# Patient Record
Sex: MALE | Race: BLACK OR AFRICAN AMERICAN | NOT HISPANIC OR LATINO | Employment: OTHER | URBAN - METROPOLITAN AREA
[De-identification: names, ages, dates, MRNs, and addresses within clinical notes are randomized per-mention and may not be internally consistent; named-entity substitution may affect disease eponyms.]

---

## 2017-02-13 RX ORDER — SILDENAFIL 100 MG/1
100 TABLET, FILM COATED ORAL DAILY PRN
Qty: 10 TABLET | Refills: 3 | Status: SHIPPED | OUTPATIENT
Start: 2017-02-13 | End: 2017-05-25

## 2017-02-17 ENCOUNTER — CLINICAL SUPPORT (OUTPATIENT)
Dept: OPHTHALMOLOGY | Facility: CLINIC | Age: 62
End: 2017-02-17
Payer: COMMERCIAL

## 2017-02-17 ENCOUNTER — OFFICE VISIT (OUTPATIENT)
Dept: OPHTHALMOLOGY | Facility: CLINIC | Age: 62
End: 2017-02-17
Payer: COMMERCIAL

## 2017-02-17 DIAGNOSIS — H40.1131 PRIMARY OPEN ANGLE GLAUCOMA OF BOTH EYES, MILD STAGE: Primary | ICD-10-CM

## 2017-02-17 DIAGNOSIS — H53.432 ARCUATE VISUAL FIELD DEFECT OF LEFT EYE: ICD-10-CM

## 2017-02-17 DIAGNOSIS — H31.002 CHORIORETINAL SCAR, LEFT: ICD-10-CM

## 2017-02-17 DIAGNOSIS — H40.1131 PRIMARY OPEN ANGLE GLAUCOMA OF BOTH EYES, MILD STAGE: ICD-10-CM

## 2017-02-17 DIAGNOSIS — H33.052 OLD SUBTOTAL RETINAL DETACHMENT, LEFT: ICD-10-CM

## 2017-02-17 DIAGNOSIS — H21.562 PUPILLARY SPHINCTER RUPTURE, LEFT: ICD-10-CM

## 2017-02-17 PROCEDURE — 92012 INTRM OPH EXAM EST PATIENT: CPT | Mod: S$GLB,,, | Performed by: OPHTHALMOLOGY

## 2017-02-17 PROCEDURE — 99999 PR PBB SHADOW E&M-EST. PATIENT-LVL II: CPT | Mod: PBBFAC,,, | Performed by: OPHTHALMOLOGY

## 2017-02-17 PROCEDURE — 92134 CPTRZ OPH DX IMG PST SGM RTA: CPT | Mod: S$GLB,,, | Performed by: OPHTHALMOLOGY

## 2017-02-17 PROCEDURE — 92020 GONIOSCOPY: CPT | Mod: S$GLB,,, | Performed by: OPHTHALMOLOGY

## 2017-02-17 NOTE — PROGRESS NOTES
HRT done-[facility]    Assessment /Plan     For exam results, see Encounter Report.    Primary open angle glaucoma of both eyes, mild stage  -     [facility] (HRT) - OU - Both Eyes

## 2017-02-17 NOTE — PROGRESS NOTES
HPI     Glaucoma    Additional comments: HRT reveiw today           Comments   DLS: 10/17/16    1. POAG  2. VF Defect OS  3. Chorioretinal Scar OS  4. Hx RD OS  5. NS OU  6. Type 2 DM no   7. Presbyopia    MEDS:  Lumigan QHS OU       Last edited by Mayda Sterling MA on 2/17/2017  8:41 AM. (History)            Assessment /Plan     For exam results, see Encounter Report.    Primary open angle glaucoma of both eyes, mild stage    Arcuate visual field defect of left eye    Chorioretinal scar, left    Old subtotal retinal detachment, left    Pupillary sphincter rupture, left        Pt initially dx with glaucoma at [facility] - stoppped his gtts on his own   Presented to [facility] - to [facility] 8/23/2006 - off gtts with a baseline / Tmax of 25/27  Referred by Courtney for consideration of SLT's   Pt with POAG and poorly controlled IOP's - does not use drops regularly       1. Primary open angle glaucoma, both eyes, mild stage        Glaucoma (type and duration)    POAG with elevated IOP ou - mild stage    First HVF   2007 - full od // SAD os 2/2 to CRS   First photos   2011   Treatment / Drops started   2006           Family history    ++ mother and 2 brothers         Glaucoma meds    lumigan (poor compliance) // gen cosopt - poor compliance         H/O adverse rxn to glaucoma drops    None (has tired alphagan and timolol in past -non compliant)         LASERS    SLT os - 3/31/2016 // SLT od - 4/14/2016         GLAUCOMA SURGERIES    none        OTHER EYE SURGERIES    H/O RD repair os - S/P laser repair         CDR    0.75 // 0.8        Tbase    25/27          Tmax    25/27            Ttarget    18/18             HVF    4 test 2007 to  2016 - full  od // SAD - 2/2 CRS from laser for RD repair os        Gonio    +3 ou        CCT    486/502 - thin ou         OCT    4 test 2011 to 2016 - RNFL - nl od // fluctuate - dec. I (has a CRS)  os        HRT    1 test 2017 to 2017 - MR -  Dec. S/N/I od // dec. S/N/I os /// CDR 0.73 od // 0.81  os        Disc photos    2011, 2015     - Ttoday    18/18/  - Test done today    HRT / gonio      2. 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 Chorioretinal scar, left   -with 2nd VF defect      4. Old subtotal retinal detachment, left   S/P repair - retina flat - stable   -large CRS      5. Type 2 diabetes mellitus without ophthalmic manifestations      6. Senile nuclear sclerosis  -mild - monitor      7. pupil sphincter rupture os  -suggest old trauma  -does NOT appear to have a angle recession    7. Presbyopia       PLAN    POAG with OHT OS > OD - mild od // moderate os   The ON and VF's are still good ou   The VF loss os is 2/2 old CRS- ?  post laser for a RD repair   Good initial response to SLT ou 25/25 --> 16/17      F/U 4 months HVF / DFE / photos     I have seen and personally examined the patient.  I agree with the findings, assessment and plan of the resident and/or fellow.     Bethany Lechuga MD

## 2017-02-22 ENCOUNTER — PATIENT MESSAGE (OUTPATIENT)
Dept: INTERNAL MEDICINE | Facility: CLINIC | Age: 62
End: 2017-02-22

## 2017-02-22 DIAGNOSIS — I10 ESSENTIAL HYPERTENSION: Primary | ICD-10-CM

## 2017-02-23 RX ORDER — LOSARTAN POTASSIUM 100 MG/1
100 TABLET ORAL DAILY
Qty: 30 TABLET | Refills: 10 | Status: SHIPPED | OUTPATIENT
Start: 2017-02-23 | End: 2017-03-01

## 2017-03-01 RX ORDER — LOSARTAN POTASSIUM 100 MG/1
100 TABLET ORAL DAILY
Qty: 90 TABLET | Refills: 3 | Status: SHIPPED | OUTPATIENT
Start: 2017-03-01 | End: 2017-03-27 | Stop reason: SDUPTHER

## 2017-03-27 ENCOUNTER — OFFICE VISIT (OUTPATIENT)
Dept: INTERNAL MEDICINE | Facility: CLINIC | Age: 62
End: 2017-03-27
Payer: COMMERCIAL

## 2017-03-27 ENCOUNTER — LAB VISIT (OUTPATIENT)
Dept: LAB | Facility: HOSPITAL | Age: 62
End: 2017-03-27
Attending: FAMILY MEDICINE
Payer: COMMERCIAL

## 2017-03-27 VITALS
BODY MASS INDEX: 25.71 KG/M2 | SYSTOLIC BLOOD PRESSURE: 130 MMHG | DIASTOLIC BLOOD PRESSURE: 70 MMHG | HEIGHT: 67 IN | WEIGHT: 163.81 LBS | OXYGEN SATURATION: 98 % | HEART RATE: 83 BPM

## 2017-03-27 DIAGNOSIS — E78.5 HYPERLIPIDEMIA, UNSPECIFIED HYPERLIPIDEMIA TYPE: ICD-10-CM

## 2017-03-27 DIAGNOSIS — I10 ESSENTIAL HYPERTENSION: ICD-10-CM

## 2017-03-27 DIAGNOSIS — E11.9 TYPE 2 DIABETES MELLITUS WITHOUT COMPLICATION, WITHOUT LONG-TERM CURRENT USE OF INSULIN: ICD-10-CM

## 2017-03-27 DIAGNOSIS — I10 ESSENTIAL HYPERTENSION: Primary | ICD-10-CM

## 2017-03-27 DIAGNOSIS — C61 PROSTATE CANCER: ICD-10-CM

## 2017-03-27 LAB
ALBUMIN SERPL BCP-MCNC: 3.8 G/DL
ALP SERPL-CCNC: 72 U/L
ALT SERPL W/O P-5'-P-CCNC: 29 U/L
ANION GAP SERPL CALC-SCNC: 7 MMOL/L
AST SERPL-CCNC: 31 U/L
BASOPHILS # BLD AUTO: 0.04 K/UL
BASOPHILS NFR BLD: 0.4 %
BILIRUB SERPL-MCNC: 0.5 MG/DL
BUN SERPL-MCNC: 13 MG/DL
CALCIUM SERPL-MCNC: 9.4 MG/DL
CHLORIDE SERPL-SCNC: 109 MMOL/L
CHOLEST/HDLC SERPL: 3.4 {RATIO}
CO2 SERPL-SCNC: 28 MMOL/L
CREAT SERPL-MCNC: 1.1 MG/DL
DIFFERENTIAL METHOD: ABNORMAL
EOSINOPHIL # BLD AUTO: 0.4 K/UL
EOSINOPHIL NFR BLD: 4.1 %
ERYTHROCYTE [DISTWIDTH] IN BLOOD BY AUTOMATED COUNT: 12.5 %
EST. GFR  (AFRICAN AMERICAN): >60 ML/MIN/1.73 M^2
EST. GFR  (NON AFRICAN AMERICAN): >60 ML/MIN/1.73 M^2
GLUCOSE SERPL-MCNC: 109 MG/DL
HCT VFR BLD AUTO: 39.3 %
HDL/CHOLESTEROL RATIO: 29.7 %
HDLC SERPL-MCNC: 138 MG/DL
HDLC SERPL-MCNC: 41 MG/DL
HGB BLD-MCNC: 13.3 G/DL
LDLC SERPL CALC-MCNC: 77.6 MG/DL
LYMPHOCYTES # BLD AUTO: 2.9 K/UL
LYMPHOCYTES NFR BLD: 31.6 %
MCH RBC QN AUTO: 30.1 PG
MCHC RBC AUTO-ENTMCNC: 33.8 %
MCV RBC AUTO: 89 FL
MONOCYTES # BLD AUTO: 0.8 K/UL
MONOCYTES NFR BLD: 8.5 %
NEUTROPHILS # BLD AUTO: 5.1 K/UL
NEUTROPHILS NFR BLD: 55.2 %
NONHDLC SERPL-MCNC: 97 MG/DL
PLATELET # BLD AUTO: 272 K/UL
PMV BLD AUTO: 9.8 FL
POTASSIUM SERPL-SCNC: 3.9 MMOL/L
PROT SERPL-MCNC: 7.2 G/DL
RBC # BLD AUTO: 4.42 M/UL
SODIUM SERPL-SCNC: 144 MMOL/L
TRIGL SERPL-MCNC: 97 MG/DL
TSH SERPL DL<=0.005 MIU/L-ACNC: 1.61 UIU/ML
WBC # BLD AUTO: 9.26 K/UL

## 2017-03-27 PROCEDURE — 85025 COMPLETE CBC W/AUTO DIFF WBC: CPT

## 2017-03-27 PROCEDURE — 1160F RVW MEDS BY RX/DR IN RCRD: CPT | Mod: S$GLB,,, | Performed by: FAMILY MEDICINE

## 2017-03-27 PROCEDURE — 80053 COMPREHEN METABOLIC PANEL: CPT

## 2017-03-27 PROCEDURE — 83036 HEMOGLOBIN GLYCOSYLATED A1C: CPT

## 2017-03-27 PROCEDURE — 3066F NEPHROPATHY DOC TX: CPT | Mod: S$GLB,,, | Performed by: FAMILY MEDICINE

## 2017-03-27 PROCEDURE — 3075F SYST BP GE 130 - 139MM HG: CPT | Mod: S$GLB,,, | Performed by: FAMILY MEDICINE

## 2017-03-27 PROCEDURE — 80061 LIPID PANEL: CPT

## 2017-03-27 PROCEDURE — 3078F DIAST BP <80 MM HG: CPT | Mod: S$GLB,,, | Performed by: FAMILY MEDICINE

## 2017-03-27 PROCEDURE — 36415 COLL VENOUS BLD VENIPUNCTURE: CPT

## 2017-03-27 PROCEDURE — 3045F PR MOST RECENT HEMOGLOBIN A1C LEVEL 7.0-9.0%: CPT | Mod: S$GLB,,, | Performed by: FAMILY MEDICINE

## 2017-03-27 PROCEDURE — 99214 OFFICE O/P EST MOD 30 MIN: CPT | Mod: S$GLB,,, | Performed by: FAMILY MEDICINE

## 2017-03-27 PROCEDURE — 99999 PR PBB SHADOW E&M-EST. PATIENT-LVL III: CPT | Mod: PBBFAC,,, | Performed by: FAMILY MEDICINE

## 2017-03-27 PROCEDURE — 84153 ASSAY OF PSA TOTAL: CPT

## 2017-03-27 PROCEDURE — 84443 ASSAY THYROID STIM HORMONE: CPT

## 2017-03-27 RX ORDER — METFORMIN HYDROCHLORIDE 750 MG/1
750 TABLET, EXTENDED RELEASE ORAL EVERY MORNING
Qty: 90 TABLET | Refills: 3 | Status: SHIPPED | OUTPATIENT
Start: 2017-03-27 | End: 2017-03-28 | Stop reason: SDUPTHER

## 2017-03-27 RX ORDER — ATORVASTATIN CALCIUM 80 MG/1
80 TABLET, FILM COATED ORAL DAILY
Qty: 90 TABLET | Refills: 3 | Status: SHIPPED | OUTPATIENT
Start: 2017-03-27 | End: 2017-12-08 | Stop reason: SDUPTHER

## 2017-03-27 RX ORDER — LOSARTAN POTASSIUM 100 MG/1
100 TABLET ORAL DAILY
Qty: 90 TABLET | Refills: 3 | Status: SHIPPED | OUTPATIENT
Start: 2017-03-27 | End: 2017-12-08 | Stop reason: SDUPTHER

## 2017-03-27 NOTE — PROGRESS NOTES
Subjective:       Patient ID: Eyad Garcia is a 61 y.o. male.    Chief Complaint: Annual Exam    HPI Comments: Patient is here for follow-up of their chronic diseases, see last note for details  No complaints    Review of Systems   Constitutional: Negative for chills and fatigue.   HENT: Negative for ear pain.    Eyes: Negative for pain.   Respiratory: Negative for chest tightness.    Cardiovascular: Negative for chest pain.   Gastrointestinal: Negative for abdominal pain.   Genitourinary: Negative for flank pain.   Musculoskeletal: Negative for gait problem.   Neurological: Negative for syncope.   Psychiatric/Behavioral: Negative for behavioral problems.       Objective:      Physical Exam   Constitutional: He appears well-developed.   HENT:   Head: Normocephalic and atraumatic.   Eyes: EOM are normal.   Neck: Neck supple.   Cardiovascular: Normal rate and normal heart sounds.    Pulmonary/Chest: Breath sounds normal. No respiratory distress. He has no wheezes. He has no rales.   Abdominal: Soft.   Musculoskeletal: He exhibits no edema.   Neurological: He is alert.   Skin: Skin is dry.   Psychiatric: His behavior is normal.       Assessment:       1. Essential hypertension    2. Type 2 diabetes mellitus without complication, without long-term current use of insulin    3. Hyperlipidemia, unspecified hyperlipidemia type    4. Prostate cancer        Plan:       Eyad was seen today for annual exam.    Diagnoses and all orders for this visit:    Essential hypertension  -controlled, stable, no change in meds  -     CBC auto differential; Future  -     Lipid panel; Future  -     TSH; Future  -     Comprehensive metabolic panel; Future  -     losartan (COZAAR) 100 MG tablet; Take 1 tablet (100 mg total) by mouth once daily.    Type 2 diabetes mellitus without complication, without long-term current use of insulin - controlled for age  Hemoglobin A1C   Date Value Ref Range Status   06/27/2016 7.1 (H) 4.5 - 6.2 % Final    10/19/2015 6.8 (H) 4.5 - 6.2 % Final   07/06/2015 6.6 (H) 4.5 - 6.2 % Final     -     Hemoglobin A1c; Future  -     metformin (GLUCOPHAGE-XR) 750 MG 24 hr tablet; Take 1 tablet (750 mg total) by mouth every morning.    Hyperlipidemia, unspecified hyperlipidemia type  -     Lipid panel; Future  -     atorvastatin (LIPITOR) 80 MG tablet; Take 1 tablet (80 mg total) by mouth once daily.    Prostate cancer - s/p surgery  -     PSA, Screening; Future

## 2017-03-28 ENCOUNTER — TELEPHONE (OUTPATIENT)
Dept: INTERNAL MEDICINE | Facility: CLINIC | Age: 62
End: 2017-03-28

## 2017-03-28 DIAGNOSIS — E11.9 TYPE 2 DIABETES MELLITUS WITHOUT COMPLICATION, WITHOUT LONG-TERM CURRENT USE OF INSULIN: ICD-10-CM

## 2017-03-28 LAB
COMPLEXED PSA SERPL-MCNC: 0.14 NG/ML
ESTIMATED AVG GLUCOSE: 160 MG/DL
HBA1C MFR BLD HPLC: 7.2 %

## 2017-03-28 RX ORDER — METFORMIN HYDROCHLORIDE 750 MG/1
1500 TABLET, EXTENDED RELEASE ORAL
Qty: 180 TABLET | Refills: 3 | Status: SHIPPED | OUTPATIENT
Start: 2017-03-28 | End: 2017-12-08 | Stop reason: SDUPTHER

## 2017-05-22 ENCOUNTER — PATIENT MESSAGE (OUTPATIENT)
Dept: UROLOGY | Facility: CLINIC | Age: 62
End: 2017-05-22

## 2017-05-25 ENCOUNTER — OFFICE VISIT (OUTPATIENT)
Dept: UROLOGY | Facility: CLINIC | Age: 62
End: 2017-05-25
Payer: COMMERCIAL

## 2017-05-25 ENCOUNTER — TELEPHONE (OUTPATIENT)
Dept: UROLOGY | Facility: CLINIC | Age: 62
End: 2017-05-25

## 2017-05-25 VITALS
DIASTOLIC BLOOD PRESSURE: 82 MMHG | BODY MASS INDEX: 25.22 KG/M2 | HEART RATE: 82 BPM | WEIGHT: 160.69 LBS | HEIGHT: 67 IN | SYSTOLIC BLOOD PRESSURE: 131 MMHG

## 2017-05-25 DIAGNOSIS — N52.9 ERECTILE DYSFUNCTION, UNSPECIFIED ERECTILE DYSFUNCTION TYPE: ICD-10-CM

## 2017-05-25 DIAGNOSIS — C61 PROSTATE CANCER: ICD-10-CM

## 2017-05-25 DIAGNOSIS — E78.5 HYPERLIPIDEMIA, UNSPECIFIED HYPERLIPIDEMIA TYPE: ICD-10-CM

## 2017-05-25 DIAGNOSIS — E11.9 TYPE 2 DIABETES MELLITUS WITHOUT COMPLICATION, WITHOUT LONG-TERM CURRENT USE OF INSULIN: ICD-10-CM

## 2017-05-25 DIAGNOSIS — J81.0 ACUTE PULMONARY EDEMA: Primary | ICD-10-CM

## 2017-05-25 DIAGNOSIS — N52.31 ERECTILE DYSFUNCTION FOLLOWING RADICAL PROSTATECTOMY: Primary | ICD-10-CM

## 2017-05-25 PROCEDURE — 99999 PR PBB SHADOW E&M-EST. PATIENT-LVL III: CPT | Mod: PBBFAC,,, | Performed by: UROLOGY

## 2017-05-25 PROCEDURE — 3066F NEPHROPATHY DOC TX: CPT | Mod: S$GLB,,, | Performed by: UROLOGY

## 2017-05-25 PROCEDURE — 3045F PR MOST RECENT HEMOGLOBIN A1C LEVEL 7.0-9.0%: CPT | Mod: S$GLB,,, | Performed by: UROLOGY

## 2017-05-25 PROCEDURE — 99214 OFFICE O/P EST MOD 30 MIN: CPT | Mod: S$GLB,,, | Performed by: UROLOGY

## 2017-05-25 NOTE — PROGRESS NOTES
CHIEF COMPLAINT:     Mr. Garcia is a 61 y.o. male presenting with ED.    PRESENTING ILLNESS:    Eyad Garcia is a 61 y.o. male s/p RRP by Dr. Palacios on 7/13/15.  Since then, he c/o ED. Prior to surgery he did not have ED.   He has tried and failed oral meds.  He's failed ICI.    He is continent.  No hematuria.  No dysuria.  Good FOS.    No bone pain or weight loss.    REVIEW OF SYSTEMS:    Patient denies bleeding diathesis, chills, decreased size/force of stream, dysuria, fever, flank pain, frequency or urgency, hematuria, hesitancy, intermittency or feeling of incomplete emptying, stones, TB or genitourinary trauma and urethral discharge.    MU  1. 1  2. 0  3. 1  4. 1  5. 1     PATIENT HISTORY:    Past Medical History:   Diagnosis Date    Cataract     Diabetes mellitus type II     Difficult intubation     Elevated PSA     Hyperlipidemia     Hypertension     OA (osteoarthritis)     POAG (primary open-angle glaucoma)     Retinal detachment     OS       Past Surgical History:   Procedure Laterality Date    CYSTOSCOPY      HERNIA REPAIR      KNEE SURGERY      left    PROSTATE SURGERY      RETINAL DETACHMENT SURGERY      OS    SELECTIVE LASER TRABECUPLASTY Bilateral 4/16    OU WITH        Family History   Problem Relation Age of Onset    Diabetes Mother     Hypertension Mother     Glaucoma Mother     Diabetes Sister     Glaucoma Sister     Diabetes Brother     Glaucoma Brother     No Known Problems Father     No Known Problems Maternal Aunt     No Known Problems Maternal Uncle     No Known Problems Paternal Aunt     No Known Problems Paternal Uncle     No Known Problems Maternal Grandmother     No Known Problems Maternal Grandfather     No Known Problems Paternal Grandmother     No Known Problems Paternal Grandfather     Anesthesia problems Neg Hx     Broken bones Neg Hx     Cancer Neg Hx     Clotting disorder Neg Hx     Collagen disease Neg Hx     Dislocations Neg  Hx     Osteoporosis Neg Hx     Rheumatologic disease Neg Hx     Scoliosis Neg Hx     Severe sprains Neg Hx     Amblyopia Neg Hx     Blindness Neg Hx     Cataracts Neg Hx     Macular degeneration Neg Hx     Retinal detachment Neg Hx     Strabismus Neg Hx     Stroke Neg Hx     Thyroid disease Neg Hx        Social History     Social History    Marital status:      Spouse name: N/A    Number of children: N/A    Years of education: N/A     Occupational History    Not on file.     Social History Main Topics    Smoking status: Never Smoker    Smokeless tobacco: Never Used    Alcohol use No    Drug use: No    Sexual activity: Yes     Partners: Female     Other Topics Concern    Not on file     Social History Narrative    Works for [employer]       Allergies:  Aspirin and Sulfa (sulfonamide antibiotics)    Medications:    Current Outpatient Prescriptions:     atorvastatin (LIPITOR) 80 MG tablet, Take 1 tablet (80 mg total) by mouth once daily., Disp: 90 tablet, Rfl: 3    bimatoprost (LUMIGAN) 0.01 % Drop, Place 1 drop into both eyes every morning., Disp: 2.5 mL, Rfl: 12    efinaconazole (JUBLIA) 10 % Hans, Apply 1 application topically once daily., Disp: 8 mL, Rfl: 12    losartan (COZAAR) 100 MG tablet, Take 1 tablet (100 mg total) by mouth once daily., Disp: 90 tablet, Rfl: 3    metformin (GLUCOPHAGE-XR) 750 MG 24 hr tablet, Take 2 tablets (1,500 mg total) by mouth daily with breakfast., Disp: 180 tablet, Rfl: 3    papaverine 30 mg/mL injection, Add: Phentolamine 10 mg Add: PGE1 100 mcg  Sig:  Inject 25 units (0.25 mls) as directed, Disp: 10 mL, Rfl: 11    sildenafil (VIAGRA) 100 MG tablet, Take 1 tablet (100 mg total) by mouth daily as needed for Erectile Dysfunction. Take 1 hour before intercourse., Disp: 10 tablet, Rfl: 3    PHYSICAL EXAMINATION:    The patient generally appears in good health, is appropriately interactive, and is in no apparent distress.    Skin: No  lesions.    Mental: Cooperative with normal affect.    Neuro: Grossly intact.    HEENT: Normal. No evidence of lymphadenopathy.    Chest: Clear to ascultation bilaterally, normal inspiratory effort.    Heart: Regular rhythm.  No murmurs    Abdomen: BS active. Soft, non-tender. No masses or organomegaly. Bladder is not palpable. No evidence of flank discomfort. No evidence of inguinal hernia.    Extremities: No clubbing, cyanosis, or edema      LABS:    UA dipped negative today    Lab Results   Component Value Date    PSA 0.14 03/27/2017    PSA 6.6 (H) 02/27/2015    PSA 5.48 (H) 05/13/2013    PSADIAG 0.11 06/27/2016    PSADIAG 0.03 08/27/2015       IMPRESSION:    Encounter Diagnoses   Name Primary?    Erectile dysfunction following radical prostatectomy Yes    Prostate cancer     Type 2 diabetes mellitus without complication, without long-term current use of insulin     Hyperlipidemia, unspecified hyperlipidemia type    HTN, controlled  Hyperlipidemia, controlled  DM, controlled      PLAN:    1. Discussed options for him.  He would like an IPP.  2. Patient read the IPP handout and understands the risks associated with this procedure. He knows the risk of infection as well as surgery requirements if it were to become infected. He understands the impact on spontaneous and nocturnal erections, as well as need for replacement and repair, which was clearly outlined in verbal and written form.  3.Risks discussed were You have elected to undergo placement of a penile prosthesis. Several devices are currently available in the marketplace, including those which are non-inflatable, versus two- or three-piece inflatable prostheses. All of these devices have the capacity to give you the opportunity to have a rigid penis on demand and to be used as frequently and as long as you would like. There are, therefore, many advantages to the use of the prosthesis which you have already discussed with your physician. The goal of this  informed consent form is to identify the potential problems that have been recognized to occur with penile prosthesis placement and that you understand the risks of undergoing prosthetic placement. It is important to recognize that as a result of improvements in design as well as better surgical technique, that all of the risks listed below are less than they were even 10 years ago. It is also important to recognize that most of the available studies report on historical data for devices which are now either not manufactured or have undergone structural improvements to reduce those side effects. The complications are simply noted in random order and do not reflect their frequency.    1. Prosthesis mechanical failure occurs as a result of leakage of fluid from the inflatable types of devices. This typically occurs as a result of a fracture in the tubing, most commonly as it emerges out of the scrotal pump, but it can also be due to a leak from the erectile cylinders in the penis. It is felt that this occurs as a result of repetitive mechanical trauma, which weakens the tubing and causes it to crack. When the fluid leaks, it is not something you would be aware of. It does not cause pain. The fluid contained within the device is typically a sterile saline solution that will simply be reabsorbed by the body. What you will recognize is that when you squeeze the pump, there will be no transfer of fluid and no rigidity or inadequate rigidity. The most recent long-term data looking at 5-10 year success reports mechanical failure in the 5-10% range over that period of time. Looked at another way, 90-95% of men will have a functional prosthesis in 10 years. These devices are designed to be used for life. Each company has its own warrantee which you should discuss with your physician.    2. Infection is a disastrous complication which usually requires the removal of the prosthesis, as it is rare to be able to clear infection  "so long as the prosthesis is within the body. Occasionally, the infected prosthesis can be removed, the location of the device can be washed out vigorously with a special antibiotic salvage procedure and then a new device may be able to be immediately placed. When this is not possible, the infected device is removed and then a period of healing will follow where the device can be replaced after a period of healing (6 weeks to 6 months). Delayed replacement of a prosthesis after initial removal is a more complicated operation associated with the potential for not being able to replace the device because of scarring but other problems such as shortening of the penis or change in sensation and shape may also occur.    As a result of improved surgical technique and device design, infection rates are now markedly reduced, typically being reported in the <1-2% range for new prosthesis placements and up to 3% when the prosthesis is uninfected and has mechanically failed.    3. Bleeding and hematoma are rarely a problem. There is likely to be localized swelling as well as "black and blue" of the penis, groin area, and scrotal sack. These will resolve without treatment over 1-3 weeks. Rarely a scrotal hematoma (collection of blood) will develop which can be treated with rest; it will reabsorb with time or, if it is growing in size or painful, it may need to be evacuated surgically (opened up and washed out). The risk of needing a blood transfusion after penile prosthesis placement is extremely rare to nonexistent.    4. Post-operative pain is variable and can be minimal, but in most men it is quite significant. Your physician will provide you with adequate pain medication to help control the pain, but not necessarily eliminate it entirely. As the prosthesis heals, there will be complete resolution of the pain, such that you will typically not even be aware that the prosthesis is within your body unless you were to touch it " directly. Complete pain resolution will most commonly occur within 4-12 weeks postoperatively. Post-operative pain is typically managed with oral narcotic agents. You should be aware that these drugs can cause constipation as well as sleepiness; therefore, you should not be in any position where you might need to make important decision or driving until the pain is under better control without the need for narcotic pain killers. It is recommended that during the first several days after the operation, that you spend as little time as possible on your feet, as this will encourage healing, reduce swelling, and result in more rapid resolution of pain.    5. Loss of length -  Penile shortening is probably the reason that most men are disappointed with the outcome from their prosthesis surgery. Studies have shown that when the flaccid penile stretched length is measured preoperatively, that the actual loss of length following placement of the prosthesis is on average no more than one centimeter (1/3 inch). To reduce the likelihood of loss of length, the surgeon will do his best to place the proper size device that fits your penis. You can expect that the length of your penis will be much like what you see when you grab the head of the penis and pull it straight out away from your body. Many men who believe they have lost length in their penis following prosthesis surgery in fact did not really lose length because of the surgery, but rather because they have not had had a full erection for some time during which there may have been some loss of tissue elasticity and thereby shortening of the penis. In addition, they may have gained some weight in the pubic area which will cover the penis and make it look shorter. Lastly, they may be expecting that the postoperative result will be like the erections they had when they were a much younger man. Although the goal is to make the penis as long as possible, one can expect that  the rigidity of the penis will be much like the erections obtained as a younger man.    6. Decreased girth - Girth is typically not substantially changed as most cylinders can expand and fill the penis satisfactorily, but if there has been scarring within the tissues of the penis, this can prevent expansion and result in a narrower appearing penis. The surgeon will do his best to put the largest cylinder in the penis, but there are limitations to which the tissues can expand. Decreased girth is not a common complaint.    7. Sensory loss - By and large the surgical techniques being used to avoid injury to the sensory nerves of the penis. Therefore, there is rarely any significant change in the sexual sensation of the penis. Some men find that it takes longer for them to experience orgasm. This may occur because they can simply inflate the penis without any sexuall arousal, and then it will seem to take longer for them to become aroused, resulting in the prolonged time to orgasm. Therefore, proper sexual stimulation is important to enjoy the entire sexual experience with a prosthesis.    8. Change in shape - The overall shape or configuration of the penis is rarely altered as a result of placement of any type of prosthesis, but if there is some unidentified scarring involving the penis such as that which occurs with Peyronie's disease, some curvature or indentations including hourglass narrowing can be identified along the shaft. Typically, in the postoperative period, the prosthesis will cause an internal tissue expansion which will correct these deformities. This may take 6-9 months occur.    9. Erosion or cylinder extrusion - If the tissues in the tips of the penis are weakened either by previous internal penile disease or as a result of the surgery, the prosthetic cylinder may migrate distally into the head of the penis and may appear to be ready to poke through the skin or the urethra. By all means, if such an  "irregularity is seen, one should address it with your doctor before the prosthesis is exposed so that it can be corrected without developing infection. This problem occurs in <1% of cases.    10. Pump problems - These include difficulty in activating or deactivating the pump as well as change of pump location due to migration or fixation of the pump to the scrotal skin. These problems are also quite unusual but can happen as a result of an altered healing process or a malposition of the pump by the implanting surgeon. If the pump were to migrate or become fixed or difficult to manipulate because of its position, a simple outpatient scrotal procedure can be performed to reposition the pump which is almost universally successful. This procedure is performed in no more than 1% of cases.    Prosthesis care - In the postoperative period, it is best to take the antibiotics prescribed by your physician, reduce your physical activity to reduce swelling and enhance healing, take pain medicine for your comfort, and avoid submergingthe incision during bathing for a minimum of 1-4 weeks. Specific bathing instructions will be issued by your physician. It is not uncommon to have an inflatable prosthesis partially fill during the postoperative period involuntarily. This is called "auto-inflation." To prevent this problem, it is important that once the prosthesis is activated, which is typically 4-6 weeks after surgery, that you perform what is known as "cycling" of the device. Cycling means complete inflation and then complete deflation of the penile cylinders twice per day for one month. In doing this, the tissues around the prosthesis are softened and stretched allowing complete deflation of the device into the reservoir. It also will allow you to become more familiar with operating the device and make it easier for you to activate it quickly and inconspicuously when you want to engage in sexual relations. If you have an " inflatable device with a scrotal pump, it is best to inflate it without twisting it. The repetitive twisting of the pump can weaken the connections between the tubing and the pump and is the most common cause for mechanical failure of the prosthesis.    It is hoped that this review will inform you of the potential problems associated with your prosthesis and as a result, will make for more realistic expectations regarding the outcome.      Copy to:

## 2017-06-22 ENCOUNTER — OFFICE VISIT (OUTPATIENT)
Dept: OPHTHALMOLOGY | Facility: CLINIC | Age: 62
End: 2017-06-22
Payer: COMMERCIAL

## 2017-06-22 ENCOUNTER — CLINICAL SUPPORT (OUTPATIENT)
Dept: OPHTHALMOLOGY | Facility: CLINIC | Age: 62
End: 2017-06-22
Payer: COMMERCIAL

## 2017-06-22 DIAGNOSIS — H31.002 CHORIORETINAL SCAR, LEFT: ICD-10-CM

## 2017-06-22 DIAGNOSIS — H21.562 PUPILLARY SPHINCTER RUPTURE, LEFT: ICD-10-CM

## 2017-06-22 DIAGNOSIS — H53.432 ARCUATE VISUAL FIELD DEFECT OF LEFT EYE: Primary | ICD-10-CM

## 2017-06-22 DIAGNOSIS — H40.1131 PRIMARY OPEN ANGLE GLAUCOMA OF BOTH EYES, MILD STAGE: ICD-10-CM

## 2017-06-22 DIAGNOSIS — E11.9 TYPE 2 DIABETES MELLITUS WITHOUT OPHTHALMIC MANIFESTATIONS: ICD-10-CM

## 2017-06-22 DIAGNOSIS — H33.052 OLD SUBTOTAL RETINAL DETACHMENT, LEFT: ICD-10-CM

## 2017-06-22 DIAGNOSIS — H52.4 BILATERAL PRESBYOPIA: ICD-10-CM

## 2017-06-22 PROCEDURE — 92014 COMPRE OPH EXAM EST PT 1/>: CPT | Mod: S$GLB,,, | Performed by: OPHTHALMOLOGY

## 2017-06-22 PROCEDURE — 99999 PR PBB SHADOW E&M-EST. PATIENT-LVL II: CPT | Mod: PBBFAC,,, | Performed by: OPHTHALMOLOGY

## 2017-06-22 PROCEDURE — 92250 FUNDUS PHOTOGRAPHY W/I&R: CPT | Mod: S$GLB,,, | Performed by: OPHTHALMOLOGY

## 2017-06-22 PROCEDURE — 92083 EXTENDED VISUAL FIELD XM: CPT | Mod: S$GLB,,, | Performed by: OPHTHALMOLOGY

## 2017-06-22 NOTE — PROGRESS NOTES
HPI     Glaucoma    Additional comments: HVF review today           Eye Problem    Additional comments: Pt c/o irritated OS x 1 week with tearing and FB   sensation           Comments   DLS: 2/17/17    1. POAG  2. VF Defect OS  3. Chorioretinal Scar OS  4. Hx RD OS  5. NS OU  6. Type 2 DM no DR  7. Presbyopia    MEDS:  Lumigan QDAY OU       Last edited by Mayda Sterling MA on 6/22/2017  1:51 PM. (History)            Assessment /Plan     For exam results, see Encounter Report.    Arcuate visual field defect of left eye    Primary open angle glaucoma of both eyes, mild stage  -     bimatoprost (LUMIGAN) 0.01 % Drop; Place 1 drop into both eyes every morning.  Dispense: 2.5 mL; Refill: 12  -     Color Fundus Photography - OU - Both Eyes  -     Petersen Visual Field - OU - Extended - Both Eyes    Chorioretinal scar, left    Old subtotal retinal detachment, left    Pupillary sphincter rupture, left    Bilateral presbyopia    Type 2 diabetes mellitus without ophthalmic manifestations        Pt initially dx with glaucoma at [facility] - stoppped his gtts on his own   Presented to [facility] - to [facility] 8/23/2006 - off gtts with a baseline / Tmax of 25/27  Referred by Courtney for consideration of SLT's   Pt with POAG and poorly controlled IOP's - does not use drops regularly       1. Primary open angle glaucoma, both eyes, mild stage        Glaucoma (type and duration)    POAG with elevated IOP ou - mild stage    First HVF   2007 - full od // SAD os 2/2 to CRS   First photos   2011   Treatment / Drops started   2006           Family history    ++ mother and 2 brothers         Glaucoma meds    lumigan (poor compliance) // gen cosopt - poor compliance         H/O adverse rxn to glaucoma drops    None (has tired alphagan and timolol in past -non compliant)         LASERS    SLT os - 3/31/2016 // SLT od - 4/14/2016         GLAUCOMA SURGERIES    none        OTHER EYE SURGERIES    H/O RD repair os - S/P laser repair         CDR    0.75  // 0.8        Tbase    25/27          Tmax    25/27            Ttarget    18/18             HVF    8 test 2006 to  2017 - full  od // SAD - 2/2 CRS from laser for RD repair os        Gonio    +3 ou        CCT    486/502 - thin ou         OCT    4 test 2011 to 2016 - RNFL - nl od // fluctuate - dec. I (has a CRS)  os        HRT    1 test 2017 to 2017 - MR -  Dec. S/N/I od // dec. S/N/I os /// CDR 0.73 od // 0.81 os        Disc photos    2011, 2015 , 2017     - Ttoday    18/18/  - Test done today    HVF / DFE / photos      2. 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 Chorioretinal scar, left   -with 2nd VF defect      4. Old subtotal retinal detachment, left   S/P repair - retina flat - stable   -large CRS      5. Type 2 diabetes mellitus without ophthalmic manifestations      6. Senile nuclear sclerosis  -mild - monitor      7. pupil sphincter rupture os  -suggest old trauma  -does NOT appear to have a angle recession    7. Presbyopia       PLAN    POAG with OHT OS > OD - mild od // moderate os   The ON and VF's are still good ou   The VF loss os is 2/2 old CRS- ?  post laser for a RD repair   Good initial response to SLT ou 25/25 --> 16/17      F/U 4 months IOP - other test are up to date     I have seen and personally examined the patient.  I agree with the findings, assessment and plan of the resident and/or fellow.     Bethany Lechuga MD

## 2017-06-29 DIAGNOSIS — Z01.818 PREOP TESTING: Primary | ICD-10-CM

## 2017-06-29 NOTE — PRE ADMISSION SCREENING
Anesthesia Assessment: Preoperative EQUATION    Planned Procedure: Procedure(s) (LRB):  INSERTION-PROSTHESIS-PENILE INFLATABLE    chris doherty [phone] [facility] (N/A)  Requested Anesthesia Type:General  Surgeon: Chaitanya Alonso MD  Service: Urology  Known or anticipated Date of Surgery:7/11/2017    Surgeon notes: reviewed    Electronic QUestionnaire Assessment completed via nurse interview with patient.      NO AQ    Triage considerations:     The patient has no apparent active cardiac condition (No unstable coronary Syndrome such as severe unstable angina or recent [<1 month] myocardial infarction, decompensated CHF, severe valvular   disease or significant arrhythmia)    Previous anesthesia records:GETA  07/13/15; Placement Time: 1234; Method of Intubation: Gutiérrez; Inserted by: Anesthesia Resident; Airway Device: Endotracheal Tube; Mask Ventilation: Easy - oral; Intubated: Postinduction; Blade: Jesse #3; Airway Device Size: 8.0; Style: Cuffed; Cuff Inflation: Minimal occlusive pressure; Placement Verified By: Auscultation, Capnometry; Grade: Grade I; Complicating Factors: None; Intubation Findings: Positive EtCO2, Bilateral breath sounds, Atraumatic/Condition of teeth unchanged;  Depth of Insertion: 24; Securment: Lips; Complications: None; Breath Sounds: Equal Bilateral; Insertion Attempts: 1    Last PCP note: 3-6 months ago , within [facility]   3/27/17    Other important co-morbidities: Difficult Intubation, DM-2, HTN, HLD, POAG     Tests already available:  Available tests,  3-6 months ago , within [facility] .  3/27/17 A1C   5/2015 EKG            Instructions given. (See in Nurse's note)    Optimization:    Plan:    Testing:  Hematology Profile and BMP       Patient  has previously scheduled Medical Appointment: 7/3 Pre-op Urology    Navigation: Tests Scheduled. Hematology Profile, BMP 7/3             Results will be tracked by Preop Clinic.    7/5  Lab results reviewed.    Sandi Rodriguez RN

## 2017-07-03 ENCOUNTER — LAB VISIT (OUTPATIENT)
Dept: LAB | Facility: HOSPITAL | Age: 62
End: 2017-07-03
Attending: ANESTHESIOLOGY
Payer: COMMERCIAL

## 2017-07-03 ENCOUNTER — OFFICE VISIT (OUTPATIENT)
Dept: UROLOGY | Facility: CLINIC | Age: 62
End: 2017-07-03
Payer: COMMERCIAL

## 2017-07-03 DIAGNOSIS — N52.9 ERECTILE DYSFUNCTION: ICD-10-CM

## 2017-07-03 DIAGNOSIS — Z01.818 PREOP TESTING: ICD-10-CM

## 2017-07-03 LAB
ANION GAP SERPL CALC-SCNC: 7 MMOL/L
BUN SERPL-MCNC: 16 MG/DL
CALCIUM SERPL-MCNC: 9.8 MG/DL
CHLORIDE SERPL-SCNC: 102 MMOL/L
CO2 SERPL-SCNC: 28 MMOL/L
CREAT SERPL-MCNC: 1.1 MG/DL
ERYTHROCYTE [DISTWIDTH] IN BLOOD BY AUTOMATED COUNT: 12.1 %
EST. GFR  (AFRICAN AMERICAN): >60 ML/MIN/1.73 M^2
EST. GFR  (NON AFRICAN AMERICAN): >60 ML/MIN/1.73 M^2
GLUCOSE SERPL-MCNC: 109 MG/DL
HCT VFR BLD AUTO: 40 %
HGB BLD-MCNC: 13.7 G/DL
MCH RBC QN AUTO: 29.9 PG
MCHC RBC AUTO-ENTMCNC: 34.3 %
MCV RBC AUTO: 87 FL
PLATELET # BLD AUTO: 267 K/UL
PMV BLD AUTO: 8.9 FL
POTASSIUM SERPL-SCNC: 4.2 MMOL/L
RBC # BLD AUTO: 4.58 M/UL
SODIUM SERPL-SCNC: 137 MMOL/L
WBC # BLD AUTO: 11.14 K/UL

## 2017-07-03 PROCEDURE — 36415 COLL VENOUS BLD VENIPUNCTURE: CPT

## 2017-07-03 PROCEDURE — 80048 BASIC METABOLIC PNL TOTAL CA: CPT

## 2017-07-03 PROCEDURE — 85027 COMPLETE CBC AUTOMATED: CPT

## 2017-07-03 PROCEDURE — 99499 UNLISTED E&M SERVICE: CPT | Mod: S$GLB,,, | Performed by: STUDENT IN AN ORGANIZED HEALTH CARE EDUCATION/TRAINING PROGRAM

## 2017-07-03 PROCEDURE — 99999 PR PBB SHADOW E&M-EST. PATIENT-LVL II: CPT | Mod: PBBFAC,,,

## 2017-07-03 NOTE — PROGRESS NOTES
Urology ([facility]) H&P  Staff: Chaitanya Alonso MD    Is this patient in a research study?  Yes    CC: erectile dysfunction    HPI:  Eyad Garcia is a 61 y.o. male with hx of prostate cancer who is s/p RALP in 7/2015. Final pathology T2aN0, [place] 6. Most recent PSA is 0.14.    Since that time he has been experiencing ED. He has tried and failed both oral meds and ICI. He desires an IPP.     He has no urinary complaints at this time. Denies hematuria, dysuria. Denies CHRISTIANO.    He has never smoked.    ROS:  Neg except per HPI    Past Medical History:   Diagnosis Date    Cataract     Diabetes mellitus type II     Difficult intubation     Elevated PSA     Hyperlipidemia     Hypertension     OA (osteoarthritis)     POAG (primary open-angle glaucoma)     Retinal detachment     OS       Past Surgical History:   Procedure Laterality Date    CYSTOSCOPY      HERNIA REPAIR      KNEE SURGERY      left    PROSTATE SURGERY      RETINAL DETACHMENT SURGERY      OS    SELECTIVE LASER TRABECUPLASTY Bilateral 4/16    OU WITH        Social History     Social History    Marital status:      Spouse name: N/A    Number of children: N/A    Years of education: N/A     Social History Main Topics    Smoking status: Never Smoker    Smokeless tobacco: Never Used    Alcohol use No    Drug use: No    Sexual activity: Yes     Partners: Female     Other Topics Concern    None     Social History Narrative    Works for [employer]       Family History   Problem Relation Age of Onset    Diabetes Mother     Hypertension Mother     Glaucoma Mother     Diabetes Sister     Glaucoma Sister     Diabetes Brother     Glaucoma Brother     No Known Problems Father     No Known Problems Maternal Aunt     No Known Problems Maternal Uncle     No Known Problems Paternal Aunt     No Known Problems Paternal Uncle     No Known Problems Maternal Grandmother     No Known Problems Maternal Grandfather     No  Known Problems Paternal Grandmother     No Known Problems Paternal Grandfather     Anesthesia problems Neg Hx     Broken bones Neg Hx     Cancer Neg Hx     Clotting disorder Neg Hx     Collagen disease Neg Hx     Dislocations Neg Hx     Osteoporosis Neg Hx     Rheumatologic disease Neg Hx     Scoliosis Neg Hx     Severe sprains Neg Hx     Amblyopia Neg Hx     Blindness Neg Hx     Cataracts Neg Hx     Macular degeneration Neg Hx     Retinal detachment Neg Hx     Strabismus Neg Hx     Stroke Neg Hx     Thyroid disease Neg Hx        Review of patient's allergies indicates:   Allergen Reactions    Aspirin Anaphylaxis     Stomach upset    Sulfa (sulfonamide antibiotics) Hives, Nausea Only and Rash      Stomach upset         Current Outpatient Prescriptions on File Prior to Visit   Medication Sig Dispense Refill    atorvastatin (LIPITOR) 80 MG tablet Take 1 tablet (80 mg total) by mouth once daily. 90 tablet 3    bimatoprost (LUMIGAN) 0.01 % Drop Place 1 drop into both eyes every morning. 2.5 mL 12    efinaconazole (JUBLIA) 10 % Hans Apply 1 application topically once daily. 8 mL 12    losartan (COZAAR) 100 MG tablet Take 1 tablet (100 mg total) by mouth once daily. 90 tablet 3    metformin (GLUCOPHAGE-XR) 750 MG 24 hr tablet Take 2 tablets (1,500 mg total) by mouth daily with breakfast. 180 tablet 3    papaverine 30 mg/mL injection Add: Phentolamine 10 mg  Add: PGE1 100 mcg    Sig:  Inject 25 units (0.25 mls) as directed 10 mL 11     No current facility-administered medications on file prior to visit.        Anticoagulation:  No    Physical Exam:  weight 160 lb/73 kg  BMI 25.5    AAOx4, NAD, WDWN  NC/AT, EOMI, PER, sclerae anicteric, speech normal, tongue midline  Nl effort, CTAB  RRR  Soft, non-tender, non-distended  Penis is circumcised. Bilateral testicles normal with no tenderness or masses. No signs of skin breakdown or infections.  CAROLINA: benign empty fossa    Labs:    Urine dipstick today  - negative    Lab Results   Component Value Date    WBC 9.26 03/27/2017    HGB 13.3 (L) 03/27/2017    HCT 39.3 (L) 03/27/2017    MCV 89 03/27/2017     03/27/2017       BMP  Lab Results   Component Value Date     03/27/2017    K 3.9 03/27/2017     03/27/2017    CO2 28 03/27/2017    BUN 13 03/27/2017    CREATININE 1.1 03/27/2017    CALCIUM 9.4 03/27/2017    ANIONGAP 7 (L) 03/27/2017    ESTGFRAFRICA >60.0 03/27/2017    EGFRNONAA >60.0 03/27/2017       Lab Results   Component Value Date    PSA 0.14 03/27/2017    PSA 6.6 (H) 02/27/2015    PSA 5.48 (H) 05/13/2013       Imaging:  No recent pertinent  imaging    Assessment: Eyad Garcia is a 61 y.o. male with erectile dysfunction s/p RALP    Plan:     1. To OR on 7/11/17 for IPP placement   2. Consents signed   3. I have explained the risk, benefits, and alternatives of the procedure in detail. The patient voices understanding and all questions have been answered. The patient agrees to proceed as planned.   4. The risks and benefits of participating in our clinical trial have been discussed and the patient has consented for the research study here at [facility].  strength questionnaires filled out and measurements obtained.   5. Sugar and dirk pre op    Dee Patricio MD

## 2017-07-05 ENCOUNTER — ANESTHESIA EVENT (OUTPATIENT)
Dept: SURGERY | Facility: HOSPITAL | Age: 62
End: 2017-07-05
Payer: COMMERCIAL

## 2017-07-05 NOTE — ANESTHESIA PREPROCEDURE EVALUATION
Pre-operative evaluation for Procedure(s) (LRB):  INSERTION-PROSTHESIS-PENILE INFLATABLE    chris doherty [phone] ams (N/A)    Eyad Garcia is a 62 y.o. male with PMH HTN, HLD, DM2, [place], Prostate CA s/p resection in 2015 and erectile dysfunction who presents for the above procedure      Prev airway:   Placement Date: 07/13/15; Placement Time: 1234; Method of Intubation: Gutiérrez; Inserted by: Anesthesia Resident; Airway Device: Endotracheal Tube; Mask Ventilation: Easy - oral; Intubated: Postinduction; Blade: Jesse #3; Airway Device Size: 8.0; Style: Cuffed; Cuff Inflation: Minimal occlusive pressure; Placement Verified By: Auscultation, Capnometry; Grade: Grade I; Complicating Factors: None; Intubation Findings: Positive EtCO2, Bilateral breath sounds, Atraumatic/Condition of teeth unchanged;  Depth of Insertion: 24; Securment: Lips; Complications: None; Breath Sounds: Equal Bilateral; Insertion Attempts: 1; Removal Date: 07/13/15;  Removal Time: 1543      Patient Active Problem List   Diagnosis    Hyperlipidemia    Old retinal detachment, total or subtotal    POAG (primary open-angle glaucoma) - Both Eyes    Diabetes mellitus, type 2    Hypertension    OA (osteoarthritis)    Hip pain    Malignant neoplasm of prostate    Prostate cancer    Erectile dysfunction       Review of patient's allergies indicates:   Allergen Reactions    Aspirin Anaphylaxis     Stomach upset    Sulfa (sulfonamide antibiotics) Hives, Nausea Only and Rash      Stomach upset          No current facility-administered medications on file prior to encounter.      Current Outpatient Prescriptions on File Prior to Encounter   Medication Sig Dispense Refill    atorvastatin (LIPITOR) 80 MG tablet Take 1 tablet (80 mg total) by mouth once daily. 90 tablet 3    losartan (COZAAR) 100 MG tablet Take 1 tablet (100 mg total) by mouth once daily. 90 tablet 3    metformin (GLUCOPHAGE-XR) 750 MG 24 hr tablet Take 2 tablets  (1,500 mg total) by mouth daily with breakfast. 180 tablet 3    efinaconazole (JUBLIA) 10 % Hans Apply 1 application topically once daily. 8 mL 12    papaverine 30 mg/mL injection Add: Phentolamine 10 mg  Add: PGE1 100 mcg    Sig:  Inject 25 units (0.25 mls) as directed 10 mL 11       Past Surgical History:   Procedure Laterality Date    CYSTOSCOPY      HERNIA REPAIR      KNEE SURGERY      left    PROSTATE SURGERY      RETINAL DETACHMENT SURGERY      OS    SELECTIVE LASER TRABECUPLASTY Bilateral     OU WITH        Social History     Social History    Marital status:      Spouse name: N/A    Number of children: N/A    Years of education: N/A     Occupational History    Not on file.     Social History Main Topics    Smoking status: Never Smoker    Smokeless tobacco: Never Used    Alcohol use No    Drug use: No    Sexual activity: Yes     Partners: Female     Other Topics Concern    Not on file     Social History Narrative    Works for [employer]         Vital Signs Range (Last 24H):         CBC: No results for input(s): WBC, RBC, HGB, HCT, PLT, MCV, MCH, MCHC in the last 72 hours.    CMP: No results for input(s): NA, K, CL, CO2, BUN, CREATININE, GLU, MG, PHOS, CALCIUM, ALBUMIN, PROT, ALKPHOS, ALT, AST, BILITOT in the last 72 hours.    INR  No results for input(s): INR, PROTIME, APTT in the last 72 hours.    Invalid input(s): PT      EK2015  Sinus tachycardia  Otherwise normal ECG    2D Echo: none on file      Anesthesia Assessment: Preoperative EQUATION     Planned Procedure: Procedure(s) (LRB):  INSERTION-PROSTHESIS-PENILE INFLATABLE    chris doherty [phone] ams (N/A)  Requested Anesthesia Type:General  Surgeon: Chaitanya Alonso MD  Service: Urology  Known or anticipated Date of Surgery:2017     Surgeon notes: reviewed     Electronic QUestionnaire Assessment completed via nurse interview with patient.      NO AQ     Triage considerations:      The patient  has no apparent active cardiac condition (No unstable coronary Syndrome such as severe unstable angina or recent [<1 month] myocardial infarction, decompensated CHF, severe valvular   disease or significant arrhythmia)     Previous anesthesia records:NATALEE  07/13/15; Placement Time: 1234; Method of Intubation: Gutiérrez; Inserted by: Anesthesia Resident; Airway Device: Endotracheal Tube; Mask Ventilation: Easy - oral; Intubated: Postinduction; Blade: Jesse #3; Airway Device Size: 8.0; Style: Cuffed; Cuff Inflation: Minimal occlusive pressure; Placement Verified By: Auscultation, Capnometry; Grade: Grade I; Complicating Factors: None; Intubation Findings: Positive EtCO2, Bilateral breath sounds, Atraumatic/Condition of teeth unchanged;  Depth of Insertion: 24; Securment: Lips; Complications: None; Breath Sounds: Equal Bilateral; Insertion Attempts: 1     Last PCP note: 3-6 months ago , within [facility]   3/27/17     Other important co-morbidities: Difficult Intubation, DM-2, HTN, HLD, POAG     Tests already available:  Available tests,  3-6 months ago , within [facility] .  3/27/17 A1C   5/2015 EKG                                                Instructions given. (See in Nurse's note)     Optimization:    Plan:              Testing:  Hematology Profile and BMP                                                  Patient  has previously scheduled Medical Appointment: 7/3 Pre-op Urology     Navigation: Tests Scheduled. Hematology Profile, BMP 7/3                                  Results will be tracked by Preop Clinic.     7/5  Lab results reviewed.     Sandi Rodriguez RN                                                                                                               07/05/2017  Eyad Garcia is a 62 y.o., male.    Anesthesia Evaluation    I have reviewed the Patient Summary Reports.    I have reviewed the Nursing Notes.   I have reviewed the Medications.     Review of Systems  Anesthesia Hx:  History of  prior surgery of interest to airway management or planning: Previous anesthesia: General Airway issues documented on chart review include difficult direct laryngoscopy  Denies Family Hx of Anesthesia complications.  Personal Hx of Anesthesia complications  Difficult Intubation   Cardiovascular:   Hypertension    Musculoskeletal:   Arthritis     Endocrine:   Diabetes        Physical Exam  General:  Well nourished    Airway/Jaw/Neck:  Airway Findings: Mouth Opening: Normal Tongue: Normal  General Airway Assessment: Adult  Mallampati: III  Improves to II with phonation.  Jaw/Neck Findings:  Neck ROM: Normal ROM      Dental:  Dental Findings:   Chest/Lungs:  Chest/Lungs Findings: Clear to auscultation, Normal Respiratory Rate         Mental Status:  Mental Status Findings:  Cooperative, Alert and Oriented         Anesthesia Plan  Type of Anesthesia, risks & benefits discussed:  Anesthesia Type:  general  Patient's Preference:   Intra-op Monitoring Plan: standard ASA monitors  Intra-op Monitoring Plan Comments:   Post Op Pain Control Plan: per primary service following discharge from PACU and multimodal analgesia  Post Op Pain Control Plan Comments:   Induction:   IV  Beta Blocker:  Patient is not currently on a Beta-Blocker (No further documentation required).       Informed Consent: Patient understands risks and agrees with Anesthesia plan.  Questions answered. Anesthesia consent signed with patient.  ASA Score: 2     Day of Surgery Review of History & Physical:  There are no significant changes.      Anesthesia Plan Notes:   62M HTN, DM2, Prostate CA s/p resection, difficult intubation, ED for penile prosthesis under GETA, asleep indirect laryngoscopy        Ready For Surgery From Anesthesia Perspective.

## 2017-07-10 ENCOUNTER — TELEPHONE (OUTPATIENT)
Dept: UROLOGY | Facility: CLINIC | Age: 62
End: 2017-07-10

## 2017-07-10 NOTE — TELEPHONE ENCOUNTER
Called pt to confirm arrival time 9:45am for procedure. Gave pt NPO instructions and gave pt opportunity to ask questions. Pt verbalized understanding.

## 2017-07-11 ENCOUNTER — SURGERY (OUTPATIENT)
Age: 62
End: 2017-07-11

## 2017-07-11 ENCOUNTER — ANESTHESIA (OUTPATIENT)
Dept: SURGERY | Facility: HOSPITAL | Age: 62
End: 2017-07-11
Payer: COMMERCIAL

## 2017-07-11 ENCOUNTER — HOSPITAL ENCOUNTER (OUTPATIENT)
Facility: HOSPITAL | Age: 62
Discharge: HOME | End: 2017-07-12
Attending: UROLOGY | Admitting: UROLOGY
Payer: COMMERCIAL

## 2017-07-11 DIAGNOSIS — C61 PROSTATE CANCER: Primary | ICD-10-CM

## 2017-07-11 DIAGNOSIS — N52.9 ERECTILE DYSFUNCTION: ICD-10-CM

## 2017-07-11 LAB
POCT GLUCOSE: 125 MG/DL (ref 70–110)
POCT GLUCOSE: 154 MG/DL (ref 70–110)

## 2017-07-11 PROCEDURE — C1813 PROSTHESIS, PENILE, INFLATAB: HCPCS | Performed by: UROLOGY

## 2017-07-11 PROCEDURE — 63600175 PHARM REV CODE 636 W HCPCS: Performed by: ANESTHESIOLOGY

## 2017-07-11 PROCEDURE — 27000221 HC OXYGEN, UP TO 24 HOURS

## 2017-07-11 PROCEDURE — 37000009 HC ANESTHESIA EA ADD 15 MINS: Performed by: UROLOGY

## 2017-07-11 PROCEDURE — 71000033 HC RECOVERY, INTIAL HOUR: Performed by: UROLOGY

## 2017-07-11 PROCEDURE — D9220A PRA ANESTHESIA: Mod: ANES,,, | Performed by: ANESTHESIOLOGY

## 2017-07-11 PROCEDURE — 54405 INSERT MULTI-COMP PENIS PROS: CPT | Mod: ,,, | Performed by: UROLOGY

## 2017-07-11 PROCEDURE — 25000003 PHARM REV CODE 250: Performed by: UROLOGY

## 2017-07-11 PROCEDURE — 27201423 OPTIME MED/SURG SUP & DEVICES STERILE SUPPLY: Performed by: UROLOGY

## 2017-07-11 PROCEDURE — 63600175 PHARM REV CODE 636 W HCPCS: Performed by: STUDENT IN AN ORGANIZED HEALTH CARE EDUCATION/TRAINING PROGRAM

## 2017-07-11 PROCEDURE — 94761 N-INVAS EAR/PLS OXIMETRY MLT: CPT

## 2017-07-11 PROCEDURE — 36000707: Performed by: UROLOGY

## 2017-07-11 PROCEDURE — 27200651 HC AIRWAY, LMA: Performed by: ANESTHESIOLOGY

## 2017-07-11 PROCEDURE — 36000706: Performed by: UROLOGY

## 2017-07-11 PROCEDURE — D9220A PRA ANESTHESIA: Mod: CRNA,,, | Performed by: NURSE ANESTHETIST, CERTIFIED REGISTERED

## 2017-07-11 PROCEDURE — 94799 UNLISTED PULMONARY SVC/PX: CPT

## 2017-07-11 PROCEDURE — 82962 GLUCOSE BLOOD TEST: CPT | Performed by: UROLOGY

## 2017-07-11 PROCEDURE — 37000008 HC ANESTHESIA 1ST 15 MINUTES: Performed by: UROLOGY

## 2017-07-11 PROCEDURE — 25000003 PHARM REV CODE 250: Performed by: NURSE ANESTHETIST, CERTIFIED REGISTERED

## 2017-07-11 PROCEDURE — 71000039 HC RECOVERY, EACH ADD'L HOUR: Performed by: UROLOGY

## 2017-07-11 PROCEDURE — 25000003 PHARM REV CODE 250: Performed by: STUDENT IN AN ORGANIZED HEALTH CARE EDUCATION/TRAINING PROGRAM

## 2017-07-11 PROCEDURE — 63600175 PHARM REV CODE 636 W HCPCS: Performed by: UROLOGY

## 2017-07-11 PROCEDURE — 27100025 HC TUBING, SET FLUID WARMER: Performed by: ANESTHESIOLOGY

## 2017-07-11 PROCEDURE — 94760 N-INVAS EAR/PLS OXIMETRY 1: CPT

## 2017-07-11 PROCEDURE — 25000003 PHARM REV CODE 250: Performed by: ANESTHESIOLOGY

## 2017-07-11 PROCEDURE — 63600175 PHARM REV CODE 636 W HCPCS: Performed by: NURSE ANESTHETIST, CERTIFIED REGISTERED

## 2017-07-11 DEVICE — RESERVOIR FLAT I2 100ML: Type: IMPLANTABLE DEVICE | Site: SCROTUM | Status: FUNCTIONAL

## 2017-07-11 DEVICE — PROSTHESIS PENILE 700CX 21CM: Type: IMPLANTABLE DEVICE | Site: PENIS | Status: FUNCTIONAL

## 2017-07-11 DEVICE — KIT ACCESSORY PENILE: Type: IMPLANTABLE DEVICE | Site: PENIS | Status: FUNCTIONAL

## 2017-07-11 RX ORDER — OXYCODONE AND ACETAMINOPHEN 10; 325 MG/1; MG/1
1 TABLET ORAL EVERY 4 HOURS PRN
Status: DISCONTINUED | OUTPATIENT
Start: 2017-07-11 | End: 2017-07-12 | Stop reason: HOSPADM

## 2017-07-11 RX ORDER — OXYCODONE AND ACETAMINOPHEN 5; 325 MG/1; MG/1
1 TABLET ORAL EVERY 4 HOURS PRN
Status: DISCONTINUED | OUTPATIENT
Start: 2017-07-11 | End: 2017-07-12 | Stop reason: HOSPADM

## 2017-07-11 RX ORDER — HYDROMORPHONE HYDROCHLORIDE 1 MG/ML
0.5 INJECTION, SOLUTION INTRAMUSCULAR; INTRAVENOUS; SUBCUTANEOUS
Status: DISCONTINUED | OUTPATIENT
Start: 2017-07-11 | End: 2017-07-12 | Stop reason: HOSPADM

## 2017-07-11 RX ORDER — GLYCOPYRROLATE 0.2 MG/ML
INJECTION INTRAMUSCULAR; INTRAVENOUS
Status: DISCONTINUED | OUTPATIENT
Start: 2017-07-11 | End: 2017-07-11

## 2017-07-11 RX ORDER — NEOSTIGMINE METHYLSULFATE 1 MG/ML
INJECTION, SOLUTION INTRAVENOUS
Status: DISCONTINUED | OUTPATIENT
Start: 2017-07-11 | End: 2017-07-11

## 2017-07-11 RX ORDER — SODIUM CHLORIDE 0.9 % (FLUSH) 0.9 %
3 SYRINGE (ML) INJECTION
Status: DISCONTINUED | OUTPATIENT
Start: 2017-07-11 | End: 2017-07-12 | Stop reason: HOSPADM

## 2017-07-11 RX ORDER — ONDANSETRON 2 MG/ML
4 INJECTION INTRAMUSCULAR; INTRAVENOUS DAILY PRN
Status: DISCONTINUED | OUTPATIENT
Start: 2017-07-11 | End: 2017-07-12 | Stop reason: HOSPADM

## 2017-07-11 RX ORDER — PROPOFOL 10 MG/ML
VIAL (ML) INTRAVENOUS
Status: DISCONTINUED | OUTPATIENT
Start: 2017-07-11 | End: 2017-07-11

## 2017-07-11 RX ORDER — HYDROMORPHONE HYDROCHLORIDE 1 MG/ML
0.2 INJECTION, SOLUTION INTRAMUSCULAR; INTRAVENOUS; SUBCUTANEOUS EVERY 5 MIN PRN
Status: DISCONTINUED | OUTPATIENT
Start: 2017-07-11 | End: 2017-07-12 | Stop reason: HOSPADM

## 2017-07-11 RX ORDER — LABETALOL HYDROCHLORIDE 5 MG/ML
INJECTION, SOLUTION INTRAVENOUS
Status: DISPENSED
Start: 2017-07-11 | End: 2017-07-12

## 2017-07-11 RX ORDER — GENTAMICIN SULFATE 40 MG/ML
INJECTION, SOLUTION INTRAMUSCULAR; INTRAVENOUS
Status: DISCONTINUED | OUTPATIENT
Start: 2017-07-11 | End: 2017-07-11 | Stop reason: HOSPADM

## 2017-07-11 RX ORDER — LOSARTAN POTASSIUM 25 MG/1
100 TABLET ORAL DAILY
Status: DISCONTINUED | OUTPATIENT
Start: 2017-07-12 | End: 2017-07-12 | Stop reason: HOSPADM

## 2017-07-11 RX ORDER — INSULIN ASPART 100 [IU]/ML
1-10 INJECTION, SOLUTION INTRAVENOUS; SUBCUTANEOUS
Status: DISCONTINUED | OUTPATIENT
Start: 2017-07-11 | End: 2017-07-12 | Stop reason: HOSPADM

## 2017-07-11 RX ORDER — ONDANSETRON 2 MG/ML
4 INJECTION INTRAMUSCULAR; INTRAVENOUS EVERY 8 HOURS PRN
Status: DISCONTINUED | OUTPATIENT
Start: 2017-07-11 | End: 2017-07-12 | Stop reason: HOSPADM

## 2017-07-11 RX ORDER — SODIUM CHLORIDE 9 MG/ML
INJECTION, SOLUTION INTRAVENOUS CONTINUOUS
Status: DISCONTINUED | OUTPATIENT
Start: 2017-07-11 | End: 2017-07-12

## 2017-07-11 RX ORDER — GLUCAGON 1 MG
1 KIT INJECTION
Status: DISCONTINUED | OUTPATIENT
Start: 2017-07-11 | End: 2017-07-12 | Stop reason: HOSPADM

## 2017-07-11 RX ORDER — ROCURONIUM BROMIDE 10 MG/ML
INJECTION, SOLUTION INTRAVENOUS
Status: DISCONTINUED | OUTPATIENT
Start: 2017-07-11 | End: 2017-07-11

## 2017-07-11 RX ORDER — MIDAZOLAM HYDROCHLORIDE 1 MG/ML
INJECTION, SOLUTION INTRAMUSCULAR; INTRAVENOUS
Status: DISCONTINUED | OUTPATIENT
Start: 2017-07-11 | End: 2017-07-11

## 2017-07-11 RX ORDER — LIDOCAINE HCL/PF 100 MG/5ML
SYRINGE (ML) INTRAVENOUS
Status: DISCONTINUED | OUTPATIENT
Start: 2017-07-11 | End: 2017-07-11

## 2017-07-11 RX ORDER — FENTANYL CITRATE 50 UG/ML
25 INJECTION, SOLUTION INTRAMUSCULAR; INTRAVENOUS EVERY 5 MIN PRN
Status: DISCONTINUED | OUTPATIENT
Start: 2017-07-11 | End: 2017-07-12 | Stop reason: HOSPADM

## 2017-07-11 RX ORDER — LIDOCAINE HYDROCHLORIDE 10 MG/ML
1 INJECTION, SOLUTION EPIDURAL; INFILTRATION; INTRACAUDAL; PERINEURAL ONCE
Status: COMPLETED | OUTPATIENT
Start: 2017-07-11 | End: 2017-07-11

## 2017-07-11 RX ORDER — FENTANYL CITRATE 50 UG/ML
INJECTION, SOLUTION INTRAMUSCULAR; INTRAVENOUS
Status: DISCONTINUED | OUTPATIENT
Start: 2017-07-11 | End: 2017-07-11

## 2017-07-11 RX ORDER — IBUPROFEN 200 MG
24 TABLET ORAL
Status: DISCONTINUED | OUTPATIENT
Start: 2017-07-11 | End: 2017-07-12 | Stop reason: HOSPADM

## 2017-07-11 RX ORDER — VANCOMYCIN HYDROCHLORIDE 1 G/20ML
INJECTION, POWDER, LYOPHILIZED, FOR SOLUTION INTRAVENOUS
Status: DISCONTINUED | OUTPATIENT
Start: 2017-07-11 | End: 2017-07-11 | Stop reason: HOSPADM

## 2017-07-11 RX ORDER — ONDANSETRON 2 MG/ML
INJECTION INTRAMUSCULAR; INTRAVENOUS
Status: DISCONTINUED | OUTPATIENT
Start: 2017-07-11 | End: 2017-07-11

## 2017-07-11 RX ORDER — IBUPROFEN 200 MG
16 TABLET ORAL
Status: DISCONTINUED | OUTPATIENT
Start: 2017-07-11 | End: 2017-07-12 | Stop reason: HOSPADM

## 2017-07-11 RX ORDER — HYDROMORPHONE HYDROCHLORIDE 2 MG/ML
INJECTION, SOLUTION INTRAMUSCULAR; INTRAVENOUS; SUBCUTANEOUS
Status: DISCONTINUED | OUTPATIENT
Start: 2017-07-11 | End: 2017-07-11

## 2017-07-11 RX ORDER — LABETALOL HYDROCHLORIDE 5 MG/ML
20 INJECTION, SOLUTION INTRAVENOUS ONCE
Status: DISCONTINUED | OUTPATIENT
Start: 2017-07-11 | End: 2017-07-12 | Stop reason: HOSPADM

## 2017-07-11 RX ORDER — SODIUM CHLORIDE, SODIUM LACTATE, POTASSIUM CHLORIDE, CALCIUM CHLORIDE 600; 310; 30; 20 MG/100ML; MG/100ML; MG/100ML; MG/100ML
INJECTION, SOLUTION INTRAVENOUS CONTINUOUS
Status: DISCONTINUED | OUTPATIENT
Start: 2017-07-11 | End: 2017-07-12

## 2017-07-11 RX ADMIN — OXYCODONE HYDROCHLORIDE AND ACETAMINOPHEN 1 TABLET: 10; 325 TABLET ORAL at 10:07

## 2017-07-11 RX ADMIN — HYDROMORPHONE HYDROCHLORIDE 0.2 MG: 1 INJECTION, SOLUTION INTRAMUSCULAR; INTRAVENOUS; SUBCUTANEOUS at 07:07

## 2017-07-11 RX ADMIN — HYDROMORPHONE HYDROCHLORIDE 0.2 MG: 1 INJECTION, SOLUTION INTRAMUSCULAR; INTRAVENOUS; SUBCUTANEOUS at 06:07

## 2017-07-11 RX ADMIN — FENTANYL CITRATE 50 MCG: 50 INJECTION, SOLUTION INTRAMUSCULAR; INTRAVENOUS at 05:07

## 2017-07-11 RX ADMIN — HYDROMORPHONE HYDROCHLORIDE 1 MG: 2 INJECTION, SOLUTION INTRAMUSCULAR; INTRAVENOUS; SUBCUTANEOUS at 04:07

## 2017-07-11 RX ADMIN — VANCOMYCIN HYDROCHLORIDE 1000 MG: 1 INJECTION, POWDER, LYOPHILIZED, FOR SOLUTION INTRAVENOUS at 12:07

## 2017-07-11 RX ADMIN — FENTANYL CITRATE 50 MCG: 50 INJECTION, SOLUTION INTRAMUSCULAR; INTRAVENOUS at 03:07

## 2017-07-11 RX ADMIN — SODIUM CHLORIDE, SODIUM GLUCONATE, SODIUM ACETATE, POTASSIUM CHLORIDE, MAGNESIUM CHLORIDE, SODIUM PHOSPHATE, DIBASIC, AND POTASSIUM PHOSPHATE: .53; .5; .37; .037; .03; .012; .00082 INJECTION, SOLUTION INTRAVENOUS at 04:07

## 2017-07-11 RX ADMIN — PROPOFOL 180 MG: 10 INJECTION, EMULSION INTRAVENOUS at 02:07

## 2017-07-11 RX ADMIN — SODIUM CHLORIDE: 0.9 INJECTION, SOLUTION INTRAVENOUS at 12:07

## 2017-07-11 RX ADMIN — SODIUM CHLORIDE, SODIUM LACTATE, POTASSIUM CHLORIDE, AND CALCIUM CHLORIDE: .6; .31; .03; .02 INJECTION, SOLUTION INTRAVENOUS at 06:07

## 2017-07-11 RX ADMIN — GENTAMICIN SULFATE 240 MG: 40 INJECTION, SOLUTION INTRAMUSCULAR; INTRAVENOUS at 03:07

## 2017-07-11 RX ADMIN — OXYCODONE HYDROCHLORIDE AND ACETAMINOPHEN 1 TABLET: 10; 325 TABLET ORAL at 06:07

## 2017-07-11 RX ADMIN — LIDOCAINE HYDROCHLORIDE 60 MG: 20 INJECTION, SOLUTION INTRAVENOUS at 02:07

## 2017-07-11 RX ADMIN — MIDAZOLAM HYDROCHLORIDE 2 MG: 1 INJECTION, SOLUTION INTRAMUSCULAR; INTRAVENOUS at 02:07

## 2017-07-11 RX ADMIN — GENTAMICIN SULFATE 240 MG: 40 INJECTION, SOLUTION INTRAMUSCULAR; INTRAVENOUS at 12:07

## 2017-07-11 RX ADMIN — FENTANYL CITRATE 100 MCG: 50 INJECTION, SOLUTION INTRAMUSCULAR; INTRAVENOUS at 02:07

## 2017-07-11 RX ADMIN — ONDANSETRON 4 MG: 2 INJECTION INTRAMUSCULAR; INTRAVENOUS at 04:07

## 2017-07-11 RX ADMIN — LIDOCAINE HYDROCHLORIDE 0.2 MG: 10 INJECTION, SOLUTION EPIDURAL; INFILTRATION; INTRACAUDAL; PERINEURAL at 12:07

## 2017-07-11 RX ADMIN — NEOSTIGMINE METHYLSULFATE 4 MG: 1 INJECTION INTRAVENOUS at 04:07

## 2017-07-11 RX ADMIN — FENTANYL CITRATE 50 MCG: 50 INJECTION, SOLUTION INTRAMUSCULAR; INTRAVENOUS at 02:07

## 2017-07-11 RX ADMIN — VANCOMYCIN HYDROCHLORIDE 1 G: 1 INJECTION, POWDER, LYOPHILIZED, FOR SOLUTION INTRAVENOUS at 03:07

## 2017-07-11 RX ADMIN — GLYCOPYRROLATE 0.6 MG: 0.2 INJECTION, SOLUTION INTRAMUSCULAR; INTRAVENOUS at 04:07

## 2017-07-11 RX ADMIN — ROCURONIUM BROMIDE 40 MG: 10 INJECTION, SOLUTION INTRAVENOUS at 02:07

## 2017-07-11 NOTE — INTERVAL H&P NOTE
The patient has been examined and the H&P has been reviewed:    I concur with the findings and no changes have occurred since H&P was written.   Takes no blood thinners  No rashes/cuts    Anesthesia/Surgery risks, benefits and alternative options discussed and understood by patient/family.          Active Hospital Problems    Diagnosis  POA    Erectile dysfunction [N52.9]  Yes      Resolved Hospital Problems    Diagnosis Date Resolved POA   No resolved problems to display.

## 2017-07-11 NOTE — H&P (VIEW-ONLY)
Urology ([facility]) H&P  Staff: Chaitanya Alonso MD    Is this patient in a research study?  Yes    CC: erectile dysfunction    HPI:  Eyad Garcia is a 61 y.o. male with hx of prostate cancer who is s/p RALP in 7/2015. Final pathology T2aN0, [place] 6. Most recent PSA is 0.14.    Since that time he has been experiencing ED. He has tried and failed both oral meds and ICI. He desires an IPP.     He has no urinary complaints at this time. Denies hematuria, dysuria. Denies CHRISTIANO.    He has never smoked.    ROS:  Neg except per HPI    Past Medical History:   Diagnosis Date    Cataract     Diabetes mellitus type II     Difficult intubation     Elevated PSA     Hyperlipidemia     Hypertension     OA (osteoarthritis)     POAG (primary open-angle glaucoma)     Retinal detachment     OS       Past Surgical History:   Procedure Laterality Date    CYSTOSCOPY      HERNIA REPAIR      KNEE SURGERY      left    PROSTATE SURGERY      RETINAL DETACHMENT SURGERY      OS    SELECTIVE LASER TRABECUPLASTY Bilateral 4/16    OU WITH        Social History     Social History    Marital status:      Spouse name: N/A    Number of children: N/A    Years of education: N/A     Social History Main Topics    Smoking status: Never Smoker    Smokeless tobacco: Never Used    Alcohol use No    Drug use: No    Sexual activity: Yes     Partners: Female     Other Topics Concern    None     Social History Narrative    Works for [employer]       Family History   Problem Relation Age of Onset    Diabetes Mother     Hypertension Mother     Glaucoma Mother     Diabetes Sister     Glaucoma Sister     Diabetes Brother     Glaucoma Brother     No Known Problems Father     No Known Problems Maternal Aunt     No Known Problems Maternal Uncle     No Known Problems Paternal Aunt     No Known Problems Paternal Uncle     No Known Problems Maternal Grandmother     No Known Problems Maternal Grandfather     No  Known Problems Paternal Grandmother     No Known Problems Paternal Grandfather     Anesthesia problems Neg Hx     Broken bones Neg Hx     Cancer Neg Hx     Clotting disorder Neg Hx     Collagen disease Neg Hx     Dislocations Neg Hx     Osteoporosis Neg Hx     Rheumatologic disease Neg Hx     Scoliosis Neg Hx     Severe sprains Neg Hx     Amblyopia Neg Hx     Blindness Neg Hx     Cataracts Neg Hx     Macular degeneration Neg Hx     Retinal detachment Neg Hx     Strabismus Neg Hx     Stroke Neg Hx     Thyroid disease Neg Hx        Review of patient's allergies indicates:   Allergen Reactions    Aspirin Anaphylaxis     Stomach upset    Sulfa (sulfonamide antibiotics) Hives, Nausea Only and Rash      Stomach upset         Current Outpatient Prescriptions on File Prior to Visit   Medication Sig Dispense Refill    atorvastatin (LIPITOR) 80 MG tablet Take 1 tablet (80 mg total) by mouth once daily. 90 tablet 3    bimatoprost (LUMIGAN) 0.01 % Drop Place 1 drop into both eyes every morning. 2.5 mL 12    efinaconazole (JUBLIA) 10 % Hans Apply 1 application topically once daily. 8 mL 12    losartan (COZAAR) 100 MG tablet Take 1 tablet (100 mg total) by mouth once daily. 90 tablet 3    metformin (GLUCOPHAGE-XR) 750 MG 24 hr tablet Take 2 tablets (1,500 mg total) by mouth daily with breakfast. 180 tablet 3    papaverine 30 mg/mL injection Add: Phentolamine 10 mg  Add: PGE1 100 mcg    Sig:  Inject 25 units (0.25 mls) as directed 10 mL 11     No current facility-administered medications on file prior to visit.        Anticoagulation:  No    Physical Exam:  weight 160 lb/73 kg  BMI 25.5    AAOx4, NAD, WDWN  NC/AT, EOMI, PER, sclerae anicteric, speech normal, tongue midline  Nl effort, CTAB  RRR  Soft, non-tender, non-distended  Penis is circumcised. Bilateral testicles normal with no tenderness or masses. No signs of skin breakdown or infections.  CAROLINA: benign empty fossa    Labs:    Urine dipstick today  - negative    Lab Results   Component Value Date    WBC 9.26 03/27/2017    HGB 13.3 (L) 03/27/2017    HCT 39.3 (L) 03/27/2017    MCV 89 03/27/2017     03/27/2017       BMP  Lab Results   Component Value Date     03/27/2017    K 3.9 03/27/2017     03/27/2017    CO2 28 03/27/2017    BUN 13 03/27/2017    CREATININE 1.1 03/27/2017    CALCIUM 9.4 03/27/2017    ANIONGAP 7 (L) 03/27/2017    ESTGFRAFRICA >60.0 03/27/2017    EGFRNONAA >60.0 03/27/2017       Lab Results   Component Value Date    PSA 0.14 03/27/2017    PSA 6.6 (H) 02/27/2015    PSA 5.48 (H) 05/13/2013       Imaging:  No recent pertinent  imaging    Assessment: Eyad Garcia is a 61 y.o. male with erectile dysfunction s/p RALP    Plan:     1. To OR on 7/11/17 for IPP placement   2. Consents signed   3. I have explained the risk, benefits, and alternatives of the procedure in detail. The patient voices understanding and all questions have been answered. The patient agrees to proceed as planned.   4. The risks and benefits of participating in our clinical trial have been discussed and the patient has consented for the research study here at [facility].  strength questionnaires filled out and measurements obtained.   5. Sugar and dirk pre op    Dee Patricio MD

## 2017-07-11 NOTE — TRANSFER OF CARE
"Anesthesia Transfer of Care Note    Patient: Eyad Garcia    Procedure(s) Performed: Procedure(s) (LRB):  INSERTION-PROSTHESIS-PENILE INFLATABLE    chris doherty [phone] [facility] (N/A)    Patient location: PACU    Anesthesia Type: general    Transport from OR: Transported from OR on room air with adequate spontaneous ventilation    Post pain: adequate analgesia    Post assessment: no apparent anesthetic complications    Post vital signs: stable    Level of consciousness: awake and alert    Nausea/Vomiting: no nausea/vomiting    Complications: none          Last vitals:   Visit Vitals  BP (!) 152/98 (BP Location: Right arm, Patient Position: Lying, BP Method: Automatic)   Pulse 93   Temp 36.8 °C (98.2 °F) (Oral)   Resp 16   Ht 5' 6.5" (1.689 m)   Wt 73.9 kg (163 lb)   SpO2 100%   BMI 25.91 kg/m²     "

## 2017-07-11 NOTE — OP NOTE
[facility]  Operative Note    Date: 07/11/2017    Pre-Op Diagnosis: Erectile Dysfunction     Post-Op Diagnosis: Erectile Dysfunction    Procedure(s) Performed:   1. 3 Piece Inflatable Penile Prosthesis (AMS)    Specimen(s): None     Staff Surgeon: Dr. Gavin MD    Assistant Surgeon: Leslie Mercedes MD    Anesthesia: General endotracheal anesthesia    Indications:  Eyad Garcia is a 62 y.o. male with a history of erectile dysfunction refractory to medical management.  After discussion with the patient, he has elected to undergo IPP.    Findings:    1.  20 cm cylinder was placed with 1 cm rear tip extender bilaterally  2.  100mL reservoir filled to 80 mL placed ectopically    Estimated Blood Loss: Minimal    Drains: 16 Syriac hopkins catheter    Procedure in detail:  After risks and benefits of the procedure were discussed with the patient, informed consent was obtained.  The patient  was taken back to the operating room and placed in the supine position.  TEDs and SCDs were applied and functioning. Vancomycin and gentamicin were given preoperatively.  General anesthesia was induced.  The patient was prepped and scrubbed with betadine for a total of 10 minutes.  A chloraprep was then used as further prep.We subsequently scrubbed our hands for 10 minutes.  He was draped in the usual sterile fashion, and an ioban was placed over the field.  A 16 Fr hopkins catheter was placed using standard sterile technique and the bladder was drained.      An approximately 4 cm transverse incision was marked at the penoscrotal junction.  This was incised sharply with a 15 blade.  Bovie electrocautery was used to dissect down to Dartos fascia.  Dartos was then incised using Metzenbaum scissors down to the level of the corpora.  A [place] ring retractor was utilized for retraction.  The left corporal body was cleared of its adventitial tissues, exposing the tunica albuginea. The tunica albuginea was then marked for a  corporotomy. 2 Stay sutures of 2-0 vicryl were then placed.  A corporotomy was then made between the stay sutures.  The patient's corpora were dilated serially from 8 mm up to 13 mm using Tian dilators distally and Hegar dilators proximally.  Care was taken to ensure not to crossover during this portion of the procedure.  Once dilated, the patient's corporotomy was irrigated with antibiotic irrigation.  There was no evidence of urethral injury during this portion of the procedure.  The patient's corpora was then measured using the Chuckie dilator.  It measured 22 cm in total length.    Attention was then turned to the contralateral corporal body, and the exact same technique was performed.  Again, the adventitial tissues around it were cleared, exposing the tunica albuginea and it was marked for a corporotomy with the bovie.  A corporotomy was made after  2-0 Vicryl stay sutures were applied to either side of the corporotomy. Again, it was serially dilated from 8 mm up to 13 mm sequentially using Tian and Hegar dilators.  Again, care was taken not to crossover.  Again the corporal length was measured using the Chuckie.  It measured 22 cm in total length.    A 21 cm IPP with 1 rear tip extender bilaterally was assembled.  The Chuckie was then advanced into the corpora and the needle fired through the glans, first on the left and then on the right.  The left IPP was then placed within the corpora and the corpora was closed by tying the previously placed stay sutures.  The IPP was then placed on the right, and the corpora was closed by tying the previously placed stay sutures.    At this time, the device was inflated.  There was good glans support with no SST deformity and the penis was straight.    Attention was then turned to the patient's inguinal region.  Blunt finger dissection was used to clear space for the reservoir.  A 100 mL reservoir was then advanced and placed ectopically and filled to 80 mL.  There was  no back pressure identified on filling.       Immediately following this, a subdartos pocket was made in the dependent portion of the scrotum. The pump device was then advanced into the dartos pouch and secured with a pursestring 2-0 Vicryl suture.  The pump, cylinders and tubing were then connected to the reservoir in the standard fashion.    The device was then inflated again.  There was good glans support with no SST deformity and the penis was straight.    It should be noted that copious amounts of antibiotic irrigation was used throughout the case.    Dartos was reapproximated in three separate layers using 2-0 Vicryl in a running fashion.  Skin was then reapproximated using 3-0 chromic in a running horizontal mattress fashion.  Derma rolon was applied and a mummy wrap was created with scrotal fluffs and support.      The patient tolerated the procedure well and was transferred to the recovery room in stable condition.    Disposition:  The patient will be admitted to the urology service for overnight observation.  We plan for a voiding trial tomorrow morning as well as morning IV antibiotics.  He will be discharged with PO antibiotics and instructions to not use his pump until instructed to do so by Dr. Alonso.      Leslie Mercedes MD

## 2017-07-12 VITALS
HEIGHT: 67 IN | HEART RATE: 82 BPM | OXYGEN SATURATION: 98 % | RESPIRATION RATE: 16 BRPM | BODY MASS INDEX: 25.58 KG/M2 | WEIGHT: 163 LBS | DIASTOLIC BLOOD PRESSURE: 80 MMHG | TEMPERATURE: 98 F | SYSTOLIC BLOOD PRESSURE: 156 MMHG

## 2017-07-12 LAB — POCT GLUCOSE: 186 MG/DL (ref 70–110)

## 2017-07-12 PROCEDURE — 63600175 PHARM REV CODE 636 W HCPCS: Performed by: UROLOGY

## 2017-07-12 PROCEDURE — 94761 N-INVAS EAR/PLS OXIMETRY MLT: CPT

## 2017-07-12 PROCEDURE — 94799 UNLISTED PULMONARY SVC/PX: CPT

## 2017-07-12 PROCEDURE — 25000003 PHARM REV CODE 250: Performed by: UROLOGY

## 2017-07-12 RX ORDER — DICLOXACILLIN SODIUM 250 MG/1
250 CAPSULE ORAL 4 TIMES DAILY
Qty: 28 CAPSULE | Refills: 0 | Status: SHIPPED | OUTPATIENT
Start: 2017-07-12 | End: 2017-07-19

## 2017-07-12 RX ORDER — POLYETHYLENE GLYCOL 3350 17 G/17G
17 POWDER, FOR SOLUTION ORAL DAILY PRN
Qty: 30 PACKET | Refills: 0 | Status: SHIPPED | OUTPATIENT
Start: 2017-07-12 | End: 2017-08-25 | Stop reason: ALTCHOICE

## 2017-07-12 RX ORDER — OXYCODONE AND ACETAMINOPHEN 5; 325 MG/1; MG/1
1 TABLET ORAL EVERY 4 HOURS PRN
Qty: 41 TABLET | Refills: 0 | Status: SHIPPED | OUTPATIENT
Start: 2017-07-12 | End: 2017-08-25 | Stop reason: ALTCHOICE

## 2017-07-12 RX ADMIN — OXYCODONE HYDROCHLORIDE AND ACETAMINOPHEN 1 TABLET: 10; 325 TABLET ORAL at 05:07

## 2017-07-12 RX ADMIN — VANCOMYCIN HYDROCHLORIDE 1000 MG: 1 INJECTION, POWDER, LYOPHILIZED, FOR SOLUTION INTRAVENOUS at 12:07

## 2017-07-12 RX ADMIN — OXYCODONE HYDROCHLORIDE AND ACETAMINOPHEN 1 TABLET: 5; 325 TABLET ORAL at 11:07

## 2017-07-12 RX ADMIN — VANCOMYCIN HYDROCHLORIDE 1000 MG: 1 INJECTION, POWDER, LYOPHILIZED, FOR SOLUTION INTRAVENOUS at 11:07

## 2017-07-12 RX ADMIN — ONDANSETRON 4 MG: 2 INJECTION INTRAMUSCULAR; INTRAVENOUS at 05:07

## 2017-07-12 RX ADMIN — LOSARTAN POTASSIUM 100 MG: 25 TABLET, FILM COATED ORAL at 08:07

## 2017-07-12 RX ADMIN — GENTAMICIN SULFATE 344 MG: 40 INJECTION, SOLUTION INTRAMUSCULAR; INTRAVENOUS at 05:07

## 2017-07-12 NOTE — ANESTHESIA POSTPROCEDURE EVALUATION
"Anesthesia Post Evaluation    Patient: Eyad Garcia    Procedure(s) Performed: Procedure(s) (LRB):  INSERTION-PROSTHESIS-PENILE INFLATABLE    chris doherty [phone] [facility] (N/A)    Final Anesthesia Type: general  Patient location during evaluation: PACU  Patient participation: Yes- Able to Participate  Level of consciousness: awake and alert  Post-procedure vital signs: reviewed and stable  Pain management: adequate  Airway patency: patent  PONV status at discharge: No PONV  Anesthetic complications: no      Cardiovascular status: hemodynamically stable  Respiratory status: unassisted  Hydration status: euvolemic  Follow-up not needed.        Visit Vitals  BP (!) 156/74   Pulse (!) 112   Temp 36.4 °C (97.6 °F) (Oral)   Resp 16   Ht 5' 6.5" (1.689 m)   Wt 73.9 kg (163 lb)   SpO2 (!) 93%   BMI 25.91 kg/m²       Pain/Omid Score: Pain Assessment Performed: Yes (7/11/2017  7:55 PM)  Presence of Pain: non-verbal indicators absent (7/11/2017  7:55 PM)  Pain Rating Prior to Med Admin: 8 (7/11/2017  7:10 PM)  Pain Rating Post Med Admin: 8 (reports pain as mild) (7/11/2017  7:55 PM)  Omid Score: 10 (7/11/2017  7:55 PM)      "

## 2017-07-12 NOTE — PLAN OF CARE
Problem: Patient Care Overview  Goal: Plan of Care Review  Outcome: Outcome(s) achieved Date Met: 07/12/17  Patient AAOX3, VSS.  Two side rails up, bed locked, call light within reach. No falls noted as these precautions remain. Patient is free of skin breakdown as patient moves well independently.. Blood glucose monitoring ordered. Pain controlled well with PRN meds. Hourly rounds made and no complaints at this time noted. Will resume with plan of care.

## 2017-07-12 NOTE — DISCHARGE SUMMARY
[facility]  Urology  Discharge Summary      Patient Name: Eyad Garcia  MRN: [id number]  Admission Date: 7/11/2017  Hospital Length of Stay: 0 days  Discharge Date and Time:  07/12/2017 11:18 AM  Attending Physician: Chaitanya lAonso MD   Discharging Provider: Leslie Mercedes MD  Primary Care Physician: Ayush Horne MD    HPI: Eyad Garcia is a 61 y.o. male with hx of prostate cancer who is s/p RALP in 7/2015. Final pathology T2aN0, [place] 6. Most recent PSA is 0.14.     Since that time he has been experiencing ED. He has tried and failed both oral meds and ICI. He desires an IPP.      He has no urinary complaints at this time. Denies hematuria, dysuria. Denies CHRISTIANO.     He has never smoked.    Procedure(s) (LRB):  INSERTION-PROSTHESIS-PENILE INFLATABLE    chris doherty [phone] [facility] (N/A)     Indwelling Lines/Drains at time of discharge:   Lines/Drains/Airways     Drain                 Urethral Catheter 07/13/15 1256 Latex 16 Fr. 729 days         Urethral Catheter 07/11/17 1525 Latex;Straight-tip 16 Fr. less than 1 day                Hospital Course (synopsis of major diagnoses, care, treatment, and services provided during the course of the hospital stay): Patient was brought to the hospital for placement of an IPP.  The patient tolerated it well.  Post op, the patient's diet was advanced, their pain was controlled, and the patient was ambulating without problem.  They passed a voiding trial on POD1 and received post-operative vancomycin and gentamicin.  The patient will follow up in 2 weeks with Dr. Alonso.  The patient was given prescriptions for miralax, percocet, and one week of dicloxicillin.        Consults:     Significant Diagnostic Studies: none    Pending Diagnostic Studies:     Procedure Component Value Units Date/Time    Hemoglobin A1c if not done in past 6 weeks [[id number]] Collected:  07/11/17 1752    Order Status:  Sent Lab Status:  In process Updated:  07/11/17 1752     Specimen:  Blood from Blood           Final Active Diagnoses:    Diagnosis Date Noted POA    PRINCIPAL PROBLEM:  Erectile dysfunction [N52.9] 08/27/2015 Yes    Prostate cancer [C61] 07/13/2015 Yes    Malignant neoplasm of prostate [C61] 06/04/2015 Yes    Diabetes mellitus, type 2 [E11.9]  Yes    Hypertension [I10]  Yes    Hyperlipidemia [E78.5]  Yes      Problems Resolved During this Admission:    Diagnosis Date Noted Date Resolved POA       Discharged Condition: good    Disposition: Home or Self Care    Follow Up:  Follow-up Information     Chaitanya Alonso MD. Schedule an appointment as soon as possible for a visit in 2 weeks.    Specialty:  Urology  Contact information:  [address]  [facility] [id number] [phone]                 Patient Instructions:     Diet general     Activity as tolerated     Other restrictions (specify):   Scheduling Instructions: No lifting more than 10 pounds for 6 weeks.  No driving while taking pain medications.     Call MD for:  temperature >100.4     Call MD for:  persistent nausea and vomiting or diarrhea     Call MD for:  severe uncontrolled pain     Call MD for:  redness, tenderness, or signs of infection (pain, swelling, redness, odor or green/yellow discharge around incision site)     No dressing needed   Scheduling Instructions: Okay to shower 48 hours after surgery.  No baths or swimming.       Medications:  Reconciled Home Medications:   Current Discharge Medication List      START taking these medications    Details   dicloxacillin (DYNAPEN) 250 MG capsule Take 1 capsule (250 mg total) by mouth 4 (four) times daily.  Qty: 28 capsule, Refills: 0      oxycodone-acetaminophen (PERCOCET) 5-325 mg per tablet Take 1 tablet by mouth every 4 (four) hours as needed (For pain 1-5/10 pain scale).  Qty: 41 tablet, Refills: 0      polyethylene glycol (GLYCOLAX) 17 gram PwPk Take 17 g by mouth daily as needed.  Qty: 30 packet, Refills: 0         CONTINUE these medications  which have NOT CHANGED    Details   atorvastatin (LIPITOR) 80 MG tablet Take 1 tablet (80 mg total) by mouth once daily.  Qty: 90 tablet, Refills: 3    Associated Diagnoses: Hyperlipidemia, unspecified hyperlipidemia type      bimatoprost (LUMIGAN) 0.01 % Drop Place 1 drop into both eyes every morning.  Qty: 2.5 mL, Refills: 12    Associated Diagnoses: Primary open angle glaucoma of both eyes, mild stage      losartan (COZAAR) 100 MG tablet Take 1 tablet (100 mg total) by mouth once daily.  Qty: 90 tablet, Refills: 3    Associated Diagnoses: Essential hypertension      metformin (GLUCOPHAGE-XR) 750 MG 24 hr tablet Take 2 tablets (1,500 mg total) by mouth daily with breakfast.  Qty: 180 tablet, Refills: 3    Associated Diagnoses: Type 2 diabetes mellitus without complication, without long-term current use of insulin      efinaconazole (JUBLIA) 10 % Hans Apply 1 application topically once daily.  Qty: 8 mL, Refills: 12    Comments: Patient has a coupon         STOP taking these medications       papaverine 30 mg/mL injection Comments:   Reason for Stopping:               Time spent on the discharge of patient: 15 minutes    Leslie Mercedes MD  Urology  [facility]

## 2017-07-12 NOTE — ANESTHESIA RELEASE NOTE
Anesthesia Release from PACU Note    Patient: Eyad Garcia    Procedure(s) Performed: Procedure(s) (LRB):  INSERTION-PROSTHESIS-PENILE INFLATABLE    chris doherty [phone] [facility] (N/A)    Anesthesia type: general    Post pain: Adequate analgesia    Post assessment: no apparent anesthetic complications, tolerated procedure well and no evidence of recall    Post vital signs:   Vitals:    07/11/17 2000   BP:    Pulse: (!) 112   Resp:    Temp:         Level of consciousness: awake, alert  and oriented    Nausea/Vomiting: no nausea/no vomiting    Complications: none    Airway Patency: patent    Respiratory: unassisted, spontaneous ventilation    Cardiovascular: stable and blood pressure at baseline    Hydration: euvolemic

## 2017-07-12 NOTE — NURSING
Pt voided 280 cc. Bladder scan done 27 cc residual. Dr. Patricio notified. Ok to D/c once ABX complete. Discharge instructions given. Pt states understanding. VSS.  Prescriptions given. Iv to be removed once vancomycin complete.

## 2017-07-12 NOTE — DISCHARGE INSTRUCTIONS
Postop instructions for IPP    Expect some bruising and swelling around scrotum for the next few days    No sex or masturbation x 6 weeks    You will be sent home with antibiotics x 1 week    Wear tight fitting underwear, jock strap or boxers.   When you are in bed or in a chair, you can elevate your scrotum.  Ice to scrotum 30 min on and 30 min off for 3-5 days post-op    Ok to shower in 2 days  No baths or soaking baths  No sitting in standing water until seen by your physician and given permission    Do not restart any blood thinners for the first few days unless told by your cardiologist or your physician specifically, if you have any questions call your physician    Return to ER if:  -you have fever  -see discharge from incisions or penis  -inability to void  -severe pain not controlled with pain meds  -any redness or concern for infection of your pump    Call your physician or urology clinic with any concerns

## 2017-07-27 ENCOUNTER — OFFICE VISIT (OUTPATIENT)
Dept: UROLOGY | Facility: CLINIC | Age: 62
End: 2017-07-27
Payer: COMMERCIAL

## 2017-07-27 VITALS
SYSTOLIC BLOOD PRESSURE: 118 MMHG | HEIGHT: 67 IN | HEART RATE: 87 BPM | WEIGHT: 156.5 LBS | DIASTOLIC BLOOD PRESSURE: 70 MMHG | BODY MASS INDEX: 24.56 KG/M2

## 2017-07-27 DIAGNOSIS — N52.31 ERECTILE DYSFUNCTION FOLLOWING RADICAL PROSTATECTOMY: Primary | ICD-10-CM

## 2017-07-27 PROCEDURE — 99999 PR PBB SHADOW E&M-EST. PATIENT-LVL III: CPT | Mod: PBBFAC,,, | Performed by: UROLOGY

## 2017-07-27 PROCEDURE — 99024 POSTOP FOLLOW-UP VISIT: CPT | Mod: S$GLB,,, | Performed by: UROLOGY

## 2017-07-27 NOTE — PROGRESS NOTES
CHIEF COMPLAINT:     Mr. Garcia is a 62 y.o. male presenting with ED.    PRESENTING ILLNESS:    Eyad Garcia is a 62 y.o. male s/p RRP by Dr. Palacios on 7/13/15.  Since then, he c/o ED. Prior to surgery he did not have ED.   He has tried and failed oral meds.  He's failed ICI.  He's s/p placement of a 3 piece AMS IPP on 7/11/17.    He is continent.  No hematuria.  No dysuria.  Good FOS.    No bone pain or weight loss.    REVIEW OF SYSTEMS:    Patient denies bleeding diathesis, chills, decreased size/force of stream, dysuria, fever, flank pain, frequency or urgency, hematuria, hesitancy, intermittency or feeling of incomplete emptying, stones, TB or genitourinary trauma and urethral discharge.    MU  1. 1  2. 0  3. 0  4. 0  5. 0     PATIENT HISTORY:    Past Medical History:   Diagnosis Date    Cataract     Diabetes mellitus type II     Difficult intubation     Elevated PSA     Hyperlipidemia     Hypertension     OA (osteoarthritis)     POAG (primary open-angle glaucoma)     Retinal detachment     OS       Past Surgical History:   Procedure Laterality Date    CYSTOSCOPY      HERNIA REPAIR      KNEE SURGERY      left    PROSTATE SURGERY      RETINAL DETACHMENT SURGERY      OS    SELECTIVE LASER TRABECUPLASTY Bilateral 4/16    OU WITH        Family History   Problem Relation Age of Onset    Diabetes Mother     Hypertension Mother     Glaucoma Mother     Diabetes Sister     Glaucoma Sister     Diabetes Brother     Glaucoma Brother     No Known Problems Father     No Known Problems Maternal Aunt     No Known Problems Maternal Uncle     No Known Problems Paternal Aunt     No Known Problems Paternal Uncle     No Known Problems Maternal Grandmother     No Known Problems Maternal Grandfather     No Known Problems Paternal Grandmother     No Known Problems Paternal Grandfather     Anesthesia problems Neg Hx     Broken bones Neg Hx     Cancer Neg Hx     Clotting disorder Neg Hx      Collagen disease Neg Hx     Dislocations Neg Hx     Osteoporosis Neg Hx     Rheumatologic disease Neg Hx     Scoliosis Neg Hx     Severe sprains Neg Hx     Amblyopia Neg Hx     Blindness Neg Hx     Cataracts Neg Hx     Macular degeneration Neg Hx     Retinal detachment Neg Hx     Strabismus Neg Hx     Stroke Neg Hx     Thyroid disease Neg Hx        Social History     Social History    Marital status:      Spouse name: N/A    Number of children: N/A    Years of education: N/A     Occupational History    Not on file.     Social History Main Topics    Smoking status: Never Smoker    Smokeless tobacco: Never Used    Alcohol use No    Drug use: No    Sexual activity: Yes     Partners: Female     Other Topics Concern    Not on file     Social History Narrative    Works for [employer]       Allergies:  Aspirin and Sulfa (sulfonamide antibiotics)    Medications:    Current Outpatient Prescriptions:     atorvastatin (LIPITOR) 80 MG tablet, Take 1 tablet (80 mg total) by mouth once daily., Disp: 90 tablet, Rfl: 3    bimatoprost (LUMIGAN) 0.01 % Drop, Place 1 drop into both eyes every morning., Disp: 2.5 mL, Rfl: 12    efinaconazole (JUBLIA) 10 % Hans, Apply 1 application topically once daily., Disp: 8 mL, Rfl: 12    losartan (COZAAR) 100 MG tablet, Take 1 tablet (100 mg total) by mouth once daily., Disp: 90 tablet, Rfl: 3    metformin (GLUCOPHAGE-XR) 750 MG 24 hr tablet, Take 2 tablets (1,500 mg total) by mouth daily with breakfast., Disp: 180 tablet, Rfl: 3    oxycodone-acetaminophen (PERCOCET) 5-325 mg per tablet, Take 1 tablet by mouth every 4 (four) hours as needed (For pain 1-5/10 pain scale)., Disp: 41 tablet, Rfl: 0    polyethylene glycol (GLYCOLAX) 17 gram PwPk, Take 17 g by mouth daily as needed., Disp: 30 packet, Rfl: 0    PHYSICAL EXAMINATION:    The patient generally appears in good health, is appropriately interactive, and is in no apparent distress.      Eyes:  anicteric sclerae, moist conjunctivae; no lid-lag; PERRLA      HENT: Atraumatic; oropharynx clear with moist mucous membranes and no mucosal ulcerations;normal hard and soft palate.  No evidence of lymphadenopathy.     Neck: Trachea midline.  No thyromegaly.     Musculoskeletal: No abnormal gait.     Skin: No lesions.     Mental: Cooperative with normal affect.  Is oriented to time, place, and person.     Neuro: Grossly intact.     Chest: Normal inspiratory effort.   No accessory muscles.  No audible wheezes.  Respirations symmetric on inspiration and expiration.     Heart: Regular rhythm.       Abdomen:  Soft, non-tender. No masses or organomegaly. Bladder is not palpable. No evidence of flank discomfort. No evidence of inguinal hernia.     Genitourinary: The penis is circumcised with no evidence of plaques or induration. The urethral meatus is normal. The testes, epididymides, and cord structures are normal in size and contour bilaterally. The scrotum is normal in size and contour.  The IPP components are palpable in the penis and scrotum.     Extremities: No clubbing, cyanosis, or edema      LABS:    UA dipped negative today    Lab Results   Component Value Date    PSA 0.14 03/27/2017    PSA 6.6 (H) 02/27/2015    PSA 5.48 (H) 05/13/2013    PSADIAG 0.11 06/27/2016    PSADIAG 0.03 08/27/2015       IMPRESSION:    Encounter Diagnoses   Name Primary?    Erectile dysfunction following radical prostatectomy Yes   HTN, controlled  Hyperlipidemia, controlled  DM, controlled      PLAN:    1. RTC 4 weeks with an STAS for inflation teaching.  2. RTC to see me in 3 months.    Copy to:

## 2017-08-25 ENCOUNTER — OFFICE VISIT (OUTPATIENT)
Dept: UROLOGY | Facility: CLINIC | Age: 62
End: 2017-08-25
Payer: COMMERCIAL

## 2017-08-25 VITALS — SYSTOLIC BLOOD PRESSURE: 144 MMHG | DIASTOLIC BLOOD PRESSURE: 83 MMHG | HEART RATE: 78 BPM

## 2017-08-25 DIAGNOSIS — Z98.890 POST-OPERATIVE STATE: Primary | ICD-10-CM

## 2017-08-25 DIAGNOSIS — N50.82 SCROTAL PAIN: ICD-10-CM

## 2017-08-25 PROCEDURE — 99024 POSTOP FOLLOW-UP VISIT: CPT | Mod: S$GLB,,, | Performed by: NURSE PRACTITIONER

## 2017-08-25 PROCEDURE — 99999 PR PBB SHADOW E&M-EST. PATIENT-LVL III: CPT | Mod: PBBFAC,,, | Performed by: NURSE PRACTITIONER

## 2017-08-25 RX ORDER — NAPROXEN 500 MG/1
500 TABLET ORAL 2 TIMES DAILY WITH MEALS
Qty: 14 TABLET | Refills: 1 | Status: SHIPPED | OUTPATIENT
Start: 2017-08-25 | End: 2017-08-31 | Stop reason: ALTCHOICE

## 2017-08-25 RX ORDER — OMEPRAZOLE 20 MG/1
20 CAPSULE, DELAYED RELEASE ORAL DAILY
Qty: 14 CAPSULE | Refills: 0 | Status: SHIPPED | OUTPATIENT
Start: 2017-08-25 | End: 2017-10-19

## 2017-08-25 NOTE — PROGRESS NOTES
Subjective:       Patient ID: Eyad Garcia is a 62 y.o. male.    Chief Complaint: Follow-up (ipp )    Eyad Garcia is a 62 y.o. male s/p RRP by Dr. Palacios on 7/13/15.    Since then, he c/o ED. Prior to surgery he did not have ED.     He has tried and failed oral meds.    He's failed ICI.      He's s/p placement of a 3 piece AMS IPP on 7/11/17.     He is here today for initial cycling of his IPP.  He states everything has healed but still with some tenderness to right side of scrotum.  Reports tenderness to right testicle  He has been wearing scrotal support and has not been doing any heavy lifting.    No changes in urination         No bone pain or weight loss.                         PSA                      0.14                03/27/2017                 PSA                      0.11                06/27/2016                    Past Medical History:  No date: Cataract  No date: Diabetes mellitus type II  No date: Difficult intubation  No date: Elevated PSA  No date: Hyperlipidemia  No date: Hypertension  No date: OA (osteoarthritis)  No date: POAG (primary open-angle glaucoma)  No date: Retinal detachment      Comment: OS    Past Surgical History:  No date: CYSTOSCOPY  No date: HERNIA REPAIR  No date: KNEE SURGERY      Comment: left  No date: PROSTATE SURGERY  No date: RETINAL DETACHMENT SURGERY      Comment: OS  4/16: SELECTIVE LASER TRABECUPLASTY Bilateral      Comment: OU WITH     Review of patient's family history indicates:    Diabetes                       Mother                    Hypertension                   Mother                    Glaucoma                       Mother                    Diabetes                       Sister                    Glaucoma                       Sister                    Diabetes                       Brother                   Glaucoma                       Brother                   No Known Problems              Father                    No Known Problems               Maternal Aunt             No Known Problems              Maternal Uncle            No Known Problems              Paternal Aunt             No Known Problems              Paternal Uncle            No Known Problems              Maternal Grandmother      No Known Problems              Maternal Grandfather      No Known Problems              Paternal Grandmother      No Known Problems              Paternal Grandfather      Anesthesia problems            Neg Hx                    Broken bones                   Neg Hx                    Cancer                         Neg Hx                    Clotting disorder              Neg Hx                    Collagen disease               Neg Hx                    Dislocations                   Neg Hx                    Osteoporosis                   Neg Hx                    Rheumatologic disease          Neg Hx                    Scoliosis                      Neg Hx                    Severe sprains                 Neg Hx                    Amblyopia                      Neg Hx                    Blindness                      Neg Hx                    Cataracts                      Neg Hx                    Macular degeneration           Neg Hx                    Retinal detachment             Neg Hx                    Strabismus                     Neg Hx                    Stroke                         Neg Hx                    Thyroid disease                Neg Hx                      Social History    Marital status:              Spouse name:                       Years of education:                 Number of children:               Occupational History    None on file    Social History Main Topics    Smoking status: Never Smoker                                                                Smokeless tobacco: Never Used                        Alcohol use: No              Drug use: No              Sexual activity: Yes               Partners with:  Female    Other Topics            Concern    None on file    Social History Narrative    Works for [employer]        Allergies:  Aspirin and Sulfa (sulfonamide antibiotics)    Medications:  Current Outpatient Prescriptions:   atorvastatin (LIPITOR) 80 MG tablet, Take 1 tablet (80 mg total) by mouth once daily., Disp: 90 tablet, Rfl: 3  bimatoprost (LUMIGAN) 0.01 % Drop, Place 1 drop into both eyes every morning., Disp: 2.5 mL, Rfl: 12  efinaconazole (JUBLIA) 10 % Hans, Apply 1 application topically once daily., Disp: 8 mL, Rfl: 12  losartan (COZAAR) 100 MG tablet, Take 1 tablet (100 mg total) by mouth once daily., Disp: 90 tablet, Rfl: 3  metformin (GLUCOPHAGE-XR) 750 MG 24 hr tablet, Take 2 tablets (1,500 mg total) by mouth daily with breakfast., Disp: 180 tablet, Rfl: 3  oxycodone-acetaminophen (PERCOCET) 5-325 mg per tablet, Take 1 tablet by mouth every 4 (four) hours as needed (For pain 1-5/10 pain scale)., Disp: 41 tablet, Rfl: 0  polyethylene glycol (GLYCOLAX) 17 gram PwPk, Take 17 g by mouth daily as needed., Disp: 30 packet, Rfl: 0        Review of Systems   Constitutional: Negative.  Negative for activity change, appetite change and fever.   HENT: Negative.  Negative for facial swelling and trouble swallowing.    Eyes: Negative.    Respiratory: Negative.  Negative for shortness of breath.    Cardiovascular: Negative.  Negative for chest pain and palpitations.   Gastrointestinal: Negative for abdominal pain, constipation, diarrhea, nausea and vomiting.   Genitourinary: Positive for testicular pain. Negative for difficulty urinating, dysuria, enuresis, flank pain, frequency, genital sores, hematuria, nocturia, penile pain, scrotal swelling and urgency.        He is continent.  No hematuria.  No dysuria.  Good FOS.   Musculoskeletal: Negative for back pain, gait problem and neck stiffness.   Skin: Negative.  Negative for wound.        Scrotal incision healed.     Neurological: Negative for  dizziness, tremors, seizures, syncope, speech difficulty, light-headedness and headaches.   Hematological: Does not bruise/bleed easily.   Psychiatric/Behavioral: Negative for confusion and hallucinations. The patient is not nervous/anxious.        Objective:      Physical Exam   Nursing note and vitals reviewed.  Constitutional: He is oriented to person, place, and time. Vital signs are normal. He appears well-developed and well-nourished. He is active.  Non-toxic appearance. He does not have a sickly appearance.   Urine dipped clear in the office     HENT:   Head: Normocephalic and atraumatic.   Right Ear: External ear normal.   Left Ear: External ear normal.   Nose: Nose normal.   Mouth/Throat: Mucous membranes are normal.   Eyes: Conjunctivae and lids are normal. No scleral icterus.   Neck: Trachea normal, normal range of motion and full passive range of motion without pain. Neck supple. No JVD present. No tracheal deviation present.   Cardiovascular: Normal rate, regular rhythm, S1 normal and S2 normal.    Pulmonary/Chest: Effort normal and breath sounds normal. No respiratory distress. He exhibits no tenderness.   Abdominal: Soft. Normal appearance and bowel sounds are normal. There is no hepatosplenomegaly. There is no tenderness. There is no rebound, no guarding and no CVA tenderness.   Genitourinary: Penis normal. Right testis shows tenderness. Right testis shows no mass and no swelling. Left testis shows no mass, no swelling and no tenderness. Circumcised. No hypospadias, penile erythema or penile tenderness. No discharge found.   Genitourinary Comments: No penis or scrotal edema present.  Incision well healed.  IPP contents palpable but tenderness to right side.     Musculoskeletal: Normal range of motion.   Neurological: He is alert and oriented to person, place, and time. He has normal strength.   Skin: Skin is warm, dry and intact.     Psychiatric: He has a normal mood and affect. His behavior is  normal. Judgment and thought content normal.       Assessment:       1. Post-operative state    2. Scrotal pain        Plan:         I spent 25 minutes with the patient of which more than half was spent in direct consultation with the patient in regards to our treatment and plan.    Education and recommendations of today's plan of care including home remedies.  We discussed post IPP expectations.  I was able to partially  inflate IPP before stopping due to tenderness then deflate.  Discussed with him this postop discomfort will get better but needs to continue scrotal support and NSAIDS.  He state ASA upsets stomach but he can take Naprosyn and PPI help with symptoms; this just temporarily.  Will see him again next week.

## 2017-08-31 ENCOUNTER — OFFICE VISIT (OUTPATIENT)
Dept: UROLOGY | Facility: CLINIC | Age: 62
End: 2017-08-31
Payer: COMMERCIAL

## 2017-08-31 VITALS
BODY MASS INDEX: 25.51 KG/M2 | HEIGHT: 66 IN | SYSTOLIC BLOOD PRESSURE: 139 MMHG | HEART RATE: 80 BPM | DIASTOLIC BLOOD PRESSURE: 72 MMHG | WEIGHT: 158.75 LBS

## 2017-08-31 DIAGNOSIS — N50.82 SCROTAL PAIN: ICD-10-CM

## 2017-08-31 DIAGNOSIS — Z98.890 POST-OPERATIVE STATE: Primary | ICD-10-CM

## 2017-08-31 DIAGNOSIS — N52.01 ERECTILE DYSFUNCTION DUE TO ARTERIAL INSUFFICIENCY: ICD-10-CM

## 2017-08-31 PROCEDURE — 99999 PR PBB SHADOW E&M-EST. PATIENT-LVL III: CPT | Mod: PBBFAC,,, | Performed by: NURSE PRACTITIONER

## 2017-08-31 PROCEDURE — 99024 POSTOP FOLLOW-UP VISIT: CPT | Mod: S$GLB,,, | Performed by: NURSE PRACTITIONER

## 2017-08-31 RX ORDER — OXAPROZIN 600 MG/1
600 TABLET, FILM COATED ORAL EVERY 12 HOURS
Qty: 30 TABLET | Refills: 1 | Status: SHIPPED | OUTPATIENT
Start: 2017-08-31 | End: 2017-09-15

## 2017-08-31 NOTE — PROGRESS NOTES
Subjective:       Patient ID: Eyad Garcia is a 62 y.o. male.    Chief Complaint: Testicle Pain and Post-op Evaluation (s/p IPP)    Eyad Garcia is a 62 y.o. male s/p RRP by Dr. Palacios on 7/13/15.    Since then, he c/o ED. Prior to surgery he did not have ED.     He has tried and failed oral meds.    He's failed ICI.      He's s/p placement of a 3 piece AMS IPP on 7/11/17.     Last seen in clinic 08/25/2017 for his initial cycling but was having some tenderness and unable to do it.  He was given Naprosyn and f/u was scheduled    He is here today for cycling of his IPP.  He states still having some pain when he tries to inflate it.  He inflated but not fully because of the pain.   He tolerating his Naprosyn.    He has been wearing scrotal support and has not been doing any heavy lifting.    No pain to scrotum with walking or sitting.  No changes in urination       No bone pain or weight loss.                         PSA                      0.14                03/27/2017                 PSA                      0.11                06/27/2016                    Past Medical History:  No date: Cataract  No date: Diabetes mellitus type II  No date: Difficult intubation  No date: Elevated PSA  No date: Hyperlipidemia  No date: Hypertension  No date: OA (osteoarthritis)  No date: POAG (primary open-angle glaucoma)  No date: Retinal detachment      Comment: OS    Past Surgical History:  No date: CYSTOSCOPY  No date: HERNIA REPAIR  No date: KNEE SURGERY      Comment: left  No date: PROSTATE SURGERY  No date: RETINAL DETACHMENT SURGERY      Comment: OS  4/16: SELECTIVE LASER TRABECUPLASTY Bilateral      Comment: OU WITH     Review of patient's family history indicates:    Diabetes                       Mother                    Hypertension                   Mother                    Glaucoma                       Mother                    Diabetes                       Sister                    Glaucoma                        Sister                    Diabetes                       Brother                   Glaucoma                       Brother                   No Known Problems              Father                    No Known Problems              Maternal Aunt             No Known Problems              Maternal Uncle            No Known Problems              Paternal Aunt             No Known Problems              Paternal Uncle            No Known Problems              Maternal Grandmother      No Known Problems              Maternal Grandfather      No Known Problems              Paternal Grandmother      No Known Problems              Paternal Grandfather      Anesthesia problems            Neg Hx                    Broken bones                   Neg Hx                    Cancer                         Neg Hx                    Clotting disorder              Neg Hx                    Collagen disease               Neg Hx                    Dislocations                   Neg Hx                    Osteoporosis                   Neg Hx                    Rheumatologic disease          Neg Hx                    Scoliosis                      Neg Hx                    Severe sprains                 Neg Hx                    Amblyopia                      Neg Hx                    Blindness                      Neg Hx                    Cataracts                      Neg Hx                    Macular degeneration           Neg Hx                    Retinal detachment             Neg Hx                    Strabismus                     Neg Hx                    Stroke                         Neg Hx                    Thyroid disease                Neg Hx                      Social History    Marital status:              Spouse name:                       Years of education:                 Number of children:               Occupational History    None on file    Social History Main Topics    Smoking status:  Never Smoker                                                                Smokeless tobacco: Never Used                        Alcohol use: No              Drug use: No              Sexual activity: Yes               Partners with: Female    Other Topics            Concern    None on file    Social History Narrative    Works for [employer]        Allergies:  Aspirin and Sulfa (sulfonamide antibiotics)    Medications:  Current Outpatient Prescriptions:   atorvastatin (LIPITOR) 80 MG tablet, Take 1 tablet (80 mg total) by mouth once daily., Disp: 90 tablet, Rfl: 3  bimatoprost (LUMIGAN) 0.01 % Drop, Place 1 drop into both eyes every morning., Disp: 2.5 mL, Rfl: 12  efinaconazole (JUBLIA) 10 % Hans, Apply 1 application topically once daily., Disp: 8 mL, Rfl: 12  losartan (COZAAR) 100 MG tablet, Take 1 tablet (100 mg total) by mouth once daily., Disp: 90 tablet, Rfl: 3  metformin (GLUCOPHAGE-XR) 750 MG 24 hr tablet, Take 2 tablets (1,500 mg total) by mouth daily with breakfast., Disp: 180 tablet, Rfl: 3  oxycodone-acetaminophen (PERCOCET) 5-325 mg per tablet, Take 1 tablet by mouth every 4 (four) hours as needed (For pain 1-5/10 pain scale)., Disp: 41 tablet, Rfl: 0  polyethylene glycol (GLYCOLAX) 17 gram PwPk, Take 17 g by mouth daily as needed., Disp: 30 packet, Rfl: 0        Review of Systems   Constitutional: Negative.  Negative for activity change, appetite change and fever.   HENT: Negative.  Negative for facial swelling and trouble swallowing.    Eyes: Negative.    Respiratory: Negative.  Negative for shortness of breath.    Cardiovascular: Negative.  Negative for chest pain and palpitations.   Gastrointestinal: Negative for abdominal pain, constipation, diarrhea, nausea and vomiting.   Genitourinary: Positive for testicular pain. Negative for difficulty urinating, discharge, dysuria, enuresis, flank pain, frequency, genital sores, hematuria, nocturia, penile pain, penile swelling, scrotal swelling  and urgency.        Ok with how he urinates.     Musculoskeletal: Negative for back pain, gait problem and neck stiffness.   Skin: Negative.  Negative for wound.   Neurological: Negative for dizziness, tremors, seizures, syncope, speech difficulty, light-headedness and headaches.   Hematological: Does not bruise/bleed easily.   Psychiatric/Behavioral: Negative for confusion and hallucinations. The patient is not nervous/anxious.        Objective:      Physical Exam   Nursing note and vitals reviewed.  Constitutional: He is oriented to person, place, and time. Vital signs are normal. He appears well-developed and well-nourished. He is active and cooperative.  Non-toxic appearance. He does not have a sickly appearance.   Urine dipped clear in the office today.     HENT:   Head: Normocephalic and atraumatic.   Right Ear: External ear normal.   Left Ear: External ear normal.   Nose: Nose normal.   Mouth/Throat: Mucous membranes are normal.   Eyes: Conjunctivae and lids are normal. No scleral icterus.   Neck: Trachea normal, normal range of motion and full passive range of motion without pain. Neck supple. No JVD present. No tracheal deviation present.   Cardiovascular: Normal rate, regular rhythm, S1 normal and S2 normal.    Pulmonary/Chest: Effort normal and breath sounds normal. No respiratory distress. He exhibits no tenderness.   Abdominal: Soft. Normal appearance and bowel sounds are normal. There is no hepatosplenomegaly. There is no tenderness. There is no rebound, no guarding and no CVA tenderness.   Genitourinary: Testes normal and penis normal. Right testis shows no mass, no swelling and no tenderness. Left testis shows no mass, no swelling and no tenderness. No hypospadias, penile erythema or penile tenderness. No discharge found.   Genitourinary Comments: No swelling or erythema noted to scrotum.  Incision well healed; no pain at incision with palpation.  IPP contents palpable.  (+) tenderness to  scrotum/right testicle with pressure to bulb.      Musculoskeletal: Normal range of motion.   Lymphadenopathy: No inguinal adenopathy noted on the right or left side.   Neurological: He is alert and oriented to person, place, and time. He has normal strength.   Skin: Skin is warm, dry and intact.     Psychiatric: He has a normal mood and affect. His behavior is normal. Judgment and thought content normal.       Assessment:       1. Post-operative state    2. Scrotal pain    3. Erectile dysfunction due to arterial insufficiency        Plan:         I spent 25 minutes with the patient of which more than half was spent in direct consultation with the patient in regards to our treatment and plan.    Education and recommendations of today's plan of care including home remedies.  discussed findings.  I was able to cycle IPP without difficulty but he reported pain 6-8 out of 10 with the cycle.  He attempted to cycle but only partially inflated and stopped due to pain.  We discussed the healing process;  Since tolerated the Naprosyn, now Rx for Daypro BID with food.  Recheck in 2 weeks; again he can try at home; call me with any problems.

## 2017-10-09 ENCOUNTER — PATIENT MESSAGE (OUTPATIENT)
Dept: UROLOGY | Facility: CLINIC | Age: 62
End: 2017-10-09

## 2017-10-13 DIAGNOSIS — E11.9 TYPE 2 DIABETES MELLITUS WITHOUT COMPLICATION: ICD-10-CM

## 2017-10-19 ENCOUNTER — OFFICE VISIT (OUTPATIENT)
Dept: OPHTHALMOLOGY | Facility: CLINIC | Age: 62
End: 2017-10-19
Payer: COMMERCIAL

## 2017-10-19 DIAGNOSIS — E11.9 TYPE 2 DIABETES MELLITUS WITHOUT OPHTHALMIC MANIFESTATIONS: ICD-10-CM

## 2017-10-19 DIAGNOSIS — H52.4 BILATERAL PRESBYOPIA: ICD-10-CM

## 2017-10-19 DIAGNOSIS — H53.432 ARCUATE VISUAL FIELD DEFECT OF LEFT EYE: ICD-10-CM

## 2017-10-19 DIAGNOSIS — H21.562 PUPILLARY SPHINCTER RUPTURE, LEFT: ICD-10-CM

## 2017-10-19 DIAGNOSIS — H31.002 CHORIORETINAL SCAR, LEFT: ICD-10-CM

## 2017-10-19 DIAGNOSIS — H40.1131 PRIMARY OPEN ANGLE GLAUCOMA OF BOTH EYES, MILD STAGE: Primary | ICD-10-CM

## 2017-10-19 DIAGNOSIS — H33.052 OLD SUBTOTAL RETINAL DETACHMENT, LEFT: ICD-10-CM

## 2017-10-19 PROCEDURE — 99999 PR PBB SHADOW E&M-EST. PATIENT-LVL II: CPT | Mod: PBBFAC,,, | Performed by: OPHTHALMOLOGY

## 2017-10-19 PROCEDURE — 92012 INTRM OPH EXAM EST PATIENT: CPT | Mod: S$GLB,,, | Performed by: OPHTHALMOLOGY

## 2017-10-19 RX ORDER — LATANOPROST 50 UG/ML
1 SOLUTION/ DROPS OPHTHALMIC DAILY
Qty: 2.5 ML | Refills: 12 | Status: SHIPPED | OUTPATIENT
Start: 2017-10-19 | End: 2018-12-04 | Stop reason: SDUPTHER

## 2017-10-30 ENCOUNTER — OFFICE VISIT (OUTPATIENT)
Dept: UROLOGY | Facility: CLINIC | Age: 62
End: 2017-10-30
Payer: COMMERCIAL

## 2017-10-30 VITALS
SYSTOLIC BLOOD PRESSURE: 143 MMHG | WEIGHT: 165.38 LBS | HEART RATE: 74 BPM | BODY MASS INDEX: 25.96 KG/M2 | HEIGHT: 67 IN | DIASTOLIC BLOOD PRESSURE: 88 MMHG

## 2017-10-30 DIAGNOSIS — E11.9 TYPE 2 DIABETES MELLITUS WITHOUT COMPLICATION, WITHOUT LONG-TERM CURRENT USE OF INSULIN: ICD-10-CM

## 2017-10-30 DIAGNOSIS — I10 ESSENTIAL HYPERTENSION: ICD-10-CM

## 2017-10-30 DIAGNOSIS — E78.5 HYPERLIPIDEMIA, UNSPECIFIED HYPERLIPIDEMIA TYPE: ICD-10-CM

## 2017-10-30 DIAGNOSIS — C61 MALIGNANT NEOPLASM OF PROSTATE: ICD-10-CM

## 2017-10-30 DIAGNOSIS — N52.01 ERECTILE DYSFUNCTION DUE TO ARTERIAL INSUFFICIENCY: Primary | ICD-10-CM

## 2017-10-30 PROCEDURE — 99214 OFFICE O/P EST MOD 30 MIN: CPT | Mod: S$GLB,,, | Performed by: UROLOGY

## 2017-10-30 PROCEDURE — 99999 PR PBB SHADOW E&M-EST. PATIENT-LVL III: CPT | Mod: PBBFAC,,, | Performed by: UROLOGY

## 2017-10-30 NOTE — PROGRESS NOTES
CHIEF COMPLAINT:     Mr. Garcia is a 62 y.o. male presenting with ED.    PRESENTING ILLNESS:    Eyad Garcia is a 62 y.o. male s/p RRP by Dr. Palacios on 7/13/15.  Since then, he c/o ED. Prior to surgery he did not have ED.   He has tried and failed oral meds.  He's failed ICI.  He's s/p placement of a 3 piece AMS IPP on 7/11/17.  He's had trouble inflating the device.    He is continent.  No hematuria.  No dysuria.  Good FOS.    No bone pain or weight loss.    REVIEW OF SYSTEMS:    Patientdenies any history of headache, blurred vision, fever, nausea, vomiting, chills, abdominal pain, bleeding per rectum, cough, SOB, recent loss of consciousness, recent mental status changes, seizures, dizziness, or upper or lower extremity weakness.    MU  1. 1  2. 0  3. 0  4. 0  5. 0     PATIENT HISTORY:    Past Medical History:   Diagnosis Date    Cataract     Diabetes mellitus type II     Difficult intubation     Erectile dysfunction     Hyperlipidemia     Hypertension     OA (osteoarthritis)     POAG (primary open-angle glaucoma)     Retinal detachment     OS       Past Surgical History:   Procedure Laterality Date    CYSTOSCOPY      HERNIA REPAIR      KNEE SURGERY      left    PENILE PROSTHESIS IMPLANT      PROSTATE SURGERY      RETINAL DETACHMENT SURGERY      OS    SELECTIVE LASER TRABECUPLASTY Bilateral 4/16    OU WITH        Family History   Problem Relation Age of Onset    Diabetes Mother     Hypertension Mother     Glaucoma Mother     Diabetes Sister     Glaucoma Sister     Diabetes Brother     Glaucoma Brother     No Known Problems Father     No Known Problems Maternal Aunt     No Known Problems Maternal Uncle     No Known Problems Paternal Aunt     No Known Problems Paternal Uncle     No Known Problems Maternal Grandmother     No Known Problems Maternal Grandfather     No Known Problems Paternal Grandmother     No Known Problems Paternal Grandfather     Anesthesia problems  Neg Hx     Broken bones Neg Hx     Cancer Neg Hx     Clotting disorder Neg Hx     Collagen disease Neg Hx     Dislocations Neg Hx     Osteoporosis Neg Hx     Rheumatologic disease Neg Hx     Scoliosis Neg Hx     Severe sprains Neg Hx     Amblyopia Neg Hx     Blindness Neg Hx     Cataracts Neg Hx     Macular degeneration Neg Hx     Retinal detachment Neg Hx     Strabismus Neg Hx     Stroke Neg Hx     Thyroid disease Neg Hx        Social History     Social History    Marital status:      Spouse name: N/A    Number of children: N/A    Years of education: N/A     Occupational History    Not on file.     Social History Main Topics    Smoking status: Never Smoker    Smokeless tobacco: Never Used    Alcohol use No    Drug use: No    Sexual activity: Yes     Partners: Female     Other Topics Concern    Not on file     Social History Narrative    Works for [employer]       Allergies:  Sulfa (sulfonamide antibiotics) and Aspirin    Medications:    Current Outpatient Prescriptions:     atorvastatin (LIPITOR) 80 MG tablet, Take 1 tablet (80 mg total) by mouth once daily., Disp: 90 tablet, Rfl: 3    efinaconazole (JUBLIA) 10 % Hans, Apply 1 application topically once daily., Disp: 8 mL, Rfl: 12    latanoprost 0.005 % ophthalmic solution, Place 1 drop into both eyes once daily. This replaces lumigan, Disp: 2.5 mL, Rfl: 12    losartan (COZAAR) 100 MG tablet, Take 1 tablet (100 mg total) by mouth once daily., Disp: 90 tablet, Rfl: 3    metformin (GLUCOPHAGE-XR) 750 MG 24 hr tablet, Take 2 tablets (1,500 mg total) by mouth daily with breakfast. (Patient taking differently: Take 750 mg by mouth daily with breakfast. ), Disp: 180 tablet, Rfl: 3    PHYSICAL EXAMINATION:    The patient generally appears in good health, is appropriately interactive, and is in no apparent distress.      Eyes: anicteric sclerae, moist conjunctivae; no lid-lag; PERRLA      HENT: Atraumatic; oropharynx clear with  moist mucous membranes and no mucosal ulcerations;normal hard and soft palate.  No evidence of lymphadenopathy.     Neck: Trachea midline.  No thyromegaly.     Musculoskeletal: No abnormal gait.     Skin: No lesions.     Mental: Cooperative with normal affect.  Is oriented to time, place, and person.     Neuro: Grossly intact.     Chest: Normal inspiratory effort.   No accessory muscles.  No audible wheezes.  Respirations symmetric on inspiration and expiration.     Heart: Regular rhythm.       Abdomen:  Soft, non-tender. No masses or organomegaly. Bladder is not palpable. No evidence of flank discomfort. No evidence of inguinal hernia.    Genitourinary: The penis is circumcised with no evidence of plaques or induration. The urethral meatus is normal. The testes, epididymides, and cord structures are normal in size and contour bilaterally. The scrotum is normal in size and contour.  The IPP components are palpable in the penis and scrotum.     Extremities: No clubbing, cyanosis, or edema      LABS:    UA dipped negative today    Lab Results   Component Value Date    PSA 0.14 03/27/2017    PSA 6.6 (H) 02/27/2015    PSA 5.48 (H) 05/13/2013    PSADIAG 0.11 06/27/2016    PSADIAG 0.03 08/27/2015       IMPRESSION:    Encounter Diagnoses   Name Primary?    Erectile dysfunction due to arterial insufficiency Yes    Malignant neoplasm of prostate     Hyperlipidemia, unspecified hyperlipidemia type     Essential hypertension     Type 2 diabetes mellitus without complication, without long-term current use of insulin    HTN, controlled  Hyperlipidemia, controlled  DM, controlled      PLAN:    1. I was able to inflate the device much firmer than he could at home.  Instructed him on proper inflation technique.    2. RTC 1 month to re-evaluate.  3. He did his 3 month questionnaires for the  strength study.    Copy to:

## 2017-12-08 ENCOUNTER — LAB VISIT (OUTPATIENT)
Dept: LAB | Facility: HOSPITAL | Age: 62
End: 2017-12-08
Attending: FAMILY MEDICINE
Payer: COMMERCIAL

## 2017-12-08 ENCOUNTER — IMMUNIZATION (OUTPATIENT)
Dept: INTERNAL MEDICINE | Facility: CLINIC | Age: 62
End: 2017-12-08
Payer: COMMERCIAL

## 2017-12-08 ENCOUNTER — OFFICE VISIT (OUTPATIENT)
Dept: INTERNAL MEDICINE | Facility: CLINIC | Age: 62
End: 2017-12-08
Payer: COMMERCIAL

## 2017-12-08 VITALS
SYSTOLIC BLOOD PRESSURE: 132 MMHG | HEART RATE: 81 BPM | BODY MASS INDEX: 26.39 KG/M2 | WEIGHT: 168.13 LBS | DIASTOLIC BLOOD PRESSURE: 74 MMHG | OXYGEN SATURATION: 98 % | TEMPERATURE: 98 F | HEIGHT: 67 IN

## 2017-12-08 DIAGNOSIS — E78.5 HYPERLIPIDEMIA, UNSPECIFIED HYPERLIPIDEMIA TYPE: ICD-10-CM

## 2017-12-08 DIAGNOSIS — I10 ESSENTIAL HYPERTENSION: Primary | ICD-10-CM

## 2017-12-08 DIAGNOSIS — E11.9 TYPE 2 DIABETES MELLITUS WITHOUT COMPLICATION, WITHOUT LONG-TERM CURRENT USE OF INSULIN: ICD-10-CM

## 2017-12-08 LAB
ALBUMIN SERPL BCP-MCNC: 3.9 G/DL
ALP SERPL-CCNC: 78 U/L
ALT SERPL W/O P-5'-P-CCNC: 20 U/L
ANION GAP SERPL CALC-SCNC: 8 MMOL/L
AST SERPL-CCNC: 23 U/L
BILIRUB SERPL-MCNC: 0.7 MG/DL
BUN SERPL-MCNC: 10 MG/DL
CALCIUM SERPL-MCNC: 9.6 MG/DL
CHLORIDE SERPL-SCNC: 106 MMOL/L
CO2 SERPL-SCNC: 26 MMOL/L
CREAT SERPL-MCNC: 1 MG/DL
EST. GFR  (AFRICAN AMERICAN): >60 ML/MIN/1.73 M^2
EST. GFR  (NON AFRICAN AMERICAN): >60 ML/MIN/1.73 M^2
ESTIMATED AVG GLUCOSE: 154 MG/DL
GLUCOSE SERPL-MCNC: 121 MG/DL
HBA1C MFR BLD HPLC: 7 %
POTASSIUM SERPL-SCNC: 3.9 MMOL/L
PROT SERPL-MCNC: 7.5 G/DL
SODIUM SERPL-SCNC: 140 MMOL/L

## 2017-12-08 PROCEDURE — 90471 IMMUNIZATION ADMIN: CPT | Mod: S$GLB,,, | Performed by: FAMILY MEDICINE

## 2017-12-08 PROCEDURE — 99999 PR PBB SHADOW E&M-EST. PATIENT-LVL III: CPT | Mod: PBBFAC,,, | Performed by: FAMILY MEDICINE

## 2017-12-08 PROCEDURE — 36415 COLL VENOUS BLD VENIPUNCTURE: CPT

## 2017-12-08 PROCEDURE — 99214 OFFICE O/P EST MOD 30 MIN: CPT | Mod: 25,S$GLB,, | Performed by: FAMILY MEDICINE

## 2017-12-08 PROCEDURE — 83036 HEMOGLOBIN GLYCOSYLATED A1C: CPT

## 2017-12-08 PROCEDURE — 80053 COMPREHEN METABOLIC PANEL: CPT

## 2017-12-08 PROCEDURE — 90686 IIV4 VACC NO PRSV 0.5 ML IM: CPT | Mod: S$GLB,,, | Performed by: FAMILY MEDICINE

## 2017-12-08 RX ORDER — METFORMIN HYDROCHLORIDE 750 MG/1
1500 TABLET, EXTENDED RELEASE ORAL
Qty: 180 TABLET | Refills: 3 | Status: SHIPPED | OUTPATIENT
Start: 2017-12-08 | End: 2019-04-24 | Stop reason: SDUPTHER

## 2017-12-08 RX ORDER — ATORVASTATIN CALCIUM 80 MG/1
80 TABLET, FILM COATED ORAL DAILY
Qty: 90 TABLET | Refills: 3 | Status: SHIPPED | OUTPATIENT
Start: 2017-12-08 | End: 2019-02-06 | Stop reason: SDUPTHER

## 2017-12-08 RX ORDER — LOSARTAN POTASSIUM 100 MG/1
100 TABLET ORAL DAILY
Qty: 90 TABLET | Refills: 3 | Status: SHIPPED | OUTPATIENT
Start: 2017-12-08 | End: 2019-01-01 | Stop reason: SDUPTHER

## 2017-12-08 NOTE — PROGRESS NOTES
Subjective:       Patient ID: Eyad Garcia is a 62 y.o. male.    Chief Complaint: Hyperlipidemia (flu shot)    Patient is here for follow-up of their chronic diseases, see last note for details  Diabetes Management Status    Statin: Taking  ACE/ARB: Taking    Screening or Prevention Patient's value Goal Complete/Controlled?   HgA1C Testing and Control   Lab Results   Component Value Date    HGBA1C 7.2 (H) 03/27/2017      Annually/Less than 8% Yes   Lipid profile : 03/27/2017 Annually Yes   LDL control Lab Results   Component Value Date    LDLCALC 77.6 03/27/2017    Annually/Less than 100 mg/dl  Yes   Nephropathy screening No results found for: LABMICR  Lab Results   Component Value Date    PROTEINUA Negative 09/12/2016    Annually No   Blood pressure BP Readings from Last 1 Encounters:   12/08/17 132/74    Less than 140/90 Yes   Dilated retinal exam : 10/19/2017 Annually Yes   Foot exam   : 06/27/2016 Annually No         Review of Systems   Constitutional: Negative for chills and fatigue.   HENT: Negative for ear pain.    Eyes: Negative for pain.   Respiratory: Negative for chest tightness.    Cardiovascular: Negative for chest pain.   Gastrointestinal: Negative for abdominal pain.   Genitourinary: Negative for flank pain.   Musculoskeletal: Negative for gait problem.   Neurological: Negative for syncope.   Psychiatric/Behavioral: Negative for behavioral problems.       Objective:      Physical Exam   Constitutional: He appears well-developed.   HENT:   Head: Normocephalic and atraumatic.   Eyes: EOM are normal.   Neck: Neck supple.   Cardiovascular: Normal rate and normal heart sounds.    Pulmonary/Chest: Breath sounds normal. No respiratory distress. He has no wheezes. He has no rales.   Abdominal: Soft.   Musculoskeletal: He exhibits no edema.        Right foot: There is normal range of motion and no deformity.        Left foot: There is normal range of motion and no deformity.   Feet:   Right Foot:  "  Protective Sensation: 5 sites tested. 5 sites sensed.   Skin Integrity: Negative for ulcer, blister, skin breakdown, erythema, warmth, callus or dry skin.   Left Foot:   Protective Sensation: 5 sites tested. 5 sites sensed.   Skin Integrity: Negative for ulcer, blister, skin breakdown, erythema, warmth, callus or dry skin.   Neurological: He is alert.   Skin: Skin is dry.   Psychiatric: His behavior is normal.       Assessment:       1. Essential hypertension    2. Hyperlipidemia, unspecified hyperlipidemia type    3. Type 2 diabetes mellitus without complication, without long-term current use of insulin        Plan:       Eyad was seen today for hyperlipidemia.    Diagnoses and all orders for this visit:    Essential hypertension  /74 (BP Location: Right arm, Patient Position: Sitting, BP Method: Medium (Manual))   Pulse 81   Temp 98.1 °F (36.7 °C) (Oral)   Ht 5' 6.5" (1.689 m)   Wt 76.2 kg (168 lb 1.6 oz)   SpO2 98%   BMI 26.73 kg/m²   -controlled, stable, no change in meds  -     losartan (COZAAR) 100 MG tablet; Take 1 tablet (100 mg total) by mouth once daily.    Hyperlipidemia, unspecified hyperlipidemia type  -     atorvastatin (LIPITOR) 80 MG tablet; Take 1 tablet (80 mg total) by mouth once daily.    Type 2 diabetes mellitus without complication, without long-term current use of insulin  -     Hemoglobin A1c; Future  -     Comprehensive metabolic panel; Future  -     metFORMIN (GLUCOPHAGE-XR) 750 MG 24 hr tablet; Take 2 tablets (1,500 mg total) by mouth daily with breakfast. [pt just taking 1 day, but at work was told HgbA1c was too high. Will return to 2 a day unless HgbA1c is very good today]      Return in about 6 months (around 6/8/2018) for dm, htn.  Fly shot today  New zoster vaccine when it is available  Does nto need prevnar 13 until he is 64      "

## 2018-04-01 ENCOUNTER — PATIENT MESSAGE (OUTPATIENT)
Dept: INTERNAL MEDICINE | Facility: CLINIC | Age: 63
End: 2018-04-01

## 2018-04-02 ENCOUNTER — PATIENT MESSAGE (OUTPATIENT)
Dept: OPHTHALMOLOGY | Facility: CLINIC | Age: 63
End: 2018-04-02

## 2018-04-05 ENCOUNTER — OFFICE VISIT (OUTPATIENT)
Dept: ORTHOPEDICS | Facility: CLINIC | Age: 63
End: 2018-04-05
Payer: COMMERCIAL

## 2018-04-05 ENCOUNTER — HOSPITAL ENCOUNTER (OUTPATIENT)
Dept: RADIOLOGY | Facility: HOSPITAL | Age: 63
Discharge: HOME | End: 2018-04-05
Attending: NURSE PRACTITIONER
Payer: COMMERCIAL

## 2018-04-05 VITALS — HEIGHT: 67 IN | WEIGHT: 167.31 LBS | BODY MASS INDEX: 26.26 KG/M2

## 2018-04-05 DIAGNOSIS — M25.562 PAIN IN BOTH KNEES, UNSPECIFIED CHRONICITY: Primary | ICD-10-CM

## 2018-04-05 DIAGNOSIS — M25.561 PAIN IN BOTH KNEES, UNSPECIFIED CHRONICITY: ICD-10-CM

## 2018-04-05 DIAGNOSIS — M25.562 PAIN IN BOTH KNEES, UNSPECIFIED CHRONICITY: ICD-10-CM

## 2018-04-05 DIAGNOSIS — M25.561 PAIN IN BOTH KNEES, UNSPECIFIED CHRONICITY: Primary | ICD-10-CM

## 2018-04-05 DIAGNOSIS — M17.0 PRIMARY OSTEOARTHRITIS OF BOTH KNEES: ICD-10-CM

## 2018-04-05 PROCEDURE — 73564 X-RAY EXAM KNEE 4 OR MORE: CPT | Mod: TC,50

## 2018-04-05 PROCEDURE — 73564 X-RAY EXAM KNEE 4 OR MORE: CPT | Mod: 26,50,, | Performed by: RADIOLOGY

## 2018-04-05 PROCEDURE — 99999 PR PBB SHADOW E&M-EST. PATIENT-LVL III: CPT | Mod: PBBFAC,,, | Performed by: NURSE PRACTITIONER

## 2018-04-05 PROCEDURE — 99203 OFFICE O/P NEW LOW 30 MIN: CPT | Mod: S$GLB,,, | Performed by: NURSE PRACTITIONER

## 2018-04-05 RX ORDER — MELOXICAM 7.5 MG/1
7.5 TABLET ORAL DAILY
Qty: 30 TABLET | Refills: 2 | Status: SHIPPED | OUTPATIENT
Start: 2018-04-05 | End: 2018-12-04 | Stop reason: SDUPTHER

## 2018-04-05 NOTE — PROGRESS NOTES
CC: Pain of the Right Knee and Pain of the Left Knee      HPI: Pt with bilateral knee pain which is intermittent for the past 2 months. The pain is aching on the left and there is stiffness on the right. The right knee hurts more when sitting and the left knee hurts more when ambulating. He has not taken any medication for the pain. 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 He has a history of meniscus repair on the left 7 years ago. He denies any swelling, catching, or locking at this time. He is ambulating without assistance device. There is not a limp.    ROS  General: denies fever and chills  Resp: no c/o sob  CVS: no c/o cp  MSK: c/o bilateral knee pain which is aching and there is associated stiffness    PE  General: AAOx3, pleasant and cooperative  Resp: respirations even and unlabored  MSK: bilateral knee exam  0 degrees extension  130 degrees flexion  No warmth or erythema   - effusion    Xray:  Reviewed by me: There is mild tricompartmental osteoarthrosis of each knee.  I suspect fluid in each suprapatellar bursa.  On AP standing view there is slight loss of joint space in the medial tibial femoral compartment of each knee, best appreciated on AP standing in flexion    Assessment:  Bilateral knee djd    Plan:  Will try mobic 7.5mg po qd   RICE  F/u as needed for cortisone injection if no improvement

## 2018-04-09 ENCOUNTER — CLINICAL SUPPORT (OUTPATIENT)
Dept: OPHTHALMOLOGY | Facility: CLINIC | Age: 63
End: 2018-04-09
Attending: OPHTHALMOLOGY
Payer: COMMERCIAL

## 2018-04-09 DIAGNOSIS — H33.052 OLD SUBTOTAL RETINAL DETACHMENT, LEFT: ICD-10-CM

## 2018-04-09 DIAGNOSIS — H31.002 CHORIORETINAL SCAR, LEFT: ICD-10-CM

## 2018-04-09 DIAGNOSIS — H40.1131 PRIMARY OPEN ANGLE GLAUCOMA OF BOTH EYES, MILD STAGE: Primary | ICD-10-CM

## 2018-04-09 DIAGNOSIS — H40.1131 PRIMARY OPEN ANGLE GLAUCOMA OF BOTH EYES, MILD STAGE: ICD-10-CM

## 2018-04-09 DIAGNOSIS — H53.432 ARCUATE VISUAL FIELD DEFECT OF LEFT EYE: ICD-10-CM

## 2018-04-09 DIAGNOSIS — H52.4 BILATERAL PRESBYOPIA: ICD-10-CM

## 2018-04-09 DIAGNOSIS — E11.9 TYPE 2 DIABETES MELLITUS WITHOUT OPHTHALMIC MANIFESTATIONS: ICD-10-CM

## 2018-04-09 DIAGNOSIS — H21.562 PUPILLARY SPHINCTER RUPTURE, LEFT: ICD-10-CM

## 2018-04-09 PROCEDURE — 99999 PR PBB SHADOW E&M-EST. PATIENT-LVL II: CPT | Mod: PBBFAC,,, | Performed by: OPHTHALMOLOGY

## 2018-04-09 PROCEDURE — 92133 CPTRZD OPH DX IMG PST SGM ON: CPT | Mod: S$GLB,,, | Performed by: OPHTHALMOLOGY

## 2018-04-09 PROCEDURE — 92012 INTRM OPH EXAM EST PATIENT: CPT | Mod: S$GLB,,, | Performed by: OPHTHALMOLOGY

## 2018-04-09 NOTE — PROGRESS NOTES
HPI     Glaucoma    Additional comments: HRT review today           Comments   DLS: 10/1917    1. POAG  2. VF Defect OS  3. Chorioretinal Scar OS  4. Hx RD OS  5. NS OU  6. Type 2 DM no DR  7. Pupil Sphincter Rupture OS  8. Presbyopia    MEDS:  Latanoprost QDAY OU       Last edited by Mayda Sterling MA on 4/9/2018  1:53 PM. (History)             Assessment /Plan     For exam results, see Encounter Report.    Primary open angle glaucoma of both eyes, mild stage    Arcuate visual field defect of left eye    Chorioretinal scar, left    Old subtotal retinal detachment, left    Pupillary sphincter rupture, left    Type 2 diabetes mellitus without ophthalmic manifestations    Bilateral presbyopia        Pt initially dx with glaucoma at [facility] - stoped his gtts on his own   Presented to [facility] - susana Valdez 8/23/2006 - off gtts with a baseline / Tmax of 25/27  Referred by Courtney for consideration of SLT's   Pt with POAG and poorly controlled IOP's - does not use drops regularly       1. Primary open angle glaucoma, both eyes, mild stage        Glaucoma (type and duration)    POAG with elevated IOP ou - mild stage    First HVF   2007 - full od // SAD os 2/2 to CRS   First photos   2011   Treatment / Drops started   2006           Family history    ++ mother and 2 brothers         Glaucoma meds    lumigan (poor compliance) // gen cosopt - poor compliance         H/O adverse rxn to glaucoma drops    None (has tired alphagan and timolol in past -non compliant)         LASERS    SLT os - 3/31/2016 // SLT od - 4/14/2016         GLAUCOMA SURGERIES    none        OTHER EYE SURGERIES    H/O RD repair os - S/P laser repair         CDR    0.75 // 0.8        Tbase    25/27          Tmax    25/27            Ttarget    18/18             HVF    8 test 2006 to  2017 - full  od // SAD - 2/2 CRS from laser for RD repair os        Gonio    +3 ou          CCT    486/502 - thin ou         OCT    4 test 2011 to 2016 - RNFL - nl od // fluctuate  - dec. I (has a CRS)  os        HRT    2 test 2017 to 2018 - MR-Dec. NS/N/NI od // dec. NS/N/NI/ TI, jus TS/T  os /// CDR 0.73 od // 0.84 os        Disc photos    2011, 2015 , 2017     - Ttoday    17/19  - Test done today  IOP, gonio, HRT  - Appears to have progression OS on HRT  - IOP slightly higher OS. Consider repeat SLT OS if the VF's progress or if IOP remains above target of 18      2. 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 Chorioretinal scar, left   -with 2nd VF defect      4. Old subtotal retinal detachment, left   S/P repair - retina flat - stable   -large CRS      5. Type 2 diabetes mellitus without ophthalmic manifestations      6. Senile nuclear sclerosis  -mild - monitor      7. pupil sphincter rupture os  -suggest old trauma  -does NOT appear to have a angle recession    7. Presbyopia       PLAN    POAG with OHT OS > OD - mild od // moderate os   The ON and VF's are still good ou   The VF loss os is 2/2 old CRS- ?  post laser for a RD repair   Good initial response to SLT ou 25/25 --> 16/17    CHANGED FROM LUMIGAN TO LATANOPROST OU (PAYING > $100 FOR EACH BOTTLE)       F/U 4 months IOP, HVF / DFE / OCT -   Consider repeat SLT - if IOP up or if VF progression os   I have seen and personally examined the patient.  I agree with the findings, assessment and plan of the resident and/or fellow.     Bethany Lechuga MD

## 2018-05-07 DIAGNOSIS — E11.9 TYPE 2 DIABETES MELLITUS WITHOUT COMPLICATION: ICD-10-CM

## 2018-07-07 ENCOUNTER — TELEPHONE (OUTPATIENT)
Dept: UROLOGY | Facility: HOSPITAL | Age: 63
End: 2018-07-07

## 2018-07-07 NOTE — RESEARCH
IPP Study  IRB # 2017.001  Subject # [id number]  07/07/2018      Subject was contacted by phone on 7/2/18 to discuss 12 month follow-up.    Subject agrees to continue participation in the IPP study.    The following questionnaires were completed with the subject:  1. MU  2. PGII  3. Non-validated     Subject is using the IPP device.     Subjects has no AEs.      Emil Maravilla MD

## 2018-07-16 DIAGNOSIS — E11.9 TYPE 2 DIABETES MELLITUS WITHOUT COMPLICATION: ICD-10-CM

## 2018-09-28 ENCOUNTER — CLINICAL SUPPORT (OUTPATIENT)
Dept: OPHTHALMOLOGY | Facility: CLINIC | Age: 63
End: 2018-09-28
Payer: COMMERCIAL

## 2018-09-28 ENCOUNTER — OFFICE VISIT (OUTPATIENT)
Dept: OPHTHALMOLOGY | Facility: CLINIC | Age: 63
End: 2018-09-28
Payer: COMMERCIAL

## 2018-09-28 DIAGNOSIS — H40.1131 PRIMARY OPEN ANGLE GLAUCOMA OF BOTH EYES, MILD STAGE: ICD-10-CM

## 2018-09-28 DIAGNOSIS — H31.002 CHORIORETINAL SCAR, LEFT: ICD-10-CM

## 2018-09-28 DIAGNOSIS — H21.562 PUPILLARY SPHINCTER RUPTURE, LEFT: ICD-10-CM

## 2018-09-28 DIAGNOSIS — H52.4 BILATERAL PRESBYOPIA: ICD-10-CM

## 2018-09-28 DIAGNOSIS — H40.1131 PRIMARY OPEN ANGLE GLAUCOMA OF BOTH EYES, MILD STAGE: Primary | ICD-10-CM

## 2018-09-28 DIAGNOSIS — H33.052 OLD SUBTOTAL RETINAL DETACHMENT, LEFT: ICD-10-CM

## 2018-09-28 DIAGNOSIS — E11.9 TYPE 2 DIABETES MELLITUS WITHOUT OPHTHALMIC MANIFESTATIONS: ICD-10-CM

## 2018-09-28 DIAGNOSIS — H53.432 ARCUATE VISUAL FIELD DEFECT OF LEFT EYE: ICD-10-CM

## 2018-09-28 PROCEDURE — 92133 CPTRZD OPH DX IMG PST SGM ON: CPT | Mod: S$GLB,,, | Performed by: OPHTHALMOLOGY

## 2018-09-28 PROCEDURE — 99999 PR PBB SHADOW E&M-EST. PATIENT-LVL II: CPT | Mod: PBBFAC,,, | Performed by: OPHTHALMOLOGY

## 2018-09-28 PROCEDURE — 92083 EXTENDED VISUAL FIELD XM: CPT | Mod: S$GLB,,, | Performed by: OPHTHALMOLOGY

## 2018-09-28 PROCEDURE — 92012 INTRM OPH EXAM EST PATIENT: CPT | Mod: S$GLB,,, | Performed by: OPHTHALMOLOGY

## 2018-09-28 NOTE — PROGRESS NOTES
Assessment /Plan     For exam results, see Encounter Report.    Primary open angle glaucoma of both eyes, mild stage    Arcuate visual field defect of left eye    Chorioretinal scar, left    Old subtotal retinal detachment, left    Pupillary sphincter rupture, left    Type 2 diabetes mellitus without ophthalmic manifestations    Bilateral presbyopia          Pt initially dx with glaucoma at [facility] - stoppped his gtts on his own   Presented to [facility] - susana Valdez 8/23/2006 - off gtts with a baseline / Tmax of 25/27  Referred by Courtney for consideration of SLT's   Pt with POAG and poorly controlled IOP's - does not use drops regularly       1. Primary open angle glaucoma, both eyes, mild stage        Glaucoma (type and duration)    POAG with elevated IOP ou - mild stage    First HVF   2007 - full od // SAD os 2/2 to CRS   First photos   2011   Treatment / Drops started   2006           Family history    ++ mother and 2 brothers         Glaucoma meds    Latanoprost  (( use to use cosopt as well)         H/O adverse rxn to glaucoma drops    None (has tired alphagan and timolol in past -non compliant)         LASERS    SLT os - 3/31/2016 // SLT od - 4/14/2016         GLAUCOMA SURGERIES    none        OTHER EYE SURGERIES    H/O RD repair os - S/P laser repair         CDR    0.75 // 0.8        Tbase    25/27          Tmax    25/27            Ttarget    18/18             HVF    9 test 2006 to  2018 - full  od // SAD - 2/2 CRS from laser for RD repair os        Gonio    +3 ou          CCT    486/502 - thin ou         OCT    5 test 2011 to 2018 - RNFL - BORCHUYITA t od // fluctuate - dec. S/ICHUNG N (has a CRS)  os        HRT    2 test 2017 to 2018 - MR-Dec. NS/N/NI od // dec. NS/N/NI/ TIchung TS/T  os /// CDR 0.73 od // 0.84 os        Disc photos    2011, 2015 , 2017     - Ttoday    18/18  - Test done today  IOP, hvf / dfe / oct      2. Arcuate visual field defect of left eye   SAD os - 2/2 old CRS from repair of old  RD   NOT from glaucomatous damage      3. Chorioretinal scar, left   -with 2nd VF defect      4. Old subtotal retinal detachment, left   S/P repair - retina flat - stable   -large CRS      5. Type 2 diabetes mellitus without ophthalmic manifestations      6. Senile nuclear sclerosis  -mild - monitor      7. pupil sphincter rupture os  -suggest old trauma  -does NOT appear to have a angle recession    7. Presbyopia       PLAN    POAG with OHT OS > OD - mild od // moderate os   The ON and VF's are still good ou   The VF loss os is 2/2 old CRS- ?  post laser for a RD repair   Good initial response to SLT ou 25/25 --> 16/17    CHANGED FROM LUMIGAN TO LATANOPROST OU - was  (PAYING > $100 FOR EACH BOTTLE)     F/U 4 months IOP, gonio    -   Consider repeat SLT - if IOP up or if VF progression os  -consider re-starting / adding cosopt prn (had some compliance issues - but tolerated ok)       Bethany Lechuga MD

## 2018-11-16 ENCOUNTER — OFFICE VISIT (OUTPATIENT)
Dept: INTERNAL MEDICINE | Facility: CLINIC | Age: 63
End: 2018-11-16
Payer: COMMERCIAL

## 2018-11-16 ENCOUNTER — IMMUNIZATION (OUTPATIENT)
Dept: INTERNAL MEDICINE | Facility: CLINIC | Age: 63
End: 2018-11-16
Payer: COMMERCIAL

## 2018-11-16 VITALS
DIASTOLIC BLOOD PRESSURE: 72 MMHG | WEIGHT: 167.56 LBS | SYSTOLIC BLOOD PRESSURE: 124 MMHG | OXYGEN SATURATION: 97 % | HEIGHT: 60 IN | HEART RATE: 104 BPM | BODY MASS INDEX: 32.89 KG/M2

## 2018-11-16 DIAGNOSIS — M25.569 KNEE PAIN, UNSPECIFIED CHRONICITY, UNSPECIFIED LATERALITY: ICD-10-CM

## 2018-11-16 DIAGNOSIS — I10 ESSENTIAL HYPERTENSION: ICD-10-CM

## 2018-11-16 DIAGNOSIS — E11.9 TYPE 2 DIABETES MELLITUS WITHOUT COMPLICATION, WITHOUT LONG-TERM CURRENT USE OF INSULIN: ICD-10-CM

## 2018-11-16 DIAGNOSIS — C61 MALIGNANT NEOPLASM OF PROSTATE: ICD-10-CM

## 2018-11-16 DIAGNOSIS — Z12.11 COLON CANCER SCREENING: ICD-10-CM

## 2018-11-16 DIAGNOSIS — L30.9 DERMATITIS: Primary | ICD-10-CM

## 2018-11-16 DIAGNOSIS — E78.5 HYPERLIPIDEMIA, UNSPECIFIED HYPERLIPIDEMIA TYPE: ICD-10-CM

## 2018-11-16 PROCEDURE — 3078F DIAST BP <80 MM HG: CPT | Mod: CPTII,S$GLB,, | Performed by: FAMILY MEDICINE

## 2018-11-16 PROCEDURE — 90686 IIV4 VACC NO PRSV 0.5 ML IM: CPT | Mod: S$GLB,,, | Performed by: INTERNAL MEDICINE

## 2018-11-16 PROCEDURE — 99214 OFFICE O/P EST MOD 30 MIN: CPT | Mod: S$GLB,,, | Performed by: FAMILY MEDICINE

## 2018-11-16 PROCEDURE — 90471 IMMUNIZATION ADMIN: CPT | Mod: S$GLB,,, | Performed by: INTERNAL MEDICINE

## 2018-11-16 PROCEDURE — 3074F SYST BP LT 130 MM HG: CPT | Mod: CPTII,S$GLB,, | Performed by: FAMILY MEDICINE

## 2018-11-16 PROCEDURE — 3045F PR MOST RECENT HEMOGLOBIN A1C LEVEL 7.0-9.0%: CPT | Mod: CPTII,S$GLB,, | Performed by: FAMILY MEDICINE

## 2018-11-16 PROCEDURE — 3008F BODY MASS INDEX DOCD: CPT | Mod: CPTII,S$GLB,, | Performed by: FAMILY MEDICINE

## 2018-11-16 PROCEDURE — 99999 PR PBB SHADOW E&M-EST. PATIENT-LVL IV: CPT | Mod: PBBFAC,,, | Performed by: FAMILY MEDICINE

## 2018-11-16 RX ORDER — TRIAMCINOLONE ACETONIDE 1 MG/G
CREAM TOPICAL 2 TIMES DAILY
Qty: 80 G | Refills: 1 | Status: SHIPPED | OUTPATIENT
Start: 2018-11-16 | End: 2020-01-29

## 2018-11-16 NOTE — PROGRESS NOTES
Subjective:       Patient ID: Eyad Garcia is a 63 y.o. male.    Chief Complaint: Follow-up (small bumps on arms back and stomach) and Knee Pain    Patient is here for follow-up of their chronic diseases, & above issues    Diabetes Management Status    Statin: Taking  ACE/ARB: Taking    Screening or Prevention Patient's value Goal Complete/Controlled?   HgA1C Testing and Control   Lab Results   Component Value Date    HGBA1C 7.0 (H) 12/08/2017      Annually/Less than 8% Yes   Lipid profile : 03/27/2017 Annually No   LDL control Lab Results   Component Value Date    LDLCALC 77.6 03/27/2017    Annually/Less than 100 mg/dl  No   Nephropathy screening No results found for: LABMICR  Lab Results   Component Value Date    PROTEINUA Negative 10/28/2018    Annually Yes   Blood pressure BP Readings from Last 1 Encounters:   11/16/18 124/72    Less than 140/90 Yes   Dilated retinal exam : 09/28/2018 Annually Yes   Foot exam   : 12/08/2017 Annually Yes         Review of Systems   Constitutional: Negative for chills and fatigue.   HENT: Negative for ear pain.    Eyes: Negative for pain.   Respiratory: Negative for chest tightness.    Cardiovascular: Positive for chest pain.        Right chest wall pain below nipple for a few days, not related to exercise   Gastrointestinal: Negative for abdominal pain.   Genitourinary: Negative for flank pain.   Musculoskeletal: Positive for arthralgias. Negative for gait problem.   Skin: Positive for rash.   Neurological: Negative for syncope.   Psychiatric/Behavioral: Negative for behavioral problems.       Objective:      Physical Exam   Constitutional: He appears well-developed.   HENT:   Head: Normocephalic and atraumatic.   Eyes: EOM are normal.   Neck: Neck supple.   Cardiovascular: Normal rate and normal heart sounds.   Pulmonary/Chest: Breath sounds normal. No respiratory distress. He has no wheezes. He has no rales.   Abdominal: Soft.   Musculoskeletal: He exhibits no edema.    Neurological: He is alert.   Skin: Skin is dry. Rash noted.   Psychiatric: His behavior is normal.       Assessment:       1. Dermatitis    2. Knee pain, unspecified chronicity, unspecified laterality    3. Type 2 diabetes mellitus without complication, without long-term current use of insulin    4. Malignant neoplasm of prostate    5. Essential hypertension    6. Hyperlipidemia, unspecified hyperlipidemia type    7. Colon cancer screening        Plan:       Eyad was seen today for follow-up and knee pain.    Diagnoses and all orders for this visit:    Dermatitis  -     triamcinolone acetonide 0.1% (KENALOG) 0.1 % cream; Apply topically 2 (two) times daily.    Knee pain, unspecified chronicity, unspecified laterality  -     Ambulatory consult to Orthopedics - consider injections - NSAID with risks to heart, kidneys, stomach    Type 2 diabetes mellitus without complication, without long-term current use of insulin  -     Lipid panel; Future  -     TSH; Future  -     Hemoglobin A1c; Future  -     Comprehensive metabolic panel; Future    Malignant neoplasm of prostate s/p operation  -     PROSTATE SPECIFIC ANTIGEN, DIAGNOSTIC; Future    Essential hypertension  /72 (BP Location: Left arm, Patient Position: Sitting, BP Method: Large (Manual))   Pulse 104   Ht 5' (1.524 m)   Wt 76 kg (167 lb 8.8 oz)   SpO2 97%   BMI 32.72 kg/m²   -controlled, stable, no change in meds  -     CBC auto differential; Future  -     Lipid panel; Future  -     TSH; Future  -     Comprehensive metabolic panel; Future    Hyperlipidemia, unspecified hyperlipidemia type  -     Lipid panel; Future  -     Comprehensive metabolic panel; Future    Colon cancer screening  -     Case request GI: COLONOSCOPY    F/u 6 mo

## 2018-11-20 DIAGNOSIS — Z86.010 ENCOUNTER FOR COLONOSCOPY DUE TO HISTORY OF COLONIC POLYP: Primary | ICD-10-CM

## 2018-11-20 DIAGNOSIS — Z12.11 ENCOUNTER FOR COLONOSCOPY DUE TO HISTORY OF COLONIC POLYP: Primary | ICD-10-CM

## 2018-11-20 RX ORDER — SODIUM, POTASSIUM,MAG SULFATES 17.5-3.13G
SOLUTION, RECONSTITUTED, ORAL ORAL
Qty: 1 KIT | Refills: 0 | Status: SHIPPED | OUTPATIENT
Start: 2018-11-20 | End: 2019-01-04

## 2018-12-04 ENCOUNTER — OFFICE VISIT (OUTPATIENT)
Dept: ORTHOPEDICS | Facility: CLINIC | Age: 63
End: 2018-12-04
Payer: COMMERCIAL

## 2018-12-04 ENCOUNTER — PATIENT MESSAGE (OUTPATIENT)
Dept: OPHTHALMOLOGY | Facility: CLINIC | Age: 63
End: 2018-12-04

## 2018-12-04 VITALS
WEIGHT: 171.94 LBS | SYSTOLIC BLOOD PRESSURE: 141 MMHG | BODY MASS INDEX: 33.58 KG/M2 | HEART RATE: 91 BPM | DIASTOLIC BLOOD PRESSURE: 86 MMHG

## 2018-12-04 DIAGNOSIS — M17.0 PRIMARY OSTEOARTHRITIS OF BOTH KNEES: ICD-10-CM

## 2018-12-04 DIAGNOSIS — H40.1131 PRIMARY OPEN ANGLE GLAUCOMA OF BOTH EYES, MILD STAGE: ICD-10-CM

## 2018-12-04 DIAGNOSIS — S39.012D BACK STRAIN, SUBSEQUENT ENCOUNTER: Primary | ICD-10-CM

## 2018-12-04 PROCEDURE — 3008F BODY MASS INDEX DOCD: CPT | Mod: CPTII,S$GLB,, | Performed by: NURSE PRACTITIONER

## 2018-12-04 PROCEDURE — 3077F SYST BP >= 140 MM HG: CPT | Mod: CPTII,S$GLB,, | Performed by: NURSE PRACTITIONER

## 2018-12-04 PROCEDURE — 99999 PR PBB SHADOW E&M-EST. PATIENT-LVL III: CPT | Mod: PBBFAC,,, | Performed by: NURSE PRACTITIONER

## 2018-12-04 PROCEDURE — 3079F DIAST BP 80-89 MM HG: CPT | Mod: CPTII,S$GLB,, | Performed by: NURSE PRACTITIONER

## 2018-12-04 PROCEDURE — 99213 OFFICE O/P EST LOW 20 MIN: CPT | Mod: S$GLB,,, | Performed by: NURSE PRACTITIONER

## 2018-12-04 RX ORDER — LATANOPROST 50 UG/ML
1 SOLUTION/ DROPS OPHTHALMIC NIGHTLY
Qty: 2.5 ML | Refills: 12 | Status: SHIPPED | OUTPATIENT
Start: 2018-12-04 | End: 2019-05-23 | Stop reason: SDUPTHER

## 2018-12-04 RX ORDER — TIZANIDINE 2 MG/1
4 TABLET ORAL EVERY 6 HOURS PRN
Qty: 30 TABLET | Refills: 0 | Status: SHIPPED | OUTPATIENT
Start: 2018-12-04 | End: 2018-12-14

## 2018-12-04 RX ORDER — MELOXICAM 7.5 MG/1
7.5 TABLET ORAL DAILY
Qty: 30 TABLET | Refills: 2 | Status: SHIPPED | OUTPATIENT
Start: 2018-12-04 | End: 2019-03-02 | Stop reason: SDUPTHER

## 2018-12-04 NOTE — PROGRESS NOTES
CC: bilateral knee pain    HPI: Pt with bilateral knee pain for the past few weeks. He was seen in the beginning of the year for the same pain and was taking mobic with good relief. He ran out of mobic last week and the pain returned. The pain is aching and global and worse with walking. He also recently strained the muscle in his lower back and was seen for that in the ER. He had a toradol injection and was given a prescription for lodine, but he is done with that and he is still having some lower back spasms. He is ambulating without assistive device. There is not a limp.    ROS  General: denies fever and chills  Resp: no c/o sob  CVS: no c/o cp  MSK: c/o bilateral knee pain which is aching and global and lower back spasms     PE  General: AAOx3, pleasant and cooperative  Resp: respirations even and unlabored  MSK: bilateral knee exam  0 degrees extension  130 degrees flexion  No warmth or erythema   - effusion    Xray:  Reviewed by me: There is mild tricompartmental osteoarthrosis of each knee.  I suspect fluid in each suprapatellar bursa.  On AP standing view there is slight loss of joint space in the medial tibial femoral compartment of each knee, best appreciated on AP standing in flexion.    Assessment:  Bilateral knee djd    Plan:  mobic refilled  RICE  zanaflex for lower back strain  If no improvement, he will f/u with back and spine next week  F/u with me as needed

## 2018-12-04 NOTE — LETTER
December 5, 2018      Ayush Horne MD  [address]  [facility] [address]           [facility] - Orthopedics  [address], [address]  [facility] [address]  Phone: [phone]          Patient: Eyad Garcia   MR Number: [id number]   YOB: 1955   Date of Visit: 12/4/2018       Dear Dr. Ayush Horne:    Thank you for referring Eyad Garcia to me for evaluation. Attached you will find relevant portions of my assessment and plan of care.    If you have questions, please do not hesitate to call me. I look forward to following Eyad Garcia along with you.    Sincerely,    Dunia Norton, NP    Enclosure  CC:  No Recipients    If you would like to receive this communication electronically, please contact [email or web address] or [phone] to request more information on [employer] Link access.    For providers and/or their staff who would like to refer a patient to [facility], please contact us through our one-stop-shop provider referral line, Paula Perez, at [phone].    If you feel you have received this communication in error or would no longer like to receive these types of communications, please e-mail [email or web address]

## 2018-12-05 DIAGNOSIS — M51.36 DDD (DEGENERATIVE DISC DISEASE), LUMBAR: Primary | ICD-10-CM

## 2018-12-07 ENCOUNTER — TELEPHONE (OUTPATIENT)
Dept: ORTHOPEDICS | Facility: CLINIC | Age: 63
End: 2018-12-07

## 2018-12-07 NOTE — TELEPHONE ENCOUNTER
Spoke with pt to remind him of his xray that he have on 12/10/18 at the imaging center before her appt with SF.pt verbalized understanding.

## 2019-01-01 DIAGNOSIS — I10 ESSENTIAL HYPERTENSION: ICD-10-CM

## 2019-01-01 RX ORDER — LOSARTAN POTASSIUM 100 MG/1
100 TABLET ORAL DAILY
Qty: 90 TABLET | Refills: 1 | Status: SHIPPED | OUTPATIENT
Start: 2019-01-01 | End: 2019-01-10

## 2019-01-03 ENCOUNTER — PATIENT MESSAGE (OUTPATIENT)
Dept: ENDOSCOPY | Facility: HOSPITAL | Age: 64
End: 2019-01-03

## 2019-01-04 DIAGNOSIS — Z12.11 SPECIAL SCREENING FOR MALIGNANT NEOPLASMS, COLON: Primary | ICD-10-CM

## 2019-01-04 RX ORDER — SODIUM, POTASSIUM,MAG SULFATES 17.5-3.13G
SOLUTION, RECONSTITUTED, ORAL ORAL
Qty: 1 BOTTLE | Refills: 0 | Status: SHIPPED | OUTPATIENT
Start: 2019-01-04 | End: 2019-01-18

## 2019-01-09 ENCOUNTER — PATIENT MESSAGE (OUTPATIENT)
Dept: INTERNAL MEDICINE | Facility: CLINIC | Age: 64
End: 2019-01-09

## 2019-01-09 DIAGNOSIS — I10 ESSENTIAL HYPERTENSION: Primary | ICD-10-CM

## 2019-01-10 RX ORDER — AMLODIPINE BESYLATE 5 MG/1
5 TABLET ORAL DAILY
Qty: 90 TABLET | Refills: 3 | Status: SHIPPED | OUTPATIENT
Start: 2019-01-10 | End: 2019-05-27 | Stop reason: DRUGHIGH

## 2019-01-10 NOTE — TELEPHONE ENCOUNTER
----- Message -----     From: Eyad Garcia     Sent: 1/9/2019  9:03 PM CST       To: Ayush Horne MD  Subject: Prescription    [facility] have a recall on the losartan potassium 100mg that may cause health risk and I have been taking it so are u going to provide another me      Please advise  Thank you

## 2019-01-15 NOTE — PROGRESS NOTES
Assessment /Plan     For exam results, see Encounter Report.    Primary open angle glaucoma of both eyes, mild stage    Arcuate visual field defect of left eye    Chorioretinal scar, left    Old subtotal retinal detachment, left    Pupillary sphincter rupture, left    Type 2 diabetes mellitus without ophthalmic manifestations    Bilateral presbyopia            Pt initially dx with glaucoma at [facility] - stoppped his gtts on his own   Presented to [facility] - susana Valdez 8/23/2006 - off gtts with a baseline / Tmax of 25/27  Referred by Courtney for consideration of SLT's   Pt with POAG and poorly controlled IOP's - does not use drops regularly       1. Primary open angle glaucoma, both eyes, mild stage        Glaucoma (type and duration)    POAG with elevated IOP ou - mild stage    First HVF   2007 - full od // SAD os 2/2 to CRS   First photos   2011   Treatment / Drops started   2006           Family history    ++ mother and 2 brothers         Glaucoma meds    Latanoprost  (( use to use cosopt as well)         H/O adverse rxn to glaucoma drops    None (has tired alphagan and timolol in past -non compliant)         LASERS    SLT os - 3/31/2016 // SLT od - 4/14/2016         GLAUCOMA SURGERIES    none        OTHER EYE SURGERIES    H/O RD repair os - S/P laser repair         CDR    0.75 // 0.8        Tbase    25/27          Tmax    25/27            Ttarget    18/18             HVF    9 test 2006 to  2018 - full  od // SAD - 2/2 CRS from laser for RD repair os        Gonio    +3 ou          CCT    486/502 - thin ou         OCT    5 test 2011 to 2018 - RNFL - BORCHUYITA t od // fluctuate - dec. S/ICHUNG N (has a CRS)  os        HRT    2 test 2017 to 2018 - MR-Dec. NS/N/NI od // dec. NS/N/NI/ TIchung TS/T  os /// CDR 0.73 od // 0.84 os        Disc photos    2011, 2015 , 2017     - Ttoday    20/18  - Test done today  gonio     2. Arcuate visual field defect of left eye   SAD os - 2/2 old CRS from repair of old RD   NOT from  glaucomatous damage      3. Chorioretinal scar, left   -with 2nd VF defect      4. Old subtotal retinal detachment, left   S/P repair - retina flat - stable   -large CRS      5. Type 2 diabetes mellitus without ophthalmic manifestations      6. Senile nuclear sclerosis  -mild - monitor      7. pupil sphincter rupture os  -suggest old trauma  -does NOT appear to have a angle recession    7. Presbyopia       PLAN    POAG with OHT OS > OD - mild od // moderate os   The ON and VF's are still good ou   The VF loss os is 2/2 old CRS- ?  post laser for a RD repair   Good initial response to SLT ou 25/25 --> 16/17     LATANOPROST OU - (PAYING > $100 FOR EACH BOTTLE of lumigan )     F/U 4 months IOP, HRT    -   Consider repeat SLT - if IOP up or if VF progression os  -consider re-starting / adding cosopt prn (had some compliance issues - but tolerated ok)       Bethany Lechuga MD

## 2019-01-18 ENCOUNTER — OFFICE VISIT (OUTPATIENT)
Dept: OPHTHALMOLOGY | Facility: CLINIC | Age: 64
End: 2019-01-18
Payer: COMMERCIAL

## 2019-01-18 DIAGNOSIS — H52.4 BILATERAL PRESBYOPIA: ICD-10-CM

## 2019-01-18 DIAGNOSIS — E11.9 TYPE 2 DIABETES MELLITUS WITHOUT OPHTHALMIC MANIFESTATIONS: ICD-10-CM

## 2019-01-18 DIAGNOSIS — H21.562 PUPILLARY SPHINCTER RUPTURE, LEFT: ICD-10-CM

## 2019-01-18 DIAGNOSIS — H40.1131 PRIMARY OPEN ANGLE GLAUCOMA OF BOTH EYES, MILD STAGE: Primary | ICD-10-CM

## 2019-01-18 DIAGNOSIS — H53.432 ARCUATE VISUAL FIELD DEFECT OF LEFT EYE: ICD-10-CM

## 2019-01-18 DIAGNOSIS — H33.052 OLD SUBTOTAL RETINAL DETACHMENT, LEFT: ICD-10-CM

## 2019-01-18 DIAGNOSIS — H31.002 CHORIORETINAL SCAR, LEFT: ICD-10-CM

## 2019-01-18 PROCEDURE — 92012 PR EYE EXAM, EST PATIENT,INTERMED: ICD-10-PCS | Mod: S$GLB,,, | Performed by: OPHTHALMOLOGY

## 2019-01-18 PROCEDURE — 99999 PR PBB SHADOW E&M-EST. PATIENT-LVL II: ICD-10-PCS | Mod: PBBFAC,,, | Performed by: OPHTHALMOLOGY

## 2019-01-18 PROCEDURE — 92012 INTRM OPH EXAM EST PATIENT: CPT | Mod: S$GLB,,, | Performed by: OPHTHALMOLOGY

## 2019-01-18 PROCEDURE — 99999 PR PBB SHADOW E&M-EST. PATIENT-LVL II: CPT | Mod: PBBFAC,,, | Performed by: OPHTHALMOLOGY

## 2019-01-25 ENCOUNTER — ANESTHESIA EVENT (OUTPATIENT)
Dept: ENDOSCOPY | Facility: HOSPITAL | Age: 64
End: 2019-01-25
Payer: COMMERCIAL

## 2019-01-25 ENCOUNTER — ANESTHESIA (OUTPATIENT)
Dept: ENDOSCOPY | Facility: HOSPITAL | Age: 64
End: 2019-01-25
Payer: COMMERCIAL

## 2019-01-25 ENCOUNTER — HOSPITAL ENCOUNTER (OUTPATIENT)
Facility: HOSPITAL | Age: 64
Discharge: HOME | End: 2019-01-25
Attending: COLON & RECTAL SURGERY | Admitting: COLON & RECTAL SURGERY
Payer: COMMERCIAL

## 2019-01-25 VITALS
TEMPERATURE: 98 F | BODY MASS INDEX: 25.9 KG/M2 | DIASTOLIC BLOOD PRESSURE: 72 MMHG | HEIGHT: 67 IN | HEART RATE: 77 BPM | WEIGHT: 165 LBS | SYSTOLIC BLOOD PRESSURE: 113 MMHG | OXYGEN SATURATION: 99 % | RESPIRATION RATE: 16 BRPM

## 2019-01-25 DIAGNOSIS — Z12.11 SCREENING FOR COLON CANCER: ICD-10-CM

## 2019-01-25 DIAGNOSIS — E78.5 HYPERLIPIDEMIA, UNSPECIFIED HYPERLIPIDEMIA TYPE: ICD-10-CM

## 2019-01-25 DIAGNOSIS — C61 MALIGNANT NEOPLASM OF PROSTATE: ICD-10-CM

## 2019-01-25 DIAGNOSIS — Z86.010 HISTORY OF COLON POLYPS: Primary | ICD-10-CM

## 2019-01-25 PROBLEM — Z86.0100 HISTORY OF COLON POLYPS: Status: ACTIVE | Noted: 2019-01-25

## 2019-01-25 LAB — POCT GLUCOSE: 157 MG/DL (ref 70–110)

## 2019-01-25 PROCEDURE — E9220 PRA ENDO ANESTHESIA: HCPCS | Mod: 33,,, | Performed by: NURSE ANESTHETIST, CERTIFIED REGISTERED

## 2019-01-25 PROCEDURE — 45385 PR COLONOSCOPY,REMV LESN,SNARE: ICD-10-PCS | Mod: 33,,, | Performed by: COLON & RECTAL SURGERY

## 2019-01-25 PROCEDURE — 88305 TISSUE EXAM BY PATHOLOGIST: CPT | Performed by: PATHOLOGY

## 2019-01-25 PROCEDURE — 27201012 HC FORCEPS, HOT/COLD, DISP: Performed by: COLON & RECTAL SURGERY

## 2019-01-25 PROCEDURE — 63600175 PHARM REV CODE 636 W HCPCS: Performed by: NURSE ANESTHETIST, CERTIFIED REGISTERED

## 2019-01-25 PROCEDURE — 45380 PR COLONOSCOPY,BIOPSY: ICD-10-PCS | Mod: 59,,, | Performed by: COLON & RECTAL SURGERY

## 2019-01-25 PROCEDURE — 45385 COLONOSCOPY W/LESION REMOVAL: CPT | Mod: 33,,, | Performed by: COLON & RECTAL SURGERY

## 2019-01-25 PROCEDURE — 37000009 HC ANESTHESIA EA ADD 15 MINS: Performed by: COLON & RECTAL SURGERY

## 2019-01-25 PROCEDURE — 45380 COLONOSCOPY AND BIOPSY: CPT | Performed by: COLON & RECTAL SURGERY

## 2019-01-25 PROCEDURE — 37000008 HC ANESTHESIA 1ST 15 MINUTES: Performed by: COLON & RECTAL SURGERY

## 2019-01-25 PROCEDURE — 88305 TISSUE EXAM BY PATHOLOGIST: CPT | Mod: 26,,, | Performed by: PATHOLOGY

## 2019-01-25 PROCEDURE — 45385 COLONOSCOPY W/LESION REMOVAL: CPT | Performed by: COLON & RECTAL SURGERY

## 2019-01-25 PROCEDURE — 27201089 HC SNARE, DISP (ANY): Performed by: COLON & RECTAL SURGERY

## 2019-01-25 PROCEDURE — E9220 PRA ENDO ANESTHESIA: ICD-10-PCS | Mod: 33,,, | Performed by: NURSE ANESTHETIST, CERTIFIED REGISTERED

## 2019-01-25 PROCEDURE — 88305 TISSUE SPECIMEN TO PATHOLOGY - SURGERY: ICD-10-PCS | Mod: 26,,, | Performed by: PATHOLOGY

## 2019-01-25 PROCEDURE — 45380 COLONOSCOPY AND BIOPSY: CPT | Mod: 59,,, | Performed by: COLON & RECTAL SURGERY

## 2019-01-25 PROCEDURE — 25000003 PHARM REV CODE 250: Performed by: NURSE PRACTITIONER

## 2019-01-25 RX ORDER — PROPOFOL 10 MG/ML
VIAL (ML) INTRAVENOUS CONTINUOUS PRN
Status: DISCONTINUED | OUTPATIENT
Start: 2019-01-25 | End: 2019-01-25

## 2019-01-25 RX ORDER — SODIUM CHLORIDE 9 MG/ML
INJECTION, SOLUTION INTRAVENOUS CONTINUOUS
Status: DISCONTINUED | OUTPATIENT
Start: 2019-01-25 | End: 2019-01-25 | Stop reason: HOSPADM

## 2019-01-25 RX ORDER — PROPOFOL 10 MG/ML
VIAL (ML) INTRAVENOUS
Status: DISCONTINUED | OUTPATIENT
Start: 2019-01-25 | End: 2019-01-25

## 2019-01-25 RX ORDER — SODIUM CHLORIDE 0.9 % (FLUSH) 0.9 %
3 SYRINGE (ML) INJECTION
Status: DISCONTINUED | OUTPATIENT
Start: 2019-01-25 | End: 2019-01-25 | Stop reason: HOSPADM

## 2019-01-25 RX ADMIN — SODIUM CHLORIDE: 0.9 INJECTION, SOLUTION INTRAVENOUS at 08:01

## 2019-01-25 RX ADMIN — PROPOFOL 50 MG: 10 INJECTION, EMULSION INTRAVENOUS at 09:01

## 2019-01-25 RX ADMIN — PROPOFOL 30 MG: 10 INJECTION, EMULSION INTRAVENOUS at 09:01

## 2019-01-25 RX ADMIN — PROPOFOL 125 MCG/KG/MIN: 10 INJECTION, EMULSION INTRAVENOUS at 09:01

## 2019-01-25 NOTE — ANESTHESIA POSTPROCEDURE EVALUATION
"Anesthesia Post Evaluation    Patient: Eyad Garcia    Procedure(s) Performed: Procedure(s) (LRB):  COLONOSCOPY (N/A)    Final Anesthesia Type: general  Patient location during evaluation: PACU  Patient participation: Yes- Able to Participate  Level of consciousness: awake and alert  Post-procedure vital signs: reviewed and stable  Pain management: adequate  Airway patency: patent  PONV status at discharge: No PONV  Anesthetic complications: no      Cardiovascular status: blood pressure returned to baseline  Respiratory status: unassisted  Hydration status: euvolemic  Follow-up not needed.        Visit Vitals  /72   Pulse 77   Temp 36.8 °C (98.2 °F) (Temporal)   Resp 16   Ht 5' 6.5" (1.689 m)   Wt 74.8 kg (165 lb)   SpO2 99%   BMI 26.23 kg/m²       Pain/Omid Score: Omid Score: 8 (1/25/2019 10:16 AM)        "

## 2019-01-25 NOTE — H&P
COLONOSCOPY HISTORY AND PE    Procedure : Colonoscopy      INDICATIONS: personal history of colon polyps    Family Hx of CRC: NO    Last Colonoscopy:  2013  Findings: POLYP X 3.  ALL NORMAL TISSUE       Past Medical History:   Diagnosis Date    Cataract     Diabetes mellitus type II     Difficult intubation     Erectile dysfunction     Hyperlipidemia     Hypertension     OA (osteoarthritis)     POAG (primary open-angle glaucoma)     Retinal detachment     OS     Sedation Problems: NO  Family History   Problem Relation Age of Onset    Diabetes Mother     Hypertension Mother     Glaucoma Mother     Diabetes Sister     Glaucoma Sister     Diabetes Brother     Glaucoma Brother     No Known Problems Father     No Known Problems Maternal Aunt     No Known Problems Maternal Uncle     No Known Problems Paternal Aunt     No Known Problems Paternal Uncle     No Known Problems Maternal Grandmother     No Known Problems Maternal Grandfather     No Known Problems Paternal Grandmother     No Known Problems Paternal Grandfather     Anesthesia problems Neg Hx     Broken bones Neg Hx     Cancer Neg Hx     Clotting disorder Neg Hx     Collagen disease Neg Hx     Dislocations Neg Hx     Osteoporosis Neg Hx     Rheumatologic disease Neg Hx     Scoliosis Neg Hx     Severe sprains Neg Hx     Amblyopia Neg Hx     Blindness Neg Hx     Cataracts Neg Hx     Macular degeneration Neg Hx     Retinal detachment Neg Hx     Strabismus Neg Hx     Stroke Neg Hx     Thyroid disease Neg Hx      Fam Hx of Sedation Problems: NO  Social History     Socioeconomic History    Marital status:      Spouse name: Not on file    Number of children: Not on file    Years of education: Not on file    Highest education level: Not on file   Social Needs    Financial resource strain: Not on file    Food insecurity - worry: Not on file    Food insecurity - inability: Not on file    Transportation needs -  "medical: Not on file    Transportation needs - non-medical: Not on file   Occupational History    Not on file   Tobacco Use    Smoking status: Never Smoker    Smokeless tobacco: Never Used   Substance and Sexual Activity    Alcohol use: No    Drug use: No    Sexual activity: Yes     Partners: Female   Other Topics Concern    Not on file   Social History Narrative    Works for [employer]       Review of Systems - Negative except   Respiratory ROS: no dyspnea  Cardiovascular ROS: no exertional chest pain  Gastrointestinal ROS: NO abdominal discomfort,  NO rectal bleeding  Musculoskeletal ROS: no muscular pain  Neurological ROS: no recent stroke    Physical Exam:  BP (!) 179/92   Pulse 88   Temp 98.6 °F (37 °C)   Resp 15   Ht 5' 6.5" (1.689 m)   Wt 74.8 kg (165 lb)   SpO2 99%   BMI 26.23 kg/m²   General: no distress  Head: normocephalic  Mallampati Score   Neck: supple, symmetrical, trachea midline  Lungs:  clear to auscultation bilaterally and normal respiratory effort  Heart: regular rate and rhythm and no murmur  Abdomen: soft, non-tender non-distented; bowel sounds normal; no masses,  no organomegaly  Extremities: no cyanosis or edema, or clubbing    ASA:  II    PLAN  COLONOSCOPY.    SedationPlan :MAC    The details of the procedure, the possible need for biopsy or polypectomy and the potential risks including bleeding, perforation, missed polyps were discussed in detail.      "

## 2019-01-25 NOTE — TRANSFER OF CARE
"Anesthesia Transfer of Care Note    Patient: Eyad Garcia    Procedure(s) Performed: Procedure(s) (LRB):  COLONOSCOPY (N/A)    Patient location: PACU    Anesthesia Type: general    Transport from OR: Transported from OR on 6-10 L/min O2 by face mask with adequate spontaneous ventilation    Post pain: adequate analgesia    Post assessment: no apparent anesthetic complications and tolerated procedure well    Post vital signs: stable    Level of consciousness: awake and alert    Nausea/Vomiting: no nausea/vomiting    Complications: none    Transfer of care protocol was followed      Last vitals:   Visit Vitals  BP 94/61 (BP Location: Left arm, Patient Position: Lying)   Pulse 84   Temp 36.8 °C (98.2 °F) (Temporal)   Resp 16   Ht 5' 6.5" (1.689 m)   Wt 74.8 kg (165 lb)   SpO2 95%   BMI 26.23 kg/m²     "

## 2019-01-25 NOTE — ANESTHESIA PREPROCEDURE EVALUATION
01/25/2019  Pre-operative evaluation for Procedure(s) (LRB):  COLONOSCOPY (N/A)    Eyad Garcia is a 63 y.o. male     Patient Active Problem List   Diagnosis    Hyperlipidemia    Old retinal detachment, total or subtotal    POAG (primary open-angle glaucoma) - Both Eyes    Diabetes mellitus, type 2    Hypertension    OA (osteoarthritis)    Hip pain    Malignant neoplasm of prostate    Erectile dysfunction    Screening for colon cancer       Review of patient's allergies indicates:   Allergen Reactions    Sulfa (sulfonamide antibiotics) Hives and Rash           Aspirin      Stomach upset       No current facility-administered medications on file prior to encounter.      Current Outpatient Medications on File Prior to Encounter   Medication Sig Dispense Refill    atorvastatin (LIPITOR) 80 MG tablet Take 1 tablet (80 mg total) by mouth once daily. 90 tablet 3    metFORMIN (GLUCOPHAGE-XR) 750 MG 24 hr tablet Take 2 tablets (1,500 mg total) by mouth daily with breakfast. 180 tablet 3    triamcinolone acetonide 0.1% (KENALOG) 0.1 % cream Apply topically 2 (two) times daily. 80 g 1    etodolac (LODINE) 400 MG tablet Take 1 tablet (400 mg total) by mouth 2 (two) times daily. Take with food 30 tablet 0       Past Surgical History:   Procedure Laterality Date    BIOPSY-TRUS  5/19/2015    [facility] at [facility] 1ST FLR    COLONOSCOPY N/A 6/13/2013    Performed by Anjum Marley MD at [facility] ENDO (4TH FLR)    CYSTOSCOPY      CYSTOSCOPY N/A 5/19/2015    [facility] by [facility] at [facility] 1ST FLR    HERNIA REPAIR      INSERTION-PROSTHESIS-PENILE INFLATABLE    chris doherty [phone] ams N/A 7/11/2017    Performed by [facility]onso [facility] at [facility] OR 2ND FLR    KNEE SURGERY      left    PENILE PROSTHESIS IMPLANT      PROSTATE SURGERY       PROSTATECTOMY-RADICAL-RETROPUBIC N/A 7/13/2015    [facility]    RETINAL DETACHMENT SURGERY      OS    SELECTIVE LASER TRABECUPLASTY Bilateral 4/16    OU WITH        Social History     Socioeconomic History    Marital status:      Spouse name: Not on file    Number of children: Not on file    Years of education: Not on file    Highest education level: Not on file   Social Needs    Financial resource strain: Not on file    Food insecurity - worry: Not on file    Food insecurity - inability: Not on file    Transportation needs - medical: Not on file    Transportation needs - non-medical: Not on file   Occupational History    Not on file   Tobacco Use    Smoking status: Never Smoker    Smokeless tobacco: Never Used   Substance and Sexual Activity    Alcohol use: No    Drug use: No    Sexual activity: Yes     Partners: Female   Other Topics Concern    Not on file   Social History Narrative    Works for [employer]           Anesthesia Evaluation    I have reviewed the Patient Summary Reports.    I have reviewed the Nursing Notes.   I have reviewed the Medications.     Review of Systems  Anesthesia Hx:  Hx of Anesthetic complications Hx of difficult intubation; video laryngoscopy used with previous anesthetic: Grade 1 view   Cardiovascular:   Hypertension    Pulmonary:  Pulmonary Normal    Renal/:  Renal/ Normal     Hepatic/GI:  Hepatic/GI Normal    Musculoskeletal:   Arthritis     Endocrine:   Diabetes        Physical Exam  General:  Well nourished    Airway/Jaw/Neck:  Airway Findings: Mouth Opening: Normal Tongue: Normal  General Airway Assessment: Adult  Mallampati: II  TM Distance: Normal, at least 6 cm       Chest/Lungs:  Chest/Lungs Findings: Clear to auscultation, Normal Respiratory Rate     Heart/Vascular:  Heart Findings: Rate: Normal  Rhythm: Regular Rhythm             Anesthesia Plan  Type of Anesthesia, risks & benefits  discussed:  Anesthesia Type:  general, MAC  Patient's Preference:   Intra-op Monitoring Plan: standard ASA monitors  Intra-op Monitoring Plan Comments:   Post Op Pain Control Plan: multimodal analgesia and IV/PO Opioids PRN  Post Op Pain Control Plan Comments:   Induction:   IV  Beta Blocker:         Informed Consent: Patient understands risks and agrees with Anesthesia plan.  Questions answered. Anesthesia consent signed with patient.  ASA Score: 2     Day of Surgery Review of History & Physical:            Ready For Surgery From Anesthesia Perspective.

## 2019-01-25 NOTE — [employer] PATIENT INSTRUCTIONS
Discharge Summary/Instructions after an Endoscopic Procedure  Patient Name: Eyad Garcia  Patient MRN: [id number]  Patient YOB: 1955 Friday, January 25, 2019  Elder Guillermo MD  RESTRICTIONS:  During your procedure today, you received medications for sedation.  These   medications may affect your judgment, balance and coordination.  Therefore,   for 24 hours, you have the following restrictions:   - DO NOT drive a car, operate machinery, make legal/financial decisions,   sign important papers or drink alcohol.    ACTIVITY:  Today: no heavy lifting, straining or running due to procedural   sedation/anesthesia.  The following day: return to full activity including work.  DIET:  Eat and drink normally unless instructed otherwise.     TREATMENT FOR COMMON SIDE EFFECTS:  - Mild abdominal pain, nausea, belching, bloating or excessive gas:  rest,   eat lightly and use a heating pad.  - Sore Throat: treat with throat lozenges and/or gargle with warm salt   water.  - Because air was used during the procedure, expelling large amounts of air   from your rectum or belching is normal.  - If a bowel prep was taken, you may not have a bowel movement for 1-3 days.    This is normal.  SYMPTOMS TO WATCH FOR AND REPORT TO YOUR PHYSICIAN:  1. Abdominal pain or bloating, other than gas cramps.  2. Chest pain.  3. Back pain.  4. Signs of infection such as: chills or fever occurring within 24 hours   after the procedure.  5. Rectal bleeding, which would show as bright red, maroon, or black stools.   (A tablespoon of blood from the rectum is not serious, especially if   hemorrhoids are present.)  6. Vomiting.  7. Weakness or dizziness.  GO DIRECTLY TO THE NEAREST EMERGENCY ROOM IF YOU HAVE ANY OF THE FOLLOWING:      Difficulty breathing              Chills and/or fever over 101 F   Persistent vomiting and/or vomiting blood   Severe abdominal pain   Severe chest pain   Black, tarry stools   Bleeding- more than one  tablespoon   Any other symptom or condition that you feel may need urgent attention  Your doctor recommends these additional instructions:  If any biopsies were taken, your doctors clinic will contact you in 1 to 2   weeks with any results.  - Discharge patient to home (ambulatory).   - Resume previous diet.   - Continue present medications.   - Await pathology results.   - Repeat colonoscopy in 3 - 5 years for surveillance based on pathology   results.  For questions, problems or results please call your physician - Elder Guillermo MD at Work:  [phone].  [facility] [place], EMERGENCY ROOM PHONE NUMBER: [phone]  IF A COMPLICATION OR EMERGENCY SITUATION ARISES AND YOU ARE UNABLE TO REACH   YOUR PHYSICIAN - GO DIRECTLY TO THE EMERGENCY ROOM.  Elder Guillermo MD  1/25/2019 10:00:12 AM  This report has been verified and signed electronically.  [employer]

## 2019-02-01 ENCOUNTER — TELEPHONE (OUTPATIENT)
Dept: ENDOSCOPY | Facility: HOSPITAL | Age: 64
End: 2019-02-01

## 2019-02-01 PROBLEM — D12.6 ADENOMATOUS POLYP OF COLON: Status: ACTIVE | Noted: 2019-02-01

## 2019-02-06 DIAGNOSIS — E78.5 HYPERLIPIDEMIA, UNSPECIFIED HYPERLIPIDEMIA TYPE: ICD-10-CM

## 2019-02-06 RX ORDER — ATORVASTATIN CALCIUM 80 MG/1
80 TABLET, FILM COATED ORAL DAILY
Qty: 90 TABLET | Refills: 1 | Status: SHIPPED | OUTPATIENT
Start: 2019-02-06 | End: 2019-04-24 | Stop reason: SDUPTHER

## 2019-02-07 DIAGNOSIS — E11.9 TYPE 2 DIABETES MELLITUS WITHOUT COMPLICATION: ICD-10-CM

## 2019-02-16 ENCOUNTER — TELEPHONE (OUTPATIENT)
Dept: UROLOGY | Facility: HOSPITAL | Age: 64
End: 2019-02-16

## 2019-02-16 NOTE — RESEARCH
IPP Study  IRB # 2017.001  Subject # [id number]  02/16/2019      Subject was contacted by phone on 2/16/19 to discuss 18 month follow-up.    Subject agrees to continue participation in the IPP study.    The following questionnaires were completed with the subject:  1. MU  2. PGII  3. Non-validated questionnaire    Subject is using the IPP device.       Subjects has no AEs or SAEs.       Emil Maravilla MD

## 2019-03-02 DIAGNOSIS — M17.0 PRIMARY OSTEOARTHRITIS OF BOTH KNEES: ICD-10-CM

## 2019-03-03 RX ORDER — MELOXICAM 7.5 MG/1
TABLET ORAL
Qty: 30 TABLET | Refills: 2 | Status: SHIPPED | OUTPATIENT
Start: 2019-03-03 | End: 2019-05-27

## 2019-03-28 ENCOUNTER — TELEPHONE (OUTPATIENT)
Dept: INTERNAL MEDICINE | Facility: CLINIC | Age: 64
End: 2019-03-28

## 2019-03-28 NOTE — TELEPHONE ENCOUNTER
----- Message from Christel Zhang sent at 3/28/2019  2:49 PM CDT -----  Contact: Self Call  [phone]  Please call patient about going to the sit of [facility] and getting a form to fill out for for his wellness and faxing it back to them.

## 2019-04-01 ENCOUNTER — PATIENT MESSAGE (OUTPATIENT)
Dept: ADMINISTRATIVE | Facility: OTHER | Age: 64
End: 2019-04-01

## 2019-04-01 ENCOUNTER — OFFICE VISIT (OUTPATIENT)
Dept: INTERNAL MEDICINE | Facility: CLINIC | Age: 64
End: 2019-04-01
Payer: COMMERCIAL

## 2019-04-01 ENCOUNTER — LAB VISIT (OUTPATIENT)
Dept: LAB | Facility: HOSPITAL | Age: 64
End: 2019-04-01
Attending: FAMILY MEDICINE
Payer: COMMERCIAL

## 2019-04-01 VITALS
BODY MASS INDEX: 27.35 KG/M2 | WEIGHT: 170.19 LBS | HEIGHT: 66 IN | OXYGEN SATURATION: 98 % | SYSTOLIC BLOOD PRESSURE: 152 MMHG | DIASTOLIC BLOOD PRESSURE: 78 MMHG | HEART RATE: 75 BPM

## 2019-04-01 DIAGNOSIS — E04.1 THYROID NODULE: ICD-10-CM

## 2019-04-01 DIAGNOSIS — I10 ESSENTIAL HYPERTENSION: Primary | ICD-10-CM

## 2019-04-01 DIAGNOSIS — I10 ESSENTIAL HYPERTENSION: ICD-10-CM

## 2019-04-01 DIAGNOSIS — M54.2 NECK PAIN: ICD-10-CM

## 2019-04-01 DIAGNOSIS — E11.9 TYPE 2 DIABETES MELLITUS WITHOUT COMPLICATION, WITHOUT LONG-TERM CURRENT USE OF INSULIN: ICD-10-CM

## 2019-04-01 LAB
ALBUMIN SERPL BCP-MCNC: 4.1 G/DL (ref 3.5–5.2)
ALP SERPL-CCNC: 76 U/L (ref 55–135)
ALT SERPL W/O P-5'-P-CCNC: 22 U/L (ref 10–44)
ANION GAP SERPL CALC-SCNC: 7 MMOL/L (ref 8–16)
AST SERPL-CCNC: 21 U/L (ref 10–40)
BILIRUB SERPL-MCNC: 0.7 MG/DL (ref 0.1–1)
BUN SERPL-MCNC: 8 MG/DL (ref 8–23)
CALCIUM SERPL-MCNC: 10 MG/DL (ref 8.7–10.5)
CHLORIDE SERPL-SCNC: 102 MMOL/L (ref 95–110)
CO2 SERPL-SCNC: 28 MMOL/L (ref 23–29)
CREAT SERPL-MCNC: 1.1 MG/DL (ref 0.5–1.4)
CREAT UR-MCNC: 108 MG/DL (ref 23–375)
EST. GFR  (AFRICAN AMERICAN): >60 ML/MIN/1.73 M^2
EST. GFR  (NON AFRICAN AMERICAN): >60 ML/MIN/1.73 M^2
ESTIMATED AVG GLUCOSE: 217 MG/DL (ref 68–131)
GLUCOSE SERPL-MCNC: 247 MG/DL (ref 70–110)
HBA1C MFR BLD HPLC: 9.2 % (ref 4–5.6)
POTASSIUM SERPL-SCNC: 3.9 MMOL/L (ref 3.5–5.1)
PROT SERPL-MCNC: 7.2 G/DL (ref 6–8.4)
PROT UR-MCNC: 12 MG/DL (ref 0–15)
PROT/CREAT UR: 0.11 MG/G{CREAT} (ref 0–0.2)
SODIUM SERPL-SCNC: 137 MMOL/L (ref 136–145)

## 2019-04-01 PROCEDURE — 83036 HEMOGLOBIN GLYCOSYLATED A1C: CPT

## 2019-04-01 PROCEDURE — 99214 OFFICE O/P EST MOD 30 MIN: CPT | Mod: S$GLB,,, | Performed by: FAMILY MEDICINE

## 2019-04-01 PROCEDURE — 3077F PR MOST RECENT SYSTOLIC BLOOD PRESSURE >= 140 MM HG: ICD-10-PCS | Mod: CPTII,S$GLB,, | Performed by: FAMILY MEDICINE

## 2019-04-01 PROCEDURE — 3078F DIAST BP <80 MM HG: CPT | Mod: CPTII,S$GLB,, | Performed by: FAMILY MEDICINE

## 2019-04-01 PROCEDURE — 3008F BODY MASS INDEX DOCD: CPT | Mod: CPTII,S$GLB,, | Performed by: FAMILY MEDICINE

## 2019-04-01 PROCEDURE — 99999 PR PBB SHADOW E&M-EST. PATIENT-LVL III: CPT | Mod: PBBFAC,,, | Performed by: FAMILY MEDICINE

## 2019-04-01 PROCEDURE — 3077F SYST BP >= 140 MM HG: CPT | Mod: CPTII,S$GLB,, | Performed by: FAMILY MEDICINE

## 2019-04-01 PROCEDURE — 3008F PR BODY MASS INDEX (BMI) DOCUMENTED: ICD-10-PCS | Mod: CPTII,S$GLB,, | Performed by: FAMILY MEDICINE

## 2019-04-01 PROCEDURE — 36415 COLL VENOUS BLD VENIPUNCTURE: CPT

## 2019-04-01 PROCEDURE — 3078F PR MOST RECENT DIASTOLIC BLOOD PRESSURE < 80 MM HG: ICD-10-PCS | Mod: CPTII,S$GLB,, | Performed by: FAMILY MEDICINE

## 2019-04-01 PROCEDURE — 99214 PR OFFICE/OUTPT VISIT, EST, LEVL IV, 30-39 MIN: ICD-10-PCS | Mod: S$GLB,,, | Performed by: FAMILY MEDICINE

## 2019-04-01 PROCEDURE — 99999 PR PBB SHADOW E&M-EST. PATIENT-LVL III: ICD-10-PCS | Mod: PBBFAC,,, | Performed by: FAMILY MEDICINE

## 2019-04-01 PROCEDURE — 80053 COMPREHEN METABOLIC PANEL: CPT

## 2019-04-01 PROCEDURE — 3045F PR MOST RECENT HEMOGLOBIN A1C LEVEL 7.0-9.0%: CPT | Mod: CPTII,S$GLB,, | Performed by: FAMILY MEDICINE

## 2019-04-01 PROCEDURE — 3045F PR MOST RECENT HEMOGLOBIN A1C LEVEL 7.0-9.0%: ICD-10-PCS | Mod: CPTII,S$GLB,, | Performed by: FAMILY MEDICINE

## 2019-04-01 PROCEDURE — 84156 ASSAY OF PROTEIN URINE: CPT

## 2019-04-01 NOTE — PROGRESS NOTES
Subjective:       Patient ID: Eyad Garcia is a 63 y.o. male.    Chief Complaint: Annual Exam    Patient is here for follow-up of their chronic diseases, have wellness form filled out    Diabetes Management Status    Statin: Taking  ACE/ARB: Not taking    Screening or Prevention Patient's value Goal Complete/Controlled?   HgA1C Testing and Control   Lab Results   Component Value Date    HGBA1C 8.0 (H) 11/16/2018      Annually/Less than 8% No   Lipid profile : 11/16/2018 Annually Yes   LDL control Lab Results   Component Value Date    LDLCALC 104.2 11/16/2018    Annually/Less than 100 mg/dl  No   Nephropathy screening No results found for: LABMICR  Lab Results   Component Value Date    PROTEINUA Negative 10/28/2018    Annually Yes   Blood pressure BP Readings from Last 1 Encounters:   01/25/19 113/72    Less than 140/90 Yes   Dilated retinal exam : 01/18/2019 Annually Yes   Foot exam   : 12/08/2017 Annually No     Current Outpatient Medications on File Prior to Visit   Medication Sig Dispense Refill    amLODIPine (NORVASC) 5 MG tablet Take 1 tablet (5 mg total) by mouth once daily. 90 tablet 3    atorvastatin (LIPITOR) 80 MG tablet TAKE 1 TABLET (80 MG TOTAL) BY MOUTH ONCE DAILY. 90 tablet 1    etodolac (LODINE) 400 MG tablet Take 1 tablet (400 mg total) by mouth 2 (two) times daily. Take with food 30 tablet 0    latanoprost 0.005 % ophthalmic solution Place 1 drop into both eyes every evening. 2.5 mL 12    meloxicam (MOBIC) 7.5 MG tablet TAKE 1 TABLET BY MOUTH EVERY DAY 30 tablet 2    metFORMIN (GLUCOPHAGE-XR) 750 MG 24 hr tablet Take 2 tablets (1,500 mg total) by mouth daily with breakfast. 180 tablet 3    triamcinolone acetonide 0.1% (KENALOG) 0.1 % cream Apply topically 2 (two) times daily. 80 g 1     No current facility-administered medications on file prior to visit.            Review of Systems   Constitutional: Negative for chills and fatigue.   HENT: Negative for ear pain.    Eyes: Negative for  "pain.   Respiratory: Negative for chest tightness.    Cardiovascular: Negative for chest pain.   Gastrointestinal: Negative for abdominal pain.   Genitourinary: Negative for flank pain.   Musculoskeletal: Positive for neck pain. Negative for gait problem.        Some neck pain on right for the past month   Neurological: Negative for syncope.   Psychiatric/Behavioral: Negative for behavioral problems.       Objective:     BP (!) 152/78 (BP Location: Right arm, Patient Position: Sitting, BP Method: Large (Manual))   Pulse 75   Ht 5' 6" (1.676 m)   Wt 77.2 kg (170 lb 3.1 oz)   SpO2 98%   BMI 27.47 kg/m²     Physical Exam   Constitutional: He appears well-developed.   HENT:   Head: Normocephalic and atraumatic.   Eyes: EOM are normal.   Neck: Neck supple.   Cardiovascular: Normal rate and normal heart sounds.   Pulmonary/Chest: Breath sounds normal. No respiratory distress. He has no wheezes. He has no rales.   Abdominal: Soft.   Musculoskeletal: He exhibits no edema.   Neurological: He is alert.   Skin: Skin is dry.   Psychiatric: His behavior is normal.       Assessment:       1. Essential hypertension    2. Type 2 diabetes mellitus without complication, without long-term current use of insulin    3. Thyroid nodule    4. Neck pain        Plan:       Eyad was seen today for annual exam.    Diagnoses and all orders for this visit:    Essential hypertension - not controlled today, he blames on poor sleep last di and HA, will check home BPs to decide about med adjustments  -     Comprehensive metabolic panel; Future  -     Hypertension Digital Medicine (HDMP) Enrollment Order  -     Hypertension Digital Medicine (HDMP): Assign Onboarding Questionnaires    Type 2 diabetes mellitus without complication, without long-term current use of insulin  -     Comprehensive metabolic panel; Future  -     Hemoglobin A1c; Future  -     Protein / creatinine ratio, urine  -he is only taking one of hsi metformins. If nto " controleld then either go up to 2/day as written or add another med    Thyroid nodule  -     US Soft Tissue Head Neck Thyroid; Future    Neck pain  -recommended Nayeli's treat rudy own neck book    F/u in 5-6 mo

## 2019-04-02 ENCOUNTER — HOSPITAL ENCOUNTER (OUTPATIENT)
Dept: RADIOLOGY | Facility: HOSPITAL | Age: 64
Discharge: HOME | End: 2019-04-02
Attending: FAMILY MEDICINE
Payer: COMMERCIAL

## 2019-04-02 ENCOUNTER — TELEPHONE (OUTPATIENT)
Dept: INTERNAL MEDICINE | Facility: CLINIC | Age: 64
End: 2019-04-02

## 2019-04-02 DIAGNOSIS — E11.65 TYPE 2 DIABETES MELLITUS WITH HYPERGLYCEMIA, WITHOUT LONG-TERM CURRENT USE OF INSULIN: Primary | ICD-10-CM

## 2019-04-02 DIAGNOSIS — E04.1 THYROID NODULE: ICD-10-CM

## 2019-04-02 PROCEDURE — 76536 US SOFT TISSUE HEAD NECK THYROID: ICD-10-PCS | Mod: 26,,, | Performed by: RADIOLOGY

## 2019-04-02 PROCEDURE — 76536 US EXAM OF HEAD AND NECK: CPT | Mod: TC

## 2019-04-02 PROCEDURE — 76536 US EXAM OF HEAD AND NECK: CPT | Mod: 26,,, | Performed by: RADIOLOGY

## 2019-04-02 RX ORDER — GLIPIZIDE 5 MG/1
5 TABLET ORAL
Qty: 180 TABLET | Refills: 3 | Status: SHIPPED | OUTPATIENT
Start: 2019-04-02 | End: 2019-05-27

## 2019-04-02 NOTE — TELEPHONE ENCOUNTER
Added glipizide 5mg BID WM to metformin    Hemoglobin A1C   Date Value Ref Range Status   04/01/2019 9.2 (H) 4.0 - 5.6 % Final     Comment:     ADA Screening Guidelines:  5.7-6.4%  Consistent with prediabetes  >or=6.5%  Consistent with diabetes  High levels of fetal hemoglobin interfere with the HbA1C  assay. Heterozygous hemoglobin variants (HbS, HgC, etc)do  not significantly interfere with this assay.   However, presence of multiple variants may affect accuracy.     11/16/2018 8.0 (H) 4.0 - 5.6 % Final     Comment:     ADA Screening Guidelines:  5.7-6.4%  Consistent with prediabetes  >or=6.5%  Consistent with diabetes  High levels of fetal hemoglobin interfere with the HbA1C  assay. Heterozygous hemoglobin variants (HbS, HgC, etc)do  not significantly interfere with this assay.   However, presence of multiple variants may affect accuracy.     12/08/2017 7.0 (H) 4.0 - 5.6 % Final     Comment:     According to ADA guidelines, hemoglobin A1c <7.0% represents  optimal control in non-pregnant diabetic patients. Different  metrics may apply to specific patient populations.   Standards of Medical Care in Diabetes-2016.  For the purpose of screening for the presence of diabetes:  <5.7%     Consistent with the absence of diabetes  5.7-6.4%  Consistent with increasing risk for diabetes   (prediabetes)  >or=6.5%  Consistent with diabetes  Currently, no consensus exists for use of hemoglobin A1c  for diagnosis of diabetes for children.  This Hemoglobin A1c assay has significant interference with fetal   hemoglobin   (HbF). The results are invalid for patients with abnormal amounts of   HbF,   including those with known Hereditary Persistence   of Fetal Hemoglobin. Heterozygous hemoglobin variants (HbAS, HbAC,   HbAD, HbAE, HbA2) do not significantly interfere with this assay;   however, presence of multiple variants in a sample may impact the %   interference.

## 2019-04-11 ENCOUNTER — PATIENT OUTREACH (OUTPATIENT)
Dept: OTHER | Facility: OTHER | Age: 64
End: 2019-04-11

## 2019-04-11 NOTE — LETTER
April 18, 2019     Eyad Garcia  [address] [facility] [address]       Dear Eyad,    Welcome to [facility]! Our goal is to make care effective, proactive and convenient by using data you send us from home to better treat your chronic conditions.        My name is , and I am your dedicated Digital Medicine clinician. As an expert in medication management, I will help ensure that the medications you are taking continue to provide the intended benefits and help you reach your goals. You can reach me directly at  or by sending me a message directly through your [facility] account.      I am Jackie Denny and I will be your health . My job is to help you identify lifestyle changes to improve your disease control. We will talk about nutrition, exercise, and other ways you may be able to adjust your current habits to better your health. Additionally, we will help ensure you are completing the tests and screenings that are necessary to help manage your conditions. You can reach me directly at  or by sending me a message directly through your [facility] account.    Most importantly, YOU are at the center of this team. Together, we will work to improve your overall health and encourage you to meet your goals for a healthier lifestyle.     What we expect from YOU:  · Please take frequent home blood pressure measurements. We ask that you take at least 1 blood pressure reading per week, but more information will better help us get you know you. Be sure you rest for a few minutes before taking the reading in a quiet, comfortable place.     Be available to receive phone calls or [facility] messages, when appropriate, from your care team. Please let us know if there are any specific days or times that work best for us to reach you via phone.     Complete routine tests and screenings. Dont worry, we will help keep you on track!           What you should expect from your [facility]  Care Team:   We will work with you to create a personalized plan of care and provide you with encouragement and education, including regarding lifestyle changes, that could help you manage your disease states.     We will adjust your current medications, if needed, and continue to monitor your long-term progress.     We will provide you and your physician with monthly progress reports after you have been in the program for more than 30 days.     We will send you reminders through [facility] and text messages to help ensure you do not miss any testing deadlines to help manage your disease states.    You will be able to reach us by phone or through your [facility] account by clicking our names under Care Team on the right side of the home screen.    I look forward to working with you to achieve your blood pressure goals!    We look forward to working with you to help manage your health,    Sincerely,    Your Digital Medicine Team    Please visit our websites to learn more:   · Hypertension: [email or web address]      Remember, we are not available for emergencies. If you have an emergency, please contact your doctors office directly or call [facility] on[facility] ([phone] or [phone]) or 911.

## 2019-04-11 NOTE — LETTER
April 18, 2019     Eyad Garcia  [address] [facility] [address] [address]       Dear Eyad,    Welcome to [facility]! Our goal is to make care effective, proactive and convenient by using data you send us from home to better treat your chronic conditions.        My name is Enriqueta Garland , and I am your dedicated Digital Medicine clinician. As an expert in medication management, I will help ensure that the medications you are taking continue to provide the intended benefits and help you reach your goals. You can reach me directly at  or by sending me a message directly through your [facility] account.      I am Jackie Denny and I will be your health . My job is to help you identify lifestyle changes to improve your disease control. We will talk about nutrition, exercise, and other ways you may be able to adjust your current habits to better your health. Additionally, we will help ensure you are completing the tests and screenings that are necessary to help manage your conditions. You can reach me directly at  or by sending me a message directly through your [facility] account.    Most importantly, YOU are at the center of this team. Together, we will work to improve your overall health and encourage you to meet your goals for a healthier lifestyle.     What we expect from YOU:  · Please take frequent home blood pressure measurements. We ask that you take at least 1 blood pressure reading per week, but more information will better help us get you know you. Be sure you rest for a few minutes before taking the reading in a quiet, comfortable place.     Be available to receive phone calls or [facility] messages, when appropriate, from your care team. Please let us know if there are any specific days or times that work best for us to reach you via phone.     Complete routine tests and screenings. Dont worry, we will help keep you on track!           What you should expect from your Digital  Medicine Care Team:   We will work with you to create a personalized plan of care and provide you with encouragement and education, including regarding lifestyle changes, that could help you manage your disease states.     We will adjust your current medications, if needed, and continue to monitor your long-term progress.     We will provide you and your physician with monthly progress reports after you have been in the program for more than 30 days.     We will send you reminders through [facility] and text messages to help ensure you do not miss any testing deadlines to help manage your disease states.    You will be able to reach us by phone or through your [facility] account by clicking our names under Care Team on the right side of the home screen.    I look forward to working with you to achieve your blood pressure goals!    We look forward to working with you to help manage your health,    Sincerely,    Your Digital Medicine Team    Please visit our websites to learn more:   · Hypertension: [email or web address]      Remember, we are not available for emergencies. If you have an emergency, please contact your doctors office directly or call [facility] on-call ([phone] or [phone]) or 911.

## 2019-04-11 NOTE — PROGRESS NOTES
Last 5 Patient Entered Readings                                      Current 30 Day Average: 153/95     Recent Readings 4/10/2019    SBP (mmHg) 153    DBP (mmHg) 95    Pulse 79

## 2019-04-18 NOTE — PROGRESS NOTES
"Last 5 Patient Entered Readings                                      Current 30 Day Average: 138/89     Recent Readings 4/14/2019 4/10/2019    SBP (mmHg) 123 153    DBP (mmHg) 82 95    Pulse 97 79          Digital Medicine Enrollment Call    Introduced Mr. Eyad Garcia to Digital Medicine.     Discussed program expectations and requirements.    Introduced digital medicine care team.     Reviewed the importance of self-monitoring for digital medicine participation.     Reviewed that the Digital Medicine team is not available for emergencies and instructed the patient to call 911 or [facility] ([phone] or [phone]) if one arises.    Digital Medicine: Health  Introduction    Introduced Mr. Eyad Garcia to Digital Medicine. Discussed health  role and recommended lifestyle modifications.    Lifestyle Assessment:  Current Dietary Habits(i.e. low sodium, food labels, dining out): Patient states that he currently follows a low sodium diet.  He states that he drinks about 48 ounces of water per day.    Exercise:Mr. Garcia reports taking walks around the block a couple of times a week, and stated "That's about all you're going to get from me because I have a knee that flares up."    Alcohol/Tobacco:Patient states he has never used tobacco and has a beer "once in a blue moon", maybe one per year.    Medication Adherence: has been compliant with the medicaiton regimen     Other goals:    Reviewed AHA/AACE recommendations:  Limit sodium intake to <2000mg/day  Recommended CHO intake, 45-65% of daily caloric intake  Perform 150 minutes of physical activity per week    Reviewed the importance of self-monitoring, medication adherence, and that the health  can be used as a resource for lifestyle modifications to help reduce or maintain a healthy lifestyle.  Reviewed that the Digital Medicine team is not available for emergencies and instructed the patient to call 911 or [facility] " ([phone] or [phone]) if one arises.

## 2019-04-18 NOTE — PROGRESS NOTES
Last 5 Patient Entered Readings                                      Current 30 Day Average: 138/89     Recent Readings 4/14/2019 4/10/2019    SBP (mmHg) 123 153    DBP (mmHg) 82 95    Pulse 97 79          Digital Medicine: Health  Introduction Call     Left voicemail and requested call back in order to complete Digital Medicine health  introduction call.

## 2019-04-23 ENCOUNTER — PATIENT MESSAGE (OUTPATIENT)
Dept: INTERNAL MEDICINE | Facility: CLINIC | Age: 64
End: 2019-04-23

## 2019-04-23 DIAGNOSIS — E78.5 HYPERLIPIDEMIA, UNSPECIFIED HYPERLIPIDEMIA TYPE: ICD-10-CM

## 2019-04-23 DIAGNOSIS — E11.9 TYPE 2 DIABETES MELLITUS WITHOUT COMPLICATION, WITHOUT LONG-TERM CURRENT USE OF INSULIN: ICD-10-CM

## 2019-04-24 RX ORDER — ATORVASTATIN CALCIUM 80 MG/1
80 TABLET, FILM COATED ORAL DAILY
Qty: 90 TABLET | Refills: 1 | Status: SHIPPED | OUTPATIENT
Start: 2019-04-24 | End: 2019-12-02 | Stop reason: SDUPTHER

## 2019-04-24 RX ORDER — METFORMIN HYDROCHLORIDE 750 MG/1
1500 TABLET, EXTENDED RELEASE ORAL
Qty: 180 TABLET | Refills: 3 | Status: SHIPPED | OUTPATIENT
Start: 2019-04-24 | End: 2020-05-11 | Stop reason: SDUPTHER

## 2019-05-02 ENCOUNTER — PATIENT OUTREACH (OUTPATIENT)
Dept: OTHER | Facility: OTHER | Age: 64
End: 2019-05-02

## 2019-05-02 NOTE — PROGRESS NOTES
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  I will call back next week.

## 2019-05-05 ENCOUNTER — PATIENT MESSAGE (OUTPATIENT)
Dept: ADMINISTRATIVE | Facility: OTHER | Age: 64
End: 2019-05-05

## 2019-05-07 NOTE — PROGRESS NOTES
Last 5 Patient Entered Readings                                      Current 30 Day Average: 142/92     Recent Readings 4/22/2019 4/22/2019 4/14/2019 4/10/2019    SBP (mmHg) 149 160 123 153    DBP (mmHg) 91 101 82 95    Pulse 72 72 97 79        Digital Medicine: Health  Follow Up    Patient states that he hasn't taken a reading in a while because he has been extra busy at work.  He also stated that his diet has not been good and he hasn't been getting much sleep so he knows his readings would be higher.  He states he will start taking readings again this week.    Lifestyle Modifications:    1.Dietary Modifications (Sodium intake <2,000mg/day, food labels, dining out): Patient states that his diet the past two weeks has been much higher in salt than usual.  He was working out at [employer] and eating the food at the [employer].  He states that he will get back on track.    2.Physical Activity: Patient states that his job is very active.  Outside of work, he takes short walks.    3.Medication Therapy: Patient has been compliant with the medication regimen.    4.Patient has the following medication side effects/concerns:   (Frequency/Alleviating factors/Precipitating factors, etc.)     Follow up with Mr. Eyad Garcia completed. No further questions or concerns. Will continue to follow up to achieve health goals.

## 2019-05-22 NOTE — PROGRESS NOTES
Assessment /Plan     For exam results, see Encounter Report.    Primary open angle glaucoma of both eyes, mild stage    Arcuate visual field defect of left eye    Chorioretinal scar, left    Old subtotal retinal detachment, left    Pupillary sphincter rupture, left    Type 2 diabetes mellitus without ophthalmic manifestations    Bilateral presbyopia        Pt initially dx with glaucoma at [facility] - stoppped his gtts on his own   Presented to [facility] susana Valdez 8/23/2006 - off gtts with a baseline / Tmax of 25/27  Referred by Courtney for consideration of SLT's   Pt with POAG and poorly controlled IOP's - does not use drops regularly       1. Primary open angle glaucoma, both eyes, mild stage        Glaucoma (type and duration)    POAG with elevated IOP ou - mild stage    First HVF   2007 - full od // SAD os 2/2 to CRS   First photos   2011   Treatment / Drops started   2006           Family history    ++ mother and 2 brothers         Glaucoma meds    Latanoprost  (( use to use cosopt as well)         H/O adverse rxn to glaucoma drops    None (has tired alphagan and timolol in past -non compliant)         LASERS    SLT os - 3/31/2016 // SLT od - 4/14/2016         GLAUCOMA SURGERIES    none        OTHER EYE SURGERIES    H/O RD repair os - S/P laser repair         CDR    0.75 // 0.8        Tbase    25/27          Tmax    25/27            Ttarget    18/18             HVF    9 test 2006 to  2018 - full  od // SAD - 2/2 CRS from laser for RD repair os        Gonio    +3 ou          CCT    486/502 - thin ou         OCT    5 test 2011 to 2018 - RNFL - BORD t od // fluctuate - dec. S/I, CHUNG N (has a CRS)  os        HRT    3 test 2017 to 2019 -MR -  DEC. Sn/n/in od // DEC. S/n/i, BORD t os /// CDR 0.73 od // 0.86 os        Disc photos    2011, 2015 , 2017     - Ttoday    16/17  - Test done today  - hrt      2. Arcuate visual field defect of left eye   SAD os - 2/2 old CRS from repair of old RD   NOT from glaucomatous  damage      3. Chorioretinal scar, left   -with 2nd VF defect      4. Old subtotal retinal detachment, left   S/P repair - retina flat - stable   -large CRS      5. Type 2 diabetes mellitus without ophthalmic manifestations      6. Senile nuclear sclerosis  -mild - monitor      7. pupil sphincter rupture os  -suggest old trauma  -does NOT appear to have a angle recession    7. Presbyopia       PLAN    POAG with OHT OS > OD - mild od // moderate os   The ON and VF's are still good ou   The VF loss os is 2/2 old CRS- ?  post laser for a RD repair   Good initial response to SLT ou 25/25 --> 16/17     LATANOPROST OU - (PAYING > $100 FOR EACH BOTTLE of lumigan )     F/U 4 months IOP, hvf / DFE / OCT      -   Consider repeat SLT - if IOP up or if VF progression os  -consider re-starting / adding cosopt prn (had some compliance issues - but tolerated ok)       Bethany Lechuga MD

## 2019-05-23 ENCOUNTER — OFFICE VISIT (OUTPATIENT)
Dept: OPHTHALMOLOGY | Facility: CLINIC | Age: 64
End: 2019-05-23
Payer: COMMERCIAL

## 2019-05-23 DIAGNOSIS — H21.562 PUPILLARY SPHINCTER RUPTURE, LEFT: ICD-10-CM

## 2019-05-23 DIAGNOSIS — H40.1131 PRIMARY OPEN ANGLE GLAUCOMA OF BOTH EYES, MILD STAGE: Primary | ICD-10-CM

## 2019-05-23 DIAGNOSIS — E11.9 TYPE 2 DIABETES MELLITUS WITHOUT OPHTHALMIC MANIFESTATIONS: ICD-10-CM

## 2019-05-23 DIAGNOSIS — H33.052 OLD SUBTOTAL RETINAL DETACHMENT, LEFT: ICD-10-CM

## 2019-05-23 DIAGNOSIS — H53.432 ARCUATE VISUAL FIELD DEFECT OF LEFT EYE: ICD-10-CM

## 2019-05-23 DIAGNOSIS — H52.4 BILATERAL PRESBYOPIA: ICD-10-CM

## 2019-05-23 DIAGNOSIS — H31.002 CHORIORETINAL SCAR, LEFT: ICD-10-CM

## 2019-05-23 PROCEDURE — 92133 HEIDELBERG RETINA TOMOGRAPHY (HRT) - OU - BOTH EYES: ICD-10-PCS | Mod: S$GLB,,, | Performed by: OPHTHALMOLOGY

## 2019-05-23 PROCEDURE — 92012 PR EYE EXAM, EST PATIENT,INTERMED: ICD-10-PCS | Mod: S$GLB,,, | Performed by: OPHTHALMOLOGY

## 2019-05-23 PROCEDURE — 99999 PR PBB SHADOW E&M-EST. PATIENT-LVL II: ICD-10-PCS | Mod: PBBFAC,,, | Performed by: OPHTHALMOLOGY

## 2019-05-23 PROCEDURE — 99999 PR PBB SHADOW E&M-EST. PATIENT-LVL II: CPT | Mod: PBBFAC,,, | Performed by: OPHTHALMOLOGY

## 2019-05-23 PROCEDURE — 92133 CPTRZD OPH DX IMG PST SGM ON: CPT | Mod: S$GLB,,, | Performed by: OPHTHALMOLOGY

## 2019-05-23 PROCEDURE — 92012 INTRM OPH EXAM EST PATIENT: CPT | Mod: S$GLB,,, | Performed by: OPHTHALMOLOGY

## 2019-05-23 RX ORDER — LATANOPROST 50 UG/ML
1 SOLUTION/ DROPS OPHTHALMIC NIGHTLY
Qty: 3 BOTTLE | Refills: 3 | Status: SHIPPED | OUTPATIENT
Start: 2019-05-23 | End: 2020-05-28 | Stop reason: SDUPTHER

## 2019-05-27 ENCOUNTER — PATIENT OUTREACH (OUTPATIENT)
Dept: OTHER | Facility: OTHER | Age: 64
End: 2019-05-27

## 2019-05-27 DIAGNOSIS — I10 ESSENTIAL HYPERTENSION: Primary | ICD-10-CM

## 2019-05-27 RX ORDER — AMLODIPINE BESYLATE 10 MG/1
10 TABLET ORAL DAILY
Qty: 30 TABLET | Refills: 3 | Status: SHIPPED | OUTPATIENT
Start: 2019-05-27 | End: 2019-06-07

## 2019-05-27 RX ORDER — LOSARTAN POTASSIUM 100 MG/1
100 TABLET ORAL DAILY
COMMUNITY
End: 2019-08-22 | Stop reason: SDUPTHER

## 2019-05-27 NOTE — PROGRESS NOTES
"HPI:  Mr. Eyad Garcia is a 63 y.o. male who is newly enrolled in the [facility]. Pertinent PMH includes T2DM and dyslipidemia. Reviewed allergies and current list of medications on file. Attributes elevated readings to increased intake of "salty foods" such as crawfish, ham, hotdogs, etc. Adherent to medication regimen daily however, believes that he is taking losartan and not amlodipine after being instructed to increase dose.     IF LUCIA screen positive    LUCIA screening results for this patient suggest a high likelihood of sleep apnea, which can contribute to hypertension. Patient has not been previously diagnosed with sleep apnea and is not interested in referral at this time.     Ayush Horne indicated he would like to have patient  referred to a specialist for further evaluation.       Last 5 Patient Entered Readings                                      Current 30 Day Average: 145/89     Recent Readings 5/25/2019 5/13/2019 5/7/2019 4/22/2019 4/22/2019    SBP (mmHg) 155 146 134 149 160    DBP (mmHg) 95 84 87 91 101    Pulse 86 89 81 72 72          Patient denies s/s of hypotension (lightheadedness, dizziness, nausea, fatigue) associated with low readings. Instructed patient to inform me if this occurs, patient confirms understanding.    Patient denies s/s of hypertension (SOB, CP, severe headaches, changes in vision) associated with high readings. Instructed patient to go to the ED if BP >180/110 and accompanied by hypertensive s/s, patient confirms understanding.    Assessment:  Patient's current 30-day average is not at goal of <130/80 mmHg.    Plan:  Amlodipine increased however, patient not sure of he is taking.  Called pharmacy however, they are closed. LVM for patient to follow-up on medications when he is at home tomorrow.   Patients health , Jackie Denny, will be following up every 3-4 weeks.   I will continue to monitor regularly and will follow-up in 1 to 2 " weeks, sooner if blood pressure begins to trend upward or downward.     Current medication regimen:  Hypertension Medications             losartan (COZAAR) 100 MG tablet Take 100 mg by mouth once daily.        Patient has my contact information and knows to call with any concerns or clinical changes.

## 2019-06-04 ENCOUNTER — PATIENT OUTREACH (OUTPATIENT)
Dept: OTHER | Facility: OTHER | Age: 64
End: 2019-06-04

## 2019-06-04 NOTE — PROGRESS NOTES
Last 5 Patient Entered Readings                                      Current 30 Day Average: 143/89     Recent Readings 6/2/2019 5/25/2019 5/13/2019 5/7/2019 4/22/2019    SBP (mmHg) 136 155 146 134 149    DBP (mmHg) 88 95 84 87 91    Pulse 82 86 89 81 72        Digital Medicine: Health  Follow Up    Lifestyle Modifications:    1.Dietary Modifications (Sodium intake <2,000mg/day, food labels, dining out): Patient states that he has been trying to do better with is diet.  He says he is eating less fried foods.  He states that he has been drinking about a gallon of water per day because it is so hot outside and he is in the heat a lot for his work.    2.Physical Activity: States it has been too hot to exercise.  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    3.Medication Therapy: Patient has been compliant with the medication regimen.    4.Patient has the following medication side effects/concerns: none reported.  (Frequency/Alleviating factors/Precipitating factors, etc.)     Follow up with Mr. Eyad Garcia completed. No further questions or concerns. Will continue to follow up to achieve health goals.

## 2019-06-06 ENCOUNTER — TELEPHONE (OUTPATIENT)
Dept: INTERNAL MEDICINE | Facility: CLINIC | Age: 64
End: 2019-06-06

## 2019-06-06 ENCOUNTER — PATIENT MESSAGE (OUTPATIENT)
Dept: INTERNAL MEDICINE | Facility: CLINIC | Age: 64
End: 2019-06-06

## 2019-06-06 NOTE — TELEPHONE ENCOUNTER
SO his question is he currently taking losartan 100mg daily and wants to know if he also needs to take the amlodipine which he is not currently taking.    Answer depends on what his BPs are on the Losartan 100mg daily.  Can he get us a week's worth of daily BP  Readings while on only the Losartan 100mg?  If they are <130/80, then no amlodipine needed and will take it off his chart.  If not meeting goal of <130/80, then start the amlodipine.    He should read the portal messages when he gets an email saying there is a new message.  heather

## 2019-06-06 NOTE — TELEPHONE ENCOUNTER
Spoke with patient and he is confused as to which Pressure medication does PCP want him to take.     Patient never picked up nor started the amlodipine 5MG,. kenny told patient that his Losartan 100MG was not involved in the recall, so he continued the Losartan 100MG.     Patient also spoke with pharmacist this weekend, whom increased amlodipine to 10MG.   Patient is asking should he take both medications or just one medication.     Also informed patient that original message from PCP about medication change was sent to patient portal, responding to patients original message, patient informed me that he does not read the messages in the portal.    Please advise

## 2019-06-07 ENCOUNTER — PATIENT MESSAGE (OUTPATIENT)
Dept: INTERNAL MEDICINE | Facility: CLINIC | Age: 64
End: 2019-06-07

## 2019-06-07 DIAGNOSIS — I10 ESSENTIAL HYPERTENSION: ICD-10-CM

## 2019-06-07 NOTE — PROGRESS NOTES
Last 5 Patient Entered Readings                                      Current 30 Day Average: 146/89     Recent Readings 6/2/2019 5/25/2019 5/13/2019 5/7/2019 4/22/2019    SBP (mmHg) 136 155 146 134 149    DBP (mmHg) 88 95 84 87 91    Pulse 82 86 89 81 72        Patient confirmed that he is only taking losartan. Will add amlodipine if readings remain elevated for 1 week.

## 2019-06-08 DIAGNOSIS — S39.011A STRAIN OF FLANK, INITIAL ENCOUNTER: ICD-10-CM

## 2019-06-10 ENCOUNTER — TELEPHONE (OUTPATIENT)
Dept: INTERNAL MEDICINE | Facility: CLINIC | Age: 64
End: 2019-06-10

## 2019-06-10 RX ORDER — ETODOLAC 400 MG/1
400 TABLET, FILM COATED ORAL 2 TIMES DAILY
Qty: 30 TABLET | Refills: 0 | Status: SHIPPED | OUTPATIENT
Start: 2019-06-10 | End: 2020-01-29

## 2019-06-10 RX ORDER — AMLODIPINE BESYLATE 5 MG/1
5 TABLET ORAL DAILY
Qty: 90 TABLET | Refills: 3 | Status: SHIPPED | OUTPATIENT
Start: 2019-06-10 | End: 2020-02-24 | Stop reason: DRUGHIGH

## 2019-06-10 NOTE — TELEPHONE ENCOUNTER
Let pt know:    amlodipine 5mg was sent to your [facility] pharmacy  ===View-only below this line===      ----- Message -----     From: Eyad Garcia     Sent: 6/7/2019  5:59 PM CDT       To: Ayush Horne MD  Subject: Prescription    After talk to your nurse from your and talking to the other nurse about the prescription Amlodipine  thinking it was phone in to [facility] but wasn't so when you call it in they will let me know thanks

## 2019-06-10 NOTE — TELEPHONE ENCOUNTER
Per PCP request notified pt...Rx for Amlodipine has been sent to the pharmacy for you. They will contact you when it is ready for .

## 2019-06-14 ENCOUNTER — PATIENT OUTREACH (OUTPATIENT)
Dept: OTHER | Facility: OTHER | Age: 64
End: 2019-06-14

## 2019-06-14 NOTE — PROGRESS NOTES
HPI:  Called Mr. Eyad Garcia for hypertension digital medicine follow-up. Patient reports adherence to medication regimen with no complaints. Discussed charging cuff and reviewed technique.  Patient has been checking BP on couch and admits that he does not rest prior to the reading.     Last 5 Patient Entered Readings                                      Current 30 Day Average: 148/85     Recent Readings 7/13/2019 7/6/2019 7/4/2019 7/1/2019 6/28/2019    SBP (mmHg) 155 141 154 152 142    DBP (mmHg) 89 76 93 86 82    Pulse 72 77 72 80 75          Patient denies s/s of hypotension (lightheadedness, dizziness, nausea, fatigue) associated with low readings. Instructed patient to inform me if this occurs, patient confirms understanding.    Patient denies s/s of hypertension (SOB, CP, severe headaches, changes in vision) associated with high readings. Instructed patient to go to the ED if BP >180/110 and accompanied by hypertensive s/s, patient confirms understanding.    Assessment:  Patient's current 30-day average is not at goal of <130/80 mmHg. Error in BP technique.     Plan:  Continue current regimen.  Correct technique as discussed and check BP every other day until next follow-up.   Will increase amlodipine if readings remain elevated.   Patients health , Jackie Denny, will follow-up as scheduled.    I will continue to monitor regularly and will follow-up in 3 weeks, sooner if blood pressure begins to trend upward or downward.     Current medication regimen:  Hypertension Medications             amLODIPine (NORVASC) 5 MG tablet Take 1 tablet (5 mg total) by mouth once daily.    losartan (COZAAR) 100 MG tablet Take 100 mg by mouth once daily.        Patient has my contact information and knows to call with any concerns or clinical changes.        Called pt for follow-up. Per chart review will be starting amlodipine 5 mg and continuing losartan. Will continue monitoring and f/uin 2 to 3 weeks to assess  tolerance and readings.     7/2/19: 2nd attempt to contact patient.

## 2019-06-20 ENCOUNTER — OFFICE VISIT (OUTPATIENT)
Dept: UROLOGY | Facility: CLINIC | Age: 64
End: 2019-06-20
Payer: COMMERCIAL

## 2019-06-20 ENCOUNTER — LAB VISIT (OUTPATIENT)
Dept: LAB | Facility: HOSPITAL | Age: 64
End: 2019-06-20
Attending: UROLOGY
Payer: COMMERCIAL

## 2019-06-20 VITALS
WEIGHT: 165.38 LBS | HEART RATE: 80 BPM | BODY MASS INDEX: 25.96 KG/M2 | DIASTOLIC BLOOD PRESSURE: 81 MMHG | SYSTOLIC BLOOD PRESSURE: 153 MMHG | HEIGHT: 67 IN

## 2019-06-20 DIAGNOSIS — N52.31 ERECTILE DYSFUNCTION AFTER RADICAL PROSTATECTOMY: Primary | ICD-10-CM

## 2019-06-20 DIAGNOSIS — E78.5 HYPERLIPIDEMIA, UNSPECIFIED HYPERLIPIDEMIA TYPE: ICD-10-CM

## 2019-06-20 DIAGNOSIS — C61 MALIGNANT NEOPLASM OF PROSTATE: ICD-10-CM

## 2019-06-20 DIAGNOSIS — I10 ESSENTIAL HYPERTENSION: ICD-10-CM

## 2019-06-20 PROBLEM — Z86.010 HISTORY OF COLON POLYPS: Status: RESOLVED | Noted: 2019-01-25 | Resolved: 2019-06-20

## 2019-06-20 PROBLEM — Z12.11 SCREENING FOR COLON CANCER: Status: RESOLVED | Noted: 2019-01-25 | Resolved: 2019-06-20

## 2019-06-20 PROBLEM — Z86.0100 HISTORY OF COLON POLYPS: Status: RESOLVED | Noted: 2019-01-25 | Resolved: 2019-06-20

## 2019-06-20 LAB — COMPLEXED PSA SERPL-MCNC: 4 NG/ML (ref 0–4)

## 2019-06-20 PROCEDURE — 99214 PR OFFICE/OUTPT VISIT, EST, LEVL IV, 30-39 MIN: ICD-10-PCS | Mod: S$GLB,,, | Performed by: UROLOGY

## 2019-06-20 PROCEDURE — 3077F SYST BP >= 140 MM HG: CPT | Mod: CPTII,S$GLB,, | Performed by: UROLOGY

## 2019-06-20 PROCEDURE — 84153 ASSAY OF PSA TOTAL: CPT

## 2019-06-20 PROCEDURE — 99999 PR PBB SHADOW E&M-EST. PATIENT-LVL III: ICD-10-PCS | Mod: PBBFAC,,, | Performed by: UROLOGY

## 2019-06-20 PROCEDURE — 3079F PR MOST RECENT DIASTOLIC BLOOD PRESSURE 80-89 MM HG: ICD-10-PCS | Mod: CPTII,S$GLB,, | Performed by: UROLOGY

## 2019-06-20 PROCEDURE — 99214 OFFICE O/P EST MOD 30 MIN: CPT | Mod: S$GLB,,, | Performed by: UROLOGY

## 2019-06-20 PROCEDURE — 3077F PR MOST RECENT SYSTOLIC BLOOD PRESSURE >= 140 MM HG: ICD-10-PCS | Mod: CPTII,S$GLB,, | Performed by: UROLOGY

## 2019-06-20 PROCEDURE — 99999 PR PBB SHADOW E&M-EST. PATIENT-LVL III: CPT | Mod: PBBFAC,,, | Performed by: UROLOGY

## 2019-06-20 PROCEDURE — 3079F DIAST BP 80-89 MM HG: CPT | Mod: CPTII,S$GLB,, | Performed by: UROLOGY

## 2019-06-20 PROCEDURE — 3008F PR BODY MASS INDEX (BMI) DOCUMENTED: ICD-10-PCS | Mod: CPTII,S$GLB,, | Performed by: UROLOGY

## 2019-06-20 PROCEDURE — 36415 COLL VENOUS BLD VENIPUNCTURE: CPT

## 2019-06-20 PROCEDURE — 3008F BODY MASS INDEX DOCD: CPT | Mod: CPTII,S$GLB,, | Performed by: UROLOGY

## 2019-06-20 NOTE — PROGRESS NOTES
CHIEF COMPLAINT:     Mr. Garcia is a 63 y.o. male presenting with ED.    PRESENTING ILLNESS:    Eyad Garcia is a 63 y.o. male s/p RRP by Dr. Palacios on 7/13/15.  Since then, he c/o ED. Prior to surgery he did not have ED.   He has tried and failed oral meds.  He's failed ICI.  He's s/p placement of a 3 piece AMS IPP on 7/11/17.  He's had trouble inflating the device.    He's been lost to follow up for his PCa.    He is continent.  No hematuria.  No dysuria.  Good FOS.    No bone pain or weight loss.    REVIEW OF SYSTEMS:    Patientdenies any history of headache, blurred vision, fever, nausea, vomiting, chills, abdominal pain, bleeding per rectum, cough, SOB, recent loss of consciousness, recent mental status changes, seizures, dizziness, or upper or lower extremity weakness.    MU  1. 3  2. 2  3. 2  4. 4  5. 2     PATIENT HISTORY:    Past Medical History:   Diagnosis Date    Cataract     Diabetes mellitus type II     Difficult intubation     Erectile dysfunction     History of colon polyps 1/25/2019    Hyperlipidemia     Hypertension     OA (osteoarthritis)     POAG (primary open-angle glaucoma)     Retinal detachment     OS       Past Surgical History:   Procedure Laterality Date    BIOPSY-TRUS  5/19/2015    [facility] at [facility] 1ST FLR    COLONOSCOPY N/A 1/25/2019    Performed by Elder [facility]illermo MD at [facility] ENDO (4TH FLR)    COLONOSCOPY N/A 6/13/2013    Performed by [facility] Marley MD at [facility] ENDO (4TH FLR)    CYSTOSCOPY      CYSTOSCOPY N/A 5/19/2015    [facility] at [facility] 1ST FLR    HERNIA REPAIR      INSERTION-PROSTHESIS-PENILE INFLATABLE    william doherty [phone] ams N/A 7/11/2017    Performed [facility]onso [facility] at [facility] OR 2ND FLR    KNEE SURGERY      left    PENILE PROSTHESIS IMPLANT      PROSTATE SURGERY      PROSTATECTOMY-RADICAL-RETROPUBIC N/A 7/13/2015    Performed by [facility]ios [facility] at [facility] OR 2ND FLR     RETINAL DETACHMENT SURGERY      OS    SELECTIVE LASER TRABECUPLASTY Bilateral 4/16    OU WITH        Family History   Problem Relation Age of Onset    Diabetes Mother     Hypertension Mother     Glaucoma Mother     Diabetes Sister     Glaucoma Sister     Diabetes Brother     Glaucoma Brother     No Known Problems Father     No Known Problems Maternal Aunt     No Known Problems Maternal Uncle     No Known Problems Paternal Aunt     No Known Problems Paternal Uncle     No Known Problems Maternal Grandmother     No Known Problems Maternal Grandfather     No Known Problems Paternal Grandmother     No Known Problems Paternal Grandfather     Anesthesia problems Neg Hx     Broken bones Neg Hx     Cancer Neg Hx     Clotting disorder Neg Hx     Collagen disease Neg Hx     Dislocations Neg Hx     Osteoporosis Neg Hx     Rheumatologic disease Neg Hx     Scoliosis Neg Hx     Severe sprains Neg Hx     Amblyopia Neg Hx     Blindness Neg Hx     Cataracts Neg Hx     Macular degeneration Neg Hx     Retinal detachment Neg Hx     Strabismus Neg Hx     Stroke Neg Hx     Thyroid disease Neg Hx        Social History     Socioeconomic History    Marital status:      Spouse name: Not on file    Number of children: Not on file    Years of education: Not on file    Highest education level: Not on file   Occupational History    Not on file   Social Needs    Financial resource strain: Not on file    Food insecurity:     Worry: Not on file     Inability: Not on file    Transportation needs:     Medical: Not on file     Non-medical: Not on file   Tobacco Use    Smoking status: Never Smoker    Smokeless tobacco: Never Used   Substance and Sexual Activity    Alcohol use: No    Drug use: No    Sexual activity: Yes     Partners: Female   Lifestyle    Physical activity:     Days per week: Not on file     Minutes per session: Not on file    Stress: Not on file   Relationships     Social connections:     Talks on phone: Not on file     Gets together: Not on file     Attends Gnosticist service: Not on file     Active member of club or organization: Not on file     Attends meetings of clubs or organizations: Not on file     Relationship status: Not on file   Other Topics Concern    Not on file   Social History Narrative    Works for [employer]       Allergies:  Sulfa (sulfonamide antibiotics) and Aspirin    Medications:    Current Outpatient Medications:     amLODIPine (NORVASC) 5 MG tablet, Take 1 tablet (5 mg total) by mouth once daily., Disp: 90 tablet, Rfl: 3    atorvastatin (LIPITOR) 80 MG tablet, Take 1 tablet (80 mg total) by mouth once daily., Disp: 90 tablet, Rfl: 1    etodolac (LODINE) 400 MG tablet, Take 1 tablet (400 mg total) by mouth 2 (two) times daily. Take with food, Disp: 30 tablet, Rfl: 0    latanoprost 0.005 % ophthalmic solution, Place 1 drop into both eyes every evening., Disp: 3 Bottle, Rfl: 3    losartan (COZAAR) 100 MG tablet, Take 100 mg by mouth once daily., Disp: , Rfl:     metFORMIN (GLUCOPHAGE-XR) 750 MG 24 hr tablet, Take 2 tablets (1,500 mg total) by mouth daily with breakfast., Disp: 180 tablet, Rfl: 3    triamcinolone acetonide 0.1% (KENALOG) 0.1 % cream, Apply topically 2 (two) times daily., Disp: 80 g, Rfl: 1    PHYSICAL EXAMINATION:    The patient generally appears in good health, is appropriately interactive, and is in no apparent distress.      Eyes: anicteric sclerae, moist conjunctivae; no lid-lag; PERRLA      HENT: Atraumatic; oropharynx clear with moist mucous membranes and no mucosal ulcerations;normal hard and soft palate.  No evidence of lymphadenopathy.     Neck: Trachea midline.  No thyromegaly.     Musculoskeletal: No abnormal gait.     Skin: No lesions.     Mental: Cooperative with normal affect.  Is oriented to time, place, and person.     Neuro: Grossly intact.     Chest: Normal inspiratory effort.   No accessory muscles.  No  audible wheezes.  Respirations symmetric on inspiration and expiration.     Heart: Regular rhythm.       Abdomen:  Soft, non-tender. No masses or organomegaly. Bladder is not palpable. No evidence of flank discomfort. No evidence of inguinal hernia.    Genitourinary: The penis is circumcised with no evidence of plaques or induration. The urethral meatus is normal. The testes, epididymides, and cord structures are normal in size and contour bilaterally. The scrotum is normal in size and contour.  The IPP components are palpable in the penis and scrotum.     Extremities: No clubbing, cyanosis, or edema      LABS:    UA dipped negative today    Lab Results   Component Value Date    PSA 0.14 03/27/2017    PSA 6.6 (H) 02/27/2015    PSA 5.48 (H) 05/13/2013    PSADIAG 1.9 11/16/2018    PSADIAG 0.11 06/27/2016    PSADIAG 0.03 08/27/2015       IMPRESSION:    Encounter Diagnoses   Name Primary?    Erectile dysfunction due to arterial insufficiency Yes    Malignant neoplasm of prostate     Essential hypertension     Hyperlipidemia, unspecified hyperlipidemia type    HTN, controlled  Hyperlipidemia, controlled  DM, controlled      PLAN:    1. I watched him inflate the device.  He was hardly pushing the pump.  I was able to inflate the device much firmer than he could at home.  Instructed him on proper inflation technique.  Discussed that he needs to squeeze the pump much more firmly.  2. Discussed that he's been lost to follow up for his PCa.  His last PSA checked by his PCP is detectible.   3. Recommend consult to rad onc.  Will also draw a PSA today.    Copy to:

## 2019-06-22 ENCOUNTER — PATIENT MESSAGE (OUTPATIENT)
Dept: UROLOGY | Facility: CLINIC | Age: 64
End: 2019-06-22

## 2019-06-25 ENCOUNTER — TELEPHONE (OUTPATIENT)
Dept: UROLOGY | Facility: CLINIC | Age: 64
End: 2019-06-25

## 2019-06-25 ENCOUNTER — PATIENT MESSAGE (OUTPATIENT)
Dept: UROLOGY | Facility: CLINIC | Age: 64
End: 2019-06-25

## 2019-06-25 NOTE — TELEPHONE ENCOUNTER
Spoke to pt. Informed pt psa was elevated. Last psa done by pcp was also elevated. Recommends pt follow up with , staff sent message to contact pt to schedule asap.

## 2019-07-02 ENCOUNTER — PATIENT OUTREACH (OUTPATIENT)
Dept: OTHER | Facility: OTHER | Age: 64
End: 2019-07-02

## 2019-07-02 NOTE — PROGRESS NOTES
Last 5 Patient Entered Readings                                      Current 30 Day Average: 135/84     Recent Readings 6/28/2019 6/27/2019 6/19/2019 6/16/2019 6/13/2019    SBP (mmHg) 142 141 136 150 142    DBP (mmHg) 82 82 86 81 85    Pulse 75 88 83 80 79          Digital Medicine: Health  Follow Up    Lifestyle Modifications:    1.Dietary Modifications (Sodium intake <2,000mg/day, food labels, dining out): Patient states that he has cut back on fried food and salt.  He says he is still drinking about a gallon of water per day.    2.Physical Activity: Patient says that he is active all day at work.  I suggested doing some walking in the evenings when it is cooler to get some additional cardio.    3.Medication Therapy: Patient has been compliant with the medication regimen.    4.Patient has the following medication side effects/concerns: none reported  (Frequency/Alleviating factors/Precipitating factors, etc.)     Follow up with Mr. Eyad Garcia completed. No further questions or concerns. Will continue to follow up to achieve health goals.

## 2019-07-08 ENCOUNTER — INITIAL CONSULT (OUTPATIENT)
Dept: RADIATION ONCOLOGY | Facility: CLINIC | Age: 64
End: 2019-07-08
Payer: OTHER GOVERNMENT

## 2019-07-08 VITALS
HEIGHT: 67 IN | BODY MASS INDEX: 26.29 KG/M2 | DIASTOLIC BLOOD PRESSURE: 86 MMHG | SYSTOLIC BLOOD PRESSURE: 160 MMHG | RESPIRATION RATE: 16 BRPM | HEART RATE: 77 BPM

## 2019-07-08 DIAGNOSIS — C61 MALIGNANT NEOPLASM OF PROSTATE: Primary | ICD-10-CM

## 2019-07-08 PROCEDURE — 99204 OFFICE O/P NEW MOD 45 MIN: CPT | Mod: S$GLB,,, | Performed by: RADIOLOGY

## 2019-07-08 PROCEDURE — 99999 PR PBB SHADOW E&M-EST. PATIENT-LVL III: CPT | Mod: PBBFAC,,, | Performed by: RADIOLOGY

## 2019-07-08 PROCEDURE — 99999 PR PBB SHADOW E&M-EST. PATIENT-LVL III: ICD-10-PCS | Mod: PBBFAC,,, | Performed by: RADIOLOGY

## 2019-07-08 PROCEDURE — 99204 PR OFFICE/OUTPT VISIT, NEW, LEVL IV, 45-59 MIN: ICD-10-PCS | Mod: S$GLB,,, | Performed by: RADIOLOGY

## 2019-07-08 NOTE — LETTER
July 8, 2019      Chaitanya Alonso MD  [id number]  [facility] [address]           [facility]  [address]  [facility] [address]  Phone: [phone]          Patient: Eyad Garcia   MR Number: [id number]   YOB: 1955   Date of Visit: 7/8/2019       Dear Dr. Chaitanya Alonso:    Thank you for referring Eyad Garcia to me for evaluation. Attached you will find relevant portions of my assessment and plan of care.    If you have questions, please do not hesitate to call me. I look forward to following Eyad Garcia along with you.    Sincerely,    Henrry Sampson Jr., MD    Enclosure  CC:  No Recipients    If you would like to receive this communication electronically, please contact [email or web address] or [phone] to request more information on [employer] access.    For providers and/or their staff who would like to refer a patient to [facility], please contact us through our one-stop-shop provider referral line, [facility], at [phone].    If you feel you have received this communication in error or would no longer like to receive these types of communications, please e-mail [email or web address]

## 2019-07-08 NOTE — PROGRESS NOTES
HISTORY OF PRESENT ILLNESS:   This patient presents for discussion of possible salvage irradiation to the prostate bed.      Mr. Garcia has a long urologic history dating back to 2009 when he was referred for evaluation of an elevated PSA  of 4.3 ng/ml.  The patient is status post negative biopsies in 2009, 2011.  Repeat biopsies in 2015 revealed a small (2%) focus of [place] 8 (4+4) adenocarcinoma in biopsies from the Rt. base.  There was also focal high grade PIN.  PSA at that time was 6.6 ng/ml.  Work up with bone scan was negative.  He subsequently underwent RALP with bilateral pelvic node dissection in July of 2015.  Pathology revealed a 6.5 x 5 x 5 cm prostate, weighing 117 g.  There was a single focus of [place] 6 (3+3) adenocarcinoma in a background of extensive high-grade PIN.  The tumor accounted for less than 1% of the specimen.  There was no extraprostatic extension, seminal vesicle invasion or lymphovascular invasion.  The margins and 10 (5 Lt., 5 Rt.) pelvic nodes were negative for tumor involvement.  Postoperatively the patient recovered well.   Postoperative PSA in 2015 was 0.03 ng/ml.  It has increases slowly until recently.  PSA in March of 2017 was 0.14 ng/ml.  Repeat PSA in November of 2018 had increased to 1.9 ng/ml.  His most recent PSA on 6/20/19 was 4 ng/ml.  The patient is now referred for discussion of salvage irradiation.  Today, the patient states he feels well.  Denies incontinence.  The patient is status post placement of IPP device in July of 2017.        REVIEW OF SYSTEMS:   Review of Systems   Constitutional: Negative for chills, diaphoresis, fever and weight loss.   Respiratory: Negative for cough, hemoptysis, sputum production and shortness of breath.    Cardiovascular: Negative for chest pain and palpitations.   Gastrointestinal: Negative for abdominal pain, constipation, diarrhea, nausea and vomiting.   Genitourinary: Negative for dysuria, frequency, hematuria and urgency.    Neurological: Negative for dizziness and headaches.         PAST MEDICAL HISTORY:  Past Medical History:   Diagnosis Date    Cataract     Diabetes mellitus type II     Difficult intubation     Erectile dysfunction     History of colon polyps 1/25/2019    Hyperlipidemia     Hypertension     OA (osteoarthritis)     POAG (primary open-angle glaucoma)     Retinal detachment     OS       PAST SURGICAL HISTORY:  Past Surgical History:   Procedure Laterality Date    BIOPSY-TRUS  5/19/2015    [facility] OR 1ST FLR    COLONOSCOPY N/A 1/25/2019    Performed by Elder [facility]illermo MD at [facility] ENDO (4TH FLR)    COLONOSCOPY N/A 6/13/2013    [facility] by [facility]um Marley MD at [facility] ENDO (4TH FLR)    CYSTOSCOPY      CYSTOSCOPY N/A 5/19/2015    [facility] at [facility] OR 1ST FLR    HERNIA REPAIR      INSERTION-PROSTHESIS-PENILE INFLATABLE    william doherty [phone] ams N/A 7/11/2017    [facility] at [facility] OR 2ND FLR    KNEE SURGERY      left    PENILE PROSTHESIS IMPLANT      PROSTATE SURGERY      PROSTATECTOMY-RADICAL-RETROPUBIC N/A 7/13/2015    Performed by William [facility]acios MD at [facility] OR 2ND FLR    RETINAL DETACHMENT SURGERY      OS    SELECTIVE LASER TRABECUPLASTY Bilateral 4/16    OU WITH        ALLERGIES:   Review of patient's allergies indicates:   Allergen Reactions    Sulfa (sulfonamide antibiotics) Hives and Rash           Aspirin      Stomach upset       MEDICATIONS:  Current Outpatient Medications   Medication Sig    amLODIPine (NORVASC) 5 MG tablet Take 1 tablet (5 mg total) by mouth once daily.    atorvastatin (LIPITOR) 80 MG tablet Take 1 tablet (80 mg total) by mouth once daily.    etodolac (LODINE) 400 MG tablet Take 1 tablet (400 mg total) by mouth 2 (two) times daily. Take with food    latanoprost 0.005 % ophthalmic solution Place 1 drop into both eyes every evening.    losartan (COZAAR) 100 MG  tablet Take 100 mg by mouth once daily.    metFORMIN (GLUCOPHAGE-XR) 750 MG 24 hr tablet Take 2 tablets (1,500 mg total) by mouth daily with breakfast.    triamcinolone acetonide 0.1% (KENALOG) 0.1 % cream Apply topically 2 (two) times daily.     No current facility-administered medications for this visit.        SOCIAL HISTORY:  Social History     Socioeconomic History    Marital status:      Spouse name: Not on file    Number of children: Not on file    Years of education: Not on file    Highest education level: Not on file   Occupational History    Not on file   Social Needs    Financial resource strain: Not on file    Food insecurity:     Worry: Not on file     Inability: Not on file    Transportation needs:     Medical: Not on file     Non-medical: Not on file   Tobacco Use    Smoking status: Never Smoker    Smokeless tobacco: Never Used   Substance and Sexual Activity    Alcohol use: No    Drug use: No    Sexual activity: Yes     Partners: Female   Lifestyle    Physical activity:     Days per week: Not on file     Minutes per session: Not on file    Stress: Not on file   Relationships    Social connections:     Talks on phone: Not on file     Gets together: Not on file     Attends Jehovah's witness service: Not on file     Active member of club or organization: Not on file     Attends meetings of clubs or organizations: Not on file     Relationship status: Not on file   Other Topics Concern    Not on file   Social History Narrative    Works for [employer]       FAMILY HISTORY:  Family History   Problem Relation Age of Onset    Diabetes Mother     Hypertension Mother     Glaucoma Mother     Diabetes Sister     Glaucoma Sister     Diabetes Brother     Glaucoma Brother     No Known Problems Father     No Known Problems Maternal Aunt     No Known Problems Maternal Uncle     No Known Problems Paternal Aunt     No Known Problems Paternal Uncle     No Known Problems Maternal  Grandmother     No Known Problems Maternal Grandfather     No Known Problems Paternal Grandmother     No Known Problems Paternal Grandfather     Anesthesia problems Neg Hx     Broken bones Neg Hx     Cancer Neg Hx     Clotting disorder Neg Hx     Collagen disease Neg Hx     Dislocations Neg Hx     Osteoporosis Neg Hx     Rheumatologic disease Neg Hx     Scoliosis Neg Hx     Severe sprains Neg Hx     Amblyopia Neg Hx     Blindness Neg Hx     Cataracts Neg Hx     Macular degeneration Neg Hx     Retinal detachment Neg Hx     Strabismus Neg Hx     Stroke Neg Hx     Thyroid disease Neg Hx          PHYSICAL EXAMINATION:  Vitals:    19 0956   BP: (!) 160/86   Pulse: 77   Resp: 16     Physical Exam   Constitutional: He is oriented to person, place, and time and well-developed, well-nourished, and in no distress.   Pulmonary/Chest: Effort normal. No respiratory distress.   Abdominal: Soft. He exhibits no distension.   Genitourinary:   Genitourinary Comments: rectal - normal tone, prostate fossa depressed.  no palpable masses or induration.     Neurological: He is alert and oriented to person, place, and time.   Psychiatric: Affect and judgment normal.       ASSESSMENT/PLAN:  History of pathologic stage II (T2a, N0, M0) adenocarcinoma of the prostate, now with evidence of biochemical failure.      ECO    I had a long discussion with the patient and his wife.  We discussed the significance of his rising PSA.  Discussed the concepts of local vs distant recurrent disease.  Discussed the role of radiotherapy in treatment of recurrent prostate cancer.  Explained that radiotherapy may be considered in cases of local recurrence.  Explained that based on his PSA, it is possible he has distant recurrent disease.  Discussed continuing his work up with bone scan and possible Axumin scan depending on the results.  Explained the rational for the studies.  The patient was agreeable to proceed with metastatic  work up.  Will plan scans with follow up after.      I spent approximately 60 minutes reviewing the available records and evaluating the patient, out of which over 50% of the time was spent face to face with the patient in counseling and coordinating this patient's care.

## 2019-07-11 ENCOUNTER — HOSPITAL ENCOUNTER (OUTPATIENT)
Dept: RADIOLOGY | Facility: HOSPITAL | Age: 64
Discharge: HOME | End: 2019-07-11
Attending: RADIOLOGY
Payer: MEDICARE

## 2019-07-11 DIAGNOSIS — C61 MALIGNANT NEOPLASM OF PROSTATE: ICD-10-CM

## 2019-07-11 PROCEDURE — A9503 TC99M MEDRONATE: HCPCS

## 2019-07-11 PROCEDURE — 78306 BONE IMAGING WHOLE BODY: CPT | Mod: 26,,, | Performed by: RADIOLOGY

## 2019-07-11 PROCEDURE — 78306 NM BONE SCAN WHOLE BODY: ICD-10-PCS | Mod: 26,,, | Performed by: RADIOLOGY

## 2019-07-22 ENCOUNTER — HOSPITAL ENCOUNTER (OUTPATIENT)
Dept: RADIOLOGY | Facility: HOSPITAL | Age: 64
Discharge: HOME | End: 2019-07-22
Attending: RADIOLOGY
Payer: MEDICARE

## 2019-07-22 DIAGNOSIS — C61 MALIGNANT NEOPLASM OF PROSTATE: ICD-10-CM

## 2019-07-22 PROCEDURE — 78815 NM PET CT FLUCICLOVINE F18(PROSTATE CANCER RECURRENCE): ICD-10-PCS | Mod: 26,PI,, | Performed by: RADIOLOGY

## 2019-07-22 PROCEDURE — 78815 PET IMAGE W/CT SKULL-THIGH: CPT | Mod: TC

## 2019-07-22 PROCEDURE — 78815 PET IMAGE W/CT SKULL-THIGH: CPT | Mod: 26,PI,, | Performed by: RADIOLOGY

## 2019-07-29 ENCOUNTER — OFFICE VISIT (OUTPATIENT)
Dept: RADIATION ONCOLOGY | Facility: CLINIC | Age: 64
End: 2019-07-29
Payer: MEDICARE

## 2019-07-29 VITALS
RESPIRATION RATE: 16 BRPM | WEIGHT: 170.88 LBS | DIASTOLIC BLOOD PRESSURE: 86 MMHG | SYSTOLIC BLOOD PRESSURE: 179 MMHG | BODY MASS INDEX: 26.82 KG/M2 | HEART RATE: 81 BPM | HEIGHT: 67 IN

## 2019-07-29 DIAGNOSIS — C61 MALIGNANT NEOPLASM OF PROSTATE: Primary | ICD-10-CM

## 2019-07-29 PROCEDURE — 99999 PR PBB SHADOW E&M-EST. PATIENT-LVL III: CPT | Mod: PBBFAC,,, | Performed by: RADIOLOGY

## 2019-07-29 PROCEDURE — 99212 OFFICE O/P EST SF 10 MIN: CPT | Mod: S$GLB,,, | Performed by: RADIOLOGY

## 2019-07-29 PROCEDURE — 99999 PR PBB SHADOW E&M-EST. PATIENT-LVL III: ICD-10-PCS | Mod: PBBFAC,,, | Performed by: RADIOLOGY

## 2019-07-29 PROCEDURE — 99212 PR OFFICE/OUTPT VISIT, EST, LEVL II, 10-19 MIN: ICD-10-PCS | Mod: S$GLB,,, | Performed by: RADIOLOGY

## 2019-07-29 RX ORDER — BICALUTAMIDE 50 MG/1
50 TABLET, FILM COATED ORAL DAILY
Qty: 90 TABLET | Refills: 1 | Status: SHIPPED | OUTPATIENT
Start: 2019-07-29 | End: 2020-01-29

## 2019-07-29 RX ORDER — BICALUTAMIDE 50 MG/1
50 TABLET, FILM COATED ORAL DAILY
Qty: 90 TABLET | Refills: 1 | Status: SHIPPED | OUTPATIENT
Start: 2019-07-29 | End: 2019-07-29 | Stop reason: SDUPTHER

## 2019-07-29 NOTE — PROGRESS NOTES
Subjective:       Patient ID: Eyad Garcia is a 64 y.o. male.    Chief Complaint: Prostate Cancer (rev scans)    This patient presents for discussion of test results.     Mr. Garcia has a long urologic history dating back to 2009 when he was referred for evaluation of an elevated PSA  of 4.3 ng/ml.  The patient is status post negative biopsies in 2009, 2011.  Repeat biopsies in 2015 revealed a small (2%) focus of Scooter 8 (4+4) adenocarcinoma in biopsies from the Rt. base.  There was also focal high grade PIN.  PSA at that time was 6.6 ng/ml.  Work up with bone scan was negative.  He subsequently underwent RALP with bilateral pelvic node dissection in July of 2015.  Pathology revealed a 6.5 x 5 x 5 cm prostate, weighing 117 g.  There was a single focus of [place] 6 (3+3) adenocarcinoma in a background of extensive high-grade PIN.  The tumor accounted for less than 1% of the specimen.  There was no extraprostatic extension, seminal vesicle invasion or lymphovascular invasion.  The margins and 10 (5 Lt., 5 Rt.) pelvic nodes were negative for tumor involvement.  Postoperatively the patient recovered well.   Postoperative PSA in 2015 was 0.03 ng/ml.  It has increases slowly until recently.  PSA in March of 2017 was 0.14 ng/ml.  Repeat PSA in November of 2018 had increased to 1.9 ng/ml.  His most recent PSA on 6/20/19 was 4 ng/ml.  The patient was referred to our department for discussion of treatment.   We elected to continue his metastatic work up.  He returns today to discuss the results.      Review of Systems   Constitutional: Negative for activity change, appetite change, chills, fatigue and fever.   Respiratory: Negative for cough, choking and shortness of breath.    Cardiovascular: Negative for chest pain and palpitations.   Gastrointestinal: Negative for abdominal pain, blood in stool and constipation.   Genitourinary: Negative for difficulty urinating, dysuria, flank pain and hematuria.       Objective:       Physical Exam   Constitutional: He appears well-developed and well-nourished. No distress.   Abdominal: Soft. He exhibits no distension. There is no tenderness.       Bone scan - negative.    Axumin scan revealed .  No evidence of local recurrence.    2.  Small pelvic lymph nodes, one of which demonstrates mild uptake that may represent early jutsa metastases.  Attention on follow-up.  Assessment:         Prostate cancer   Plan:       Discussed the results of his metastatic work up.  Discussed the options of continues surveillance, hormonal therapy or salvage irradiation with 6 months of hormonal therapy.  Discussed the pros and cons of each treatment approach.  Explained the procedures, risks and benefits of therapy.   Discussed the rational for therapy.  The patient would like to proceed with salvage irradiation and 6 months of hormonal therapy.  Will start bicalutamide with Lupron.

## 2019-08-01 ENCOUNTER — TELEPHONE (OUTPATIENT)
Dept: UROLOGY | Facility: HOSPITAL | Age: 64
End: 2019-08-01

## 2019-08-01 NOTE — RESEARCH
IPP Study  IRB # 2017.001  Subject # [id number]  08/01/2019      Subject was contacted by phone on 08/01/2019 to discuss 24 month follow-up.    Subject agrees to continue participation in the IPP study.    The following questionnaires were completed with the subject:  1. MU  2. PGII  3. Non-validated questionnaire    Subject is using the IPP device.   Is having much better results with his device after seeing Dr. Alonso for it.     Seeing Dr. Sampson for Biochemical recurrence, starting ADT and radiation therapy.       Subjects has no AEs or SAEs.       Emil Maravilla MD

## 2019-08-05 ENCOUNTER — PATIENT OUTREACH (OUTPATIENT)
Dept: OTHER | Facility: OTHER | Age: 64
End: 2019-08-05

## 2019-08-05 NOTE — PROGRESS NOTES
Last 5 Patient Entered Readings                                      Current 30 Day Average: 143/83     Recent Readings 7/28/2019 7/28/2019 7/28/2019 7/26/2019 7/25/2019    SBP (mmHg) 152 162 152 139 133    DBP (mmHg) 86 88 87 79 81    Pulse 72 75 75 79 84          Digital Medicine: Health  Follow Up    Unable to leave .  Sent [employer] message asking patient to contact me.   Current BP average 143/83 mmHg is not at goal, 130/80 mmHg..

## 2019-08-06 ENCOUNTER — OFFICE VISIT (OUTPATIENT)
Dept: RADIATION ONCOLOGY | Facility: CLINIC | Age: 64
End: 2019-08-06
Payer: MEDICARE

## 2019-08-06 VITALS
HEIGHT: 67 IN | DIASTOLIC BLOOD PRESSURE: 79 MMHG | SYSTOLIC BLOOD PRESSURE: 164 MMHG | BODY MASS INDEX: 26.55 KG/M2 | HEART RATE: 77 BPM | RESPIRATION RATE: 16 BRPM | WEIGHT: 169.13 LBS

## 2019-08-06 DIAGNOSIS — C61 MALIGNANT NEOPLASM OF PROSTATE: Primary | ICD-10-CM

## 2019-08-06 PROCEDURE — 1101F PT FALLS ASSESS-DOCD LE1/YR: CPT | Mod: CPTII,S$GLB,, | Performed by: RADIOLOGY

## 2019-08-06 PROCEDURE — 3008F PR BODY MASS INDEX (BMI) DOCUMENTED: ICD-10-PCS | Mod: CPTII,S$GLB,, | Performed by: RADIOLOGY

## 2019-08-06 PROCEDURE — 3078F DIAST BP <80 MM HG: CPT | Mod: CPTII,S$GLB,, | Performed by: RADIOLOGY

## 2019-08-06 PROCEDURE — 99212 PR OFFICE/OUTPT VISIT, EST, LEVL II, 10-19 MIN: ICD-10-PCS | Mod: S$GLB,,, | Performed by: RADIOLOGY

## 2019-08-06 PROCEDURE — 3008F BODY MASS INDEX DOCD: CPT | Mod: CPTII,S$GLB,, | Performed by: RADIOLOGY

## 2019-08-06 PROCEDURE — 1101F PR PT FALLS ASSESS DOC 0-1 FALLS W/OUT INJ PAST YR: ICD-10-PCS | Mod: CPTII,S$GLB,, | Performed by: RADIOLOGY

## 2019-08-06 PROCEDURE — 3077F PR MOST RECENT SYSTOLIC BLOOD PRESSURE >= 140 MM HG: ICD-10-PCS | Mod: CPTII,S$GLB,, | Performed by: RADIOLOGY

## 2019-08-06 PROCEDURE — 99999 PR PBB SHADOW E&M-EST. PATIENT-LVL III: ICD-10-PCS | Mod: PBBFAC,,, | Performed by: RADIOLOGY

## 2019-08-06 PROCEDURE — 3078F PR MOST RECENT DIASTOLIC BLOOD PRESSURE < 80 MM HG: ICD-10-PCS | Mod: CPTII,S$GLB,, | Performed by: RADIOLOGY

## 2019-08-06 PROCEDURE — 96402 PR CHEMOTHER HORMON ANTINEOPL SUB-Q/IM: ICD-10-PCS | Mod: S$GLB,,, | Performed by: RADIOLOGY

## 2019-08-06 PROCEDURE — 96402 CHEMO HORMON ANTINEOPL SQ/IM: CPT | Mod: S$GLB,,, | Performed by: RADIOLOGY

## 2019-08-06 PROCEDURE — 99212 OFFICE O/P EST SF 10 MIN: CPT | Mod: S$GLB,,, | Performed by: RADIOLOGY

## 2019-08-06 PROCEDURE — 3077F SYST BP >= 140 MM HG: CPT | Mod: CPTII,S$GLB,, | Performed by: RADIOLOGY

## 2019-08-06 PROCEDURE — 99999 PR PBB SHADOW E&M-EST. PATIENT-LVL III: CPT | Mod: PBBFAC,,, | Performed by: RADIOLOGY

## 2019-08-14 NOTE — PROGRESS NOTES
Last 5 Patient Entered Readings                                      Current 30 Day Average: 141/83     Recent Readings 7/28/2019 7/28/2019 7/28/2019 7/26/2019 7/25/2019    SBP (mmHg) 152 162 152 139 133    DBP (mmHg) 86 88 87 79 81    Pulse 72 75 75 79 84        Digital Medicine: Health  Follow Up    Patient states he doesn't know why his readings are not transmitting. He requested assistance from tech support.      Lifestyle Modifications:    1.Dietary Modifications (Sodium intake <2,000mg/day, food labels, dining out): Patient says he is currently taking chemo and doesn't have as much of an appetite.  He states when he does eat, he tries to watch his salt.  He continues to drink at least a half a gallon of water per day.     2.Physical Activity: Patient says that he continues to get his exercise at work.    3.Medication Therapy: Patient has been compliant with the medication regimen.    4.Patient has the following medication side effects/concerns: none reported.  (Frequency/Alleviating factors/Precipitating factors, etc.)     Follow up with Mr. Eyad Garcia completed. No further questions or concerns. Will continue to follow up to achieve health goals.

## 2019-08-14 NOTE — PROGRESS NOTES
Last 5 Patient Entered Readings                                      Current 30 Day Average: 141/83     Recent Readings 7/28/2019 7/28/2019 7/28/2019 7/26/2019 7/25/2019    SBP (mmHg) 152 162 152 139 133    DBP (mmHg) 86 88 87 79 81    Pulse 72 75 75 79 84          08/14/2019: Pt spoke with HC yesterday and readings are not transmitting. Needs IT assistance. Will await data.

## 2019-08-21 DIAGNOSIS — I10 ESSENTIAL HYPERTENSION: ICD-10-CM

## 2019-08-22 RX ORDER — LOSARTAN POTASSIUM 100 MG/1
100 TABLET ORAL DAILY
Qty: 90 TABLET | Refills: 1 | Status: SHIPPED | OUTPATIENT
Start: 2019-08-22 | End: 2020-02-23

## 2019-09-03 ENCOUNTER — APPOINTMENT (OUTPATIENT)
Dept: RADIATION THERAPY | Facility: OTHER | Age: 64
End: 2019-09-03
Attending: RADIOLOGY
Payer: MEDICARE

## 2019-09-11 ENCOUNTER — PATIENT OUTREACH (OUTPATIENT)
Dept: OTHER | Facility: OTHER | Age: 64
End: 2019-09-11

## 2019-09-11 NOTE — PROGRESS NOTES
Digital Medicine: Health  Follow-Up    Patient states that he is feeling good.  He is not sure why is BP has been a little elevated.               Diet:   Patient reports eating or drinking the following: Dom has the following dietary restrictions: low sodium dietHe does not skip meals regularly.  He has no standard fasting periods.      Patient did not have times when they could not afford food or ran out.      Patient states that his appetite is better and he is eating regular meals.    Physical Activity:   When asked if exercising, patient responded: no    Patient states that he is still very physically active at work.    Medication Adherence:   He misses doses: never    Patient is not selectively taking diuretics.    He does not wonder if medications are working.  He knows purpose of medications.      Tobacco and Alcohol:       Patient is not eligible for referral to smoking cessation.        [facility]    INTERVENTION(S)  reviewed monitoring technique and encouragement/support    PLAN  patient verbalizes understanding          Topic    Hemoglobin A1C        Last 5 Patient Entered Readings                                      Current 30 Day Average: 144/77     Recent Readings 9/7/2019 8/28/2019 8/21/2019 8/11/2019 8/5/2019    SBP (mmHg) 146 136 149 143 126    DBP (mmHg) 75 77 78 83 81    Pulse 77 85 81 79 81

## 2019-09-17 ENCOUNTER — HOSPITAL ENCOUNTER (OUTPATIENT)
Dept: RADIATION THERAPY | Facility: HOSPITAL | Age: 64
Discharge: HOME | End: 2019-09-17
Attending: RADIOLOGY
Payer: MEDICARE

## 2019-09-17 PROCEDURE — 77014 HC CT GUIDANCE RADIATION THERAPY FLDS PLACEMENT: CPT | Mod: TC | Performed by: RADIOLOGY

## 2019-09-17 PROCEDURE — 77334 RADIATION TREATMENT AID(S): CPT | Mod: 26,,, | Performed by: RADIOLOGY

## 2019-09-17 PROCEDURE — 77290 PR  SET RADN THERAPY FIELD COMPLEX: ICD-10-PCS | Mod: 26,,, | Performed by: RADIOLOGY

## 2019-09-17 PROCEDURE — 77263 THER RADIOLOGY TX PLNG CPLX: CPT | Mod: ,,, | Performed by: RADIOLOGY

## 2019-09-17 PROCEDURE — 77263 PR  RADIATION THERAPY PLAN COMPLEX: ICD-10-PCS | Mod: ,,, | Performed by: RADIOLOGY

## 2019-09-17 PROCEDURE — 77290 THER RAD SIMULAJ FIELD CPLX: CPT | Mod: 26,,, | Performed by: RADIOLOGY

## 2019-09-17 PROCEDURE — 77290 THER RAD SIMULAJ FIELD CPLX: CPT | Mod: TC | Performed by: RADIOLOGY

## 2019-09-17 PROCEDURE — 77334 RADIATION TREATMENT AID(S): CPT | Mod: TC | Performed by: RADIOLOGY

## 2019-09-17 PROCEDURE — 77334 PR  RADN TREATMENT AID(S) COMPLX: ICD-10-PCS | Mod: 26,,, | Performed by: RADIOLOGY

## 2019-09-19 ENCOUNTER — PATIENT MESSAGE (OUTPATIENT)
Dept: INTERNAL MEDICINE | Facility: CLINIC | Age: 64
End: 2019-09-19

## 2019-09-20 NOTE — TELEPHONE ENCOUNTER
"Eyad, get the flu shot and skip the others until after you finish your chemotherapy.  The [employer] says:  "It's generally recommended that vaccines not be given during chemo or radiation treatments - the only exception to this is the flu shot. This is mainly because vaccines need an immune system response to work, and you may not get an adequate response during cancer treatment."  If you will finish chemo by end of Oct, the hold off on the flu shot till then. If the chemo is longer than that, then get it now.  heather  "

## 2019-09-23 PROCEDURE — 77301 RADIOTHERAPY DOSE PLAN IMRT: CPT | Mod: 26,,, | Performed by: RADIOLOGY

## 2019-09-23 PROCEDURE — 77301 PR  INTEN MOD RADIOTHER PLAN W/DOSE VOL HIST: ICD-10-PCS | Mod: 26,,, | Performed by: RADIOLOGY

## 2019-09-23 PROCEDURE — 77301 RADIOTHERAPY DOSE PLAN IMRT: CPT | Mod: TC | Performed by: RADIOLOGY

## 2019-09-24 PROCEDURE — 77338 PR  MLC IMRT DESIGN & CONSTRUCTION PER IMRT PLAN: ICD-10-PCS | Mod: 26,,, | Performed by: RADIOLOGY

## 2019-09-24 PROCEDURE — 77338 DESIGN MLC DEVICE FOR IMRT: CPT | Mod: 26,,, | Performed by: RADIOLOGY

## 2019-09-24 PROCEDURE — 77300 PR RADIATION THERAPY,DOSIMETRY PLAN: ICD-10-PCS | Mod: 26,,, | Performed by: RADIOLOGY

## 2019-09-24 PROCEDURE — 77014 HC CT GUIDANCE RADIATION THERAPY FLDS PLACEMENT: CPT | Mod: TC | Performed by: RADIOLOGY

## 2019-09-24 PROCEDURE — 77300 RADIATION THERAPY DOSE PLAN: CPT | Mod: TC | Performed by: RADIOLOGY

## 2019-09-24 PROCEDURE — 77300 RADIATION THERAPY DOSE PLAN: CPT | Mod: 26,,, | Performed by: RADIOLOGY

## 2019-09-24 PROCEDURE — 77338 DESIGN MLC DEVICE FOR IMRT: CPT | Mod: TC | Performed by: RADIOLOGY

## 2019-09-24 NOTE — PROGRESS NOTES
HPI     DLS: 5/23/19    Pt here for HVF review;    Meds: Latanoprost QDAY OU     1. POAG   2. VF Defect OS   3. Chorioretinal Scar OS   4. Hx RD OS   5. NS OU   6. Type 2 DM no    7. Pupil Sphincter Rupture OS   8. Presbyopia       Last edited by Talya Hobson on 9/26/2019  3:10 PM. (History)            Assessment /Plan     For exam results, see Encounter Report.    Primary open angle glaucoma of both eyes, mild stage    Arcuate visual field defect of left eye    Chorioretinal scar, left    Old subtotal retinal detachment, left    Pupillary sphincter rupture, left    Type 2 diabetes mellitus without ophthalmic manifestations    Bilateral presbyopia      Pt initially dx with glaucoma at [facility] - stoped his gtts on his own   Presented to [facility] - susana Valdez 8/23/2006 - off gtts with a baseline / Tmax of 25/27  Referred by Courteny for consideration of SLT's   Pt with POAG and poorly controlled IOP's - does not use drops regularly       1. Primary open angle glaucoma, both eyes, mild stage        Glaucoma (type and duration)    POAG with elevated IOP ou - mild stage    First HVF   2007 - full od // SAD os 2/2 to CRS   First photos   2011   Treatment / Drops started   2006           Family history    ++ mother and 2 brothers         Glaucoma meds    Latanoprost  (( use to use cosopt as well)         H/O adverse rxn to glaucoma drops    None (has tired alphagan and timolol in past -non compliant)         LASERS    SLT os - 3/31/2016 // SLT od - 4/14/2016         GLAUCOMA SURGERIES    none        OTHER EYE SURGERIES    H/O RD repair os - S/P laser repair         CDR    0.75 // 0.8        Tbase    25/27          Tmax    25/27            Ttarget    18/18             HVF    10 test 2006 to  2019 - full  od // SAD - 2/2 CRS from laser for RD repair os        Gonio    +3 ou          CCT    486/502 - thin ou         OCT    6 test 2011 to 2019 - RNFL - CHUNG t od // fluctuate - dec. S/I, CHUNG MARION (has a CRS)  os        HRT     3 test 2017 to 2019 -MR -  DEC. Sn/n/in od // DEC. S/n/i, BORD t os /// CDR 0.73 od // 0.86 os        Disc photos    2011, 2015 , 2017     - Ttoday    16/16  - Test done today  - HVF / DFE / OCT      2. 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 Chorioretinal scar, left   -with 2nd VF defect      4. Old subtotal retinal detachment, left   S/P repair - retina flat - stable   -large CRS      5. Type 2 diabetes mellitus without ophthalmic manifestations      6. Senile nuclear sclerosis  -mild - monitor      7. pupil sphincter rupture os  -suggest old trauma  -does NOT appear to have a angle recession    7. Presbyopia       PLAN    POAG with OHT OS > OD - mild od // moderate os   The ON and VF's are still good ou   The VF loss os is 2/2 old CRS- ?  post laser for a RD repair   Good initial response to SLT ou 25/25 --> 16/17     LATANOPROST OU - (PAYING > $100 FOR EACH BOTTLE of lumigan )     F/U 4 months IOP,  Gonio      -   Consider repeat SLT - if IOP up or if VF progression os  -consider re-starting / adding cosopt prn (had some compliance issues - but tolerated ok)       Bethany Lechuga MD

## 2019-09-25 ENCOUNTER — PATIENT OUTREACH (OUTPATIENT)
Dept: ADMINISTRATIVE | Facility: OTHER | Age: 64
End: 2019-09-25

## 2019-09-25 PROCEDURE — 77385 HC IMRT, SIMPLE: CPT | Performed by: RADIOLOGY

## 2019-09-25 PROCEDURE — 77014 HC CT GUIDANCE RADIATION THERAPY FLDS PLACEMENT: CPT | Mod: TC | Performed by: RADIOLOGY

## 2019-09-26 ENCOUNTER — CLINICAL SUPPORT (OUTPATIENT)
Dept: OPHTHALMOLOGY | Facility: CLINIC | Age: 64
End: 2019-09-26
Payer: MEDICARE

## 2019-09-26 ENCOUNTER — OFFICE VISIT (OUTPATIENT)
Dept: OPHTHALMOLOGY | Facility: CLINIC | Age: 64
End: 2019-09-26
Payer: MEDICARE

## 2019-09-26 ENCOUNTER — DOCUMENTATION ONLY (OUTPATIENT)
Dept: RADIATION ONCOLOGY | Facility: CLINIC | Age: 64
End: 2019-09-26

## 2019-09-26 DIAGNOSIS — H53.432 ARCUATE VISUAL FIELD DEFECT OF LEFT EYE: ICD-10-CM

## 2019-09-26 DIAGNOSIS — H33.052 OLD SUBTOTAL RETINAL DETACHMENT, LEFT: ICD-10-CM

## 2019-09-26 DIAGNOSIS — H40.1131 PRIMARY OPEN ANGLE GLAUCOMA OF BOTH EYES, MILD STAGE: ICD-10-CM

## 2019-09-26 DIAGNOSIS — H21.562 PUPILLARY SPHINCTER RUPTURE, LEFT: ICD-10-CM

## 2019-09-26 DIAGNOSIS — H40.1131 PRIMARY OPEN ANGLE GLAUCOMA OF BOTH EYES, MILD STAGE: Primary | ICD-10-CM

## 2019-09-26 DIAGNOSIS — H31.002 CHORIORETINAL SCAR, LEFT: ICD-10-CM

## 2019-09-26 DIAGNOSIS — E11.9 TYPE 2 DIABETES MELLITUS WITHOUT OPHTHALMIC MANIFESTATIONS: ICD-10-CM

## 2019-09-26 DIAGNOSIS — H52.4 BILATERAL PRESBYOPIA: ICD-10-CM

## 2019-09-26 PROCEDURE — 99999 PR PBB SHADOW E&M-EST. PATIENT-LVL II: ICD-10-PCS | Mod: PBBFAC,,, | Performed by: OPHTHALMOLOGY

## 2019-09-26 PROCEDURE — 92133 POSTERIOR SEGMENT OCT OPTIC NERVE(OCULAR COHERENCE TOMOGRAPHY) - OU - BOTH EYES: ICD-10-PCS | Mod: S$GLB,,, | Performed by: OPHTHALMOLOGY

## 2019-09-26 PROCEDURE — 92133 CPTRZD OPH DX IMG PST SGM ON: CPT | Mod: S$GLB,,, | Performed by: OPHTHALMOLOGY

## 2019-09-26 PROCEDURE — 92014 PR EYE EXAM, EST PATIENT,COMPREHESV: ICD-10-PCS | Mod: S$GLB,,, | Performed by: OPHTHALMOLOGY

## 2019-09-26 PROCEDURE — 77014 HC CT GUIDANCE RADIATION THERAPY FLDS PLACEMENT: CPT | Mod: TC | Performed by: RADIOLOGY

## 2019-09-26 PROCEDURE — 92083 EXTENDED VISUAL FIELD XM: CPT | Mod: S$GLB,,, | Performed by: OPHTHALMOLOGY

## 2019-09-26 PROCEDURE — 92014 COMPRE OPH EXAM EST PT 1/>: CPT | Mod: S$GLB,,, | Performed by: OPHTHALMOLOGY

## 2019-09-26 PROCEDURE — 92083 HUMPHREY VISUAL FIELD - OU - BOTH EYES: ICD-10-PCS | Mod: S$GLB,,, | Performed by: OPHTHALMOLOGY

## 2019-09-26 PROCEDURE — 99999 PR PBB SHADOW E&M-EST. PATIENT-LVL II: CPT | Mod: PBBFAC,,, | Performed by: OPHTHALMOLOGY

## 2019-09-26 PROCEDURE — 77385 HC IMRT, SIMPLE: CPT | Performed by: RADIOLOGY

## 2019-09-27 PROCEDURE — 77014 HC CT GUIDANCE RADIATION THERAPY FLDS PLACEMENT: CPT | Mod: TC | Performed by: RADIOLOGY

## 2019-09-27 PROCEDURE — 77385 HC IMRT, SIMPLE: CPT | Performed by: RADIOLOGY

## 2019-09-30 PROCEDURE — 77014 HC CT GUIDANCE RADIATION THERAPY FLDS PLACEMENT: CPT | Mod: TC | Performed by: RADIOLOGY

## 2019-09-30 PROCEDURE — 77385 HC IMRT, SIMPLE: CPT | Performed by: RADIOLOGY

## 2019-10-01 ENCOUNTER — APPOINTMENT (OUTPATIENT)
Dept: RADIATION THERAPY | Facility: OTHER | Age: 64
End: 2019-10-01
Attending: RADIOLOGY
Payer: MEDICARE

## 2019-10-01 ENCOUNTER — HOSPITAL ENCOUNTER (OUTPATIENT)
Dept: RADIATION THERAPY | Facility: HOSPITAL | Age: 64
Discharge: HOME | End: 2019-10-01
Attending: RADIOLOGY
Payer: MEDICARE

## 2019-10-01 PROCEDURE — 77014 HC CT GUIDANCE RADIATION THERAPY FLDS PLACEMENT: CPT | Mod: TC | Performed by: RADIOLOGY

## 2019-10-01 PROCEDURE — 77385 HC IMRT, SIMPLE: CPT | Performed by: RADIOLOGY

## 2019-10-01 PROCEDURE — 77336 RADIATION PHYSICS CONSULT: CPT | Performed by: RADIOLOGY

## 2019-10-02 PROCEDURE — 77417 THER RADIOLOGY PORT IMAGE(S): CPT | Performed by: RADIOLOGY

## 2019-10-02 PROCEDURE — 77385 HC IMRT, SIMPLE: CPT | Performed by: RADIOLOGY

## 2019-10-02 PROCEDURE — G6002 PR STEREOSCOPIC XRAY GUIDE FOR RADIATION TX DELIV: ICD-10-PCS | Mod: 26,,, | Performed by: RADIOLOGY

## 2019-10-02 PROCEDURE — G6002 STEREOSCOPIC X-RAY GUIDANCE: HCPCS | Mod: 26,,, | Performed by: RADIOLOGY

## 2019-10-03 ENCOUNTER — DOCUMENTATION ONLY (OUTPATIENT)
Dept: RADIATION ONCOLOGY | Facility: CLINIC | Age: 64
End: 2019-10-03

## 2019-10-03 PROCEDURE — 77014 HC CT GUIDANCE RADIATION THERAPY FLDS PLACEMENT: CPT | Mod: TC | Performed by: RADIOLOGY

## 2019-10-03 PROCEDURE — 77385 HC IMRT, SIMPLE: CPT | Performed by: RADIOLOGY

## 2019-10-04 PROCEDURE — G6002 STEREOSCOPIC X-RAY GUIDANCE: HCPCS | Mod: 26,,, | Performed by: RADIOLOGY

## 2019-10-04 PROCEDURE — 77385 HC IMRT, SIMPLE: CPT | Performed by: RADIOLOGY

## 2019-10-04 PROCEDURE — G6002 PR STEREOSCOPIC XRAY GUIDE FOR RADIATION TX DELIV: ICD-10-PCS | Mod: 26,,, | Performed by: RADIOLOGY

## 2019-10-07 PROCEDURE — 77014 HC CT GUIDANCE RADIATION THERAPY FLDS PLACEMENT: CPT | Mod: TC | Performed by: RADIOLOGY

## 2019-10-07 PROCEDURE — 77385 HC IMRT, SIMPLE: CPT | Performed by: RADIOLOGY

## 2019-10-08 ENCOUNTER — PATIENT OUTREACH (OUTPATIENT)
Dept: OTHER | Facility: OTHER | Age: 64
End: 2019-10-08

## 2019-10-08 PROCEDURE — G6002 PR STEREOSCOPIC XRAY GUIDE FOR RADIATION TX DELIV: ICD-10-PCS | Mod: 26,,, | Performed by: RADIOLOGY

## 2019-10-08 PROCEDURE — 77385 HC IMRT, SIMPLE: CPT | Performed by: RADIOLOGY

## 2019-10-08 PROCEDURE — 77336 RADIATION PHYSICS CONSULT: CPT | Performed by: RADIOLOGY

## 2019-10-08 PROCEDURE — G6002 STEREOSCOPIC X-RAY GUIDANCE: HCPCS | Mod: 26,,, | Performed by: RADIOLOGY

## 2019-10-08 NOTE — PROGRESS NOTES
Digital Medicine: Health  Follow-Up    Patient says that he is doing okay.  He says they have been taking his blood pressure and testing his blood when he goes for radiation treatments and that is why he has not been taking frequent readings at home.      The history is provided by the patient.     Follow Up  Follow-up reason(s): reading review      Readings are trending down           Diet:   He has the following dietary restrictions: low sodium diet    Physical Activity:   When asked if exercising, patient responded: no    Medication Adherence:   He misses doses: never        SDOH    INTERVENTION(S)  encouragement/support    PLAN  patient verbalizes understanding          Topic    Hemoglobin A1C        Last 5 Patient Entered Readings                                      Current 30 Day Average: 135/81     Recent Readings 9/29/2019 9/21/2019 9/11/2019 9/7/2019 8/28/2019    SBP (mmHg) 120 150 136 146 136    DBP (mmHg) 82 81 80 75 77    Pulse 90 80 81 77 85

## 2019-10-09 ENCOUNTER — DOCUMENTATION ONLY (OUTPATIENT)
Dept: RADIATION ONCOLOGY | Facility: CLINIC | Age: 64
End: 2019-10-09

## 2019-10-09 PROCEDURE — 77385 HC IMRT, SIMPLE: CPT | Performed by: RADIOLOGY

## 2019-10-09 PROCEDURE — 77014 HC CT GUIDANCE RADIATION THERAPY FLDS PLACEMENT: CPT | Mod: TC | Performed by: RADIOLOGY

## 2019-10-10 ENCOUNTER — IMMUNIZATION (OUTPATIENT)
Dept: PHARMACY | Facility: CLINIC | Age: 64
End: 2019-10-10
Payer: MEDICARE

## 2019-10-10 PROCEDURE — G6002 PR STEREOSCOPIC XRAY GUIDE FOR RADIATION TX DELIV: ICD-10-PCS | Mod: 26,,, | Performed by: RADIOLOGY

## 2019-10-10 PROCEDURE — G6002 STEREOSCOPIC X-RAY GUIDANCE: HCPCS | Mod: 26,,, | Performed by: RADIOLOGY

## 2019-10-10 PROCEDURE — 77385 HC IMRT, SIMPLE: CPT | Performed by: RADIOLOGY

## 2019-10-11 PROCEDURE — 77385 HC IMRT, SIMPLE: CPT | Performed by: RADIOLOGY

## 2019-10-11 PROCEDURE — 77014 HC CT GUIDANCE RADIATION THERAPY FLDS PLACEMENT: CPT | Mod: TC | Performed by: RADIOLOGY

## 2019-10-14 ENCOUNTER — PATIENT OUTREACH (OUTPATIENT)
Dept: OTHER | Facility: OTHER | Age: 64
End: 2019-10-14

## 2019-10-14 PROCEDURE — 77417 THER RADIOLOGY PORT IMAGE(S): CPT | Performed by: RADIOLOGY

## 2019-10-14 PROCEDURE — 77385 HC IMRT, SIMPLE: CPT | Performed by: RADIOLOGY

## 2019-10-14 NOTE — PROGRESS NOTES
Digital Medicine: Clinician Follow-Up    The history is provided by the patient. No  was used.       Assessment/Plan    Intervention/Plan        Topic    Hemoglobin A1C        Last 5 Patient Entered Readings                                      Current 30 Day Average: 138/80     Recent Readings 10/12/2019 10/11/2019 9/29/2019 9/21/2019 9/11/2019    SBP (mmHg) 146 134 120 150 136    DBP (mmHg) 80 78 82 81 80    Pulse 81 82 90 80 81             Hypertension Medications             amLODIPine (NORVASC) 5 MG tablet Take 1 tablet (5 mg total) by mouth once daily.    losartan (COZAAR) 100 MG tablet TAKE 1 TABLET (100 MG TOTAL) BY MOUTH ONCE DAILY.

## 2019-10-15 PROCEDURE — 77014 HC CT GUIDANCE RADIATION THERAPY FLDS PLACEMENT: CPT | Mod: TC | Performed by: RADIOLOGY

## 2019-10-15 PROCEDURE — 77336 RADIATION PHYSICS CONSULT: CPT | Performed by: RADIOLOGY

## 2019-10-15 PROCEDURE — 77385 HC IMRT, SIMPLE: CPT | Performed by: RADIOLOGY

## 2019-10-16 ENCOUNTER — DOCUMENTATION ONLY (OUTPATIENT)
Dept: RADIATION ONCOLOGY | Facility: CLINIC | Age: 64
End: 2019-10-16

## 2019-10-16 PROCEDURE — G6002 PR STEREOSCOPIC XRAY GUIDE FOR RADIATION TX DELIV: ICD-10-PCS | Mod: 26,,, | Performed by: RADIOLOGY

## 2019-10-16 PROCEDURE — G6002 STEREOSCOPIC X-RAY GUIDANCE: HCPCS | Mod: 26,,, | Performed by: RADIOLOGY

## 2019-10-16 PROCEDURE — 77385 HC IMRT, SIMPLE: CPT | Performed by: RADIOLOGY

## 2019-10-17 PROCEDURE — 77385 HC IMRT, SIMPLE: CPT | Performed by: RADIOLOGY

## 2019-10-17 PROCEDURE — 77014 HC CT GUIDANCE RADIATION THERAPY FLDS PLACEMENT: CPT | Mod: TC | Performed by: RADIOLOGY

## 2019-10-18 PROCEDURE — 77385 HC IMRT, SIMPLE: CPT | Performed by: RADIOLOGY

## 2019-10-18 PROCEDURE — G6002 PR STEREOSCOPIC XRAY GUIDE FOR RADIATION TX DELIV: ICD-10-PCS | Mod: 26,,, | Performed by: RADIOLOGY

## 2019-10-18 PROCEDURE — G6002 STEREOSCOPIC X-RAY GUIDANCE: HCPCS | Mod: 26,,, | Performed by: RADIOLOGY

## 2019-10-21 PROCEDURE — 77014 HC CT GUIDANCE RADIATION THERAPY FLDS PLACEMENT: CPT | Mod: TC | Performed by: RADIOLOGY

## 2019-10-21 PROCEDURE — 77385 HC IMRT, SIMPLE: CPT | Performed by: RADIOLOGY

## 2019-10-22 PROCEDURE — G6002 PR STEREOSCOPIC XRAY GUIDE FOR RADIATION TX DELIV: ICD-10-PCS | Mod: 26,,, | Performed by: RADIOLOGY

## 2019-10-22 PROCEDURE — 77385 HC IMRT, SIMPLE: CPT | Performed by: RADIOLOGY

## 2019-10-22 PROCEDURE — 77336 RADIATION PHYSICS CONSULT: CPT | Performed by: RADIOLOGY

## 2019-10-22 PROCEDURE — G6002 STEREOSCOPIC X-RAY GUIDANCE: HCPCS | Mod: 26,,, | Performed by: RADIOLOGY

## 2019-10-23 ENCOUNTER — DOCUMENTATION ONLY (OUTPATIENT)
Dept: RADIATION ONCOLOGY | Facility: CLINIC | Age: 64
End: 2019-10-23

## 2019-10-23 PROCEDURE — 77014 HC CT GUIDANCE RADIATION THERAPY FLDS PLACEMENT: CPT | Mod: TC | Performed by: RADIOLOGY

## 2019-10-23 PROCEDURE — 77385 HC IMRT, SIMPLE: CPT | Performed by: RADIOLOGY

## 2019-10-24 PROCEDURE — 77385 HC IMRT, SIMPLE: CPT | Performed by: RADIOLOGY

## 2019-10-24 PROCEDURE — 77417 THER RADIOLOGY PORT IMAGE(S): CPT | Performed by: RADIOLOGY

## 2019-10-24 PROCEDURE — G6002 PR STEREOSCOPIC XRAY GUIDE FOR RADIATION TX DELIV: ICD-10-PCS | Mod: 26,,, | Performed by: RADIOLOGY

## 2019-10-24 PROCEDURE — G6002 STEREOSCOPIC X-RAY GUIDANCE: HCPCS | Mod: 26,,, | Performed by: RADIOLOGY

## 2019-10-25 PROCEDURE — 77385 HC IMRT, SIMPLE: CPT | Performed by: RADIOLOGY

## 2019-10-25 PROCEDURE — 77014 HC CT GUIDANCE RADIATION THERAPY FLDS PLACEMENT: CPT | Mod: TC | Performed by: RADIOLOGY

## 2019-10-28 PROCEDURE — 77385 HC IMRT, SIMPLE: CPT | Performed by: RADIOLOGY

## 2019-10-29 PROCEDURE — G6002 PR STEREOSCOPIC XRAY GUIDE FOR RADIATION TX DELIV: ICD-10-PCS | Mod: 26,,, | Performed by: RADIOLOGY

## 2019-10-29 PROCEDURE — 77336 RADIATION PHYSICS CONSULT: CPT | Performed by: RADIOLOGY

## 2019-10-29 PROCEDURE — G6002 STEREOSCOPIC X-RAY GUIDANCE: HCPCS | Mod: 26,,, | Performed by: RADIOLOGY

## 2019-10-29 PROCEDURE — 77385 HC IMRT, SIMPLE: CPT | Performed by: RADIOLOGY

## 2019-10-30 ENCOUNTER — DOCUMENTATION ONLY (OUTPATIENT)
Dept: RADIATION ONCOLOGY | Facility: CLINIC | Age: 64
End: 2019-10-30

## 2019-10-30 PROCEDURE — 77338 DESIGN MLC DEVICE FOR IMRT: CPT | Mod: 26,,, | Performed by: RADIOLOGY

## 2019-10-30 PROCEDURE — 77300 PR RADIATION THERAPY,DOSIMETRY PLAN: ICD-10-PCS | Mod: 26,,, | Performed by: RADIOLOGY

## 2019-10-30 PROCEDURE — 77385 HC IMRT, SIMPLE: CPT | Performed by: RADIOLOGY

## 2019-10-30 PROCEDURE — 77300 RADIATION THERAPY DOSE PLAN: CPT | Mod: TC | Performed by: RADIOLOGY

## 2019-10-30 PROCEDURE — 77338 PR  MLC IMRT DESIGN & CONSTRUCTION PER IMRT PLAN: ICD-10-PCS | Mod: 26,,, | Performed by: RADIOLOGY

## 2019-10-30 PROCEDURE — 77338 DESIGN MLC DEVICE FOR IMRT: CPT | Mod: TC | Performed by: RADIOLOGY

## 2019-10-30 PROCEDURE — 77014 HC CT GUIDANCE RADIATION THERAPY FLDS PLACEMENT: CPT | Mod: TC | Performed by: RADIOLOGY

## 2019-10-30 PROCEDURE — 77300 RADIATION THERAPY DOSE PLAN: CPT | Mod: 26,,, | Performed by: RADIOLOGY

## 2019-10-31 PROCEDURE — 77417 THER RADIOLOGY PORT IMAGE(S): CPT | Performed by: RADIOLOGY

## 2019-10-31 PROCEDURE — G6002 STEREOSCOPIC X-RAY GUIDANCE: HCPCS | Mod: 26,,, | Performed by: RADIOLOGY

## 2019-10-31 PROCEDURE — 77385 HC IMRT, SIMPLE: CPT | Performed by: RADIOLOGY

## 2019-10-31 PROCEDURE — G6002 PR STEREOSCOPIC XRAY GUIDE FOR RADIATION TX DELIV: ICD-10-PCS | Mod: 26,,, | Performed by: RADIOLOGY

## 2019-11-01 ENCOUNTER — APPOINTMENT (OUTPATIENT)
Dept: RADIATION THERAPY | Facility: OTHER | Age: 64
End: 2019-11-01
Attending: RADIOLOGY
Payer: MEDICARE

## 2019-11-01 ENCOUNTER — HOSPITAL ENCOUNTER (OUTPATIENT)
Dept: RADIATION THERAPY | Facility: HOSPITAL | Age: 64
Discharge: HOME | End: 2019-11-01
Attending: RADIOLOGY
Payer: MEDICARE

## 2019-11-01 PROCEDURE — 77385 HC IMRT, SIMPLE: CPT | Performed by: RADIOLOGY

## 2019-11-01 PROCEDURE — G6002 STEREOSCOPIC X-RAY GUIDANCE: HCPCS | Mod: 26,,, | Performed by: RADIOLOGY

## 2019-11-01 PROCEDURE — G6002 PR STEREOSCOPIC XRAY GUIDE FOR RADIATION TX DELIV: ICD-10-PCS | Mod: 26,,, | Performed by: RADIOLOGY

## 2019-11-04 PROCEDURE — 77014 HC CT GUIDANCE RADIATION THERAPY FLDS PLACEMENT: CPT | Mod: TC | Performed by: RADIOLOGY

## 2019-11-04 PROCEDURE — 77385 HC IMRT, SIMPLE: CPT | Performed by: RADIOLOGY

## 2019-11-05 ENCOUNTER — PATIENT OUTREACH (OUTPATIENT)
Dept: OTHER | Facility: OTHER | Age: 64
End: 2019-11-05

## 2019-11-05 PROCEDURE — 77385 HC IMRT, SIMPLE: CPT | Performed by: RADIOLOGY

## 2019-11-05 PROCEDURE — 77336 RADIATION PHYSICS CONSULT: CPT | Performed by: RADIOLOGY

## 2019-11-05 PROCEDURE — G6002 STEREOSCOPIC X-RAY GUIDANCE: HCPCS | Mod: 26,,, | Performed by: RADIOLOGY

## 2019-11-05 PROCEDURE — G6002 PR STEREOSCOPIC XRAY GUIDE FOR RADIATION TX DELIV: ICD-10-PCS | Mod: 26,,, | Performed by: RADIOLOGY

## 2019-11-05 NOTE — PROGRESS NOTES
Digital Medicine: Health  Follow-Up    Patient's answers were very brief.  He says he does not know why his BP is trending higher.  He reports charging his blood pressure cuff about a week ago.  He states no change in diet or physical activity.  He stated that he is still receiving radiation treatments.  He states that he is feeling fine.      The history is provided by the patient. No  was used.     Follow Up  Follow-up reason(s): reading review      Readings are trending up       INTERVENTION(S)  recommended diet modifications, encouragement/support, denied further coaching and denied resources    PLAN  patient verbalizes understanding and continue monitoring          Topic    Hemoglobin A1C        Last 5 Patient Entered Readings                                      Current 30 Day Average: 143/82     Recent Readings 11/2/2019 10/24/2019 10/20/2019 10/12/2019 10/11/2019    SBP (mmHg) 155 133 146 146 134    DBP (mmHg) 90 80 80 80 78    Pulse 73 75 81 81 82                      Diet Screening   No change to diet.      I offered resources such as meal plan or recipes. Patient declined.    Intervention(s): increasing water intake    Assigning the following patient goals: maintain low sodium diet    Physical Activity Screening   No change to exercise routine.        Patient says he remain physically active at work.    Medication Adherence Screening   He misses doses: never        SDOH

## 2019-11-06 ENCOUNTER — DOCUMENTATION ONLY (OUTPATIENT)
Dept: RADIATION ONCOLOGY | Facility: CLINIC | Age: 64
End: 2019-11-06

## 2019-11-06 PROCEDURE — 77385 HC IMRT, SIMPLE: CPT | Performed by: RADIOLOGY

## 2019-11-06 PROCEDURE — 77014 HC CT GUIDANCE RADIATION THERAPY FLDS PLACEMENT: CPT | Mod: TC | Performed by: RADIOLOGY

## 2019-11-07 PROCEDURE — 77417 THER RADIOLOGY PORT IMAGE(S): CPT | Performed by: RADIOLOGY

## 2019-11-07 PROCEDURE — 77385 HC IMRT, SIMPLE: CPT | Performed by: RADIOLOGY

## 2019-11-07 PROCEDURE — G6002 PR STEREOSCOPIC XRAY GUIDE FOR RADIATION TX DELIV: ICD-10-PCS | Mod: 26,,, | Performed by: RADIOLOGY

## 2019-11-07 PROCEDURE — G6002 STEREOSCOPIC X-RAY GUIDANCE: HCPCS | Mod: 26,,, | Performed by: RADIOLOGY

## 2019-11-08 PROCEDURE — 77014 HC CT GUIDANCE RADIATION THERAPY FLDS PLACEMENT: CPT | Mod: TC | Performed by: RADIOLOGY

## 2019-11-08 PROCEDURE — 77385 HC IMRT, SIMPLE: CPT | Performed by: RADIOLOGY

## 2019-11-08 NOTE — PLAN OF CARE
Day 31 of outpatient radiation to the prostate bed intermittent episodes of diarrhea controlled with immodium

## 2019-11-11 PROCEDURE — G6002 PR STEREOSCOPIC XRAY GUIDE FOR RADIATION TX DELIV: ICD-10-PCS | Mod: 26,,, | Performed by: RADIOLOGY

## 2019-11-11 PROCEDURE — 77385 HC IMRT, SIMPLE: CPT | Performed by: RADIOLOGY

## 2019-11-11 PROCEDURE — G6002 STEREOSCOPIC X-RAY GUIDANCE: HCPCS | Mod: 26,,, | Performed by: RADIOLOGY

## 2019-11-12 PROCEDURE — 77336 RADIATION PHYSICS CONSULT: CPT | Performed by: RADIOLOGY

## 2019-11-12 PROCEDURE — 77385 HC IMRT, SIMPLE: CPT | Performed by: RADIOLOGY

## 2019-11-12 PROCEDURE — 77014 HC CT GUIDANCE RADIATION THERAPY FLDS PLACEMENT: CPT | Mod: TC | Performed by: RADIOLOGY

## 2019-11-13 ENCOUNTER — DOCUMENTATION ONLY (OUTPATIENT)
Dept: RADIATION ONCOLOGY | Facility: CLINIC | Age: 64
End: 2019-11-13

## 2019-11-13 PROCEDURE — 77385 HC IMRT, SIMPLE: CPT | Performed by: RADIOLOGY

## 2019-11-13 PROCEDURE — G6002 PR STEREOSCOPIC XRAY GUIDE FOR RADIATION TX DELIV: ICD-10-PCS | Mod: 26,,, | Performed by: RADIOLOGY

## 2019-11-13 PROCEDURE — G6002 STEREOSCOPIC X-RAY GUIDANCE: HCPCS | Mod: 26,,, | Performed by: RADIOLOGY

## 2019-11-14 PROCEDURE — 77385 HC IMRT, SIMPLE: CPT | Performed by: RADIOLOGY

## 2019-11-14 PROCEDURE — 77014 HC CT GUIDANCE RADIATION THERAPY FLDS PLACEMENT: CPT | Mod: TC | Performed by: RADIOLOGY

## 2019-11-15 ENCOUNTER — DOCUMENTATION ONLY (OUTPATIENT)
Dept: RADIATION ONCOLOGY | Facility: CLINIC | Age: 64
End: 2019-11-15

## 2019-11-15 PROCEDURE — 77385 HC IMRT, SIMPLE: CPT | Performed by: RADIOLOGY

## 2019-11-15 PROCEDURE — G6002 STEREOSCOPIC X-RAY GUIDANCE: HCPCS | Mod: 26,,, | Performed by: RADIOLOGY

## 2019-11-15 PROCEDURE — G6002 PR STEREOSCOPIC XRAY GUIDE FOR RADIATION TX DELIV: ICD-10-PCS | Mod: 26,,, | Performed by: RADIOLOGY

## 2019-11-20 PROCEDURE — 77336 RADIATION PHYSICS CONSULT: CPT | Performed by: RADIOLOGY

## 2019-11-21 NOTE — PLAN OF CARE
Last day of outpatient XRT to the prostate bed. Tolerated treatment well. Follow up per Dr. Chacon.

## 2019-11-24 DIAGNOSIS — C61 MALIGNANT NEOPLASM OF PROSTATE: Primary | ICD-10-CM

## 2019-12-02 DIAGNOSIS — E78.5 HYPERLIPIDEMIA, UNSPECIFIED HYPERLIPIDEMIA TYPE: ICD-10-CM

## 2019-12-03 ENCOUNTER — PATIENT OUTREACH (OUTPATIENT)
Dept: OTHER | Facility: OTHER | Age: 64
End: 2019-12-03

## 2019-12-03 RX ORDER — ATORVASTATIN CALCIUM 80 MG/1
80 TABLET, FILM COATED ORAL DAILY
Qty: 90 TABLET | Refills: 0 | Status: SHIPPED | OUTPATIENT
Start: 2019-12-03 | End: 2020-06-03

## 2019-12-03 NOTE — PROGRESS NOTES
Refill Authorization Note     is requesting a refill authorization.    Brief assessment and rationale for refill: APPROVE: needs labs  Name and strength of medication: atorvastatin (LIPITOR) 80 MG tablet  Medication-related problems identified: Requires labs    Medication Therapy Plan: NTBO(LIPID)    Medication reconciliation completed: No              How patient will take medication: t1t po qd           Comments:   Requested Prescriptions   Pending Prescriptions Disp Refills    atorvastatin (LIPITOR) 80 MG tablet 90 tablet 0     Sig: Take 1 tablet (80 mg total) by mouth once daily.       Cardiovascular:  Antilipid - Statins Failed - 12/3/2019  1:39 PM        Failed - Lipid Panel completed in last 360 days     Lab Results   Component Value Date    CHOL 165 11/16/2018    HDL 40 11/16/2018    LDLCALC 104.2 11/16/2018    TRIG 104 11/16/2018             Passed - Patient is at least 18 years old        Passed - Office visit in past 12 months or future 90 days     Recent Outpatient Visits            2 months ago Primary open angle glaucoma of both eyes, mild stage    [facility] Bethany Lechuga MD    3 months ago Malignant neoplasm of prostate    [facility] Henrry Sampson Jr., MD    4 months ago Malignant neoplasm of prostate    [facility] Henrry Sampson Jr., MD    5 months ago Erectile dysfunction after radical prostatectomy    [facility] - Urology Davonte Alonso MD    6 months ago Primary open angle glaucoma of both eyes, mild stage    [facility] Ophthalmology Bethany Lechuga MD          Future Appointments              In 1 month MD [facility]    In 2 months LAB, APPOINTMENT [place] [facility]    In 2 months Henrry Sampson Jr., MD [facility] [place] [facility]                Passed - ALT is 94 or below and within 360 days     ALT    Date Value Ref Range Status   04/01/2019 22 10 - 44 U/L Final   11/16/2018 33 10 - 44 U/L Final   12/08/2017 20 10 - 44 U/L Final              Passed - AST is 54 or below and within 360 days     AST   Date Value Ref Range Status   04/01/2019 21 10 - 40 U/L Final   11/16/2018 26 10 - 40 U/L Final   12/08/2017 23 10 - 40 U/L Final                Appointments with PCP in past 18 months or upcoming 3 months     Date Provider   Last Visit   4/1/2019 Ayush Horne MD   Next Visit   Visit date not found Ayush Horne MD

## 2019-12-03 NOTE — TELEPHONE ENCOUNTER
Please schedule patient for labs (LIPIDS).         Please also check with your provider if any further labs need to be added and scheduled together.        Thank you

## 2019-12-06 ENCOUNTER — PATIENT MESSAGE (OUTPATIENT)
Dept: INTERNAL MEDICINE | Facility: CLINIC | Age: 64
End: 2019-12-06

## 2019-12-12 NOTE — PROGRESS NOTES
Digital Medicine: Health  Follow-Up    Patient states that he will take a blood pressure reading today.      The history is provided by the patient. No  was used.       INTERVENTION(S)  encouragement/support, denied further coaching, denied resources and denied questions    PLAN  patient verbalizes understanding and continue monitoring          Topic    Hemoglobin A1C     Lipid (Cholesterol) Test        Last 5 Patient Entered Readings                                      Current 30 Day Average: 145/86     Recent Readings 12/3/2019 12/1/2019 11/21/2019 11/10/2019 11/2/2019    SBP (mmHg) 137 165 133 118 155    DBP (mmHg) 80 92 85 78 90    Pulse 85 81 84 106 73                      Diet Screening   No change to diet.  He has the following dietary restrictions: low sodium diet    Assigning the following patient goals: maintain low sodium diet    Physical Activity Screening   No change to exercise routine.    When asked if exercising, patient responded: yes and active job    Assigning the following patient goal(s): participate in exercise weekly    Medication Adherence Screening   He misses doses: never    Patient is not selectively taking diuretics.    He does not wonder if medications are working.  He knows purpose of medications.        SDOH

## 2019-12-23 NOTE — PROGRESS NOTES
Digital Medicine: Clinician Follow-Up    Called patient for digital medicine follow-up. He says that he is doing well. Reviewed readings and blood pressure is fluctuating often. Patient attributes this to his technique. Says that he does not always relax prior to readings and he and his wife have noticed the difference. We reviewed technique and he says that he will try to be more consistent with how he takes his readings. Patient reports adherence to losartan and amlodipine every morning with no complaints.    The history is provided by the patient. No  was used.           Assessment:  Patient's current 30-day average is not at goal of <130/80 mmHg secondary to technique.        Plan:  Continue current regimen.  Encouraged increased frequency of readings using appropriate technique.   Patients health , Jackie Denny, will follow-up as scheduled.    I will continue to monitor regularly and will follow-up in 4-5 weeks, sooner if blood pressure begins to trend upward or downward.        Patient has my contact information and knows to call with any concerns or clinical changes.                Topic    Hemoglobin A1C     Lipid (Cholesterol) Test        Last 5 Patient Entered Readings                                      Current 30 Day Average: 150/86     Recent Readings 12/22/2019 12/22/2019 12/13/2019 12/3/2019 12/1/2019    SBP (mmHg) 138 163 158 137 165    DBP (mmHg) 77 94 87 80 92    Pulse 94 82 84 85 81             Hypertension Medications             amLODIPine (NORVASC) 5 MG tablet Take 1 tablet (5 mg total) by mouth once daily.    losartan (COZAAR) 100 MG tablet TAKE 1 TABLET (100 MG TOTAL) BY MOUTH ONCE DAILY.                 Screenings

## 2020-01-07 ENCOUNTER — PATIENT MESSAGE (OUTPATIENT)
Dept: RADIATION ONCOLOGY | Facility: CLINIC | Age: 65
End: 2020-01-07

## 2020-01-16 ENCOUNTER — PATIENT OUTREACH (OUTPATIENT)
Dept: OTHER | Facility: OTHER | Age: 65
End: 2020-01-16

## 2020-01-16 NOTE — PROGRESS NOTES
Digital Medicine: Health  Follow-Up    Called patient for follow up. Patient reports he is feeling pretty good.      The history is provided by the patient. No  was used.     Follow Up  Follow-up reason(s): reading review          INTERVENTION(S)  reviewed monitoring technique and encouragement/support    PLAN  patient verbalizes understanding and continue monitoring          Topic    Hemoglobin A1C     Lipid (Cholesterol) Test        Last 5 Patient Entered Readings                                      Current 30 Day Average: 135/77     Recent Readings 1/15/2020 1/15/2020 1/6/2020 12/29/2019 12/28/2019    SBP (mmHg) 138 144 138 122 137    DBP (mmHg) 76 77 79 74 73    Pulse 79 77 91 95 83                      Diet Screening   Patient reports eating or drinking the following: fruit, fresh vegetables and lean proteinsHe has the following dietary restrictions: low sodium dietHe cooks for self and has meals prepared by family.    Patient does the shopping for groceries and lets family grocery shop.  He gets groceries from the grocery store.      Patient state that he has been eating mostly salad and baked proteins such as chicken or pork chops. He says occasionally he will have a fried poboy.    Assigning the following patient goals: maintain low sodium diet    Physical Activity Screening   When asked if exercising, patient responded: yes  Patient has the following chronic pain: joint pain    Patient participates in the following activities: walking    He identified the following barriers to physical activity: pain/injury/recent surgery    Patient says he has been walking, but lately his knee has been hurting, so he is not walking as much    Assigning the following patient goal(s): increase physical activity, 150 minutes of exercise per week and participate in exercise weekly    Medication Adherence Screening   He misses doses: never    Patient is not selectively taking diuretics.    He does  not wonder if medications are working.  He knows purpose of medications.        SDOH

## 2020-01-23 ENCOUNTER — PATIENT OUTREACH (OUTPATIENT)
Dept: OTHER | Facility: OTHER | Age: 65
End: 2020-01-23

## 2020-01-23 NOTE — PROGRESS NOTES
Digital Medicine: Clinician Follow-Up    Called patient for digital medicine follow-up. Corrected technique as discussed and blood pressure average has improved. Patient reports adherence to medications each morning with no complaints. Reviewed readings and they remain slightly elevated but, mostly in the evenings.     The history is provided by the patient. No  was used.     Follow Up  Follow-up reason(s): reading review              Assessment:  Patient's current 30-day average is slightly above goal of <130/80 mmHg.       Plan:  Continue current regimen however, amlodipine moved to 5 or 6 PM.   Patients health , Jackie Denny, will follow-up as scheduled.    I will continue to monitor regularly and will follow-up in 3-4 weeks, sooner if blood pressure begins to trend upward or downward.     Patient has my contact information and knows to call with any concerns or clinical changes.                Topic    Hemoglobin A1C     Lipid (Cholesterol) Test        Last 5 Patient Entered Readings                                      Current 30 Day Average: 136/75     Recent Readings 1/16/2020 1/16/2020 1/15/2020 1/15/2020 1/6/2020    SBP (mmHg) 144 143 138 144 138    DBP (mmHg) 72 75 76 77 79    Pulse 87 88 79 77 91          Hypertension Medications             amLODIPine (NORVASC) 5 MG tablet Take 1 tablet (5 mg total) by mouth once daily.    losartan (COZAAR) 100 MG tablet TAKE 1 TABLET (100 MG TOTAL) BY MOUTH ONCE DAILY.                 Screenings

## 2020-01-24 ENCOUNTER — PATIENT OUTREACH (OUTPATIENT)
Dept: ADMINISTRATIVE | Facility: OTHER | Age: 65
End: 2020-01-24

## 2020-01-26 NOTE — PROGRESS NOTES
HPI     DLS: 9/26/19    Pt here for 4 month check;    Meds:   Latanoprost QDAY OU     1. POAG   2. VF Defect OS   3. Chorioretinal Scar OS   4. Hx RD OS   5. NS OU   6. Type 2 DM no DR   7. Pupil Sphincter Rupture OS   8. Presbyopia       Last edited by Talya Hobson on 1/27/2020  1:28 PM. (History)            Assessment /Plan     For exam results, see Encounter Report.    Primary open angle glaucoma of both eyes, mild stage    Arcuate visual field defect of left eye    Chorioretinal scar, left    Old subtotal retinal detachment, left    Pupillary sphincter rupture, left    Type 2 diabetes mellitus without ophthalmic manifestations    Bilateral presbyopia        Pt initially dx with glaucoma at [facility] - stoppped his gtts on his own   Presented to [facility] - susana Valdez 8/23/2006 - off gtts with a baseline / Tmax of 25/27  Referred by Courtney for consideration of SLT's   Pt with POAG and poorly controlled IOP's - does not use drops regularly       1. Primary open angle glaucoma, both eyes, mild stage        Glaucoma (type and duration)    POAG with elevated IOP ou - mild stage    First HVF   2007 - full od // SAD os 2/2 to CRS   First photos   2011   Treatment / Drops started   2006           Family history    ++ mother and 2 brothers         Glaucoma meds    Latanoprost  (( use to use cosopt as well)         H/O adverse rxn to glaucoma drops    None (has tired alphagan and timolol in past -non compliant)         LASERS    SLT os - 3/31/2016 // SLT od - 4/14/2016         GLAUCOMA SURGERIES    none        OTHER EYE SURGERIES    H/O RD repair os - S/P laser repair         CDR    0.75 // 0.8        Tbase    25/27          Tmax    25/27            Ttarget    18/18             HVF    10 test 2006 to  2019 - full  od // SAD - 2/2 CRS from laser for RD repair os        Gonio    +3 ou          CCT    486/502 - thin ou         OCT    6 test 2011 to 2019 - RNFL - CHUNG t od // fluctuate - dec. S/I, CHUNG MARION (has a CRS)  os         HRT    3 test 2017 to 2019 -MR -  DEC. Sn/n/in od // DEC. S/n/i, BORD t os /// CDR 0.73 od // 0.86 os        Disc photos    2011, 2015 , 2017     - Ttoday    18/18  - Test done today  - gonio      2. 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 Chorioretinal scar, left   -with 2nd VF defect      4. Old subtotal retinal detachment, left   S/P repair - retina flat - stable   -large CRS      5. Type 2 diabetes mellitus without ophthalmic manifestations      6. Senile nuclear sclerosis  -mild - monitor      7. pupil sphincter rupture os  -suggest old trauma  -does NOT appear to have a angle recession    7. Presbyopia       PLAN    POAG with OHT OS > OD - mild od // moderate os   The ON and VF's are still good ou   The VF loss os is 2/2 old CRS- ?  post laser for a RD repair   Good initial response to SLT ou 25/25 --> 16/17     LATANOPROST OU - (PAYING > $100 FOR EACH BOTTLE of lumigan )    F/U 4 months IOP,  HRT      -   Consider repeat SLT - if IOP up or if VF progression os  -consider re-starting / adding cosopt prn (had some compliance issues - but tolerated ok)       Bethany Lechuga MD

## 2020-01-27 ENCOUNTER — OFFICE VISIT (OUTPATIENT)
Dept: OPHTHALMOLOGY | Facility: CLINIC | Age: 65
End: 2020-01-27
Payer: MEDICARE

## 2020-01-27 DIAGNOSIS — H53.432 ARCUATE VISUAL FIELD DEFECT OF LEFT EYE: ICD-10-CM

## 2020-01-27 DIAGNOSIS — H21.562 PUPILLARY SPHINCTER RUPTURE, LEFT: ICD-10-CM

## 2020-01-27 DIAGNOSIS — H33.052 OLD SUBTOTAL RETINAL DETACHMENT, LEFT: ICD-10-CM

## 2020-01-27 DIAGNOSIS — H52.4 BILATERAL PRESBYOPIA: ICD-10-CM

## 2020-01-27 DIAGNOSIS — E11.9 TYPE 2 DIABETES MELLITUS WITHOUT OPHTHALMIC MANIFESTATIONS: ICD-10-CM

## 2020-01-27 DIAGNOSIS — H40.1131 PRIMARY OPEN ANGLE GLAUCOMA OF BOTH EYES, MILD STAGE: Primary | ICD-10-CM

## 2020-01-27 DIAGNOSIS — E11.65 TYPE 2 DIABETES MELLITUS WITH HYPERGLYCEMIA, WITHOUT LONG-TERM CURRENT USE OF INSULIN: Primary | ICD-10-CM

## 2020-01-27 DIAGNOSIS — H31.002 CHORIORETINAL SCAR, LEFT: ICD-10-CM

## 2020-01-27 PROCEDURE — 99999 PR PBB SHADOW E&M-EST. PATIENT-LVL II: ICD-10-PCS | Mod: PBBFAC,,, | Performed by: OPHTHALMOLOGY

## 2020-01-27 PROCEDURE — 92012 INTRM OPH EXAM EST PATIENT: CPT | Mod: S$GLB,,, | Performed by: OPHTHALMOLOGY

## 2020-01-27 PROCEDURE — 92020 PR SPECIAL EYE EVAL,GONIOSCOPY: ICD-10-PCS | Mod: S$GLB,,, | Performed by: OPHTHALMOLOGY

## 2020-01-27 PROCEDURE — 92020 GONIOSCOPY: CPT | Mod: S$GLB,,, | Performed by: OPHTHALMOLOGY

## 2020-01-27 PROCEDURE — 92012 PR EYE EXAM, EST PATIENT,INTERMED: ICD-10-PCS | Mod: S$GLB,,, | Performed by: OPHTHALMOLOGY

## 2020-01-27 PROCEDURE — 99999 PR PBB SHADOW E&M-EST. PATIENT-LVL II: CPT | Mod: PBBFAC,,, | Performed by: OPHTHALMOLOGY

## 2020-01-28 NOTE — PROGRESS NOTES
Subjective:       Patient ID: Eyad Garcia is a 64 y.o. male.    Chief Complaint: Establish Care    HPI  This patient is new to me.   Eyda Garcia is a 64 y.o. year old male with prostate cancer, hypertension, glaucoma, diabetes who presents today to establish care.    Hypertension - compliant with amlodipine 5 mg daily, losartan 100 mg daily. Enrolled in digital HTN program.     Glaucoma - following with Ophthalmology.    Prostate cancer - patient has had recurrence prostate cancer.  He is status post RALP bilateral pelvic node dissection in July 2015.  His PSA began to slowly increase and his PSA June 2019 was 4.  The decision was made to proceed with hormonal therapy.  He completed Lupron therapy.    DM type 2   Lab Results   Component Value Date    HGBA1C 6.7 (H) 01/30/2020     ~Current medications - metformin  x2 daily  ~Previously tried medications -   ~Blood glucose monitoring: does not check   ~Diet - da silva, eggs, grits; sandwich, mashed potatoes, baked meat. Cookies occasionally. Sprite or Coke daily.  ~Exercise - none  ~ Seen diabetes education? -    --- Complications:  ~Retinopathy - no  Last optho visit - 1/27/2020  ~Neuropathy -   Sees podiatry? -  ~ Nephropathy - no    BMP  Lab Results   Component Value Date     01/30/2020    K 4.5 01/30/2020     01/30/2020    CO2 26 01/30/2020    BUN 14 01/30/2020    CREATININE 1.0 01/30/2020    CALCIUM 9.8 01/30/2020    ANIONGAP 9 01/30/2020    ESTGFRAFRICA >60 01/30/2020    EGFRNONAA >60 01/30/2020     The 10-year ASCVD risk score (Riptonpaxton SHAH Jr., et al., 2013) is: 33.3%    Values used to calculate the score:      Age: 64 years      Sex: Male      Is Non- : Yes      Diabetic: Yes      Tobacco smoker: No      Systolic Blood Pressure: 146 mmHg      Is BP treated: Yes      HDL Cholesterol: 47 mg/dL      Total Cholesterol: 153 mg/dL    ~ Statin? - yes  ~ ASA? -   ~ PNA vaccine? -     Health Maintenance  Colon Cancer  "Screening: colonoscopy 1/25/19 - repeat in 3 years  Prostate Cancer Screening: HX of prostate CA  Hepatitis C screening: negative   Flu vaccine: UTD  Tetanus vaccine: UTD  PNA vaccine: due for Prevnar 13  Shingles vaccine: due    I personally reviewed Past Medical History, Past Surgical History, Social History, and Family History    Review of Systems   Constitutional: Negative for chills, fatigue, fever and unexpected weight change.   HENT: Negative for congestion, hearing loss, rhinorrhea and sore throat.    Eyes: Negative for visual disturbance.   Respiratory: Negative for cough, shortness of breath and wheezing.    Cardiovascular: Negative for chest pain, palpitations and leg swelling.   Gastrointestinal: Negative for abdominal pain, constipation, diarrhea, nausea and vomiting.   Genitourinary: Negative for dysuria, frequency and urgency.   Musculoskeletal: Negative for arthralgias and myalgias.   Skin: Negative for rash.   Neurological: Negative for dizziness, syncope and headaches.        +occasional dizziness with position changes   Psychiatric/Behavioral: Negative for dysphoric mood and sleep disturbance. The patient is not nervous/anxious.        Objective:      Vitals:    01/29/20 1238   BP: (!) 146/76   Pulse: 92   SpO2: 99%   Weight: 76.5 kg (168 lb 10.4 oz)   Height: 5' 6.5" (1.689 m)     Physical Exam   Constitutional: He is oriented to person, place, and time. He appears well-developed and well-nourished. No distress.   HENT:   Head: Normocephalic and atraumatic.   Right Ear: Hearing normal.   Left Ear: Hearing normal.   Nose: Nose normal.   Mouth/Throat: Oropharynx is clear and moist and mucous membranes are normal. Normal dentition. No oropharyngeal exudate.   Eyes: Pupils are equal, round, and reactive to light. Conjunctivae and lids are normal.   Neck: Normal range of motion. No thyroid mass and no thyromegaly present.   Cardiovascular: Normal rate, regular rhythm, S1 normal, S2 normal and intact " distal pulses.   Murmur heard.  No lower extremity edema   Pulmonary/Chest: Effort normal and breath sounds normal. No respiratory distress.   Abdominal: Soft. Bowel sounds are normal. There is no tenderness.   Lymphadenopathy:     He has no cervical adenopathy.        Right: No supraclavicular adenopathy present.        Left: No supraclavicular adenopathy present.   Neurological: He is alert and oriented to person, place, and time. He is not disoriented.   Skin: Skin is warm and dry. No rash noted.   Psychiatric: He has a normal mood and affect. His behavior is normal. Thought content normal.   Nursing note and vitals reviewed.      Assessment:       1. Encounter to establish care with new doctor    2. Type 2 diabetes mellitus without complication, without long-term current use of insulin    3. Essential hypertension    4. Hyperlipidemia, unspecified hyperlipidemia type    5. Heart murmur, systolic    6. Other long term (current) drug therapy     7. Malignant neoplasm of prostate    8. Primary open angle glaucoma (POAG) of both eyes, mild stage        Plan:     Eyad was seen today for establish care.    Diagnoses and all orders for this visit:    Encounter to establish care with new doctor  Recommended Prevnar 13 and shingles vaccines.    Type 2 diabetes mellitus without complication, without long-term current use of insulin  Currently at goal.  Continue current medication regimen and healthy diet.  Follow-up in 3 months.  -     Microalbumin/creatinine urine ratio; Future  -     Hemoglobin A1c; Standing  -     TSH; Future  -     CBC auto differential; Future  -     Comprehensive metabolic panel; Standing  -     Lipid panel; Future    Essential hypertension  Slightly above goal today.  Continue current medication regimen and follow-up in 3 months.  -     Microalbumin/creatinine urine ratio; Future  -     TSH; Future  -     Lipid panel; Future    Hyperlipidemia, unspecified hyperlipidemia type  -     Lipid panel;  Future    Heart murmur, systolic  -     Echo Color Flow Doppler? Yes; Future    Other long term (current) drug therapy   -     Echo Color Flow Doppler? Yes; Future    Malignant neoplasm of prostate  Continue current management per Urology and Radiation Oncology.    Primary open angle glaucoma (POAG) of both eyes, mild stage  Continue current management per Ophthalmology.    I personally reviewed the patient's medical record, including physician notes, imaging, and labs that were available to me today.

## 2020-01-29 ENCOUNTER — OFFICE VISIT (OUTPATIENT)
Dept: INTERNAL MEDICINE | Facility: CLINIC | Age: 65
End: 2020-01-29
Payer: MEDICARE

## 2020-01-29 VITALS
HEIGHT: 67 IN | BODY MASS INDEX: 26.47 KG/M2 | SYSTOLIC BLOOD PRESSURE: 146 MMHG | HEART RATE: 92 BPM | DIASTOLIC BLOOD PRESSURE: 76 MMHG | OXYGEN SATURATION: 99 % | WEIGHT: 168.63 LBS

## 2020-01-29 DIAGNOSIS — R01.1 HEART MURMUR, SYSTOLIC: ICD-10-CM

## 2020-01-29 DIAGNOSIS — Z79.899 OTHER LONG TERM (CURRENT) DRUG THERAPY: ICD-10-CM

## 2020-01-29 DIAGNOSIS — E78.5 HYPERLIPIDEMIA, UNSPECIFIED HYPERLIPIDEMIA TYPE: ICD-10-CM

## 2020-01-29 DIAGNOSIS — I10 ESSENTIAL HYPERTENSION: ICD-10-CM

## 2020-01-29 DIAGNOSIS — Z76.89 ENCOUNTER TO ESTABLISH CARE WITH NEW DOCTOR: Primary | ICD-10-CM

## 2020-01-29 DIAGNOSIS — E11.9 TYPE 2 DIABETES MELLITUS WITHOUT COMPLICATION, WITHOUT LONG-TERM CURRENT USE OF INSULIN: ICD-10-CM

## 2020-01-29 DIAGNOSIS — C61 MALIGNANT NEOPLASM OF PROSTATE: ICD-10-CM

## 2020-01-29 DIAGNOSIS — H40.1131 PRIMARY OPEN ANGLE GLAUCOMA (POAG) OF BOTH EYES, MILD STAGE: ICD-10-CM

## 2020-01-29 PROCEDURE — 3077F SYST BP >= 140 MM HG: CPT | Mod: CPTII,S$GLB,, | Performed by: FAMILY MEDICINE

## 2020-01-29 PROCEDURE — 1101F PR PT FALLS ASSESS DOC 0-1 FALLS W/OUT INJ PAST YR: ICD-10-PCS | Mod: CPTII,S$GLB,, | Performed by: FAMILY MEDICINE

## 2020-01-29 PROCEDURE — 3078F PR MOST RECENT DIASTOLIC BLOOD PRESSURE < 80 MM HG: ICD-10-PCS | Mod: CPTII,S$GLB,, | Performed by: FAMILY MEDICINE

## 2020-01-29 PROCEDURE — 99999 PR PBB SHADOW E&M-EST. PATIENT-LVL III: ICD-10-PCS | Mod: PBBFAC,,, | Performed by: FAMILY MEDICINE

## 2020-01-29 PROCEDURE — 99999 PR PBB SHADOW E&M-EST. PATIENT-LVL III: CPT | Mod: PBBFAC,,, | Performed by: FAMILY MEDICINE

## 2020-01-29 PROCEDURE — 3077F PR MOST RECENT SYSTOLIC BLOOD PRESSURE >= 140 MM HG: ICD-10-PCS | Mod: CPTII,S$GLB,, | Performed by: FAMILY MEDICINE

## 2020-01-29 PROCEDURE — 3046F PR MOST RECENT HEMOGLOBIN A1C LEVEL > 9.0%: ICD-10-PCS | Mod: CPTII,S$GLB,, | Performed by: FAMILY MEDICINE

## 2020-01-29 PROCEDURE — 3008F PR BODY MASS INDEX (BMI) DOCUMENTED: ICD-10-PCS | Mod: CPTII,S$GLB,, | Performed by: FAMILY MEDICINE

## 2020-01-29 PROCEDURE — 99214 OFFICE O/P EST MOD 30 MIN: CPT | Mod: S$GLB,,, | Performed by: FAMILY MEDICINE

## 2020-01-29 PROCEDURE — 3008F BODY MASS INDEX DOCD: CPT | Mod: CPTII,S$GLB,, | Performed by: FAMILY MEDICINE

## 2020-01-29 PROCEDURE — 3078F DIAST BP <80 MM HG: CPT | Mod: CPTII,S$GLB,, | Performed by: FAMILY MEDICINE

## 2020-01-29 PROCEDURE — 1101F PT FALLS ASSESS-DOCD LE1/YR: CPT | Mod: CPTII,S$GLB,, | Performed by: FAMILY MEDICINE

## 2020-01-29 PROCEDURE — 3046F HEMOGLOBIN A1C LEVEL >9.0%: CPT | Mod: CPTII,S$GLB,, | Performed by: FAMILY MEDICINE

## 2020-01-29 PROCEDURE — 99214 PR OFFICE/OUTPT VISIT, EST, LEVL IV, 30-39 MIN: ICD-10-PCS | Mod: S$GLB,,, | Performed by: FAMILY MEDICINE

## 2020-01-30 ENCOUNTER — HOSPITAL ENCOUNTER (OUTPATIENT)
Dept: CARDIOLOGY | Facility: CLINIC | Age: 65
Discharge: HOME | End: 2020-01-30
Attending: FAMILY MEDICINE
Payer: MEDICARE

## 2020-01-30 VITALS
BODY MASS INDEX: 27.16 KG/M2 | HEART RATE: 76 BPM | DIASTOLIC BLOOD PRESSURE: 76 MMHG | WEIGHT: 169 LBS | HEIGHT: 66 IN | SYSTOLIC BLOOD PRESSURE: 146 MMHG

## 2020-01-30 DIAGNOSIS — Z79.899 OTHER LONG TERM (CURRENT) DRUG THERAPY: ICD-10-CM

## 2020-01-30 DIAGNOSIS — R01.1 HEART MURMUR, SYSTOLIC: ICD-10-CM

## 2020-01-30 LAB
ASCENDING AORTA: 3.28 CM
AV INDEX (PROSTH): 0.92
AV MEAN GRADIENT: 4 MMHG
AV PEAK GRADIENT: 8 MMHG
AV VALVE AREA: 3.64 CM2
AV VELOCITY RATIO: 0.8
BSA FOR ECHO PROCEDURE: 1.89 M2
CV ECHO LV RWT: 0.43 CM
DOP CALC AO PEAK VEL: 1.38 M/S
DOP CALC AO VTI: 24.72 CM
DOP CALC LVOT AREA: 3.9 CM2
DOP CALC LVOT DIAMETER: 2.24 CM
DOP CALC LVOT PEAK VEL: 1.11 M/S
DOP CALC LVOT STROKE VOLUME: 90 CM3
DOP CALCLVOT PEAK VEL VTI: 22.85 CM
E WAVE DECELERATION TIME: 302.72 MSEC
E/A RATIO: 0.72
E/E' RATIO: 8.67 M/S
ECHO LV POSTERIOR WALL: 0.81 CM (ref 0.6–1.1)
FRACTIONAL SHORTENING: 33 % (ref 28–44)
INTERVENTRICULAR SEPTUM: 1.02 CM (ref 0.6–1.1)
LA MAJOR: 4.8 CM
LA MINOR: 4.76 CM
LA WIDTH: 3.56 CM
LEFT ATRIUM SIZE: 3.59 CM
LEFT ATRIUM VOLUME INDEX: 27.9 ML/M2
LEFT ATRIUM VOLUME: 51.93 CM3
LEFT INTERNAL DIMENSION IN SYSTOLE: 2.5 CM (ref 2.1–4)
LEFT VENTRICLE DIASTOLIC VOLUME INDEX: 31.9 ML/M2
LEFT VENTRICLE DIASTOLIC VOLUME: 59.4 ML
LEFT VENTRICLE MASS INDEX: 54 G/M2
LEFT VENTRICLE SYSTOLIC VOLUME INDEX: 12 ML/M2
LEFT VENTRICLE SYSTOLIC VOLUME: 22.39 ML
LEFT VENTRICULAR INTERNAL DIMENSION IN DIASTOLE: 3.73 CM (ref 3.5–6)
LEFT VENTRICULAR MASS: 100.43 G
LV LATERAL E/E' RATIO: 6.5 M/S
LV SEPTAL E/E' RATIO: 13 M/S
MV PEAK A VEL: 0.72 M/S
MV PEAK E VEL: 0.52 M/S
PISA TR MAX VEL: 2.41 M/S
PULM VEIN S/D RATIO: 1.86
PV PEAK D VEL: 0.36 M/S
PV PEAK S VEL: 0.67 M/S
RA MAJOR: 4.19 CM
RA PRESSURE: 3 MMHG
RA WIDTH: 2.14 CM
RIGHT VENTRICULAR END-DIASTOLIC DIMENSION: 2.48 CM
SINUS: 3.82 CM
STJ: 3.38 CM
TDI LATERAL: 0.08 M/S
TDI SEPTAL: 0.04 M/S
TDI: 0.06 M/S
TR MAX PG: 23 MMHG
TRICUSPID ANNULAR PLANE SYSTOLIC EXCURSION: 1.79 CM
TV REST PULMONARY ARTERY PRESSURE: 26 MMHG

## 2020-01-30 PROCEDURE — 93306 TTE W/DOPPLER COMPLETE: CPT | Mod: S$GLB,,, | Performed by: INTERNAL MEDICINE

## 2020-01-30 PROCEDURE — 93306 ECHO (CUPID ONLY): ICD-10-PCS | Mod: S$GLB,,, | Performed by: INTERNAL MEDICINE

## 2020-01-31 PROBLEM — E11.9 TYPE 2 DIABETES MELLITUS WITHOUT COMPLICATION, WITHOUT LONG-TERM CURRENT USE OF INSULIN: Status: ACTIVE | Noted: 2020-01-31

## 2020-02-03 ENCOUNTER — IMMUNIZATION (OUTPATIENT)
Dept: PHARMACY | Facility: CLINIC | Age: 65
End: 2020-02-03
Payer: MEDICARE

## 2020-02-04 ENCOUNTER — PATIENT MESSAGE (OUTPATIENT)
Dept: INTERNAL MEDICINE | Facility: CLINIC | Age: 65
End: 2020-02-04

## 2020-02-12 ENCOUNTER — LAB VISIT (OUTPATIENT)
Dept: LAB | Facility: HOSPITAL | Age: 65
End: 2020-02-12
Payer: MEDICARE

## 2020-02-12 DIAGNOSIS — E78.5 HYPERLIPIDEMIA, UNSPECIFIED HYPERLIPIDEMIA TYPE: ICD-10-CM

## 2020-02-12 DIAGNOSIS — C61 MALIGNANT NEOPLASM OF PROSTATE: ICD-10-CM

## 2020-02-12 LAB
CHOLEST SERPL-MCNC: 134 MG/DL (ref 120–199)
CHOLEST/HDLC SERPL: 3.1 {RATIO} (ref 2–5)
COMPLEXED PSA SERPL-MCNC: <0.01 NG/ML (ref 0–4)
HDLC SERPL-MCNC: 43 MG/DL (ref 40–75)
HDLC SERPL: 32.1 % (ref 20–50)
LDLC SERPL CALC-MCNC: 76 MG/DL (ref 63–159)
NONHDLC SERPL-MCNC: 91 MG/DL
TRIGL SERPL-MCNC: 75 MG/DL (ref 30–150)

## 2020-02-12 PROCEDURE — 84153 ASSAY OF PSA TOTAL: CPT | Mod: HCNC

## 2020-02-12 PROCEDURE — 80061 LIPID PANEL: CPT | Mod: HCNC

## 2020-02-12 PROCEDURE — 36415 COLL VENOUS BLD VENIPUNCTURE: CPT | Mod: HCNC

## 2020-02-19 ENCOUNTER — OFFICE VISIT (OUTPATIENT)
Dept: RADIATION ONCOLOGY | Facility: CLINIC | Age: 65
End: 2020-02-19
Attending: RADIOLOGY
Payer: MEDICARE

## 2020-02-19 VITALS
RESPIRATION RATE: 16 BRPM | BODY MASS INDEX: 26.84 KG/M2 | SYSTOLIC BLOOD PRESSURE: 147 MMHG | WEIGHT: 171 LBS | HEART RATE: 89 BPM | HEIGHT: 67 IN | DIASTOLIC BLOOD PRESSURE: 80 MMHG

## 2020-02-19 DIAGNOSIS — C61 MALIGNANT NEOPLASM OF PROSTATE: Primary | ICD-10-CM

## 2020-02-19 PROCEDURE — 99999 PR PBB SHADOW E&M-EST. PATIENT-LVL III: CPT | Mod: PBBFAC,HCNC,, | Performed by: RADIOLOGY

## 2020-02-19 PROCEDURE — 99999 PR PBB SHADOW E&M-EST. PATIENT-LVL III: ICD-10-PCS | Mod: PBBFAC,HCNC,, | Performed by: RADIOLOGY

## 2020-02-19 PROCEDURE — 99499 UNLISTED E&M SERVICE: CPT | Mod: HCNC,S$GLB,, | Performed by: RADIOLOGY

## 2020-02-19 PROCEDURE — 99499 NO LOS: ICD-10-PCS | Mod: HCNC,S$GLB,, | Performed by: RADIOLOGY

## 2020-02-19 NOTE — PROGRESS NOTES
Subjective:       Patient ID: Eyad Garcia is a 64 y.o. male.    Chief Complaint: Prostate Cancer (f/u after xrt;psa)    This patient returns for his initial follow up visit.     Mr. Garcia has a history of recurrent prostate cancer. He initially presented in 2015 when biopsies from the [place] revealed a small (2%) focus of Scooter 8 (4+4) adenocarcinoma. PSA at that time was 6.6 ng/ml. Work up with bone scan was negative. He subsequently underwent RALP with bilateral pelvic node dissection in July of 2015. Pathology revealed a 6.5 x 5 x 5 cm prostate, weighing 117 g. There was a single focus of Scooter 6 (3+3) adenocarcinoma in a background of extensive high-grade PIN. The tumor accounted for less than 1% of the specimen. There was no extraprostatic extension, seminal vesicle invasion or lymphovascular invasion. The margins and 10 (5 Lt., 5 Rt.) pelvic nodes were negative for tumor involvement. Postoperatively the patient recovered well. Postoperative PSA in 2015 was 0.03 ng/ml. It has increases slowly until recently. PSA in March of 2017 was 0.14 ng/ml. Repeat PSA in November of 2018 had increased to 1.9 ng/ml. His most recent PSA on 6/20/19 was 4 ng/ml. He was referred to our department. CAROL[place] was negative and work up with bone scan and Axumin scan were negative. There was uptake in one small pelvic node. The patient was referred for radiotherapy. He also received a 6 month Lupron injection on 8/6/19.  He completed 46.8 Gy to the prostate bed and nodes followed by a boost to the prostate bed and the node identified on PET on 11/15/19.  Today the patient states he feels well. No complaints of dysuria, hematuria or incontinence.  No diarrhea.  Notes hot flashes.  Denies fatigue.  Continues to work full time.      Review of Systems   Constitutional: Negative for activity change, appetite change, chills and fatigue.   Gastrointestinal: Negative for abdominal pain, constipation and diarrhea.   Genitourinary:  Negative for difficulty urinating, dysuria, hematuria and urgency.       Objective:      Physical Exam   Constitutional: He appears well-developed and well-nourished. No distress.   Abdominal: Soft. He exhibits no distension.       PSA < 0.01 ng/ml  Assessment:       1. Malignant neoplasm of prostate        Plan:       Doing well, recovered well from the acute effects of radiotherapy.   Undetectable PSA.  Explained we need to wait for the effects of Lupron to resolve.  Plan follow up in 6 months with PSA and testosterone.

## 2020-02-21 ENCOUNTER — PATIENT OUTREACH (OUTPATIENT)
Dept: OTHER | Facility: OTHER | Age: 65
End: 2020-02-21

## 2020-02-21 DIAGNOSIS — I10 ESSENTIAL HYPERTENSION: Primary | ICD-10-CM

## 2020-02-23 ENCOUNTER — PATIENT MESSAGE (OUTPATIENT)
Dept: INTERNAL MEDICINE | Facility: CLINIC | Age: 65
End: 2020-02-23

## 2020-02-23 DIAGNOSIS — I10 ESSENTIAL HYPERTENSION: ICD-10-CM

## 2020-02-23 DIAGNOSIS — E11.9 TYPE 2 DIABETES MELLITUS WITHOUT COMPLICATION, WITHOUT LONG-TERM CURRENT USE OF INSULIN: Primary | ICD-10-CM

## 2020-02-23 RX ORDER — LOSARTAN POTASSIUM 100 MG/1
100 TABLET ORAL DAILY
Qty: 90 TABLET | Refills: 1 | Status: SHIPPED | OUTPATIENT
Start: 2020-02-23 | End: 2020-03-17 | Stop reason: SDUPTHER

## 2020-02-24 RX ORDER — AMLODIPINE BESYLATE 10 MG/1
10 TABLET ORAL DAILY
Qty: 90 TABLET | Refills: 1 | Status: SHIPPED | OUTPATIENT
Start: 2020-02-24 | End: 2020-03-17 | Stop reason: ALTCHOICE

## 2020-02-24 RX ORDER — LOSARTAN POTASSIUM 100 MG/1
100 TABLET ORAL DAILY
Qty: 90 TABLET | Refills: 4 | Status: SHIPPED | OUTPATIENT
Start: 2020-02-24 | End: 2020-06-04 | Stop reason: SDUPTHER

## 2020-02-24 RX ORDER — GLIPIZIDE 5 MG/1
5 TABLET ORAL
Qty: 60 TABLET | Refills: 2 | Status: CANCELLED | OUTPATIENT
Start: 2020-02-24 | End: 2021-02-23

## 2020-02-24 NOTE — PROGRESS NOTES
Digital Medicine: Clinician Follow-Up    Called Mr. Eyad Garcia for hypertension digital medicine follow-up. Patient reports adherence to medication regimen with no complaints. Home readings have been elevated at home and in office, but patient says that he feels fine.     Patient denies s/s of hypotension (lightheadedness, dizziness, nausea, fatigue) associated with low readings. Instructed patient to inform me if this occurs, patient confirms understanding.    Patient denies s/s of hypertension (SOB, CP, severe headaches, changes in vision) associated with high readings. Instructed patient to go to the ED if BP >180/110 and accompanied by hypertensive s/s, patient confirms understanding.        The history is provided by the patient. No  was used.     Follow Up  Follow-up reason(s): medication change      Medication Change: dose increase            Assessment:  Patient's current 30-day average is not at goal of <130/80 mmHg.       Plan:  Increase amlodipine to 10 mg and continue losartan 100 mg.  Encouraged more frequent readings to assess BP trend on new regimen.   Patient's health , Jackie Denny, will follow-up as scheduled.    I will continue to monitor regularly and will follow-up in 2-3 weeks, sooner if blood pressure begins to trend upward or downward.   Patient has my contact information and knows to call with any concerns or clinical changes.            There are no preventive care reminders to display for this patient.    Last 5 Patient Entered Readings                                      Current 30 Day Average: 157/85     Recent Readings 2/8/2020 2/8/2020 1/26/2020 1/26/2020 1/16/2020    SBP (mmHg) 154 151 160 143 144    DBP (mmHg) 82 87 86 83 72    Pulse 88 84 78 83 87             Hypertension Medications             amLODIPine (NORVASC) 10 MG tablet Take 1 tablet (10 mg total) by mouth once daily.    losartan (COZAAR) 100 MG tablet TAKE 1 TABLET (100 MG TOTAL) BY MOUTH  ONCE DAILY.    losartan (COZAAR) 100 MG tablet Take 1 tablet (100 mg total) by mouth once daily.                 Screenings

## 2020-02-24 NOTE — TELEPHONE ENCOUNTER
Please contact patient and inform that I do not see glipizide on his medication list. Is he currently taking this medication?  At his last visit he stated that metformin was his only diabetes medication.    thanks

## 2020-03-02 ENCOUNTER — PATIENT OUTREACH (OUTPATIENT)
Dept: OTHER | Facility: OTHER | Age: 65
End: 2020-03-02

## 2020-03-09 ENCOUNTER — PATIENT OUTREACH (OUTPATIENT)
Dept: OTHER | Facility: OTHER | Age: 65
End: 2020-03-09

## 2020-03-09 DIAGNOSIS — I10 ESSENTIAL HYPERTENSION: Primary | ICD-10-CM

## 2020-03-10 NOTE — PROGRESS NOTES
Digital Medicine: Health  Follow-Up    Called patient for health  follow up.  Patient states that he is doing well and his happy with his latest blood pressure readings.  He says that he has been trying to rest before taking readings    The history is provided by the patient. No  was used.     Follow Up  Follow-up reason(s): reading review      Readings are trending down       INTERVENTION(S)  encouragement/support, denied further coaching, denied resources and denied questions    PLAN  continue monitoring      There are no preventive care reminders to display for this patient.    Last 5 Patient Entered Readings                                      Current 30 Day Average: 132/76     Recent Readings 3/9/2020 3/6/2020 3/4/2020 3/1/2020 2/8/2020    SBP (mmHg) 120 139 127 140 154    DBP (mmHg) 78 78 75 72 82    Pulse 91 90 87 92 88                      Diet Screening   No change to diet.  He has the following dietary restrictions: low sodium diet    See note dated 1/16/20    Assigning the following patient goals: maintain low sodium diet    Physical Activity Screening   No change to exercise routine.    When asked if exercising, patient responded: yes    Patient is physically active at work.    Assigning the following patient goal(s): participate in exercise weekly    Medication Adherence Screening   He did not miss a dose this month.  Patient knows purpose of medications.      Patient states that he did not increase Amlodipine to 10 mg.  He continues to take 5 mg.      SDOH

## 2020-03-17 RX ORDER — AMLODIPINE BESYLATE 5 MG/1
5 TABLET ORAL DAILY
Qty: 90 TABLET | Refills: 0 | Status: SHIPPED | OUTPATIENT
Start: 2020-03-17 | End: 2020-05-19 | Stop reason: DRUGHIGH

## 2020-03-17 NOTE — PROGRESS NOTES
Digital Medicine: Clinician Follow-Up    Called patient for digital medicine follow up. Mr. Garcia did not start increased does of amlodipine as discussed due to not recalling our conversation. Blood pressure has been trending down and is approaching goal. Patient attributes lower readings to being less active and fully relaxed. Patient inquired if he should start taking higher dose now however, after reviewing readings I have encouraged him to focus on using the appropriate technique as those readings are at goal when he does.     The history is provided by the patient. No  was used.     Follow Up  Follow-up reason(s): medication change follow-up      Patient did not start new medication.    Adherence Barriers: patient forgets    Is patient tolerating med change?:  Patient denies change          Assessment:  Patient's current 30-day average is approaching goal of <130/80 mmHg.       Plan:  Continue current regimen.  Patients health , Jackie Denny, will follow-up as scheduled.    I will continue to monitor regularly and will follow-up in 1-2 months, sooner if blood pressure begins to trend upward or downward.     Patient has my contact information and knows to call with any concerns or clinical changes.            There are no preventive care reminders to display for this patient.    Last 5 Patient Entered Readings                                      Current 30 Day Average: 134/78     Recent Readings 3/11/2020 3/9/2020 3/6/2020 3/4/2020 3/1/2020    SBP (mmHg) 142 120 139 127 140    DBP (mmHg) 85 78 78 75 72    Pulse 89 91 90 87 92             Hypertension Medications             amLODIPine (NORVASC) 5 MG tablet Take 1 tablet (5 mg total) by mouth once daily.    losartan (COZAAR) 100 MG tablet Take 1 tablet (100 mg total) by mouth once daily.                             Medication Adherence Screening       Patient identified the following reasons for non-compliance: Patient forgets

## 2020-03-31 ENCOUNTER — PATIENT MESSAGE (OUTPATIENT)
Dept: INTERNAL MEDICINE | Facility: CLINIC | Age: 65
End: 2020-03-31

## 2020-04-06 ENCOUNTER — PATIENT MESSAGE (OUTPATIENT)
Dept: INTERNAL MEDICINE | Facility: CLINIC | Age: 65
End: 2020-04-06

## 2020-04-20 ENCOUNTER — PATIENT OUTREACH (OUTPATIENT)
Dept: ADMINISTRATIVE | Facility: OTHER | Age: 65
End: 2020-04-20

## 2020-04-20 ENCOUNTER — OFFICE VISIT (OUTPATIENT)
Dept: OPHTHALMOLOGY | Facility: CLINIC | Age: 65
End: 2020-04-20
Payer: MEDICARE

## 2020-04-20 ENCOUNTER — PATIENT MESSAGE (OUTPATIENT)
Dept: OPHTHALMOLOGY | Facility: CLINIC | Age: 65
End: 2020-04-20

## 2020-04-20 DIAGNOSIS — S05.01XA CORNEA ABRASION, RIGHT, INITIAL ENCOUNTER: ICD-10-CM

## 2020-04-20 DIAGNOSIS — T15.91XA FOREIGN BODY OF RIGHT EYE, INITIAL ENCOUNTER: Primary | ICD-10-CM

## 2020-04-20 PROCEDURE — 92012 PR EYE EXAM, EST PATIENT,INTERMED: ICD-10-PCS | Mod: 25,HCNC,S$GLB, | Performed by: OPHTHALMOLOGY

## 2020-04-20 PROCEDURE — 92012 INTRM OPH EXAM EST PATIENT: CPT | Mod: 25,HCNC,S$GLB, | Performed by: OPHTHALMOLOGY

## 2020-04-20 PROCEDURE — 99999 PR PBB SHADOW E&M-EST. PATIENT-LVL II: CPT | Mod: PBBFAC,,, | Performed by: OPHTHALMOLOGY

## 2020-04-20 PROCEDURE — 99999 PR PBB SHADOW E&M-EST. PATIENT-LVL II: ICD-10-PCS | Mod: PBBFAC,,, | Performed by: OPHTHALMOLOGY

## 2020-04-20 PROCEDURE — 65210 REMOVE FOREIGN BODY FROM EYE: CPT | Mod: HCNC,RT,S$GLB, | Performed by: OPHTHALMOLOGY

## 2020-04-20 PROCEDURE — 65210 PR REMV F.B.,EYE,EMBED CONJUNC: ICD-10-PCS | Mod: HCNC,RT,S$GLB, | Performed by: OPHTHALMOLOGY

## 2020-04-20 RX ORDER — OFLOXACIN 3 MG/ML
1 SOLUTION/ DROPS OPHTHALMIC 4 TIMES DAILY
Qty: 1 BOTTLE | Refills: 0 | Status: SHIPPED | OUTPATIENT
Start: 2020-04-20 | End: 2020-04-27

## 2020-04-20 NOTE — PROGRESS NOTES
Chart reviewed.   Immunizations: RECONCILED  Orders placed: n/a  Upcoming appts to satisfy BRANDON topics: n/a

## 2020-04-23 ENCOUNTER — PATIENT OUTREACH (OUTPATIENT)
Dept: OTHER | Facility: OTHER | Age: 65
End: 2020-04-23

## 2020-04-23 ENCOUNTER — PATIENT MESSAGE (OUTPATIENT)
Dept: ADMINISTRATIVE | Facility: HOSPITAL | Age: 65
End: 2020-04-23

## 2020-04-23 NOTE — PROGRESS NOTES
Digital Medicine: Health  Follow-Up    Patient says he is not sure what is causing higher readings.  He denies any symptoms of hypertension.  He states that he charged his cuff about two weeks ago.  I reviewed technique and advised patient to make sure he is resting a few minutes before taking readings.  Patient verbally expressed understanding.    The history is provided by the patient. No  was used.     Follow Up  Follow-up reason(s): reading review          INTERVENTION(S)  recommended diet modifications, recommend physical activity, reviewed monitoring technique, encouragement/support, denied resources and denied questions    PLAN  continue monitoring          Topic    Hemoglobin A1C        Last 5 Patient Entered Readings                                      Current 30 Day Average: 136/83     Recent Readings 4/15/2020 4/15/2020 4/14/2020 4/14/2020 4/9/2020    SBP (mmHg) 127 154 135 150 150    DBP (mmHg) 84 95 85 80 80    Pulse 102 99 89 90 90                      Diet Screening   No change to diet.  Patient reports eating or drinking the following: fruit, fresh vegetables and lean protiens, whole grainsHe has the following dietary restrictions: low sodium dietHe cooks for self and has meals prepared by family.    Patient does the shopping for groceries and lets family grocery shop.  He gets groceries from the grocery store.      Barriers to a Healthy Diet: no barriers to healthy eating    Intervention(s): reducing sodium intake    Assigning the following patient goals: meal plan and maintain low sodium diet    Physical Activity Screening   When asked if exercising, patient responded: no    Assigning the following patient goal(s): increase physical activity    Medication Adherence Screening   He did not miss a dose this month.  Patient knows purpose of medications.      Patient identified the following reasons for non-compliance: None      SDOH

## 2020-04-24 ENCOUNTER — LAB VISIT (OUTPATIENT)
Dept: LAB | Facility: OTHER | Age: 65
End: 2020-04-24
Attending: FAMILY MEDICINE
Payer: MEDICARE

## 2020-04-24 DIAGNOSIS — E11.65 TYPE 2 DIABETES MELLITUS WITH HYPERGLYCEMIA, WITHOUT LONG-TERM CURRENT USE OF INSULIN: ICD-10-CM

## 2020-04-24 LAB
ALBUMIN SERPL BCP-MCNC: 4.3 G/DL (ref 3.5–5.2)
ALP SERPL-CCNC: 72 U/L (ref 55–135)
ALT SERPL W/O P-5'-P-CCNC: 29 U/L (ref 10–44)
ANION GAP SERPL CALC-SCNC: 9 MMOL/L (ref 8–16)
AST SERPL-CCNC: 24 U/L (ref 10–40)
BILIRUB SERPL-MCNC: 0.7 MG/DL (ref 0.1–1)
BUN SERPL-MCNC: 17 MG/DL (ref 8–23)
CALCIUM SERPL-MCNC: 9.7 MG/DL (ref 8.7–10.5)
CHLORIDE SERPL-SCNC: 103 MMOL/L (ref 95–110)
CO2 SERPL-SCNC: 25 MMOL/L (ref 23–29)
CREAT SERPL-MCNC: 1 MG/DL (ref 0.5–1.4)
EST. GFR  (AFRICAN AMERICAN): >60 ML/MIN/1.73 M^2
EST. GFR  (NON AFRICAN AMERICAN): >60 ML/MIN/1.73 M^2
ESTIMATED AVG GLUCOSE: 177 MG/DL (ref 68–131)
GLUCOSE SERPL-MCNC: 162 MG/DL (ref 70–110)
HBA1C MFR BLD HPLC: 7.8 % (ref 4–5.6)
POTASSIUM SERPL-SCNC: 4.1 MMOL/L (ref 3.5–5.1)
PROT SERPL-MCNC: 7.4 G/DL (ref 6–8.4)
SODIUM SERPL-SCNC: 137 MMOL/L (ref 136–145)

## 2020-04-24 PROCEDURE — 83036 HEMOGLOBIN GLYCOSYLATED A1C: CPT | Mod: HCNC

## 2020-04-24 PROCEDURE — 36415 COLL VENOUS BLD VENIPUNCTURE: CPT | Mod: HCNC

## 2020-04-24 PROCEDURE — 80053 COMPREHEN METABOLIC PANEL: CPT | Mod: HCNC

## 2020-04-28 NOTE — PROGRESS NOTES
Subjective:       Patient ID: Eyad Garcia is a 64 y.o. male.    Chief Complaint:  Diabetes follow-up    HPI  Eyad Garcia is a 64 y.o. year old male with prostate cancer, hypertension, glaucoma, diabetes who presents today for diabetes follow-up.    The patient location is:  patient's home  The chief complaint leading to consultation is:  Diabetes follow-up  Visit type: audiovisual  Total time spent with patient: 27 mins  Each patient to whom he or she provides medical services by telemedicine is:  (1) informed of the relationship between the physician and patient and the respective role of any other health care provider with respect to management of the patient; and (2) notified that he or she may decline to receive medical services by telemedicine and may withdraw from such care at any time.  Patient agreed to this telemedicine visit today in an effort to avoid face-to-face visits due to the current COVID 19 pandemic.    Hypertension - compliant with amlodipine 5 mg daily, losartan 100 mg daily. Enrolled in digital HTN program.  Most recent blood pressure have been 127-135/79-95.    Glaucoma - following with Ophthalmology.    Prostate cancer - patient has had recurrence prostate cancer.  He is status post RALP bilateral pelvic node dissection in July 2015.  His PSA began to slowly increase and his PSA June 2019 was 4.  The decision was made to proceed with hormonal therapy.  He completed Lupron therapy.    DM type 2   Lab Results   Component Value Date    HGBA1C 7.8 (H) 04/24/2020     ~Current medications - metformin  2 tabs in AM (has taken for years)  ~Previously tried medications -   ~Blood glucose monitoring: does not check   ~Diet - da silva, eggs, grits; sandwich, mashed potatoes, baked meat. Cookies occasionally. Sprite or Coke (every other day).   ~Exercise - none  ~ Seen diabetes education? -    --- Complications:  ~Retinopathy - no  Last optho visit - 1/27/2020  ~Neuropathy -   Sees podiatry?  -  ~ Nephropathy - no    BMP  Lab Results   Component Value Date     04/24/2020    K 4.1 04/24/2020     04/24/2020    CO2 25 04/24/2020    BUN 17 04/24/2020    CREATININE 1.0 04/24/2020    CALCIUM 9.7 04/24/2020    ANIONGAP 9 04/24/2020    ESTGFRAFRICA >60 04/24/2020    EGFRNONAA >60 04/24/2020     The 10-year ASCVD risk score (John SHAH Jr., et al., 2013) is: 33.3%    Values used to calculate the score:      Age: 64 years      Sex: Male      Is Non- : Yes      Diabetic: Yes      Tobacco smoker: No      Systolic Blood Pressure: 147 mmHg      Is BP treated: Yes      HDL Cholesterol: 43 mg/dL      Total Cholesterol: 134 mg/dL    ~ Statin? - yes  ~ ASA? -   ~ PNA vaccine? -     Health Maintenance  Colon Cancer Screening: colonoscopy 1/25/19 - repeat in 3 years  Prostate Cancer Screening: HX of prostate CA  Hepatitis C screening: negative   Flu vaccine: UTD  Tetanus vaccine: UTD  PNA vaccine: due for Prevnar 13  Shingles vaccine: due    I personally reviewed Past Medical History, Past Surgical History, Social History, and Family History    Review of Systems   Constitutional: Negative for chills, fatigue and fever.   HENT: Negative for congestion, hearing loss, rhinorrhea and sore throat.    Eyes: Negative for visual disturbance.   Respiratory: Negative for cough, shortness of breath and wheezing.    Cardiovascular: Negative for chest pain and palpitations.   Gastrointestinal: Negative for abdominal pain, constipation, diarrhea, nausea and vomiting.   Skin: Negative for rash.   Neurological: Negative for dizziness, syncope and headaches.   Psychiatric/Behavioral: Negative for dysphoric mood and sleep disturbance. The patient is not nervous/anxious.        Objective:      There were no vitals filed for this visit.  Physical Exam   Constitutional: He is oriented to person, place, and time. He appears well-developed and well-nourished. No distress.   HENT:   Head: Normocephalic and  atraumatic.   Eyes: Conjunctivae are normal.   Neck: Normal range of motion.   Pulmonary/Chest: Effort normal. No respiratory distress.   Neurological: He is alert and oriented to person, place, and time.   Psychiatric: He has a normal mood and affect. His behavior is normal. Thought content normal.       Assessment:       1. Type 2 diabetes mellitus with hyperglycemia, without long-term current use of insulin    2. Essential hypertension    3. Normocytic anemia    4. Other long term (current) drug therapy     5. Malignant neoplasm of prostate        Plan:     Type 2 diabetes mellitus with hyperglycemia, without long-term current use of insulin  Hemoglobin A1c slightly above goal and elevated compared to 3 months ago.  Patient reports his diet has been somewhat poor lately.  Discussed cutting back on snacks and junk food as well as sugary drinks.  Will also refer to diabetes education for assistance with diet management.  Recommended that he work on starting a regular exercise regimen at home.  Continue metformin.  Follow-up in 3 months.  -     Ambulatory referral/consult to Diabetes Education; Future; Expected date: 05/06/2020  -     CBC auto differential; Future; Expected date: 04/29/2020  -     Comprehensive metabolic panel; Future; Expected date: 04/29/2020  -     Hemoglobin A1c; Future; Expected date: 04/29/2020    Essential hypertension  Continue current medication regimen.  Continue with digital hypertension program.  -     CBC auto differential; Future; Expected date: 04/29/2020  -     Comprehensive metabolic panel; Future; Expected date: 04/29/2020    Normocytic anemia  Labs ordered to evaluate.  Colonoscopy is up-to-date.  -     CBC auto differential; Future; Expected date: 04/29/2020  -     Ferritin; Future; Expected date: 04/29/2020  -     Iron and TIBC; Future; Expected date: 04/29/2020  -     Reticulocytes; Future; Expected date: 04/29/2020  -     Haptoglobin; Future; Expected date: 04/29/2020  -      Vitamin B12; Future; Expected date: 04/29/2020  -     Folate; Future; Expected date: 04/29/2020    Other long term (current) drug therapy   -     Vitamin B12; Future; Expected date: 04/29/2020  -     Folate; Future; Expected date: 04/29/2020    Malignant neoplasm of prostate  Continue current management and follow-up per Urology.    I personally reviewed the patient's medical record, including physician notes and labs that were available to me today.

## 2020-04-29 ENCOUNTER — OFFICE VISIT (OUTPATIENT)
Dept: INTERNAL MEDICINE | Facility: CLINIC | Age: 65
End: 2020-04-29
Payer: MEDICARE

## 2020-04-29 ENCOUNTER — TELEPHONE (OUTPATIENT)
Dept: INTERNAL MEDICINE | Facility: CLINIC | Age: 65
End: 2020-04-29

## 2020-04-29 DIAGNOSIS — C61 MALIGNANT NEOPLASM OF PROSTATE: ICD-10-CM

## 2020-04-29 DIAGNOSIS — I10 ESSENTIAL HYPERTENSION: ICD-10-CM

## 2020-04-29 DIAGNOSIS — E11.65 TYPE 2 DIABETES MELLITUS WITH HYPERGLYCEMIA, WITHOUT LONG-TERM CURRENT USE OF INSULIN: Primary | ICD-10-CM

## 2020-04-29 DIAGNOSIS — D64.9 NORMOCYTIC ANEMIA: ICD-10-CM

## 2020-04-29 DIAGNOSIS — Z79.899 OTHER LONG TERM (CURRENT) DRUG THERAPY: ICD-10-CM

## 2020-04-29 PROCEDURE — 99214 PR OFFICE/OUTPT VISIT, EST, LEVL IV, 30-39 MIN: ICD-10-PCS | Mod: HCNC,95,, | Performed by: FAMILY MEDICINE

## 2020-04-29 PROCEDURE — 1101F PT FALLS ASSESS-DOCD LE1/YR: CPT | Mod: HCNC,CPTII,95, | Performed by: FAMILY MEDICINE

## 2020-04-29 PROCEDURE — 99499 UNLISTED E&M SERVICE: CPT | Mod: HCNC,95,, | Performed by: FAMILY MEDICINE

## 2020-04-29 PROCEDURE — 99499 RISK ADDL DX/OHS AUDIT: ICD-10-PCS | Mod: HCNC,95,, | Performed by: FAMILY MEDICINE

## 2020-04-29 PROCEDURE — 3051F PR MOST RECENT HEMOGLOBIN A1C LEVEL 7.0 - < 8.0%: ICD-10-PCS | Mod: HCNC,CPTII,95, | Performed by: FAMILY MEDICINE

## 2020-04-29 PROCEDURE — 99214 OFFICE O/P EST MOD 30 MIN: CPT | Mod: HCNC,95,, | Performed by: FAMILY MEDICINE

## 2020-04-29 PROCEDURE — 1101F PR PT FALLS ASSESS DOC 0-1 FALLS W/OUT INJ PAST YR: ICD-10-PCS | Mod: HCNC,CPTII,95, | Performed by: FAMILY MEDICINE

## 2020-04-29 PROCEDURE — 3051F HG A1C>EQUAL 7.0%<8.0%: CPT | Mod: HCNC,CPTII,95, | Performed by: FAMILY MEDICINE

## 2020-04-29 NOTE — TELEPHONE ENCOUNTER
Please assist patient with setting up 3 month follow-up visit with me and lab work to do a week before the appointment.  Please also assist him with setting appointment with diabetes education.    thanks

## 2020-05-04 ENCOUNTER — PATIENT OUTREACH (OUTPATIENT)
Dept: ADMINISTRATIVE | Facility: OTHER | Age: 65
End: 2020-05-04

## 2020-05-04 NOTE — PROGRESS NOTES
Chart reviewed.   Immunizations: Triggered Imm Registry     Orders placed: n/a  Upcoming appts to satisfy BRANDON topics: A1c 7/28/2020

## 2020-05-05 ENCOUNTER — CLINICAL SUPPORT (OUTPATIENT)
Dept: DIABETES | Facility: CLINIC | Age: 65
End: 2020-05-05
Payer: MEDICARE

## 2020-05-05 DIAGNOSIS — E11.65 TYPE 2 DIABETES MELLITUS WITH HYPERGLYCEMIA, WITHOUT LONG-TERM CURRENT USE OF INSULIN: ICD-10-CM

## 2020-05-05 PROCEDURE — G0108 DIAB MANAGE TRN  PER INDIV: HCPCS | Mod: HCNC,,, | Performed by: FAMILY MEDICINE

## 2020-05-05 PROCEDURE — G0108 PR DIAB MANAGE TRN  PER INDIV: ICD-10-PCS | Mod: HCNC,,, | Performed by: FAMILY MEDICINE

## 2020-05-05 PROCEDURE — 99212 OFFICE O/P EST SF 10 MIN: CPT | Mod: HCNC

## 2020-05-05 NOTE — PROGRESS NOTES
Diabetes Care Specialist Telehealth Visit Note     It was in the patient's best interest to receive diabetes self-management education and support services in this format to prevent the exposure to COVID-19.        Established Patient - Audio Only Telehealth Visit  The patient location is: home   The chief complaint leading to consultation is: Diabetes    Visit type: Virtual visit with audio only (telephone)  Total time spent with patient: 60 min      The reason for the audio only service rather than synchronous audio and video virtual visit was related to technical difficulties or patient preference/necessity.     Each patient to whom I provide medical services by telemedicine is:  (1) informed of the relationship between the physician and patient and the respective role of any other health care provider with respect to management of the patient; and (2) notified that they may decline to receive medical services by telemedicine and may withdraw from such care at any time. Patient verbally consented to receive this service via voice-only telephone call.              Diabetes Education  Author: Charu Prater RN  Date: 5/5/2020    Diabetes Care Management Summary  Diabetes Education Record Assessment/Progress: Initial  Current Diabetes Risk Level: Low         Diabetes Type  Diabetes Type : Type II    Diabetes History  Diabetes Diagnosis: >10 years  Current Treatment: Oral Medication, Diet, Exercise  Reviewed Problem List with Patient: Yes    Health Maintenance was reviewed today with patient. Discussed with patient importance of routine eye exams, foot exams/foot care, blood work (i.e.: A1c, microalbumin, and lipid), dental visits, yearly flu vaccine, and pneumonia vaccine as indicated by PCP. Patient verbalized understanding.     Health Maintenance Topics with due status: Not Due       Topic Last Completion Date    TETANUS VACCINE 06/27/2016    Colonoscopy 01/25/2019    Lipid Panel 02/12/2020    Low Dose Statin  04/20/2020    Eye Exam 04/20/2020    Hemoglobin A1c 04/24/2020     Health Maintenance Due   Topic Date Due    Foot Exam  12/08/2018    Pneumococcal Vaccine (65+ High/Highest Risk) (2 of 2 - PPSV23) 03/25/2020       Nutrition  Meal Plan 24 Hour Recall - Breakfast: bowl of cereal (chandrakant charms) w/ banana w/ milk and apple juice - maybe some coffee  Meal Plan 24 Hour Recall - Lunch: skipped   Meal Plan 24 Hour Recall - Dinner: black eyed peas + rice, baked chicken - water   Meal Plan 24 Hour Recall - Snack: 3 oreo cookies     Monitoring   Self Monitoring : not checking BG right now (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 and sometimes burns)   Blood Glucose Logs: No  Do you use a personal continuous glucose monitor?: No    Exercise   Frequency: Never    Current Diabetes Treatment   Current Treatment: Oral Medication, Diet, Exercise    Social History  Preferred Learning Method: Face to Face, Web Based, Reading Materials, Hands On  Primary Support: Spouse  Educational Level: College Graduate(associate degree in business admin)  Occupation: technician  Smoking Status: Never a Smoker  Alcohol Use: Rarely                                Barriers to Change  Barriers to Change: None  Learning Challenges : None    Readiness to Learn   Readiness to Learn : Eager    Cultural Influences  Cultural Influences: No    Diabetes Education Assessment/Progress  Diabetes Disease Process (diabetes disease process and treatment options): Discussion, Individual Session, Requires Assistance Family/SO   Ed on what DM is, insulin resistance, beta cell burnout and importance of using lifestyle changes + appropriate medications to control BG    Nutrition (Incorporating nutritional management into one's lifestyle): Discussion, Needs Review, Individual Session, Requires Assistance Family/SO, Written Materials Provided Ed on the plate method: importance of increasing non-starchy veggies, choosing lean protein, healthy fats and portioned servings of complex  cho's; label reading exercise demonstrating importance of reducing added sugar/eliminating sweet drinks       Physical Activity (incorporating physical activity into one's lifestyle): Not Covered/Deferred  Medications (states correct name, dose, onset, peak, duration, side effects & timing of meds): Discussion, Individual Session, Requires Assistance Family/SO   Ed on next options if pt unable to get BG controlled w/ diet and exercise modifications alone    Monitoring (monitoring blood glucose/other parameters & using results): Discussion, Individual Session, Requires Assistance Family/SO   Discussed if A1C unchanged or higher the importance of resuming SMBG regime - began discussion about wearable CGM options like freestyle flash sensor    Acute Complications (preventing, detecting, and treating acute complications): Not Covered/Deferred  Chronic Complications (preventing, detecting, and treating chronic complications): Discussion, Individual Session, Requires Assistance Family/SO   Ed on current A1C, goal A1C and importance of keeping BG well controlled to prevent complications r/t DM     Clinical (diabetes, other pertinent medical history, and relevant comorbidities reviewed during visit): Discussion, Individual Session, Requires Assistance Family/SO  Cognitive (knowledge of self-management skills, functional health literacy): Discussion, Requires Assistance Family/SO, Individual Session  Psychosocial (emotional response to diabetes): Discussion, Individual Session, Requires Assistance Family/SO  Diabetes Distress and Support Systems: Discussion, Individual Session, Requires Assistance Family/SO  Behavioral (readiness for change, lifestyle practices, self-care behaviors): Discussion, Individual Session, Requires Assistance Family/SO   Pt ready to begin making small changes in lifestyle to help work towards getting BG better controlled    Goals  Patient has selected/evaluated goals during today's session: Yes,  selected  Healthy Eating: Set(pt will eliminate sweet drinks from diet and increase non-starchy veggie servings)  Start Date: 05/05/20         Diabetes Care Plan/Intervention  Education Plan/Intervention: Individual Follow-Up DSMT    Diabetes Meal Plan  Restrictions: Restricted Carbohydrate  Carbohydrate Per Meal: 30-45g    Today's Self-Management Care Plan was developed with the patient's input and is based on barriers identified during today's assessment.    The long and short-term goals in the care plan were written with the patient/caregiver's input. The patient has agreed to work toward these goals to improve his overall diabetes control.      The patient received a copy of today's self-management plan and verbalized understanding of the care plan, goals, and all of today's instructions.      The patient was encouraged to communicate with his physician and care team regarding his condition(s) and treatment.  I provided the patient with my contact information today and encouraged him to contact me via phone or patient portal as needed.     Education Units of Time   Time Spent: 60 min

## 2020-05-11 ENCOUNTER — PATIENT MESSAGE (OUTPATIENT)
Dept: INTERNAL MEDICINE | Facility: CLINIC | Age: 65
End: 2020-05-11

## 2020-05-11 DIAGNOSIS — E11.9 TYPE 2 DIABETES MELLITUS WITHOUT COMPLICATION, WITHOUT LONG-TERM CURRENT USE OF INSULIN: ICD-10-CM

## 2020-05-11 RX ORDER — METFORMIN HYDROCHLORIDE 750 MG/1
1500 TABLET, EXTENDED RELEASE ORAL
Qty: 180 TABLET | Refills: 1 | Status: SHIPPED | OUTPATIENT
Start: 2020-05-11 | End: 2020-05-27 | Stop reason: SDUPTHER

## 2020-05-19 ENCOUNTER — PATIENT OUTREACH (OUTPATIENT)
Dept: OTHER | Facility: OTHER | Age: 65
End: 2020-05-19

## 2020-05-19 DIAGNOSIS — I10 ESSENTIAL HYPERTENSION: Primary | ICD-10-CM

## 2020-05-19 RX ORDER — AMLODIPINE BESYLATE 10 MG/1
10 TABLET ORAL DAILY
Qty: 90 TABLET | Refills: 1 | Status: SHIPPED | OUTPATIENT
Start: 2020-05-19 | End: 2020-05-27 | Stop reason: SDUPTHER

## 2020-05-19 NOTE — PROGRESS NOTES
Digital Medicine: Clinician Follow-Up    Called patient for digital medicine follow up. He is doing well. Blood pressure remains above goal. Patient reports adherence to medication regimen but, did miss dose on 5/17. States that this is unusual for him. Declined DDMP at this time as he does not wish to perform finger sticks.     The history is provided by the patient. No  was used.     Follow Up  Follow-up reason(s): reading review and medication change      Readings are trending up   Medication Change: dose increase            Assessment:  Patient's current 30-day average is not at goal of <130/80 mmHg.       Plan:  Increase amlodipine to 10 mg. Continue losartan 100 mg.   Patients health , Jackie Denny, will follow-up as scheduled.    I will continue to monitor regularly and will follow-up in 2 weeks, sooner if blood pressure begins to trend upward or downward.     Patient has my contact information and knows to call with any concerns or clinical changes.            There are no preventive care reminders to display for this patient.    Last 5 Patient Entered Readings                                      Current 30 Day Average: 137/82     Recent Readings 5/17/2020 5/9/2020 5/9/2020 5/3/2020 5/3/2020    SBP (mmHg) 155 138 145 124 143    DBP (mmHg) 88 84 85 78 79    Pulse 90 79 80 82 82        Hypertension Medications             amLODIPine (NORVASC) 10 MG tablet Take 1 tablet (10 mg total) by mouth once daily.    losartan (COZAAR) 100 MG tablet Take 1 tablet (100 mg total) by mouth once daily.

## 2020-05-26 ENCOUNTER — PATIENT OUTREACH (OUTPATIENT)
Dept: ADMINISTRATIVE | Facility: OTHER | Age: 65
End: 2020-05-26

## 2020-05-26 NOTE — PROGRESS NOTES
Chart reviewed.   Immunizations: updated  Orders placed: n/a  Upcoming appts to satisfy BRANDON topics: Hemoglobin A1c 7/23

## 2020-05-27 DIAGNOSIS — I10 ESSENTIAL HYPERTENSION: ICD-10-CM

## 2020-05-27 DIAGNOSIS — E11.9 TYPE 2 DIABETES MELLITUS WITHOUT COMPLICATION, WITHOUT LONG-TERM CURRENT USE OF INSULIN: ICD-10-CM

## 2020-05-27 RX ORDER — AMLODIPINE BESYLATE 10 MG/1
10 TABLET ORAL DAILY
Qty: 90 TABLET | Refills: 0 | Status: SHIPPED | OUTPATIENT
Start: 2020-05-27 | End: 2020-09-21

## 2020-05-27 RX ORDER — METFORMIN HYDROCHLORIDE 750 MG/1
1500 TABLET, EXTENDED RELEASE ORAL
Qty: 180 TABLET | Refills: 0 | Status: SHIPPED | OUTPATIENT
Start: 2020-05-27 | End: 2020-06-02 | Stop reason: SDUPTHER

## 2020-05-27 NOTE — PROGRESS NOTES
HPI     Glaucoma      Additional comments: IOP ck today an pt states no changes since last   exam              Comments     DLS: 4/20/20    1. POAG  2. VF Defect OS  3. Chorioretinal Scar OS  4. Hx RD OS  5. NS OU  6. Type 2 DM no DR  7. Pupil Sphincter Rupture OS  8. Presbyopia    MEDS:  Latanoprost QDAY OU          Last edited by Mayda Sterling MA on 5/28/2020  1:06 PM. (History)            Assessment /Plan     For exam results, see Encounter Report.    Primary open angle glaucoma of both eyes, mild stage    Arcuate visual field defect of left eye    Chorioretinal scar, left    Old subtotal retinal detachment, left    Pupillary sphincter rupture, left    Type 2 diabetes mellitus without ophthalmic manifestations    Bilateral presbyopia        [facility]   Presented to [facility] 8/23/2006 - off gtts with a baseline / Tmax of 25/27  Referred by Courtney for consideration of SLT's   Pt with POAG and poorly controlled IOP's - does not use drops regularly       1. Primary open angle glaucoma, both eyes, mild stage        Glaucoma (type and duration)    POAG with elevated IOP ou - mild stage    First HVF   2007 - full od // SAD os 2/2 to CRS   First photos   2011   Treatment / Drops started   2006           Family history    ++ mother and 2 brothers         Glaucoma meds    Latanoprost  (( use to use cosopt as well)         H/O adverse rxn to glaucoma drops    None (has tired alphagan and timolol in past -non compliant)         LASERS    SLT os - 3/31/2016 // SLT od - 4/14/2016         GLAUCOMA SURGERIES    none        OTHER EYE SURGERIES    H/O RD repair os - S/P laser repair         CDR    0.75 // 0.8        Tbase    25/27          Tmax    25/27            Ttarget    18/18             HVF    10 test 2006 to  2019 - full  od // SAD - 2/2 CRS from laser for RD repair os        Gonio    +3 ou          CCT    486/502 - thin ou         OCT    6 test 2011  to 2019 - RNFL - BORD t od // fluctuate - dec. S/I, BORD N (has a CRS)  os        HRT    3 test 2017 to 2019 -MR -  DEC. Sn/n/in od // DEC. S/n/i, BORD t os /// CDR 0.73 od // 0.86 os        Disc photos    2011, 2015 , 2017     - Ttoday    22/24  - Test done today  - IOP and HRT (HRT not done- ? Why- covid concerns)      2. 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 Chorioretinal scar, left   -with 2nd VF defect      4. Old subtotal retinal detachment, left   S/P repair - retina flat - stable   -large CRS      5. Type 2 diabetes mellitus without ophthalmic manifestations      6. Senile nuclear sclerosis  -mild - monitor      7. pupil sphincter rupture os  -suggest old trauma  -does NOT appear to have a angle recession    7. Presbyopia       PLAN    Corneal abrasion and retained FB - od - 4/20/2020 - resolved - healed     POAG with OHT OS > OD - mild od // moderate os   IOP up ou today 22/24 - 4/20/2020    The ON and VF's are still good ou   The VF loss os is 2/2 old CRS- ?  post laser for a RD repair   Good initial response to SLT ou 25/25 --> 16/17     LATANOPROST OU   Add back cosopt ou bid - has used in past (5/28/2020)     Photo file updated 5/28/2020     F/U 4 months HVF / DFE / disc photos     - Consider repeat SLT - if IOP up or if VF progression os    Bethany Lechuga MD

## 2020-05-28 ENCOUNTER — OFFICE VISIT (OUTPATIENT)
Dept: OPHTHALMOLOGY | Facility: CLINIC | Age: 65
End: 2020-05-28
Payer: MEDICARE

## 2020-05-28 DIAGNOSIS — H31.002 CHORIORETINAL SCAR, LEFT: ICD-10-CM

## 2020-05-28 DIAGNOSIS — H40.1131 PRIMARY OPEN ANGLE GLAUCOMA OF BOTH EYES, MILD STAGE: Primary | ICD-10-CM

## 2020-05-28 DIAGNOSIS — H33.052 OLD SUBTOTAL RETINAL DETACHMENT, LEFT: ICD-10-CM

## 2020-05-28 DIAGNOSIS — H21.562 PUPILLARY SPHINCTER RUPTURE, LEFT: ICD-10-CM

## 2020-05-28 DIAGNOSIS — H52.4 BILATERAL PRESBYOPIA: ICD-10-CM

## 2020-05-28 DIAGNOSIS — H53.432 ARCUATE VISUAL FIELD DEFECT OF LEFT EYE: ICD-10-CM

## 2020-05-28 DIAGNOSIS — E11.9 TYPE 2 DIABETES MELLITUS WITHOUT OPHTHALMIC MANIFESTATIONS: ICD-10-CM

## 2020-05-28 LAB
LEFT EYE DM RETINOPATHY: NEGATIVE
RIGHT EYE DM RETINOPATHY: NEGATIVE

## 2020-05-28 PROCEDURE — 92012 INTRM OPH EXAM EST PATIENT: CPT | Mod: HCNC,S$GLB,, | Performed by: OPHTHALMOLOGY

## 2020-05-28 PROCEDURE — 99999 PR PBB SHADOW E&M-EST. PATIENT-LVL II: CPT | Mod: PBBFAC,HCNC,, | Performed by: OPHTHALMOLOGY

## 2020-05-28 PROCEDURE — 92012 PR EYE EXAM, EST PATIENT,INTERMED: ICD-10-PCS | Mod: HCNC,S$GLB,, | Performed by: OPHTHALMOLOGY

## 2020-05-28 PROCEDURE — 99999 PR PBB SHADOW E&M-EST. PATIENT-LVL II: ICD-10-PCS | Mod: PBBFAC,HCNC,, | Performed by: OPHTHALMOLOGY

## 2020-05-28 RX ORDER — LATANOPROST 50 UG/ML
1 SOLUTION/ DROPS OPHTHALMIC NIGHTLY
Qty: 3 BOTTLE | Refills: 3 | Status: SHIPPED | OUTPATIENT
Start: 2020-05-28 | End: 2020-06-05 | Stop reason: SDUPTHER

## 2020-05-28 RX ORDER — DORZOLAMIDE HYDROCHLORIDE AND TIMOLOL MALEATE 20; 5 MG/ML; MG/ML
1 SOLUTION/ DROPS OPHTHALMIC 2 TIMES DAILY
Qty: 3 BOTTLE | Refills: 3 | OUTPATIENT
Start: 2020-05-28 | End: 2020-05-28 | Stop reason: SDUPTHER

## 2020-05-28 RX ORDER — DORZOLAMIDE HYDROCHLORIDE AND TIMOLOL MALEATE 20; 5 MG/ML; MG/ML
1 SOLUTION/ DROPS OPHTHALMIC 2 TIMES DAILY
Qty: 3 BOTTLE | Refills: 3 | Status: SHIPPED | OUTPATIENT
Start: 2020-05-28 | End: 2020-06-05 | Stop reason: SDUPTHER

## 2020-06-01 ENCOUNTER — TELEPHONE (OUTPATIENT)
Dept: INTERNAL MEDICINE | Facility: CLINIC | Age: 65
End: 2020-06-01

## 2020-06-02 ENCOUNTER — PATIENT OUTREACH (OUTPATIENT)
Dept: OTHER | Facility: OTHER | Age: 65
End: 2020-06-02

## 2020-06-02 DIAGNOSIS — E11.9 TYPE 2 DIABETES MELLITUS WITHOUT COMPLICATION, WITHOUT LONG-TERM CURRENT USE OF INSULIN: ICD-10-CM

## 2020-06-02 RX ORDER — METFORMIN HYDROCHLORIDE 750 MG/1
1500 TABLET, EXTENDED RELEASE ORAL
Qty: 180 TABLET | Refills: 0 | Status: SHIPPED | OUTPATIENT
Start: 2020-06-02 | End: 2020-09-21

## 2020-06-02 NOTE — PROGRESS NOTES
Digital Medicine: Clinician Follow-Up    Called patient for digital medicine follow up. Patient started increased dose of amlodipine ~1 week ago and is tolerating medication regimen well. Blood pressure is at goal today and patient denies s/s of hypotension with at goal reading.     The history is provided by the patient. No  was used.     Follow Up  Follow-up reason(s): reading review, medication change follow-up and routine education      Patient started new medication.    Is patient tolerating med change?:  Yes          Assessment:  Patient's current 30-day average is not at goal of <130/80 mmHg however, readings are trending down.        Plan:  Continue current regimen.   Patients health , Jackie Denny, will follow-up as scheduled.    I will continue to monitor regularly and will follow-up in 4 weeks, sooner if blood pressure begins to trend upward or downward.     Patient has my contact information and knows to call with any concerns or clinical changes.              Last 5 Patient Entered Readings                                      Current 30 Day Average: 139/82     Recent Readings 6/2/2020 5/22/2020 5/21/2020 5/17/2020 5/9/2020    SBP (mmHg) 119 157 142 155 138    DBP (mmHg) 77 88 80 88 84    Pulse 76 74 74 90 79             Hypertension Medications             amLODIPine (NORVASC) 10 MG tablet Take 1 tablet (10 mg total) by mouth once daily.    losartan (COZAAR) 100 MG tablet Take 1 tablet (100 mg total) by mouth once daily.

## 2020-06-04 DIAGNOSIS — E78.5 HYPERLIPIDEMIA, UNSPECIFIED HYPERLIPIDEMIA TYPE: ICD-10-CM

## 2020-06-04 DIAGNOSIS — I10 ESSENTIAL HYPERTENSION: ICD-10-CM

## 2020-06-04 RX ORDER — ATORVASTATIN CALCIUM 80 MG/1
80 TABLET, FILM COATED ORAL DAILY
Qty: 90 TABLET | Refills: 3 | Status: SHIPPED | OUTPATIENT
Start: 2020-06-04 | End: 2021-10-05 | Stop reason: SDUPTHER

## 2020-06-04 RX ORDER — LOSARTAN POTASSIUM 100 MG/1
100 TABLET ORAL DAILY
Qty: 90 TABLET | Refills: 3 | Status: SHIPPED | OUTPATIENT
Start: 2020-06-04 | End: 2021-09-10 | Stop reason: SDUPTHER

## 2020-06-08 ENCOUNTER — PATIENT OUTREACH (OUTPATIENT)
Dept: ADMINISTRATIVE | Facility: OTHER | Age: 65
End: 2020-06-08

## 2020-06-09 ENCOUNTER — CLINICAL SUPPORT (OUTPATIENT)
Dept: DIABETES | Facility: CLINIC | Age: 65
End: 2020-06-09
Payer: MEDICARE

## 2020-06-09 DIAGNOSIS — E11.65 TYPE 2 DIABETES MELLITUS WITH HYPERGLYCEMIA, WITHOUT LONG-TERM CURRENT USE OF INSULIN: Primary | ICD-10-CM

## 2020-06-09 PROCEDURE — 99211 OFF/OP EST MAY X REQ PHY/QHP: CPT | Mod: HCNC

## 2020-06-09 PROCEDURE — G0108 DIAB MANAGE TRN  PER INDIV: HCPCS | Mod: HCNC,,, | Performed by: FAMILY MEDICINE

## 2020-06-09 PROCEDURE — G0108 PR DIAB MANAGE TRN  PER INDIV: ICD-10-PCS | Mod: HCNC,,, | Performed by: FAMILY MEDICINE

## 2020-06-09 NOTE — PROGRESS NOTES
Chart reviewed.   Immunizations: Triggered Imm Registry     Orders placed: n/a  Upcoming appts to satisfy BRANDON topics: n/a

## 2020-06-10 ENCOUNTER — PATIENT OUTREACH (OUTPATIENT)
Dept: OTHER | Facility: OTHER | Age: 65
End: 2020-06-10

## 2020-06-10 NOTE — PROGRESS NOTES
Digital Medicine: Health  Follow-Up    Called patient for health  follow up.  He states he is feeling fine and denies symptoms of hypertension.  Patient states he spoke with the nutritionist with the Diabetes Management program yesterday.    The history is provided by the patient. No  was used.   Follow Up  Follow-up reason(s): reading review      Readings are trending down       INTERVENTION(S)  recommended diet modifications, recommend physical activity, encouragement/support, denied resources and denied questions    PLAN  continue monitoring      There are no preventive care reminders to display for this patient.    Last 5 Patient Entered Readings                                      Current 30 Day Average: 136/79     Recent Readings 6/6/2020 6/5/2020 6/2/2020 5/22/2020 5/21/2020    SBP (mmHg) 134 108 119 157 142    DBP (mmHg) 71 67 77 88 80    Pulse 78 81 76 74 74                      Diet Screening   Patient reports eating or drinking the following: water, fresh vegetables and protein, grainsHe has the following dietary restrictions: low sodium diet and diabeticHe has meals prepared by family.    Patient lets family grocery shop.  He gets groceries from the grocery store.      Assigning the following patient goals: meal plan and maintain low sodium diet    Physical Activity Screening   No change to exercise routine.    When asked if exercising, patient responded: no    Assigning the following patient goal(s): increase physical activity    Medication Adherence Screening   He did not miss a dose this month.  Patient knows purpose of medications.      Patient identified the following reasons for non-compliance: None      SDOH

## 2020-06-10 NOTE — PROGRESS NOTES
Diabetes Care Specialist Telehealth Visit Note     It was in the patient's best interest to receive diabetes self-management education and support services in this format to prevent the exposure to COVID-19.        Established Patient - Audio Only Telehealth Visit  The patient location is: home   The chief complaint leading to consultation is: Diabetes    Visit type: Virtual visit with audio only (telephone)  Total time spent with patient: 30 min      The reason for the audio only service rather than synchronous audio and video virtual visit was related to technical difficulties or patient preference/necessity.     Each patient to whom I provide medical services by telemedicine is:  (1) informed of the relationship between the physician and patient and the respective role of any other health care provider with respect to management of the patient; and (2) notified that they may decline to receive medical services by telemedicine and may withdraw from such care at any time. Patient verbally consented to receive this service via voice-only telephone call.              Diabetes Education  Author: Charu Prater RN  Date: 6/10/2020    Diabetes Care Management Summary  Diabetes Education Record Assessment/Progress: Comprehensive/Group  Current Diabetes Risk Level: Low         Diabetes Type  Diabetes Type : Type II    Diabetes History  Current Treatment: Oral Medication  Reviewed Problem List with Patient: Yes    Health Maintenance was reviewed today with patient. Discussed with patient importance of routine eye exams, foot exams/foot care, blood work (i.e.: A1c, microalbumin, and lipid), dental visits, yearly flu vaccine, and pneumonia vaccine as indicated by PCP. Patient verbalized understanding.     Health Maintenance Topics with due status: Not Due       Topic Last Completion Date    TETANUS VACCINE 06/27/2016    Colonoscopy 01/25/2019    Lipid Panel 02/12/2020    Hemoglobin A1c 04/24/2020    Eye Exam 05/28/2020    Low  Dose Statin 06/04/2020     Health Maintenance Due   Topic Date Due    Foot Exam  12/08/2018    Pneumococcal Vaccine (65+ High/Highest Risk) (2 of 2 - PPSV23) 03/25/2020       Nutrition  Meal Plan 24 Hour Recall - Breakfast: grits, sausage - apple juice   Meal Plan 24 Hour Recall - Lunch: tunafish sandwich - water   Meal Plan 24 Hour Recall - Dinner: tacos - 2 - water   Meal Plan 24 Hour Recall - Snack: apple sauce squeeze tube     Monitoring   Self Monitoring : not checking BG right now   Blood Glucose Logs: No  Do you use a personal continuous glucose monitor?: No    Exercise   Frequency: Never    Current Diabetes Treatment   Current Treatment: Oral Medication                                                    Diabetes Education Assessment/Progress  Diabetes Disease Process (diabetes disease process and treatment options): Not Covered/Deferred  Nutrition (Incorporating nutritional management into one's lifestyle): Discussion, Individual Session, Requires Assistance Family/SO(pt continuing to work on reducing added sugar in diet - is drinking sweet drinks much less)  Physical Activity (incorporating physical activity into one's lifestyle): Discussion, Individual Session, Requires Assistance Family/SO(is staying busy around the house doing projects, focusing on staying active)  Medications (states correct name, dose, onset, peak, duration, side effects & timing of meds): Not Covered/Deferred  Monitoring (monitoring blood glucose/other parameters & using results): Not Covered/Deferred  Acute Complications (preventing, detecting, and treating acute complications): Not Covered/Deferred  Chronic Complications (preventing, detecting, and treating chronic complications): Discussion, Individual Session, Requires Assistance Family/SO(pt scheduled for f/u A1C  - reviewed goal A1C )  Clinical (diabetes, other pertinent medical history, and relevant comorbidities reviewed during visit): Not Covered/Deferred  Cognitive  (knowledge of self-management skills, functional health literacy): Not Covered/Deferred  Psychosocial (emotional response to diabetes): Not Covered/Deferred  Diabetes Distress and Support Systems: Not Covered/Deferred  Behavioral (readiness for change, lifestyle practices, self-care behaviors): Discussion, Individual Session, Requires Assistance Family/SO(pt remains motivated to maintain changes and continue working to keep BG well controlled)    Goals  Patient has selected/evaluated goals during today's session: Yes, evaluated  Healthy Eating: In Progress         Diabetes Care Plan/Intervention  Education Plan/Intervention: Individual Follow-Up DSMT    Diabetes Meal Plan  Restrictions: Restricted Carbohydrate  Carbohydrate Per Meal: 30-45g    Today's Self-Management Care Plan was developed with the patient's input and is based on barriers identified during today's assessment.    The long and short-term goals in the care plan were written with the patient/caregiver's input. The patient has agreed to work toward these goals to improve his overall diabetes control.      The patient received a copy of today's self-management plan and verbalized understanding of the care plan, goals, and all of today's instructions.      The patient was encouraged to communicate with his physician and care team regarding his condition(s) and treatment.  I provided the patient with my contact information today and encouraged him to contact me via phone or patient portal as needed.     Education Units of Time   Time Spent: 30 min

## 2020-06-30 ENCOUNTER — PATIENT OUTREACH (OUTPATIENT)
Dept: OTHER | Facility: OTHER | Age: 65
End: 2020-06-30

## 2020-07-09 ENCOUNTER — OFFICE VISIT (OUTPATIENT)
Dept: INTERNAL MEDICINE | Facility: CLINIC | Age: 65
End: 2020-07-09
Attending: FAMILY MEDICINE
Payer: MEDICARE

## 2020-07-09 DIAGNOSIS — E11.9 TYPE 2 DIABETES MELLITUS WITHOUT COMPLICATION, WITHOUT LONG-TERM CURRENT USE OF INSULIN: Primary | ICD-10-CM

## 2020-07-09 DIAGNOSIS — I10 ESSENTIAL HYPERTENSION: ICD-10-CM

## 2020-07-09 DIAGNOSIS — E78.5 HYPERLIPIDEMIA, UNSPECIFIED HYPERLIPIDEMIA TYPE: ICD-10-CM

## 2020-07-09 PROCEDURE — 1101F PT FALLS ASSESS-DOCD LE1/YR: CPT | Mod: HCNC,CPTII,95, | Performed by: FAMILY MEDICINE

## 2020-07-09 PROCEDURE — 1159F PR MEDICATION LIST DOCUMENTED IN MEDICAL RECORD: ICD-10-PCS | Mod: HCNC,95,, | Performed by: FAMILY MEDICINE

## 2020-07-09 PROCEDURE — 99499 RISK ADDL DX/OHS AUDIT: ICD-10-PCS | Mod: HCNC,95,, | Performed by: FAMILY MEDICINE

## 2020-07-09 PROCEDURE — 1159F MED LIST DOCD IN RCRD: CPT | Mod: HCNC,95,, | Performed by: FAMILY MEDICINE

## 2020-07-09 PROCEDURE — 99214 PR OFFICE/OUTPT VISIT, EST, LEVL IV, 30-39 MIN: ICD-10-PCS | Mod: HCNC,95,, | Performed by: FAMILY MEDICINE

## 2020-07-09 PROCEDURE — 99214 OFFICE O/P EST MOD 30 MIN: CPT | Mod: HCNC,95,, | Performed by: FAMILY MEDICINE

## 2020-07-09 PROCEDURE — 3051F HG A1C>EQUAL 7.0%<8.0%: CPT | Mod: HCNC,CPTII,95, | Performed by: FAMILY MEDICINE

## 2020-07-09 PROCEDURE — 3051F PR MOST RECENT HEMOGLOBIN A1C LEVEL 7.0 - < 8.0%: ICD-10-PCS | Mod: HCNC,CPTII,95, | Performed by: FAMILY MEDICINE

## 2020-07-09 PROCEDURE — 1101F PR PT FALLS ASSESS DOC 0-1 FALLS W/OUT INJ PAST YR: ICD-10-PCS | Mod: HCNC,CPTII,95, | Performed by: FAMILY MEDICINE

## 2020-07-09 PROCEDURE — 99499 UNLISTED E&M SERVICE: CPT | Mod: HCNC,95,, | Performed by: FAMILY MEDICINE

## 2020-07-09 NOTE — PROGRESS NOTES
The patient location is:  home  The chief complaint leading to consultation is:  Return to work    Visit type: audiovisual    Face to Face time with patient:  15 min  Twenty minutes of total time spent on the encounter, which includes face to face time and non-face to face time preparing to see the patient (eg, review of tests), Obtaining and/or reviewing separately obtained history, Documenting clinical information in the electronic or other health record, Independently interpreting results (not separately reported) and communicating results to the patient/family/caregiver, or Care coordination (not separately reported).         Each patient to whom he or she provides medical services by telemedicine is:  (1) informed of the relationship between the physician and patient and the respective role of any other health care provider with respect to management of the patient; and (2) notified that he or she may decline to receive medical services by telemedicine and may withdraw from such care at any time.    Notes:   CHIEF COMPLAINT:  Work status    HISTORY OF PRESENT ILLNESS: The patient is a 65 year-old BM.  The patient has no specific complaints today.  His employer recently put him on short-term disability due to concerns about him jonnathan COVID 19 due to some risk factors.  His short-term disability has ended.  He was denied long-term disability.  He is entirely asymptomatic.  This is simply an anxiety issue.  He would like to get some sort of documentation for recommendation to stay home from work well still getting paid.  This is in direct contra distinction to the current CDC guidelines.  Given that he would be at home without pay he has decided to return to work.    The patient has diabetes.  Recent blood sugars have been less than 200.  There have been no episodes of hypoglycemia.  Patient does routinely monitor their blood sugar.    The patient has a history of stable hypertension on current medications.   Patient denies chest pain or shortness of breath today.    The patient has a history of stable hyperlipidemia on current medications.  The patient denies chest pain or shortness of breath today.  The patient denies muscle aches or myalgias suggestive of myositis.    REVIEW OF SYSTEMS:  GENERAL: No fever, chills, fatigability or weight loss.  SKIN: No rashes, itching or changes in color or texture of skin.  HEAD: No headaches or recent head trauma.  EYES: Visual acuity fine. No photophobia, ocular pain or diplopia.  EARS: Denies ear pain, discharge or vertigo.  NOSE: No loss of smell, no epistaxis or postnasal drip.  MOUTH & THROAT: No hoarseness or change in voice. No excessive gum bleeding.  NODES: Denies swollen glands.  CHEST: Denies QUINTEROS, cyanosis, wheezing, cough and sputum production.  CARDIOVASCULAR: Denies chest pain, PND, orthopnea or reduced exercise tolerance.  ABDOMEN: Appetite fine. No weight loss. Denies diarrhea, abdominal pain, hematemesis or blood in stool.  URINARY: No flank pain, dysuria or hematuria.  PERIPHERAL VASCULAR: No claudication or cyanosis.  MUSCULOSKELETAL: No joint stiffness or swelling. Denies back pain.  NEUROLOGIC: No history of seizures, paralysis, alteration of gait or coordination.    SOCIAL HISTORY: The patient does not smoke.  The patient consumes alcohol socially.      PHYSICAL EXAMINATION:   There were no vitals taken for this visit.    APPEARANCE: Well nourished, well developed, in no acute distress.    HEAD: Normocephalic, atraumatic.  EYES: PERRL. EOMI.  Conjunctivae without injection and  anicteric  NEUROLOGIC:       Normal speech development.      Hearing normal.  PSYCHIATRIC: Patient is alert and oriented x3.  Thought processes are all normal.  There is no homicidality.  There is no suicidality.  There is no evidence of psychosis.    LABORATORY/RADIOLOGY:   Chart reviewed.      ASSESSMENT:   Diabetes  Hypertension  Hyperlipidemia    PLAN:  Return to work note  given  Follow up PCP  Answers for HPI/ROS submitted by the patient on 7/9/2020   activity change: No  unexpected weight change: No  neck pain: No  hearing loss: No  rhinorrhea: No  trouble swallowing: No  eye discharge: No  visual disturbance: No  chest tightness: No  wheezing: No  chest pain: No  palpitations: No  blood in stool: No  constipation: No  vomiting: No  diarrhea: No  polydipsia: No  polyuria: No  difficulty urinating: No  urgency: No  hematuria: No  joint swelling: No  arthralgias: No  headaches: No  weakness: No  confusion: No  dysphoric mood: Yes

## 2020-07-09 NOTE — LETTER
July 9, 2020      [facility] [address]  [address]  [facility] [address]  Phone: [phone]  Fax: [phone]       Patient: Eyad Garcia   YOB: 1955  Date of Visit: 07/09/2020    To Whom It May Concern:    Harpreet Garcia  was at [facility] on 07/09/2020. He may return to work on 7/13/2020 with no restrictions. If you have any questions or concerns, or if I can be of further assistance, please do not hesitate to contact me.    Sincerely,    Trav Santos MD

## 2020-07-23 ENCOUNTER — LAB VISIT (OUTPATIENT)
Dept: LAB | Facility: OTHER | Age: 65
End: 2020-07-23
Attending: FAMILY MEDICINE
Payer: MEDICARE

## 2020-07-23 DIAGNOSIS — E11.65 TYPE 2 DIABETES MELLITUS WITH HYPERGLYCEMIA, WITHOUT LONG-TERM CURRENT USE OF INSULIN: ICD-10-CM

## 2020-07-23 LAB
ALBUMIN SERPL BCP-MCNC: 4.4 G/DL (ref 3.5–5.2)
ALP SERPL-CCNC: 84 U/L (ref 55–135)
ALT SERPL W/O P-5'-P-CCNC: 21 U/L (ref 10–44)
ANION GAP SERPL CALC-SCNC: 12 MMOL/L (ref 8–16)
AST SERPL-CCNC: 19 U/L (ref 10–40)
BILIRUB SERPL-MCNC: 0.5 MG/DL (ref 0.1–1)
BUN SERPL-MCNC: 14 MG/DL (ref 8–23)
CALCIUM SERPL-MCNC: 9.9 MG/DL (ref 8.7–10.5)
CHLORIDE SERPL-SCNC: 106 MMOL/L (ref 95–110)
CO2 SERPL-SCNC: 21 MMOL/L (ref 23–29)
CREAT SERPL-MCNC: 0.9 MG/DL (ref 0.5–1.4)
EST. GFR  (AFRICAN AMERICAN): >60 ML/MIN/1.73 M^2
EST. GFR  (NON AFRICAN AMERICAN): >60 ML/MIN/1.73 M^2
ESTIMATED AVG GLUCOSE: 160 MG/DL (ref 68–131)
GLUCOSE SERPL-MCNC: 156 MG/DL (ref 70–110)
HBA1C MFR BLD HPLC: 7.2 % (ref 4–5.6)
POTASSIUM SERPL-SCNC: 4.3 MMOL/L (ref 3.5–5.1)
PROT SERPL-MCNC: 7.7 G/DL (ref 6–8.4)
SODIUM SERPL-SCNC: 139 MMOL/L (ref 136–145)

## 2020-07-23 PROCEDURE — 83036 HEMOGLOBIN GLYCOSYLATED A1C: CPT | Mod: HCNC

## 2020-07-23 PROCEDURE — 36415 COLL VENOUS BLD VENIPUNCTURE: CPT | Mod: HCNC

## 2020-07-23 PROCEDURE — 80053 COMPREHEN METABOLIC PANEL: CPT | Mod: HCNC

## 2020-08-11 NOTE — PROGRESS NOTES
Chart Reviewed. Patient's current 30-day average is at goal of <130/80 mmHg per 2017 ACC/AHA HTN guidelines. No medication recommendations at this time. I will continue monitoring and follow-up in 1-2 months, sooner if blood pressure begins to trend upward or downward.     Last 5 Patient Entered Readings                                      Current 30 Day Average: 127/76     Recent Readings 8/5/2020 7/26/2020 7/26/2020 7/15/2020 7/7/2020    SBP (mmHg) 139 124 147 119 126    DBP (mmHg) 74 78 78 74 74    Pulse 80 85 89 73 81        Hypertension Medications             amLODIPine (NORVASC) 10 MG tablet Take 1 tablet (10 mg total) by mouth once daily.    losartan (COZAAR) 100 MG tablet Take 1 tablet (100 mg total) by mouth once daily.                
Digital Medicine: Clinician Follow-Up    Called Mr. Eyad Garcia for hypertension digital medicine follow-up. Patient reports adherence  to medication regimen with no complaints. Blood pressure has trended down and current average is now at goal. Patient states that he is tolerating his readings with no complaints.     Patient denies s/s of hypotension (lightheadedness, dizziness, nausea, fatigue) associated with low readings. Instructed patient to inform me if this occurs, patient confirms understanding.    Patient denies s/s of hypertension (SOB, CP, severe headaches, changes in vision) associated with high readings. Instructed patient to go to the ED if BP >180/110 and accompanied by hypertensive s/s, patient confirms understanding.        The history is provided by the patient. No  was used.   Follow Up  Follow-up reason(s): reading review and routine education              Assessment:  Patient's current 30-day average is at goal of <130/80 mmHg.       Plan:  Continue current regimen.   Patient's health , Jackie Denny, will follow-up as scheduled.    I will continue to monitor regularly and will follow-up in 6-8 weeks, sooner if blood pressure begins to trend upward or downward.     Patient has my contact information and knows to call with any concerns or clinical changes.          There are no preventive care reminders to display for this patient.    Last 5 Patient Entered Readings                                      Current 30 Day Average: 126/74     Recent Readings 6/27/2020 6/24/2020 6/23/2020 6/14/2020 6/13/2020    SBP (mmHg) 137 125 133 130 118    DBP (mmHg) 77 69 78 79 76    Pulse 78 79 74 71 76             Hypertension Medications             amLODIPine (NORVASC) 10 MG tablet Take 1 tablet (10 mg total) by mouth once daily.    losartan (COZAAR) 100 MG tablet Take 1 tablet (100 mg total) by mouth once daily.            
Left VM for general follow up. Patient's blood pressure has trended down and overall average is at goal. Will continue monitoring and follow up in 4-6 weeks    Last 5 Patient Entered Readings                                      Current 30 Day Average: 126/74     Recent Readings 6/27/2020 6/24/2020 6/23/2020 6/14/2020 6/13/2020    SBP (mmHg) 137 125 133 130 118    DBP (mmHg) 77 69 78 79 76    Pulse 78 79 74 71 76        Hypertension Medications             amLODIPine (NORVASC) 10 MG tablet Take 1 tablet (10 mg total) by mouth once daily.    losartan (COZAAR) 100 MG tablet Take 1 tablet (100 mg total) by mouth once daily.          
Airborne/Contact

## 2020-08-12 ENCOUNTER — OFFICE VISIT (OUTPATIENT)
Dept: RADIATION ONCOLOGY | Facility: CLINIC | Age: 65
End: 2020-08-12
Attending: RADIOLOGY
Payer: MEDICARE

## 2020-08-12 ENCOUNTER — LAB VISIT (OUTPATIENT)
Dept: LAB | Facility: OTHER | Age: 65
End: 2020-08-12
Attending: RADIOLOGY
Payer: MEDICARE

## 2020-08-12 VITALS
BODY MASS INDEX: 26.34 KG/M2 | WEIGHT: 167.81 LBS | HEIGHT: 67 IN | HEART RATE: 81 BPM | TEMPERATURE: 99 F | SYSTOLIC BLOOD PRESSURE: 136 MMHG | RESPIRATION RATE: 16 BRPM | DIASTOLIC BLOOD PRESSURE: 80 MMHG

## 2020-08-12 DIAGNOSIS — C61 MALIGNANT NEOPLASM OF PROSTATE: ICD-10-CM

## 2020-08-12 DIAGNOSIS — C61 MALIGNANT NEOPLASM OF PROSTATE: Primary | ICD-10-CM

## 2020-08-12 LAB — COMPLEXED PSA SERPL-MCNC: <0.01 NG/ML (ref 0–4)

## 2020-08-12 PROCEDURE — 99212 PR OFFICE/OUTPT VISIT, EST, LEVL II, 10-19 MIN: ICD-10-PCS | Mod: HCNC,S$GLB,, | Performed by: RADIOLOGY

## 2020-08-12 PROCEDURE — 3079F PR MOST RECENT DIASTOLIC BLOOD PRESSURE 80-89 MM HG: ICD-10-PCS | Mod: HCNC,CPTII,S$GLB, | Performed by: RADIOLOGY

## 2020-08-12 PROCEDURE — 84153 ASSAY OF PSA TOTAL: CPT | Mod: HCNC

## 2020-08-12 PROCEDURE — 99999 PR PBB SHADOW E&M-EST. PATIENT-LVL III: CPT | Mod: PBBFAC,HCNC,, | Performed by: RADIOLOGY

## 2020-08-12 PROCEDURE — 1126F AMNT PAIN NOTED NONE PRSNT: CPT | Mod: HCNC,S$GLB,, | Performed by: RADIOLOGY

## 2020-08-12 PROCEDURE — 1159F PR MEDICATION LIST DOCUMENTED IN MEDICAL RECORD: ICD-10-PCS | Mod: HCNC,S$GLB,, | Performed by: RADIOLOGY

## 2020-08-12 PROCEDURE — 1159F MED LIST DOCD IN RCRD: CPT | Mod: HCNC,S$GLB,, | Performed by: RADIOLOGY

## 2020-08-12 PROCEDURE — 36415 COLL VENOUS BLD VENIPUNCTURE: CPT | Mod: HCNC

## 2020-08-12 PROCEDURE — 3008F PR BODY MASS INDEX (BMI) DOCUMENTED: ICD-10-PCS | Mod: HCNC,CPTII,S$GLB, | Performed by: RADIOLOGY

## 2020-08-12 PROCEDURE — 1101F PR PT FALLS ASSESS DOC 0-1 FALLS W/OUT INJ PAST YR: ICD-10-PCS | Mod: HCNC,CPTII,S$GLB, | Performed by: RADIOLOGY

## 2020-08-12 PROCEDURE — 3075F PR MOST RECENT SYSTOLIC BLOOD PRESS GE 130-139MM HG: ICD-10-PCS | Mod: HCNC,CPTII,S$GLB, | Performed by: RADIOLOGY

## 2020-08-12 PROCEDURE — 3079F DIAST BP 80-89 MM HG: CPT | Mod: HCNC,CPTII,S$GLB, | Performed by: RADIOLOGY

## 2020-08-12 PROCEDURE — 1101F PT FALLS ASSESS-DOCD LE1/YR: CPT | Mod: HCNC,CPTII,S$GLB, | Performed by: RADIOLOGY

## 2020-08-12 PROCEDURE — 3075F SYST BP GE 130 - 139MM HG: CPT | Mod: HCNC,CPTII,S$GLB, | Performed by: RADIOLOGY

## 2020-08-12 PROCEDURE — 1126F PR PAIN SEVERITY QUANTIFIED, NO PAIN PRESENT: ICD-10-PCS | Mod: HCNC,S$GLB,, | Performed by: RADIOLOGY

## 2020-08-12 PROCEDURE — 99999 PR PBB SHADOW E&M-EST. PATIENT-LVL III: ICD-10-PCS | Mod: PBBFAC,HCNC,, | Performed by: RADIOLOGY

## 2020-08-12 PROCEDURE — 3008F BODY MASS INDEX DOCD: CPT | Mod: HCNC,CPTII,S$GLB, | Performed by: RADIOLOGY

## 2020-08-12 PROCEDURE — 99212 OFFICE O/P EST SF 10 MIN: CPT | Mod: HCNC,S$GLB,, | Performed by: RADIOLOGY

## 2020-08-12 NOTE — PROGRESS NOTES
Subjective:       Patient ID: Eyad Garcia is a 65 y.o. male.    Chief Complaint: Prostate Cancer (6mo f/u;psa)    This patient returns for follow up visit.     Mr. Garcia has a history of recurrent prostate cancer. He initially presented in 2015 when biopsies from the [place]t. base revealed a small (2%) focus of Scooter 8 (4+4) adenocarcinoma. PSA at that time was 6.6 ng/ml. Work up with bone scan was negative. He subsequently underwent RALP with bilateral pelvic node dissection in July of 2015. Pathology revealed a 6.5 x 5 x 5 cm prostate, weighing 117 g. There was a single focus of Scooter 6 (3+3) adenocarcinoma in a background of extensive high-grade PIN. The tumor accounted for less than 1% of the specimen. There was no extraprostatic extension, seminal vesicle invasion or lymphovascular invasion. The margins and 10 (5 Lt., 5 Rt.) pelvic nodes were negative for tumor involvement. Postoperatively the patient recovered well. Postoperative PSA in 2015 was 0.03 ng/ml. It has increases slowly until recently. PSA in March of 2017 was 0.14 ng/ml. Repeat PSA in November of 2018 had increased to 1.9 ng/ml. His most recent PSA on 6/20/19 was 4 ng/ml. He was referred to our department. CAROLINA was negative and work up with bone scan and Axumin scan were negative. There was uptake in one small pelvic node. The patient was referred for radiotherapy. He also received a 6 month Lupron injection on 8/6/19.  He completed 46.8 Gy to the prostate bed and nodes followed by a boost to the prostate bed and the node identified on PET on 11/15/19.   Today the patient states he feels well.  No significant complaints.       Review of Systems   Constitutional: Negative for activity change, appetite change, chills, fatigue and fever.   Respiratory: Negative for cough and shortness of breath.    Gastrointestinal: Negative for abdominal pain, constipation and diarrhea.   Genitourinary: Negative for bladder incontinence, difficulty urinating,  dysuria, frequency and hematuria.         Objective:      Physical Exam  Constitutional:       Appearance: Normal appearance.   Abdominal:      General: Abdomen is flat. There is no distension.   Neurological:      Mental Status: He is alert and oriented to person, place, and time.   Psychiatric:         Mood and Affect: Mood normal.         Judgment: Judgment normal.       PSA - pending.    Assessment:       1. Malignant neoplasm of prostate        Plan:       Recovered from radiotherapy.  Check PSA today with phone follow up.

## 2020-08-19 ENCOUNTER — PATIENT OUTREACH (OUTPATIENT)
Dept: OTHER | Facility: OTHER | Age: 65
End: 2020-08-19

## 2020-08-19 NOTE — PROGRESS NOTES
Digital Medicine: Health  Follow-Up    Called patient for follow up.  He states he is feeling well.    The history is provided by the patient.             Reason for review: Blood pressure at goal        Topics Covered on Call: physical activity and Diet          Diet-no change to diet    No change to diet.        Physical Activity-no change to routine  No change to exercise routine.     Medication Adherence-Medication adherence was assessed.      Substance, Sleep, Stress-Not assessed      Continue current diet/physical activity routine.       Addressed any questions or concerns and patient has my contact information if needed prior to next outreach.   Explained the importance of self-monitoring and medication adherence. Encouraged the patient to communicate with their health  for lifestyle modifications to help improve or maintain a healthy lifestyle.            There are no preventive care reminders to display for this patient.    Last 5 Patient Entered Readings                                      Current 30 Day Average: 130/77     Recent Readings 8/13/2020 8/5/2020 7/26/2020 7/26/2020 7/15/2020    SBP (mmHg) 127 139 124 147 119    DBP (mmHg) 76 74 78 78 74    Pulse 76 80 85 89 73

## 2020-09-21 DIAGNOSIS — E11.9 TYPE 2 DIABETES MELLITUS WITHOUT COMPLICATION, WITHOUT LONG-TERM CURRENT USE OF INSULIN: ICD-10-CM

## 2020-09-21 RX ORDER — METFORMIN HYDROCHLORIDE 750 MG/1
1500 TABLET, EXTENDED RELEASE ORAL
Qty: 180 TABLET | Refills: 0 | Status: SHIPPED | OUTPATIENT
Start: 2020-09-21 | End: 2021-04-01

## 2020-09-29 ENCOUNTER — PATIENT MESSAGE (OUTPATIENT)
Dept: OTHER | Facility: OTHER | Age: 65
End: 2020-09-29

## 2020-10-01 ENCOUNTER — IMMUNIZATION (OUTPATIENT)
Dept: PHARMACY | Facility: CLINIC | Age: 65
End: 2020-10-01
Payer: MEDICARE

## 2020-10-01 ENCOUNTER — LAB VISIT (OUTPATIENT)
Dept: LAB | Facility: HOSPITAL | Age: 65
End: 2020-10-01
Attending: FAMILY MEDICINE
Payer: MEDICARE

## 2020-10-01 ENCOUNTER — OFFICE VISIT (OUTPATIENT)
Dept: PRIMARY CARE CLINIC | Facility: CLINIC | Age: 65
End: 2020-10-01
Payer: MEDICARE

## 2020-10-01 VITALS
SYSTOLIC BLOOD PRESSURE: 122 MMHG | BODY MASS INDEX: 27.46 KG/M2 | DIASTOLIC BLOOD PRESSURE: 66 MMHG | HEART RATE: 76 BPM | WEIGHT: 170.88 LBS | OXYGEN SATURATION: 98 % | HEIGHT: 66 IN | TEMPERATURE: 98 F

## 2020-10-01 DIAGNOSIS — Z13.220 ENCOUNTER FOR LIPID SCREENING FOR CARDIOVASCULAR DISEASE: ICD-10-CM

## 2020-10-01 DIAGNOSIS — D53.9 NUTRITIONAL ANEMIA, UNSPECIFIED: ICD-10-CM

## 2020-10-01 DIAGNOSIS — E78.2 MIXED HYPERLIPIDEMIA: ICD-10-CM

## 2020-10-01 DIAGNOSIS — D64.9 NORMOCYTIC ANEMIA: ICD-10-CM

## 2020-10-01 DIAGNOSIS — E11.9 TYPE 2 DIABETES MELLITUS WITHOUT COMPLICATION, WITHOUT LONG-TERM CURRENT USE OF INSULIN: ICD-10-CM

## 2020-10-01 DIAGNOSIS — R71.0 PRECIPITOUS DROP IN HEMATOCRIT: ICD-10-CM

## 2020-10-01 DIAGNOSIS — C61 MALIGNANT NEOPLASM OF PROSTATE: ICD-10-CM

## 2020-10-01 DIAGNOSIS — N52.03 COMBINED ARTERIAL INSUFFICIENCY AND CORPORO-VENOUS OCCLUSIVE ERECTILE DYSFUNCTION: ICD-10-CM

## 2020-10-01 DIAGNOSIS — Z76.89 ENCOUNTER TO ESTABLISH CARE: ICD-10-CM

## 2020-10-01 DIAGNOSIS — Z13.6 ENCOUNTER FOR LIPID SCREENING FOR CARDIOVASCULAR DISEASE: ICD-10-CM

## 2020-10-01 DIAGNOSIS — E11.9 ENCOUNTER FOR DIABETIC FOOT EXAM: ICD-10-CM

## 2020-10-01 DIAGNOSIS — Z00.00 LABORATORY EXAM ORDERED AS PART OF ROUTINE GENERAL MEDICAL EXAMINATION: ICD-10-CM

## 2020-10-01 DIAGNOSIS — Z00.00 ANNUAL PHYSICAL EXAM: Primary | ICD-10-CM

## 2020-10-01 DIAGNOSIS — I10 ESSENTIAL HYPERTENSION: ICD-10-CM

## 2020-10-01 DIAGNOSIS — H40.1131 PRIMARY OPEN ANGLE GLAUCOMA (POAG) OF BOTH EYES, MILD STAGE: ICD-10-CM

## 2020-10-01 DIAGNOSIS — M15.9 PRIMARY OSTEOARTHRITIS INVOLVING MULTIPLE JOINTS: ICD-10-CM

## 2020-10-01 LAB
BILIRUB UR QL STRIP: NEGATIVE
CLARITY UR REFRACT.AUTO: CLEAR
COLOR UR AUTO: YELLOW
ERYTHROCYTE [DISTWIDTH] IN BLOOD BY AUTOMATED COUNT: 12.3 % (ref 11.5–14.5)
FERRITIN SERPL-MCNC: 74 NG/ML (ref 20–300)
GLUCOSE UR QL STRIP: NEGATIVE
HCT VFR BLD AUTO: 36.6 % (ref 40–54)
HGB BLD-MCNC: 11.9 G/DL (ref 14–18)
HGB UR QL STRIP: NEGATIVE
IRON SERPL-MCNC: 85 UG/DL (ref 45–160)
KETONES UR QL STRIP: NEGATIVE
LEUKOCYTE ESTERASE UR QL STRIP: NEGATIVE
MCH RBC QN AUTO: 29.7 PG (ref 27–31)
MCHC RBC AUTO-ENTMCNC: 32.5 G/DL (ref 32–36)
MCV RBC AUTO: 91 FL (ref 82–98)
NITRITE UR QL STRIP: NEGATIVE
PH UR STRIP: 6 [PH] (ref 5–8)
PLATELET # BLD AUTO: 294 K/UL (ref 150–350)
PMV BLD AUTO: 9.2 FL (ref 9.2–12.9)
PROT UR QL STRIP: NEGATIVE
RBC # BLD AUTO: 4.01 M/UL (ref 4.6–6.2)
RETICS/RBC NFR AUTO: 1.9 % (ref 0.4–2)
SATURATED IRON: 18 % (ref 20–50)
SP GR UR STRIP: 1.02 (ref 1–1.03)
TOTAL IRON BINDING CAPACITY: 477 UG/DL (ref 250–450)
TRANSFERRIN SERPL-MCNC: 322 MG/DL (ref 200–375)
URN SPEC COLLECT METH UR: NORMAL
WBC # BLD AUTO: 6.28 K/UL (ref 3.9–12.7)

## 2020-10-01 PROCEDURE — 99397 PR PREVENTIVE VISIT,EST,65 & OVER: ICD-10-PCS | Mod: HCNC,S$GLB,, | Performed by: FAMILY MEDICINE

## 2020-10-01 PROCEDURE — 3078F PR MOST RECENT DIASTOLIC BLOOD PRESSURE < 80 MM HG: ICD-10-PCS | Mod: HCNC,CPTII,S$GLB, | Performed by: FAMILY MEDICINE

## 2020-10-01 PROCEDURE — 3074F PR MOST RECENT SYSTOLIC BLOOD PRESSURE < 130 MM HG: ICD-10-PCS | Mod: HCNC,CPTII,S$GLB, | Performed by: FAMILY MEDICINE

## 2020-10-01 PROCEDURE — 83540 ASSAY OF IRON: CPT | Mod: HCNC

## 2020-10-01 PROCEDURE — 99397 PER PM REEVAL EST PAT 65+ YR: CPT | Mod: HCNC,S$GLB,, | Performed by: FAMILY MEDICINE

## 2020-10-01 PROCEDURE — 85027 COMPLETE CBC AUTOMATED: CPT | Mod: HCNC

## 2020-10-01 PROCEDURE — 99499 UNLISTED E&M SERVICE: CPT | Mod: S$GLB,,, | Performed by: FAMILY MEDICINE

## 2020-10-01 PROCEDURE — 36415 COLL VENOUS BLD VENIPUNCTURE: CPT | Mod: HCNC,PN

## 2020-10-01 PROCEDURE — 85045 AUTOMATED RETICULOCYTE COUNT: CPT | Mod: HCNC

## 2020-10-01 PROCEDURE — 99999 PR PBB SHADOW E&M-EST. PATIENT-LVL IV: CPT | Mod: PBBFAC,HCNC,, | Performed by: FAMILY MEDICINE

## 2020-10-01 PROCEDURE — 99999 PR PBB SHADOW E&M-EST. PATIENT-LVL IV: ICD-10-PCS | Mod: PBBFAC,HCNC,, | Performed by: FAMILY MEDICINE

## 2020-10-01 PROCEDURE — 99499 RISK ADDL DX/OHS AUDIT: ICD-10-PCS | Mod: S$GLB,,, | Performed by: FAMILY MEDICINE

## 2020-10-01 PROCEDURE — 80061 LIPID PANEL: CPT | Mod: HCNC

## 2020-10-01 PROCEDURE — 82043 UR ALBUMIN QUANTITATIVE: CPT | Mod: HCNC

## 2020-10-01 PROCEDURE — 81003 URINALYSIS AUTO W/O SCOPE: CPT | Mod: HCNC

## 2020-10-01 PROCEDURE — 80053 COMPREHEN METABOLIC PANEL: CPT | Mod: HCNC

## 2020-10-01 PROCEDURE — 3074F SYST BP LT 130 MM HG: CPT | Mod: HCNC,CPTII,S$GLB, | Performed by: FAMILY MEDICINE

## 2020-10-01 PROCEDURE — 3051F PR MOST RECENT HEMOGLOBIN A1C LEVEL 7.0 - < 8.0%: ICD-10-PCS | Mod: HCNC,CPTII,S$GLB, | Performed by: FAMILY MEDICINE

## 2020-10-01 PROCEDURE — 82728 ASSAY OF FERRITIN: CPT | Mod: HCNC

## 2020-10-01 PROCEDURE — 3051F HG A1C>EQUAL 7.0%<8.0%: CPT | Mod: HCNC,CPTII,S$GLB, | Performed by: FAMILY MEDICINE

## 2020-10-01 PROCEDURE — 3078F DIAST BP <80 MM HG: CPT | Mod: HCNC,CPTII,S$GLB, | Performed by: FAMILY MEDICINE

## 2020-10-01 RX ORDER — AMLODIPINE BESYLATE 10 MG/1
10 TABLET ORAL DAILY
Qty: 90 TABLET | Refills: 3 | Status: SHIPPED | OUTPATIENT
Start: 2020-10-01 | End: 2021-10-05 | Stop reason: SDUPTHER

## 2020-10-01 NOTE — PROGRESS NOTES
"Subjective:       Patient ID: Eyad Garcia is a 65 y.o. male.    Chief Complaint: Annual Exam and Flu Vaccine      66 yo male presents for annual physical exam.    He was being worked up for anemia but has yet to schedule labs.  Up to date on colonoscopy done on 1/25/2019.    Lipid disorders/ASCVD risk (ages >/= 45 or >/= 20 if increased risk ): ordered    Last A1c 7.2 on 7/23/2020    The following portions of the patient's history were reviewed and updated as appropriate: allergies, current medications, past family history, past medical history, past social history, past surgical history and problem list.      Review of Systems   Constitutional: Negative for activity change and unexpected weight change.   HENT: Negative for hearing loss, rhinorrhea and trouble swallowing.    Eyes: Negative for discharge and visual disturbance.   Respiratory: Negative for chest tightness and wheezing.    Cardiovascular: Negative for chest pain and palpitations.   Gastrointestinal: Positive for diarrhea. Negative for blood in stool, constipation and vomiting.   Endocrine: Negative for polydipsia and polyuria.   Genitourinary: Negative for difficulty urinating, hematuria and urgency.   Musculoskeletal: Negative for arthralgias, joint swelling and neck pain.   Neurological: Negative for weakness and headaches.   Psychiatric/Behavioral: Negative for confusion and dysphoric mood.          Objective:       Vitals:    10/01/20 1311   BP: 122/66   Pulse: 76   Temp: 98.3 °F (36.8 °C)   TempSrc: Oral   SpO2: 98%   Weight: 77.5 kg (170 lb 13.7 oz)   Height: 5' 5.5" (1.664 m)     Physical Exam  Constitutional:       General: He is not in acute distress.     Appearance: He is well-developed. He is not diaphoretic.   HENT:      Head: Normocephalic and atraumatic.      Right Ear: External ear normal.      Left Ear: External ear normal.   Eyes:      General: No scleral icterus.        Right eye: No discharge.         Left eye: No discharge.      " Conjunctiva/sclera: Conjunctivae normal.      Pupils: Pupils are equal, round, and reactive to light.   Neck:      Musculoskeletal: Normal range of motion and neck supple.      Thyroid: No thyromegaly.   Cardiovascular:      Rate and Rhythm: Normal rate and regular rhythm.      Pulses:           Dorsalis pedis pulses are 2+ on the right side and 2+ on the left side.        Posterior tibial pulses are 2+ on the right side and 2+ on the left side.      Heart sounds: Normal heart sounds. No murmur. No friction rub. No gallop.    Pulmonary:      Effort: Pulmonary effort is normal. No respiratory distress.      Breath sounds: Normal breath sounds.   Chest:      Chest wall: No tenderness.   Abdominal:      General: Bowel sounds are normal. There is no distension.      Palpations: Abdomen is soft. There is no mass.      Tenderness: There is no abdominal tenderness. There is no guarding or rebound.   Musculoskeletal: Normal range of motion.      Right foot: Normal range of motion. No deformity, bunion, Charcot foot, foot drop or prominent metatarsal heads.      Left foot: Normal range of motion. No deformity, bunion, Charcot foot, foot drop or prominent metatarsal heads.   Feet:      Right foot:      Protective Sensation: 5 sites tested. 5 sites sensed.      Skin integrity: Skin integrity normal.      Toenail Condition: Right toenails are long.      Left foot:      Protective Sensation: 5 sites tested. 5 sites sensed.      Skin integrity: Skin integrity normal.      Toenail Condition: Left toenails are long.   Lymphadenopathy:      Cervical: No cervical adenopathy.   Skin:     General: Skin is warm.      Capillary Refill: Capillary refill takes less than 2 seconds.      Findings: No rash.   Neurological:      Mental Status: He is alert and oriented to person, place, and time.      Cranial Nerves: No cranial nerve deficit.      Motor: No abnormal muscle tone.      Coordination: Coordination normal.      Deep Tendon Reflexes:  Reflexes normal.   Psychiatric:         Thought Content: Thought content normal.         Judgment: Judgment normal.         Assessment:       1. Annual physical exam    2. Encounter to establish care    3. Essential hypertension    4. BMI 28.0-28.9,adult    5. Combined arterial insufficiency and corporo-venous occlusive erectile dysfunction    6. Mixed hyperlipidemia    7. Type 2 diabetes mellitus without complication, without long-term current use of insulin    8. Encounter for diabetic foot exam    9. Primary open angle glaucoma (POAG) of both eyes, mild stage    10. Primary osteoarthritis involving multiple joints    11. Nutritional anemia, unspecified     12. Normocytic anemia    13. Precipitous drop in hematocrit     14. Malignant neoplasm of prostate    15. Encounter for lipid screening for cardiovascular disease    16. Laboratory exam ordered as part of routine general medical examination        Plan:       1. Annual physical exam  2. Encounter to establish care  Age appropriate prevention guidelines implemented, unless patient refused  Encourage Advanced directives  Labs ordered   Immunizations reviewed. Encourage yearly influenza vaccine.  Patient Counseling:  --Nutrition: Encourage healthy food choices, adequate water intake, moderate sodium/caffeine intake, diet low in saturated fat and cholesterol. Importance of caloric balance and sufficient intake of fresh fruits, vegetables, fiber, calcium, iron, and 1 mg of folate supplement per day (for females capable of pregnancy).  --Exercise: Stressed the importance of regular exercise.   --Substance Abuse: Abstinence from tobacco products. No more than 2 alcoholic drinks per day in men or 1 alcoholic drink per day in women. Avoid illicit drugs, driving or other dangerous activities under the influence. In the event of abuse, treatment offered.   --Sexuality: Safe sex practices to avoid sexually transmitted diseases; careful partner selection, use of condoms and  contraceptive alternatives, avoidance of unintended pregnancy in fertile women of child-bearing age  --Injury prevention: encourage safety belts, safety helmets, smoke detector.  --Dental health: Discussed importance of regular tooth brushing X2 daily, flossing X1 daily, and routine dental visits.  --Encourage use of sun screen, wear sun protective clothing  --Encourage routine eye exams    3. Essential hypertension  -     amLODIPine (NORVASC) 10 MG tablet; Take 1 tablet (10 mg total) by mouth once daily.  Dispense: 90 tablet; Refill: 3  On losartan, does not need refill    4. BMI 28.0-28.9,adult  Encourage healthy lifestyle changes through diet and exercise    5. Combined arterial insufficiency and corporo-venous occlusive erectile dysfunction  Manage medical comorbidities    6. Mixed hyperlipidemia  On atorvastatin    7. Type 2 diabetes mellitus without complication, without long-term current use of insulin  -     Urinalysis, Reflex to Urine Culture Urine, Clean Catch  On metformin.    8. Encounter for diabetic foot exam  Ft precautions advised.  Trimming of the toenails encouraged; has vocies difficulty doing so himself.  No sensory deficit by monofilament testing.    9. Primary open angle glaucoma (POAG) of both eyes, mild stage  Encourage routine eye exam    10. Primary osteoarthritis involving multiple joints  Manage conservatively    11. Nutritional anemia, unspecified   -     Vitamin B12; Future; Expected date: 10/01/2020  -     Folate; Future; Expected date: 10/01/2020    12. Normocytic anemia  -     Iron and TIBC; Future; Expected date: 10/01/2020  -     Vitamin B12; Future; Expected date: 10/01/2020  -     Ferritin; Future; Expected date: 10/01/2020  -     Folate; Future; Expected date: 10/01/2020  -     Reticulocytes; Future; Expected date: 10/01/2020    13. Precipitous drop in hematocrit   -     CBC Without Differential; Future; Expected date: 10/01/2020    14. Malignant neoplasm of  prostate  Comments:  s/p radiation to prostate bed. Follows with oncology.    15. Encounter for lipid screening for cardiovascular disease  -     Lipid Panel; Future; Expected date: 10/01/2020    16. Laboratory exam ordered as part of routine general medical examination  -     CBC Without Differential; Future; Expected date: 10/01/2020  -     Comprehensive metabolic panel; Future; Expected date: 10/01/2020  -     Lipid Panel; Future; Expected date: 10/01/2020  -     Microalbumin/creatinine urine ratio  -     Iron and TIBC; Future; Expected date: 10/01/2020  -     Vitamin B12; Future; Expected date: 10/01/2020  -     Ferritin; Future; Expected date: 10/01/2020  -     Folate; Future; Expected date: 10/01/2020  -     Reticulocytes; Future; Expected date: 10/01/2020    Disclaimer: This note has been generated using voice-recognition software. There may be typographical errors that have been missed during proof-reading

## 2020-10-02 LAB
ALBUMIN SERPL BCP-MCNC: 4.2 G/DL (ref 3.5–5.2)
ALBUMIN/CREAT UR: 5.5 UG/MG (ref 0–30)
ALP SERPL-CCNC: 71 U/L (ref 55–135)
ALT SERPL W/O P-5'-P-CCNC: 24 U/L (ref 10–44)
ANION GAP SERPL CALC-SCNC: 10 MMOL/L (ref 8–16)
AST SERPL-CCNC: 22 U/L (ref 10–40)
BILIRUB SERPL-MCNC: 0.6 MG/DL (ref 0.1–1)
BUN SERPL-MCNC: 14 MG/DL (ref 8–23)
CALCIUM SERPL-MCNC: 9.4 MG/DL (ref 8.7–10.5)
CHLORIDE SERPL-SCNC: 103 MMOL/L (ref 95–110)
CHOLEST SERPL-MCNC: 145 MG/DL (ref 120–199)
CHOLEST/HDLC SERPL: 3.4 {RATIO} (ref 2–5)
CO2 SERPL-SCNC: 25 MMOL/L (ref 23–29)
CREAT SERPL-MCNC: 1 MG/DL (ref 0.5–1.4)
CREAT UR-MCNC: 128 MG/DL (ref 23–375)
EST. GFR  (AFRICAN AMERICAN): >60 ML/MIN/1.73 M^2
EST. GFR  (NON AFRICAN AMERICAN): >60 ML/MIN/1.73 M^2
GLUCOSE SERPL-MCNC: 107 MG/DL (ref 70–110)
HDLC SERPL-MCNC: 43 MG/DL (ref 40–75)
HDLC SERPL: 29.7 % (ref 20–50)
LDLC SERPL CALC-MCNC: 84 MG/DL (ref 63–159)
MICROALBUMIN UR DL<=1MG/L-MCNC: 7 UG/ML
NONHDLC SERPL-MCNC: 102 MG/DL
POTASSIUM SERPL-SCNC: 4.2 MMOL/L (ref 3.5–5.1)
PROT SERPL-MCNC: 7.2 G/DL (ref 6–8.4)
SODIUM SERPL-SCNC: 138 MMOL/L (ref 136–145)
TRIGL SERPL-MCNC: 90 MG/DL (ref 30–150)

## 2020-10-05 ENCOUNTER — PATIENT MESSAGE (OUTPATIENT)
Dept: PRIMARY CARE CLINIC | Facility: CLINIC | Age: 65
End: 2020-10-05

## 2020-10-05 NOTE — TELEPHONE ENCOUNTER
LOV 10/01/2020 annual with labs    He sent second message:  I have a question about COMPREHENSIVE METABOLIC PANEL resulted on 10/2/20, 12:00 AM.     Is there anything I should be concerned about?

## 2020-10-06 ENCOUNTER — TELEPHONE (OUTPATIENT)
Dept: PRIMARY CARE CLINIC | Facility: CLINIC | Age: 65
End: 2020-10-06

## 2020-10-06 DIAGNOSIS — D53.9 NUTRITIONAL ANEMIA: Primary | ICD-10-CM

## 2020-10-06 NOTE — TELEPHONE ENCOUNTER
----- Message from Dale Gomez sent at 10/6/2020  8:40 AM CDT -----  Regarding: [facility]  The lab order for B12 Folat test wasn't done, can you reorder this test. Thank you

## 2020-10-06 NOTE — TELEPHONE ENCOUNTER
----- Message from Dale Gomez sent at 10/6/2020  8:40 AM CDT -----  Regarding: [facility]  The lab order for B12 Folat test wasn't done, can you reorder this test. Thank you

## 2020-10-07 ENCOUNTER — LAB VISIT (OUTPATIENT)
Dept: LAB | Facility: HOSPITAL | Age: 65
End: 2020-10-07
Payer: MEDICARE

## 2020-10-07 DIAGNOSIS — D53.9 NUTRITIONAL ANEMIA: ICD-10-CM

## 2020-10-07 LAB
FOLATE SERPL-MCNC: 7.8 NG/ML (ref 4–24)
VIT B12 SERPL-MCNC: 836 PG/ML (ref 210–950)

## 2020-10-07 PROCEDURE — 82746 ASSAY OF FOLIC ACID SERUM: CPT | Mod: HCNC

## 2020-10-07 PROCEDURE — 36415 COLL VENOUS BLD VENIPUNCTURE: CPT | Mod: HCNC,PN

## 2020-10-07 PROCEDURE — 82607 VITAMIN B-12: CPT | Mod: HCNC

## 2020-10-08 ENCOUNTER — TELEPHONE (OUTPATIENT)
Dept: PRIMARY CARE CLINIC | Facility: CLINIC | Age: 65
End: 2020-10-08

## 2020-10-08 NOTE — TELEPHONE ENCOUNTER
----- Message from Taylor Choi MD sent at 10/8/2020  9:18 AM CDT -----  Hello,    Normal vitamin B12 and folate.  Good!    Message sent to portal

## 2020-10-15 ENCOUNTER — TELEPHONE (OUTPATIENT)
Dept: PRIMARY CARE CLINIC | Facility: CLINIC | Age: 65
End: 2020-10-15

## 2020-10-15 NOTE — LETTER
October 15, 2020    Eyad Garcia  [address] [facility] [place] [address]             [facility] - Primary care  [address] [facility] [address]  Phone: [phone]  Fax: [phone] Dear Mr. Garcia:    Below are the results from your recent visit:    Resulted Orders   CBC Without Differential   Result Value Ref Range    WBC 6.28 3.90 - 12.70 K/uL    RBC 4.01 (L) 4.60 - 6.20 M/uL    Hemoglobin 11.9 (L) 14.0 - 18.0 g/dL    Hematocrit 36.6 (L) 40.0 - 54.0 %    Mean Corpuscular Volume 91 82 - 98 fL    Mean Corpuscular Hemoglobin 29.7 27.0 - 31.0 pg    Mean Corpuscular Hemoglobin Conc 32.5 32.0 - 36.0 g/dL    RDW 12.3 11.5 - 14.5 %    Platelets 294 150 - 350 K/uL    MPV 9.2 9.2 - 12.9 fL   Comprehensive metabolic panel   Result Value Ref Range    Sodium 138 136 - 145 mmol/L    Potassium 4.2 3.5 - 5.1 mmol/L    Chloride 103 95 - 110 mmol/L    CO2 25 23 - 29 mmol/L    Glucose 107 70 - 110 mg/dL    BUN, Bld 14 8 - 23 mg/dL    Creatinine 1.0 0.5 - 1.4 mg/dL    Calcium 9.4 8.7 - 10.5 mg/dL    Total Protein 7.2 6.0 - 8.4 g/dL    Albumin 4.2 3.5 - 5.2 g/dL    Total Bilirubin 0.6 0.1 - 1.0 mg/dL      Comment:      For infants and newborns, interpretation of results should be based  on gestational age, weight and in agreement with clinical  observations.  Premature Infant recommended reference ranges:  Up to 24 hours.............<8.0 mg/dL  Up to 48 hours............<12.0 mg/dL  3-5 days..................<15.0 mg/dL  6-29 days.................<15.0 mg/dL      Alkaline Phosphatase 71 55 - 135 U/L    AST 22 10 - 40 U/L    ALT 24 10 - 44 U/L    Anion Gap 10 8 - 16 mmol/L    eGFR if African American >60.0 >60 mL/min/1.73 m^2    eGFR if non African American >60.0 >60 mL/min/1.73 m^2      Comment:      Calculation used to obtain the estimated glomerular filtration  rate (eGFR) is the CKD-EPI equation.      Lipid Panel   Result Value Ref Range    Cholesterol 145 120 - 199 mg/dL      Comment:      The  National Cholesterol Education Program (NCEP) has set the  following guidelines (reference ranges) for Cholesterol:  Optimal.....................<200 mg/dL  Borderline High.............200-239 mg/dL  High........................> or = 240 mg/dL      Triglycerides 90 30 - 150 mg/dL      Comment:      The National Cholesterol Education Program (NCEP) has set the  following guidelines (reference values) for triglycerides:  Normal......................<150 mg/dL  Borderline High.............150-199 mg/dL  High........................200-499 mg/dL      HDL 43 40 - 75 mg/dL      Comment:      The National Cholesterol Education Program (NCEP) has set the  following guidelines (reference values) for HDL Cholesterol:  Low...............<40 mg/dL  Optimal...........>60 mg/dL      LDL Cholesterol 84.0 63.0 - 159.0 mg/dL      Comment:      The National Cholesterol Education Program (NCEP) has set the  following guidelines (reference values) for LDL Cholesterol:  Optimal.......................<130 mg/dL  Borderline High...............130-159 mg/dL  High..........................160-189 mg/dL  Very High.....................>190 mg/dL      Hdl/Cholesterol Ratio 29.7 20.0 - 50.0 %    Total Cholesterol/HDL Ratio 3.4 2.0 - 5.0    Non-HDL Cholesterol 102 mg/dL      Comment:      Risk category and Non-HDL cholesterol goals:  Coronary heart disease (CHD)or equivalent (10-year risk of CHD >20%):  Non-HDL cholesterol goal     <130 mg/dL  Two or more CHD risk factors and 10-year risk of CHD <= 20%:  Non-HDL cholesterol goal     <160 mg/dL  0 to 1 CHD risk factor:  Non-HDL cholesterol goal     <190 mg/dL     Iron and TIBC   Result Value Ref Range    Iron 85 45 - 160 ug/dL    Transferrin 322 200 - 375 mg/dL    TIBC 477 (H) 250 - 450 ug/dL    Saturated Iron 18 (L) 20 - 50 %   Ferritin   Result Value Ref Range    Ferritin 74 20.0 - 300.0 ng/mL   Reticulocytes   Result Value Ref Range    Retic 1.9 0.4 - 2.0 %      Normal liver and kidney  function testing.  Normal electrolytes.     Continue cholesterol medication.  Cholesterol within normal limits.  Encourage healthy eating and exercise.     Mild iron deficiency.  Will repeat colonoscopy in 2022.  Encourage iron rich foods: meat, chicken, fish, eggs, fortified cereals, grains, dried beans and vegetables.   Recheck hemoglobin in 3 months and screen for a hemoglobinopathy/ hereditary disorder which can also contribute to anemia.  Labs ordered.     Vitamin B12 and folate were normal.    Please don't hesitate to call our office if you have any questions or concerns.      Sincerely,        Taylor Choi MD

## 2020-10-15 NOTE — TELEPHONE ENCOUNTER
LVM advising results were released via [facility]. Letter sent. advised a call back with questions. Recall entered.

## 2020-10-15 NOTE — TELEPHONE ENCOUNTER
----- Message from Taylor Choi MD sent at 10/14/2020  6:15 PM CDT -----  Nonfasting labs ordered in 3 months regarding anemia.    Please let patient know message sent to portal  Hello,    Normal liver and kidney function testing.  Normal electrolytes.    Continue cholesterol medication.  Cholesterol within normal limits.  Encourage healthy eating and exercise.    Mild iron deficiency.  Will repeat colonoscopy in 2022.  Encourage iron rich foods: meat, chicken, fish, eggs, fortified cereals, grains, dried beans and vegetables.   Recheck hemoglobin in 3 months and screen for a hemoglobinopathy/ hereditary disorder which can also contribute to anemia.  Labs ordered.    Vitamin B12 and folate were normal.

## 2020-10-19 ENCOUNTER — PATIENT OUTREACH (OUTPATIENT)
Dept: OTHER | Facility: OTHER | Age: 65
End: 2020-10-19

## 2020-10-27 NOTE — PROGRESS NOTES
Digital Medicine: Health  Follow-Up    The history is provided by the patient.             Reason for review: Blood pressure not at goal        Topics Covered on Call: physical activity and Diet    Additional Follow-up details: Patient says he is feeling well and does not need anything from me today.            Diet-no change to diet    No change to diet.        Physical Activity-no change to routine  No change to exercise routine.     Medication Adherence-Medication adherence was assessed.      Substance, Sleep, Stress-Not assessed      Continue current diet/physical activity routine.       Addressed patient questions and patient has my contact information if needed prior to next outreach.   Explained the importance of self-monitoring and medication adherence. Encouraged the patient to communicate with their health  for lifestyle modifications to help improve or maintain a healthy lifestyle.                   Topic    Hemoglobin A1C          Last 5 Patient Entered Readings                                      Current 30 Day Average: 132/81     Recent Readings 10/25/2020 10/13/2020 9/30/2020 9/30/2020 9/20/2020    SBP (mmHg) 120 134 141 153 127    DBP (mmHg) 80 81 78 83 83    Pulse 99 73 87 80 90

## 2020-12-10 ENCOUNTER — OFFICE VISIT (OUTPATIENT)
Dept: PRIMARY CARE CLINIC | Facility: CLINIC | Age: 65
End: 2020-12-10
Payer: MEDICARE

## 2020-12-10 VITALS
DIASTOLIC BLOOD PRESSURE: 70 MMHG | OXYGEN SATURATION: 98 % | TEMPERATURE: 98 F | HEART RATE: 82 BPM | WEIGHT: 171.31 LBS | SYSTOLIC BLOOD PRESSURE: 122 MMHG | BODY MASS INDEX: 27.53 KG/M2 | HEIGHT: 66 IN

## 2020-12-10 DIAGNOSIS — E11.9 TYPE 2 DIABETES MELLITUS WITHOUT COMPLICATION, WITHOUT LONG-TERM CURRENT USE OF INSULIN: ICD-10-CM

## 2020-12-10 DIAGNOSIS — R05.9 COUGH: Primary | ICD-10-CM

## 2020-12-10 DIAGNOSIS — C61 MALIGNANT NEOPLASM OF PROSTATE: ICD-10-CM

## 2020-12-10 DIAGNOSIS — J40 BRONCHITIS: ICD-10-CM

## 2020-12-10 PROCEDURE — 99499 RISK ADDL DX/OHS AUDIT: ICD-10-PCS | Mod: S$GLB,,, | Performed by: FAMILY MEDICINE

## 2020-12-10 PROCEDURE — 99214 OFFICE O/P EST MOD 30 MIN: CPT | Mod: HCNC,S$GLB,, | Performed by: FAMILY MEDICINE

## 2020-12-10 PROCEDURE — 1126F PR PAIN SEVERITY QUANTIFIED, NO PAIN PRESENT: ICD-10-PCS | Mod: HCNC,S$GLB,, | Performed by: FAMILY MEDICINE

## 2020-12-10 PROCEDURE — 1101F PT FALLS ASSESS-DOCD LE1/YR: CPT | Mod: HCNC,CPTII,S$GLB, | Performed by: FAMILY MEDICINE

## 2020-12-10 PROCEDURE — 1159F PR MEDICATION LIST DOCUMENTED IN MEDICAL RECORD: ICD-10-PCS | Mod: HCNC,S$GLB,, | Performed by: FAMILY MEDICINE

## 2020-12-10 PROCEDURE — 3288F PR FALLS RISK ASSESSMENT DOCUMENTED: ICD-10-PCS | Mod: HCNC,CPTII,S$GLB, | Performed by: FAMILY MEDICINE

## 2020-12-10 PROCEDURE — 3074F PR MOST RECENT SYSTOLIC BLOOD PRESSURE < 130 MM HG: ICD-10-PCS | Mod: HCNC,CPTII,S$GLB, | Performed by: FAMILY MEDICINE

## 2020-12-10 PROCEDURE — 99214 PR OFFICE/OUTPT VISIT, EST, LEVL IV, 30-39 MIN: ICD-10-PCS | Mod: HCNC,S$GLB,, | Performed by: FAMILY MEDICINE

## 2020-12-10 PROCEDURE — 3288F FALL RISK ASSESSMENT DOCD: CPT | Mod: HCNC,CPTII,S$GLB, | Performed by: FAMILY MEDICINE

## 2020-12-10 PROCEDURE — 1126F AMNT PAIN NOTED NONE PRSNT: CPT | Mod: HCNC,S$GLB,, | Performed by: FAMILY MEDICINE

## 2020-12-10 PROCEDURE — 3008F PR BODY MASS INDEX (BMI) DOCUMENTED: ICD-10-PCS | Mod: HCNC,CPTII,S$GLB, | Performed by: FAMILY MEDICINE

## 2020-12-10 PROCEDURE — 3078F PR MOST RECENT DIASTOLIC BLOOD PRESSURE < 80 MM HG: ICD-10-PCS | Mod: HCNC,CPTII,S$GLB, | Performed by: FAMILY MEDICINE

## 2020-12-10 PROCEDURE — 99499 UNLISTED E&M SERVICE: CPT | Mod: S$GLB,,, | Performed by: FAMILY MEDICINE

## 2020-12-10 PROCEDURE — 1101F PR PT FALLS ASSESS DOC 0-1 FALLS W/OUT INJ PAST YR: ICD-10-PCS | Mod: HCNC,CPTII,S$GLB, | Performed by: FAMILY MEDICINE

## 2020-12-10 PROCEDURE — U0003 INFECTIOUS AGENT DETECTION BY NUCLEIC ACID (DNA OR RNA); SEVERE ACUTE RESPIRATORY SYNDROME CORONAVIRUS 2 (SARS-COV-2) (CORONAVIRUS DISEASE [COVID-19]), AMPLIFIED PROBE TECHNIQUE, MAKING USE OF HIGH THROUGHPUT TECHNOLOGIES AS DESCRIBED BY CMS-2020-01-R: HCPCS | Mod: HCNC

## 2020-12-10 PROCEDURE — 3051F PR MOST RECENT HEMOGLOBIN A1C LEVEL 7.0 - < 8.0%: ICD-10-PCS | Mod: HCNC,CPTII,S$GLB, | Performed by: FAMILY MEDICINE

## 2020-12-10 PROCEDURE — 3074F SYST BP LT 130 MM HG: CPT | Mod: HCNC,CPTII,S$GLB, | Performed by: FAMILY MEDICINE

## 2020-12-10 PROCEDURE — 99999 PR PBB SHADOW E&M-EST. PATIENT-LVL IV: ICD-10-PCS | Mod: PBBFAC,HCNC,, | Performed by: FAMILY MEDICINE

## 2020-12-10 PROCEDURE — 3051F HG A1C>EQUAL 7.0%<8.0%: CPT | Mod: HCNC,CPTII,S$GLB, | Performed by: FAMILY MEDICINE

## 2020-12-10 PROCEDURE — 3008F BODY MASS INDEX DOCD: CPT | Mod: HCNC,CPTII,S$GLB, | Performed by: FAMILY MEDICINE

## 2020-12-10 PROCEDURE — 3078F DIAST BP <80 MM HG: CPT | Mod: HCNC,CPTII,S$GLB, | Performed by: FAMILY MEDICINE

## 2020-12-10 PROCEDURE — 1159F MED LIST DOCD IN RCRD: CPT | Mod: HCNC,S$GLB,, | Performed by: FAMILY MEDICINE

## 2020-12-10 PROCEDURE — 99999 PR PBB SHADOW E&M-EST. PATIENT-LVL IV: CPT | Mod: PBBFAC,HCNC,, | Performed by: FAMILY MEDICINE

## 2020-12-10 RX ORDER — FLUTICASONE PROPIONATE 50 MCG
2 SPRAY, SUSPENSION (ML) NASAL 2 TIMES DAILY PRN
Qty: 16 G | Refills: 0 | Status: SHIPPED | OUTPATIENT
Start: 2020-12-10

## 2020-12-10 RX ORDER — ALBUTEROL SULFATE 90 UG/1
2 AEROSOL, METERED RESPIRATORY (INHALATION) EVERY 6 HOURS PRN
Qty: 18 G | Refills: 0 | Status: SHIPPED | OUTPATIENT
Start: 2020-12-10 | End: 2022-04-05 | Stop reason: SDUPTHER

## 2020-12-10 RX ORDER — PROMETHAZINE HYDROCHLORIDE AND CODEINE PHOSPHATE 6.25; 1 MG/5ML; MG/5ML
5 SOLUTION ORAL EVERY 4 HOURS PRN
Qty: 118 ML | Refills: 0 | Status: SHIPPED | OUTPATIENT
Start: 2020-12-10 | End: 2020-12-17

## 2020-12-10 RX ORDER — MONTELUKAST SODIUM 10 MG/1
10 TABLET ORAL NIGHTLY
Qty: 30 TABLET | Refills: 0 | Status: SHIPPED | OUTPATIENT
Start: 2020-12-10 | End: 2021-01-19

## 2020-12-10 RX ORDER — AZITHROMYCIN 250 MG/1
TABLET, FILM COATED ORAL
Qty: 6 TABLET | Refills: 0 | Status: SHIPPED | OUTPATIENT
Start: 2020-12-10 | End: 2020-12-15

## 2020-12-10 NOTE — PROGRESS NOTES
Subjective:       Patient ID: Eyad Garcia is a 65 y.o. male.    Chief Complaint: Cough (x 2-3 wks / clear phlegm in the AM) and Nasal Congestion (Runny nose)      65-year-old male presents with a concern for cough.    Coughing for 2-3 weeks; productive of white phlegm in the morning. No diurinal cough.  Previous episode 1 year ago.  Reports that he coughs around this time a year for the past 2 years.    Runny nose off and on lasting a few mins X 2 weekly.  No chest pain. No chest tightness. No wheezing. No palpiation. No swelling in feet. No dizziness. No fainting. No Orhtopenea.  No TB exposure.  No fever. No nausea or vomiting. No diarrhea. No urinary complaints.  No loss of taste or smell.  No sinus congestion.  No reflux.  No cigarettes personally used but has second hand smoke exposure.      The following portions of the patient's history were reviewed and updated as appropriate: allergies, current medications, past family history, past medical history, past social history, past surgical history and problem list.      Review of Systems   Constitutional: Negative for activity change, appetite change, chills, diaphoresis, fatigue, fever and unexpected weight change.   HENT: Positive for rhinorrhea. Negative for nasal congestion, dental problem, facial swelling, nosebleeds, postnasal drip, sore throat, tinnitus and trouble swallowing.    Eyes: Negative for pain, discharge, itching and visual disturbance.   Respiratory: Positive for cough. Negative for apnea, chest tightness, shortness of breath, wheezing and stridor.    Cardiovascular: Negative for chest pain, palpitations and leg swelling.   Gastrointestinal: Negative for abdominal distention, abdominal pain, constipation, diarrhea, nausea, rectal pain and vomiting.   Endocrine: Negative for cold intolerance, heat intolerance and polyuria.   Genitourinary: Negative for difficulty urinating, dysuria, frequency, hematuria and urgency.   Musculoskeletal:  "Negative for arthralgias, gait problem, myalgias, neck pain and neck stiffness.   Integumentary:  Negative for color change and rash.   Neurological: Negative for dizziness, tremors, seizures, syncope, facial asymmetry, weakness and headaches.   Hematological: Negative for adenopathy. Does not bruise/bleed easily.   Psychiatric/Behavioral: Negative for agitation, confusion, hallucinations, self-injury and suicidal ideas. The patient is not hyperactive.           Objective:       Vitals:    12/10/20 1006   BP: 122/70   BP Location: Right arm   Patient Position: Sitting   BP Method: Large (Manual)   Pulse: 82   Temp: 98.2 °F (36.8 °C)   TempSrc: Oral   SpO2: 98%   Weight: 77.7 kg (171 lb 4.8 oz)   Height: 5' 6" (1.676 m)     Physical Exam  Constitutional:       General: He is not in acute distress.     Appearance: He is well-developed. He is not diaphoretic.   HENT:      Head: Normocephalic and atraumatic.      Right Ear: External ear normal. There is no impacted cerumen.      Left Ear: External ear normal. There is no impacted cerumen.      Nose: Rhinorrhea present.      Mouth/Throat:      Pharynx: No oropharyngeal exudate.   Eyes:      General: No scleral icterus.        Right eye: No discharge.         Left eye: No discharge.      Conjunctiva/sclera: Conjunctivae normal.      Pupils: Pupils are equal, round, and reactive to light.   Neck:      Musculoskeletal: Normal range of motion and neck supple.      Thyroid: No thyromegaly.   Cardiovascular:      Rate and Rhythm: Normal rate and regular rhythm.      Heart sounds: Normal heart sounds. No murmur. No friction rub. No gallop.    Pulmonary:      Effort: Pulmonary effort is normal. No respiratory distress.      Breath sounds: Normal breath sounds.   Chest:      Chest wall: No tenderness.   Abdominal:      General: Bowel sounds are normal. There is no distension.      Palpations: Abdomen is soft. There is no mass.      Tenderness: There is no abdominal tenderness. " There is no guarding or rebound.   Lymphadenopathy:      Cervical: No cervical adenopathy.   Skin:     General: Skin is warm.      Capillary Refill: Capillary refill takes less than 2 seconds.      Findings: No rash.   Neurological:      General: No focal deficit present.      Mental Status: He is alert and oriented to person, place, and time.      Motor: No abnormal muscle tone.   Psychiatric:         Thought Content: Thought content normal.         Judgment: Judgment normal.         Assessment:       1. Cough    2. Bronchitis    3. Malignant neoplasm of prostate    4. Type 2 diabetes mellitus without complication, without long-term current use of insulin        Plan:       1. Cough  -     COVID-19 Routine Screening  -     X-Ray Chest PA And Lateral; Future; Expected date: 12/24/2020 if symptoms persist for another 2 weeks    2. Bronchitis  -     azithromycin (Z-ELIO) 250 MG tablet; Take 2 tablets by mouth on day 1; Take 1 tablet by mouth on days 2-5  Dispense: 6 tablet; Refill: 0  -     fluticasone propionate (FLONASE) 50 mcg/actuation nasal spray; 2 sprays (100 mcg total) by Each Nostril route 2 (two) times daily as needed for Rhinitis.  Dispense: 16 g; Refill: 0  Video instruction on how to use an inhaler  -     albuterol (VENTOLIN HFA) 90 mcg/actuation inhaler; Inhale 2 puffs into the lungs every 6 (six) hours as needed for Wheezing or Shortness of Breath (cough). Rescue  Dispense: 18 g; Refill: 0  -     X-Ray Chest PA And Lateral; Future; Expected date: 12/24/2020  -     promethazine-codeine 6.25-10 mg/5 ml (PHENERGAN WITH CODEINE) 6.25-10 mg/5 mL syrup; Take 5 mLs by mouth every 4 (four) hours as needed for Cough.  Dispense: 118 mL; Refill: 0  -     montelukast (SINGULAIR) 10 mg tablet; Take 1 tablet (10 mg total) by mouth every evening.  Dispense: 30 tablet; Refill: 0  He declines a work note.    3. Malignant neoplasm of prostate  No acutely worsening features.    4. Type 2 diabetes mellitus without  complication, without long-term current use of insulin  No hypoglycemic episodes. Aware that he is higher risk than the general population. Continue antihyperglycemic therapy- has normal appetite. Encourage adequate hydration.     Disclaimer: This note has been generated using voice-recognition software. There may be typographical errors that have been missed during proof-reading

## 2020-12-11 ENCOUNTER — TELEPHONE (OUTPATIENT)
Dept: PRIMARY CARE CLINIC | Facility: CLINIC | Age: 65
End: 2020-12-11

## 2020-12-11 LAB — SARS-COV-2 RNA RESP QL NAA+PROBE: NOT DETECTED

## 2020-12-11 NOTE — TELEPHONE ENCOUNTER
----- Message from Taylor Choi MD sent at 12/11/2020  4:23 PM CST -----  Hello,    No COVID 19 detected. Good!    Please let patient know message sent to portal.

## 2020-12-14 ENCOUNTER — HOSPITAL ENCOUNTER (OUTPATIENT)
Dept: RADIOLOGY | Facility: HOSPITAL | Age: 65
Discharge: HOME | End: 2020-12-14
Attending: FAMILY MEDICINE
Payer: MEDICARE

## 2020-12-14 DIAGNOSIS — R05.9 COUGH: ICD-10-CM

## 2020-12-14 DIAGNOSIS — J40 BRONCHITIS: ICD-10-CM

## 2020-12-14 PROCEDURE — 71046 X-RAY EXAM CHEST 2 VIEWS: CPT | Mod: 26,HCNC,, | Performed by: RADIOLOGY

## 2020-12-14 PROCEDURE — 71046 X-RAY EXAM CHEST 2 VIEWS: CPT | Mod: TC,HCNC,PN

## 2020-12-14 PROCEDURE — 71046 XR CHEST PA AND LATERAL: ICD-10-PCS | Mod: 26,HCNC,, | Performed by: RADIOLOGY

## 2020-12-15 ENCOUNTER — TELEPHONE (OUTPATIENT)
Dept: PRIMARY CARE CLINIC | Facility: CLINIC | Age: 65
End: 2020-12-15

## 2020-12-15 NOTE — TELEPHONE ENCOUNTER
----- Message from Taylor Choi MD sent at 12/14/2020  4:48 PM CST -----  Please let patient know that there is no pneumonia on x-ray.  Heart and lungs within normal limits.    Portal message sent    Hello,    There is no pneumonia on x-ray.  Heart and lungs within normal limits.

## 2020-12-22 ENCOUNTER — PATIENT OUTREACH (OUTPATIENT)
Dept: OTHER | Facility: OTHER | Age: 65
End: 2020-12-22

## 2020-12-22 NOTE — PROGRESS NOTES
Digital Medicine: Health  Follow-Up    The history is provided by the patient.             Reason for review: Blood pressure at goal        Topics Covered on Call: physical activity and Diet    Additional Follow-up details: Patient says he is feeling fine.  He reports no lifestyle changes.  Patient says he does not have any questions or need anything from me today.            Diet-no change to diet    No change to diet.  Patient reports eating or drinking the following: Patient says he maintains his low sodium diet.      Physical Activity-no change to routine  No change to exercise routine.     Medication Adherence-Medication adherence was assessed.      Substance, Sleep, Stress-Not assessed      Continue current diet/physical activity routine.       Addressed patient questions and patient has my contact information if needed prior to next outreach. Patient verbalizes understanding.      Explained the importance of self-monitoring and medication adherence. Encouraged the patient to communicate with their health  for lifestyle modifications to help improve or maintain a healthy lifestyle.                   Topic    Hemoglobin A1C          Last 5 Patient Entered Readings                                      Current 30 Day Average: 123/72     Recent Readings 12/15/2020 12/7/2020 11/29/2020 11/20/2020 11/20/2020    SBP (mmHg) 125 112 133 144 145    DBP (mmHg) 74 69 74 85 89    Pulse 79 87 87 77 78

## 2021-02-05 ENCOUNTER — OFFICE VISIT (OUTPATIENT)
Dept: PRIMARY CARE CLINIC | Facility: CLINIC | Age: 66
End: 2021-02-05
Payer: MEDICARE

## 2021-02-05 DIAGNOSIS — R05.3 PERSISTENT COUGH FOR 3 WEEKS OR LONGER: Primary | ICD-10-CM

## 2021-02-05 PROCEDURE — 1159F PR MEDICATION LIST DOCUMENTED IN MEDICAL RECORD: ICD-10-PCS | Mod: 95,,, | Performed by: FAMILY MEDICINE

## 2021-02-05 PROCEDURE — 99212 PR OFFICE/OUTPT VISIT, EST, LEVL II, 10-19 MIN: ICD-10-PCS | Mod: 95,,, | Performed by: FAMILY MEDICINE

## 2021-02-05 PROCEDURE — 1159F MED LIST DOCD IN RCRD: CPT | Mod: 95,,, | Performed by: FAMILY MEDICINE

## 2021-02-05 PROCEDURE — 99212 OFFICE O/P EST SF 10 MIN: CPT | Mod: 95,,, | Performed by: FAMILY MEDICINE

## 2021-02-05 RX ORDER — BENZONATATE 200 MG/1
200 CAPSULE ORAL 3 TIMES DAILY PRN
Qty: 30 CAPSULE | Refills: 0 | Status: SHIPPED | OUTPATIENT
Start: 2021-02-05 | End: 2021-02-15

## 2021-02-18 ENCOUNTER — PATIENT OUTREACH (OUTPATIENT)
Dept: ADMINISTRATIVE | Facility: OTHER | Age: 66
End: 2021-02-18

## 2021-02-18 DIAGNOSIS — E11.65 TYPE 2 DIABETES MELLITUS WITH HYPERGLYCEMIA, WITHOUT LONG-TERM CURRENT USE OF INSULIN: Primary | ICD-10-CM

## 2021-02-23 ENCOUNTER — OFFICE VISIT (OUTPATIENT)
Dept: PULMONOLOGY | Facility: CLINIC | Age: 66
End: 2021-02-23
Payer: MEDICARE

## 2021-02-23 VITALS
WEIGHT: 164.44 LBS | DIASTOLIC BLOOD PRESSURE: 68 MMHG | HEIGHT: 66 IN | OXYGEN SATURATION: 98 % | SYSTOLIC BLOOD PRESSURE: 130 MMHG | HEART RATE: 80 BPM | BODY MASS INDEX: 26.43 KG/M2

## 2021-02-23 DIAGNOSIS — R05.3 PERSISTENT COUGH FOR 3 WEEKS OR LONGER: ICD-10-CM

## 2021-02-23 DIAGNOSIS — R05.9 COUGH: ICD-10-CM

## 2021-02-23 PROCEDURE — 3008F BODY MASS INDEX DOCD: CPT | Mod: CPTII,S$GLB,, | Performed by: NURSE PRACTITIONER

## 2021-02-23 PROCEDURE — 1159F MED LIST DOCD IN RCRD: CPT | Mod: S$GLB,,, | Performed by: NURSE PRACTITIONER

## 2021-02-23 PROCEDURE — 3075F PR MOST RECENT SYSTOLIC BLOOD PRESS GE 130-139MM HG: ICD-10-PCS | Mod: CPTII,S$GLB,, | Performed by: NURSE PRACTITIONER

## 2021-02-23 PROCEDURE — 1159F PR MEDICATION LIST DOCUMENTED IN MEDICAL RECORD: ICD-10-PCS | Mod: S$GLB,,, | Performed by: NURSE PRACTITIONER

## 2021-02-23 PROCEDURE — 1126F AMNT PAIN NOTED NONE PRSNT: CPT | Mod: S$GLB,,, | Performed by: NURSE PRACTITIONER

## 2021-02-23 PROCEDURE — 3008F PR BODY MASS INDEX (BMI) DOCUMENTED: ICD-10-PCS | Mod: CPTII,S$GLB,, | Performed by: NURSE PRACTITIONER

## 2021-02-23 PROCEDURE — 3288F PR FALLS RISK ASSESSMENT DOCUMENTED: ICD-10-PCS | Mod: CPTII,S$GLB,, | Performed by: NURSE PRACTITIONER

## 2021-02-23 PROCEDURE — 99204 PR OFFICE/OUTPT VISIT, NEW, LEVL IV, 45-59 MIN: ICD-10-PCS | Mod: S$GLB,,, | Performed by: NURSE PRACTITIONER

## 2021-02-23 PROCEDURE — 3078F DIAST BP <80 MM HG: CPT | Mod: CPTII,S$GLB,, | Performed by: NURSE PRACTITIONER

## 2021-02-23 PROCEDURE — 99999 PR PBB SHADOW E&M-EST. PATIENT-LVL IV: ICD-10-PCS | Mod: PBBFAC,,, | Performed by: NURSE PRACTITIONER

## 2021-02-23 PROCEDURE — 3288F FALL RISK ASSESSMENT DOCD: CPT | Mod: CPTII,S$GLB,, | Performed by: NURSE PRACTITIONER

## 2021-02-23 PROCEDURE — 1126F PR PAIN SEVERITY QUANTIFIED, NO PAIN PRESENT: ICD-10-PCS | Mod: S$GLB,,, | Performed by: NURSE PRACTITIONER

## 2021-02-23 PROCEDURE — 1101F PR PT FALLS ASSESS DOC 0-1 FALLS W/OUT INJ PAST YR: ICD-10-PCS | Mod: CPTII,S$GLB,, | Performed by: NURSE PRACTITIONER

## 2021-02-23 PROCEDURE — 1101F PT FALLS ASSESS-DOCD LE1/YR: CPT | Mod: CPTII,S$GLB,, | Performed by: NURSE PRACTITIONER

## 2021-02-23 PROCEDURE — 99204 OFFICE O/P NEW MOD 45 MIN: CPT | Mod: S$GLB,,, | Performed by: NURSE PRACTITIONER

## 2021-02-23 PROCEDURE — 3075F SYST BP GE 130 - 139MM HG: CPT | Mod: CPTII,S$GLB,, | Performed by: NURSE PRACTITIONER

## 2021-02-23 PROCEDURE — 99999 PR PBB SHADOW E&M-EST. PATIENT-LVL IV: CPT | Mod: PBBFAC,,, | Performed by: NURSE PRACTITIONER

## 2021-02-23 PROCEDURE — 3078F PR MOST RECENT DIASTOLIC BLOOD PRESSURE < 80 MM HG: ICD-10-PCS | Mod: CPTII,S$GLB,, | Performed by: NURSE PRACTITIONER

## 2021-02-26 PROBLEM — R05.9 COUGH: Status: ACTIVE | Noted: 2021-02-26

## 2021-03-07 ENCOUNTER — PATIENT MESSAGE (OUTPATIENT)
Dept: PRIMARY CARE CLINIC | Facility: CLINIC | Age: 66
End: 2021-03-07

## 2021-04-01 ENCOUNTER — LAB VISIT (OUTPATIENT)
Dept: LAB | Facility: HOSPITAL | Age: 66
End: 2021-04-01
Attending: FAMILY MEDICINE
Payer: MEDICARE

## 2021-04-01 ENCOUNTER — OFFICE VISIT (OUTPATIENT)
Dept: PRIMARY CARE CLINIC | Facility: CLINIC | Age: 66
End: 2021-04-01
Payer: MEDICARE

## 2021-04-01 ENCOUNTER — IMMUNIZATION (OUTPATIENT)
Dept: PHARMACY | Facility: CLINIC | Age: 66
End: 2021-04-01
Payer: COMMERCIAL

## 2021-04-01 VITALS
DIASTOLIC BLOOD PRESSURE: 74 MMHG | HEIGHT: 66 IN | BODY MASS INDEX: 26.93 KG/M2 | HEART RATE: 69 BPM | TEMPERATURE: 98 F | SYSTOLIC BLOOD PRESSURE: 134 MMHG | OXYGEN SATURATION: 100 % | WEIGHT: 167.56 LBS

## 2021-04-01 DIAGNOSIS — D64.9 ANEMIA, UNSPECIFIED TYPE: Primary | ICD-10-CM

## 2021-04-01 DIAGNOSIS — E11.9 TYPE 2 DIABETES MELLITUS WITHOUT COMPLICATION, WITHOUT LONG-TERM CURRENT USE OF INSULIN: ICD-10-CM

## 2021-04-01 DIAGNOSIS — E11.65 TYPE 2 DIABETES MELLITUS WITH HYPERGLYCEMIA, WITHOUT LONG-TERM CURRENT USE OF INSULIN: ICD-10-CM

## 2021-04-01 DIAGNOSIS — H40.1131 PRIMARY OPEN ANGLE GLAUCOMA (POAG) OF BOTH EYES, MILD STAGE: ICD-10-CM

## 2021-04-01 DIAGNOSIS — N52.31 ERECTILE DYSFUNCTION AFTER RADICAL PROSTATECTOMY: ICD-10-CM

## 2021-04-01 DIAGNOSIS — E78.2 MIXED HYPERLIPIDEMIA: ICD-10-CM

## 2021-04-01 DIAGNOSIS — C61 MALIGNANT NEOPLASM OF PROSTATE: Primary | ICD-10-CM

## 2021-04-01 DIAGNOSIS — Z85.46 HISTORY OF PROSTATE CANCER: ICD-10-CM

## 2021-04-01 DIAGNOSIS — I10 ESSENTIAL HYPERTENSION: ICD-10-CM

## 2021-04-01 PROCEDURE — 80048 BASIC METABOLIC PNL TOTAL CA: CPT | Performed by: FAMILY MEDICINE

## 2021-04-01 PROCEDURE — 3075F SYST BP GE 130 - 139MM HG: CPT | Mod: CPTII,S$GLB,, | Performed by: FAMILY MEDICINE

## 2021-04-01 PROCEDURE — 3008F BODY MASS INDEX DOCD: CPT | Mod: CPTII,S$GLB,, | Performed by: FAMILY MEDICINE

## 2021-04-01 PROCEDURE — 99499 UNLISTED E&M SERVICE: CPT | Mod: S$GLB,,, | Performed by: FAMILY MEDICINE

## 2021-04-01 PROCEDURE — 1159F PR MEDICATION LIST DOCUMENTED IN MEDICAL RECORD: ICD-10-PCS | Mod: S$GLB,,, | Performed by: FAMILY MEDICINE

## 2021-04-01 PROCEDURE — 99999 PR PBB SHADOW E&M-EST. PATIENT-LVL V: ICD-10-PCS | Mod: PBBFAC,,, | Performed by: FAMILY MEDICINE

## 2021-04-01 PROCEDURE — 3051F PR MOST RECENT HEMOGLOBIN A1C LEVEL 7.0 - < 8.0%: ICD-10-PCS | Mod: CPTII,S$GLB,, | Performed by: FAMILY MEDICINE

## 2021-04-01 PROCEDURE — 1101F PR PT FALLS ASSESS DOC 0-1 FALLS W/OUT INJ PAST YR: ICD-10-PCS | Mod: CPTII,S$GLB,, | Performed by: FAMILY MEDICINE

## 2021-04-01 PROCEDURE — 1101F PT FALLS ASSESS-DOCD LE1/YR: CPT | Mod: CPTII,S$GLB,, | Performed by: FAMILY MEDICINE

## 2021-04-01 PROCEDURE — 83036 HEMOGLOBIN GLYCOSYLATED A1C: CPT | Performed by: FAMILY MEDICINE

## 2021-04-01 PROCEDURE — 3288F PR FALLS RISK ASSESSMENT DOCUMENTED: ICD-10-PCS | Mod: CPTII,S$GLB,, | Performed by: FAMILY MEDICINE

## 2021-04-01 PROCEDURE — 3078F PR MOST RECENT DIASTOLIC BLOOD PRESSURE < 80 MM HG: ICD-10-PCS | Mod: CPTII,S$GLB,, | Performed by: FAMILY MEDICINE

## 2021-04-01 PROCEDURE — 1125F AMNT PAIN NOTED PAIN PRSNT: CPT | Mod: S$GLB,,, | Performed by: FAMILY MEDICINE

## 2021-04-01 PROCEDURE — 36415 COLL VENOUS BLD VENIPUNCTURE: CPT | Mod: PN | Performed by: FAMILY MEDICINE

## 2021-04-01 PROCEDURE — 3051F HG A1C>EQUAL 7.0%<8.0%: CPT | Mod: CPTII,S$GLB,, | Performed by: FAMILY MEDICINE

## 2021-04-01 PROCEDURE — 99214 OFFICE O/P EST MOD 30 MIN: CPT | Mod: S$GLB,,, | Performed by: FAMILY MEDICINE

## 2021-04-01 PROCEDURE — 3078F DIAST BP <80 MM HG: CPT | Mod: CPTII,S$GLB,, | Performed by: FAMILY MEDICINE

## 2021-04-01 PROCEDURE — 99999 PR PBB SHADOW E&M-EST. PATIENT-LVL V: CPT | Mod: PBBFAC,,, | Performed by: FAMILY MEDICINE

## 2021-04-01 PROCEDURE — 99499 RISK ADDL DX/OHS AUDIT: ICD-10-PCS | Mod: S$GLB,,, | Performed by: FAMILY MEDICINE

## 2021-04-01 PROCEDURE — 1125F PR PAIN SEVERITY QUANTIFIED, PAIN PRESENT: ICD-10-PCS | Mod: S$GLB,,, | Performed by: FAMILY MEDICINE

## 2021-04-01 PROCEDURE — 3075F PR MOST RECENT SYSTOLIC BLOOD PRESS GE 130-139MM HG: ICD-10-PCS | Mod: CPTII,S$GLB,, | Performed by: FAMILY MEDICINE

## 2021-04-01 PROCEDURE — 1159F MED LIST DOCD IN RCRD: CPT | Mod: S$GLB,,, | Performed by: FAMILY MEDICINE

## 2021-04-01 PROCEDURE — 3288F FALL RISK ASSESSMENT DOCD: CPT | Mod: CPTII,S$GLB,, | Performed by: FAMILY MEDICINE

## 2021-04-01 PROCEDURE — 3008F PR BODY MASS INDEX (BMI) DOCUMENTED: ICD-10-PCS | Mod: CPTII,S$GLB,, | Performed by: FAMILY MEDICINE

## 2021-04-01 PROCEDURE — 99214 PR OFFICE/OUTPT VISIT, EST, LEVL IV, 30-39 MIN: ICD-10-PCS | Mod: S$GLB,,, | Performed by: FAMILY MEDICINE

## 2021-04-01 RX ORDER — IRON,CARBONYL/ASCORBIC ACID 65MG-125MG
1 TABLET, DELAYED RELEASE (ENTERIC COATED) ORAL 2 TIMES DAILY
Qty: 180 TABLET | Refills: 0 | Status: SHIPPED | OUTPATIENT
Start: 2021-04-01 | End: 2022-07-29

## 2021-04-02 LAB
ANION GAP SERPL CALC-SCNC: 12 MMOL/L (ref 8–16)
BUN SERPL-MCNC: 13 MG/DL (ref 8–23)
CALCIUM SERPL-MCNC: 9.3 MG/DL (ref 8.7–10.5)
CHLORIDE SERPL-SCNC: 107 MMOL/L (ref 95–110)
CO2 SERPL-SCNC: 19 MMOL/L (ref 23–29)
CREAT SERPL-MCNC: 1 MG/DL (ref 0.5–1.4)
EST. GFR  (AFRICAN AMERICAN): >60 ML/MIN/1.73 M^2
EST. GFR  (NON AFRICAN AMERICAN): >60 ML/MIN/1.73 M^2
ESTIMATED AVG GLUCOSE: 189 MG/DL (ref 68–131)
GLUCOSE SERPL-MCNC: 144 MG/DL (ref 70–110)
HBA1C MFR BLD: 8.2 % (ref 4–5.6)
POTASSIUM SERPL-SCNC: 4.3 MMOL/L (ref 3.5–5.1)
SODIUM SERPL-SCNC: 138 MMOL/L (ref 136–145)

## 2021-04-04 ENCOUNTER — HOSPITAL ENCOUNTER (EMERGENCY)
Facility: OTHER | Age: 66
Discharge: HOME | End: 2021-04-04
Attending: EMERGENCY MEDICINE
Payer: MEDICARE

## 2021-04-04 VITALS
DIASTOLIC BLOOD PRESSURE: 69 MMHG | WEIGHT: 167 LBS | TEMPERATURE: 99 F | RESPIRATION RATE: 16 BRPM | SYSTOLIC BLOOD PRESSURE: 151 MMHG | HEIGHT: 66 IN | BODY MASS INDEX: 26.84 KG/M2 | HEART RATE: 71 BPM | OXYGEN SATURATION: 99 %

## 2021-04-04 DIAGNOSIS — M79.672 LEFT FOOT PAIN: Primary | ICD-10-CM

## 2021-04-04 DIAGNOSIS — M79.673 FOOT PAIN: ICD-10-CM

## 2021-04-04 LAB
ANION GAP SERPL CALC-SCNC: 12 MMOL/L (ref 8–16)
BASOPHILS # BLD AUTO: 0.02 K/UL (ref 0–0.2)
BASOPHILS NFR BLD: 0.3 % (ref 0–1.9)
BUN SERPL-MCNC: 15 MG/DL (ref 8–23)
CALCIUM SERPL-MCNC: 9.4 MG/DL (ref 8.7–10.5)
CHLORIDE SERPL-SCNC: 104 MMOL/L (ref 95–110)
CO2 SERPL-SCNC: 21 MMOL/L (ref 23–29)
CREAT SERPL-MCNC: 1.2 MG/DL (ref 0.5–1.4)
CRP SERPL-MCNC: 7.5 MG/L (ref 0–8.2)
DIFFERENTIAL METHOD: ABNORMAL
EOSINOPHIL # BLD AUTO: 0.2 K/UL (ref 0–0.5)
EOSINOPHIL NFR BLD: 3.1 % (ref 0–8)
ERYTHROCYTE [DISTWIDTH] IN BLOOD BY AUTOMATED COUNT: 12.1 % (ref 11.5–14.5)
ERYTHROCYTE [SEDIMENTATION RATE] IN BLOOD: 15 MM/HR (ref 0–10)
EST. GFR  (AFRICAN AMERICAN): >60 ML/MIN/1.73 M^2
EST. GFR  (NON AFRICAN AMERICAN): >60 ML/MIN/1.73 M^2
GLUCOSE SERPL-MCNC: 179 MG/DL (ref 70–110)
HCT VFR BLD AUTO: 35.9 % (ref 40–54)
HGB BLD-MCNC: 12.2 G/DL (ref 14–18)
IMM GRANULOCYTES # BLD AUTO: 0.02 K/UL (ref 0–0.04)
IMM GRANULOCYTES NFR BLD AUTO: 0.3 % (ref 0–0.5)
LYMPHOCYTES # BLD AUTO: 1 K/UL (ref 1–4.8)
LYMPHOCYTES NFR BLD: 14.9 % (ref 18–48)
MCH RBC QN AUTO: 29.5 PG (ref 27–31)
MCHC RBC AUTO-ENTMCNC: 34 G/DL (ref 32–36)
MCV RBC AUTO: 87 FL (ref 82–98)
MONOCYTES # BLD AUTO: 0.7 K/UL (ref 0.3–1)
MONOCYTES NFR BLD: 9.7 % (ref 4–15)
NEUTROPHILS # BLD AUTO: 4.9 K/UL (ref 1.8–7.7)
NEUTROPHILS NFR BLD: 71.7 % (ref 38–73)
NRBC BLD-RTO: 0 /100 WBC
PLATELET # BLD AUTO: 293 K/UL (ref 150–450)
PMV BLD AUTO: 8.8 FL (ref 9.2–12.9)
POTASSIUM SERPL-SCNC: 3.8 MMOL/L (ref 3.5–5.1)
RBC # BLD AUTO: 4.13 M/UL (ref 4.6–6.2)
SODIUM SERPL-SCNC: 137 MMOL/L (ref 136–145)
URATE SERPL-MCNC: 6.4 MG/DL (ref 3.4–7)
WBC # BLD AUTO: 6.77 K/UL (ref 3.9–12.7)

## 2021-04-04 PROCEDURE — 86140 C-REACTIVE PROTEIN: CPT | Performed by: PHYSICIAN ASSISTANT

## 2021-04-04 PROCEDURE — 85025 COMPLETE CBC W/AUTO DIFF WBC: CPT | Performed by: PHYSICIAN ASSISTANT

## 2021-04-04 PROCEDURE — 85651 RBC SED RATE NONAUTOMATED: CPT | Performed by: PHYSICIAN ASSISTANT

## 2021-04-04 PROCEDURE — 99284 EMERGENCY DEPT VISIT MOD MDM: CPT | Mod: 25

## 2021-04-04 PROCEDURE — 84550 ASSAY OF BLOOD/URIC ACID: CPT | Performed by: PHYSICIAN ASSISTANT

## 2021-04-04 PROCEDURE — 80048 BASIC METABOLIC PNL TOTAL CA: CPT | Performed by: PHYSICIAN ASSISTANT

## 2021-04-04 RX ORDER — CEPHALEXIN 500 MG/1
500 CAPSULE ORAL EVERY 6 HOURS
Qty: 40 CAPSULE | Refills: 0 | Status: SHIPPED | OUTPATIENT
Start: 2021-04-04 | End: 2021-04-14

## 2021-04-05 ENCOUNTER — TELEPHONE (OUTPATIENT)
Dept: PRIMARY CARE CLINIC | Facility: CLINIC | Age: 66
End: 2021-04-05

## 2021-04-05 RX ORDER — METFORMIN HYDROCHLORIDE EXTENDED-RELEASE TABLETS 1000 MG/1
1000 TABLET, FILM COATED, EXTENDED RELEASE ORAL 2 TIMES DAILY WITH MEALS
Qty: 180 TABLET | Refills: 3 | Status: SHIPPED | OUTPATIENT
Start: 2021-04-05 | End: 2021-04-16

## 2021-04-09 ENCOUNTER — OFFICE VISIT (OUTPATIENT)
Dept: PRIMARY CARE CLINIC | Facility: CLINIC | Age: 66
End: 2021-04-09
Payer: MEDICARE

## 2021-04-09 VITALS
OXYGEN SATURATION: 98 % | TEMPERATURE: 98 F | WEIGHT: 159.38 LBS | DIASTOLIC BLOOD PRESSURE: 72 MMHG | HEIGHT: 66 IN | SYSTOLIC BLOOD PRESSURE: 134 MMHG | HEART RATE: 79 BPM | BODY MASS INDEX: 25.61 KG/M2

## 2021-04-09 DIAGNOSIS — L03.116 CELLULITIS OF LEFT FOOT: Primary | ICD-10-CM

## 2021-04-09 PROCEDURE — 1126F AMNT PAIN NOTED NONE PRSNT: CPT | Mod: S$GLB,,, | Performed by: FAMILY MEDICINE

## 2021-04-09 PROCEDURE — 1101F PR PT FALLS ASSESS DOC 0-1 FALLS W/OUT INJ PAST YR: ICD-10-PCS | Mod: CPTII,S$GLB,, | Performed by: FAMILY MEDICINE

## 2021-04-09 PROCEDURE — 1159F PR MEDICATION LIST DOCUMENTED IN MEDICAL RECORD: ICD-10-PCS | Mod: S$GLB,,, | Performed by: FAMILY MEDICINE

## 2021-04-09 PROCEDURE — 3008F PR BODY MASS INDEX (BMI) DOCUMENTED: ICD-10-PCS | Mod: CPTII,S$GLB,, | Performed by: FAMILY MEDICINE

## 2021-04-09 PROCEDURE — 3288F FALL RISK ASSESSMENT DOCD: CPT | Mod: CPTII,S$GLB,, | Performed by: FAMILY MEDICINE

## 2021-04-09 PROCEDURE — 3288F PR FALLS RISK ASSESSMENT DOCUMENTED: ICD-10-PCS | Mod: CPTII,S$GLB,, | Performed by: FAMILY MEDICINE

## 2021-04-09 PROCEDURE — 1126F PR PAIN SEVERITY QUANTIFIED, NO PAIN PRESENT: ICD-10-PCS | Mod: S$GLB,,, | Performed by: FAMILY MEDICINE

## 2021-04-09 PROCEDURE — 99212 OFFICE O/P EST SF 10 MIN: CPT | Mod: S$GLB,,, | Performed by: FAMILY MEDICINE

## 2021-04-09 PROCEDURE — 99999 PR PBB SHADOW E&M-EST. PATIENT-LVL IV: ICD-10-PCS | Mod: PBBFAC,,, | Performed by: FAMILY MEDICINE

## 2021-04-09 PROCEDURE — 99999 PR PBB SHADOW E&M-EST. PATIENT-LVL IV: CPT | Mod: PBBFAC,,, | Performed by: FAMILY MEDICINE

## 2021-04-09 PROCEDURE — 1101F PT FALLS ASSESS-DOCD LE1/YR: CPT | Mod: CPTII,S$GLB,, | Performed by: FAMILY MEDICINE

## 2021-04-09 PROCEDURE — 3008F BODY MASS INDEX DOCD: CPT | Mod: CPTII,S$GLB,, | Performed by: FAMILY MEDICINE

## 2021-04-09 PROCEDURE — 1159F MED LIST DOCD IN RCRD: CPT | Mod: S$GLB,,, | Performed by: FAMILY MEDICINE

## 2021-04-09 PROCEDURE — 99212 PR OFFICE/OUTPT VISIT, EST, LEVL II, 10-19 MIN: ICD-10-PCS | Mod: S$GLB,,, | Performed by: FAMILY MEDICINE

## 2021-04-09 RX ORDER — INDOMETHACIN 50 MG/1
50 CAPSULE ORAL 2 TIMES DAILY WITH MEALS
Qty: 60 CAPSULE | Refills: 2 | Status: SHIPPED | OUTPATIENT
Start: 2021-04-09 | End: 2022-04-05 | Stop reason: SDUPTHER

## 2021-04-09 RX ORDER — FAMOTIDINE 40 MG/1
40 TABLET, FILM COATED ORAL DAILY
Qty: 30 TABLET | Refills: 2 | Status: SHIPPED | OUTPATIENT
Start: 2021-04-09 | End: 2022-04-05 | Stop reason: SDUPTHER

## 2021-04-12 ENCOUNTER — TELEPHONE (OUTPATIENT)
Dept: PRIMARY CARE CLINIC | Facility: CLINIC | Age: 66
End: 2021-04-12

## 2021-04-16 ENCOUNTER — OFFICE VISIT (OUTPATIENT)
Dept: PRIMARY CARE CLINIC | Facility: CLINIC | Age: 66
End: 2021-04-16
Payer: MEDICARE

## 2021-04-16 VITALS
BODY MASS INDEX: 26.61 KG/M2 | HEART RATE: 70 BPM | TEMPERATURE: 98 F | DIASTOLIC BLOOD PRESSURE: 66 MMHG | OXYGEN SATURATION: 98 % | HEIGHT: 66 IN | WEIGHT: 165.56 LBS | SYSTOLIC BLOOD PRESSURE: 136 MMHG

## 2021-04-16 DIAGNOSIS — M25.572 ARTHRALGIA OF LEFT FOOT: Primary | ICD-10-CM

## 2021-04-16 DIAGNOSIS — R68.83 CHILLS (WITHOUT FEVER): ICD-10-CM

## 2021-04-16 DIAGNOSIS — E11.9 TYPE 2 DIABETES MELLITUS WITHOUT COMPLICATION, WITHOUT LONG-TERM CURRENT USE OF INSULIN: ICD-10-CM

## 2021-04-16 DIAGNOSIS — I10 ESSENTIAL HYPERTENSION: ICD-10-CM

## 2021-04-16 DIAGNOSIS — D50.8 IRON DEFICIENCY ANEMIA SECONDARY TO INADEQUATE DIETARY IRON INTAKE: ICD-10-CM

## 2021-04-16 PROCEDURE — 1126F PR PAIN SEVERITY QUANTIFIED, NO PAIN PRESENT: ICD-10-PCS | Mod: S$GLB,,, | Performed by: FAMILY MEDICINE

## 2021-04-16 PROCEDURE — 3288F PR FALLS RISK ASSESSMENT DOCUMENTED: ICD-10-PCS | Mod: CPTII,S$GLB,, | Performed by: FAMILY MEDICINE

## 2021-04-16 PROCEDURE — 3008F PR BODY MASS INDEX (BMI) DOCUMENTED: ICD-10-PCS | Mod: CPTII,S$GLB,, | Performed by: FAMILY MEDICINE

## 2021-04-16 PROCEDURE — 99213 PR OFFICE/OUTPT VISIT, EST, LEVL III, 20-29 MIN: ICD-10-PCS | Mod: S$GLB,,, | Performed by: FAMILY MEDICINE

## 2021-04-16 PROCEDURE — 3008F BODY MASS INDEX DOCD: CPT | Mod: CPTII,S$GLB,, | Performed by: FAMILY MEDICINE

## 2021-04-16 PROCEDURE — 99999 PR PBB SHADOW E&M-EST. PATIENT-LVL V: CPT | Mod: PBBFAC,,, | Performed by: FAMILY MEDICINE

## 2021-04-16 PROCEDURE — 3288F FALL RISK ASSESSMENT DOCD: CPT | Mod: CPTII,S$GLB,, | Performed by: FAMILY MEDICINE

## 2021-04-16 PROCEDURE — 99213 OFFICE O/P EST LOW 20 MIN: CPT | Mod: S$GLB,,, | Performed by: FAMILY MEDICINE

## 2021-04-16 PROCEDURE — 1159F PR MEDICATION LIST DOCUMENTED IN MEDICAL RECORD: ICD-10-PCS | Mod: S$GLB,,, | Performed by: FAMILY MEDICINE

## 2021-04-16 PROCEDURE — 1159F MED LIST DOCD IN RCRD: CPT | Mod: S$GLB,,, | Performed by: FAMILY MEDICINE

## 2021-04-16 PROCEDURE — 99999 PR PBB SHADOW E&M-EST. PATIENT-LVL V: ICD-10-PCS | Mod: PBBFAC,,, | Performed by: FAMILY MEDICINE

## 2021-04-16 PROCEDURE — 1101F PT FALLS ASSESS-DOCD LE1/YR: CPT | Mod: CPTII,S$GLB,, | Performed by: FAMILY MEDICINE

## 2021-04-16 PROCEDURE — 3052F HG A1C>EQUAL 8.0%<EQUAL 9.0%: CPT | Mod: CPTII,S$GLB,, | Performed by: FAMILY MEDICINE

## 2021-04-16 PROCEDURE — 3052F PR MOST RECENT HEMOGLOBIN A1C LEVEL 8.0 - < 9.0%: ICD-10-PCS | Mod: CPTII,S$GLB,, | Performed by: FAMILY MEDICINE

## 2021-04-16 PROCEDURE — 1126F AMNT PAIN NOTED NONE PRSNT: CPT | Mod: S$GLB,,, | Performed by: FAMILY MEDICINE

## 2021-04-16 PROCEDURE — 1101F PR PT FALLS ASSESS DOC 0-1 FALLS W/OUT INJ PAST YR: ICD-10-PCS | Mod: CPTII,S$GLB,, | Performed by: FAMILY MEDICINE

## 2021-04-16 RX ORDER — METFORMIN HYDROCHLORIDE 750 MG/1
TABLET, EXTENDED RELEASE ORAL
COMMUNITY
Start: 2021-04-05 | End: 2021-04-16

## 2021-04-19 ENCOUNTER — PATIENT MESSAGE (OUTPATIENT)
Dept: ADMINISTRATIVE | Facility: HOSPITAL | Age: 66
End: 2021-04-19

## 2021-04-19 ENCOUNTER — PATIENT OUTREACH (OUTPATIENT)
Dept: ADMINISTRATIVE | Facility: HOSPITAL | Age: 66
End: 2021-04-19

## 2021-05-03 ENCOUNTER — LAB VISIT (OUTPATIENT)
Dept: LAB | Facility: OTHER | Age: 66
End: 2021-05-03
Attending: RADIOLOGY
Payer: MEDICARE

## 2021-05-03 DIAGNOSIS — C61 MALIGNANT NEOPLASM OF PROSTATE: ICD-10-CM

## 2021-05-03 LAB — COMPLEXED PSA SERPL-MCNC: 0.9 NG/ML (ref 0–4)

## 2021-05-03 PROCEDURE — 84153 ASSAY OF PSA TOTAL: CPT | Performed by: RADIOLOGY

## 2021-05-03 PROCEDURE — 36415 COLL VENOUS BLD VENIPUNCTURE: CPT | Performed by: RADIOLOGY

## 2021-05-05 ENCOUNTER — OFFICE VISIT (OUTPATIENT)
Dept: RADIATION ONCOLOGY | Facility: CLINIC | Age: 66
End: 2021-05-05
Attending: RADIOLOGY
Payer: MEDICARE

## 2021-05-05 VITALS
DIASTOLIC BLOOD PRESSURE: 72 MMHG | BODY MASS INDEX: 25.87 KG/M2 | WEIGHT: 164.81 LBS | TEMPERATURE: 98 F | HEIGHT: 67 IN | SYSTOLIC BLOOD PRESSURE: 158 MMHG | HEART RATE: 82 BPM | RESPIRATION RATE: 16 BRPM

## 2021-05-05 DIAGNOSIS — C61 MALIGNANT NEOPLASM OF PROSTATE: Primary | ICD-10-CM

## 2021-05-05 PROCEDURE — 99999 PR PBB SHADOW E&M-EST. PATIENT-LVL IV: ICD-10-PCS | Mod: PBBFAC,,, | Performed by: RADIOLOGY

## 2021-05-05 PROCEDURE — 1126F AMNT PAIN NOTED NONE PRSNT: CPT | Mod: S$GLB,,, | Performed by: RADIOLOGY

## 2021-05-05 PROCEDURE — 99212 PR OFFICE/OUTPT VISIT, EST, LEVL II, 10-19 MIN: ICD-10-PCS | Mod: S$GLB,,, | Performed by: RADIOLOGY

## 2021-05-05 PROCEDURE — 1159F PR MEDICATION LIST DOCUMENTED IN MEDICAL RECORD: ICD-10-PCS | Mod: S$GLB,,, | Performed by: RADIOLOGY

## 2021-05-05 PROCEDURE — 1126F PR PAIN SEVERITY QUANTIFIED, NO PAIN PRESENT: ICD-10-PCS | Mod: S$GLB,,, | Performed by: RADIOLOGY

## 2021-05-05 PROCEDURE — 3008F BODY MASS INDEX DOCD: CPT | Mod: CPTII,S$GLB,, | Performed by: RADIOLOGY

## 2021-05-05 PROCEDURE — 3008F PR BODY MASS INDEX (BMI) DOCUMENTED: ICD-10-PCS | Mod: CPTII,S$GLB,, | Performed by: RADIOLOGY

## 2021-05-05 PROCEDURE — 99999 PR PBB SHADOW E&M-EST. PATIENT-LVL IV: CPT | Mod: PBBFAC,,, | Performed by: RADIOLOGY

## 2021-05-05 PROCEDURE — 99212 OFFICE O/P EST SF 10 MIN: CPT | Mod: S$GLB,,, | Performed by: RADIOLOGY

## 2021-05-05 PROCEDURE — 1159F MED LIST DOCD IN RCRD: CPT | Mod: S$GLB,,, | Performed by: RADIOLOGY

## 2021-05-10 ENCOUNTER — HOSPITAL ENCOUNTER (OUTPATIENT)
Dept: RADIOLOGY | Facility: HOSPITAL | Age: 66
Discharge: HOME | End: 2021-05-10
Attending: RADIOLOGY
Payer: MEDICARE

## 2021-05-10 DIAGNOSIS — C61 MALIGNANT NEOPLASM OF PROSTATE: ICD-10-CM

## 2021-05-10 LAB
CREAT SERPL-MCNC: 1.1 MG/DL (ref 0.5–1.4)
SAMPLE: NORMAL

## 2021-05-10 PROCEDURE — 25500020 PHARM REV CODE 255: Performed by: RADIOLOGY

## 2021-05-10 PROCEDURE — 74177 CT ABD & PELVIS W/CONTRAST: CPT | Mod: TC

## 2021-05-10 PROCEDURE — 74177 CT ABDOMEN PELVIS WITH CONTRAST: ICD-10-PCS | Mod: 26,,, | Performed by: INTERNAL MEDICINE

## 2021-05-10 PROCEDURE — 74177 CT ABD & PELVIS W/CONTRAST: CPT | Mod: 26,,, | Performed by: INTERNAL MEDICINE

## 2021-05-10 RX ADMIN — IOHEXOL 75 ML: 350 INJECTION, SOLUTION INTRAVENOUS at 04:05

## 2021-05-11 ENCOUNTER — HOSPITAL ENCOUNTER (OUTPATIENT)
Dept: RADIOLOGY | Facility: HOSPITAL | Age: 66
Discharge: HOME | End: 2021-05-11
Attending: RADIOLOGY
Payer: MEDICARE

## 2021-05-11 ENCOUNTER — PATIENT MESSAGE (OUTPATIENT)
Dept: RADIATION ONCOLOGY | Facility: CLINIC | Age: 66
End: 2021-05-11

## 2021-05-11 DIAGNOSIS — C61 MALIGNANT NEOPLASM OF PROSTATE: ICD-10-CM

## 2021-05-11 PROCEDURE — A9503 TC99M MEDRONATE: HCPCS

## 2021-05-11 PROCEDURE — 78306 BONE IMAGING WHOLE BODY: CPT | Mod: 26,,, | Performed by: RADIOLOGY

## 2021-05-11 PROCEDURE — 78306 NM BONE SCAN WHOLE BODY: ICD-10-PCS | Mod: 26,,, | Performed by: RADIOLOGY

## 2021-05-12 DIAGNOSIS — C61 MALIGNANT NEOPLASM OF PROSTATE: Primary | ICD-10-CM

## 2021-05-21 ENCOUNTER — HOSPITAL ENCOUNTER (OUTPATIENT)
Dept: RADIOLOGY | Facility: HOSPITAL | Age: 66
Discharge: HOME | End: 2021-05-21
Attending: RADIOLOGY
Payer: MEDICARE

## 2021-05-21 DIAGNOSIS — C61 MALIGNANT NEOPLASM OF PROSTATE: ICD-10-CM

## 2021-05-21 PROCEDURE — A9588 FLUCICLOVINE F-18: HCPCS

## 2021-05-21 PROCEDURE — 78815 PET IMAGE W/CT SKULL-THIGH: CPT | Mod: 26,PS,, | Performed by: RADIOLOGY

## 2021-05-21 PROCEDURE — 78815 NM PET CT FLUCICLOVINE F18(PROSTATE CANCER RECURRENCE): ICD-10-PCS | Mod: 26,PS,, | Performed by: RADIOLOGY

## 2021-05-25 ENCOUNTER — OFFICE VISIT (OUTPATIENT)
Dept: PULMONOLOGY | Facility: CLINIC | Age: 66
End: 2021-05-25
Payer: MEDICARE

## 2021-05-25 ENCOUNTER — OFFICE VISIT (OUTPATIENT)
Dept: RADIATION ONCOLOGY | Facility: CLINIC | Age: 66
End: 2021-05-25
Payer: MEDICARE

## 2021-05-25 VITALS
HEART RATE: 60 BPM | DIASTOLIC BLOOD PRESSURE: 73 MMHG | WEIGHT: 159 LBS | RESPIRATION RATE: 20 BRPM | SYSTOLIC BLOOD PRESSURE: 135 MMHG | OXYGEN SATURATION: 99 % | HEIGHT: 66 IN | BODY MASS INDEX: 25.55 KG/M2 | TEMPERATURE: 97 F

## 2021-05-25 VITALS
DIASTOLIC BLOOD PRESSURE: 74 MMHG | HEIGHT: 67 IN | BODY MASS INDEX: 25 KG/M2 | SYSTOLIC BLOOD PRESSURE: 129 MMHG | OXYGEN SATURATION: 99 % | WEIGHT: 159.31 LBS | HEART RATE: 69 BPM

## 2021-05-25 DIAGNOSIS — R05.9 COUGH: ICD-10-CM

## 2021-05-25 DIAGNOSIS — C61 MALIGNANT NEOPLASM OF PROSTATE: Primary | ICD-10-CM

## 2021-05-25 PROCEDURE — 99999 PR PBB SHADOW E&M-EST. PATIENT-LVL IV: ICD-10-PCS | Mod: PBBFAC,,, | Performed by: RADIOLOGY

## 2021-05-25 PROCEDURE — 3288F PR FALLS RISK ASSESSMENT DOCUMENTED: ICD-10-PCS | Mod: CPTII,S$GLB,, | Performed by: NURSE PRACTITIONER

## 2021-05-25 PROCEDURE — 99213 PR OFFICE/OUTPT VISIT, EST, LEVL III, 20-29 MIN: ICD-10-PCS | Mod: S$GLB,,, | Performed by: NURSE PRACTITIONER

## 2021-05-25 PROCEDURE — 3288F FALL RISK ASSESSMENT DOCD: CPT | Mod: CPTII,S$GLB,, | Performed by: NURSE PRACTITIONER

## 2021-05-25 PROCEDURE — 99999 PR PBB SHADOW E&M-EST. PATIENT-LVL IV: CPT | Mod: PBBFAC,,, | Performed by: RADIOLOGY

## 2021-05-25 PROCEDURE — 99999 PR PBB SHADOW E&M-EST. PATIENT-LVL III: ICD-10-PCS | Mod: PBBFAC,,, | Performed by: NURSE PRACTITIONER

## 2021-05-25 PROCEDURE — 1159F MED LIST DOCD IN RCRD: CPT | Mod: S$GLB,,, | Performed by: RADIOLOGY

## 2021-05-25 PROCEDURE — 1101F PT FALLS ASSESS-DOCD LE1/YR: CPT | Mod: CPTII,S$GLB,, | Performed by: NURSE PRACTITIONER

## 2021-05-25 PROCEDURE — 3288F PR FALLS RISK ASSESSMENT DOCUMENTED: ICD-10-PCS | Mod: CPTII,S$GLB,, | Performed by: RADIOLOGY

## 2021-05-25 PROCEDURE — 3008F BODY MASS INDEX DOCD: CPT | Mod: CPTII,S$GLB,, | Performed by: NURSE PRACTITIONER

## 2021-05-25 PROCEDURE — 3008F BODY MASS INDEX DOCD: CPT | Mod: CPTII,S$GLB,, | Performed by: RADIOLOGY

## 2021-05-25 PROCEDURE — 3008F PR BODY MASS INDEX (BMI) DOCUMENTED: ICD-10-PCS | Mod: CPTII,S$GLB,, | Performed by: RADIOLOGY

## 2021-05-25 PROCEDURE — 99212 OFFICE O/P EST SF 10 MIN: CPT | Mod: S$GLB,,, | Performed by: RADIOLOGY

## 2021-05-25 PROCEDURE — 99999 PR PBB SHADOW E&M-EST. PATIENT-LVL III: CPT | Mod: PBBFAC,,, | Performed by: NURSE PRACTITIONER

## 2021-05-25 PROCEDURE — 1101F PR PT FALLS ASSESS DOC 0-1 FALLS W/OUT INJ PAST YR: ICD-10-PCS | Mod: CPTII,S$GLB,, | Performed by: RADIOLOGY

## 2021-05-25 PROCEDURE — 1159F PR MEDICATION LIST DOCUMENTED IN MEDICAL RECORD: ICD-10-PCS | Mod: S$GLB,,, | Performed by: NURSE PRACTITIONER

## 2021-05-25 PROCEDURE — 3008F PR BODY MASS INDEX (BMI) DOCUMENTED: ICD-10-PCS | Mod: CPTII,S$GLB,, | Performed by: NURSE PRACTITIONER

## 2021-05-25 PROCEDURE — 1101F PT FALLS ASSESS-DOCD LE1/YR: CPT | Mod: CPTII,S$GLB,, | Performed by: RADIOLOGY

## 2021-05-25 PROCEDURE — 99212 PR OFFICE/OUTPT VISIT, EST, LEVL II, 10-19 MIN: ICD-10-PCS | Mod: S$GLB,,, | Performed by: RADIOLOGY

## 2021-05-25 PROCEDURE — 1126F PR PAIN SEVERITY QUANTIFIED, NO PAIN PRESENT: ICD-10-PCS | Mod: S$GLB,,, | Performed by: NURSE PRACTITIONER

## 2021-05-25 PROCEDURE — 1126F PR PAIN SEVERITY QUANTIFIED, NO PAIN PRESENT: ICD-10-PCS | Mod: S$GLB,,, | Performed by: RADIOLOGY

## 2021-05-25 PROCEDURE — 1159F PR MEDICATION LIST DOCUMENTED IN MEDICAL RECORD: ICD-10-PCS | Mod: S$GLB,,, | Performed by: RADIOLOGY

## 2021-05-25 PROCEDURE — 1159F MED LIST DOCD IN RCRD: CPT | Mod: S$GLB,,, | Performed by: NURSE PRACTITIONER

## 2021-05-25 PROCEDURE — 1126F AMNT PAIN NOTED NONE PRSNT: CPT | Mod: S$GLB,,, | Performed by: NURSE PRACTITIONER

## 2021-05-25 PROCEDURE — 3288F FALL RISK ASSESSMENT DOCD: CPT | Mod: CPTII,S$GLB,, | Performed by: RADIOLOGY

## 2021-05-25 PROCEDURE — 99213 OFFICE O/P EST LOW 20 MIN: CPT | Mod: S$GLB,,, | Performed by: NURSE PRACTITIONER

## 2021-05-25 PROCEDURE — 1126F AMNT PAIN NOTED NONE PRSNT: CPT | Mod: S$GLB,,, | Performed by: RADIOLOGY

## 2021-05-25 PROCEDURE — 1101F PR PT FALLS ASSESS DOC 0-1 FALLS W/OUT INJ PAST YR: ICD-10-PCS | Mod: CPTII,S$GLB,, | Performed by: NURSE PRACTITIONER

## 2021-06-03 ENCOUNTER — TELEPHONE (OUTPATIENT)
Dept: PRIMARY CARE CLINIC | Facility: CLINIC | Age: 66
End: 2021-06-03

## 2021-06-24 ENCOUNTER — PATIENT OUTREACH (OUTPATIENT)
Dept: ADMINISTRATIVE | Facility: OTHER | Age: 66
End: 2021-06-24

## 2021-06-28 ENCOUNTER — OFFICE VISIT (OUTPATIENT)
Dept: OPHTHALMOLOGY | Facility: CLINIC | Age: 66
End: 2021-06-28
Payer: MEDICARE

## 2021-06-28 ENCOUNTER — CLINICAL SUPPORT (OUTPATIENT)
Dept: OPHTHALMOLOGY | Facility: CLINIC | Age: 66
End: 2021-06-28
Payer: MEDICARE

## 2021-06-28 DIAGNOSIS — H53.432 ARCUATE VISUAL FIELD DEFECT OF LEFT EYE: ICD-10-CM

## 2021-06-28 DIAGNOSIS — H40.1131 PRIMARY OPEN ANGLE GLAUCOMA OF BOTH EYES, MILD STAGE: Primary | ICD-10-CM

## 2021-06-28 DIAGNOSIS — E11.9 TYPE 2 DIABETES MELLITUS WITHOUT OPHTHALMIC MANIFESTATIONS: ICD-10-CM

## 2021-06-28 DIAGNOSIS — H21.562 PUPILLARY SPHINCTER RUPTURE, LEFT: ICD-10-CM

## 2021-06-28 DIAGNOSIS — H52.4 BILATERAL PRESBYOPIA: ICD-10-CM

## 2021-06-28 DIAGNOSIS — H33.052 OLD SUBTOTAL RETINAL DETACHMENT, LEFT: ICD-10-CM

## 2021-06-28 DIAGNOSIS — H31.002 CHORIORETINAL SCAR, LEFT: ICD-10-CM

## 2021-06-28 PROCEDURE — 92083 HUMPHREY VISUAL FIELD - OU - BOTH EYES: ICD-10-PCS | Mod: S$GLB,,, | Performed by: OPHTHALMOLOGY

## 2021-06-28 PROCEDURE — 1126F PR PAIN SEVERITY QUANTIFIED, NO PAIN PRESENT: ICD-10-PCS | Mod: S$GLB,,, | Performed by: OPHTHALMOLOGY

## 2021-06-28 PROCEDURE — 3288F PR FALLS RISK ASSESSMENT DOCUMENTED: ICD-10-PCS | Mod: CPTII,S$GLB,, | Performed by: OPHTHALMOLOGY

## 2021-06-28 PROCEDURE — 99499 UNLISTED E&M SERVICE: CPT | Mod: S$GLB,,, | Performed by: OPHTHALMOLOGY

## 2021-06-28 PROCEDURE — 3288F FALL RISK ASSESSMENT DOCD: CPT | Mod: CPTII,S$GLB,, | Performed by: OPHTHALMOLOGY

## 2021-06-28 PROCEDURE — 99999 PR PBB SHADOW E&M-EST. PATIENT-LVL III: ICD-10-PCS | Mod: PBBFAC,,, | Performed by: OPHTHALMOLOGY

## 2021-06-28 PROCEDURE — 1101F PT FALLS ASSESS-DOCD LE1/YR: CPT | Mod: CPTII,S$GLB,, | Performed by: OPHTHALMOLOGY

## 2021-06-28 PROCEDURE — 92014 COMPRE OPH EXAM EST PT 1/>: CPT | Mod: S$GLB,,, | Performed by: OPHTHALMOLOGY

## 2021-06-28 PROCEDURE — 92014 PR EYE EXAM, EST PATIENT,COMPREHESV: ICD-10-PCS | Mod: S$GLB,,, | Performed by: OPHTHALMOLOGY

## 2021-06-28 PROCEDURE — 99499 RISK ADDL DX/OHS AUDIT: ICD-10-PCS | Mod: S$GLB,,, | Performed by: OPHTHALMOLOGY

## 2021-06-28 PROCEDURE — 1101F PR PT FALLS ASSESS DOC 0-1 FALLS W/OUT INJ PAST YR: ICD-10-PCS | Mod: CPTII,S$GLB,, | Performed by: OPHTHALMOLOGY

## 2021-06-28 PROCEDURE — 92133 POSTERIOR SEGMENT OCT OPTIC NERVE(OCULAR COHERENCE TOMOGRAPHY) - OU - BOTH EYES: ICD-10-PCS | Mod: S$GLB,,, | Performed by: OPHTHALMOLOGY

## 2021-06-28 PROCEDURE — 92083 EXTENDED VISUAL FIELD XM: CPT | Mod: S$GLB,,, | Performed by: OPHTHALMOLOGY

## 2021-06-28 PROCEDURE — 92133 CPTRZD OPH DX IMG PST SGM ON: CPT | Mod: S$GLB,,, | Performed by: OPHTHALMOLOGY

## 2021-06-28 PROCEDURE — 99999 PR PBB SHADOW E&M-EST. PATIENT-LVL III: CPT | Mod: PBBFAC,,, | Performed by: OPHTHALMOLOGY

## 2021-06-28 PROCEDURE — 1126F AMNT PAIN NOTED NONE PRSNT: CPT | Mod: S$GLB,,, | Performed by: OPHTHALMOLOGY

## 2021-07-02 ENCOUNTER — LAB VISIT (OUTPATIENT)
Dept: LAB | Facility: HOSPITAL | Age: 66
End: 2021-07-02
Attending: FAMILY MEDICINE
Payer: MEDICARE

## 2021-07-02 DIAGNOSIS — E11.65 TYPE 2 DIABETES MELLITUS WITH HYPERGLYCEMIA, WITHOUT LONG-TERM CURRENT USE OF INSULIN: ICD-10-CM

## 2021-07-02 DIAGNOSIS — D64.9 ANEMIA, UNSPECIFIED TYPE: ICD-10-CM

## 2021-07-02 LAB
ANION GAP SERPL CALC-SCNC: 11 MMOL/L (ref 8–16)
BUN SERPL-MCNC: 19 MG/DL (ref 8–23)
CALCIUM SERPL-MCNC: 9.3 MG/DL (ref 8.7–10.5)
CHLORIDE SERPL-SCNC: 108 MMOL/L (ref 95–110)
CO2 SERPL-SCNC: 21 MMOL/L (ref 23–29)
CREAT SERPL-MCNC: 1 MG/DL (ref 0.5–1.4)
EST. GFR  (AFRICAN AMERICAN): >60 ML/MIN/1.73 M^2
EST. GFR  (NON AFRICAN AMERICAN): >60 ML/MIN/1.73 M^2
ESTIMATED AVG GLUCOSE: 163 MG/DL (ref 68–131)
GLUCOSE SERPL-MCNC: 136 MG/DL (ref 70–110)
HBA1C MFR BLD: 7.3 % (ref 4–5.6)
HGB BLD-MCNC: 11.5 G/DL (ref 14–18)
POTASSIUM SERPL-SCNC: 3.6 MMOL/L (ref 3.5–5.1)
SODIUM SERPL-SCNC: 140 MMOL/L (ref 136–145)

## 2021-07-02 PROCEDURE — 83020 HEMOGLOBIN ELECTROPHORESIS: CPT | Performed by: FAMILY MEDICINE

## 2021-07-02 PROCEDURE — 80048 BASIC METABOLIC PNL TOTAL CA: CPT | Performed by: FAMILY MEDICINE

## 2021-07-02 PROCEDURE — 36415 COLL VENOUS BLD VENIPUNCTURE: CPT | Mod: PN | Performed by: FAMILY MEDICINE

## 2021-07-02 PROCEDURE — 85018 HEMOGLOBIN: CPT | Performed by: FAMILY MEDICINE

## 2021-07-02 PROCEDURE — 83036 HEMOGLOBIN GLYCOSYLATED A1C: CPT | Performed by: FAMILY MEDICINE

## 2021-07-06 ENCOUNTER — TELEPHONE (OUTPATIENT)
Dept: PRIMARY CARE CLINIC | Facility: CLINIC | Age: 66
End: 2021-07-06

## 2021-07-06 LAB
HB ELECTROPHORESIS INTERP CANCEL: NORMAL
HB ELECTROPHORESIS INTERPRETATION: NORMAL
HGB A MFR BLD ELPH: 97.8 % (ref 95.8–98)
HGB A2 MFR BLD: 2.2 % (ref 2–3.3)
HGB A2+XXX MFR BLD ELPH: NORMAL %
HGB F MFR BLD: 0 % (ref 0–0.9)
HGB XXX MFR BLD ELPH: NORMAL %
HPLC HB VARIANT: NORMAL

## 2021-09-07 ENCOUNTER — TELEPHONE (OUTPATIENT)
Dept: PRIMARY CARE CLINIC | Facility: CLINIC | Age: 66
End: 2021-09-07

## 2021-09-10 ENCOUNTER — OFFICE VISIT (OUTPATIENT)
Dept: PRIMARY CARE CLINIC | Facility: CLINIC | Age: 66
End: 2021-09-10
Payer: MEDICARE

## 2021-09-10 ENCOUNTER — LAB VISIT (OUTPATIENT)
Dept: LAB | Facility: HOSPITAL | Age: 66
End: 2021-09-10
Attending: FAMILY MEDICINE
Payer: MEDICARE

## 2021-09-10 VITALS
HEART RATE: 74 BPM | OXYGEN SATURATION: 98 % | BODY MASS INDEX: 25.68 KG/M2 | HEIGHT: 66 IN | DIASTOLIC BLOOD PRESSURE: 68 MMHG | SYSTOLIC BLOOD PRESSURE: 130 MMHG | WEIGHT: 159.81 LBS | TEMPERATURE: 98 F

## 2021-09-10 DIAGNOSIS — R19.7 DIARRHEA, UNSPECIFIED TYPE: ICD-10-CM

## 2021-09-10 DIAGNOSIS — C61 MALIGNANT NEOPLASM OF PROSTATE: ICD-10-CM

## 2021-09-10 DIAGNOSIS — R19.7 DIARRHEA, UNSPECIFIED TYPE: Primary | ICD-10-CM

## 2021-09-10 DIAGNOSIS — I10 ESSENTIAL HYPERTENSION: ICD-10-CM

## 2021-09-10 DIAGNOSIS — R20.2 PARESTHESIA OF BOTH HANDS: ICD-10-CM

## 2021-09-10 DIAGNOSIS — R68.89 COLD INTOLERANCE: ICD-10-CM

## 2021-09-10 DIAGNOSIS — E11.9 TYPE 2 DIABETES MELLITUS WITHOUT COMPLICATION, WITHOUT LONG-TERM CURRENT USE OF INSULIN: ICD-10-CM

## 2021-09-10 DIAGNOSIS — R63.4 UNINTENTIONAL WEIGHT LOSS: ICD-10-CM

## 2021-09-10 LAB
ALBUMIN SERPL BCP-MCNC: 4.2 G/DL (ref 3.5–5.2)
ALP SERPL-CCNC: 66 U/L (ref 55–135)
ALT SERPL W/O P-5'-P-CCNC: 17 U/L (ref 10–44)
ANION GAP SERPL CALC-SCNC: 11 MMOL/L (ref 8–16)
AST SERPL-CCNC: 21 U/L (ref 10–40)
BASOPHILS # BLD AUTO: 0.03 K/UL (ref 0–0.2)
BASOPHILS NFR BLD: 0.5 % (ref 0–1.9)
BILIRUB SERPL-MCNC: 0.7 MG/DL (ref 0.1–1)
BUN SERPL-MCNC: 10 MG/DL (ref 8–23)
CALCIUM SERPL-MCNC: 10 MG/DL (ref 8.7–10.5)
CHLORIDE SERPL-SCNC: 103 MMOL/L (ref 95–110)
CO2 SERPL-SCNC: 26 MMOL/L (ref 23–29)
CREAT SERPL-MCNC: 0.9 MG/DL (ref 0.5–1.4)
DIFFERENTIAL METHOD: ABNORMAL
EOSINOPHIL # BLD AUTO: 0.2 K/UL (ref 0–0.5)
EOSINOPHIL NFR BLD: 3.5 % (ref 0–8)
ERYTHROCYTE [DISTWIDTH] IN BLOOD BY AUTOMATED COUNT: 12.4 % (ref 11.5–14.5)
EST. GFR  (AFRICAN AMERICAN): >60 ML/MIN/1.73 M^2
EST. GFR  (NON AFRICAN AMERICAN): >60 ML/MIN/1.73 M^2
GLUCOSE SERPL-MCNC: 116 MG/DL (ref 70–110)
HCT VFR BLD AUTO: 36.7 % (ref 40–54)
HGB BLD-MCNC: 12.4 G/DL (ref 14–18)
IMM GRANULOCYTES # BLD AUTO: 0.01 K/UL (ref 0–0.04)
IMM GRANULOCYTES NFR BLD AUTO: 0.2 % (ref 0–0.5)
LYMPHOCYTES # BLD AUTO: 1.1 K/UL (ref 1–4.8)
LYMPHOCYTES NFR BLD: 18.8 % (ref 18–48)
MCH RBC QN AUTO: 30.2 PG (ref 27–31)
MCHC RBC AUTO-ENTMCNC: 33.8 G/DL (ref 32–36)
MCV RBC AUTO: 90 FL (ref 82–98)
MONOCYTES # BLD AUTO: 0.5 K/UL (ref 0.3–1)
MONOCYTES NFR BLD: 9.2 % (ref 4–15)
NEUTROPHILS # BLD AUTO: 3.9 K/UL (ref 1.8–7.7)
NEUTROPHILS NFR BLD: 67.8 % (ref 38–73)
NRBC BLD-RTO: 0 /100 WBC
PLATELET # BLD AUTO: 277 K/UL (ref 150–450)
PMV BLD AUTO: 9.1 FL (ref 9.2–12.9)
POTASSIUM SERPL-SCNC: 4 MMOL/L (ref 3.5–5.1)
PROT SERPL-MCNC: 7.3 G/DL (ref 6–8.4)
RBC # BLD AUTO: 4.1 M/UL (ref 4.6–6.2)
SODIUM SERPL-SCNC: 140 MMOL/L (ref 136–145)
TSH SERPL DL<=0.005 MIU/L-ACNC: 1.57 UIU/ML (ref 0.4–4)
WBC # BLD AUTO: 5.76 K/UL (ref 3.9–12.7)

## 2021-09-10 PROCEDURE — 1159F MED LIST DOCD IN RCRD: CPT | Mod: CPTII,S$GLB,, | Performed by: FAMILY MEDICINE

## 2021-09-10 PROCEDURE — 99214 PR OFFICE/OUTPT VISIT, EST, LEVL IV, 30-39 MIN: ICD-10-PCS | Mod: S$GLB,,, | Performed by: FAMILY MEDICINE

## 2021-09-10 PROCEDURE — 3288F PR FALLS RISK ASSESSMENT DOCUMENTED: ICD-10-PCS | Mod: CPTII,S$GLB,, | Performed by: FAMILY MEDICINE

## 2021-09-10 PROCEDURE — 3008F BODY MASS INDEX DOCD: CPT | Mod: CPTII,S$GLB,, | Performed by: FAMILY MEDICINE

## 2021-09-10 PROCEDURE — 85025 COMPLETE CBC W/AUTO DIFF WBC: CPT | Performed by: FAMILY MEDICINE

## 2021-09-10 PROCEDURE — 1160F RVW MEDS BY RX/DR IN RCRD: CPT | Mod: CPTII,S$GLB,, | Performed by: FAMILY MEDICINE

## 2021-09-10 PROCEDURE — 1126F AMNT PAIN NOTED NONE PRSNT: CPT | Mod: CPTII,S$GLB,, | Performed by: FAMILY MEDICINE

## 2021-09-10 PROCEDURE — 3051F PR MOST RECENT HEMOGLOBIN A1C LEVEL 7.0 - < 8.0%: ICD-10-PCS | Mod: CPTII,S$GLB,, | Performed by: FAMILY MEDICINE

## 2021-09-10 PROCEDURE — 1159F PR MEDICATION LIST DOCUMENTED IN MEDICAL RECORD: ICD-10-PCS | Mod: CPTII,S$GLB,, | Performed by: FAMILY MEDICINE

## 2021-09-10 PROCEDURE — 3075F SYST BP GE 130 - 139MM HG: CPT | Mod: CPTII,S$GLB,, | Performed by: FAMILY MEDICINE

## 2021-09-10 PROCEDURE — 3075F PR MOST RECENT SYSTOLIC BLOOD PRESS GE 130-139MM HG: ICD-10-PCS | Mod: CPTII,S$GLB,, | Performed by: FAMILY MEDICINE

## 2021-09-10 PROCEDURE — 84443 ASSAY THYROID STIM HORMONE: CPT | Performed by: FAMILY MEDICINE

## 2021-09-10 PROCEDURE — 4010F ACE/ARB THERAPY RXD/TAKEN: CPT | Mod: CPTII,S$GLB,, | Performed by: FAMILY MEDICINE

## 2021-09-10 PROCEDURE — 1101F PR PT FALLS ASSESS DOC 0-1 FALLS W/OUT INJ PAST YR: ICD-10-PCS | Mod: CPTII,S$GLB,, | Performed by: FAMILY MEDICINE

## 2021-09-10 PROCEDURE — 3008F PR BODY MASS INDEX (BMI) DOCUMENTED: ICD-10-PCS | Mod: CPTII,S$GLB,, | Performed by: FAMILY MEDICINE

## 2021-09-10 PROCEDURE — 1160F PR REVIEW ALL MEDS BY PRESCRIBER/CLIN PHARMACIST DOCUMENTED: ICD-10-PCS | Mod: CPTII,S$GLB,, | Performed by: FAMILY MEDICINE

## 2021-09-10 PROCEDURE — 99214 OFFICE O/P EST MOD 30 MIN: CPT | Mod: S$GLB,,, | Performed by: FAMILY MEDICINE

## 2021-09-10 PROCEDURE — 80053 COMPREHEN METABOLIC PANEL: CPT | Performed by: FAMILY MEDICINE

## 2021-09-10 PROCEDURE — 3078F PR MOST RECENT DIASTOLIC BLOOD PRESSURE < 80 MM HG: ICD-10-PCS | Mod: CPTII,S$GLB,, | Performed by: FAMILY MEDICINE

## 2021-09-10 PROCEDURE — 3051F HG A1C>EQUAL 7.0%<8.0%: CPT | Mod: CPTII,S$GLB,, | Performed by: FAMILY MEDICINE

## 2021-09-10 PROCEDURE — 99499 RISK ADDL DX/OHS AUDIT: ICD-10-PCS | Mod: HCNC,S$GLB,, | Performed by: FAMILY MEDICINE

## 2021-09-10 PROCEDURE — 99499 UNLISTED E&M SERVICE: CPT | Mod: HCNC,S$GLB,, | Performed by: FAMILY MEDICINE

## 2021-09-10 PROCEDURE — 3078F DIAST BP <80 MM HG: CPT | Mod: CPTII,S$GLB,, | Performed by: FAMILY MEDICINE

## 2021-09-10 PROCEDURE — 1101F PT FALLS ASSESS-DOCD LE1/YR: CPT | Mod: CPTII,S$GLB,, | Performed by: FAMILY MEDICINE

## 2021-09-10 PROCEDURE — 3288F FALL RISK ASSESSMENT DOCD: CPT | Mod: CPTII,S$GLB,, | Performed by: FAMILY MEDICINE

## 2021-09-10 PROCEDURE — 99999 PR PBB SHADOW E&M-EST. PATIENT-LVL V: CPT | Mod: PBBFAC,,, | Performed by: FAMILY MEDICINE

## 2021-09-10 PROCEDURE — 36415 COLL VENOUS BLD VENIPUNCTURE: CPT | Mod: PN | Performed by: FAMILY MEDICINE

## 2021-09-10 PROCEDURE — 4010F PR ACE/ARB THEARPY RXD/TAKEN: ICD-10-PCS | Mod: CPTII,S$GLB,, | Performed by: FAMILY MEDICINE

## 2021-09-10 PROCEDURE — 1126F PR PAIN SEVERITY QUANTIFIED, NO PAIN PRESENT: ICD-10-PCS | Mod: CPTII,S$GLB,, | Performed by: FAMILY MEDICINE

## 2021-09-10 PROCEDURE — 99999 PR PBB SHADOW E&M-EST. PATIENT-LVL V: ICD-10-PCS | Mod: PBBFAC,,, | Performed by: FAMILY MEDICINE

## 2021-09-10 RX ORDER — LOSARTAN POTASSIUM 100 MG/1
100 TABLET ORAL DAILY
Qty: 30 TABLET | Refills: 0 | Status: SHIPPED | OUTPATIENT
Start: 2021-09-10 | End: 2021-09-10 | Stop reason: SDUPTHER

## 2021-09-10 RX ORDER — INSULIN PUMP SYRINGE, 3 ML
EACH MISCELLANEOUS
Qty: 1 EACH | Refills: 0 | OUTPATIENT
Start: 2021-09-10 | End: 2022-05-29

## 2021-09-10 RX ORDER — LOSARTAN POTASSIUM 100 MG/1
100 TABLET ORAL DAILY
Qty: 90 TABLET | Refills: 3 | Status: SHIPPED | OUTPATIENT
Start: 2021-09-10 | End: 2022-10-12 | Stop reason: SDUPTHER

## 2021-09-10 RX ORDER — LOSARTAN POTASSIUM 100 MG/1
100 TABLET ORAL DAILY
Qty: 90 TABLET | Refills: 0 | Status: SHIPPED | OUTPATIENT
Start: 2021-09-10 | End: 2021-10-04 | Stop reason: SDUPTHER

## 2021-09-10 RX ORDER — LANCETS
EACH MISCELLANEOUS
Qty: 200 EACH | Refills: 3 | OUTPATIENT
Start: 2021-09-10 | End: 2022-05-29

## 2021-09-10 RX ORDER — LOPERAMIDE HYDROCHLORIDE 2 MG/1
2 CAPSULE ORAL 4 TIMES DAILY PRN
Qty: 40 CAPSULE | Refills: 0 | Status: SHIPPED | OUTPATIENT
Start: 2021-09-10 | End: 2021-09-20

## 2021-09-10 RX ORDER — ISOPROPYL ALCOHOL 70 ML/100ML
SWAB TOPICAL
Qty: 200 EACH | Refills: 3 | Status: SHIPPED | OUTPATIENT
Start: 2021-09-10

## 2021-09-13 ENCOUNTER — LAB VISIT (OUTPATIENT)
Dept: LAB | Facility: HOSPITAL | Age: 66
End: 2021-09-13
Attending: FAMILY MEDICINE
Payer: MEDICARE

## 2021-09-13 DIAGNOSIS — R63.4 UNINTENTIONAL WEIGHT LOSS: ICD-10-CM

## 2021-09-13 DIAGNOSIS — R19.7 DIARRHEA, UNSPECIFIED TYPE: ICD-10-CM

## 2021-09-13 PROCEDURE — 87449 NOS EACH ORGANISM AG IA: CPT | Performed by: FAMILY MEDICINE

## 2021-09-13 PROCEDURE — 87427 SHIGA-LIKE TOXIN AG IA: CPT | Mod: 59 | Performed by: FAMILY MEDICINE

## 2021-09-13 PROCEDURE — 82656 EL-1 FECAL QUAL/SEMIQ: CPT | Performed by: FAMILY MEDICINE

## 2021-09-13 PROCEDURE — 87177 OVA AND PARASITES SMEARS: CPT | Performed by: FAMILY MEDICINE

## 2021-09-13 PROCEDURE — 87329 GIARDIA AG IA: CPT | Performed by: FAMILY MEDICINE

## 2021-09-13 PROCEDURE — 87324 CLOSTRIDIUM AG IA: CPT | Performed by: FAMILY MEDICINE

## 2021-09-13 PROCEDURE — 87046 STOOL CULTR AEROBIC BACT EA: CPT | Performed by: FAMILY MEDICINE

## 2021-09-13 PROCEDURE — 87045 FECES CULTURE AEROBIC BACT: CPT | Performed by: FAMILY MEDICINE

## 2021-09-14 ENCOUNTER — TELEPHONE (OUTPATIENT)
Dept: PRIMARY CARE CLINIC | Facility: CLINIC | Age: 66
End: 2021-09-14

## 2021-09-14 LAB
CRYPTOSP AG STL QL IA: NEGATIVE
E COLI SXT1 STL QL IA: NEGATIVE
E COLI SXT2 STL QL IA: NEGATIVE
G LAMBLIA AG STL QL IA: NEGATIVE

## 2021-09-16 LAB — O+P STL MICRO: NORMAL

## 2021-09-17 ENCOUNTER — PATIENT MESSAGE (OUTPATIENT)
Dept: PRIMARY CARE CLINIC | Facility: CLINIC | Age: 66
End: 2021-09-17

## 2021-09-17 LAB
BACTERIA STL CULT: NORMAL
ELASTASE 1, FECAL: 411 MCG/G

## 2021-09-19 ENCOUNTER — PATIENT MESSAGE (OUTPATIENT)
Dept: PRIMARY CARE CLINIC | Facility: CLINIC | Age: 66
End: 2021-09-19

## 2021-09-20 ENCOUNTER — OFFICE VISIT (OUTPATIENT)
Dept: INTERNAL MEDICINE | Facility: CLINIC | Age: 66
End: 2021-09-20
Payer: MEDICARE

## 2021-09-20 ENCOUNTER — HOSPITAL ENCOUNTER (OUTPATIENT)
Dept: RADIOLOGY | Facility: HOSPITAL | Age: 66
Discharge: HOME | End: 2021-09-20
Attending: NURSE PRACTITIONER
Payer: MEDICARE

## 2021-09-20 VITALS
BODY MASS INDEX: 26.26 KG/M2 | WEIGHT: 163.38 LBS | DIASTOLIC BLOOD PRESSURE: 70 MMHG | HEIGHT: 66 IN | OXYGEN SATURATION: 97 % | HEART RATE: 89 BPM | SYSTOLIC BLOOD PRESSURE: 128 MMHG

## 2021-09-20 DIAGNOSIS — M25.561 ACUTE PAIN OF RIGHT KNEE: ICD-10-CM

## 2021-09-20 DIAGNOSIS — M25.561 ACUTE PAIN OF RIGHT KNEE: Primary | ICD-10-CM

## 2021-09-20 PROCEDURE — 1125F PR PAIN SEVERITY QUANTIFIED, PAIN PRESENT: ICD-10-PCS | Mod: CPTII,S$GLB,, | Performed by: NURSE PRACTITIONER

## 2021-09-20 PROCEDURE — 73562 XR KNEE ORTHO RIGHT: ICD-10-PCS | Mod: 26,RT,, | Performed by: RADIOLOGY

## 2021-09-20 PROCEDURE — 3288F PR FALLS RISK ASSESSMENT DOCUMENTED: ICD-10-PCS | Mod: CPTII,S$GLB,, | Performed by: NURSE PRACTITIONER

## 2021-09-20 PROCEDURE — 3078F PR MOST RECENT DIASTOLIC BLOOD PRESSURE < 80 MM HG: ICD-10-PCS | Mod: CPTII,S$GLB,, | Performed by: NURSE PRACTITIONER

## 2021-09-20 PROCEDURE — 3074F SYST BP LT 130 MM HG: CPT | Mod: CPTII,S$GLB,, | Performed by: NURSE PRACTITIONER

## 2021-09-20 PROCEDURE — 3008F PR BODY MASS INDEX (BMI) DOCUMENTED: ICD-10-PCS | Mod: CPTII,S$GLB,, | Performed by: NURSE PRACTITIONER

## 2021-09-20 PROCEDURE — 3008F BODY MASS INDEX DOCD: CPT | Mod: CPTII,S$GLB,, | Performed by: NURSE PRACTITIONER

## 2021-09-20 PROCEDURE — 1159F PR MEDICATION LIST DOCUMENTED IN MEDICAL RECORD: ICD-10-PCS | Mod: CPTII,S$GLB,, | Performed by: NURSE PRACTITIONER

## 2021-09-20 PROCEDURE — 73560 XR KNEE ORTHO RIGHT: ICD-10-PCS | Mod: 26,LT,, | Performed by: RADIOLOGY

## 2021-09-20 PROCEDURE — 73560 X-RAY EXAM OF KNEE 1 OR 2: CPT | Mod: 26,LT,, | Performed by: RADIOLOGY

## 2021-09-20 PROCEDURE — 3288F FALL RISK ASSESSMENT DOCD: CPT | Mod: CPTII,S$GLB,, | Performed by: NURSE PRACTITIONER

## 2021-09-20 PROCEDURE — 99213 OFFICE O/P EST LOW 20 MIN: CPT | Mod: S$GLB,,, | Performed by: NURSE PRACTITIONER

## 2021-09-20 PROCEDURE — 99999 PR PBB SHADOW E&M-EST. PATIENT-LVL V: ICD-10-PCS | Mod: PBBFAC,,, | Performed by: NURSE PRACTITIONER

## 2021-09-20 PROCEDURE — 1101F PR PT FALLS ASSESS DOC 0-1 FALLS W/OUT INJ PAST YR: ICD-10-PCS | Mod: CPTII,S$GLB,, | Performed by: NURSE PRACTITIONER

## 2021-09-20 PROCEDURE — 1159F MED LIST DOCD IN RCRD: CPT | Mod: CPTII,S$GLB,, | Performed by: NURSE PRACTITIONER

## 2021-09-20 PROCEDURE — 3051F HG A1C>EQUAL 7.0%<8.0%: CPT | Mod: CPTII,S$GLB,, | Performed by: NURSE PRACTITIONER

## 2021-09-20 PROCEDURE — 3078F DIAST BP <80 MM HG: CPT | Mod: CPTII,S$GLB,, | Performed by: NURSE PRACTITIONER

## 2021-09-20 PROCEDURE — 99213 PR OFFICE/OUTPT VISIT, EST, LEVL III, 20-29 MIN: ICD-10-PCS | Mod: S$GLB,,, | Performed by: NURSE PRACTITIONER

## 2021-09-20 PROCEDURE — 1101F PT FALLS ASSESS-DOCD LE1/YR: CPT | Mod: CPTII,S$GLB,, | Performed by: NURSE PRACTITIONER

## 2021-09-20 PROCEDURE — 4010F ACE/ARB THERAPY RXD/TAKEN: CPT | Mod: CPTII,S$GLB,, | Performed by: NURSE PRACTITIONER

## 2021-09-20 PROCEDURE — 3074F PR MOST RECENT SYSTOLIC BLOOD PRESSURE < 130 MM HG: ICD-10-PCS | Mod: CPTII,S$GLB,, | Performed by: NURSE PRACTITIONER

## 2021-09-20 PROCEDURE — 73560 X-RAY EXAM OF KNEE 1 OR 2: CPT | Mod: 59,TC,LT

## 2021-09-20 PROCEDURE — 99999 PR PBB SHADOW E&M-EST. PATIENT-LVL V: CPT | Mod: PBBFAC,,, | Performed by: NURSE PRACTITIONER

## 2021-09-20 PROCEDURE — 73562 X-RAY EXAM OF KNEE 3: CPT | Mod: 26,RT,, | Performed by: RADIOLOGY

## 2021-09-20 PROCEDURE — 4010F PR ACE/ARB THEARPY RXD/TAKEN: ICD-10-PCS | Mod: CPTII,S$GLB,, | Performed by: NURSE PRACTITIONER

## 2021-09-20 PROCEDURE — 3051F PR MOST RECENT HEMOGLOBIN A1C LEVEL 7.0 - < 8.0%: ICD-10-PCS | Mod: CPTII,S$GLB,, | Performed by: NURSE PRACTITIONER

## 2021-09-20 PROCEDURE — 1125F AMNT PAIN NOTED PAIN PRSNT: CPT | Mod: CPTII,S$GLB,, | Performed by: NURSE PRACTITIONER

## 2021-09-20 RX ORDER — BLOOD-GLUCOSE METER
EACH MISCELLANEOUS
COMMUNITY
Start: 2021-09-11 | End: 2022-05-29

## 2021-09-20 RX ORDER — LANCETS 33 GAUGE
EACH MISCELLANEOUS
COMMUNITY
Start: 2021-09-11 | End: 2022-05-29

## 2021-09-23 ENCOUNTER — OFFICE VISIT (OUTPATIENT)
Dept: RADIATION ONCOLOGY | Facility: CLINIC | Age: 66
End: 2021-09-23
Payer: MEDICARE

## 2021-09-23 VITALS
SYSTOLIC BLOOD PRESSURE: 125 MMHG | RESPIRATION RATE: 17 BRPM | HEART RATE: 94 BPM | BODY MASS INDEX: 25.9 KG/M2 | DIASTOLIC BLOOD PRESSURE: 63 MMHG | HEIGHT: 67 IN | OXYGEN SATURATION: 98 % | WEIGHT: 165 LBS

## 2021-09-23 DIAGNOSIS — C61 MALIGNANT NEOPLASM OF PROSTATE: Primary | ICD-10-CM

## 2021-09-23 PROCEDURE — 3078F DIAST BP <80 MM HG: CPT | Mod: CPTII,S$GLB,, | Performed by: RADIOLOGY

## 2021-09-23 PROCEDURE — 1160F RVW MEDS BY RX/DR IN RCRD: CPT | Mod: CPTII,S$GLB,, | Performed by: RADIOLOGY

## 2021-09-23 PROCEDURE — 1126F PR PAIN SEVERITY QUANTIFIED, NO PAIN PRESENT: ICD-10-PCS | Mod: CPTII,S$GLB,, | Performed by: RADIOLOGY

## 2021-09-23 PROCEDURE — 1101F PT FALLS ASSESS-DOCD LE1/YR: CPT | Mod: CPTII,S$GLB,, | Performed by: RADIOLOGY

## 2021-09-23 PROCEDURE — 3288F PR FALLS RISK ASSESSMENT DOCUMENTED: ICD-10-PCS | Mod: CPTII,S$GLB,, | Performed by: RADIOLOGY

## 2021-09-23 PROCEDURE — 3078F PR MOST RECENT DIASTOLIC BLOOD PRESSURE < 80 MM HG: ICD-10-PCS | Mod: CPTII,S$GLB,, | Performed by: RADIOLOGY

## 2021-09-23 PROCEDURE — 3008F BODY MASS INDEX DOCD: CPT | Mod: CPTII,S$GLB,, | Performed by: RADIOLOGY

## 2021-09-23 PROCEDURE — 99499 RISK ADDL DX/OHS AUDIT: ICD-10-PCS | Mod: HCNC,S$GLB,, | Performed by: RADIOLOGY

## 2021-09-23 PROCEDURE — 4010F ACE/ARB THERAPY RXD/TAKEN: CPT | Mod: CPTII,S$GLB,, | Performed by: RADIOLOGY

## 2021-09-23 PROCEDURE — 3288F FALL RISK ASSESSMENT DOCD: CPT | Mod: CPTII,S$GLB,, | Performed by: RADIOLOGY

## 2021-09-23 PROCEDURE — 99499 UNLISTED E&M SERVICE: CPT | Mod: HCNC,S$GLB,, | Performed by: RADIOLOGY

## 2021-09-23 PROCEDURE — 1160F PR REVIEW ALL MEDS BY PRESCRIBER/CLIN PHARMACIST DOCUMENTED: ICD-10-PCS | Mod: CPTII,S$GLB,, | Performed by: RADIOLOGY

## 2021-09-23 PROCEDURE — 3051F HG A1C>EQUAL 7.0%<8.0%: CPT | Mod: CPTII,S$GLB,, | Performed by: RADIOLOGY

## 2021-09-23 PROCEDURE — 3008F PR BODY MASS INDEX (BMI) DOCUMENTED: ICD-10-PCS | Mod: CPTII,S$GLB,, | Performed by: RADIOLOGY

## 2021-09-23 PROCEDURE — 1159F PR MEDICATION LIST DOCUMENTED IN MEDICAL RECORD: ICD-10-PCS | Mod: CPTII,S$GLB,, | Performed by: RADIOLOGY

## 2021-09-23 PROCEDURE — 99212 OFFICE O/P EST SF 10 MIN: CPT | Mod: S$GLB,,, | Performed by: RADIOLOGY

## 2021-09-23 PROCEDURE — 3074F SYST BP LT 130 MM HG: CPT | Mod: CPTII,S$GLB,, | Performed by: RADIOLOGY

## 2021-09-23 PROCEDURE — 99999 PR PBB SHADOW E&M-EST. PATIENT-LVL V: CPT | Mod: PBBFAC,,, | Performed by: RADIOLOGY

## 2021-09-23 PROCEDURE — 99212 PR OFFICE/OUTPT VISIT, EST, LEVL II, 10-19 MIN: ICD-10-PCS | Mod: S$GLB,,, | Performed by: RADIOLOGY

## 2021-09-23 PROCEDURE — 1159F MED LIST DOCD IN RCRD: CPT | Mod: CPTII,S$GLB,, | Performed by: RADIOLOGY

## 2021-09-23 PROCEDURE — 4010F PR ACE/ARB THEARPY RXD/TAKEN: ICD-10-PCS | Mod: CPTII,S$GLB,, | Performed by: RADIOLOGY

## 2021-09-23 PROCEDURE — 1101F PR PT FALLS ASSESS DOC 0-1 FALLS W/OUT INJ PAST YR: ICD-10-PCS | Mod: CPTII,S$GLB,, | Performed by: RADIOLOGY

## 2021-09-23 PROCEDURE — 3051F PR MOST RECENT HEMOGLOBIN A1C LEVEL 7.0 - < 8.0%: ICD-10-PCS | Mod: CPTII,S$GLB,, | Performed by: RADIOLOGY

## 2021-09-23 PROCEDURE — 99999 PR PBB SHADOW E&M-EST. PATIENT-LVL V: ICD-10-PCS | Mod: PBBFAC,,, | Performed by: RADIOLOGY

## 2021-09-23 PROCEDURE — 1126F AMNT PAIN NOTED NONE PRSNT: CPT | Mod: CPTII,S$GLB,, | Performed by: RADIOLOGY

## 2021-09-23 PROCEDURE — 3074F PR MOST RECENT SYSTOLIC BLOOD PRESSURE < 130 MM HG: ICD-10-PCS | Mod: CPTII,S$GLB,, | Performed by: RADIOLOGY

## 2021-09-27 ENCOUNTER — LAB VISIT (OUTPATIENT)
Dept: LAB | Facility: HOSPITAL | Age: 66
End: 2021-09-27
Attending: FAMILY MEDICINE
Payer: MEDICARE

## 2021-09-27 DIAGNOSIS — I10 ESSENTIAL HYPERTENSION: ICD-10-CM

## 2021-09-27 DIAGNOSIS — E78.2 MIXED HYPERLIPIDEMIA: ICD-10-CM

## 2021-09-27 DIAGNOSIS — E11.9 TYPE 2 DIABETES MELLITUS WITHOUT COMPLICATION, WITHOUT LONG-TERM CURRENT USE OF INSULIN: ICD-10-CM

## 2021-09-27 LAB
ALBUMIN SERPL BCP-MCNC: 4.1 G/DL (ref 3.5–5.2)
ALP SERPL-CCNC: 71 U/L (ref 55–135)
ALT SERPL W/O P-5'-P-CCNC: 37 U/L (ref 10–44)
ANION GAP SERPL CALC-SCNC: 11 MMOL/L (ref 8–16)
AST SERPL-CCNC: 30 U/L (ref 10–40)
BASOPHILS # BLD AUTO: 0.04 K/UL (ref 0–0.2)
BASOPHILS NFR BLD: 0.7 % (ref 0–1.9)
BILIRUB SERPL-MCNC: 0.5 MG/DL (ref 0.1–1)
BUN SERPL-MCNC: 18 MG/DL (ref 8–23)
CALCIUM SERPL-MCNC: 9.2 MG/DL (ref 8.7–10.5)
CHLORIDE SERPL-SCNC: 105 MMOL/L (ref 95–110)
CHOLEST SERPL-MCNC: 140 MG/DL (ref 120–199)
CHOLEST/HDLC SERPL: 3.7 {RATIO} (ref 2–5)
CO2 SERPL-SCNC: 23 MMOL/L (ref 23–29)
CREAT SERPL-MCNC: 1 MG/DL (ref 0.5–1.4)
DIFFERENTIAL METHOD: ABNORMAL
EOSINOPHIL # BLD AUTO: 0.2 K/UL (ref 0–0.5)
EOSINOPHIL NFR BLD: 4.1 % (ref 0–8)
ERYTHROCYTE [DISTWIDTH] IN BLOOD BY AUTOMATED COUNT: 12.3 % (ref 11.5–14.5)
EST. GFR  (AFRICAN AMERICAN): >60 ML/MIN/1.73 M^2
EST. GFR  (NON AFRICAN AMERICAN): >60 ML/MIN/1.73 M^2
ESTIMATED AVG GLUCOSE: 160 MG/DL (ref 68–131)
GLUCOSE SERPL-MCNC: 149 MG/DL (ref 70–110)
HBA1C MFR BLD: 7.2 % (ref 4–5.6)
HCT VFR BLD AUTO: 35.6 % (ref 40–54)
HDLC SERPL-MCNC: 38 MG/DL (ref 40–75)
HDLC SERPL: 27.1 % (ref 20–50)
HGB BLD-MCNC: 11.7 G/DL (ref 14–18)
IMM GRANULOCYTES # BLD AUTO: 0.03 K/UL (ref 0–0.04)
IMM GRANULOCYTES NFR BLD AUTO: 0.6 % (ref 0–0.5)
LDLC SERPL CALC-MCNC: 90.2 MG/DL (ref 63–159)
LYMPHOCYTES # BLD AUTO: 1.1 K/UL (ref 1–4.8)
LYMPHOCYTES NFR BLD: 21.3 % (ref 18–48)
MCH RBC QN AUTO: 29.6 PG (ref 27–31)
MCHC RBC AUTO-ENTMCNC: 32.9 G/DL (ref 32–36)
MCV RBC AUTO: 90 FL (ref 82–98)
MONOCYTES # BLD AUTO: 0.8 K/UL (ref 0.3–1)
MONOCYTES NFR BLD: 14 % (ref 4–15)
NEUTROPHILS # BLD AUTO: 3.2 K/UL (ref 1.8–7.7)
NEUTROPHILS NFR BLD: 59.3 % (ref 38–73)
NONHDLC SERPL-MCNC: 102 MG/DL
NRBC BLD-RTO: 0 /100 WBC
PLATELET # BLD AUTO: 281 K/UL (ref 150–450)
PMV BLD AUTO: 9.3 FL (ref 9.2–12.9)
POTASSIUM SERPL-SCNC: 4.2 MMOL/L (ref 3.5–5.1)
PROT SERPL-MCNC: 7 G/DL (ref 6–8.4)
RBC # BLD AUTO: 3.95 M/UL (ref 4.6–6.2)
SODIUM SERPL-SCNC: 139 MMOL/L (ref 136–145)
TRIGL SERPL-MCNC: 59 MG/DL (ref 30–150)
TSH SERPL DL<=0.005 MIU/L-ACNC: 2.14 UIU/ML (ref 0.4–4)
WBC # BLD AUTO: 5.34 K/UL (ref 3.9–12.7)

## 2021-09-27 PROCEDURE — 80053 COMPREHEN METABOLIC PANEL: CPT | Performed by: FAMILY MEDICINE

## 2021-09-27 PROCEDURE — 36415 COLL VENOUS BLD VENIPUNCTURE: CPT | Mod: PN | Performed by: FAMILY MEDICINE

## 2021-09-27 PROCEDURE — 80061 LIPID PANEL: CPT | Performed by: FAMILY MEDICINE

## 2021-09-27 PROCEDURE — 83036 HEMOGLOBIN GLYCOSYLATED A1C: CPT | Performed by: FAMILY MEDICINE

## 2021-09-27 PROCEDURE — 84443 ASSAY THYROID STIM HORMONE: CPT | Performed by: FAMILY MEDICINE

## 2021-09-27 PROCEDURE — 85025 COMPLETE CBC W/AUTO DIFF WBC: CPT | Performed by: FAMILY MEDICINE

## 2021-09-29 ENCOUNTER — HOSPITAL ENCOUNTER (OUTPATIENT)
Dept: RADIOLOGY | Facility: HOSPITAL | Age: 66
Discharge: HOME | End: 2021-09-29
Attending: RADIOLOGY
Payer: MEDICARE

## 2021-09-29 DIAGNOSIS — C61 MALIGNANT NEOPLASM OF PROSTATE: ICD-10-CM

## 2021-09-29 PROCEDURE — A9503 TC99M MEDRONATE: HCPCS

## 2021-09-29 PROCEDURE — 78306 BONE IMAGING WHOLE BODY: CPT | Mod: 26,,, | Performed by: RADIOLOGY

## 2021-09-29 PROCEDURE — 78306 BONE IMAGING WHOLE BODY: CPT | Mod: TC

## 2021-09-29 PROCEDURE — 78306 NM BONE SCAN WHOLE BODY: ICD-10-PCS | Mod: 26,,, | Performed by: RADIOLOGY

## 2021-10-04 ENCOUNTER — OFFICE VISIT (OUTPATIENT)
Dept: GASTROENTEROLOGY | Facility: CLINIC | Age: 66
End: 2021-10-04
Payer: MEDICARE

## 2021-10-04 VITALS
SYSTOLIC BLOOD PRESSURE: 154 MMHG | DIASTOLIC BLOOD PRESSURE: 84 MMHG | BODY MASS INDEX: 25.9 KG/M2 | WEIGHT: 165 LBS | HEART RATE: 70 BPM | HEIGHT: 67 IN

## 2021-10-04 DIAGNOSIS — Z86.010 PERSONAL HISTORY OF COLONIC POLYPS: Primary | ICD-10-CM

## 2021-10-04 DIAGNOSIS — R19.7 DIARRHEA, UNSPECIFIED TYPE: ICD-10-CM

## 2021-10-04 DIAGNOSIS — R63.4 UNINTENTIONAL WEIGHT LOSS: ICD-10-CM

## 2021-10-04 PROCEDURE — 1101F PR PT FALLS ASSESS DOC 0-1 FALLS W/OUT INJ PAST YR: ICD-10-PCS | Mod: CPTII,S$GLB,, | Performed by: INTERNAL MEDICINE

## 2021-10-04 PROCEDURE — 99204 PR OFFICE/OUTPT VISIT, NEW, LEVL IV, 45-59 MIN: ICD-10-PCS | Mod: S$GLB,,, | Performed by: INTERNAL MEDICINE

## 2021-10-04 PROCEDURE — 3066F PR DOCUMENTATION OF TREATMENT FOR NEPHROPATHY: ICD-10-PCS | Mod: CPTII,S$GLB,, | Performed by: INTERNAL MEDICINE

## 2021-10-04 PROCEDURE — 3051F HG A1C>EQUAL 7.0%<8.0%: CPT | Mod: CPTII,S$GLB,, | Performed by: INTERNAL MEDICINE

## 2021-10-04 PROCEDURE — 1126F AMNT PAIN NOTED NONE PRSNT: CPT | Mod: CPTII,S$GLB,, | Performed by: INTERNAL MEDICINE

## 2021-10-04 PROCEDURE — 1159F PR MEDICATION LIST DOCUMENTED IN MEDICAL RECORD: ICD-10-PCS | Mod: CPTII,S$GLB,, | Performed by: INTERNAL MEDICINE

## 2021-10-04 PROCEDURE — 3008F BODY MASS INDEX DOCD: CPT | Mod: CPTII,S$GLB,, | Performed by: INTERNAL MEDICINE

## 2021-10-04 PROCEDURE — 3077F SYST BP >= 140 MM HG: CPT | Mod: CPTII,S$GLB,, | Performed by: INTERNAL MEDICINE

## 2021-10-04 PROCEDURE — 4010F ACE/ARB THERAPY RXD/TAKEN: CPT | Mod: CPTII,S$GLB,, | Performed by: INTERNAL MEDICINE

## 2021-10-04 PROCEDURE — 1159F MED LIST DOCD IN RCRD: CPT | Mod: CPTII,S$GLB,, | Performed by: INTERNAL MEDICINE

## 2021-10-04 PROCEDURE — 3066F NEPHROPATHY DOC TX: CPT | Mod: CPTII,S$GLB,, | Performed by: INTERNAL MEDICINE

## 2021-10-04 PROCEDURE — 3288F PR FALLS RISK ASSESSMENT DOCUMENTED: ICD-10-PCS | Mod: CPTII,S$GLB,, | Performed by: INTERNAL MEDICINE

## 2021-10-04 PROCEDURE — 4010F PR ACE/ARB THEARPY RXD/TAKEN: ICD-10-PCS | Mod: CPTII,S$GLB,, | Performed by: INTERNAL MEDICINE

## 2021-10-04 PROCEDURE — 3079F DIAST BP 80-89 MM HG: CPT | Mod: CPTII,S$GLB,, | Performed by: INTERNAL MEDICINE

## 2021-10-04 PROCEDURE — 3051F PR MOST RECENT HEMOGLOBIN A1C LEVEL 7.0 - < 8.0%: ICD-10-PCS | Mod: CPTII,S$GLB,, | Performed by: INTERNAL MEDICINE

## 2021-10-04 PROCEDURE — 3077F PR MOST RECENT SYSTOLIC BLOOD PRESSURE >= 140 MM HG: ICD-10-PCS | Mod: CPTII,S$GLB,, | Performed by: INTERNAL MEDICINE

## 2021-10-04 PROCEDURE — 99999 PR PBB SHADOW E&M-EST. PATIENT-LVL IV: ICD-10-PCS | Mod: PBBFAC,,, | Performed by: INTERNAL MEDICINE

## 2021-10-04 PROCEDURE — 3061F PR NEG MICROALBUMINURIA RESULT DOCUMENTED/REVIEW: ICD-10-PCS | Mod: CPTII,S$GLB,, | Performed by: INTERNAL MEDICINE

## 2021-10-04 PROCEDURE — 3288F FALL RISK ASSESSMENT DOCD: CPT | Mod: CPTII,S$GLB,, | Performed by: INTERNAL MEDICINE

## 2021-10-04 PROCEDURE — 99204 OFFICE O/P NEW MOD 45 MIN: CPT | Mod: S$GLB,,, | Performed by: INTERNAL MEDICINE

## 2021-10-04 PROCEDURE — 3079F PR MOST RECENT DIASTOLIC BLOOD PRESSURE 80-89 MM HG: ICD-10-PCS | Mod: CPTII,S$GLB,, | Performed by: INTERNAL MEDICINE

## 2021-10-04 PROCEDURE — 3061F NEG MICROALBUMINURIA REV: CPT | Mod: CPTII,S$GLB,, | Performed by: INTERNAL MEDICINE

## 2021-10-04 PROCEDURE — 99999 PR PBB SHADOW E&M-EST. PATIENT-LVL IV: CPT | Mod: PBBFAC,,, | Performed by: INTERNAL MEDICINE

## 2021-10-04 PROCEDURE — 1126F PR PAIN SEVERITY QUANTIFIED, NO PAIN PRESENT: ICD-10-PCS | Mod: CPTII,S$GLB,, | Performed by: INTERNAL MEDICINE

## 2021-10-04 PROCEDURE — 1101F PT FALLS ASSESS-DOCD LE1/YR: CPT | Mod: CPTII,S$GLB,, | Performed by: INTERNAL MEDICINE

## 2021-10-04 PROCEDURE — 3008F PR BODY MASS INDEX (BMI) DOCUMENTED: ICD-10-PCS | Mod: CPTII,S$GLB,, | Performed by: INTERNAL MEDICINE

## 2021-10-05 ENCOUNTER — OFFICE VISIT (OUTPATIENT)
Dept: PRIMARY CARE CLINIC | Facility: CLINIC | Age: 66
End: 2021-10-05
Payer: MEDICARE

## 2021-10-05 VITALS
OXYGEN SATURATION: 96 % | HEART RATE: 73 BPM | WEIGHT: 162.94 LBS | TEMPERATURE: 98 F | HEIGHT: 67 IN | SYSTOLIC BLOOD PRESSURE: 130 MMHG | DIASTOLIC BLOOD PRESSURE: 72 MMHG | BODY MASS INDEX: 25.57 KG/M2

## 2021-10-05 DIAGNOSIS — Z85.46 HISTORY OF PROSTATE CANCER: ICD-10-CM

## 2021-10-05 DIAGNOSIS — D50.8 IRON DEFICIENCY ANEMIA SECONDARY TO INADEQUATE DIETARY IRON INTAKE: ICD-10-CM

## 2021-10-05 DIAGNOSIS — M15.9 PRIMARY OSTEOARTHRITIS INVOLVING MULTIPLE JOINTS: ICD-10-CM

## 2021-10-05 DIAGNOSIS — D64.9 ANEMIA, UNSPECIFIED TYPE: ICD-10-CM

## 2021-10-05 DIAGNOSIS — E78.2 MIXED HYPERLIPIDEMIA: ICD-10-CM

## 2021-10-05 DIAGNOSIS — N52.31 ERECTILE DYSFUNCTION AFTER RADICAL PROSTATECTOMY: ICD-10-CM

## 2021-10-05 DIAGNOSIS — H40.1131 PRIMARY OPEN ANGLE GLAUCOMA (POAG) OF BOTH EYES, MILD STAGE: ICD-10-CM

## 2021-10-05 DIAGNOSIS — E11.9 TYPE 2 DIABETES MELLITUS WITHOUT COMPLICATION, WITHOUT LONG-TERM CURRENT USE OF INSULIN: ICD-10-CM

## 2021-10-05 DIAGNOSIS — H33.052 OLD TOTAL RETINAL DETACHMENT OF LEFT EYE: ICD-10-CM

## 2021-10-05 DIAGNOSIS — I10 ESSENTIAL HYPERTENSION: Primary | ICD-10-CM

## 2021-10-05 PROBLEM — R05.9 COUGH: Status: RESOLVED | Noted: 2021-02-26 | Resolved: 2021-10-05

## 2021-10-05 PROCEDURE — 3288F PR FALLS RISK ASSESSMENT DOCUMENTED: ICD-10-PCS | Mod: CPTII,S$GLB,, | Performed by: FAMILY MEDICINE

## 2021-10-05 PROCEDURE — 1126F PR PAIN SEVERITY QUANTIFIED, NO PAIN PRESENT: ICD-10-PCS | Mod: CPTII,S$GLB,, | Performed by: FAMILY MEDICINE

## 2021-10-05 PROCEDURE — 3075F SYST BP GE 130 - 139MM HG: CPT | Mod: CPTII,S$GLB,, | Performed by: FAMILY MEDICINE

## 2021-10-05 PROCEDURE — 3008F BODY MASS INDEX DOCD: CPT | Mod: CPTII,S$GLB,, | Performed by: FAMILY MEDICINE

## 2021-10-05 PROCEDURE — 3075F PR MOST RECENT SYSTOLIC BLOOD PRESS GE 130-139MM HG: ICD-10-PCS | Mod: CPTII,S$GLB,, | Performed by: FAMILY MEDICINE

## 2021-10-05 PROCEDURE — 3051F HG A1C>EQUAL 7.0%<8.0%: CPT | Mod: CPTII,S$GLB,, | Performed by: FAMILY MEDICINE

## 2021-10-05 PROCEDURE — 1126F AMNT PAIN NOTED NONE PRSNT: CPT | Mod: CPTII,S$GLB,, | Performed by: FAMILY MEDICINE

## 2021-10-05 PROCEDURE — 3288F FALL RISK ASSESSMENT DOCD: CPT | Mod: CPTII,S$GLB,, | Performed by: FAMILY MEDICINE

## 2021-10-05 PROCEDURE — 3078F DIAST BP <80 MM HG: CPT | Mod: CPTII,S$GLB,, | Performed by: FAMILY MEDICINE

## 2021-10-05 PROCEDURE — 99999 PR PBB SHADOW E&M-EST. PATIENT-LVL IV: CPT | Mod: PBBFAC,,, | Performed by: FAMILY MEDICINE

## 2021-10-05 PROCEDURE — 1101F PT FALLS ASSESS-DOCD LE1/YR: CPT | Mod: CPTII,S$GLB,, | Performed by: FAMILY MEDICINE

## 2021-10-05 PROCEDURE — 1101F PR PT FALLS ASSESS DOC 0-1 FALLS W/OUT INJ PAST YR: ICD-10-PCS | Mod: CPTII,S$GLB,, | Performed by: FAMILY MEDICINE

## 2021-10-05 PROCEDURE — 99214 OFFICE O/P EST MOD 30 MIN: CPT | Mod: S$GLB,,, | Performed by: FAMILY MEDICINE

## 2021-10-05 PROCEDURE — 1159F MED LIST DOCD IN RCRD: CPT | Mod: CPTII,S$GLB,, | Performed by: FAMILY MEDICINE

## 2021-10-05 PROCEDURE — 99214 PR OFFICE/OUTPT VISIT, EST, LEVL IV, 30-39 MIN: ICD-10-PCS | Mod: S$GLB,,, | Performed by: FAMILY MEDICINE

## 2021-10-05 PROCEDURE — 3008F PR BODY MASS INDEX (BMI) DOCUMENTED: ICD-10-PCS | Mod: CPTII,S$GLB,, | Performed by: FAMILY MEDICINE

## 2021-10-05 PROCEDURE — 4010F ACE/ARB THERAPY RXD/TAKEN: CPT | Mod: CPTII,S$GLB,, | Performed by: FAMILY MEDICINE

## 2021-10-05 PROCEDURE — 3061F NEG MICROALBUMINURIA REV: CPT | Mod: CPTII,S$GLB,, | Performed by: FAMILY MEDICINE

## 2021-10-05 PROCEDURE — 3066F NEPHROPATHY DOC TX: CPT | Mod: CPTII,S$GLB,, | Performed by: FAMILY MEDICINE

## 2021-10-05 PROCEDURE — 3051F PR MOST RECENT HEMOGLOBIN A1C LEVEL 7.0 - < 8.0%: ICD-10-PCS | Mod: CPTII,S$GLB,, | Performed by: FAMILY MEDICINE

## 2021-10-05 PROCEDURE — 1159F PR MEDICATION LIST DOCUMENTED IN MEDICAL RECORD: ICD-10-PCS | Mod: CPTII,S$GLB,, | Performed by: FAMILY MEDICINE

## 2021-10-05 PROCEDURE — 3061F PR NEG MICROALBUMINURIA RESULT DOCUMENTED/REVIEW: ICD-10-PCS | Mod: CPTII,S$GLB,, | Performed by: FAMILY MEDICINE

## 2021-10-05 PROCEDURE — 3066F PR DOCUMENTATION OF TREATMENT FOR NEPHROPATHY: ICD-10-PCS | Mod: CPTII,S$GLB,, | Performed by: FAMILY MEDICINE

## 2021-10-05 PROCEDURE — 1160F PR REVIEW ALL MEDS BY PRESCRIBER/CLIN PHARMACIST DOCUMENTED: ICD-10-PCS | Mod: CPTII,S$GLB,, | Performed by: FAMILY MEDICINE

## 2021-10-05 PROCEDURE — 99999 PR PBB SHADOW E&M-EST. PATIENT-LVL IV: ICD-10-PCS | Mod: PBBFAC,,, | Performed by: FAMILY MEDICINE

## 2021-10-05 PROCEDURE — 4010F PR ACE/ARB THEARPY RXD/TAKEN: ICD-10-PCS | Mod: CPTII,S$GLB,, | Performed by: FAMILY MEDICINE

## 2021-10-05 PROCEDURE — 1160F RVW MEDS BY RX/DR IN RCRD: CPT | Mod: CPTII,S$GLB,, | Performed by: FAMILY MEDICINE

## 2021-10-05 PROCEDURE — 3078F PR MOST RECENT DIASTOLIC BLOOD PRESSURE < 80 MM HG: ICD-10-PCS | Mod: CPTII,S$GLB,, | Performed by: FAMILY MEDICINE

## 2021-10-05 RX ORDER — ATORVASTATIN CALCIUM 80 MG/1
80 TABLET, FILM COATED ORAL DAILY
Qty: 90 TABLET | Refills: 3 | Status: SHIPPED | OUTPATIENT
Start: 2021-10-05 | End: 2022-06-06

## 2021-10-05 RX ORDER — AMLODIPINE BESYLATE 10 MG/1
10 TABLET ORAL DAILY
Qty: 90 TABLET | Refills: 3 | Status: SHIPPED | OUTPATIENT
Start: 2021-10-05 | End: 2022-10-12 | Stop reason: SDUPTHER

## 2021-10-22 ENCOUNTER — IMMUNIZATION (OUTPATIENT)
Dept: PHARMACY | Facility: CLINIC | Age: 66
End: 2021-10-22
Payer: MEDICARE

## 2021-10-22 DIAGNOSIS — Z23 NEED FOR VACCINATION: Primary | ICD-10-CM

## 2021-10-25 ENCOUNTER — OFFICE VISIT (OUTPATIENT)
Dept: OPHTHALMOLOGY | Facility: CLINIC | Age: 66
End: 2021-10-25
Payer: MEDICARE

## 2021-10-25 DIAGNOSIS — H40.1131 PRIMARY OPEN ANGLE GLAUCOMA OF BOTH EYES, MILD STAGE: Primary | ICD-10-CM

## 2021-10-25 DIAGNOSIS — E11.9 TYPE 2 DIABETES MELLITUS WITHOUT OPHTHALMIC MANIFESTATIONS: ICD-10-CM

## 2021-10-25 DIAGNOSIS — H52.4 BILATERAL PRESBYOPIA: ICD-10-CM

## 2021-10-25 DIAGNOSIS — H33.052 OLD SUBTOTAL RETINAL DETACHMENT, LEFT: ICD-10-CM

## 2021-10-25 DIAGNOSIS — H21.562 PUPILLARY SPHINCTER RUPTURE, LEFT: ICD-10-CM

## 2021-10-25 DIAGNOSIS — H31.002 CHORIORETINAL SCAR, LEFT: ICD-10-CM

## 2021-10-25 DIAGNOSIS — H53.432 ARCUATE VISUAL FIELD DEFECT OF LEFT EYE: ICD-10-CM

## 2021-10-25 PROCEDURE — 1101F PR PT FALLS ASSESS DOC 0-1 FALLS W/OUT INJ PAST YR: ICD-10-PCS | Mod: HCNC,CPTII,S$GLB, | Performed by: OPHTHALMOLOGY

## 2021-10-25 PROCEDURE — 4010F ACE/ARB THERAPY RXD/TAKEN: CPT | Mod: HCNC,CPTII,S$GLB, | Performed by: OPHTHALMOLOGY

## 2021-10-25 PROCEDURE — 1126F PR PAIN SEVERITY QUANTIFIED, NO PAIN PRESENT: ICD-10-PCS | Mod: HCNC,CPTII,S$GLB, | Performed by: OPHTHALMOLOGY

## 2021-10-25 PROCEDURE — 1160F RVW MEDS BY RX/DR IN RCRD: CPT | Mod: HCNC,CPTII,S$GLB, | Performed by: OPHTHALMOLOGY

## 2021-10-25 PROCEDURE — 99999 PR PBB SHADOW E&M-EST. PATIENT-LVL III: CPT | Mod: PBBFAC,HCNC,, | Performed by: OPHTHALMOLOGY

## 2021-10-25 PROCEDURE — 3051F HG A1C>EQUAL 7.0%<8.0%: CPT | Mod: HCNC,CPTII,S$GLB, | Performed by: OPHTHALMOLOGY

## 2021-10-25 PROCEDURE — 99499 RISK ADDL DX/OHS AUDIT: ICD-10-PCS | Mod: HCNC,S$GLB,, | Performed by: OPHTHALMOLOGY

## 2021-10-25 PROCEDURE — 1160F PR REVIEW ALL MEDS BY PRESCRIBER/CLIN PHARMACIST DOCUMENTED: ICD-10-PCS | Mod: HCNC,CPTII,S$GLB, | Performed by: OPHTHALMOLOGY

## 2021-10-25 PROCEDURE — 92020 GONIOSCOPY: CPT | Mod: HCNC,S$GLB,, | Performed by: OPHTHALMOLOGY

## 2021-10-25 PROCEDURE — 3288F FALL RISK ASSESSMENT DOCD: CPT | Mod: HCNC,CPTII,S$GLB, | Performed by: OPHTHALMOLOGY

## 2021-10-25 PROCEDURE — 3051F PR MOST RECENT HEMOGLOBIN A1C LEVEL 7.0 - < 8.0%: ICD-10-PCS | Mod: HCNC,CPTII,S$GLB, | Performed by: OPHTHALMOLOGY

## 2021-10-25 PROCEDURE — 3061F PR NEG MICROALBUMINURIA RESULT DOCUMENTED/REVIEW: ICD-10-PCS | Mod: HCNC,CPTII,S$GLB, | Performed by: OPHTHALMOLOGY

## 2021-10-25 PROCEDURE — 1101F PT FALLS ASSESS-DOCD LE1/YR: CPT | Mod: HCNC,CPTII,S$GLB, | Performed by: OPHTHALMOLOGY

## 2021-10-25 PROCEDURE — 1126F AMNT PAIN NOTED NONE PRSNT: CPT | Mod: HCNC,CPTII,S$GLB, | Performed by: OPHTHALMOLOGY

## 2021-10-25 PROCEDURE — 1159F PR MEDICATION LIST DOCUMENTED IN MEDICAL RECORD: ICD-10-PCS | Mod: HCNC,CPTII,S$GLB, | Performed by: OPHTHALMOLOGY

## 2021-10-25 PROCEDURE — 3066F PR DOCUMENTATION OF TREATMENT FOR NEPHROPATHY: ICD-10-PCS | Mod: HCNC,CPTII,S$GLB, | Performed by: OPHTHALMOLOGY

## 2021-10-25 PROCEDURE — 4010F PR ACE/ARB THEARPY RXD/TAKEN: ICD-10-PCS | Mod: HCNC,CPTII,S$GLB, | Performed by: OPHTHALMOLOGY

## 2021-10-25 PROCEDURE — 1159F MED LIST DOCD IN RCRD: CPT | Mod: HCNC,CPTII,S$GLB, | Performed by: OPHTHALMOLOGY

## 2021-10-25 PROCEDURE — 92012 INTRM OPH EXAM EST PATIENT: CPT | Mod: HCNC,S$GLB,, | Performed by: OPHTHALMOLOGY

## 2021-10-25 PROCEDURE — 99499 UNLISTED E&M SERVICE: CPT | Mod: HCNC,S$GLB,, | Performed by: OPHTHALMOLOGY

## 2021-10-25 PROCEDURE — 92020 PR SPECIAL EYE EVAL,GONIOSCOPY: ICD-10-PCS | Mod: HCNC,S$GLB,, | Performed by: OPHTHALMOLOGY

## 2021-10-25 PROCEDURE — 92012 PR EYE EXAM, EST PATIENT,INTERMED: ICD-10-PCS | Mod: HCNC,S$GLB,, | Performed by: OPHTHALMOLOGY

## 2021-10-25 PROCEDURE — 3061F NEG MICROALBUMINURIA REV: CPT | Mod: HCNC,CPTII,S$GLB, | Performed by: OPHTHALMOLOGY

## 2021-10-25 PROCEDURE — 99999 PR PBB SHADOW E&M-EST. PATIENT-LVL III: ICD-10-PCS | Mod: PBBFAC,HCNC,, | Performed by: OPHTHALMOLOGY

## 2021-10-25 PROCEDURE — 3288F PR FALLS RISK ASSESSMENT DOCUMENTED: ICD-10-PCS | Mod: HCNC,CPTII,S$GLB, | Performed by: OPHTHALMOLOGY

## 2021-10-25 PROCEDURE — 3066F NEPHROPATHY DOC TX: CPT | Mod: HCNC,CPTII,S$GLB, | Performed by: OPHTHALMOLOGY

## 2021-11-19 DIAGNOSIS — C61 MALIGNANT NEOPLASM OF PROSTATE: Primary | ICD-10-CM

## 2021-11-22 ENCOUNTER — TELEPHONE (OUTPATIENT)
Dept: NEUROLOGY | Facility: CLINIC | Age: 66
End: 2021-11-22
Payer: COMMERCIAL

## 2021-12-13 ENCOUNTER — OFFICE VISIT (OUTPATIENT)
Dept: RADIATION ONCOLOGY | Facility: CLINIC | Age: 66
End: 2021-12-13
Attending: RADIOLOGY
Payer: MEDICARE

## 2021-12-13 VITALS
DIASTOLIC BLOOD PRESSURE: 69 MMHG | SYSTOLIC BLOOD PRESSURE: 140 MMHG | HEIGHT: 67 IN | HEART RATE: 86 BPM | BODY MASS INDEX: 26.37 KG/M2 | RESPIRATION RATE: 16 BRPM | WEIGHT: 168 LBS

## 2021-12-13 DIAGNOSIS — C61 MALIGNANT NEOPLASM OF PROSTATE: Primary | ICD-10-CM

## 2021-12-13 PROCEDURE — 99212 OFFICE O/P EST SF 10 MIN: CPT | Mod: HCNC,S$GLB,, | Performed by: RADIOLOGY

## 2021-12-13 PROCEDURE — 4010F PR ACE/ARB THEARPY RXD/TAKEN: ICD-10-PCS | Mod: HCNC,CPTII,S$GLB, | Performed by: RADIOLOGY

## 2021-12-13 PROCEDURE — 3061F PR NEG MICROALBUMINURIA RESULT DOCUMENTED/REVIEW: ICD-10-PCS | Mod: HCNC,CPTII,S$GLB, | Performed by: RADIOLOGY

## 2021-12-13 PROCEDURE — 3066F PR DOCUMENTATION OF TREATMENT FOR NEPHROPATHY: ICD-10-PCS | Mod: HCNC,CPTII,S$GLB, | Performed by: RADIOLOGY

## 2021-12-13 PROCEDURE — 3061F NEG MICROALBUMINURIA REV: CPT | Mod: HCNC,CPTII,S$GLB, | Performed by: RADIOLOGY

## 2021-12-13 PROCEDURE — 99212 PR OFFICE/OUTPT VISIT, EST, LEVL II, 10-19 MIN: ICD-10-PCS | Mod: HCNC,S$GLB,, | Performed by: RADIOLOGY

## 2021-12-13 PROCEDURE — 3066F NEPHROPATHY DOC TX: CPT | Mod: HCNC,CPTII,S$GLB, | Performed by: RADIOLOGY

## 2021-12-13 PROCEDURE — 4010F ACE/ARB THERAPY RXD/TAKEN: CPT | Mod: HCNC,CPTII,S$GLB, | Performed by: RADIOLOGY

## 2021-12-13 PROCEDURE — 99999 PR PBB SHADOW E&M-EST. PATIENT-LVL IV: CPT | Mod: PBBFAC,HCNC,, | Performed by: RADIOLOGY

## 2021-12-13 PROCEDURE — 99999 PR PBB SHADOW E&M-EST. PATIENT-LVL IV: ICD-10-PCS | Mod: PBBFAC,HCNC,, | Performed by: RADIOLOGY

## 2021-12-27 ENCOUNTER — PATIENT MESSAGE (OUTPATIENT)
Dept: ADMINISTRATIVE | Facility: OTHER | Age: 66
End: 2021-12-27
Payer: COMMERCIAL

## 2021-12-30 ENCOUNTER — LAB VISIT (OUTPATIENT)
Dept: PRIMARY CARE CLINIC | Facility: OTHER | Age: 66
End: 2021-12-30
Payer: MEDICARE

## 2021-12-30 DIAGNOSIS — Z20.822 ENCOUNTER FOR LABORATORY TESTING FOR COVID-19 VIRUS: ICD-10-CM

## 2021-12-30 PROCEDURE — U0003 INFECTIOUS AGENT DETECTION BY NUCLEIC ACID (DNA OR RNA); SEVERE ACUTE RESPIRATORY SYNDROME CORONAVIRUS 2 (SARS-COV-2) (CORONAVIRUS DISEASE [COVID-19]), AMPLIFIED PROBE TECHNIQUE, MAKING USE OF HIGH THROUGHPUT TECHNOLOGIES AS DESCRIBED BY CMS-2020-01-R: HCPCS | Mod: HCNC | Performed by: INTERNAL MEDICINE

## 2022-01-01 LAB
SARS-COV-2 RNA RESP QL NAA+PROBE: DETECTED
SARS-COV-2- CYCLE NUMBER: 29

## 2022-01-03 DIAGNOSIS — U07.1 COVID-19 VIRUS DETECTED: ICD-10-CM

## 2022-01-05 ENCOUNTER — LAB VISIT (OUTPATIENT)
Dept: PRIMARY CARE CLINIC | Facility: CLINIC | Age: 67
End: 2022-01-05
Payer: MEDICARE

## 2022-01-05 DIAGNOSIS — Z20.822 CONTACT WITH AND (SUSPECTED) EXPOSURE TO COVID-19: ICD-10-CM

## 2022-01-05 LAB
CTP QC/QA: YES
SARS-COV-2 AG RESP QL IA.RAPID: NEGATIVE

## 2022-01-05 PROCEDURE — 87811 SARS-COV-2 COVID19 W/OPTIC: CPT

## 2022-03-10 ENCOUNTER — LAB VISIT (OUTPATIENT)
Dept: LAB | Facility: HOSPITAL | Age: 67
End: 2022-03-10
Attending: RADIOLOGY
Payer: MEDICARE

## 2022-03-10 ENCOUNTER — TELEPHONE (OUTPATIENT)
Dept: SURGERY | Facility: CLINIC | Age: 67
End: 2022-03-10
Payer: COMMERCIAL

## 2022-03-10 DIAGNOSIS — C61 MALIGNANT NEOPLASM OF PROSTATE: ICD-10-CM

## 2022-03-10 LAB — COMPLEXED PSA SERPL-MCNC: 8.9 NG/ML (ref 0–4)

## 2022-03-10 PROCEDURE — 36415 COLL VENOUS BLD VENIPUNCTURE: CPT | Mod: HCNC,PN | Performed by: RADIOLOGY

## 2022-03-10 PROCEDURE — 84153 ASSAY OF PSA TOTAL: CPT | Mod: HCNC | Performed by: RADIOLOGY

## 2022-03-14 ENCOUNTER — OFFICE VISIT (OUTPATIENT)
Dept: RADIATION ONCOLOGY | Facility: CLINIC | Age: 67
End: 2022-03-14
Attending: RADIOLOGY
Payer: COMMERCIAL

## 2022-03-14 VITALS
WEIGHT: 166.5 LBS | BODY MASS INDEX: 26.13 KG/M2 | SYSTOLIC BLOOD PRESSURE: 153 MMHG | DIASTOLIC BLOOD PRESSURE: 74 MMHG | HEART RATE: 76 BPM | RESPIRATION RATE: 16 BRPM | HEIGHT: 67 IN

## 2022-03-14 DIAGNOSIS — C61 MALIGNANT NEOPLASM OF PROSTATE: Primary | ICD-10-CM

## 2022-03-14 PROCEDURE — 3077F PR MOST RECENT SYSTOLIC BLOOD PRESSURE >= 140 MM HG: ICD-10-PCS | Mod: CPTII,S$GLB,, | Performed by: RADIOLOGY

## 2022-03-14 PROCEDURE — 3008F BODY MASS INDEX DOCD: CPT | Mod: CPTII,S$GLB,, | Performed by: RADIOLOGY

## 2022-03-14 PROCEDURE — 1159F MED LIST DOCD IN RCRD: CPT | Mod: CPTII,S$GLB,, | Performed by: RADIOLOGY

## 2022-03-14 PROCEDURE — 3008F PR BODY MASS INDEX (BMI) DOCUMENTED: ICD-10-PCS | Mod: CPTII,S$GLB,, | Performed by: RADIOLOGY

## 2022-03-14 PROCEDURE — 99212 OFFICE O/P EST SF 10 MIN: CPT | Mod: S$GLB,,, | Performed by: RADIOLOGY

## 2022-03-14 PROCEDURE — 99999 PR PBB SHADOW E&M-EST. PATIENT-LVL IV: ICD-10-PCS | Mod: PBBFAC,,, | Performed by: RADIOLOGY

## 2022-03-14 PROCEDURE — 1126F PR PAIN SEVERITY QUANTIFIED, NO PAIN PRESENT: ICD-10-PCS | Mod: CPTII,S$GLB,, | Performed by: RADIOLOGY

## 2022-03-14 PROCEDURE — 3078F PR MOST RECENT DIASTOLIC BLOOD PRESSURE < 80 MM HG: ICD-10-PCS | Mod: CPTII,S$GLB,, | Performed by: RADIOLOGY

## 2022-03-14 PROCEDURE — 1160F PR REVIEW ALL MEDS BY PRESCRIBER/CLIN PHARMACIST DOCUMENTED: ICD-10-PCS | Mod: CPTII,S$GLB,, | Performed by: RADIOLOGY

## 2022-03-14 PROCEDURE — 1126F AMNT PAIN NOTED NONE PRSNT: CPT | Mod: CPTII,S$GLB,, | Performed by: RADIOLOGY

## 2022-03-14 PROCEDURE — 3078F DIAST BP <80 MM HG: CPT | Mod: CPTII,S$GLB,, | Performed by: RADIOLOGY

## 2022-03-14 PROCEDURE — 3077F SYST BP >= 140 MM HG: CPT | Mod: CPTII,S$GLB,, | Performed by: RADIOLOGY

## 2022-03-14 PROCEDURE — 1160F RVW MEDS BY RX/DR IN RCRD: CPT | Mod: CPTII,S$GLB,, | Performed by: RADIOLOGY

## 2022-03-14 PROCEDURE — 99999 PR PBB SHADOW E&M-EST. PATIENT-LVL IV: CPT | Mod: PBBFAC,,, | Performed by: RADIOLOGY

## 2022-03-14 PROCEDURE — 1159F PR MEDICATION LIST DOCUMENTED IN MEDICAL RECORD: ICD-10-PCS | Mod: CPTII,S$GLB,, | Performed by: RADIOLOGY

## 2022-03-14 PROCEDURE — 99212 PR OFFICE/OUTPT VISIT, EST, LEVL II, 10-19 MIN: ICD-10-PCS | Mod: S$GLB,,, | Performed by: RADIOLOGY

## 2022-03-14 NOTE — PROGRESS NOTES
Subjective:       Patient ID: Eyad Gracia is a 66 y.o. male.    Chief Complaint: Prostate Cancer    This patient presents for follow up visit.     Mr. Garcia has a history of recurrent prostate cancer. He initially presented in 2015 when biopsies from the Rt. base revealed a small (2%) focus of [place] 8 (4+4) adenocarcinoma. He subsequently underwent RALP with bilateral pelvic node dissection in July of 2015. Pathology revealed a single focus of [place] 6 (3+3) adenocarcinoma in a background of extensive high-grade PIN. There was no extraprostatic extension, seminal vesicle invasion or lymphovascular invasion. The margins and 10 (5 Lt., 5 Rt.) pelvic nodes were negative for tumor involvement. Postoperative PSA in 2015 was 0.03 ng/ml. It continued to increase slowly. He was referred to our department after PSA in June of 2019 returned at 4 ng/ml. Work up revealed uptake in one small pelvic node on Axumin scan.  He completed 46.8 Gy to the prostate bed and nodes followed by a boost to the prostate bed and pelvic node 11/15/19.   His radiotherapy was combined with 6 months of androgen deprivation therapy.  Unfortunately his PSA has continued to increase since that time.  Axumin scan in May of 2021 revealed non specific uptake in the inguinal node and questionable uptake at the vesicoureteral anastomosis.  Bone scan in September of 2021 was negative. We have continued with active surveillance.  Today the patient states he feels well.  No complaints.      Review of Systems   Constitutional: Negative for activity change, appetite change, chills, fatigue and fever.   Respiratory: Negative for cough and shortness of breath.    Gastrointestinal: Negative for constipation, diarrhea and fecal incontinence.   Genitourinary: Negative for bladder incontinence, difficulty urinating, dysuria, frequency and hematuria.         Objective:      Physical Exam  Constitutional:       General: He is not in acute distress.      Appearance: Normal appearance.   Pulmonary:      Effort: Pulmonary effort is normal. No respiratory distress.   Abdominal:      General: Abdomen is flat. There is no distension.   Neurological:      Mental Status: He is alert and oriented to person, place, and time.   Psychiatric:         Mood and Affect: Mood normal.         Judgment: Judgment normal.          Latest Reference Range & Units 05/03/21 08:26 09/20/21 09:08 12/09/21 08:30 03/10/22 08:40   PSA Diagnostic 0.00 - 4.00 ng/mL 0.90 [1] 2.6 [2] 3.7 [3] 8.9 (H) [4]   l   Assessment:         recurrent prostate cancer   Plan:       I had a long discussion with the patient and his son.  Discussed the significance of his increasing PSA and the concepts of local vs distant recurrent disease.  Explained he likely has micro metastatic disease based on his short PSA doubling time.  Discussed the options of active surveillance vs initiating hormonal therapy.   Discussed the pros and cons of each approach.  Will plan to repeat his Axumin scan to determine if there are any sites amenable  to local therapy.  Will likely plan to start hormonal therapy after Axumin scan.

## 2022-03-15 ENCOUNTER — PATIENT MESSAGE (OUTPATIENT)
Dept: RADIATION ONCOLOGY | Facility: CLINIC | Age: 67
End: 2022-03-15
Payer: COMMERCIAL

## 2022-03-15 DIAGNOSIS — Z86.010 HISTORY OF COLON POLYPS: Primary | ICD-10-CM

## 2022-03-21 ENCOUNTER — HOSPITAL ENCOUNTER (OUTPATIENT)
Dept: RADIOLOGY | Facility: HOSPITAL | Age: 67
Discharge: HOME | End: 2022-03-21
Attending: RADIOLOGY
Payer: MEDICARE

## 2022-03-21 DIAGNOSIS — C61 MALIGNANT NEOPLASM OF PROSTATE: ICD-10-CM

## 2022-03-21 PROCEDURE — 78815 NM PET CT FLUCICLOVINE F18(PROSTATE CANCER RECURRENCE): ICD-10-PCS | Mod: 26,PS,, | Performed by: RADIOLOGY

## 2022-03-21 PROCEDURE — 78815 PET IMAGE W/CT SKULL-THIGH: CPT | Mod: TC

## 2022-03-21 PROCEDURE — 78815 PET IMAGE W/CT SKULL-THIGH: CPT | Mod: 26,PS,, | Performed by: RADIOLOGY

## 2022-03-23 ENCOUNTER — CLINICAL SUPPORT (OUTPATIENT)
Dept: RADIATION ONCOLOGY | Facility: CLINIC | Age: 67
End: 2022-03-23
Payer: MEDICARE

## 2022-03-23 DIAGNOSIS — R22.2 LOCALIZED SWELLING, MASS AND LUMP, TRUNK: ICD-10-CM

## 2022-03-23 DIAGNOSIS — C61 MALIGNANT NEOPLASM OF PROSTATE: Primary | ICD-10-CM

## 2022-03-23 PROCEDURE — 96402 PR CHEMOTHER HORMON ANTINEOPL SUB-Q/IM: ICD-10-PCS | Mod: S$GLB,,, | Performed by: RADIOLOGY

## 2022-03-23 PROCEDURE — 96402 CHEMO HORMON ANTINEOPL SQ/IM: CPT | Mod: S$GLB,,, | Performed by: RADIOLOGY

## 2022-03-23 RX ORDER — BICALUTAMIDE 50 MG/1
50 TABLET, FILM COATED ORAL DAILY
Qty: 30 TABLET | Refills: 1 | OUTPATIENT
Start: 2022-03-23 | End: 2022-05-29

## 2022-03-28 ENCOUNTER — OFFICE VISIT (OUTPATIENT)
Dept: OPHTHALMOLOGY | Facility: CLINIC | Age: 67
End: 2022-03-28
Payer: MEDICARE

## 2022-03-28 DIAGNOSIS — H33.052 OLD SUBTOTAL RETINAL DETACHMENT, LEFT: ICD-10-CM

## 2022-03-28 DIAGNOSIS — H52.4 BILATERAL PRESBYOPIA: ICD-10-CM

## 2022-03-28 DIAGNOSIS — H40.1131 PRIMARY OPEN ANGLE GLAUCOMA OF BOTH EYES, MILD STAGE: Primary | ICD-10-CM

## 2022-03-28 DIAGNOSIS — H31.002 CHORIORETINAL SCAR, LEFT: ICD-10-CM

## 2022-03-28 DIAGNOSIS — H21.562 PUPILLARY SPHINCTER RUPTURE, LEFT: ICD-10-CM

## 2022-03-28 DIAGNOSIS — H53.432 ARCUATE VISUAL FIELD DEFECT OF LEFT EYE: ICD-10-CM

## 2022-03-28 DIAGNOSIS — E11.9 TYPE 2 DIABETES MELLITUS WITHOUT OPHTHALMIC MANIFESTATIONS: ICD-10-CM

## 2022-03-28 PROCEDURE — 1159F PR MEDICATION LIST DOCUMENTED IN MEDICAL RECORD: ICD-10-PCS | Mod: CPTII,S$GLB,, | Performed by: OPHTHALMOLOGY

## 2022-03-28 PROCEDURE — 1160F PR REVIEW ALL MEDS BY PRESCRIBER/CLIN PHARMACIST DOCUMENTED: ICD-10-PCS | Mod: CPTII,S$GLB,, | Performed by: OPHTHALMOLOGY

## 2022-03-28 PROCEDURE — 92250 FUNDUS PHOTOGRAPHY W/I&R: CPT | Mod: S$GLB,,, | Performed by: OPHTHALMOLOGY

## 2022-03-28 PROCEDURE — 3288F PR FALLS RISK ASSESSMENT DOCUMENTED: ICD-10-PCS | Mod: CPTII,S$GLB,, | Performed by: OPHTHALMOLOGY

## 2022-03-28 PROCEDURE — 92014 COMPRE OPH EXAM EST PT 1/>: CPT | Mod: S$GLB,,, | Performed by: OPHTHALMOLOGY

## 2022-03-28 PROCEDURE — 1126F PR PAIN SEVERITY QUANTIFIED, NO PAIN PRESENT: ICD-10-PCS | Mod: CPTII,S$GLB,, | Performed by: OPHTHALMOLOGY

## 2022-03-28 PROCEDURE — 1101F PT FALLS ASSESS-DOCD LE1/YR: CPT | Mod: CPTII,S$GLB,, | Performed by: OPHTHALMOLOGY

## 2022-03-28 PROCEDURE — 1126F AMNT PAIN NOTED NONE PRSNT: CPT | Mod: CPTII,S$GLB,, | Performed by: OPHTHALMOLOGY

## 2022-03-28 PROCEDURE — 3288F FALL RISK ASSESSMENT DOCD: CPT | Mod: CPTII,S$GLB,, | Performed by: OPHTHALMOLOGY

## 2022-03-28 PROCEDURE — 1159F MED LIST DOCD IN RCRD: CPT | Mod: CPTII,S$GLB,, | Performed by: OPHTHALMOLOGY

## 2022-03-28 PROCEDURE — 99999 PR PBB SHADOW E&M-EST. PATIENT-LVL II: ICD-10-PCS | Mod: PBBFAC,,, | Performed by: OPHTHALMOLOGY

## 2022-03-28 PROCEDURE — 92014 PR EYE EXAM, EST PATIENT,COMPREHESV: ICD-10-PCS | Mod: S$GLB,,, | Performed by: OPHTHALMOLOGY

## 2022-03-28 PROCEDURE — 92250 COLOR FUNDUS PHOTOGRAPHY - OU - BOTH EYES: ICD-10-PCS | Mod: S$GLB,,, | Performed by: OPHTHALMOLOGY

## 2022-03-28 PROCEDURE — 1160F RVW MEDS BY RX/DR IN RCRD: CPT | Mod: CPTII,S$GLB,, | Performed by: OPHTHALMOLOGY

## 2022-03-28 PROCEDURE — 1101F PR PT FALLS ASSESS DOC 0-1 FALLS W/OUT INJ PAST YR: ICD-10-PCS | Mod: CPTII,S$GLB,, | Performed by: OPHTHALMOLOGY

## 2022-03-28 PROCEDURE — 99999 PR PBB SHADOW E&M-EST. PATIENT-LVL II: CPT | Mod: PBBFAC,,, | Performed by: OPHTHALMOLOGY

## 2022-03-28 RX ORDER — LATANOPROST 50 UG/ML
1 SOLUTION/ DROPS OPHTHALMIC NIGHTLY
Qty: 15 ML | Refills: 3 | Status: SHIPPED | OUTPATIENT
Start: 2022-03-28 | End: 2022-08-19

## 2022-03-28 NOTE — PROGRESS NOTES
HPI     DLS: 10/25/2021    Pt here for 4 Month Check/DFE;  Pt states no eye pain or discomfort. Pt states he needs refills on his   Latanoprost eye drop.      Meds;  Cosopt BID OU = HAS ONLY BEEN USING QDAY NOT BID OU   Latanoprost QDAY OU    1. POAG   2. VF Defect OS   3. Chorioretinal Scar OS   4. Hx RD OS   5. NS OU   6. Type 2 DM no DR   7. Pupil Sphincter Rupture OS   8. Presbyopia     Last edited by Talya Hobson on 3/28/2022  2:29 PM. (History)              Assessment /Plan     For exam results, see Encounter Report.    Primary open angle glaucoma of both eyes, mild stage  -     latanoprost 0.005 % ophthalmic solution; Place 1 drop into both eyes every evening.  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 Primary open angle glaucoma, both eyes, mild stage        Glaucoma (type and duration)    POAG with elevated IOP ou - mild stage    First HVF   2007 - full od // SAD os 2/2 to CRS   First photos   2011   Treatment / Drops started   2006           Family history    ++ mother and 2 brothers         Glaucoma meds    Latanoprost  (( use to use cosopt as well)         H/O adverse rxn to glaucoma drops    None (has tired alphagan and timolol in past -non compliant)         LASERS    SLT os - 3/31/2016 // SLT od - 4/14/2016          GLAUCOMA SURGERIES    none        OTHER EYE SURGERIES    H/O RD repair os - S/P laser repair         CDR    0.75 // 0.85-0.9         Tbase    25/27          Tmax    25/27            Ttarget    18/18             HVF    12 test 2006 to  2021 - full  od // SAD - 2/2 CRS from laser for RD repair os        Gonio    +3 ou          CCT    486/502 - thin ou         OCT    7 test 2011 to 2021 - RNFL - dec T od ( fluctuate) od  - dec. G/N/NI/TI/TS,  Bord T  (has a CRS)  os        HRT    3 test 2017 to 2019 -MR -  DEC. Sn/n/in od // DEC. S/n/i, BORD t os /// CDR 0.73 od // 0.86 os        Disc photos    2011, 2015 , 2017// harmony - 2022      - Ttoday   15/15 ( up - only using cosopt q day)   - Test done today  - IOP / DFE // photos      2. 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 Chorioretinal scar, left   -with 2nd VF defect      4. Old subtotal retinal detachment, left   S/P repair - retina flat - stable   -large CRS      5. Type 2 diabetes mellitus without ophthalmic manifestations      6. Senile nuclear sclerosis  -mild - monitor      7. pupil sphincter rupture os  -suggest old trauma  -does NOT appear to have a angle recession    7. Presbyopia       PLAN    POAG with OHT OS > OD - mild od // moderate os   IOP ok - below target of 18 ou - better after addition of cosopt ou - re-instruct on bid use of cosopt (3/2022)    The ON and VF's are still good ou   The VF loss os is 2/2 old CRS- ?  post laser for a RD repair   Good initial response to SLT ou 25/25 --> 16/17     LATANOPROST OU   Cont  cosopt ou bid -    Photo file updated 3/28/2022     F/U 4 months IOP //  HVF / OCT - will NOT need dilating     - Consider repeat SLT - if IOP up or if VF progression os    Bethany Lechuga MD

## 2022-03-29 ENCOUNTER — LAB VISIT (OUTPATIENT)
Dept: LAB | Facility: HOSPITAL | Age: 67
End: 2022-03-29
Attending: FAMILY MEDICINE
Payer: MEDICARE

## 2022-03-29 DIAGNOSIS — E78.2 MIXED HYPERLIPIDEMIA: ICD-10-CM

## 2022-03-29 DIAGNOSIS — E11.9 TYPE 2 DIABETES MELLITUS WITHOUT COMPLICATION, WITHOUT LONG-TERM CURRENT USE OF INSULIN: ICD-10-CM

## 2022-03-29 DIAGNOSIS — D64.9 ANEMIA, UNSPECIFIED TYPE: ICD-10-CM

## 2022-03-29 DIAGNOSIS — I10 ESSENTIAL HYPERTENSION: ICD-10-CM

## 2022-03-29 LAB
ALBUMIN SERPL BCP-MCNC: 4 G/DL (ref 3.5–5.2)
ALP SERPL-CCNC: 72 U/L (ref 55–135)
ALT SERPL W/O P-5'-P-CCNC: 28 U/L (ref 10–44)
ANION GAP SERPL CALC-SCNC: 11 MMOL/L (ref 8–16)
AST SERPL-CCNC: 24 U/L (ref 10–40)
BASOPHILS # BLD AUTO: 0.04 K/UL (ref 0–0.2)
BASOPHILS NFR BLD: 0.7 % (ref 0–1.9)
BILIRUB SERPL-MCNC: 0.7 MG/DL (ref 0.1–1)
BUN SERPL-MCNC: 14 MG/DL (ref 8–23)
CALCIUM SERPL-MCNC: 9.6 MG/DL (ref 8.7–10.5)
CHLORIDE SERPL-SCNC: 105 MMOL/L (ref 95–110)
CHOLEST SERPL-MCNC: 151 MG/DL (ref 120–199)
CHOLEST/HDLC SERPL: 4 {RATIO} (ref 2–5)
CO2 SERPL-SCNC: 24 MMOL/L (ref 23–29)
CREAT SERPL-MCNC: 0.9 MG/DL (ref 0.5–1.4)
DIFFERENTIAL METHOD: ABNORMAL
EOSINOPHIL # BLD AUTO: 0.2 K/UL (ref 0–0.5)
EOSINOPHIL NFR BLD: 3.7 % (ref 0–8)
ERYTHROCYTE [DISTWIDTH] IN BLOOD BY AUTOMATED COUNT: 12.2 % (ref 11.5–14.5)
EST. GFR  (AFRICAN AMERICAN): >60 ML/MIN/1.73 M^2
EST. GFR  (NON AFRICAN AMERICAN): >60 ML/MIN/1.73 M^2
FERRITIN SERPL-MCNC: 93 NG/ML (ref 20–300)
GLUCOSE SERPL-MCNC: 173 MG/DL (ref 70–110)
HCT VFR BLD AUTO: 40.5 % (ref 40–54)
HDLC SERPL-MCNC: 38 MG/DL (ref 40–75)
HDLC SERPL: 25.2 % (ref 20–50)
HGB BLD-MCNC: 13 G/DL (ref 14–18)
IMM GRANULOCYTES # BLD AUTO: 0.01 K/UL (ref 0–0.04)
IMM GRANULOCYTES NFR BLD AUTO: 0.2 % (ref 0–0.5)
IRON SERPL-MCNC: 87 UG/DL (ref 45–160)
LDLC SERPL CALC-MCNC: 97 MG/DL (ref 63–159)
LYMPHOCYTES # BLD AUTO: 1.1 K/UL (ref 1–4.8)
LYMPHOCYTES NFR BLD: 18.6 % (ref 18–48)
MCH RBC QN AUTO: 29.4 PG (ref 27–31)
MCHC RBC AUTO-ENTMCNC: 32.1 G/DL (ref 32–36)
MCV RBC AUTO: 92 FL (ref 82–98)
MONOCYTES # BLD AUTO: 0.5 K/UL (ref 0.3–1)
MONOCYTES NFR BLD: 8.8 % (ref 4–15)
NEUTROPHILS # BLD AUTO: 4.2 K/UL (ref 1.8–7.7)
NEUTROPHILS NFR BLD: 68 % (ref 38–73)
NONHDLC SERPL-MCNC: 113 MG/DL
NRBC BLD-RTO: 0 /100 WBC
PLATELET # BLD AUTO: 319 K/UL (ref 150–450)
PMV BLD AUTO: 9.5 FL (ref 9.2–12.9)
POTASSIUM SERPL-SCNC: 4 MMOL/L (ref 3.5–5.1)
PROT SERPL-MCNC: 7.1 G/DL (ref 6–8.4)
RBC # BLD AUTO: 4.42 M/UL (ref 4.6–6.2)
SATURATED IRON: 20 % (ref 20–50)
SODIUM SERPL-SCNC: 140 MMOL/L (ref 136–145)
TOTAL IRON BINDING CAPACITY: 437 UG/DL (ref 250–450)
TRANSFERRIN SERPL-MCNC: 295 MG/DL (ref 200–375)
TRIGL SERPL-MCNC: 80 MG/DL (ref 30–150)
WBC # BLD AUTO: 6.14 K/UL (ref 3.9–12.7)

## 2022-03-29 PROCEDURE — 83036 HEMOGLOBIN GLYCOSYLATED A1C: CPT | Performed by: FAMILY MEDICINE

## 2022-03-29 PROCEDURE — 82728 ASSAY OF FERRITIN: CPT | Performed by: FAMILY MEDICINE

## 2022-03-29 PROCEDURE — 83020 HEMOGLOBIN ELECTROPHORESIS: CPT | Mod: 91 | Performed by: FAMILY MEDICINE

## 2022-03-29 PROCEDURE — 80053 COMPREHEN METABOLIC PANEL: CPT | Performed by: FAMILY MEDICINE

## 2022-03-29 PROCEDURE — 80061 LIPID PANEL: CPT | Performed by: FAMILY MEDICINE

## 2022-03-29 PROCEDURE — 85025 COMPLETE CBC W/AUTO DIFF WBC: CPT | Performed by: FAMILY MEDICINE

## 2022-03-29 PROCEDURE — 84466 ASSAY OF TRANSFERRIN: CPT | Performed by: FAMILY MEDICINE

## 2022-03-30 ENCOUNTER — TELEPHONE (OUTPATIENT)
Dept: PRIMARY CARE CLINIC | Facility: CLINIC | Age: 67
End: 2022-03-30
Payer: COMMERCIAL

## 2022-03-30 DIAGNOSIS — Z12.11 ENCOUNTER FOR SCREENING COLONOSCOPY: Primary | ICD-10-CM

## 2022-03-30 LAB
ESTIMATED AVG GLUCOSE: 223 MG/DL (ref 68–131)
HB ELECTROPHORESIS INTERP CANCEL: NORMAL
HB ELECTROPHORESIS INTERPRETATION: NORMAL
HBA1C MFR BLD: 9.4 % (ref 4–5.6)
HGB A MFR BLD ELPH: 97.8 % (ref 95.8–98)
HGB A2 MFR BLD: 2.2 % (ref 2–3.3)
HGB A2+XXX MFR BLD ELPH: NORMAL %
HGB F MFR BLD: 0 % (ref 0–0.9)
HGB XXX MFR BLD ELPH: NORMAL %
HPLC HB VARIANT: NORMAL

## 2022-04-01 ENCOUNTER — TELEPHONE (OUTPATIENT)
Dept: PRIMARY CARE CLINIC | Facility: CLINIC | Age: 67
End: 2022-04-01
Payer: COMMERCIAL

## 2022-04-01 NOTE — TELEPHONE ENCOUNTER
----- Message from Taylor Choi MD sent at 3/30/2022  1:48 PM CDT -----  Colonoscopy ordered. Will need log of blood sugar readings.    Hello,    Keep upcoming appointment in April. Please bring in a log of your home blood sugar readings (fasting, before largest meal, 2 hrs after largest meal and bedtime.)    We need to discuss uncontrolled diabetes. We will get your diabetes back on track.    Normal iron studies. Anemia has improved. Awaiting results of hemoglobin electrophoresis. We will need to discuss colon cancer screening since or records show that you are due for colonoscopy.  Colonoscopy:-  Please call to schedule the appointment for colonoscopy:  [facility]  or   [place]      Normal liver and kidney function    HDL, the good cholesterol is low. Exercise is encouraged.  Continue atorvastatin- LDL cholesterol, is on target.

## 2022-04-05 ENCOUNTER — OFFICE VISIT (OUTPATIENT)
Dept: PRIMARY CARE CLINIC | Facility: CLINIC | Age: 67
End: 2022-04-05
Payer: MEDICARE

## 2022-04-05 VITALS
DIASTOLIC BLOOD PRESSURE: 64 MMHG | WEIGHT: 163.13 LBS | OXYGEN SATURATION: 98 % | HEIGHT: 67 IN | SYSTOLIC BLOOD PRESSURE: 132 MMHG | BODY MASS INDEX: 25.6 KG/M2 | HEART RATE: 75 BPM | TEMPERATURE: 98 F

## 2022-04-05 DIAGNOSIS — K21.9 GASTROESOPHAGEAL REFLUX DISEASE WITHOUT ESOPHAGITIS: ICD-10-CM

## 2022-04-05 DIAGNOSIS — Z98.49 STATUS POST CATARACT EXTRACTION, UNSPECIFIED LATERALITY: ICD-10-CM

## 2022-04-05 DIAGNOSIS — J40 BRONCHITIS: ICD-10-CM

## 2022-04-05 DIAGNOSIS — E11.9 TYPE 2 DIABETES MELLITUS WITHOUT COMPLICATION, WITHOUT LONG-TERM CURRENT USE OF INSULIN: Primary | ICD-10-CM

## 2022-04-05 DIAGNOSIS — M15.9 PRIMARY OSTEOARTHRITIS INVOLVING MULTIPLE JOINTS: ICD-10-CM

## 2022-04-05 DIAGNOSIS — Z12.11 SCREENING FOR COLON CANCER: ICD-10-CM

## 2022-04-05 DIAGNOSIS — C61 MALIGNANT NEOPLASM OF PROSTATE: ICD-10-CM

## 2022-04-05 DIAGNOSIS — E78.2 MIXED HYPERLIPIDEMIA: ICD-10-CM

## 2022-04-05 DIAGNOSIS — H40.1131 PRIMARY OPEN ANGLE GLAUCOMA (POAG) OF BOTH EYES, MILD STAGE: ICD-10-CM

## 2022-04-05 DIAGNOSIS — E11.36 TYPE 2 DIABETES MELLITUS WITH DIABETIC CATARACT, WITHOUT LONG-TERM CURRENT USE OF INSULIN: ICD-10-CM

## 2022-04-05 DIAGNOSIS — H33.052 OLD TOTAL RETINAL DETACHMENT OF LEFT EYE: ICD-10-CM

## 2022-04-05 DIAGNOSIS — N52.31 ERECTILE DYSFUNCTION AFTER RADICAL PROSTATECTOMY: ICD-10-CM

## 2022-04-05 DIAGNOSIS — D50.8 IRON DEFICIENCY ANEMIA SECONDARY TO INADEQUATE DIETARY IRON INTAKE: ICD-10-CM

## 2022-04-05 DIAGNOSIS — I10 ESSENTIAL HYPERTENSION: ICD-10-CM

## 2022-04-05 PROCEDURE — 1160F PR REVIEW ALL MEDS BY PRESCRIBER/CLIN PHARMACIST DOCUMENTED: ICD-10-PCS | Mod: CPTII,S$GLB,, | Performed by: FAMILY MEDICINE

## 2022-04-05 PROCEDURE — 3008F PR BODY MASS INDEX (BMI) DOCUMENTED: ICD-10-PCS | Mod: CPTII,S$GLB,, | Performed by: FAMILY MEDICINE

## 2022-04-05 PROCEDURE — 99999 PR PBB SHADOW E&M-EST. PATIENT-LVL V: CPT | Mod: PBBFAC,,, | Performed by: FAMILY MEDICINE

## 2022-04-05 PROCEDURE — 1126F AMNT PAIN NOTED NONE PRSNT: CPT | Mod: CPTII,S$GLB,, | Performed by: FAMILY MEDICINE

## 2022-04-05 PROCEDURE — 3075F SYST BP GE 130 - 139MM HG: CPT | Mod: CPTII,S$GLB,, | Performed by: FAMILY MEDICINE

## 2022-04-05 PROCEDURE — 3288F FALL RISK ASSESSMENT DOCD: CPT | Mod: CPTII,S$GLB,, | Performed by: FAMILY MEDICINE

## 2022-04-05 PROCEDURE — 3046F PR MOST RECENT HEMOGLOBIN A1C LEVEL > 9.0%: ICD-10-PCS | Mod: CPTII,S$GLB,, | Performed by: FAMILY MEDICINE

## 2022-04-05 PROCEDURE — 1126F PR PAIN SEVERITY QUANTIFIED, NO PAIN PRESENT: ICD-10-PCS | Mod: CPTII,S$GLB,, | Performed by: FAMILY MEDICINE

## 2022-04-05 PROCEDURE — 1101F PT FALLS ASSESS-DOCD LE1/YR: CPT | Mod: CPTII,S$GLB,, | Performed by: FAMILY MEDICINE

## 2022-04-05 PROCEDURE — 3288F PR FALLS RISK ASSESSMENT DOCUMENTED: ICD-10-PCS | Mod: CPTII,S$GLB,, | Performed by: FAMILY MEDICINE

## 2022-04-05 PROCEDURE — 3046F HEMOGLOBIN A1C LEVEL >9.0%: CPT | Mod: CPTII,S$GLB,, | Performed by: FAMILY MEDICINE

## 2022-04-05 PROCEDURE — 99214 OFFICE O/P EST MOD 30 MIN: CPT | Mod: S$GLB,,, | Performed by: FAMILY MEDICINE

## 2022-04-05 PROCEDURE — 3008F BODY MASS INDEX DOCD: CPT | Mod: CPTII,S$GLB,, | Performed by: FAMILY MEDICINE

## 2022-04-05 PROCEDURE — 99214 PR OFFICE/OUTPT VISIT, EST, LEVL IV, 30-39 MIN: ICD-10-PCS | Mod: S$GLB,,, | Performed by: FAMILY MEDICINE

## 2022-04-05 PROCEDURE — 99999 PR PBB SHADOW E&M-EST. PATIENT-LVL V: ICD-10-PCS | Mod: PBBFAC,,, | Performed by: FAMILY MEDICINE

## 2022-04-05 PROCEDURE — 3075F PR MOST RECENT SYSTOLIC BLOOD PRESS GE 130-139MM HG: ICD-10-PCS | Mod: CPTII,S$GLB,, | Performed by: FAMILY MEDICINE

## 2022-04-05 PROCEDURE — 1159F PR MEDICATION LIST DOCUMENTED IN MEDICAL RECORD: ICD-10-PCS | Mod: CPTII,S$GLB,, | Performed by: FAMILY MEDICINE

## 2022-04-05 PROCEDURE — 3078F PR MOST RECENT DIASTOLIC BLOOD PRESSURE < 80 MM HG: ICD-10-PCS | Mod: CPTII,S$GLB,, | Performed by: FAMILY MEDICINE

## 2022-04-05 PROCEDURE — 1159F MED LIST DOCD IN RCRD: CPT | Mod: CPTII,S$GLB,, | Performed by: FAMILY MEDICINE

## 2022-04-05 PROCEDURE — 1160F RVW MEDS BY RX/DR IN RCRD: CPT | Mod: CPTII,S$GLB,, | Performed by: FAMILY MEDICINE

## 2022-04-05 PROCEDURE — 1101F PR PT FALLS ASSESS DOC 0-1 FALLS W/OUT INJ PAST YR: ICD-10-PCS | Mod: CPTII,S$GLB,, | Performed by: FAMILY MEDICINE

## 2022-04-05 PROCEDURE — 3078F DIAST BP <80 MM HG: CPT | Mod: CPTII,S$GLB,, | Performed by: FAMILY MEDICINE

## 2022-04-05 RX ORDER — INDOMETHACIN 50 MG/1
50 CAPSULE ORAL 2 TIMES DAILY PRN
Qty: 180 CAPSULE | Refills: 3 | Status: SHIPPED | OUTPATIENT
Start: 2022-04-05 | End: 2022-06-03 | Stop reason: ALTCHOICE

## 2022-04-05 RX ORDER — LINAGLIPTIN 5 MG/1
5 TABLET, FILM COATED ORAL DAILY
Qty: 90 TABLET | Refills: 3 | Status: SHIPPED | OUTPATIENT
Start: 2022-04-05 | End: 2022-04-05

## 2022-04-05 RX ORDER — METFORMIN HYDROCHLORIDE 500 MG/1
1000 TABLET, EXTENDED RELEASE ORAL 2 TIMES DAILY WITH MEALS
Qty: 360 TABLET | Refills: 3 | Status: SHIPPED | OUTPATIENT
Start: 2022-04-05 | End: 2022-04-05

## 2022-04-05 RX ORDER — FAMOTIDINE 40 MG/1
40 TABLET, FILM COATED ORAL DAILY
Qty: 90 TABLET | Refills: 3 | Status: SHIPPED | OUTPATIENT
Start: 2022-04-05 | End: 2022-07-29

## 2022-04-05 RX ORDER — ALBUTEROL SULFATE 90 UG/1
2 AEROSOL, METERED RESPIRATORY (INHALATION) EVERY 6 HOURS PRN
Qty: 18 G | Refills: 11 | Status: SHIPPED | OUTPATIENT
Start: 2022-04-05

## 2022-04-05 NOTE — PATIENT INSTRUCTIONS
Colonoscopy  Please call to schedule the appointment for colonoscopy:  [facility]  or   [place]   [place] [phone]

## 2022-04-05 NOTE — PROGRESS NOTES
Subjective:       Patient ID: Eyad Garcia is a 66 y.o. male.    Chief Complaint: Follow-up      65 yo male presents for follow up.     Past medical history hypertension, overweight, erectile dysfunction, hyperlipidemia, type 2 diabetes, primary osteoarthritis of multiple joints, bronchitis, GERD, anemia, malignant neoplasm of prostate status post radiation to prostate bed; old retinal detachment, POAG both eyes.     Last colonoscopy done on 1/25/2019: repeat in 3-5 years recommended.  A1c 7.2 on 9/27/2021 but has increased to 9.4 on 3/29/22.     Has has no acute concerns.     He reports no medication side effects.     The following portions of the patient's history were reviewed and updated as appropriate: allergies, current medications, past family history, past medical history, past social history, past surgical history and problem list.       Review of Systems   Constitutional: Negative for activity change, appetite change, chills, diaphoresis, fatigue, fever and unexpected weight change.   HENT: Negative for nasal congestion, dental problem, facial swelling, nosebleeds, postnasal drip, rhinorrhea, sore throat, tinnitus and trouble swallowing.    Eyes: Negative for pain, discharge, itching and visual disturbance.   Respiratory: Negative for apnea, chest tightness, shortness of breath, wheezing and stridor.    Cardiovascular: Negative for chest pain, palpitations and leg swelling.   Gastrointestinal: Negative for abdominal distention, abdominal pain, constipation, diarrhea, nausea, rectal pain and vomiting.   Endocrine: Negative for cold intolerance, heat intolerance and polyuria.   Genitourinary: Positive for erectile dysfunction. Negative for difficulty urinating, dysuria, frequency, hematuria and urgency.   Musculoskeletal: Positive for arthralgias. Negative for gait problem, myalgias, neck pain and neck stiffness.   Integumentary:  Negative for color change and rash.   Neurological: Negative for dizziness,  "tremors, seizures, syncope, facial asymmetry, weakness and headaches.   Hematological: Negative for adenopathy. Does not bruise/bleed easily.   Psychiatric/Behavioral: Negative for agitation, confusion, hallucinations, self-injury and suicidal ideas. The patient is not hyperactive.           Objective:       Vitals:    04/05/22 0942   BP: 132/64   Pulse: 75   Temp: 98.3 °F (36.8 °C)   TempSrc: Oral   SpO2: 98%   Weight: 74 kg (163 lb 2.3 oz)   Height: 5' 6.5" (1.689 m)     Physical Exam  Constitutional:       General: He is not in acute distress.     Appearance: He is well-developed. He is not diaphoretic.   HENT:      Head: Normocephalic and atraumatic.      Right Ear: External ear normal.      Left Ear: External ear normal.   Eyes:      General: No scleral icterus.        Right eye: No discharge.         Left eye: No discharge.      Conjunctiva/sclera: Conjunctivae normal.      Pupils: Pupils are equal, round, and reactive to light.   Neck:      Thyroid: No thyromegaly.   Cardiovascular:      Rate and Rhythm: Normal rate and regular rhythm.      Heart sounds: Normal heart sounds. No murmur heard.   No friction rub. No gallop.    Pulmonary:      Effort: Pulmonary effort is normal. No respiratory distress.      Breath sounds: Normal breath sounds.   Chest:      Chest wall: No tenderness.   Abdominal:      General: Bowel sounds are normal. There is no distension.      Palpations: Abdomen is soft. There is no mass.      Tenderness: There is no abdominal tenderness. There is no guarding or rebound.   Musculoskeletal:         General: Normal range of motion.      Cervical back: Normal range of motion and neck supple.   Lymphadenopathy:      Cervical: No cervical adenopathy.   Skin:     General: Skin is warm.      Capillary Refill: Capillary refill takes less than 2 seconds.      Findings: No rash.   Neurological:      Mental Status: He is alert and oriented to person, place, and time.      Cranial Nerves: No cranial " "nerve deficit.      Motor: No abnormal muscle tone.      Coordination: Coordination normal.      Deep Tendon Reflexes: Reflexes normal.   Psychiatric:         Thought Content: Thought content normal.         Judgment: Judgment normal.         Assessment:       1. Type 2 diabetes mellitus without complication, without long-term current use of insulin    2. Type 2 diabetes mellitus with diabetic cataract, without long-term current use of insulin    3. Status post cataract extraction, unspecified laterality    4. Essential hypertension    5. Mixed hyperlipidemia    6. BMI 25.0-25.9,adult    7. Erectile dysfunction after radical prostatectomy    8. Bronchitis    9. Gastroesophageal reflux disease without esophagitis    10. Primary osteoarthritis involving multiple joints    11. Primary open angle glaucoma (POAG) of both eyes, mild stage    12. Old total retinal detachment of left eye    13. Malignant neoplasm of prostate    14. Iron deficiency anemia secondary to inadequate dietary iron intake    15. Screening for colon cancer        Plan:       1. Type 2 diabetes mellitus without complication, without long-term current use of insulin  -     Ambulatory referral/consult to Diabetes Education; Future; Expected date: 04/12/2022  -     Ambulatory referral/consult to Internal Medicine; Future; Expected date: 04/12/2022  -     linagliptin-metformin (JENTADUETO) 2.5-1,000 mg Tab; Take 1 tablet by mouth 2 (two) times daily.  Dispense: 180 tablet; Refill: 3  Stop current use of Metformin  mg ER 24 hr tablet- 2 tablets twice daily  He does not like the listed side effects of SGLT 2- he would not drink adequate water due to concern for increased urination; will not inject GLP1's. May consider Rybelsus if affordable. Consider adding sulfonylurea in the future.  He is not checking his blood sugar readings with the traditional monitor and does not like the idea of having to "wear" a continuous blood glucose meter.    2. Type 2 " diabetes with diabetic cataract  3. S/p cataract extraction  Cataract is no longer an issue    4. Essential hypertension  On losartan, amlodipine    5. Mixed hyperlipidemia  On atorvastatin    6. BMI 25.0-25.9,adult  The importance of optimum diet & exercise discussed.     7. Erectile dysfunction  Not on pharmacotherapy    8. Bronchitis  -     albuterol (VENTOLIN HFA) 90 mcg/actuation inhaler; Inhale 2 puffs into the lungs every 6 (six) hours as needed for Wheezing or Shortness of Breath (cough). Rescue  Dispense: 18 g; Refill: 11  Non smoker    9. Gastroesophageal reflux disease without esophagitis  -     famotidine (PEPCID) 40 MG tablet; Take 1 tablet (40 mg total) by mouth once daily.  Dispense: 90 tablet; Refill: 3    10. Primary osteoarthritis involving multiple joints  -     indomethacin (INDOCIN) 50 MG capsule; Take 1 capsule (50 mg total) by mouth 2 (two) times daily as needed (pain).  Dispense: 180 capsule; Refill: 3    11. Primary open angle glaucoma (POAG) of both eyes, mild stage  12. Old total retinal detachment of left eye  Follow up with eye specialist    13. Malignant neoplasm of prostate  Follows with radiation oncology    14. Iron deficiency anemia secondary to inadequate dietary iron intake  On iron suplementation    15. Screening for colon cancer  -     Case Request Endoscopy: COLONOSCOPY      Disclaimer: This note has been generated using voice-recognition software. There may be typographical errors that have been missed during proof-reading

## 2022-04-11 ENCOUNTER — PATIENT MESSAGE (OUTPATIENT)
Dept: PRIMARY CARE CLINIC | Facility: CLINIC | Age: 67
End: 2022-04-11
Payer: COMMERCIAL

## 2022-04-20 ENCOUNTER — PATIENT MESSAGE (OUTPATIENT)
Dept: RADIATION ONCOLOGY | Facility: CLINIC | Age: 67
End: 2022-04-20
Payer: COMMERCIAL

## 2022-05-03 ENCOUNTER — OFFICE VISIT (OUTPATIENT)
Dept: PRIMARY CARE CLINIC | Facility: CLINIC | Age: 67
End: 2022-05-03
Payer: MEDICARE

## 2022-05-03 VITALS
HEART RATE: 84 BPM | DIASTOLIC BLOOD PRESSURE: 60 MMHG | OXYGEN SATURATION: 99 % | TEMPERATURE: 98 F | BODY MASS INDEX: 25.79 KG/M2 | HEIGHT: 66 IN | SYSTOLIC BLOOD PRESSURE: 124 MMHG | WEIGHT: 160.5 LBS | RESPIRATION RATE: 18 BRPM

## 2022-05-03 DIAGNOSIS — M19.90 ARTHRITIS: ICD-10-CM

## 2022-05-03 DIAGNOSIS — E11.9 ENCOUNTER FOR DIABETIC FOOT EXAM: ICD-10-CM

## 2022-05-03 DIAGNOSIS — Z86.010 HISTORY OF COLON POLYPS: ICD-10-CM

## 2022-05-03 DIAGNOSIS — E11.9 TYPE 2 DIABETES MELLITUS WITHOUT COMPLICATION, WITHOUT LONG-TERM CURRENT USE OF INSULIN: Primary | ICD-10-CM

## 2022-05-03 DIAGNOSIS — I10 ESSENTIAL HYPERTENSION: ICD-10-CM

## 2022-05-03 PROCEDURE — 3078F PR MOST RECENT DIASTOLIC BLOOD PRESSURE < 80 MM HG: ICD-10-PCS | Mod: CPTII,S$GLB,, | Performed by: FAMILY MEDICINE

## 2022-05-03 PROCEDURE — 1159F PR MEDICATION LIST DOCUMENTED IN MEDICAL RECORD: ICD-10-PCS | Mod: CPTII,S$GLB,, | Performed by: FAMILY MEDICINE

## 2022-05-03 PROCEDURE — 99214 PR OFFICE/OUTPT VISIT, EST, LEVL IV, 30-39 MIN: ICD-10-PCS | Mod: S$GLB,,, | Performed by: FAMILY MEDICINE

## 2022-05-03 PROCEDURE — 3074F PR MOST RECENT SYSTOLIC BLOOD PRESSURE < 130 MM HG: ICD-10-PCS | Mod: CPTII,S$GLB,, | Performed by: FAMILY MEDICINE

## 2022-05-03 PROCEDURE — 3046F HEMOGLOBIN A1C LEVEL >9.0%: CPT | Mod: CPTII,S$GLB,, | Performed by: FAMILY MEDICINE

## 2022-05-03 PROCEDURE — 1159F MED LIST DOCD IN RCRD: CPT | Mod: CPTII,S$GLB,, | Performed by: FAMILY MEDICINE

## 2022-05-03 PROCEDURE — 1160F RVW MEDS BY RX/DR IN RCRD: CPT | Mod: CPTII,S$GLB,, | Performed by: FAMILY MEDICINE

## 2022-05-03 PROCEDURE — 3078F DIAST BP <80 MM HG: CPT | Mod: CPTII,S$GLB,, | Performed by: FAMILY MEDICINE

## 2022-05-03 PROCEDURE — 99214 OFFICE O/P EST MOD 30 MIN: CPT | Mod: S$GLB,,, | Performed by: FAMILY MEDICINE

## 2022-05-03 PROCEDURE — 3288F FALL RISK ASSESSMENT DOCD: CPT | Mod: CPTII,S$GLB,, | Performed by: FAMILY MEDICINE

## 2022-05-03 PROCEDURE — 3046F PR MOST RECENT HEMOGLOBIN A1C LEVEL > 9.0%: ICD-10-PCS | Mod: CPTII,S$GLB,, | Performed by: FAMILY MEDICINE

## 2022-05-03 PROCEDURE — 3288F PR FALLS RISK ASSESSMENT DOCUMENTED: ICD-10-PCS | Mod: CPTII,S$GLB,, | Performed by: FAMILY MEDICINE

## 2022-05-03 PROCEDURE — 3008F BODY MASS INDEX DOCD: CPT | Mod: CPTII,S$GLB,, | Performed by: FAMILY MEDICINE

## 2022-05-03 PROCEDURE — 3008F PR BODY MASS INDEX (BMI) DOCUMENTED: ICD-10-PCS | Mod: CPTII,S$GLB,, | Performed by: FAMILY MEDICINE

## 2022-05-03 PROCEDURE — 99999 PR PBB SHADOW E&M-EST. PATIENT-LVL V: CPT | Mod: PBBFAC,,, | Performed by: FAMILY MEDICINE

## 2022-05-03 PROCEDURE — 1101F PR PT FALLS ASSESS DOC 0-1 FALLS W/OUT INJ PAST YR: ICD-10-PCS | Mod: CPTII,S$GLB,, | Performed by: FAMILY MEDICINE

## 2022-05-03 PROCEDURE — 1125F PR PAIN SEVERITY QUANTIFIED, PAIN PRESENT: ICD-10-PCS | Mod: CPTII,S$GLB,, | Performed by: FAMILY MEDICINE

## 2022-05-03 PROCEDURE — 3074F SYST BP LT 130 MM HG: CPT | Mod: CPTII,S$GLB,, | Performed by: FAMILY MEDICINE

## 2022-05-03 PROCEDURE — 1101F PT FALLS ASSESS-DOCD LE1/YR: CPT | Mod: CPTII,S$GLB,, | Performed by: FAMILY MEDICINE

## 2022-05-03 PROCEDURE — 99999 PR PBB SHADOW E&M-EST. PATIENT-LVL V: ICD-10-PCS | Mod: PBBFAC,,, | Performed by: FAMILY MEDICINE

## 2022-05-03 PROCEDURE — 1125F AMNT PAIN NOTED PAIN PRSNT: CPT | Mod: CPTII,S$GLB,, | Performed by: FAMILY MEDICINE

## 2022-05-03 PROCEDURE — 1160F PR REVIEW ALL MEDS BY PRESCRIBER/CLIN PHARMACIST DOCUMENTED: ICD-10-PCS | Mod: CPTII,S$GLB,, | Performed by: FAMILY MEDICINE

## 2022-05-03 NOTE — PROGRESS NOTES
Subjective:       Patient ID: Eyad Garcia is a 66 y.o. male.    Chief Complaint: Follow-up      67 yo male presents for follow up.     Past medical history hypertension, overweight, erectile dysfunction, hyperlipidemia, type 2 diabetes, primary osteoarthritis of multiple joints, bronchitis, GERD, anemia, malignant neoplasm of prostate status post radiation to prostate bed; old retinal detachment, POAG both eyes.     Last colonoscopy done on 1/25/2019: repeat in 3-5 years recommended.  A1c 7.2 on 9/27/2021 but has increased to 9.4 on 3/29/22.  He is not checking home blood sugars. He is not interested in continuous blood glucose monitor.     Has has no acute concerns.     He reports no medication side effects. On linagliptin-metformin.    Plans to schedule colonoscopy in the future.     The following portions of the patient's history were reviewed and updated as appropriate: allergies, current medications, past family history, past medical history, past social history, past surgical history and problem list.       Review of Systems   Constitutional: Negative for activity change, appetite change, chills, diaphoresis, fatigue, fever and unexpected weight change.   HENT: Negative for nasal congestion, dental problem, facial swelling, nosebleeds, postnasal drip, rhinorrhea, sore throat, tinnitus and trouble swallowing.    Eyes: Negative for pain, discharge, itching and visual disturbance.   Respiratory: Negative for apnea, chest tightness, shortness of breath, wheezing and stridor.    Cardiovascular: Negative for chest pain, palpitations and leg swelling.   Gastrointestinal: Negative for abdominal distention, abdominal pain, constipation, diarrhea, nausea, rectal pain and vomiting.   Endocrine: Negative for cold intolerance, heat intolerance and polyuria.   Genitourinary: Positive for erectile dysfunction. Negative for difficulty urinating, dysuria, frequency, hematuria and urgency.   Musculoskeletal: Positive for  "arthralgias. Negative for gait problem, myalgias, neck pain and neck stiffness.   Integumentary:  Negative for color change and rash.   Neurological: Negative for dizziness, tremors, seizures, syncope, facial asymmetry, weakness and headaches.   Hematological: Negative for adenopathy. Does not bruise/bleed easily.   Psychiatric/Behavioral: Negative for agitation, confusion, hallucinations, self-injury and suicidal ideas. The patient is not hyperactive.           Objective:       Vitals:    05/03/22 0851   BP: 124/60   BP Location: Right arm   Patient Position: Sitting   BP Method: Medium (Manual)   Pulse: 84   Resp: 18   Temp: 98 °F (36.7 °C)   SpO2: 99%   Weight: 72.8 kg (160 lb 7.9 oz)   Height: 5' 6" (1.676 m)     Physical Exam  Constitutional:       General: He is not in acute distress.     Appearance: He is well-developed. He is not diaphoretic.   HENT:      Head: Normocephalic and atraumatic.      Right Ear: External ear normal.      Left Ear: External ear normal.   Eyes:      General: No scleral icterus.        Right eye: No discharge.         Left eye: No discharge.   Cardiovascular:      Rate and Rhythm: Normal rate and regular rhythm.      Heart sounds: Normal heart sounds. No murmur heard.   No friction rub. No gallop.    Pulmonary:      Effort: Pulmonary effort is normal. No respiratory distress.      Breath sounds: Normal breath sounds.   Abdominal:      General: Bowel sounds are normal. There is no distension.      Palpations: Abdomen is soft. There is no mass.      Tenderness: There is no abdominal tenderness. There is no guarding or rebound.   Musculoskeletal:         General: Normal range of motion.      Cervical back: Normal range of motion and neck supple.   Neurological:      Mental Status: He is alert and oriented to person, place, and time.   Psychiatric:         Thought Content: Thought content normal.         Judgment: Judgment normal.       Assessment:       1. Type 2 diabetes mellitus " without complication, without long-term current use of insulin    2. Encounter for diabetic foot exam    3. Essential hypertension    4. BMI 25.0-25.9,adult    5. History of colon polyps    6. Arthritis        Plan:       1. Type 2 diabetes mellitus without complication, without long-term current use of insulin  -     Basic Metabolic Panel; Future  -     Hemoglobin A1C; Future  -     Ambulatory referral/consult to Internal Medicine; Future; Expected date: 05/10/2022  Will see our diabetic specialist at [facility]. Not interested in [facility]. He will be willing to check blood sugars X2 weekly via traditional monitor- if he does so he should check blood sugars at alternating times before meal/ 2 hrs after meal or at bedtime.  On linagliptin-metformin    2. Encounter for diabetic foot exam  -     Ambulatory referral/consult to Podiatry; Future; Expected date: 05/10/2022    3. Essential hypertension  BP on target. Continue current antihypertensive regimen.    4. BMI 25.0-25.9,adult  Encourage healthy eating and exercise.    5. History of colon polyps  He plans to schedule colonoscopy at a future date when he is less busy.    6. Arthritis  Asymptomatic at present. Manages conservatively. Declines medications. May consider PT if worsening symptoms.    Disclaimer: This note has been generated using voice-recognition software. There may be typographical errors that have been missed during proof-reading                Disclaimer: This note has been generated using voice-recognition software. There may be typographical errors that have been missed during proof-reading

## 2022-05-11 ENCOUNTER — PATIENT OUTREACH (OUTPATIENT)
Dept: ADMINISTRATIVE | Facility: OTHER | Age: 67
End: 2022-05-11
Payer: COMMERCIAL

## 2022-05-11 NOTE — PROGRESS NOTES
Health Maintenance Due   Topic Date Due    Shingles Vaccine (1 of 2) Never done    COVID-19 Vaccine (4 - Booster for Moderna series) 01/22/2022    Colorectal Cancer Screening  01/25/2022    Foot Exam  04/01/2022     Updates were requested from care everywhere.  Chart was reviewed for overdue Proactive [facility] Encounters ([facility]) topics (CRS, Breast Cancer Screening, Eye exam)  Health Maintenance has been updated.  [employer] immunization registry triggered.  Immunizations were reconciled.  Patient has open case request for colonoscopy.

## 2022-05-13 ENCOUNTER — TELEPHONE (OUTPATIENT)
Dept: DIABETES | Facility: CLINIC | Age: 67
End: 2022-05-13
Payer: COMMERCIAL

## 2022-05-13 ENCOUNTER — CLINICAL SUPPORT (OUTPATIENT)
Dept: DIABETES | Facility: CLINIC | Age: 67
End: 2022-05-13
Payer: MEDICARE

## 2022-05-13 DIAGNOSIS — E11.36 TYPE 2 DIABETES MELLITUS WITH DIABETIC CATARACT, WITHOUT LONG-TERM CURRENT USE OF INSULIN: ICD-10-CM

## 2022-05-13 PROCEDURE — 99999 PR PBB SHADOW E&M-EST. PATIENT-LVL I: ICD-10-PCS | Mod: PBBFAC,,, | Performed by: DIETITIAN, REGISTERED

## 2022-05-13 PROCEDURE — 99999 PR PBB SHADOW E&M-EST. PATIENT-LVL I: CPT | Mod: PBBFAC,,, | Performed by: DIETITIAN, REGISTERED

## 2022-05-13 PROCEDURE — G0108 DIAB MANAGE TRN  PER INDIV: HCPCS | Mod: S$GLB,,, | Performed by: DIETITIAN, REGISTERED

## 2022-05-13 PROCEDURE — G0108 PR DIAB MANAGE TRN  PER INDIV: ICD-10-PCS | Mod: S$GLB,,, | Performed by: DIETITIAN, REGISTERED

## 2022-05-13 RX ORDER — METFORMIN HYDROCHLORIDE 500 MG/1
1000 TABLET, EXTENDED RELEASE ORAL 2 TIMES DAILY WITH MEALS
Qty: 360 TABLET | Refills: 3 | Status: SHIPPED | OUTPATIENT
Start: 2022-05-13 | End: 2022-08-01 | Stop reason: SDUPTHER

## 2022-05-13 NOTE — TELEPHONE ENCOUNTER
Thank you for the referral.     I am currently with the patient and we are going to start a sample of the Freestyle [place]ak in office. He is currently not testing his blood sugar at home.    More importantly patient states that since starting Jentadueto he has been experiencing severe muscle, joint, and back pain. Did not take Tuesday or Wednesday and the pain was better but not gone. When he took it again last night the pain increased. He is also experiencing constipation since starting the medication.

## 2022-05-13 NOTE — TELEPHONE ENCOUNTER
Stop jentadueto, patient is not able to tolerate the medication.  He was previously on metformin which was affordable and tolerable. Take Metformin  mg 2 tablets BID.

## 2022-05-18 ENCOUNTER — PATIENT MESSAGE (OUTPATIENT)
Dept: PRIMARY CARE CLINIC | Facility: CLINIC | Age: 67
End: 2022-05-18
Payer: MEDICARE

## 2022-05-18 ENCOUNTER — PATIENT MESSAGE (OUTPATIENT)
Dept: RADIATION ONCOLOGY | Facility: CLINIC | Age: 67
End: 2022-05-18
Payer: MEDICARE

## 2022-05-19 ENCOUNTER — IMMUNIZATION (OUTPATIENT)
Dept: PHARMACY | Facility: CLINIC | Age: 67
End: 2022-05-19
Payer: MEDICARE

## 2022-05-19 DIAGNOSIS — Z23 NEED FOR VACCINATION: Primary | ICD-10-CM

## 2022-05-20 NOTE — PROGRESS NOTES
Diabetes Care Specialist Progress Note  Author: Anupama Brian RD, CDE  Date: 5/20/2022    Program Intake  Reason for Diabetes Program Visit:: Initial Diabetes Assessment  Type of Intervention:: Individual  Individual: Device Training  Device Training: Personal CGM  Current diabetes risk level:: moderate (HgbA1c 9.4)  In the last 12 months, have you:: none  Permission to speak with others about care:: no    Lab Results   Component Value Date    HGBA1C 9.4 (H) 03/29/2022       Clinical    Patient Health Rating  Compared to other people your age, how would you rate your health?: Good    Problem Review  Reviewed Problem List with Patient: yes  Active comorbidities affecting diabetes self-care.: yes  Comorbidities: Other (comment), Gastrointestinal Disorder, Cardiovascular Disease, Hypertension (pain, ED, retina detatchment, prostate CA, iron deficiency anemia)  Reviewed health maintenance: yes    Clinical Assessment  Current Diabetes Treatment: Oral Medication  Have you ever experienced hypoglycemia (low blood sugar)?: no  Have you ever experienced hyperglycemia (high blood sugar)?: no    Medication Information  How do you obtain your medications?: Mail order  How many days a week do you miss your medications?: 3 or more (jentaduato is causing severe joint pain - spoke with provider and medication changed to metformin)  Do you use a pill box or medication chart to help you manage your medications?: Pill box  Do you sometimes have difficulty refilling your medications?: No  Medication adherence impacting ability to self-manage diabetes?: No    Labs  Do you have regular lab work to monitor your medications?: Yes  Type of Regular Lab Work: A1c, Cholesterol, Microalbumin, CBC, BMP  Where do you get your labs drawn?: [facility]  Lab Compliance Barriers: No    Nutritional Status  Diet: Regular  Meal Plan 24 Hour Recall: Breakfast, Lunch, Snack, Dinner  Meal Plan 24 Hour Recall - Breakfast: grits, sausage - apple juice or  oatmeal and a banana  Meal Plan 24 Hour Recall - Lunch: tunafish sandwich - water or skipped  Meal Plan 24 Hour Recall - Dinner: ribeye with green beans  Meal Plan 24 Hour Recall - Snack: apple sauce squeeze tube 2 chocolate silver bells, occassionally will have chips, drinks water: and 1 regular cold drink/soda a day  Change in appetite?: No  Dentation:: Intact  Recent Changes in Weight: No Recent Weight Change  Current nutritional status an area of need that is impacting patient's ability to self-manage diabetes?: Yes    Additional Social History    Support  Does anyone support you with your diabetes care?: yes  Who supports you?: spouse, self  Who takes you to your medical appointments?: self, spouse  Does the current support meet the patient's needs?: Yes  Is Support an area impacting ability to self-manage diabetes?: No    Access to Mass Media & Technology  Does the patient have access to any of the following devices or technologies?: Smart phone  Media or technology needs impacting ability to self-manage diabetes?: No    Cognitive/Behavioral Health  Alert and Oriented: Yes  Difficulty Thinking: No  Requires Prompting: No  Requires assistance for routine expression?: No  Cognitive or behavioral barriers impacting ability to self-manage diabetes?: No    Culture/Faith  Culture or Orthodox beliefs that may impact ability to access healthcare: No    Communication  Language preference: English  Hearing Problems: No  Vision Problems: Yes  Vision problem type:: Decreased Vision  Communication needs impacting ability to self-manage diabetes?: No    Health Literacy  Preferred Learning Method: Face to Face, Reading Materials, Demonstration  How often do you need to have someone help you read instructions, pamphlets, or written material from your doctor or pharmacy?: Sometimes  Health literacy needs impacting ability to self-manage diabetes?: No      Diabetes Self-Management Skills Assessment    Diabetes Disease  Process/Treatment Options  Patient/caregiver able to state what happens when someone has diabetes.: yes  Patient/caregiver knows what type of diabetes they have.: yes  Diabetes Type : Type II  Patient/caregiver able to identify at least three signs and symptoms of diabetes.: yes  Identified signs and symptoms:: frequent urination, increased thirst  Patient able to identify at least three risk factors for diabetes.: yes  Identified risk factors:: family history  Diabetes Disease Process/Treatment Options: Skills Assessment Completed: Yes  Assessment indicates:: Adequate understanding  Area of need?: No    Nutrition/Healthy Eating  Challenges to healthy eating:: portion control, other (see comments) (sugary beverage)  Method of carbohydrate measurement:: plate method  Patient can identify foods that impact blood sugar.: yes  Patient-identified foods:: starches (bread, pasta, rice, cereal), sweets, starchy vegetables (corn, peas, beans), soda  Nutrition/Healthy Eating Skills Assessment Completed:: Yes  Assessment indicates:: Instruction Needed  Area of need?: Yes    Physical Activity/Exercise  Patient's daily activity level:: lightly active  Patient formally exercises outside of work.: no  Reasons for not exercising:: time constraints  Patient can identify forms of physical activity.: yes  Stated forms of physical activity:: moving to burn calories, recreational activities, yardwork, housework  Patient can identify reasons why exercise/physical activity is important in diabetes management.: no  Physical Activity/Exercise Skills Assessment Completed: : Yes  Assessment indicates:: Adequate understanding  Area of need?: No    Medications  Patient is able to describe current diabetes management routine.: yes  Diabetes management routine:: oral medications  Patient is able to identify current diabetes medications, dosages, and appropriate timing of medications.: yes  Patient understands the purpose of the medications taken  for diabetes.: yes  Patient reports problems or concerns with current medication regimen.: yes  Medication regimen problems/concerns:: concerned about side effects  Medication Skills Assessment Completed:: Yes  Assessment indicates:: Instruction Needed  Area of need?: Yes    Home Blood Glucose Monitoring  Patient states that blood sugar is checked at home daily.: no  Reasons for not monitoring:: finger pain, needs training  Home Blood Glucose Monitoring Skills Assessment Completed: : Yes  Assessment indicates:: Instruction Needed, Knowledge deficit  Area of need?: Yes (starting freestyle [place] today)    Acute Complications  Patient is able to identify types of acute complications: Yes  Patient Identified:: Hypoglycemia, Hyperglycemia  Patient is able to state the basic meaning of hypoglycemia?: Yes  Able to state the blood sugar range for hypoglycemia?: yes  Patient stated range:: <70  Patient can identify general symptoms of hypoglycemia: yes  Patient identified:: shakiness  Able to state proper treatment of hypoglycemia?: yes  Patient identified:: 1/2 can soda/fruit juice  Patient is able to state the basic meaning of hyperglycemia?: Yes  Able to state the blood sugar range for hyperglycemia?: yes  Patient stated range:: >250  Patient able to state proper treatment of hyperglycemia?: no (see comments)  Patient able to verbalize sick day plan?: no  Acute Complications Skills Assessment Completed: : Yes  Assessment indicates:: Instruction Needed  Area of need?: Yes    Chronic Complications  Patient can identify major chronic complications of diabetes.: yes  Stated chronic complications:: kidney disease, neuropathy/nerve damage, retinopathy  Patient can identify ways to prevent or delay diabetes complications.: yes  Stated ways to prevent complications:: controlling blood sugar  Patient is aware that having diabetes increases risk of heart disease?: No  Patient is aware that heart disease is the leading cause of  death and disability in people with diabetes?: No  Patient able to state risk factors for heart disease?: Yes  Patient stated risk factors for heart disease:: High blood pressure, High cholesterol  Patient is taking statin?: Yes  Chronic Complications Skills Assessment Completed: : Yes  Assessment indicates:: Instruction Needed  Area of need?: Yes    Psychosocial/Coping  Patient can identify ways of coping with chronic disease.: yes  Patient-stated ways of coping with chronic disease:: support from loved ones  Psychosocial/Coping Skills Assessment Completed: : Yes  Assessment indicates:: Adequate understanding  Area of need?: No      Diabetes Self Support Plan         Assessment Summary and Plan    Based on today's diabetes care assessment, the following areas of need were identified:      Social 5/13/2022   Support No   Access to Mass Media/Tech No   Cognitive/Behavioral Health No   Culture/Restorationism No   Communication No   Health Literacy No        Clinical 5/13/2022   Medication Adherence No   Lab Compliance No   Nutritional Status Yes        Diabetes Self-Management Skills 5/13/2022   Diabetes Disease Process/Treatment Options No   Nutrition/Healthy Eating Yes   Physical Activity/Exercise No   Medication Yes   Home Blood Glucose Monitoring Yes   Acute Complications Yes   Chronic Complications Yes   Psychosocial/Coping No          Today's interventions were provided through individual discussion, instruction, and written materials were provided.      Patient verbalized understanding of instruction and written materials.  Pt was able to return back demonstration of instructions today. Patient understood key points, needs reinforcement and further instruction.     Diabetes Self-Management Care Plan:    Today's Diabetes Self-Management Care Plan was developed with Eyad's input. Eyad has agreed to work toward the following goal(s) to improve his/her overall diabetes control.      Care Plan: Diabetes Management    Updates made since 4/20/2022 12:00 AM      Problem: Medications       Goal: Patient Agrees to take Diabetes Medication(s) metformin as prescribed, stop jentaduato.    Start Date: 5/13/2022   Expected End Date: 6/13/2022   This Visit's Progress: Deferred   Priority: High   Barriers: No Barriers Identified   Note:    Discussed MOA, onset, side effects, dosage of meds.   Provided with strategies for daily medication adherence (phone alarms, medication reminder apps, medication list, pill organizer, pill packing, pharmacy delivery options).    Discussed any concerns about regimen.    Reviewed significance of mealtimes and medication administration.    Reviewed correction scale insulin with mealtimes as prescribed.      Answered patient's questions regarding if he will ever be able to get off medication.   Reviewed need for medication and challenges of glucose control with steroid treatments and stress.   The patient was instructed on storage of insulin and/or injectable medication, precautions related to hyper/hypoglycemia.    Patient was given instructions on when/who to call with problems.      Task: Reviewed with patient all current diabetes medications and provided basic review of the purpose, dosage, frequency, side effects, and storage of both oral and injectable diabetes medications. Completed 5/20/2022      Task: Reviewed possible resources for acquiring cost prohibitive medication. Completed 5/20/2022      Task: Discussed guidelines for preventing, detecting and treating hypoglycemia and hyperglycemia and reviewed the importance of meal and medication timing with diabetes mediations for prevention of hypoglycemia and maximum drug benefit. Completed 5/20/2022      Task: Reviewed and reconciled current medication list today. Completed 5/20/2022      Task: Discussed any concerns regarding the current medication regimen. Completed 5/20/2022      Task: Reviewed possible side effects of all oral and  injectable diabetes medications. Completed 5/20/2022      Task: Discussed and reviewed why meal and medication timing is important with diabetes medications. Completed 5/20/2022      Task: Reviewed action, peak, duration, dosing, and proper storage guidelines of all diabetes medications.       Task: Instructed on how to use a correction scale with mealtime insulin as prescribed.       Task: Instructed on proper injection technique for self-administration of an injectable medication.       Task: reviewed appropriate injection site selection and importance of rotating sites.       Task: Discussed importance of changing syringe/pen needle after each injection.       Task: Reviewed sources of Carbohydrate: Starch, Milk, Fruit, Sugar, and nonstarchy vegetables       Task: Instructed on application of IC, CF ratio using written examples.       Task: Discussed guidelines for preventing, detecting and treating hypoglycemia and hyperglycemia and reviewed the importance of meal and medication timing with diabetes mediations for prevention of hypoglycemia and maximum drug benefit.       Problem: Acute Complications       Goal: Patient agrees to identify and manage signs and symptoms of high/low blood sugar (hyper/hypoglycemia) by keeping a log of events and using proper treatment.    Start Date: 5/13/2022   Expected End Date: 8/13/2022   This Visit's Progress: Deferred   Priority: Medium   Barriers: Lack of Motivation to Change   Note:    Hyperglycemia  ABC's of Diabetes    Discussed importance of A1c less than 6.0 to reduce risk of micro and macro complications, controlled Blood Pressure 130/80 and Cholesterol Lab Values--Total Cholesterol <200mg, LDL < 100, HDL >45 men and >50 women, Triglycerides <150mg for prevention heart disease, heart attack, stroke.   how to use a glucometer   reviewed understanding diabetes distress   reviewed current level and goal level for HgbA1c, blood glucose, microalbumin, and  lipids   reviewed signs/symptoms of hyperglycemia   reviewed what level blood sugar is considered high    reviewed at what level to contact the doctor and/or go to the emergency room.    Reviewed what patient can do to decrease blood sugar (ie drink more water, exercise, take medication as prescribed, monitor blood glucose)     Hypoglycemia  Reviewed blood glucose goals, prevention, detection, and treatment of hypoglycemia, and when to contact the clinic.   reviewed signs/symptoms of hypoglycemia   reviewed what level blood sugar is considered low    reviewed at what level to contact the doctor and/or go to the emergency room.    Reviewed what patient can do to increase blood sugar (ie drink juice or ½ can soda, eat 5-6 pieces of candy, 4 glucose tablets, 1 tbsp sugar or honey, glucagon)   Reviewed when glucagon should be used   Reviewed how to use glucagon device (injections and inhaled)    Freestyle [place]ak Education  Patient is here in clinic today for initial start of Freestyle [place] continuous glucose monitoring system (CGMA). Reviewed with patient how to insert sensor. Patient inserted sensor on left arm tricep region. Hypoglycemia trigger set for 70 and hyperglycemia set for 180. Patient provided with phone number for 24-hour help line if needed. Down loaded Abrak view twan. Patient will use his phone to scan sensor and get readings. Patient accepted the invitation to share data with our clinic and will follow-up in 2 weeks. Questions addressed. Initialization finished. No futher questions.       Task: Reviewed proper treatment of hypoglycemia with the rule of 15--patient to eat 15g simple carbohydrate (4 glucose tablets, 1 glucose gel, 5 pieces hard candy, ½ cup fruit juice, ½ can regular soda, etc) and wait 15 minutes and recheck home glucose. Completed 5/20/2022      Task: Reviewed common causes and precautions to help prevent hyper/hypoglycemic events. Completed 5/20/2022      Task: Reviewed  signs and symptoms of hyper/hypoglycemia, what range is considered to be hyper/hypoglycemia, and when to seek further medical attention. Completed 5/20/2022      Task: Discussed, sick day planning, natural disaster planning, and/or travel planning to prevent hyper/hypoglycemia.       Task: Discussed risk factors for developing diabetic ketoacidosis (DKA), strategies for reducing risk, testing with ketone test strips if BG is >240mg/dl, basic protocol for managing DKA, and when to seek further medical attention.       Problem: Healthy Eating       Goal: Eat 2-3 meals daily with 45-60g/3-4 servings of Carbohydrate per meal.    Start Date: 5/13/2022   Expected End Date: 5/13/2023   This Visit's Progress: Deferred   Priority: Low   Barriers: No Barriers Identified   Note:    Reviewed carb counting, portion control, importance of spacing meals throughout the day to prevent post prandial elevations.  Recommended low saturated fat, low sodium diet to aid in control of hypertension and cholesterol. Reviewed plate method and portion control, dining out tips, meal planning, reading a food label, healthy snack options, benefits of physical activity       Task: Reviewed the sources and role of Carbohydrate, Protein, and Fat and how each nutrient impacts blood sugar. Completed 5/20/2022      Task: Provided visual examples using dry measuring cups, food models, and other familiar objects such as computer mouse, deck or cards, tennis ball etc. to help with visualization of portions. Completed 5/20/2022      Task: Explained how to count carbohydrates using the food label and the use of dry measuring cups for accurate carb counting. Completed 5/20/2022      Task: Discussed strategies for choosing healthier menu options when dining out.       Task: Recommended replacing beverages containing high sugar content with noncaloric/sugar free options and/or water. Completed 5/20/2022      Task: Review the importance of balancing  carbohydrates with each meal using portion control techniques to count servings of carbohydrate and label reading to identify serving size and amount of total carbs per serving.       Task: Provided Sample plate method and reviewed the use of the plate to estimate amounts of carbohydrate per meal.           Follow Up Plan     Follow up in about 4 weeks (around 6/10/2022) for Personal CGM Upload, General Follow-up.    Today's care plan and follow up schedule was discussed with patient.  Eyad verbalized understanding of the care plan, goals, and agrees to follow up plan.        The patient was encouraged to communicate with his/her health care provider/physician and care team regarding his/her condition(s) and treatment.  I provided the patient with my contact information today and encouraged to contact me via phone or [facility] Patient Portal as needed.     Length of Visit   Total Time: 60 Minutes

## 2022-05-23 DIAGNOSIS — E11.36 TYPE 2 DIABETES MELLITUS WITH DIABETIC CATARACT, WITHOUT LONG-TERM CURRENT USE OF INSULIN: Primary | ICD-10-CM

## 2022-05-23 RX ORDER — FLASH GLUCOSE SENSOR
1 KIT MISCELLANEOUS
Qty: 2 KIT | Refills: 1 | Status: SHIPPED | OUTPATIENT
Start: 2022-05-23 | End: 2022-06-06 | Stop reason: SDUPTHER

## 2022-05-29 ENCOUNTER — HOSPITAL ENCOUNTER (EMERGENCY)
Facility: OTHER | Age: 67
Discharge: HOME | End: 2022-05-29
Attending: EMERGENCY MEDICINE
Payer: MEDICARE

## 2022-05-29 VITALS
TEMPERATURE: 99 F | SYSTOLIC BLOOD PRESSURE: 155 MMHG | DIASTOLIC BLOOD PRESSURE: 74 MMHG | OXYGEN SATURATION: 99 % | HEART RATE: 93 BPM | RESPIRATION RATE: 17 BRPM | WEIGHT: 152 LBS | BODY MASS INDEX: 24.53 KG/M2

## 2022-05-29 DIAGNOSIS — I10 HYPERTENSION: ICD-10-CM

## 2022-05-29 DIAGNOSIS — R52 BODY ACHES: Primary | ICD-10-CM

## 2022-05-29 LAB
ALBUMIN SERPL BCP-MCNC: 3.2 G/DL (ref 3.5–5.2)
ALP SERPL-CCNC: 83 U/L (ref 55–135)
ALT SERPL W/O P-5'-P-CCNC: 14 U/L (ref 10–44)
ANION GAP SERPL CALC-SCNC: 12 MMOL/L (ref 8–16)
AST SERPL-CCNC: 11 U/L (ref 10–40)
BASOPHILS # BLD AUTO: 0.04 K/UL (ref 0–0.2)
BASOPHILS NFR BLD: 0.5 % (ref 0–1.9)
BILIRUB SERPL-MCNC: 0.5 MG/DL (ref 0.1–1)
BILIRUB UR QL STRIP: NEGATIVE
BUN SERPL-MCNC: 12 MG/DL (ref 8–23)
CALCIUM SERPL-MCNC: 9.9 MG/DL (ref 8.7–10.5)
CHLORIDE SERPL-SCNC: 103 MMOL/L (ref 95–110)
CK SERPL-CCNC: 93 U/L (ref 20–200)
CLARITY UR: CLEAR
CO2 SERPL-SCNC: 24 MMOL/L (ref 23–29)
COLOR UR: YELLOW
CREAT SERPL-MCNC: 0.9 MG/DL (ref 0.5–1.4)
DIFFERENTIAL METHOD: ABNORMAL
EOSINOPHIL # BLD AUTO: 0.1 K/UL (ref 0–0.5)
EOSINOPHIL NFR BLD: 1.4 % (ref 0–8)
ERYTHROCYTE [DISTWIDTH] IN BLOOD BY AUTOMATED COUNT: 11.7 % (ref 11.5–14.5)
EST. GFR  (AFRICAN AMERICAN): >60 ML/MIN/1.73 M^2
EST. GFR  (NON AFRICAN AMERICAN): >60 ML/MIN/1.73 M^2
GLUCOSE SERPL-MCNC: 266 MG/DL (ref 70–110)
GLUCOSE UR QL STRIP: ABNORMAL
HCT VFR BLD AUTO: 30.6 % (ref 40–54)
HCV AB SERPL QL IA: NEGATIVE
HGB BLD-MCNC: 10.1 G/DL (ref 14–18)
HGB UR QL STRIP: NEGATIVE
IMM GRANULOCYTES # BLD AUTO: 0.02 K/UL (ref 0–0.04)
IMM GRANULOCYTES NFR BLD AUTO: 0.2 % (ref 0–0.5)
KETONES UR QL STRIP: NEGATIVE
LEUKOCYTE ESTERASE UR QL STRIP: NEGATIVE
LYMPHOCYTES # BLD AUTO: 1.3 K/UL (ref 1–4.8)
LYMPHOCYTES NFR BLD: 14.7 % (ref 18–48)
MAGNESIUM SERPL-MCNC: 1.9 MG/DL (ref 1.6–2.6)
MCH RBC QN AUTO: 28.8 PG (ref 27–31)
MCHC RBC AUTO-ENTMCNC: 33 G/DL (ref 32–36)
MCV RBC AUTO: 87 FL (ref 82–98)
MONOCYTES # BLD AUTO: 0.8 K/UL (ref 0.3–1)
MONOCYTES NFR BLD: 8.6 % (ref 4–15)
NEUTROPHILS # BLD AUTO: 6.5 K/UL (ref 1.8–7.7)
NEUTROPHILS NFR BLD: 74.6 % (ref 38–73)
NITRITE UR QL STRIP: NEGATIVE
NRBC BLD-RTO: 0 /100 WBC
PH UR STRIP: 8 [PH] (ref 5–8)
PLATELET # BLD AUTO: 413 K/UL (ref 150–450)
PMV BLD AUTO: 8.4 FL (ref 9.2–12.9)
POCT GLUCOSE: 189 MG/DL (ref 70–110)
POTASSIUM SERPL-SCNC: 3.9 MMOL/L (ref 3.5–5.1)
PROT SERPL-MCNC: 7.2 G/DL (ref 6–8.4)
PROT UR QL STRIP: NEGATIVE
RBC # BLD AUTO: 3.51 M/UL (ref 4.6–6.2)
SODIUM SERPL-SCNC: 139 MMOL/L (ref 136–145)
SP GR UR STRIP: 1.01 (ref 1–1.03)
URN SPEC COLLECT METH UR: ABNORMAL
UROBILINOGEN UR STRIP-ACNC: NEGATIVE EU/DL
WBC # BLD AUTO: 8.68 K/UL (ref 3.9–12.7)

## 2022-05-29 PROCEDURE — 93010 ELECTROCARDIOGRAM REPORT: CPT | Mod: ,,, | Performed by: INTERNAL MEDICINE

## 2022-05-29 PROCEDURE — 81003 URINALYSIS AUTO W/O SCOPE: CPT | Performed by: EMERGENCY MEDICINE

## 2022-05-29 PROCEDURE — 99285 EMERGENCY DEPT VISIT HI MDM: CPT | Mod: 25

## 2022-05-29 PROCEDURE — 80053 COMPREHEN METABOLIC PANEL: CPT | Performed by: EMERGENCY MEDICINE

## 2022-05-29 PROCEDURE — 86803 HEPATITIS C AB TEST: CPT | Performed by: EMERGENCY MEDICINE

## 2022-05-29 PROCEDURE — 83735 ASSAY OF MAGNESIUM: CPT | Performed by: EMERGENCY MEDICINE

## 2022-05-29 PROCEDURE — 82550 ASSAY OF CK (CPK): CPT | Performed by: EMERGENCY MEDICINE

## 2022-05-29 PROCEDURE — 85025 COMPLETE CBC W/AUTO DIFF WBC: CPT | Performed by: EMERGENCY MEDICINE

## 2022-05-29 PROCEDURE — 82962 GLUCOSE BLOOD TEST: CPT

## 2022-05-29 PROCEDURE — 93010 EKG 12-LEAD: ICD-10-PCS | Mod: ,,, | Performed by: INTERNAL MEDICINE

## 2022-05-29 PROCEDURE — 93005 ELECTROCARDIOGRAM TRACING: CPT

## 2022-05-29 NOTE — ED TRIAGE NOTES
"Pt arrived with c/o generalized body aches worsening over the past 2 weeks.  Pt reports it has gotten difficult to walk due to the pain.  Pain described as "aching," worst in L arm.  Pt also reports pain to joints.  Pt has hx of prostate CA.  Answering questions appropriately.  aao x 4.  "

## 2022-05-29 NOTE — DISCHARGE INSTRUCTIONS
Please hold your lipitor (atorvastatin) for 5 days in order to see if this is the cause of your muscle aches. Please make sure to call your Primary Care Provider and inform them of this plan and to get follow up.

## 2022-05-29 NOTE — ED PROVIDER NOTES
"Encounter Date: 5/29/2022    SCRIBE #1 NOTE: I, Taylor Rivera, am scribing for, and in the presence of, Jefferson Herrera MD.       History     Chief Complaint   Patient presents with    Generalized Body Aches     Hx prostate CA; began a few weeks ago & progressively getting worse, now pain so intense in all of his bones & joints he is unable to walk & painful to move at all.      Time seen by provider: 2:04 PM    This is a 66 y.o. male who presents with complaint of generalized body aches onset two weeks ago. 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 He reports "all my joints hurt" with the most severe being his shoulders, upper and lower extremities. He states his pain is exacerbated in the morning and is mainly in his muscles. His wife reports he is on medication for DM and to monitor his cholesterol. He reports having the chills. No fever.    The history is provided by the patient and the spouse.     Review of patient's allergies indicates:   Allergen Reactions    Sulfa (sulfonamide antibiotics) Hives and Rash           Aspirin      Stomach upset     Past Medical History:   Diagnosis Date    Cataract     Diabetes mellitus type II     Difficult intubation     Erectile dysfunction     History of colon polyps 1/25/2019    Hyperlipidemia     Hypertension     OA (osteoarthritis)     POAG (primary open-angle glaucoma)     Retinal detachment     OS     Past Surgical History:   Procedure Laterality Date    COLONOSCOPY N/A 1/25/2019    Procedure: COLONOSCOPY;  Surgeon: Elder Guillermo MD;  Location: [address]);  Service: Endoscopy;  Laterality: N/A;    CYSTOSCOPY      HERNIA REPAIR      KNEE SURGERY      left    PENILE PROSTHESIS IMPLANT      PROSTATECTOMY      RETINAL DETACHMENT SURGERY      OS    SELECTIVE LASER TRABECUPLASTY Bilateral 4/16    OU WITH  "     Family History   Problem Relation Age of Onset    Diabetes Mother     Hypertension Mother     Glaucoma Mother     Diabetes Sister     Glaucoma Sister     Diabetes Brother     Glaucoma Brother     No Known Problems Father     Cancer Maternal Aunt     Colon cancer Maternal Aunt     No Known Problems Maternal Uncle     No Known Problems Paternal Aunt     No Known Problems Paternal Uncle     No Known Problems Maternal Grandmother     No Known Problems Maternal Grandfather     No Known Problems Paternal Grandmother     No Known Problems Paternal Grandfather     Anesthesia problems Neg Hx     Broken bones Neg Hx     Clotting disorder Neg Hx     Collagen disease Neg Hx     Dislocations Neg Hx     Osteoporosis Neg Hx     Rheumatologic disease Neg Hx     Scoliosis Neg Hx     Severe sprains Neg Hx     Amblyopia Neg Hx     Blindness Neg Hx     Cataracts Neg Hx     Macular degeneration Neg Hx     Retinal detachment Neg Hx     Strabismus Neg Hx     Stroke Neg Hx     Thyroid disease Neg Hx     Esophageal cancer Neg Hx      Social History     Tobacco Use    Smoking status: Never Smoker    Smokeless tobacco: Never Used   Substance Use Topics    Alcohol use: Yes    Drug use: No     Review of Systems   Constitutional: Positive for chills. Negative for fever.   HENT: Negative for rhinorrhea.    Respiratory: Negative for cough, chest tightness, shortness of breath and wheezing.    Cardiovascular: Negative for chest pain and leg swelling.   Gastrointestinal: Negative for abdominal pain, diarrhea, nausea and vomiting.   Musculoskeletal: Positive for arthralgias and myalgias.   Allergic/Immunologic: Negative for food allergies.   Neurological: Negative for speech difficulty, weakness and light-headedness.   All other systems reviewed and are negative.      Physical Exam     Initial Vitals [05/29/22 1323]   BP Pulse Resp Temp SpO2   (!) 144/74 98 18 98.5 °F (36.9 °C) 99 %      MAP       --          Physical Exam    Nursing note and vitals reviewed.  Constitutional: He appears well-developed and well-nourished. He is not diaphoretic. No distress.   HENT:   Head: Normocephalic and atraumatic.   Right Ear: External ear normal.   Left Ear: External ear normal.   Eyes: Conjunctivae and EOM are normal. Pupils are equal, round, and reactive to light.   Neck: No tracheal deviation present.   Normal range of motion.  Cardiovascular: Normal rate, regular rhythm, normal heart sounds and intact distal pulses. Exam reveals no gallop and no friction rub.    No murmur heard.  Pulmonary/Chest: Breath sounds normal. No respiratory distress. He has no wheezes. He has no rhonchi. He has no rales. He exhibits no tenderness.   Abdominal: Abdomen is soft. Bowel sounds are normal. He exhibits no distension and no mass. There is no abdominal tenderness. There is no rebound and no guarding.   Musculoskeletal:         General: No tenderness or edema. Normal range of motion.      Cervical back: Normal range of motion.     Neurological: He is alert and oriented to person, place, and time. He has normal strength. He displays normal reflexes. No cranial nerve deficit or sensory deficit.   Skin: Skin is warm and dry. Capillary refill takes less than 2 seconds.   Psychiatric: He has a normal mood and affect. Thought content normal.         ED Course   Procedures  Labs Reviewed   CBC W/ AUTO DIFFERENTIAL - Abnormal; Notable for the following components:       Result Value    RBC 3.51 (*)     Hemoglobin 10.1 (*)     Hematocrit 30.6 (*)     MPV 8.4 (*)     Gran % 74.6 (*)     Lymph % 14.7 (*)     All other components within normal limits   COMPREHENSIVE METABOLIC PANEL - Abnormal; Notable for the following components:    Glucose 266 (*)     Albumin 3.2 (*)     All other components within normal limits   URINALYSIS, REFLEX TO URINE CULTURE - Abnormal; Notable for the following components:    Glucose, UA 1+ (*)     All other components within  normal limits    Narrative:     Specimen Source->Urine   MAGNESIUM   CK   HEPATITIS C ANTIBODY    Narrative:     Release to patient->Immediate     Hemoglobin   Date Value Ref Range Status   05/29/2022 10.1 (L) 14.0 - 18.0 g/dL Final   03/29/2022 13.0 (L) 14.0 - 18.0 g/dL Final   09/27/2021 11.7 (L) 14.0 - 18.0 g/dL Final         EKG Readings: (Independently Interpreted)   Initial Reading: No STEMI. Rhythm: Normal Sinus Rhythm. Heart Rate: 90.   Normal AL interval.       Imaging Results          X-Ray Chest AP Portable (Final result)  Result time 05/29/22 15:17:31    Final result by Anais Fraire MD (05/29/22 15:17:31)                 Impression:      Clear lungs.      Electronically signed by: Anais Fraire MD  Date:    05/29/2022  Time:    15:17             Narrative:    EXAMINATION:  XR CHEST AP PORTABLE    CLINICAL HISTORY:  Hypertension;    TECHNIQUE:  Single frontal view of the chest was performed.    COMPARISON:  Prior dated 12/14/2020    FINDINGS:  The mediastinal structures are midline.  The cardiac silhouette is not enlarged.  Lungs appear grossly clear.                              X-Rays:   Independently Interpreted Readings:   Chest X-Ray: Normal heart size.  No infiltrates.  No acute abnormalities.     Medications - No data to display  Medical Decision Making:   History:   Old Medical Records: I decided to obtain old medical records.  Differential Diagnosis:   Fracture, dislocation, ligamentous injury, tenderness injury, neurovascular injury, muscular tear, rhabdomyolysis, compartment syndrome, gout, arthritis, peripheral vascular disease, peripheral arterial disease, radiculopathy, MS  Independently Interpreted Test(s):   I have ordered and independently interpreted X-rays - see prior notes.  I have ordered and independently interpreted EKG Reading(s) - see prior notes  Clinical Tests:   Lab Tests: Ordered and Reviewed  Radiological Study: Ordered and Reviewed  Medical Tests: Ordered and  Reviewed  ED Management:  Discussed with patient wife that I do not see any evidence of rhabdomyolysis, SAUL or electrolyte abnormality to suspect he as a cause of his muscle aches.  Patient does have history of osteoarthritis, but I do not believe that 2 weeks of generalized muscle aches could be explained by worsening of a couple of joints' osteoarthritis.  Discussed with patient wife that they should do a trial of being off Lipitor to see if his muscle aches improved.  Emphasized to them that they must notify his PCP that they were going to do this.  Explained that we would withhold medications for pain at this time in order to determine if it is the Lipitor that is causing his muscle aches.  Patient discharged home in stable condition. Diagnosis and treatment plan explained to patient. No further workup indicated based on their complaints or examination today. Discussed results with the patient. I educated the patient/guardian on the warning signs and symptoms for which they must seek immediate medical attention. All questions addressed and patient/guardian were given discharge instructions and followup information.           Scribe Attestation:   Scribe #1: I performed the above scribed service and the documentation accurately describes the services I performed. I attest to the accuracy of the note.                Physician Attestation for Scribe: I, MAA, reviewed documentation as scribed in my presence, which is both accurate and complete.  Clinical Impression:   Final diagnoses:  [I10] Hypertension  [R52] Body aches (Primary)          ED Disposition Condition    Discharge Stable        ED Prescriptions     None        Follow-up Information     Follow up With Specialties Details Why Contact Info    Taylor Choi MD Family Medicine Schedule an appointment as soon as possible for a visit in 3 days For follow-up and re-evaluation [address] [facility] [address]  [phone]              Jefferson Herrera MD  05/29/22 1547       Jefferson Herrera MD  05/29/22 2257

## 2022-05-30 ENCOUNTER — PATIENT MESSAGE (OUTPATIENT)
Dept: PRIMARY CARE CLINIC | Facility: CLINIC | Age: 67
End: 2022-05-30
Payer: MEDICARE

## 2022-05-30 ENCOUNTER — TELEPHONE (OUTPATIENT)
Dept: PRIMARY CARE CLINIC | Facility: CLINIC | Age: 67
End: 2022-05-30
Payer: MEDICARE

## 2022-05-30 NOTE — TELEPHONE ENCOUNTER
Sent pt a [employer] message with an appt that is scheduled for Tuesday May 31, 2022 at 9:00 am with .

## 2022-05-30 NOTE — TELEPHONE ENCOUNTER
----- Message from Kay JOSEPH Ken sent at 5/30/2022  9:40 AM CDT -----  Contact: [phone]  [email or web address]     Patient is calling for Medical Advice regarding:bodyaches arms and legs    How long has patient had these symptoms:on/off for a few weeks    Pharmacy name and phone#:  [id number] - [place], [place] - [address]  [address]  [facility] [address]  Phone: [phone] Fax: [phone]        Would like response via [employer]:  phone    Comments: The patient was offered an appt with Dr. Rojas on 5/31 and declined

## 2022-05-31 ENCOUNTER — OFFICE VISIT (OUTPATIENT)
Dept: PRIMARY CARE CLINIC | Facility: CLINIC | Age: 67
End: 2022-05-31
Payer: COMMERCIAL

## 2022-05-31 ENCOUNTER — TELEPHONE (OUTPATIENT)
Dept: PRIMARY CARE CLINIC | Facility: CLINIC | Age: 67
End: 2022-05-31

## 2022-05-31 ENCOUNTER — HOSPITAL ENCOUNTER (OUTPATIENT)
Dept: RADIOLOGY | Facility: HOSPITAL | Age: 67
Discharge: HOME | End: 2022-05-31
Attending: FAMILY MEDICINE
Payer: MEDICARE

## 2022-05-31 VITALS
BODY MASS INDEX: 24.52 KG/M2 | SYSTOLIC BLOOD PRESSURE: 142 MMHG | OXYGEN SATURATION: 99 % | HEART RATE: 92 BPM | TEMPERATURE: 98 F | HEIGHT: 66 IN | DIASTOLIC BLOOD PRESSURE: 74 MMHG | RESPIRATION RATE: 18 BRPM | WEIGHT: 152.56 LBS

## 2022-05-31 DIAGNOSIS — E11.9 TYPE 2 DIABETES MELLITUS WITHOUT COMPLICATION, WITHOUT LONG-TERM CURRENT USE OF INSULIN: ICD-10-CM

## 2022-05-31 DIAGNOSIS — G89.29 CHRONIC LEFT SHOULDER PAIN: ICD-10-CM

## 2022-05-31 DIAGNOSIS — M15.9 PRIMARY OSTEOARTHRITIS INVOLVING MULTIPLE JOINTS: ICD-10-CM

## 2022-05-31 DIAGNOSIS — M79.605 PAIN IN BOTH LOWER EXTREMITIES: ICD-10-CM

## 2022-05-31 DIAGNOSIS — G89.29 CHRONIC MIDLINE LOW BACK PAIN WITHOUT SCIATICA: ICD-10-CM

## 2022-05-31 DIAGNOSIS — M54.50 CHRONIC MIDLINE LOW BACK PAIN WITHOUT SCIATICA: ICD-10-CM

## 2022-05-31 DIAGNOSIS — G89.29 CHRONIC LEFT SHOULDER PAIN: Primary | ICD-10-CM

## 2022-05-31 DIAGNOSIS — M79.601 PAIN IN BOTH UPPER EXTREMITIES: ICD-10-CM

## 2022-05-31 DIAGNOSIS — M79.604 PAIN IN BOTH LOWER EXTREMITIES: ICD-10-CM

## 2022-05-31 DIAGNOSIS — M25.512 CHRONIC LEFT SHOULDER PAIN: ICD-10-CM

## 2022-05-31 DIAGNOSIS — M79.602 PAIN IN BOTH UPPER EXTREMITIES: ICD-10-CM

## 2022-05-31 DIAGNOSIS — M25.512 CHRONIC LEFT SHOULDER PAIN: Primary | ICD-10-CM

## 2022-05-31 PROCEDURE — 73030 XR SHOULDER COMPLETE 2 OR MORE VIEWS LEFT: ICD-10-PCS | Mod: 26,LT,, | Performed by: RADIOLOGY

## 2022-05-31 PROCEDURE — 99499 UNLISTED E&M SERVICE: CPT | Mod: S$GLB,,, | Performed by: FAMILY MEDICINE

## 2022-05-31 PROCEDURE — 99999 PR PBB SHADOW E&M-EST. PATIENT-LVL V: CPT | Mod: PBBFAC,,, | Performed by: FAMILY MEDICINE

## 2022-05-31 PROCEDURE — 3078F PR MOST RECENT DIASTOLIC BLOOD PRESSURE < 80 MM HG: ICD-10-PCS | Mod: CPTII,S$GLB,, | Performed by: FAMILY MEDICINE

## 2022-05-31 PROCEDURE — 3008F BODY MASS INDEX DOCD: CPT | Mod: CPTII,S$GLB,, | Performed by: FAMILY MEDICINE

## 2022-05-31 PROCEDURE — 1159F MED LIST DOCD IN RCRD: CPT | Mod: CPTII,S$GLB,, | Performed by: FAMILY MEDICINE

## 2022-05-31 PROCEDURE — 99999 PR PBB SHADOW E&M-EST. PATIENT-LVL V: ICD-10-PCS | Mod: PBBFAC,,, | Performed by: FAMILY MEDICINE

## 2022-05-31 PROCEDURE — 1160F PR REVIEW ALL MEDS BY PRESCRIBER/CLIN PHARMACIST DOCUMENTED: ICD-10-PCS | Mod: CPTII,S$GLB,, | Performed by: FAMILY MEDICINE

## 2022-05-31 PROCEDURE — 3288F FALL RISK ASSESSMENT DOCD: CPT | Mod: CPTII,S$GLB,, | Performed by: FAMILY MEDICINE

## 2022-05-31 PROCEDURE — 1125F AMNT PAIN NOTED PAIN PRSNT: CPT | Mod: CPTII,S$GLB,, | Performed by: FAMILY MEDICINE

## 2022-05-31 PROCEDURE — 1125F PR PAIN SEVERITY QUANTIFIED, PAIN PRESENT: ICD-10-PCS | Mod: CPTII,S$GLB,, | Performed by: FAMILY MEDICINE

## 2022-05-31 PROCEDURE — 99499 RISK ADDL DX/OHS AUDIT: ICD-10-PCS | Mod: S$GLB,,, | Performed by: FAMILY MEDICINE

## 2022-05-31 PROCEDURE — 73030 X-RAY EXAM OF SHOULDER: CPT | Mod: 26,LT,, | Performed by: RADIOLOGY

## 2022-05-31 PROCEDURE — 99214 PR OFFICE/OUTPT VISIT, EST, LEVL IV, 30-39 MIN: ICD-10-PCS | Mod: S$GLB,,, | Performed by: FAMILY MEDICINE

## 2022-05-31 PROCEDURE — 3077F SYST BP >= 140 MM HG: CPT | Mod: CPTII,S$GLB,, | Performed by: FAMILY MEDICINE

## 2022-05-31 PROCEDURE — 1160F RVW MEDS BY RX/DR IN RCRD: CPT | Mod: CPTII,S$GLB,, | Performed by: FAMILY MEDICINE

## 2022-05-31 PROCEDURE — 73030 X-RAY EXAM OF SHOULDER: CPT | Mod: TC,PN,LT

## 2022-05-31 PROCEDURE — 3288F PR FALLS RISK ASSESSMENT DOCUMENTED: ICD-10-PCS | Mod: CPTII,S$GLB,, | Performed by: FAMILY MEDICINE

## 2022-05-31 PROCEDURE — 3046F HEMOGLOBIN A1C LEVEL >9.0%: CPT | Mod: CPTII,S$GLB,, | Performed by: FAMILY MEDICINE

## 2022-05-31 PROCEDURE — 3046F PR MOST RECENT HEMOGLOBIN A1C LEVEL > 9.0%: ICD-10-PCS | Mod: CPTII,S$GLB,, | Performed by: FAMILY MEDICINE

## 2022-05-31 PROCEDURE — 4010F ACE/ARB THERAPY RXD/TAKEN: CPT | Mod: CPTII,S$GLB,, | Performed by: FAMILY MEDICINE

## 2022-05-31 PROCEDURE — 3078F DIAST BP <80 MM HG: CPT | Mod: CPTII,S$GLB,, | Performed by: FAMILY MEDICINE

## 2022-05-31 PROCEDURE — 1159F PR MEDICATION LIST DOCUMENTED IN MEDICAL RECORD: ICD-10-PCS | Mod: CPTII,S$GLB,, | Performed by: FAMILY MEDICINE

## 2022-05-31 PROCEDURE — 3077F PR MOST RECENT SYSTOLIC BLOOD PRESSURE >= 140 MM HG: ICD-10-PCS | Mod: CPTII,S$GLB,, | Performed by: FAMILY MEDICINE

## 2022-05-31 PROCEDURE — 4010F PR ACE/ARB THEARPY RXD/TAKEN: ICD-10-PCS | Mod: CPTII,S$GLB,, | Performed by: FAMILY MEDICINE

## 2022-05-31 PROCEDURE — 1101F PT FALLS ASSESS-DOCD LE1/YR: CPT | Mod: CPTII,S$GLB,, | Performed by: FAMILY MEDICINE

## 2022-05-31 PROCEDURE — 99214 OFFICE O/P EST MOD 30 MIN: CPT | Mod: S$GLB,,, | Performed by: FAMILY MEDICINE

## 2022-05-31 PROCEDURE — 3008F PR BODY MASS INDEX (BMI) DOCUMENTED: ICD-10-PCS | Mod: CPTII,S$GLB,, | Performed by: FAMILY MEDICINE

## 2022-05-31 PROCEDURE — 1101F PR PT FALLS ASSESS DOC 0-1 FALLS W/OUT INJ PAST YR: ICD-10-PCS | Mod: CPTII,S$GLB,, | Performed by: FAMILY MEDICINE

## 2022-05-31 RX ORDER — FLASH GLUCOSE SENSOR
1 KIT MISCELLANEOUS CONTINUOUS
Qty: 6 KIT | Refills: 3 | Status: SHIPPED | OUTPATIENT
Start: 2022-05-31 | End: 2022-08-01

## 2022-05-31 RX ORDER — FLASH GLUCOSE SCANNING READER
1 EACH MISCELLANEOUS CONTINUOUS
Qty: 6 EACH | Refills: 3 | Status: SHIPPED | OUTPATIENT
Start: 2022-05-31 | End: 2022-06-06

## 2022-05-31 NOTE — PROGRESS NOTES
Subjective:       Patient ID: Eyad Garcia is a 66 y.o. male.    Chief Complaint: Leg Pain and Arm Pain      65 yo male presents with complaint of pain in upper arms, thighs and posterior calves.      Past medical history hypertension, erectile dysfunction, hyperlipidemia, type 2 diabetes, primary osteoarthritis of multiple joints, bronchitis, GERD, anemia, malignant neoplasm of prostate status post radiation to prostate bed; old retinal detachment, POAG both eyes.     He reports left arm numbness which radiates to his left shoulder, followed by pain in both hands and occasional pain in right arm. He notes pain in both thighs and occasional throbbing pain in the right calf. Occasional numbness in both hands. He denies trauma. He states he is unable to lift both legs to get out of bed; no diurnal variation. He has trouble dressing himself because of the pain at times. He is unable to lift his left shoulder overhead. He denies tingling. He stopped lipitor after ED visit on 5/29/22 for pain. He reports indomethacin helps temporarily with the pain.    He is now using a Freestyle Barak and needs refills on readers/ sensors.   He is no longer on linagliptin-metformin after reporting pain with linagliptin. He is on metformin only.       The following portions of the patient's history were reviewed and updated as appropriate: allergies, current medications, past family history, past medical history, past social history, past surgical history and problem list.       Review of Systems   Constitutional: Negative for activity change, appetite change, chills, diaphoresis, fatigue, fever and unexpected weight change.   HENT: Negative for nasal congestion, dental problem, facial swelling, nosebleeds, postnasal drip, rhinorrhea, sore throat, tinnitus and trouble swallowing.    Eyes: Negative for pain, discharge, itching and visual disturbance.   Respiratory: Negative for apnea, chest tightness, shortness of breath, wheezing and  "stridor.    Cardiovascular: Negative for chest pain, palpitations and leg swelling.   Gastrointestinal: Negative for abdominal distention, abdominal pain, constipation, diarrhea, nausea, rectal pain and vomiting.   Endocrine: Negative for cold intolerance, heat intolerance and polyuria.   Genitourinary: Positive for erectile dysfunction. Negative for difficulty urinating, dysuria, frequency, hematuria and urgency.   Musculoskeletal: Positive for arthralgias. Negative for gait problem, myalgias, neck pain and neck stiffness.   Integumentary:  Negative for color change and rash.   Neurological: Negative for dizziness, tremors, seizures, syncope, facial asymmetry, weakness and headaches.   Hematological: Negative for adenopathy. Does not bruise/bleed easily.   Psychiatric/Behavioral: Negative for agitation, confusion, hallucinations, self-injury and suicidal ideas. The patient is not hyperactive.           Objective:       Vitals:    05/31/22 0852   BP: (!) 142/74   BP Location: Right arm   Patient Position: Sitting   BP Method: Medium (Manual)   Pulse: 92   Resp: 18   Temp: 98 °F (36.7 °C)   SpO2: 99%   Weight: 69.2 kg (152 lb 8.9 oz)   Height: 5' 6" (1.676 m)     Physical Exam  Constitutional:       General: He is not in acute distress.     Appearance: He is well-developed. He is not diaphoretic.   HENT:      Head: Normocephalic and atraumatic.   Eyes:      General: No scleral icterus.        Right eye: No discharge.         Left eye: No discharge.   Cardiovascular:      Rate and Rhythm: Normal rate and regular rhythm.      Heart sounds: Normal heart sounds. No murmur heard.   Pulmonary:      Effort: Pulmonary effort is normal. No respiratory distress.      Breath sounds: Normal breath sounds.   Abdominal:   Bowel sounds are normal. There is no guarding or rebound.   Musculoskeletal:      Extremities:  No cyanosis, no edema, peripheral pulses intact   Musculoskeletal: Antalgic gait. Poor posture. Tenderness to " palpation of left anterior shoulder and paralumbar muscles. Decreased overhead lifting of left arm above head at the left shoulder; decreased range of motion to flexion at the spine. No atrophy or abnormal strength or tone in the head, neck, spine, ribs, pelvis or extremities.  Neurological:      Mental Status: He is alert and oriented to person, place, and time.   Normal reflexes  Psychiatric:         Thought Content: Thought content normal.         Judgment: Judgment normal.       Assessment:       1. Chronic left shoulder pain    2. Chronic midline low back pain without sciatica    3. Primary osteoarthritis involving multiple joints    4. Pain in both upper extremities    5. Pain in both lower extremities    6. Type 2 diabetes mellitus without complication, without long-term current use of insulin        Plan:       1. Chronic left shoulder pain  -     X-Ray Shoulder 2 or More Views Left; Future; Expected date: 05/31/2022  -     Ambulatory referral/consult to Sports Medicine; Future; Expected date: 06/07/2022    2. Chronic midline low back pain without sciatica  Activity modification. Analgesia as needed. Careful with GI and renal adverse events with NSAIDs.   No alarm symptoms requiring immediate imaging  See PM&R    3. Primary osteoarthritis involving multiple joints  Manages conservatively  Second opinion form Rheumatology    4. Pain in both upper extremities  -     Ambulatory referral/consult to Physical Medicine Rehab; Future; Expected date: 06/07/2022  -     Ambulatory referral/consult to Rheumatology; Future; Expected date: 06/07/2022  Defer EMG  Hold Lipitor    5. Pain in both lower extremities  -     Ambulatory referral/consult to Physical Medicine Rehab; Future; Expected date: 06/07/2022  -     Ambulatory referral/consult to Rheumatology; Future; Expected date: 06/07/2022  Hold Lipitor    6. Type 2 diabetes mellitus without complication, without long-term current use of insulin  -     flash glucose  scanning reader (FREESTYLE ROME 14 DAY READER) Misc; 1 each by Misc.(Non-Drug; Combo Route) route continuous. Monitor blood sugar X4 times daily and as needed for hypoglycemic events. ICD 10 E11.65  Dispense: 6 each; Refill: 3  -     flash glucose sensor (FREESTYLE ROME 14 DAY SENSOR) Kit; 1 each by Misc.(Non-Drug; Combo Route) route continuous.  Dispense: 6 kit; Refill: 3  On metformin. ADA diet.    Disclaimer: This note has been generated using voice-recognition software. There may be typographical errors that have been missed during proof-reading

## 2022-05-31 NOTE — PROGRESS NOTES
NEW PATIENT    HISTORY OF PRESENT ILLNESS   66 y.o. Male with a history of left shoulder pain who is referred today by Dr. Taylor Choi. He is a current cancer patient with prostate cancer. He also has diabetes. He states he is having pain while lifting overhead and has recently not been able to lift the arm at all.  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  He does not recall when he could not significantly lift his left arm.  He does not remember how long it has been.  He also has uncontrolled diabetes at this time with his last hemoglobin A1c of 9.6.  In addition he complains of radiating bilateral lower extremity pain.    Is affecting ADLs.  Pain is 10/10 at it's worst.        PAST MEDICAL HISTORY    Past Medical History:   Diagnosis Date    Cataract     Diabetes mellitus type II     Difficult intubation     Erectile dysfunction     History of colon polyps 1/25/2019    Hyperlipidemia     Hypertension     OA (osteoarthritis)     POAG (primary open-angle glaucoma)     Retinal detachment     OS       PAST SURGICAL HISTORY     Past Surgical History:   Procedure Laterality Date    COLONOSCOPY N/A 1/25/2019    Procedure: COLONOSCOPY;  Surgeon: Elder Guillermo MD;  Location: [facility] ([address] [place] [address]);  Service: Endoscopy;  Laterality: N/A;    CYSTOSCOPY      HERNIA REPAIR      KNEE SURGERY      left    PENILE PROSTHESIS IMPLANT      PROSTATECTOMY      RETINAL DETACHMENT SURGERY      OS    SELECTIVE LASER TRABECUPLASTY Bilateral 4/16    OU WITH        FAMILY HISTORY    Family History   Problem Relation Age of Onset    Diabetes Mother     Hypertension Mother     Glaucoma Mother     Diabetes Sister     Glaucoma Sister     Diabetes Brother     Glaucoma Brother     No Known Problems Father     Cancer Maternal Aunt     Colon cancer Maternal Aunt     No Known Problems Maternal Uncle     No Known Problems Paternal Aunt     No Known Problems Paternal Uncle      No Known Problems Maternal Grandmother     No Known Problems Maternal Grandfather     No Known Problems Paternal Grandmother     No Known Problems Paternal Grandfather     Anesthesia problems Neg Hx     Broken bones Neg Hx     Clotting disorder Neg Hx     Collagen disease Neg Hx     Dislocations Neg Hx     Osteoporosis Neg Hx     Rheumatologic disease Neg Hx     Scoliosis Neg Hx     Severe sprains Neg Hx     Amblyopia Neg Hx     Blindness Neg Hx     Cataracts Neg Hx     Macular degeneration Neg Hx     Retinal detachment Neg Hx     Strabismus Neg Hx     Stroke Neg Hx     Thyroid disease Neg Hx     Esophageal cancer Neg Hx        SOCIAL HISTORY    Social History     Socioeconomic History    Marital status:     Number of children: 2   Occupational History     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 Rescue, Disp: 18 g, Rfl: 11    alcohol swabs (ALCOHOL WIPES) PadM, Monitor as directed X 2 daily. Per insurance coverage. ICD 10 E11.9, Disp: 200 each, Rfl: 3    amLODIPine (NORVASC) 10 MG tablet, Take 1 tablet (10 mg total) by mouth once daily., Disp: 90 tablet, Rfl: 3    atorvastatin (LIPITOR) 80 MG tablet, Take 1 tablet (80 mg total) by mouth once daily., Disp: 90 tablet, Rfl: 3    dorzolamide-timolol 2-0.5% (COSOPT) 22.3-6.8 mg/mL ophthalmic solution, Place 1 drop into both eyes 2 (two) times daily., Disp: 30 mL, Rfl: 3    famotidine (PEPCID) 40 MG tablet, Take 1 tablet (40 mg total) by mouth once daily., Disp: 90  tablet, Rfl: 3    flash glucose scanning reader (FREESTYLE ROME 14 DAY READER) Misc, 1 each by Misc.(Non-Drug; Combo Route) route continuous. Monitor blood sugar X4 times daily and as needed for hypoglycemic events. ICD 10 E11.65, Disp: 6 each, Rfl: 3    flash glucose sensor (FREESTYLE ROME 14 DAY SENSOR) Kit, 1 each by Misc.(Non-Drug; Combo Route) route continuous., Disp: 6 kit, Rfl: 3    fluticasone propionate (FLONASE) 50 mcg/actuation nasal spray, 2 sprays (100 mcg total) by Each Nostril route 2 (two) times daily as needed for Rhinitis., Disp: 16 g, Rfl: 0    FREESTYLE ROME 2 SENSOR Kit, 1 kit by Misc.(Non-Drug; Combo Route) route every 14 (fourteen) days., Disp: 2 kit, Rfl: 1    indomethacin (INDOCIN) 50 MG capsule, Take 1 capsule (50 mg total) by mouth 2 (two) times daily as needed (pain)., Disp: 180 capsule, Rfl: 3    iron,carbonyl-vitamin C (VITRON-C) 65 mg iron- 125 mg TbEC, Take 1 tablet by mouth 2 (two) times a day., Disp: 180 tablet, Rfl: 0    latanoprost 0.005 % ophthalmic solution, Place 1 drop into both eyes every evening., Disp: 15 mL, Rfl: 3    losartan (COZAAR) 100 MG tablet, Take 1 tablet (100 mg total) by mouth once daily., Disp: 90 tablet, Rfl: 3    metFORMIN (GLUCOPHAGE-XR) 500 MG ER 24hr tablet, Take 2 tablets (1,000 mg total) by mouth 2 (two) times daily with meals., Disp: 360 tablet, Rfl: 3    sars-cov-2, covid-19, (MODERNA COVID-19) 50 mcg/0.25 ml injection (BOOSTER), Inject into the muscle., Disp: 0.25 mL, Rfl: 0    Current Facility-Administered Medications:     leuprolide (6 month) injection 45 mg, 45 mg, Intramuscular, Q6 Months, Henrry Sampson Jr., MD, 45 mg at 03/23/22 3559    ALLERGIES     Review of patient's allergies indicates:   Allergen Reactions    Sulfa (sulfonamide antibiotics) Hives and Rash           Aspirin      Stomach upset         REVIEW OF SYSTEMS   Constitution: Negative. Negative for chills, fever and night sweats.   HENT: Negative for congestion and  "headaches.    Eyes: Negative for blurred vision, left vision loss and right vision loss.   Cardiovascular: Negative for chest pain and syncope.   Respiratory: Negative for cough and shortness of breath.    Endocrine: Negative for polydipsia, polyphagia and polyuria.   Hematologic/Lymphatic: Negative for bleeding problem. Does not bruise/bleed easily.   Skin: Negative for dry skin, itching and rash.   Musculoskeletal: Negative for falls. Positive for left shoulder pain  Gastrointestinal: Negative for abdominal pain and bowel incontinence.   Genitourinary: Negative for bladder incontinence and nocturia.   Neurological: Negative for disturbances in coordination, loss of balance and seizures.   Psychiatric/Behavioral: Negative for depression. The patient does not have insomnia.    Allergic/Immunologic: Negative for hives and persistent infections.     PHYSICAL EXAMINATION    Vitals: BP (!) 162/85   Pulse 101   Ht 5' 6" (1.676 m)   Wt 68.9 kg (152 lb)   BMI 24.53 kg/m²     General: The patient appears active and healthy with no apparent physical problems.  No disturbance of mood or affect is demonstrated. Alert and Oriented.    HEENT: Eyes normal, pupils equally round, nose normal.    Resp: Equal and symmetrical chest rises. No wheezing    CV: Regular rate    Neck: Supple; nonpainful range of motion.    Extremities: no cyanosis, clubbing, edema, or diffuse swelling.  Palpable pulses, good capillary refill of the digits.  No coolness, discoloration, edema or obvious varicosities.    Skin: no lesions noted.    Lymphatic: no detected adenopathy in the upper or lower extremities.    Neurologic: normal mental status, normal reflexes, normal gait and balance.  Patient is alert and oriented to person, place and time.  No flaccidity or spasticity is noted.  No motor or sensory deficits are noted.  Light touch is intact    Orthopaedic:     SHOULDER EXAM - LEFT    Inspection:   Normal skin color and appearance with no scars.  " No muscle atrophy noted.  No scapular winging.  Atrophy suprascapular fossa and posterior scapula    Palpation: No tenderness of the acromioclavicular joint, lateral edge of the acromion, biceps tendon, trapezius muscle or scapulothoracic bursa.      ROM:      PROM:     FE - 120°    Abd/ER -  0°  Abd/IR -  45°   Add/ER -  60°     AROM:    FE - 30°    Abd/ER -  0°  Abd/IR -  45°   Add/ER -  60°         Tests:     - Aguilar, - Neer's, - Cross Arm Adduction, - Coamo, - Yerguson, - Speed. - Belly Press,  - Jobes, - Lift Off    Stability: - sulcus, - apprehension, - relocation, - load and shift, - DLS      Motor:  Rotator cuff strength is 2/5 supraspinatus, 2/5 infraspinatus, 4/5 subscapularis. Biceps, triceps and deltoid strength is 5/5.      Neuro     Distally there are no paresthesias, and sensation is intact to light touch in the median, ulnar, and radial distributions.  Reflexes are 2/2 biceps, triceps and brachioradialis.        IMAGING    X-Ray Shoulder 2 or More Views Left  Narrative: EXAMINATION:  XR SHOULDER COMPLETE 2 OR MORE VIEWS LEFT    CLINICAL HISTORY:  Pain in left shoulder    TECHNIQUE:  Two or three views of the left shoulder were performed.    COMPARISON:  None    FINDINGS:  Mild DJD also affecting the AC joint.  No fracture or dislocation.  No bone destruction identified  Impression: See above    Electronically signed by: Alpesh Ontiveros MD  Date:    05/31/2022  Time:    10:21    Addendum to his radiology note.  The patient has clear superior migration of his left glenohumeral joint which appears to be migrated approximately 9-10 mm from the inferior margin of his glenoid.  Greater tuberosity changes are noted with chronic rotator cuff pathology.  A type 3 acromion is noted.  This is likely a [place] 3 shoulder.    IMPRESSION       ICD-10-CM ICD-9-CM   1. Rotator cuff tear, non-traumatic, left  M75.102 727.61   2. Chronic left shoulder pain  M25.512 719.41    G89.29 338.29       MEDICATIONS PRESCRIBED       1. None    RECOMMENDATIONS     1. Ultrasound-guided subacromial injection with Toradol was given to the patient.  The patient had 100% resolution of his pain symptoms.  He had slight improvement with active range of motion of the left shoulder but was unable to reproduce passive range of motion.  We had a long discussion concerning nonsurgical and surgical treatments for massive rotator cuff tears.  Considering his medical comorbidities at this time, I do not believe he has an optimal surgical candidate.  I will see him back in approximately 1 month to reassess the injection as well as monitor his medical status as well as hemoglobin A1c prior to discussing any type of surgical solutions for his chronic left shoulder problem.      All questions were answered, pt will contact us for questions or concerns in the interim.

## 2022-05-31 NOTE — TELEPHONE ENCOUNTER
----- Message from Taylor Choi MD sent at 5/31/2022 11:14 AM CDT -----  Hello,    You have arthritis of the shoulder. Follow up with sports medicine. The goal would be to get your left arm above your head; at this time you are limited because of the left shoulder pain and would benefit from further interventions including the possibility of joint injection.

## 2022-06-01 ENCOUNTER — OFFICE VISIT (OUTPATIENT)
Dept: SPORTS MEDICINE | Facility: CLINIC | Age: 67
End: 2022-06-01
Payer: COMMERCIAL

## 2022-06-01 ENCOUNTER — OFFICE VISIT (OUTPATIENT)
Dept: PODIATRY | Facility: CLINIC | Age: 67
End: 2022-06-01
Payer: COMMERCIAL

## 2022-06-01 VITALS
SYSTOLIC BLOOD PRESSURE: 148 MMHG | WEIGHT: 152.13 LBS | HEART RATE: 101 BPM | HEIGHT: 66 IN | DIASTOLIC BLOOD PRESSURE: 82 MMHG | BODY MASS INDEX: 24.45 KG/M2

## 2022-06-01 VITALS
WEIGHT: 152 LBS | HEART RATE: 101 BPM | HEIGHT: 66 IN | BODY MASS INDEX: 24.43 KG/M2 | SYSTOLIC BLOOD PRESSURE: 162 MMHG | DIASTOLIC BLOOD PRESSURE: 85 MMHG

## 2022-06-01 DIAGNOSIS — E11.9 ENCOUNTER FOR DIABETIC FOOT EXAM: ICD-10-CM

## 2022-06-01 DIAGNOSIS — G89.29 CHRONIC LEFT SHOULDER PAIN: ICD-10-CM

## 2022-06-01 DIAGNOSIS — M25.512 CHRONIC LEFT SHOULDER PAIN: ICD-10-CM

## 2022-06-01 DIAGNOSIS — M75.102 ROTATOR CUFF TEAR, NON-TRAUMATIC, LEFT: Primary | ICD-10-CM

## 2022-06-01 DIAGNOSIS — E11.9 TYPE 2 DIABETES MELLITUS WITHOUT COMPLICATION, WITHOUT LONG-TERM CURRENT USE OF INSULIN: Primary | ICD-10-CM

## 2022-06-01 PROCEDURE — 20611 LARGE JOINT ASPIRATION/INJECTION: L SUBACROMIAL BURSA: ICD-10-PCS | Mod: LT,S$GLB,, | Performed by: ORTHOPAEDIC SURGERY

## 2022-06-01 PROCEDURE — 99999 PR PBB SHADOW E&M-EST. PATIENT-LVL IV: ICD-10-PCS | Mod: PBBFAC,,, | Performed by: PODIATRIST

## 2022-06-01 PROCEDURE — 1159F MED LIST DOCD IN RCRD: CPT | Mod: CPTII,S$GLB,, | Performed by: ORTHOPAEDIC SURGERY

## 2022-06-01 PROCEDURE — 3046F HEMOGLOBIN A1C LEVEL >9.0%: CPT | Mod: CPTII,S$GLB,, | Performed by: PODIATRIST

## 2022-06-01 PROCEDURE — 1125F PR PAIN SEVERITY QUANTIFIED, PAIN PRESENT: ICD-10-PCS | Mod: CPTII,S$GLB,, | Performed by: ORTHOPAEDIC SURGERY

## 2022-06-01 PROCEDURE — 4010F PR ACE/ARB THEARPY RXD/TAKEN: ICD-10-PCS | Mod: CPTII,S$GLB,, | Performed by: ORTHOPAEDIC SURGERY

## 2022-06-01 PROCEDURE — 3079F PR MOST RECENT DIASTOLIC BLOOD PRESSURE 80-89 MM HG: ICD-10-PCS | Mod: CPTII,S$GLB,, | Performed by: PODIATRIST

## 2022-06-01 PROCEDURE — 99999 PR PBB SHADOW E&M-EST. PATIENT-LVL IV: CPT | Mod: PBBFAC,,, | Performed by: PODIATRIST

## 2022-06-01 PROCEDURE — 3008F BODY MASS INDEX DOCD: CPT | Mod: CPTII,S$GLB,, | Performed by: PODIATRIST

## 2022-06-01 PROCEDURE — 1159F PR MEDICATION LIST DOCUMENTED IN MEDICAL RECORD: ICD-10-PCS | Mod: CPTII,S$GLB,, | Performed by: ORTHOPAEDIC SURGERY

## 2022-06-01 PROCEDURE — 3008F BODY MASS INDEX DOCD: CPT | Mod: CPTII,S$GLB,, | Performed by: ORTHOPAEDIC SURGERY

## 2022-06-01 PROCEDURE — 3077F SYST BP >= 140 MM HG: CPT | Mod: CPTII,S$GLB,, | Performed by: ORTHOPAEDIC SURGERY

## 2022-06-01 PROCEDURE — 4010F ACE/ARB THERAPY RXD/TAKEN: CPT | Mod: CPTII,S$GLB,, | Performed by: ORTHOPAEDIC SURGERY

## 2022-06-01 PROCEDURE — 3008F PR BODY MASS INDEX (BMI) DOCUMENTED: ICD-10-PCS | Mod: CPTII,S$GLB,, | Performed by: PODIATRIST

## 2022-06-01 PROCEDURE — 3079F DIAST BP 80-89 MM HG: CPT | Mod: CPTII,S$GLB,, | Performed by: PODIATRIST

## 2022-06-01 PROCEDURE — 1126F PR PAIN SEVERITY QUANTIFIED, NO PAIN PRESENT: ICD-10-PCS | Mod: CPTII,S$GLB,, | Performed by: PODIATRIST

## 2022-06-01 PROCEDURE — 99203 OFFICE O/P NEW LOW 30 MIN: CPT | Mod: S$GLB,,, | Performed by: PODIATRIST

## 2022-06-01 PROCEDURE — 3079F PR MOST RECENT DIASTOLIC BLOOD PRESSURE 80-89 MM HG: ICD-10-PCS | Mod: CPTII,S$GLB,, | Performed by: ORTHOPAEDIC SURGERY

## 2022-06-01 PROCEDURE — 4010F ACE/ARB THERAPY RXD/TAKEN: CPT | Mod: CPTII,S$GLB,, | Performed by: PODIATRIST

## 2022-06-01 PROCEDURE — 20611 DRAIN/INJ JOINT/BURSA W/US: CPT | Mod: LT,S$GLB,, | Performed by: ORTHOPAEDIC SURGERY

## 2022-06-01 PROCEDURE — 3288F PR FALLS RISK ASSESSMENT DOCUMENTED: ICD-10-PCS | Mod: CPTII,S$GLB,, | Performed by: ORTHOPAEDIC SURGERY

## 2022-06-01 PROCEDURE — 3077F SYST BP >= 140 MM HG: CPT | Mod: CPTII,S$GLB,, | Performed by: PODIATRIST

## 2022-06-01 PROCEDURE — 99203 PR OFFICE/OUTPT VISIT, NEW, LEVL III, 30-44 MIN: ICD-10-PCS | Mod: S$GLB,,, | Performed by: PODIATRIST

## 2022-06-01 PROCEDURE — 3046F PR MOST RECENT HEMOGLOBIN A1C LEVEL > 9.0%: ICD-10-PCS | Mod: CPTII,S$GLB,, | Performed by: PODIATRIST

## 2022-06-01 PROCEDURE — 1159F PR MEDICATION LIST DOCUMENTED IN MEDICAL RECORD: ICD-10-PCS | Mod: CPTII,S$GLB,, | Performed by: PODIATRIST

## 2022-06-01 PROCEDURE — 3288F FALL RISK ASSESSMENT DOCD: CPT | Mod: CPTII,S$GLB,, | Performed by: ORTHOPAEDIC SURGERY

## 2022-06-01 PROCEDURE — 3046F HEMOGLOBIN A1C LEVEL >9.0%: CPT | Mod: CPTII,S$GLB,, | Performed by: ORTHOPAEDIC SURGERY

## 2022-06-01 PROCEDURE — 3046F PR MOST RECENT HEMOGLOBIN A1C LEVEL > 9.0%: ICD-10-PCS | Mod: CPTII,S$GLB,, | Performed by: ORTHOPAEDIC SURGERY

## 2022-06-01 PROCEDURE — 99999 PR PBB SHADOW E&M-EST. PATIENT-LVL IV: ICD-10-PCS | Mod: PBBFAC,,, | Performed by: ORTHOPAEDIC SURGERY

## 2022-06-01 PROCEDURE — 1125F AMNT PAIN NOTED PAIN PRSNT: CPT | Mod: CPTII,S$GLB,, | Performed by: ORTHOPAEDIC SURGERY

## 2022-06-01 PROCEDURE — 3008F PR BODY MASS INDEX (BMI) DOCUMENTED: ICD-10-PCS | Mod: CPTII,S$GLB,, | Performed by: ORTHOPAEDIC SURGERY

## 2022-06-01 PROCEDURE — 1159F MED LIST DOCD IN RCRD: CPT | Mod: CPTII,S$GLB,, | Performed by: PODIATRIST

## 2022-06-01 PROCEDURE — 3077F PR MOST RECENT SYSTOLIC BLOOD PRESSURE >= 140 MM HG: ICD-10-PCS | Mod: CPTII,S$GLB,, | Performed by: ORTHOPAEDIC SURGERY

## 2022-06-01 PROCEDURE — 1101F PT FALLS ASSESS-DOCD LE1/YR: CPT | Mod: CPTII,S$GLB,, | Performed by: ORTHOPAEDIC SURGERY

## 2022-06-01 PROCEDURE — 4010F PR ACE/ARB THEARPY RXD/TAKEN: ICD-10-PCS | Mod: CPTII,S$GLB,, | Performed by: PODIATRIST

## 2022-06-01 PROCEDURE — 99204 PR OFFICE/OUTPT VISIT, NEW, LEVL IV, 45-59 MIN: ICD-10-PCS | Mod: 25,S$GLB,, | Performed by: ORTHOPAEDIC SURGERY

## 2022-06-01 PROCEDURE — 99999 PR PBB SHADOW E&M-EST. PATIENT-LVL IV: CPT | Mod: PBBFAC,,, | Performed by: ORTHOPAEDIC SURGERY

## 2022-06-01 PROCEDURE — 3079F DIAST BP 80-89 MM HG: CPT | Mod: CPTII,S$GLB,, | Performed by: ORTHOPAEDIC SURGERY

## 2022-06-01 PROCEDURE — 1126F AMNT PAIN NOTED NONE PRSNT: CPT | Mod: CPTII,S$GLB,, | Performed by: PODIATRIST

## 2022-06-01 PROCEDURE — 1101F PR PT FALLS ASSESS DOC 0-1 FALLS W/OUT INJ PAST YR: ICD-10-PCS | Mod: CPTII,S$GLB,, | Performed by: ORTHOPAEDIC SURGERY

## 2022-06-01 PROCEDURE — 99204 OFFICE O/P NEW MOD 45 MIN: CPT | Mod: 25,S$GLB,, | Performed by: ORTHOPAEDIC SURGERY

## 2022-06-01 PROCEDURE — 3077F PR MOST RECENT SYSTOLIC BLOOD PRESSURE >= 140 MM HG: ICD-10-PCS | Mod: CPTII,S$GLB,, | Performed by: PODIATRIST

## 2022-06-01 RX ORDER — KETOROLAC TROMETHAMINE 30 MG/ML
30 INJECTION, SOLUTION INTRAMUSCULAR; INTRAVENOUS
Status: DISCONTINUED | OUTPATIENT
Start: 2022-06-01 | End: 2022-06-01 | Stop reason: HOSPADM

## 2022-06-01 RX ADMIN — KETOROLAC TROMETHAMINE 30 MG: 30 INJECTION, SOLUTION INTRAMUSCULAR; INTRAVENOUS at 11:06

## 2022-06-01 NOTE — PROCEDURES
Large Joint Aspiration/Injection: L subacromial bursa    Date/Time: 6/1/2022 11:45 AM  Performed by: Minnie Sneed MD  Authorized by: Minnie Sneed MD     Consent Done?:  Yes (Verbal)  Indications:  Pain  Site marked: the procedure site was marked    Timeout: prior to procedure the correct patient, procedure, and site was verified    Prep: patient was prepped and draped in usual sterile fashion      Local anesthesia used?: Yes    Local anesthetic:  Co-phenylcaine spray (0.2% Naropin)  Anesthetic total (ml):  4      Details:  Needle Size:  22 G  Ultrasonic Guidance for needle placement?: Yes (Ultrasound guidance was used for needle localization.  Images were saved and stored for documentation.  Dynamic visualization of the needle was continuous throughout the procedure and maintained accurate placement)    Images are saved and documented.  Approach:  Posterior  Location:  Shoulder  Site:  L subacromial bursa  Medications:  30 mg ketorolac 30 mg/mL (1 mL)  Medications comment:  5 mL LIDOcaine HCL 10 mg/ml (1%) 10 mg/mL (1 %)  Patient tolerance:  Patient tolerated the procedure well with no immediate complications

## 2022-06-01 NOTE — PROGRESS NOTES
Subjective:      Patient ID: Eyad Garcia is a 66 y.o. male.    Chief Complaint:   Diabetic Foot Exam (Pcp-[facility])    Eyad is a 66 y.o. male who presents to the clinic upon referral from Dr. Choi  for evaluation and treatment of diabetic feet. Eyad has a past medical history of Cataract, Diabetes mellitus type II, Difficult intubation, Erectile dysfunction, History of colon polyps (1/25/2019), Hyperlipidemia, Hypertension, OA (osteoarthritis), POAG (primary open-angle glaucoma), and Retinal detachment. Patient relates no major problem with feet. Only complaints today consist of diabetic foot exam.    Pt presents with son. No foot pain. Denies numbness/tingling in feet.   Was told not to cut nails since he is diabetic.    Patient runs a [employer]    Main complaint is upper body aches pains arthritis      PCP: Taylor Choi MD    Date Last Seen by PCP: 5/30/22    Current shoe gear: Extra depth shoes    Hemoglobin A1C   Date Value Ref Range Status   03/29/2022 9.4 (H) 4.0 - 5.6 % Final     Comment:     ADA Screening Guidelines:  5.7-6.4%  Consistent with prediabetes  >or=6.5%  Consistent with diabetes    High levels of fetal hemoglobin interfere with the HbA1C  assay. Heterozygous hemoglobin variants (HbS, HgC, etc)do  not significantly interfere with this assay.   However, presence of multiple variants may affect accuracy.     09/27/2021 7.2 (H) 4.0 - 5.6 % Final     Comment:     ADA Screening Guidelines:  5.7-6.4%  Consistent with prediabetes  >or=6.5%  Consistent with diabetes    High levels of fetal hemoglobin interfere with the HbA1C  assay. Heterozygous hemoglobin variants (HbS, HgC, etc)do  not significantly interfere with this assay.   However, presence of multiple variants may affect accuracy.     07/02/2021 7.3 (H) 4.0 - 5.6 % Final     Comment:     ADA Screening Guidelines:  5.7-6.4%  Consistent with prediabetes  >or=6.5%  Consistent with diabetes    High levels of fetal  hemoglobin interfere with the HbA1C  assay. Heterozygous hemoglobin variants (HbS, HgC, etc)do  not significantly interfere with this assay.   However, presence of multiple variants may affect accuracy.              Past Medical History:   Diagnosis Date    Cataract     Diabetes mellitus type II     Difficult intubation     Erectile dysfunction     History of colon polyps 1/25/2019    Hyperlipidemia     Hypertension     OA (osteoarthritis)     POAG (primary open-angle glaucoma)     Retinal detachment     OS     Past Surgical History:   Procedure Laterality Date    COLONOSCOPY N/A 1/25/2019    Procedure: COLONOSCOPY;  Surgeon: Elder Guillermo MD;  Location: [address]);  Service: Endoscopy;  Laterality: N/A;    CYSTOSCOPY      HERNIA REPAIR      KNEE SURGERY      left    PENILE PROSTHESIS IMPLANT      PROSTATECTOMY      RETINAL DETACHMENT SURGERY      OS    SELECTIVE LASER TRABECUPLASTY Bilateral 4/16    OU WITH      Current Outpatient Medications on File Prior to Visit   Medication Sig Dispense Refill    albuterol (VENTOLIN HFA) 90 mcg/actuation inhaler Inhale 2 puffs into the lungs every 6 (six) hours as needed for Wheezing or Shortness of Breath (cough). Rescue 18 g 11    alcohol swabs (ALCOHOL WIPES) PadM Monitor as directed X 2 daily. Per insurance coverage. ICD 10 E11.9 200 each 3    amLODIPine (NORVASC) 10 MG tablet Take 1 tablet (10 mg total) by mouth once daily. 90 tablet 3    atorvastatin (LIPITOR) 80 MG tablet Take 1 tablet (80 mg total) by mouth once daily. 90 tablet 3    dorzolamide-timolol 2-0.5% (COSOPT) 22.3-6.8 mg/mL ophthalmic solution Place 1 drop into both eyes 2 (two) times daily. 30 mL 3    famotidine (PEPCID) 40 MG tablet Take 1 tablet (40 mg total) by mouth once daily. 90 tablet 3    flash glucose scanning reader ([employer] ROME 14 DAY READER) Misc 1 each by Misc.(Non-Drug; Combo Route) route continuous. Monitor blood sugar X4 times daily and  as needed for hypoglycemic events. ICD 10 E11.65 6 each 3    flash glucose sensor (FREESTYLE ROME 14 DAY SENSOR) Kit 1 each by Misc.(Non-Drug; Combo Route) route continuous. 6 kit 3    fluticasone propionate (FLONASE) 50 mcg/actuation nasal spray 2 sprays (100 mcg total) by Each Nostril route 2 (two) times daily as needed for Rhinitis. 16 g 0    FREESTYLE ROME 2 SENSOR Kit 1 kit by Misc.(Non-Drug; Combo Route) route every 14 (fourteen) days. 2 kit 1    indomethacin (INDOCIN) 50 MG capsule Take 1 capsule (50 mg total) by mouth 2 (two) times daily as needed (pain). 180 capsule 3    iron,carbonyl-vitamin C (VITRON-C) 65 mg iron- 125 mg TbEC Take 1 tablet by mouth 2 (two) times a day. 180 tablet 0    latanoprost 0.005 % ophthalmic solution Place 1 drop into both eyes every evening. 15 mL 3    losartan (COZAAR) 100 MG tablet Take 1 tablet (100 mg total) by mouth once daily. 90 tablet 3    metFORMIN (GLUCOPHAGE-XR) 500 MG ER 24hr tablet Take 2 tablets (1,000 mg total) by mouth 2 (two) times daily with meals. 360 tablet 3    sars-cov-2, covid-19, (MODERNA COVID-19) 50 mcg/0.25 ml injection (BOOSTER) Inject into the muscle. 0.25 mL 0    [DISCONTINUED] bicalutamide (CASODEX) 50 MG Tab Take 1 tablet (50 mg total) by mouth once daily. (Patient not taking: No sig reported) 30 tablet 1    [DISCONTINUED] TRUE METRIX GLUCOSE METER [facility]        Current Facility-Administered Medications on File Prior to Visit   Medication Dose Route Frequency Provider Last Rate Last Admin    leuprolide (6 month) injection 45 mg  45 mg Intramuscular Q6 Months Henrry Sampson Jr., MD   45 mg at 03/23/22 8408     Review of patient's allergies indicates:   Allergen Reactions    Sulfa (sulfonamide antibiotics) Hives and Rash           Aspirin      Stomach upset       Review of Systems   Constitutional: Negative for chills, decreased appetite, fever, malaise/fatigue, night sweats, weight gain and weight loss.   Cardiovascular: Negative  "for chest pain, claudication, dyspnea on exertion, leg swelling, palpitations and syncope.   Respiratory: Negative for cough and shortness of breath.    Endocrine: Negative for cold intolerance and heat intolerance.   Hematologic/Lymphatic: Negative for bleeding problem. Does not bruise/bleed easily.   Skin: Negative for color change, dry skin, flushing, itching, nail changes, poor wound healing, rash, skin cancer, suspicious lesions and unusual hair distribution.   Musculoskeletal: Positive for arthritis, joint pain, muscle cramps and myalgias. Negative for back pain, falls, gout, joint swelling, muscle weakness, neck pain and stiffness.   Gastrointestinal: Negative for diarrhea, nausea and vomiting.   Neurological: Negative for dizziness, focal weakness, light-headedness, numbness, paresthesias, tremors, vertigo and weakness.   Psychiatric/Behavioral: Negative for altered mental status and depression. The patient does not have insomnia.    Allergic/Immunologic: Negative.            Objective:       Vitals:    06/01/22 0904   BP: (!) 148/82   Pulse: 101   Weight: 69 kg (152 lb 1.9 oz)   Height: 5' 6" (1.676 m)   PainSc: 0-No pain   69 kg (152 lb 1.9 oz)     Physical Exam  Vitals reviewed.   Constitutional:       General: He is not in acute distress.     Appearance: He is well-developed. He is not ill-appearing, toxic-appearing or diaphoretic.      Comments: Proper supportive shoegear      Cardiovascular:      Pulses:           Dorsalis pedis pulses are 2+ on the right side and 2+ on the left side.        Posterior tibial pulses are 2+ on the right side and 2+ on the left side.   Musculoskeletal:         General: No swelling or deformity.      Right lower leg: No edema.      Left lower leg: No edema.      Right ankle: Normal.      Right Achilles Tendon: Normal. No tenderness.      Left ankle: Normal.      Left Achilles Tendon: Normal. No tenderness.      Right foot: Decreased range of motion. Bunion present. No " deformity, tenderness or bony tenderness.      Left foot: Decreased range of motion. Bunion present. No deformity, tenderness or bony tenderness.      Comments: Flexible pes planus foot type w/ medial arch collapse and mild gastroc equinus      Reducible extensor and flexor contractures at the MTPJ and PIPJ of toes 2-5, bilat.        No pop   Feet:      Right foot:      Protective Sensation: 10 sites tested. 10 sites sensed.      Skin integrity: No ulcer, blister, skin breakdown, erythema, warmth, callus or dry skin.      Toenail Condition: Right toenails are abnormally thick and long.      Left foot:      Protective Sensation: 10 sites tested. 10 sites sensed.      Skin integrity: No ulcer, blister, skin breakdown, erythema, warmth, callus or dry skin.      Toenail Condition: Left toenails are abnormally thick and long.      Comments: SWM intact  Nails dystrophic no signs of mycosis    No open lesions signs of infection    Interspaces clear  Skin:     General: Skin is warm.      Capillary Refill: Capillary refill takes 2 to 3 seconds.      Coloration: Skin is not pale.      Findings: No erythema or rash.      Nails: There is no clubbing.   Neurological:      Mental Status: He is alert and oriented to person, place, and time.      Sensory: No sensory deficit.      Gait: Gait abnormal.   Psychiatric:         Attention and Perception: Attention normal.         Mood and Affect: Mood normal.         Speech: Speech normal.         Behavior: Behavior normal.         Thought Content: Thought content normal.         Cognition and Memory: Cognition normal.         Judgment: Judgment normal.               Assessment:       Encounter Diagnoses   Name Primary?    Encounter for diabetic foot exam     Type 2 diabetes mellitus without complication, without long-term current use of insulin Yes         Plan:       Eyad was seen today for diabetic foot exam.    Diagnoses and all orders for this visit:    Type 2 diabetes mellitus  without complication, without long-term current use of insulin    Encounter for diabetic foot exam  -     Ambulatory referral/consult to Podiatry      I counseled the patient on his conditions, their implications and medical management.        - Shoe inspection. Diabetic Foot Education. Patient reminded of the importance of good nutrition and blood sugar control to help prevent podiatric complications of diabetes. Patient instructed on proper foot hygeine. We discussed wearing proper shoe gear, daily foot inspections, never walking without protective shoe gear, never putting sharp instruments to feet, routine podiatric nail visits every 2-3 months.      - With patient's permission, nails were aggressively reduced and debrided x 10 to their soft tissue attachment mechanically , removing all offending nail and debris. Patient relates relief following the procedure. He will continue to monitor the areas daily, inspect his feet, wear protective shoe gear when ambulatory, moisturizer to maintain skin integrity and follow in this office in approximately 2-3 months, sooner p.r.n.      -continue proper shoes patient likely does not qualify for diabetic shoes as neurovascular status is intact    - Based on clinical exam, this patient does not qualify for nail care (he does not have criteria for Q modifiers) discussed with patient and son options of Prob B $42 for nail care in future    Follow-up sooner if needed        Follow up in about 1 year (around 6/1/2023).

## 2022-06-03 ENCOUNTER — OFFICE VISIT (OUTPATIENT)
Dept: PHYSICAL MEDICINE AND REHAB | Facility: CLINIC | Age: 67
End: 2022-06-03
Payer: COMMERCIAL

## 2022-06-03 ENCOUNTER — HOSPITAL ENCOUNTER (OUTPATIENT)
Dept: RADIOLOGY | Facility: HOSPITAL | Age: 67
Discharge: HOME | End: 2022-06-03
Attending: PHYSICAL MEDICINE & REHABILITATION
Payer: MEDICARE

## 2022-06-03 VITALS
DIASTOLIC BLOOD PRESSURE: 82 MMHG | HEIGHT: 67 IN | SYSTOLIC BLOOD PRESSURE: 142 MMHG | BODY MASS INDEX: 23.18 KG/M2 | WEIGHT: 147.69 LBS | HEART RATE: 78 BPM

## 2022-06-03 DIAGNOSIS — M79.605 PAIN IN BOTH LOWER EXTREMITIES: Primary | ICD-10-CM

## 2022-06-03 DIAGNOSIS — M79.601 PAIN IN BOTH UPPER EXTREMITIES: ICD-10-CM

## 2022-06-03 DIAGNOSIS — M79.604 PAIN IN BOTH LOWER EXTREMITIES: Primary | ICD-10-CM

## 2022-06-03 DIAGNOSIS — M79.602 PAIN IN BOTH UPPER EXTREMITIES: ICD-10-CM

## 2022-06-03 DIAGNOSIS — M17.0 PRIMARY OSTEOARTHRITIS OF BOTH KNEES: ICD-10-CM

## 2022-06-03 DIAGNOSIS — M79.605 PAIN IN BOTH LOWER EXTREMITIES: ICD-10-CM

## 2022-06-03 DIAGNOSIS — M79.604 PAIN IN BOTH LOWER EXTREMITIES: ICD-10-CM

## 2022-06-03 DIAGNOSIS — M19.012 PRIMARY OSTEOARTHRITIS OF LEFT SHOULDER: ICD-10-CM

## 2022-06-03 PROCEDURE — 3077F SYST BP >= 140 MM HG: CPT | Mod: CPTII,S$GLB,, | Performed by: PHYSICAL MEDICINE & REHABILITATION

## 2022-06-03 PROCEDURE — 3046F HEMOGLOBIN A1C LEVEL >9.0%: CPT | Mod: CPTII,S$GLB,, | Performed by: PHYSICAL MEDICINE & REHABILITATION

## 2022-06-03 PROCEDURE — 99205 PR OFFICE/OUTPT VISIT, NEW, LEVL V, 60-74 MIN: ICD-10-PCS | Mod: S$GLB,,, | Performed by: PHYSICAL MEDICINE & REHABILITATION

## 2022-06-03 PROCEDURE — 99205 OFFICE O/P NEW HI 60 MIN: CPT | Mod: S$GLB,,, | Performed by: PHYSICAL MEDICINE & REHABILITATION

## 2022-06-03 PROCEDURE — 1125F PR PAIN SEVERITY QUANTIFIED, PAIN PRESENT: ICD-10-PCS | Mod: CPTII,S$GLB,, | Performed by: PHYSICAL MEDICINE & REHABILITATION

## 2022-06-03 PROCEDURE — 72050 XR CERVICAL SPINE AP LAT WITH FLEX EXTEN: ICD-10-PCS | Mod: 26,,, | Performed by: RADIOLOGY

## 2022-06-03 PROCEDURE — 3077F PR MOST RECENT SYSTOLIC BLOOD PRESSURE >= 140 MM HG: ICD-10-PCS | Mod: CPTII,S$GLB,, | Performed by: PHYSICAL MEDICINE & REHABILITATION

## 2022-06-03 PROCEDURE — 4010F PR ACE/ARB THEARPY RXD/TAKEN: ICD-10-PCS | Mod: CPTII,S$GLB,, | Performed by: PHYSICAL MEDICINE & REHABILITATION

## 2022-06-03 PROCEDURE — 3008F BODY MASS INDEX DOCD: CPT | Mod: CPTII,S$GLB,, | Performed by: PHYSICAL MEDICINE & REHABILITATION

## 2022-06-03 PROCEDURE — 72110 XR LUMBAR SPINE AP AND LAT WITH FLEX/EXT: ICD-10-PCS | Mod: 26,,, | Performed by: RADIOLOGY

## 2022-06-03 PROCEDURE — 72110 X-RAY EXAM L-2 SPINE 4/>VWS: CPT | Mod: TC

## 2022-06-03 PROCEDURE — 99999 PR PBB SHADOW E&M-EST. PATIENT-LVL III: ICD-10-PCS | Mod: PBBFAC,,, | Performed by: PHYSICAL MEDICINE & REHABILITATION

## 2022-06-03 PROCEDURE — 72110 X-RAY EXAM L-2 SPINE 4/>VWS: CPT | Mod: 26,,, | Performed by: RADIOLOGY

## 2022-06-03 PROCEDURE — 3079F DIAST BP 80-89 MM HG: CPT | Mod: CPTII,S$GLB,, | Performed by: PHYSICAL MEDICINE & REHABILITATION

## 2022-06-03 PROCEDURE — 72050 X-RAY EXAM NECK SPINE 4/5VWS: CPT | Mod: 26,,, | Performed by: RADIOLOGY

## 2022-06-03 PROCEDURE — 4010F ACE/ARB THERAPY RXD/TAKEN: CPT | Mod: CPTII,S$GLB,, | Performed by: PHYSICAL MEDICINE & REHABILITATION

## 2022-06-03 PROCEDURE — 72050 X-RAY EXAM NECK SPINE 4/5VWS: CPT | Mod: TC

## 2022-06-03 PROCEDURE — 3046F PR MOST RECENT HEMOGLOBIN A1C LEVEL > 9.0%: ICD-10-PCS | Mod: CPTII,S$GLB,, | Performed by: PHYSICAL MEDICINE & REHABILITATION

## 2022-06-03 PROCEDURE — 1125F AMNT PAIN NOTED PAIN PRSNT: CPT | Mod: CPTII,S$GLB,, | Performed by: PHYSICAL MEDICINE & REHABILITATION

## 2022-06-03 PROCEDURE — 99999 PR PBB SHADOW E&M-EST. PATIENT-LVL III: CPT | Mod: PBBFAC,,, | Performed by: PHYSICAL MEDICINE & REHABILITATION

## 2022-06-03 PROCEDURE — 3008F PR BODY MASS INDEX (BMI) DOCUMENTED: ICD-10-PCS | Mod: CPTII,S$GLB,, | Performed by: PHYSICAL MEDICINE & REHABILITATION

## 2022-06-03 PROCEDURE — 3079F PR MOST RECENT DIASTOLIC BLOOD PRESSURE 80-89 MM HG: ICD-10-PCS | Mod: CPTII,S$GLB,, | Performed by: PHYSICAL MEDICINE & REHABILITATION

## 2022-06-03 RX ORDER — MELOXICAM 15 MG/1
15 TABLET ORAL DAILY
Qty: 30 TABLET | Refills: 2 | Status: SHIPPED | OUTPATIENT
Start: 2022-06-03 | End: 2022-07-03

## 2022-06-03 RX ORDER — ACETAMINOPHEN 500 MG
500-1000 TABLET ORAL 3 TIMES DAILY PRN
Refills: 0 | COMMUNITY
Start: 2022-06-03 | End: 2022-07-29

## 2022-06-03 NOTE — PATIENT INSTRUCTIONS
Neck/Back Pain [General]    Both neck and back pain are usually caused by injury to the muscles or ligaments of the spine. Sometimes the disks that separate each bone of the spine may cause pain by putting pressure on a nearby nerve. Back and neck pain may appear after a sudden twisting/bending force (such as in a car accident), or sometimes after a simple awkward movement. In either case, muscle spasm is often present and adds to the pain.  Acute neck and back pain usually gets better in one to two weeks. Pain related to disk disease, arthritis in the spinal joints or spinal stenosis (narrowing of the spinal canal) can become chronic and last for months or years.  Home Care:  FOR NECK PAIN: Use a comfortable pillow that supports the head and keeps the spine in a neutral position. The position of the head should not be tilted forward or backward.  FOR BACK PAIN: You may need to stay in bed the first few days. But, as soon as possible, begin sitting or walking to avoid problems with prolonged bed rest (muscle weakness, worsening back stiffness and pain, blood clots in the legs).  When in bed, try to find a position of comfort. A firm mattress is best. Try lying flat on your back with pillows under your knees. You can also try lying on your side with your knees bent up towards your chest and a pillow between your knees.  Avoid prolonged sitting. This puts more stress on the lower back than standing or walking.  During the first two days after injury, apply an ICE PACK to the painful area for 20 minutes every 2-4 hours. This will reduce swelling and pain. HEAT (hot shower, hot bath or heating pad) works well for muscle spasm. You can start with ice, then switch to heat after two days. Some patients feel best alternating ice and heat treatments. Use the one method that feels the best to you.  You may use acetaminophen (Tylenol) or ibuprofen (Motrin, Advil) to control pain, unless another pain medicine was prescribed.  [NOTE: If you have chronic liver or kidney disease or ever had a stomach ulcer or GI bleeding, talk with your doctor before using these medicines.]  Be aware of safe lifting methods and do not lift anything over 15 pounds until all the pain is gone.  Follow Up  with your physician or this facility if your symptoms do not start to improve after one week. Physical therapy or further tests may be needed.  [NOTE: If X-rays were taken, they will be reviewed by a radiologist. You will be notified of any new findings that may affect your care.]  Get Prompt Medical Attention  if any of the following occur:  Pain becomes worse or spreads into your arms or legs  Weakness, numbness or pain in one or both arms or legs  Loss of bowel or bladder control  Numbness in the groin area  Difficulty walking  Fever of 100.4ºF (38ºC) or higher, or as directed by your healthcare provider  © 2615-1125 [place] [address]. All rights reserved. This information is not intended as a substitute for professional medical care. Always follow your healthcare professional's instructions.    Back Exercises: Back Press  Do this exercise on your hands and knees. Keep your knees under your hips and your hands under your shoulders. Keep your spine in a neutral position (not arched or sagging). Be sure to maintain your necks natural curve.  Tighten your abdominal and buttocks muscles to press your back upward. Let your head drop slightly.  Hold for 5 seconds. Return to starting position.  Repeat 5 times.     © 1913-2415 [facility], [address] [place] [address]. All rights reserved. This information is not intended as a substitute for professional medical care. Always follow your healthcare professional's instructions.    Back Exercises: Back Release  Do this exercise on your hands and knees. Keep your knees under your hips and your hands under your shoulders. Keep your spine in a neutral position  (not arched or sagging). Be sure to maintain your necks natural curve.  Relax your abdominal and buttocks muscles, lift your head, and let your back sag. Be sure to keep your weight evenly distributed. Dont sit back on your hips.   Hold for 5 seconds.  Return to starting position.  Repeat 5 times.  © 8807-9110 [place] [address]. All rights reserved. This information is not intended as a substitute for professional medical care. Always follow your healthcare professional's instructions.    Back Exercises: Bridge  The Bridge exercise strengthens your abdominal, buttocks, and hamstring muscles. This helps keep your back stable and aligned when you walk.  Lie on the floor with your back and palms flat. Bend your knees. Keep your feet flat on the floor.  Contract your abdominal and buttocks muscles. Slowly lift your buttocks off the floor until there is a straight line from your knees to your shoulders.  Hold for 5  seconds. Repeat 10 times.    © 6796-4241 [place] [address]. All rights reserved. This information is not intended as a substitute for professional medical care. Always follow your healthcare professional's instructions.    Back Exercises: Elbow Press    To start, lie face down on your stomach, feet slightly apart, forehead on the floor. Breathe deeply. You should feel comfortable and relaxed in this position.  Press up on your forearms. Keep your abdomen and hips on the floor.  Hold for 20 seconds. Lower slowly.  Repeat 2 times.  Return to starting position.  © 6273-5663 [place] [address]. All rights reserved. This information is not intended as a substitute for professional medical care. Always follow your healthcare professional's instructions.    Back Exercises: Pelvic Tilt  To start, lie on your back with your knees bent and feet flat on the floor. Dont press your neck or lower  back to the floor. Breathe deeply. You should feel comfortable and relaxed in this position.  Tighten your abdomen and buttocks, and press your lower back toward the floor. This should be a small, subtle movement.  Hold for 5 seconds. Release.  Repeat 5 times.         © 8780-8638 [facility], [address] [place] [address]. All rights reserved. This information is not intended as a substitute for professional medical care. Always follow your healthcare professional's instructions.    Back Exercises: Partial Curl-Ups          To start, lie on your back with your knees bent and feet flat on the floor. Dont press your neck or lower back to the floor. Breathe deeply. You should feel comfortable and relaxed in this position.  Cross your arms loosely.  Tighten your abdomen and curl [facility] up, keeping your head in line with your shoulders.  Hold for 5 seconds. Uncurl to lie down.  Repeat 5 times.   © 3139-5281 Brandyn [facility], [address] [place] [address]. All rights reserved. This information is not intended as a substitute for professional medical care. Always follow your healthcare professional's instructions.    Back Exercises: Lower Back Stretch                            To start, sit in a chair with your feet flat on the floor. Shift your weight slightly forward to avoid rounding your back. Relax, and keep your ears, shoulders, and hips aligned.  Sit with your feet well apart.  Bend forward and touch the floor with the backs of your hands. Relax and let your body drop.  Hold for 20 seconds. Return to starting position.  Repeat 2 times.   © 3893-9854 [employer], [address] [place] [address]. All rights reserved. This information is not intended as a substitute for professional medical care. Always follow your healthcare professional's instructions.    Back Exercises: Seated Rotation      To start, sit in a chair with your feet flat on the floor. Shift your weight  slightly forward to avoid rounding your back. Relax, and keep your ears, shoulders, and hips aligned.  Fold your arms, elbows just below shoulder height.  Turn from the waist with hips forward. Turn your head last.  Hold for a count of 5. Return to starting position.  Repeat 5 times on one side. Then switch sides.  © 8192-3935 Brand[facility], [address] [place] [address]. All rights reserved. This information is not intended as a substitute for professional medical care. Always follow your healthcare professional's instructions.    Back Exercises: Side Stretch  To start, sit in a chair with your feet flat on the floor. Shift your weight slightly forward to avoid rounding your back. Relax. Keep your ears, shoulders, and hips aligned.  Stretch your right arm overhead.  Slowly bend to the left. Dont twist your torso.  Hold for 20 seconds. Return to starting position.  Repeat 2 times. Then, switch to the other side.  © 5200-2293 [place] [address]. All rights reserved. This information is not intended as a substitute for professional medical care. Always follow your healthcare professional's instructions    Shoulder Clock Exercise         To start, stand tall with your ears, shoulders, and hips in line. Your feet should be slightly apart, positioned just under your hips. Focus your eyes directly in front of you.  this position for a few seconds before starting your exercise. This helps increase your awareness of proper posture.  Imagine that your right shoulder is the center of a clock. With your shoulder, slowly trace the outer edge of the clock.  Move clockwise first, then counterclockwise.  Repeat 3 times. Switch shoulders.  Shoulder and Upper Back Stretch  To start, stand tall with your ears, shoulders, and hips in line. Your feet should be slightly apart, positioned just under your hips. Focus your eyes directly in front of you.  this position  for a few seconds before starting your exercise. This helps increase your awareness of proper posture.  Reach overhead and slightly back with both arms. Keep your shoulders and neck aligned and your elbows behind your shoulders.  With your palms facing the ceiling, turn your fingers inward.  Take a deep breath. Breathe out and lower your elbows toward your buttocks. Hold for 5 seconds, then return to starting position.  Repeat 3 times.             Shoulder Girdle Stretch        To start, sit in a chair with your feet flat on the floor. Your weight should be slightly forward so that youre balanced evenly on your buttocks. Relax your shoulders and keep your head level. Using a chair with arms may help you keep your balance.  Place one hand on the outside elbow of the other arm.  Pull the arm across your body. Hold for 30-60 seconds. Repeat once.  Switch sides.      Shoulder Isometric Exercises                To start, sit in a chair with your feet flat on the floor. Your weight should be slightly forward so that youre balanced evenly on your buttocks. Relax your shoulders and keep your head level. Avoid arching your back or rounding your shoulders. Using a chair with arms may help you keep your balance.  Raise your arms, elbows bent, to shoulder height.  Slowly move your forearms together. Hold for 5 seconds.  Return to starting position. Repeat 5 times.    © 7807-6538 Brandyn [facility], [address] [place] [address], [place] [address]. All rights reserved. This information is not intended as a substitute for professional medical care. Always follow your healthcare professional's instructions.

## 2022-06-03 NOTE — PROGRESS NOTES
Subjective:       Patient ID: Eyad Garcia is a 66 y.o. male.    Chief Complaint: No chief complaint on file.      HPI      Mr. Garcia is a 66-year-old black male with past medical history of hypertension, diabetes mellitus, hyperlipidemia, osteoarthritis of multiple joints, prostate cancer status post radiation therapy.  He was referred to the [facility] Medicine [facility] for help with bilateral upper and lower extremity pain.    The patient reports that he started complaining of bilateral arm and neck pain about 2 weeks ago.  He denies any preceding injuries.  He was evaluated at the emergency department on 05/29/2022.  CPK level was within normal (although he has history of severe elevation up to 03/14/2016).  There was no evidence of rhabdomyolysis, electrolyte abnormalities or kidney problems.  He was asked to hold Lipitor.  He was discharged to the care of his Primary Care Provider Dr. Choi).  He was evaluated by his Primary Care Provider on 05/31/2022.  He indicated some weakness and trouble getting out of bed.  He was referred to rheumatology (he has an appointment with Dr. Gonsales on 06/28/2022), Sports Medicine and Physical Medicine and Rehabilitation.    The patient was seen by Dr. Kohli from Sports Medicine on 05/31/2022, focusing mostly on his bilateral shoulder pain, worse on the left.  X-rays of the left shoulder showed DJD.  He received a subacromial injection with Toradol, reportedly with 100% immediate resolution of pain.    The patient is presenting to the Physical Medicine Clinic for management.  He continues to complain of pain in both arms and both legs.  His pain has been steady.    His bilateral lower extremity pain starts in the hip/buttocks and goes down to his feet.  He also has some associated lumbar pain.  His pain is almost constant.  It is described as tightness and sharp.  It is aggravated by walking.  It is better by sitting down although prolonged sitting also makes it worse.   His maximum pain is 10/10 and minimum 7-8/10.  Today it is 7-8/10.  The patient reports bilateral lower extremity weakness that gets better with movement.  He complains of some numbness in his calves.  He denies any bowel or bladder incontinence.  He reports occasional cramping in his legs with prolonged ambulation, better to some extent by sitting down and massage.  Of note is that the patient has history of OA of both knees and was previously followed up by Orthopedics.  He was managed conservatively.    His bilateral arm pain starts in the shoulders and goes down to his elbows and occasionally the hands.  He does not have any clear associated neck pain.  His pain is constant.  He describes it as stabbing.  It is aggravated by sitting still.  It is better to some extent by upper extremity movement.  His maximum pain is 7-8/10 and minimum 5-6/10.  Today it is 5-6/10.  He complains of occasional numbness in his fingers.  He denies any weakness.  He denies any impaired hand coordination.  He complains of mild gait imbalance but denies any falls.  He denies any muscle fasciculations.  He denies any dysphagia or dysarthria.  He denies any breathing impairments.    He is currently taking Indocin 50 mg tablets, 1 tablet twice per day.        Past Medical History:   Diagnosis Date    Cataract     Diabetes mellitus type II     Difficult intubation     Erectile dysfunction     History of colon polyps 1/25/2019    Hyperlipidemia     Hypertension     OA (osteoarthritis)     POAG (primary open-angle glaucoma)     Retinal detachment     OS        Review of patient's allergies indicates:   Allergen Reactions    Sulfa (sulfonamide antibiotics) Hives and Rash           Aspirin      Stomach upset        Review of Systems   Constitutional: Negative for chills, fatigue and fever.   Eyes: Negative for visual disturbance.   Respiratory: Negative for shortness of breath.    Cardiovascular: Negative for chest pain.    Gastrointestinal: Negative for blood in stool, constipation, nausea and vomiting.   Genitourinary: Negative for difficulty urinating.   Musculoskeletal: Positive for arthralgias, back pain and gait problem. Negative for neck pain.   Neurological: Negative for dizziness, weakness and headaches.   Psychiatric/Behavioral: Negative for behavioral problems and sleep disturbance.             Objective:      Physical Exam  Vitals reviewed.   Constitutional:       General: He is not in acute distress.     Appearance: He is well-developed.   HENT:      Head: Normocephalic and atraumatic.   Eyes:      Extraocular Movements: Extraocular movements intact.   Neck:      Comments: ROM: WNL.  -ve tenderness c-spine.  Spurling's test:     -ve to RUE.     -ve to LUE.    Musculoskeletal:      Comments: BUE:  ROM:   RUE: full.   LUE: decreased at shoulder (abduction active ROM: 45 deg, passive: 90 deg).  Strength:    RUE: 4/5 at shoulder abduction, 5 elbow flexion, 5 wrist extension, 5 elbow extension, 5 finger flexion, 5 finger abduction.   LUE: 3/5 at shoulder abduction, 5 elbow flexion, 5 wrist extension, 5 elbow extension, 5 finger flexion, 5 finger abduction.  Sensation to pinprick:   RUE: intact.   LUE: intact.  DTR:    RUE: +1 biceps, +1 triceps.   LUE:  +1 biceps, +1 triceps.  Vargas:   RUE: -ve.   LUE: -ve.    Impingement Signs:  Neer:  RUE: +ve    LUE: +ve  Aguilar: RUE: +ve    LUE: +ve     BLE:  ROM:   RLE: full.   LLE: full.  Knee crepitus:   RLE: +ve.   LLE: +ve.   Strength:    RLE: 4/5 at hip flexion, 5 knee extension, 5 ankle DF, 5 PF, 5 EHL.   LLE: 4/5 at hip flexion, 5 knee extension, 5 ankle DF, 5 PF, 5 EHL.  Sensation to pinprick:     RLE: intact.      LLE: intact.   DTR:     RLE: +2 knee, +1 ankle.    LLE: +2 knee, +1 ankle.  Clonus:    Rt ankle: -ve.    Lt ankle: -ve.  SLR:      RLE: -ve at 60 degrees.      LLE: +ve at 45 degrees.   GRISEL:     RLE: -ve.      LLE: -ve.  -ve tenderness over lumbar spine.  No leg  length discrepancy.    Directional Preference:  Spine flexion: 80 degrees , mild tightness in back.  Spine extension: 10 degrees, mild pain in back.  Lateral bending: mild pain to Right, mild pain to Left.      Heel walking: WNL.  Toe walking: WNL.  Gait: WNL     Skin:     General: Skin is warm.   Neurological:      Mental Status: He is alert.   Psychiatric:         Behavior: Behavior normal.         Assessment:       1. Pain in both lower extremities    2. Pain in both upper extremities    3. Primary osteoarthritis of both knees    4. Primary osteoarthritis of left shoulder        Plan:       Patient with hypertension, diabetes mellitus, and osteoarthritis of multiple joints.  Presenting with bilateral upper and lower extremities.  Some features suggestive of muscle weakness related to cholesterol lowering agents.  He also has some features of axial pain with radiculopathy in his back and possibly in his neck.  He already has history of OA of the knees.  He also has OA of the left shoulder for which she was seen by Sports Medicine.    - I will get x-rays of his lumbar and cervical spine to rule out any significant DJD contributing to his pain  - Discontinue in the medicine  - Start meloxicam 15 mg tablets, 1 tablet by mouth once daily  - Start acetaminophen 500 mg tablets, 1-2 tablets by mouth 3 times per day as needed for pain  - The patient was encouraged to adopt a regular Home Exercise Program, and provided with printouts.  - If no relief, electrodiagnostic studies may be ordered looking for myopathy, motor neuron disease or polyradiculopathy (such as diabetic amyotrophy).  - Follow up in about 4 months (around 10/3/2022).      This was a 60 minute visit, 50% of which was spent educating the patient about the diagnosis and the treatment plan.    This note was partly generated with [employer] voice recognition software. I apologize for any possible typographical errors.

## 2022-06-06 ENCOUNTER — OFFICE VISIT (OUTPATIENT)
Dept: PRIMARY CARE CLINIC | Facility: CLINIC | Age: 67
End: 2022-06-06
Payer: COMMERCIAL

## 2022-06-06 VITALS
OXYGEN SATURATION: 98 % | WEIGHT: 150.38 LBS | RESPIRATION RATE: 14 BRPM | BODY MASS INDEX: 24.17 KG/M2 | HEIGHT: 66 IN | HEART RATE: 81 BPM | SYSTOLIC BLOOD PRESSURE: 120 MMHG | TEMPERATURE: 97 F | DIASTOLIC BLOOD PRESSURE: 70 MMHG

## 2022-06-06 DIAGNOSIS — I15.2 HYPERTENSION ASSOCIATED WITH DIABETES: ICD-10-CM

## 2022-06-06 DIAGNOSIS — E11.36 TYPE 2 DIABETES MELLITUS WITH DIABETIC CATARACT, WITHOUT LONG-TERM CURRENT USE OF INSULIN: Primary | ICD-10-CM

## 2022-06-06 DIAGNOSIS — E11.69 HYPERLIPIDEMIA ASSOCIATED WITH TYPE 2 DIABETES MELLITUS: ICD-10-CM

## 2022-06-06 DIAGNOSIS — E11.59 HYPERTENSION ASSOCIATED WITH DIABETES: ICD-10-CM

## 2022-06-06 DIAGNOSIS — E78.5 HYPERLIPIDEMIA ASSOCIATED WITH TYPE 2 DIABETES MELLITUS: ICD-10-CM

## 2022-06-06 DIAGNOSIS — E11.9 TYPE 2 DIABETES MELLITUS WITHOUT COMPLICATION, WITHOUT LONG-TERM CURRENT USE OF INSULIN: ICD-10-CM

## 2022-06-06 PROCEDURE — 1101F PT FALLS ASSESS-DOCD LE1/YR: CPT | Mod: CPTII,S$GLB,, | Performed by: NURSE PRACTITIONER

## 2022-06-06 PROCEDURE — 1159F MED LIST DOCD IN RCRD: CPT | Mod: CPTII,S$GLB,, | Performed by: NURSE PRACTITIONER

## 2022-06-06 PROCEDURE — 3078F DIAST BP <80 MM HG: CPT | Mod: CPTII,S$GLB,, | Performed by: NURSE PRACTITIONER

## 2022-06-06 PROCEDURE — 95251 CONT GLUC MNTR ANALYSIS I&R: CPT | Mod: S$GLB,,, | Performed by: NURSE PRACTITIONER

## 2022-06-06 PROCEDURE — 99215 OFFICE O/P EST HI 40 MIN: CPT | Mod: S$GLB,,, | Performed by: NURSE PRACTITIONER

## 2022-06-06 PROCEDURE — 99999 PR PBB SHADOW E&M-EST. PATIENT-LVL V: ICD-10-PCS | Mod: PBBFAC,,, | Performed by: NURSE PRACTITIONER

## 2022-06-06 PROCEDURE — 99999 PR PBB SHADOW E&M-EST. PATIENT-LVL V: CPT | Mod: PBBFAC,,, | Performed by: NURSE PRACTITIONER

## 2022-06-06 PROCEDURE — 99215 PR OFFICE/OUTPT VISIT, EST, LEVL V, 40-54 MIN: ICD-10-PCS | Mod: S$GLB,,, | Performed by: NURSE PRACTITIONER

## 2022-06-06 PROCEDURE — 3008F BODY MASS INDEX DOCD: CPT | Mod: CPTII,S$GLB,, | Performed by: NURSE PRACTITIONER

## 2022-06-06 PROCEDURE — 3078F PR MOST RECENT DIASTOLIC BLOOD PRESSURE < 80 MM HG: ICD-10-PCS | Mod: CPTII,S$GLB,, | Performed by: NURSE PRACTITIONER

## 2022-06-06 PROCEDURE — 3008F PR BODY MASS INDEX (BMI) DOCUMENTED: ICD-10-PCS | Mod: CPTII,S$GLB,, | Performed by: NURSE PRACTITIONER

## 2022-06-06 PROCEDURE — 4010F PR ACE/ARB THEARPY RXD/TAKEN: ICD-10-PCS | Mod: CPTII,S$GLB,, | Performed by: NURSE PRACTITIONER

## 2022-06-06 PROCEDURE — 1126F PR PAIN SEVERITY QUANTIFIED, NO PAIN PRESENT: ICD-10-PCS | Mod: CPTII,S$GLB,, | Performed by: NURSE PRACTITIONER

## 2022-06-06 PROCEDURE — 3046F PR MOST RECENT HEMOGLOBIN A1C LEVEL > 9.0%: ICD-10-PCS | Mod: CPTII,S$GLB,, | Performed by: NURSE PRACTITIONER

## 2022-06-06 PROCEDURE — 95249 CONT GLUC MNTR PT PROV EQP: CPT | Mod: S$GLB,,, | Performed by: NURSE PRACTITIONER

## 2022-06-06 PROCEDURE — 3288F PR FALLS RISK ASSESSMENT DOCUMENTED: ICD-10-PCS | Mod: CPTII,S$GLB,, | Performed by: NURSE PRACTITIONER

## 2022-06-06 PROCEDURE — 3046F HEMOGLOBIN A1C LEVEL >9.0%: CPT | Mod: CPTII,S$GLB,, | Performed by: NURSE PRACTITIONER

## 2022-06-06 PROCEDURE — 1126F AMNT PAIN NOTED NONE PRSNT: CPT | Mod: CPTII,S$GLB,, | Performed by: NURSE PRACTITIONER

## 2022-06-06 PROCEDURE — 1160F RVW MEDS BY RX/DR IN RCRD: CPT | Mod: CPTII,S$GLB,, | Performed by: NURSE PRACTITIONER

## 2022-06-06 PROCEDURE — 1101F PR PT FALLS ASSESS DOC 0-1 FALLS W/OUT INJ PAST YR: ICD-10-PCS | Mod: CPTII,S$GLB,, | Performed by: NURSE PRACTITIONER

## 2022-06-06 PROCEDURE — 4010F ACE/ARB THERAPY RXD/TAKEN: CPT | Mod: CPTII,S$GLB,, | Performed by: NURSE PRACTITIONER

## 2022-06-06 PROCEDURE — 95251 PR GLUCOSE MONITOR, 72 HOUR, PHYS INTERP: ICD-10-PCS | Mod: S$GLB,,, | Performed by: NURSE PRACTITIONER

## 2022-06-06 PROCEDURE — 3288F FALL RISK ASSESSMENT DOCD: CPT | Mod: CPTII,S$GLB,, | Performed by: NURSE PRACTITIONER

## 2022-06-06 PROCEDURE — 1160F PR REVIEW ALL MEDS BY PRESCRIBER/CLIN PHARMACIST DOCUMENTED: ICD-10-PCS | Mod: CPTII,S$GLB,, | Performed by: NURSE PRACTITIONER

## 2022-06-06 PROCEDURE — 1159F PR MEDICATION LIST DOCUMENTED IN MEDICAL RECORD: ICD-10-PCS | Mod: CPTII,S$GLB,, | Performed by: NURSE PRACTITIONER

## 2022-06-06 PROCEDURE — 3074F PR MOST RECENT SYSTOLIC BLOOD PRESSURE < 130 MM HG: ICD-10-PCS | Mod: CPTII,S$GLB,, | Performed by: NURSE PRACTITIONER

## 2022-06-06 PROCEDURE — 3074F SYST BP LT 130 MM HG: CPT | Mod: CPTII,S$GLB,, | Performed by: NURSE PRACTITIONER

## 2022-06-06 PROCEDURE — 95249 PR GLUCOSE MONITORING, 72 HRS, SUB-Q SENSOR, PATIENT PROVIDED: ICD-10-PCS | Mod: S$GLB,,, | Performed by: NURSE PRACTITIONER

## 2022-06-06 RX ORDER — FLASH GLUCOSE SENSOR
1 KIT MISCELLANEOUS
Qty: 2 KIT | Refills: 11 | Status: SHIPPED | OUTPATIENT
Start: 2022-06-06 | End: 2022-08-01 | Stop reason: SDUPTHER

## 2022-06-06 RX ORDER — EMPAGLIFLOZIN 10 MG/1
10 TABLET, FILM COATED ORAL DAILY
Qty: 30 TABLET | Refills: 6 | Status: SHIPPED | OUTPATIENT
Start: 2022-06-06 | End: 2023-03-08

## 2022-06-06 NOTE — PROGRESS NOTES
HPI: Eyad Garcia is a 66 y.o.  male c/I for visit to address Diabetes Type 2  This is the first time I am seeing him for care, follows with Dr. Taylor Choi MD for primary care needs.   Has not seen endocrinology, but did see SUNG Acharya in the past.     was diagnosed with T2DM about 40+ years ago - he reports he was in his 20's when he was diagnosed.   Mother had T2dm - as well as many siblings, he is 1 of 12 children.   Has never been hospitalized r/t DM.  Denies missing doses of DM medication.     a1c up from 7.2% to 9.4% currently.   Metformin 1000 mg XR just in the morning time - he cannot tolerate higher doses.   He has tried tradjenta in the past but had some cramping in his legs, so stopped taking.   Began using personal cgm most recently -     Free style richmond 2 personal downloaded and reviewed today -   He is using with his cell phone.   See scanned in .   Average glucose is 186   53% time in range.   37% highs.   10% highs.   0% lows.     Exercise prohibited due to pain secondary to left rotator cuff tear and chronic pain.   But not always following ADA diet.   Admits likes sweets, occasional snowballs especially with hot weather.     Complications from diabetes and pertinent co-morbidities include:   Negative for DKA.   Has never taken insulin in life.   -negative for diabetic neuropathy.   -negative for nephropathy.   No microalbuminuria.   Eyes:  negative for Diabetic retinopathy - has cataracts. And glaucoma?    CV: Denies history of MI nor stroke.   CAD: Denies.  Takes aspirin 81mg tablet daily  BP: has history of HTN  Statin: Taking  ACE/ARB: Taking  Podiatry - seeing Dr. Alberto - last saw June 1st, 2022.   Prostate cancer?     Social History     Tobacco Use   Smoking Status Never Smoker   Smokeless Tobacco Never Used        Past medical History:   Past Medical History:   Diagnosis Date    Cataract     Diabetes mellitus type II     Difficult intubation      Erectile dysfunction     History of colon polyps 1/25/2019    Hyperlipidemia     Hypertension     OA (osteoarthritis)     POAG (primary open-angle glaucoma)     Retinal detachment     OS      Family hx:   Family History   Problem Relation Age of Onset    Diabetes Mother     Hypertension Mother     Glaucoma Mother     Diabetes Sister     Glaucoma Sister     Diabetes Brother     Glaucoma Brother     No Known Problems Father     Cancer Maternal Aunt     Colon cancer Maternal Aunt     No Known Problems Maternal Uncle     No Known Problems Paternal Aunt     No Known Problems Paternal Uncle     No Known Problems Maternal Grandmother     No Known Problems Maternal Grandfather     No Known Problems Paternal Grandmother     No Known Problems Paternal Grandfather     Anesthesia problems Neg Hx     Broken bones Neg Hx     Clotting disorder Neg Hx     Collagen disease Neg Hx     Dislocations Neg Hx     Osteoporosis Neg Hx     Rheumatologic disease Neg Hx     Scoliosis Neg Hx     Severe sprains Neg Hx     Amblyopia Neg Hx     Blindness Neg Hx     Cataracts Neg Hx     Macular degeneration Neg Hx     Retinal detachment Neg Hx     Strabismus Neg Hx     Stroke Neg Hx     Thyroid disease Neg Hx     Esophageal cancer Neg Hx       Current meds:   Current Outpatient Medications:     acetaminophen (TYLENOL) 500 MG tablet, Take 1-2 tablets (500-1,000 mg total) by mouth 3 (three) times daily as needed for Pain., Disp: , Rfl: 0    albuterol (VENTOLIN HFA) 90 mcg/actuation inhaler, Inhale 2 puffs into the lungs every 6 (six) hours as needed for Wheezing or Shortness of Breath (cough). Rescue, Disp: 18 g, Rfl: 11    alcohol swabs (ALCOHOL WIPES) PadM, Monitor as directed X 2 daily. Per insurance coverage. ICD 10 E11.9, Disp: 200 each, Rfl: 3    amLODIPine (NORVASC) 10 MG tablet, Take 1 tablet (10 mg total) by mouth once daily., Disp: 90 tablet, Rfl: 3    dorzolamide-timolol 2-0.5% (COSOPT)  22.3-6.8 mg/mL ophthalmic solution, Place 1 drop into both eyes 2 (two) times daily., Disp: 30 mL, Rfl: 3    famotidine (PEPCID) 40 MG tablet, Take 1 tablet (40 mg total) by mouth once daily., Disp: 90 tablet, Rfl: 3    flash glucose sensor (FREESTYLE ROME 14 DAY SENSOR) Kit, 1 each by Misc.(Non-Drug; Combo Route) route continuous., Disp: 6 kit, Rfl: 3    fluticasone propionate (FLONASE) 50 mcg/actuation nasal spray, 2 sprays (100 mcg total) by Each Nostril route 2 (two) times daily as needed for Rhinitis., Disp: 16 g, Rfl: 0    iron,carbonyl-vitamin C (VITRON-C) 65 mg iron- 125 mg TbEC, Take 1 tablet by mouth 2 (two) times a day., Disp: 180 tablet, Rfl: 0    latanoprost 0.005 % ophthalmic solution, Place 1 drop into both eyes every evening., Disp: 15 mL, Rfl: 3    losartan (COZAAR) 100 MG tablet, Take 1 tablet (100 mg total) by mouth once daily., Disp: 90 tablet, Rfl: 3    meloxicam (MOBIC) 15 MG tablet, Take 1 tablet (15 mg total) by mouth once daily., Disp: 30 tablet, Rfl: 2    metFORMIN (GLUCOPHAGE-XR) 500 MG ER 24hr tablet, Take 2 tablets (1,000 mg total) by mouth 2 (two) times daily with meals., Disp: 360 tablet, Rfl: 3    sars-cov-2, covid-19, (MODERNA COVID-19) 50 mcg/0.25 ml injection (BOOSTER), Inject into the muscle., Disp: 0.25 mL, Rfl: 0    empagliflozin (JARDIANCE) 10 mg tablet, Take 1 tablet (10 mg total) by mouth once daily., Disp: 30 tablet, Rfl: 6    FREESTYLE ROME 2 SENSOR Kit, Use as directed. Change every 14 (fourteen) days., Disp: 2 kit, Rfl: 11    Current Facility-Administered Medications:     leuprolide (6 month) injection 45 mg, 45 mg, Intramuscular, Q6 Months, Henrry Sampson Jr., MD, 45 mg at 03/23/22 1448     Current Diabetes medications:   Metformin 500 mg XR - takes 2 tablets in the morning.     Medications Tried and Failed:   tradjenta - cramping in legs.     Social:   Lives at home with: wife, and granddaughter 11 years old. (raising her - parents ).   Life  "changes/stressors currently: hurt shoulder   Diet: not always ADA diet now.   Meals: 3 per day and snacks.        Breakfast - grits/breakfast sausage or oatmeal - usually juice for lunch.        Lunch - snacks usually - fruits/tangerine or grapes.        Dinner - home cooked, red beans and rice, baked chicken. Squash and shrimp - fried seafood on Friday nights.        Snacks - occasional snowballs. Chocolate candies, icecream. Cookies.         Drinks - coke 1 per day, water, coffee  Exercise: none.   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      Glucose Monitoring:   Checking sugars with free style richmond 2 - new personal   See scanned in .     Standards of care:   Eyes: .: 03/28/2022  Foot exam: : 06/06/2022   Diabetes education: yes    Vital Signs  /70 (BP Location: Left arm, Patient Position: Sitting, BP Method: Medium (Manual))   Pulse 81   Temp 97.3 °F (36.3 °C) (Temporal)   Resp 14   Ht 5' 6" (1.676 m)   Wt 68.2 kg (150 lb 5.7 oz)   SpO2 98%   BMI 24.27 kg/m²     Pertinent Labs:   Hgba1c   Lab Results   Component Value Date    HGBA1C 9.4 (H) 03/29/2022    HGBA1C 7.2 (H) 09/27/2021    HGBA1C 7.3 (H) 07/02/2021     Lipid panel   Lab Results   Component Value Date    CHOL 151 03/29/2022    CHOL 140 09/27/2021    CHOL 145 10/01/2020     Lab Results   Component Value Date    HDL 38 (L) 03/29/2022    HDL 38 (L) 09/27/2021    HDL 43 10/01/2020     Lab Results   Component Value Date    LDLCALC 97.0 03/29/2022    LDLCALC 90.2 09/27/2021    LDLCALC 84.0 10/01/2020     Lab Results   Component Value Date    TRIG 80 03/29/2022    TRIG 59 09/27/2021    TRIG 90 10/01/2020     Lab Results   Component Value Date    CHOLHDL 25.2 03/29/2022    CHOLHDL 27.1 09/27/2021    CHOLHDL 29.7 10/01/2020      CMP  Glucose   Date Value Ref Range Status   05/29/2022 266 (H) 70 - 110 mg/dL Final     BUN   Date Value Ref Range " Status   05/29/2022 12 8 - 23 mg/dL Final     Creatinine   Date Value Ref Range Status   05/29/2022 0.9 0.5 - 1.4 mg/dL Final     eGFR if    Date Value Ref Range Status   05/29/2022 >60 >60 mL/min/1.73 m^2 Final     eGFR if non    Date Value Ref Range Status   05/29/2022 >60 >60 mL/min/1.73 m^2 Final     Comment:     Calculation used to obtain the estimated glomerular filtration  rate (eGFR) is the CKD-EPI equation.        AST   Date Value Ref Range Status   05/29/2022 11 10 - 40 U/L Final     ALT   Date Value Ref Range Status   05/29/2022 14 10 - 44 U/L Final     Microalbumin creatinine ratio:   Lab Results   Component Value Date    MICALBCREAT 6.5 09/27/2021       Review Of Systems:   Gen: Appetite good, no weight gain or loss, + fatigue, feeling cold a lot, Denies polydipsia.  Skin: Skin is intact and heals well, denies any rashes or hair changes.   EENT: Denies any acute visual disturbances, nor blurred vision.   Resp: Denies SOB or Dyspnea on exertion, denies cough.   Cardiac: Denies chest pain, palpitations, or swelling.   GI: Denies abdominal pain, nausea or vomiting, diarrhea, or constipation.   /GYN: Denies nocturia, nor burning, frequency or pain on urination.  MS/Neuro: Denies numbness/ tingling in BLE; Gait steady, speech clear  Psych: Denies drug/ETOH abuse, no hx of depression.  Other systems: negative.    Physical Exam:   GENERAL: Well developed, well nourished in appearance.   PSYCH: AAOx3, appropriate mood and affect, pleasant expression, conversant, appears relaxed, well groomed.   EYES: PERRL, Conjunctiva and corneas clear  NECK: Soft and Supple, trachea midline  CHEST: Even, regular, and unlabored respirations  ABDOMEN: Soft, non-tender, non-distended.  VASCULAR: pedal pulses palpable bilaterally, no edema.  NEURO:  cranial nerves II - XII intact   MUSCULOSKELETAL: Good ROM, steady gait.   SKIN: Skin warm, dry, and intact     Assessment and Plan of Care:      Eyad was seen today for diabetes.    Diagnoses and all orders for this visit:    Type 2 diabetes mellitus with diabetic cataract, without long-term current use of insulin  -     FREESTYLE ROME 2 SENSOR Kit; Use as directed. Change every 14 (fourteen) days.  -     Hemoglobin A1C; Future  -     Comprehensive Metabolic Panel; Future    Type 2 diabetes mellitus without complication, without long-term current use of insulin  -     Ambulatory referral/consult to Internal Medicine    Hypertension associated with diabetes    Hyperlipidemia associated with type 2 diabetes mellitus  -     Lipid Panel; Future    Other orders  -     empagliflozin (JARDIANCE) 10 mg tablet; Take 1 tablet (10 mg total) by mouth once daily.         1. T2DM with hyperglycemia- Hgba1c goal is 7.5% or less without hypoglycemia - 7.3%--> 7.2%--> 9.4% current   discussed DM, progression of disease, long term complications, CV risk factors and tx options.   Advise compliance with ADA diet and encourage exercise  Continue metformin 1000mg XR in the morning time -   Failed tradjenta in past.   He is at goal weight - would use caution in starting a glp1 b/c of weight loss. Will hold off for now.   Patient is not wanting insulin - I think we could -   Start sglt2i. jardiance 10mg tablet daily.   Take 1 tablet po daily.   Discussed MOA, possible side effects including yeast infection, UTI, dehydration and ketoacidosis, importance of maintaining hydration and avoiding No carb diets. Good use hygiene. Notify my office if any side effects. Will monitor renal function closely. Stable at present.   Reviewed  hypoglycemia, s/s and appropriate tx. Have/get quick acting glucose tablets at hand.   Instructed to monitor Blood glucose 2 - 4x/day and bring meter/ log to every clinic visit.     2. HTN- controlled, continue meds as previously prescribed and monitor.   No urine mac.     3. Lipids- LDL goal < 100. At goal minimally   Is not on statin anymore b/c he  was having severe leg pain - will consider adding zetia to his regimen at next visit if his cholesterol goes up.     4. Weight - BMI Body mass index is 24.27 kg/m².   Encourage Ada diet and exercise.     5. Renal Function - stable. No nephropathy.   No urine mac.   Starting jardiance for renal protection.          Follow up in 8 weeks with OV and labs prior.

## 2022-06-10 ENCOUNTER — TELEPHONE (OUTPATIENT)
Dept: DIABETES | Facility: CLINIC | Age: 67
End: 2022-06-10

## 2022-06-10 ENCOUNTER — NUTRITION (OUTPATIENT)
Dept: DIABETES | Facility: CLINIC | Age: 67
End: 2022-06-10
Payer: COMMERCIAL

## 2022-06-10 DIAGNOSIS — E11.36 TYPE 2 DIABETES MELLITUS WITH DIABETIC CATARACT, WITHOUT LONG-TERM CURRENT USE OF INSULIN: Primary | ICD-10-CM

## 2022-06-10 PROCEDURE — G0108 DIAB MANAGE TRN  PER INDIV: HCPCS | Mod: S$GLB,,, | Performed by: DIETITIAN, REGISTERED

## 2022-06-10 PROCEDURE — G0108 PR DIAB MANAGE TRN  PER INDIV: ICD-10-PCS | Mod: S$GLB,,, | Performed by: DIETITIAN, REGISTERED

## 2022-06-10 PROCEDURE — 95249 CONT GLUC MNTR PT PROV EQP: CPT | Mod: S$GLB,,, | Performed by: DIETITIAN, REGISTERED

## 2022-06-10 PROCEDURE — 95249 PR GLUCOSE MONITORING, 72 HRS, SUB-Q SENSOR, PATIENT PROVIDED: ICD-10-PCS | Mod: S$GLB,,, | Performed by: DIETITIAN, REGISTERED

## 2022-06-10 NOTE — TELEPHONE ENCOUNTER
Miriam just wanted to touch base with you. I am a little concerned Mr. Garcia came in today and has been feeling very tired with loss of appetite, and generalized malaise. He explained that after seeing you he started jardiance and cut out the carbohydrates you instructed him to cut out.  He is so fatigued and malaise he needs to get his wife to tie his shoes - which isn't like him.  I am worried he may be experiencing Euglycemic DKA. What instructions do you want me to give Mr. Garcia?

## 2022-06-10 NOTE — PROGRESS NOTES
Diabetes Care Specialist Progress Note  Author: Anupama Brian RD, CDE  Date: 6/10/2022    Program Intake  Reason for Diabetes Program Visit:: Intervention  Type of Intervention:: Individual  Individual: Education  Education: Nutrition and Meal Planning, Pattern Management  Device Training: Personal CGM  Current diabetes risk level:: moderate (HgbA1c 9.4)  In the last 12 months, have you:: none  Permission to speak with others about care:: no    Lab Results   Component Value Date    HGBA1C 9.4 (H) 03/29/2022       Clinical    Patient Health Rating  Compared to other people your age, how would you rate your health?: Good    Problem Review  Reviewed Problem List with Patient: yes  Active comorbidities affecting diabetes self-care.: yes  Comorbidities: Other (comment), Gastrointestinal Disorder, Cardiovascular Disease, Hypertension (pain, ED, retina detatchment, prostate CA, iron deficiency anemia)  Reviewed health maintenance: yes    Clinical Assessment  Current Diabetes Treatment: Oral Medication  Have you ever experienced hypoglycemia (low blood sugar)?: no  Have you ever experienced hyperglycemia (high blood sugar)?: no    Medication Information  How do you obtain your medications?: Mail order  How many days a week do you miss your medications?: Never (medication was changed to jardiance and meformin)  Do you use a pill box or medication chart to help you manage your medications?: Pill box  Do you sometimes have difficulty refilling your medications?: No  Medication adherence impacting ability to self-manage diabetes?: No    Labs  Do you have regular lab work to monitor your medications?: No  Type of Regular Lab Work: A1c, Cholesterol, Microalbumin, CBC, BMP  Where do you get your labs drawn?: [facility]  Lab Compliance Barriers: No    Nutritional Status  Diet: Regular  Meal Plan 24 Hour Recall: Breakfast, Lunch, Snack, Dinner  Meal Plan 24 Hour Recall - Breakfast: grits, sausage - apple juice or oatmeal and a  banana  Meal Plan 24 Hour Recall - Lunch: tunafish sandwich - water or skipped  Meal Plan 24 Hour Recall - Dinner: ribeye with green beans  Meal Plan 24 Hour Recall - Snack: apple sauce squeeze tube 2 chocolate silver bells, occassionally will have chips, drinks water: and 1 regular cold drink/soda a day  Change in appetite?: No  Dentation:: Intact  Recent Changes in Weight: No Recent Weight Change  Current nutritional status an area of need that is impacting patient's ability to self-manage diabetes?: Yes    Additional Social History    Support  Does anyone support you with your diabetes care?: yes  Who supports you?: spouse, self  Who takes you to your medical appointments?: self, spouse  Does the current support meet the patient's needs?: Yes  Is Support an area impacting ability to self-manage diabetes?: No    Access to Mass Media & Technology  Does the patient have access to any of the following devices or technologies?: Smart phone  Media or technology needs impacting ability to self-manage diabetes?: No    Cognitive/Behavioral Health  Alert and Oriented: Yes  Difficulty Thinking: No  Requires Prompting: No  Requires assistance for routine expression?: No  Cognitive or behavioral barriers impacting ability to self-manage diabetes?: No    Culture/Protestant  Culture or Hoahaoism beliefs that may impact ability to access healthcare: No    Communication  Language preference: English  Hearing Problems: No  Vision Problems: Yes  Vision problem type:: Decreased Vision  Vision Assistance: Glasses  Communication needs impacting ability to self-manage diabetes?: No    Health Literacy  Preferred Learning Method: Face to Face, Reading Materials, Demonstration  How often do you need to have someone help you read instructions, pamphlets, or written material from your doctor or pharmacy?: Sometimes  Health literacy needs impacting ability to self-manage diabetes?: No      Diabetes Self-Management Skills Assessment    Diabetes  Disease Process/Treatment Options  Patient/caregiver able to state what happens when someone has diabetes.: yes  Patient/caregiver knows what type of diabetes they have.: yes  Diabetes Type : Type II  Patient/caregiver able to identify at least three signs and symptoms of diabetes.: yes  Identified signs and symptoms:: frequent urination, increased thirst  Patient able to identify at least three risk factors for diabetes.: yes  Identified risk factors:: family history  Diabetes Disease Process/Treatment Options: Skills Assessment Completed: Yes  Assessment indicates:: Adequate understanding  Area of need?: No    Nutrition/Healthy Eating  Challenges to healthy eating:: portion control, other (see comments) (sugary beverage)  Method of carbohydrate measurement:: plate method  Patient can identify foods that impact blood sugar.: yes  Patient-identified foods:: starches (bread, pasta, rice, cereal), sweets, starchy vegetables (corn, peas, beans), soda, fruit/fruit juice, milk, yogurt  Nutrition/Healthy Eating Skills Assessment Completed:: Yes  Assessment indicates:: Instruction Needed  Area of need?: Yes    Physical Activity/Exercise  Patient's daily activity level:: lightly active  Patient formally exercises outside of work.: no  Reasons for not exercising:: time constraints  Patient can identify forms of physical activity.: yes  Stated forms of physical activity:: moving to burn calories, recreational activities, yardwork, housework  Patient can identify reasons why exercise/physical activity is important in diabetes management.: no  Physical Activity/Exercise Skills Assessment Completed: : Yes  Assessment indicates:: Adequate understanding  Area of need?: No    Medications  Patient is able to describe current diabetes management routine.: yes  Diabetes management routine:: oral medications  Patient is able to identify current diabetes medications, dosages, and appropriate timing of medications.: yes  Patient  understands the purpose of the medications taken for diabetes.: yes  Patient reports problems or concerns with current medication regimen.: yes  Medication regimen problems/concerns:: concerned about side effects  Medication Skills Assessment Completed:: Yes  Assessment indicates:: Instruction Needed  Area of need?: Yes    Patient is experiencing extreme weakness since starting jardiance and lower carbohydrate diet.   Recommended consuming 15-30 grams of carbohydrates at a meal. My concern is the risk of developing ketoacidosis while on low carb diet with use of jardiance.  Patient encouraged to increase water intake    Home Blood Glucose Monitoring  Patient states that blood sugar is checked at home daily.: yes  Monitoring Method:: personal continuous glucose monitor  Reasons for not monitoring:: finger pain, needs training  Personal CGM type:: freestlye libre2  Patient is able to use personal CGM appropriately.: no  CGM Report reviewed?: no  Home Blood Glucose Monitoring Skills Assessment Completed: : Yes  Assessment indicates:: Instruction Needed, Knowledge deficit  Area of need?: Yes (starting freestyle richmond today)    CGM will allow us to remotely monitor patients glucose levels and will help us make any necessary adjustments to their diabetes regimen; while keeping our patients safe and our staff informed during the [facility]    Acute Complications  Patient is able to identify types of acute complications: Yes  Patient Identified:: Hypoglycemia, Hyperglycemia  Patient is able to state the basic meaning of hypoglycemia?: Yes  Able to state the blood sugar range for hypoglycemia?: yes  Patient stated range:: <70  Patient can identify general symptoms of hypoglycemia: yes  Patient identified:: shakiness  Able to state proper treatment of hypoglycemia?: yes  Patient identified:: 1/2 can soda/fruit juice  Patient is able to state the basic meaning of hyperglycemia?: Yes  Able to state the blood sugar range for  hyperglycemia?: yes  Patient stated range:: >250  Patient able to state proper treatment of hyperglycemia?: no (see comments)  Patient able to verbalize sick day plan?: no  Acute Complications Skills Assessment Completed: : Yes  Assessment indicates:: Instruction Needed  Area of need?: Yes    Chronic Complications  Patient can identify major chronic complications of diabetes.: yes  Stated chronic complications:: kidney disease, neuropathy/nerve damage, retinopathy  Patient can identify ways to prevent or delay diabetes complications.: yes  Stated ways to prevent complications:: controlling blood sugar  Patient is aware that having diabetes increases risk of heart disease?: Yes  Patient is aware that heart disease is the leading cause of death and disability in people with diabetes?: Yes  Patient able to state risk factors for heart disease?: Yes  Patient stated risk factors for heart disease:: High blood pressure, High cholesterol, Having diabetes  Patient is taking statin?: Yes  Chronic Complications Skills Assessment Completed: : Yes  Assessment indicates:: Adequate understanding  Area of need?: Yes    Psychosocial/Coping  Patient can identify ways of coping with chronic disease.: yes  Patient-stated ways of coping with chronic disease:: support from loved ones  Psychosocial/Coping Skills Assessment Completed: : Yes  Assessment indicates:: Adequate understanding  Area of need?: No      Diabetes Self Support Plan    Diabetes Self-Management Support Plan  Stress management:: family  Medication:: pharmacy  Review status:: Patient has selected and agrees to support plan., Patient was provided Diabetes Self-Management Support Plan document that includes support options.    Assessment Summary and Plan    Based on today's diabetes care assessment, the following areas of need were identified:      Social 6/10/2022   Support No   Access to Mass Media/Tech No   Cognitive/Behavioral Health No   Culture/Worship No    Communication No   Health Literacy No        Clinical 6/10/2022   Medication Adherence No   Lab Compliance No   Nutritional Status Yes        Diabetes Self-Management Skills 6/10/2022   Diabetes Disease Process/Treatment Options No   Nutrition/Healthy Eating Yes   Physical Activity/Exercise No   Medication Yes   Home Blood Glucose Monitoring Yes   Acute Complications Yes   Chronic Complications Yes   Psychosocial/Coping No          Today's interventions were provided through individual discussion, instruction, and written materials were provided.      Patient verbalized understanding of instruction and written materials.  Pt was able to return back demonstration of instructions today. Patient understood key points, needs reinforcement and further instruction.     Diabetes Self-Management Care Plan:    Today's Diabetes Self-Management Care Plan was developed with Eyad's input. Eyad has agreed to work toward the following goal(s) to improve his/her overall diabetes control.      Care Plan: Diabetes Management   Updates made since 5/11/2022 12:00 AM      Problem: Medications       Goal: Patient Agrees to take Diabetes Medication(s) metformin and jardiance as prescribed, stop jentaduato.    Start Date: 5/13/2022   Expected End Date: 6/13/2022   This Visit's Progress: On track   Recent Progress: Deferred   Priority: High   Barriers: No Barriers Identified   Note:    Discussed MOA, onset, side effects, dosage of meds.   Provided with strategies for daily medication adherence (phone alarms, medication reminder apps, medication list, pill organizer, pill packing, pharmacy delivery options).    Discussed any concerns about regimen.    Reviewed significance of mealtimes and medication administration.    Reviewed correction scale insulin with mealtimes as prescribed.      Answered patient's questions regarding if he will ever be able to get off medication.   Reviewed need for medication and challenges of glucose  control with steroid treatments and stress.   The patient was instructed on storage of insulin and/or injectable medication, precautions related to hyper/hypoglycemia.    Patient was given instructions on when/who to call with problems.      Task: Reviewed with patient all current diabetes medications and provided basic review of the purpose, dosage, frequency, side effects, and storage of both oral and injectable diabetes medications. Completed 5/20/2022      Task: Reviewed possible resources for acquiring cost prohibitive medication. Completed 5/20/2022      Task: Discussed guidelines for preventing, detecting and treating hypoglycemia and hyperglycemia and reviewed the importance of meal and medication timing with diabetes mediations for prevention of hypoglycemia and maximum drug benefit. Completed 5/20/2022      Task: Reviewed and reconciled current medication list today. Completed 5/20/2022      Task: Discussed any concerns regarding the current medication regimen. Completed 5/20/2022      Task: Reviewed possible side effects of all oral and injectable diabetes medications. Completed 5/20/2022      Task: Discussed and reviewed why meal and medication timing is important with diabetes medications. Completed 5/20/2022      Task: Reviewed action, peak, duration, dosing, and proper storage guidelines of all diabetes medications.       Task: Instructed on how to use a correction scale with mealtime insulin as prescribed.       Task: Instructed on proper injection technique for self-administration of an injectable medication.       Task: reviewed appropriate injection site selection and importance of rotating sites.       Task: Discussed importance of changing syringe/pen needle after each injection.       Task: Reviewed sources of Carbohydrate: Starch, Milk, Fruit, Sugar, and nonstarchy vegetables       Task: Instructed on application of IC, CF ratio using written examples.       Task: Discussed guidelines for  preventing, detecting and treating hypoglycemia and hyperglycemia and reviewed the importance of meal and medication timing with diabetes mediations for prevention of hypoglycemia and maximum drug benefit. Completed 6/10/2022      Problem: Acute Complications       Goal: Patient agrees to identify and manage signs and symptoms of high/low blood sugar (hyper/hypoglycemia) by keeping a log of events and using proper treatment.    Start Date: 5/13/2022   Expected End Date: 8/13/2022   This Visit's Progress: On track   Recent Progress: Deferred   Priority: Medium   Barriers: No Barriers Identified   Note:    Hyperglycemia  ABC's of Diabetes    Discussed importance of A1c less than 6.0 to reduce risk of micro and macro complications, controlled Blood Pressure 130/80 and Cholesterol Lab Values--Total Cholesterol <200mg, LDL < 100, HDL >45 men and >50 women, Triglycerides <150mg for prevention heart disease, heart attack, stroke.   how to use a glucometer   reviewed understanding diabetes distress   reviewed current level and goal level for HgbA1c, blood glucose, microalbumin, and lipids   reviewed signs/symptoms of hyperglycemia   reviewed what level blood sugar is considered high    reviewed at what level to contact the doctor and/or go to the emergency room.    Reviewed what patient can do to decrease blood sugar (ie drink more water, exercise, take medication as prescribed, monitor blood glucose)     Hypoglycemia  Reviewed blood glucose goals, prevention, detection, and treatment of hypoglycemia, and when to contact the clinic.   reviewed signs/symptoms of hypoglycemia   reviewed what level blood sugar is considered low    reviewed at what level to contact the doctor and/or go to the emergency room.    Reviewed what patient can do to increase blood sugar (ie drink juice or ½ can soda, eat 5-6 pieces of candy, 4 glucose tablets, 1 tbsp sugar or honey, glucagon)   Reviewed when glucagon should be  used   Reviewed how to use glucagon device (injections and inhaled)    Freestyle Barak Education  Patient is here in clinic today for initial start of Freestyle Barak continuous glucose monitoring system (CGMA). Reviewed with patient how to insert sensor. Patient inserted sensor on left arm tricep region. Hypoglycemia trigger set for 70 and hyperglycemia set for 180. Patient provided with phone number for 24-hour help line if needed. Down loaded Barak view twan. Patient will use his phone to scan sensor and get readings. Patient accepted the invitation to share data with our clinic and will follow-up in 2 weeks. Questions addressed. Initialization finished. No futher questions.       Task: Reviewed proper treatment of hypoglycemia with the rule of 15--patient to eat 15g simple carbohydrate (4 glucose tablets, 1 glucose gel, 5 pieces hard candy, ½ cup fruit juice, ½ can regular soda, etc) and wait 15 minutes and recheck home glucose. Completed 5/20/2022      Task: Reviewed common causes and precautions to help prevent hyper/hypoglycemic events. Completed 5/20/2022      Task: Reviewed signs and symptoms of hyper/hypoglycemia, what range is considered to be hyper/hypoglycemia, and when to seek further medical attention. Completed 5/20/2022      Task: Discussed, sick day planning, natural disaster planning, and/or travel planning to prevent hyper/hypoglycemia. Completed 6/10/2022      Task: Discussed risk factors for developing diabetic ketoacidosis (DKA), strategies for reducing risk, testing with ketone test strips if BG is >240mg/dl, basic protocol for managing DKA, and when to seek further medical attention. Completed 6/10/2022      Problem: Healthy Eating       Goal: Eat 2-3 meals daily with 45-60g/3-4 servings of Carbohydrate per meal.    Start Date: 5/13/2022   Expected End Date: 5/13/2023   This Visit's Progress: On track   Recent Progress: Deferred   Priority: Low   Barriers: No Barriers Identified   Note:     Reviewed carb counting, portion control, importance of spacing meals throughout the day to prevent post prandial elevations.  Recommended low saturated fat, low sodium diet to aid in control of hypertension and cholesterol. Reviewed plate method and portion control, dining out tips, meal planning, reading a food label, healthy snack options, benefits of physical activity       Task: Reviewed the sources and role of Carbohydrate, Protein, and Fat and how each nutrient impacts blood sugar. Completed 5/20/2022      Task: Provided visual examples using dry measuring cups, food models, and other familiar objects such as computer mouse, deck or cards, tennis ball etc. to help with visualization of portions. Completed 5/20/2022      Task: Explained how to count carbohydrates using the food label and the use of dry measuring cups for accurate carb counting. Completed 5/20/2022      Task: Discussed strategies for choosing healthier menu options when dining out. Completed 6/10/2022      Task: Recommended replacing beverages containing high sugar content with noncaloric/sugar free options and/or water. Completed 5/20/2022      Task: Review the importance of balancing carbohydrates with each meal using portion control techniques to count servings of carbohydrate and label reading to identify serving size and amount of total carbs per serving. Completed 6/10/2022      Task: Provided Sample plate method and reviewed the use of the plate to estimate amounts of carbohydrate per meal. Completed 6/10/2022            Follow Up Plan     Follow up in about 3 months (around 9/10/2022) for General Follow-up, Personal CGM Upload.    Today's care plan and follow up schedule was discussed with patient.  Eyad verbalized understanding of the care plan, goals, and agrees to follow up plan.        The patient was encouraged to communicate with his/her health care provider/physician and care team regarding his/her condition(s) and treatment.   I provided the patient with my contact information today and encouraged to contact me via phone or [facility] Patient Portal as needed.     Length of Visit   Total Time: 45 Minutes

## 2022-06-14 ENCOUNTER — TELEPHONE (OUTPATIENT)
Dept: PRIMARY CARE CLINIC | Facility: CLINIC | Age: 67
End: 2022-06-14
Payer: MEDICARE

## 2022-06-14 NOTE — TELEPHONE ENCOUNTER
----- Message from Amina Jauregui sent at 6/13/2022  4:29 PM CDT -----  Contact: [facility]/Estee/  Estee from [facility] said that she is calling in regards to she called pt to explain how there proceess for the Freestyle Barak goes and pt told them not to call him anymore .  Please advise

## 2022-06-20 NOTE — TELEPHONE ENCOUNTER
Called and s/w patient -   He is doing well - his sugars are mostly in the 90's   He does notice increased urination, but overall doing well. Feeling good/has had 1 or 2 episodes his sugar was on lower end 69 - 80's - but he is using the richmond to become more aware.   Encouraged to eat regular frequent meals and stay hydrated. Call me if any issues. He has an appointment with me in august for follow up.   Thanks,  erin

## 2022-06-21 ENCOUNTER — PATIENT MESSAGE (OUTPATIENT)
Dept: PRIMARY CARE CLINIC | Facility: CLINIC | Age: 67
End: 2022-06-21
Payer: MEDICARE

## 2022-06-23 ENCOUNTER — HOSPITAL ENCOUNTER (OUTPATIENT)
Dept: RADIOLOGY | Facility: HOSPITAL | Age: 67
Discharge: HOME | End: 2022-06-23
Attending: RADIOLOGY
Payer: MEDICARE

## 2022-06-23 DIAGNOSIS — R22.2 LOCALIZED SWELLING, MASS AND LUMP, TRUNK: ICD-10-CM

## 2022-06-23 PROCEDURE — 71250 CT THORAX DX C-: CPT | Mod: 26,,, | Performed by: RADIOLOGY

## 2022-06-23 PROCEDURE — 71250 CT CHEST WITHOUT CONTRAST: ICD-10-PCS | Mod: 26,,, | Performed by: RADIOLOGY

## 2022-06-23 PROCEDURE — 71250 CT THORAX DX C-: CPT | Mod: TC

## 2022-06-27 ENCOUNTER — OFFICE VISIT (OUTPATIENT)
Dept: RADIATION ONCOLOGY | Facility: CLINIC | Age: 67
End: 2022-06-27
Attending: RADIOLOGY
Payer: MEDICARE

## 2022-06-27 VITALS
HEART RATE: 81 BPM | BODY MASS INDEX: 23.93 KG/M2 | SYSTOLIC BLOOD PRESSURE: 130 MMHG | WEIGHT: 148.88 LBS | HEIGHT: 66 IN | DIASTOLIC BLOOD PRESSURE: 68 MMHG

## 2022-06-27 DIAGNOSIS — C61 MALIGNANT NEOPLASM OF PROSTATE: Primary | ICD-10-CM

## 2022-06-27 PROCEDURE — 99999 PR PBB SHADOW E&M-EST. PATIENT-LVL IV: ICD-10-PCS | Mod: PBBFAC,,, | Performed by: RADIOLOGY

## 2022-06-27 PROCEDURE — 1126F AMNT PAIN NOTED NONE PRSNT: CPT | Mod: CPTII,S$GLB,, | Performed by: RADIOLOGY

## 2022-06-27 PROCEDURE — 3046F HEMOGLOBIN A1C LEVEL >9.0%: CPT | Mod: CPTII,S$GLB,, | Performed by: RADIOLOGY

## 2022-06-27 PROCEDURE — 4010F ACE/ARB THERAPY RXD/TAKEN: CPT | Mod: CPTII,S$GLB,, | Performed by: RADIOLOGY

## 2022-06-27 PROCEDURE — 3288F PR FALLS RISK ASSESSMENT DOCUMENTED: ICD-10-PCS | Mod: CPTII,S$GLB,, | Performed by: RADIOLOGY

## 2022-06-27 PROCEDURE — 1159F MED LIST DOCD IN RCRD: CPT | Mod: CPTII,S$GLB,, | Performed by: RADIOLOGY

## 2022-06-27 PROCEDURE — 99999 PR PBB SHADOW E&M-EST. PATIENT-LVL IV: CPT | Mod: PBBFAC,,, | Performed by: RADIOLOGY

## 2022-06-27 PROCEDURE — 3046F PR MOST RECENT HEMOGLOBIN A1C LEVEL > 9.0%: ICD-10-PCS | Mod: CPTII,S$GLB,, | Performed by: RADIOLOGY

## 2022-06-27 PROCEDURE — 1159F PR MEDICATION LIST DOCUMENTED IN MEDICAL RECORD: ICD-10-PCS | Mod: CPTII,S$GLB,, | Performed by: RADIOLOGY

## 2022-06-27 PROCEDURE — 3075F PR MOST RECENT SYSTOLIC BLOOD PRESS GE 130-139MM HG: ICD-10-PCS | Mod: CPTII,S$GLB,, | Performed by: RADIOLOGY

## 2022-06-27 PROCEDURE — 1160F PR REVIEW ALL MEDS BY PRESCRIBER/CLIN PHARMACIST DOCUMENTED: ICD-10-PCS | Mod: CPTII,S$GLB,, | Performed by: RADIOLOGY

## 2022-06-27 PROCEDURE — 3008F PR BODY MASS INDEX (BMI) DOCUMENTED: ICD-10-PCS | Mod: CPTII,S$GLB,, | Performed by: RADIOLOGY

## 2022-06-27 PROCEDURE — 3075F SYST BP GE 130 - 139MM HG: CPT | Mod: CPTII,S$GLB,, | Performed by: RADIOLOGY

## 2022-06-27 PROCEDURE — 1126F PR PAIN SEVERITY QUANTIFIED, NO PAIN PRESENT: ICD-10-PCS | Mod: CPTII,S$GLB,, | Performed by: RADIOLOGY

## 2022-06-27 PROCEDURE — 1101F PR PT FALLS ASSESS DOC 0-1 FALLS W/OUT INJ PAST YR: ICD-10-PCS | Mod: CPTII,S$GLB,, | Performed by: RADIOLOGY

## 2022-06-27 PROCEDURE — 3288F FALL RISK ASSESSMENT DOCD: CPT | Mod: CPTII,S$GLB,, | Performed by: RADIOLOGY

## 2022-06-27 PROCEDURE — 3078F PR MOST RECENT DIASTOLIC BLOOD PRESSURE < 80 MM HG: ICD-10-PCS | Mod: CPTII,S$GLB,, | Performed by: RADIOLOGY

## 2022-06-27 PROCEDURE — 1101F PT FALLS ASSESS-DOCD LE1/YR: CPT | Mod: CPTII,S$GLB,, | Performed by: RADIOLOGY

## 2022-06-27 PROCEDURE — 99212 OFFICE O/P EST SF 10 MIN: CPT | Mod: S$GLB,,, | Performed by: RADIOLOGY

## 2022-06-27 PROCEDURE — 1160F RVW MEDS BY RX/DR IN RCRD: CPT | Mod: CPTII,S$GLB,, | Performed by: RADIOLOGY

## 2022-06-27 PROCEDURE — 3008F BODY MASS INDEX DOCD: CPT | Mod: CPTII,S$GLB,, | Performed by: RADIOLOGY

## 2022-06-27 PROCEDURE — 3078F DIAST BP <80 MM HG: CPT | Mod: CPTII,S$GLB,, | Performed by: RADIOLOGY

## 2022-06-27 PROCEDURE — 4010F PR ACE/ARB THEARPY RXD/TAKEN: ICD-10-PCS | Mod: CPTII,S$GLB,, | Performed by: RADIOLOGY

## 2022-06-27 PROCEDURE — 99212 PR OFFICE/OUTPT VISIT, EST, LEVL II, 10-19 MIN: ICD-10-PCS | Mod: S$GLB,,, | Performed by: RADIOLOGY

## 2022-06-27 NOTE — PROGRESS NOTES
Subjective:       Patient ID: Eyad Garcia is a 66 y.o. male.    Chief Complaint: Prostate Cancer (3mo f/u;psa;ct scan)    This patient presents for follow up visit.     Mr. Garcia has a history of recurrent prostate cancer. He initially presented in 2015 when biopsies from the Rt. base revealed a small (2%) focus of [place] 8 (4+4) adenocarcinoma. He subsequently underwent RALP with bilateral pelvic node dissection in July of 2015. Pathology revealed a single focus of [place] 6 (3+3) adenocarcinoma in a background of extensive high-grade PIN. There was no extraprostatic extension, seminal vesicle invasion or lymphovascular invasion. The margins and 10 (5 Lt., 5 Rt.) pelvic nodes were negative for tumor involvement. Postoperative PSA in 2015 was 0.03 ng/ml. It continued to increase slowly. He was referred to our department after PSA in June of 2019 returned at 4 ng/ml. Work up revealed uptake in one small pelvic node on Axumin scan.  He completed 46.8 Gy to the prostate bed and nodes followed by a boost to the prostate bed and pelvic node 11/15/19.   His radiotherapy was combined with 6 months of androgen deprivation therapy.  Unfortunately his PSA has continued to increase since that time.  Subsequent imaging with bone scan and Axumin scans have been negative.  We elected to start hormonal deprivation therapy in March of 2022 given a short (< 3 month) PSA doubling time.  Today the patient states he feels well.  Has noted hot flashes.  He has also noted weight loss over the past 3 months.     Review of Systems   Constitutional: Positive for unexpected weight change (weight decreased 17 lbs). Negative for activity change, appetite change, chills and fatigue.   Respiratory: Negative for cough and shortness of breath.    Gastrointestinal: Negative for abdominal pain, constipation, diarrhea and fecal incontinence.   Genitourinary: Negative for bladder incontinence, difficulty urinating, dysuria, frequency and  hematuria.         Objective:      Physical Exam  Constitutional:       General: He is not in acute distress.     Appearance: Normal appearance.   Pulmonary:      Effort: Pulmonary effort is normal. No respiratory distress.   Abdominal:      General: Abdomen is flat. There is no distension.   Neurological:      Mental Status: He is alert and oriented to person, place, and time.   Psychiatric:         Mood and Affect: Mood normal.         Judgment: Judgment normal.       PSA decreased to 0.02 ng/ml  CT of the chest was performed secondary to pleura based nodule on Axumin.  CT was negative.   Assessment:       Problem List Items Addressed This Visit     Malignant neoplasm of prostate - Primary          Plan:       Responding well to hormonal deprivation therapy.  Will plan follow up in 3 months with PSA for lupron injection.  Will alert diabetes .

## 2022-06-28 ENCOUNTER — OFFICE VISIT (OUTPATIENT)
Dept: RHEUMATOLOGY | Facility: CLINIC | Age: 67
End: 2022-06-28
Payer: MEDICARE

## 2022-06-28 ENCOUNTER — LAB VISIT (OUTPATIENT)
Dept: LAB | Facility: HOSPITAL | Age: 67
End: 2022-06-28
Attending: INTERNAL MEDICINE
Payer: MEDICARE

## 2022-06-28 VITALS
SYSTOLIC BLOOD PRESSURE: 128 MMHG | WEIGHT: 151.69 LBS | DIASTOLIC BLOOD PRESSURE: 77 MMHG | BODY MASS INDEX: 24.48 KG/M2 | HEART RATE: 78 BPM

## 2022-06-28 DIAGNOSIS — M79.604 PAIN IN BOTH LOWER EXTREMITIES: ICD-10-CM

## 2022-06-28 DIAGNOSIS — M79.602 PAIN IN BOTH UPPER EXTREMITIES: ICD-10-CM

## 2022-06-28 DIAGNOSIS — M15.9 PRIMARY OSTEOARTHRITIS INVOLVING MULTIPLE JOINTS: ICD-10-CM

## 2022-06-28 DIAGNOSIS — M79.605 PAIN IN BOTH LOWER EXTREMITIES: ICD-10-CM

## 2022-06-28 DIAGNOSIS — M79.601 PAIN IN BOTH UPPER EXTREMITIES: ICD-10-CM

## 2022-06-28 DIAGNOSIS — C61 MALIGNANT NEOPLASM OF PROSTATE: ICD-10-CM

## 2022-06-28 DIAGNOSIS — E11.9 TYPE 2 DIABETES MELLITUS WITHOUT COMPLICATION, WITHOUT LONG-TERM CURRENT USE OF INSULIN: ICD-10-CM

## 2022-06-28 DIAGNOSIS — M79.10 MYALGIA: Primary | ICD-10-CM

## 2022-06-28 LAB
CK SERPL-CCNC: 130 U/L (ref 20–200)
CRP SERPL-MCNC: 2.7 MG/L (ref 0–8.2)
ERYTHROCYTE [SEDIMENTATION RATE] IN BLOOD BY PHOTOMETRIC METHOD: 12 MM/HR (ref 0–23)

## 2022-06-28 PROCEDURE — 3074F PR MOST RECENT SYSTOLIC BLOOD PRESSURE < 130 MM HG: ICD-10-PCS | Mod: CPTII,S$GLB,, | Performed by: INTERNAL MEDICINE

## 2022-06-28 PROCEDURE — 36415 COLL VENOUS BLD VENIPUNCTURE: CPT | Performed by: INTERNAL MEDICINE

## 2022-06-28 PROCEDURE — 3078F PR MOST RECENT DIASTOLIC BLOOD PRESSURE < 80 MM HG: ICD-10-PCS | Mod: CPTII,S$GLB,, | Performed by: INTERNAL MEDICINE

## 2022-06-28 PROCEDURE — 3074F SYST BP LT 130 MM HG: CPT | Mod: CPTII,S$GLB,, | Performed by: INTERNAL MEDICINE

## 2022-06-28 PROCEDURE — 1160F PR REVIEW ALL MEDS BY PRESCRIBER/CLIN PHARMACIST DOCUMENTED: ICD-10-PCS | Mod: CPTII,S$GLB,, | Performed by: INTERNAL MEDICINE

## 2022-06-28 PROCEDURE — 1125F AMNT PAIN NOTED PAIN PRSNT: CPT | Mod: CPTII,S$GLB,, | Performed by: INTERNAL MEDICINE

## 2022-06-28 PROCEDURE — 82085 ASSAY OF ALDOLASE: CPT | Performed by: INTERNAL MEDICINE

## 2022-06-28 PROCEDURE — 82550 ASSAY OF CK (CPK): CPT | Performed by: INTERNAL MEDICINE

## 2022-06-28 PROCEDURE — 3008F PR BODY MASS INDEX (BMI) DOCUMENTED: ICD-10-PCS | Mod: CPTII,S$GLB,, | Performed by: INTERNAL MEDICINE

## 2022-06-28 PROCEDURE — 99204 PR OFFICE/OUTPT VISIT, NEW, LEVL IV, 45-59 MIN: ICD-10-PCS | Mod: S$GLB,,, | Performed by: INTERNAL MEDICINE

## 2022-06-28 PROCEDURE — 3078F DIAST BP <80 MM HG: CPT | Mod: CPTII,S$GLB,, | Performed by: INTERNAL MEDICINE

## 2022-06-28 PROCEDURE — 3046F HEMOGLOBIN A1C LEVEL >9.0%: CPT | Mod: CPTII,S$GLB,, | Performed by: INTERNAL MEDICINE

## 2022-06-28 PROCEDURE — 4010F ACE/ARB THERAPY RXD/TAKEN: CPT | Mod: CPTII,S$GLB,, | Performed by: INTERNAL MEDICINE

## 2022-06-28 PROCEDURE — 99999 PR PBB SHADOW E&M-EST. PATIENT-LVL IV: CPT | Mod: PBBFAC,,, | Performed by: INTERNAL MEDICINE

## 2022-06-28 PROCEDURE — 1125F PR PAIN SEVERITY QUANTIFIED, PAIN PRESENT: ICD-10-PCS | Mod: CPTII,S$GLB,, | Performed by: INTERNAL MEDICINE

## 2022-06-28 PROCEDURE — 4010F PR ACE/ARB THEARPY RXD/TAKEN: ICD-10-PCS | Mod: CPTII,S$GLB,, | Performed by: INTERNAL MEDICINE

## 2022-06-28 PROCEDURE — 85652 RBC SED RATE AUTOMATED: CPT | Performed by: INTERNAL MEDICINE

## 2022-06-28 PROCEDURE — 1160F RVW MEDS BY RX/DR IN RCRD: CPT | Mod: CPTII,S$GLB,, | Performed by: INTERNAL MEDICINE

## 2022-06-28 PROCEDURE — 86140 C-REACTIVE PROTEIN: CPT | Performed by: INTERNAL MEDICINE

## 2022-06-28 PROCEDURE — 3008F BODY MASS INDEX DOCD: CPT | Mod: CPTII,S$GLB,, | Performed by: INTERNAL MEDICINE

## 2022-06-28 PROCEDURE — 3046F PR MOST RECENT HEMOGLOBIN A1C LEVEL > 9.0%: ICD-10-PCS | Mod: CPTII,S$GLB,, | Performed by: INTERNAL MEDICINE

## 2022-06-28 PROCEDURE — 99999 PR PBB SHADOW E&M-EST. PATIENT-LVL IV: ICD-10-PCS | Mod: PBBFAC,,, | Performed by: INTERNAL MEDICINE

## 2022-06-28 PROCEDURE — 99204 OFFICE O/P NEW MOD 45 MIN: CPT | Mod: S$GLB,,, | Performed by: INTERNAL MEDICINE

## 2022-06-28 PROCEDURE — 1159F MED LIST DOCD IN RCRD: CPT | Mod: CPTII,S$GLB,, | Performed by: INTERNAL MEDICINE

## 2022-06-28 PROCEDURE — 1159F PR MEDICATION LIST DOCUMENTED IN MEDICAL RECORD: ICD-10-PCS | Mod: CPTII,S$GLB,, | Performed by: INTERNAL MEDICINE

## 2022-06-28 ASSESSMENT — ROUTINE ASSESSMENT OF PATIENT INDEX DATA (RAPID3)
AM STIFFNESS SCORE: 1, YES
PATIENT GLOBAL ASSESSMENT SCORE: 6
PSYCHOLOGICAL DISTRESS SCORE: 0
PAIN SCORE: 6
TOTAL RAPID3 SCORE: 4
MDHAQ FUNCTION SCORE: 0
FATIGUE SCORE: 6

## 2022-06-28 NOTE — PROGRESS NOTES
History of present illness:  66-year-old gentleman initially evaluated by Rheumatology in 2015. He had hilar adenopathy and elevated Ace level.  He was diagnosed as having sarcoid.  Since was just lymph node involvement, it require no treatment.  He subsequently developed prostate cancer.  He was initially treated with surgery.  He then received radiation.  He had a recent increase in his PSA and is now on hormonal therapy.  He also has diabetes mellitus.    He was seen in the emergency room May 29th.  He presented with a 2 week history of generalized body aches.  It was primarily in the shoulders and legs.  He was told to stop Lipitor by the emergency room doctor.  His pain got better afterwards.  He was having trouble lifting his left shoulder.  This had begun 1 month prior.  He was seen by Orthopedics and had an injection in the shoulder.  This helped and he has no further problems.  He had had previous rotator cuff problem on the right shoulder and had undergone surgery.  He has had no further problem with the right shoulder.    He was also complaining of neck and low back pain.  Your he was having some pain in his legs.  He was evaluated by Dr. Mckenzie.  He was placed on meloxicam.  He was told to exercise but not sent to therapy.  He has a follow-up in 4 months.    He denies any peripheral joint complaints.  He has had no joint swelling.  He has 5 minutes of morning stiffness.  His symptoms tend to be worse with activity.  It does not wake him up at night he feels the meloxicam has been helping.    No fever, headache, rash, conjunctivitis, oral ulcers, dry eyes or mouth, Raynaud's phenomenon, pleurisy, chronic or bloody diarrhea, vaginal or urethral discharge or ulcer, numbness or tingling, blood clots or phlebitis.  He has had no amaurosis or jaw claudication.  He has had no similar problem in the past.  He has no family history of arthritis.    Systems review:  General:  Weight has decreased 15 lb  Physical  examination:  ENT:  Temporal artery pulsations are normal.  Musculoskeletal:  He has decreased range of motion of the cervical spine but no pain on range of motion.  He has decreased range of motion of the cervical spine.  He has no pain on range of motion.  He has decreased range of motion of the shoulders bilaterally, worse on the left than on the right.  He has no pain on range of motion.  Elbows and wrists are unremarkable.  He has bony hypertrophy of the small joints of the hands.  He has no synovitis.  Has good range of motion lumbar spine without pain on range of motion.  Straight leg raising is negative.  Hips, knees, ankles, small joints the feet are unremarkable.  Neurologic:  He has normal muscle strength testing  Radiology:  X-ray of the cervical and lumbar spine show evidence of osteoarthritis.  X-ray of his shoulder also shows osteoarthritis.  Chest x-ray no longer shows hilar adenopathy    Assessment:  1. He had diffuse pain which resolved on stopping Lipitor.  He probably had myalgias secondary to statins but no evidence of myositis.  2. He has rotator cuff problem with the left shoulder  3. He has osteoarthritis of the spine    Plans:  1. Laboratory studies obtained  2. I defer pain management to Dr. Mckenzie  3. I did not give him a regular return appointment but would be happy to see him in follow-up if necessary.

## 2022-06-28 NOTE — PROGRESS NOTES
Subjective:       Patient ID: Eyad Garcia is a 64 y.o. male.    Chief Complaint: Prostate Cancer (initiate lupron)    This patient presents to initiate Lupron therapy     Mr. Garcia has a history of recurrent prostate cancer.  He initially presented in 2015 when biopsies from the [place] revealed a small (2%) focus of [place] 8 (4+4) adenocarcinoma.   PSA at that time was 6.6 ng/ml.  Work up with bone scan was negative.  He subsequently underwent RALP with bilateral pelvic node dissection in July of 2015.  Pathology revealed a 6.5 x 5 x 5 cm prostate, weighing 117 g.  There was a single focus of [place] 6 (3+3) adenocarcinoma in a background of extensive high-grade PIN.  The tumor accounted for less than 1% of the specimen.  There was no extraprostatic extension, seminal vesicle invasion or lymphovascular invasion.  The margins and 10 (5 Lt., 5 Rt.) pelvic nodes were negative for tumor involvement.  Postoperatively the patient recovered well.   Postoperative PSA in 2015 was 0.03 ng/ml.  It has increases slowly until recently.  PSA in March of 2017 was 0.14 ng/ml.  Repeat PSA in November of 2018 had increased to 1.9 ng/ml.  His most recent PSA on 6/20/19 was 4 ng/ml.  He was referred to our department.  CAROLINA was negative and work up with bone scan and Axumin scan were negative.  There was uptake in one small pelvic node.  We elected to proceed with hormonal therapy and salvage irradiation.  He presents for Lupron injection.     Review of Systems   Constitutional: Negative for activity change, appetite change and fatigue.   Gastrointestinal: Negative for abdominal pain and constipation.   Genitourinary: Negative for difficulty urinating, dysuria, frequency and hematuria.       Objective:      Physical Exam   Constitutional: He appears well-developed and well-nourished. No distress.       Assessment:       1. Malignant neoplasm of prostate        Plan:       6 month Lupron injection today.  Plan follow up in 6 - 8  weeks for simulation.        52y/o F with recurrent cervical CA admitted for management of neutropenic fever, now s/p emergent exploratory laparotomy, abdominal washout, rectosigmoid colectomy, diverting colostomy, bronchoscopy and Abthera placement on 6/8/22 for findings of increased free air on abdominal xray concerning for bowel perforation. Patient s/p washout and bridging vicryl mesh placement to close fascial gap on 6/14 w/ skin closure device place don 6/17. Patient now extubated, hemodynamically stable and clinically improving. Palliative consulted for sx management and assistance with complex decision making regarding goc in setting of recurrent malignancy.

## 2022-06-29 ENCOUNTER — OFFICE VISIT (OUTPATIENT)
Dept: SPORTS MEDICINE | Facility: CLINIC | Age: 67
End: 2022-06-29
Payer: COMMERCIAL

## 2022-06-29 VITALS
SYSTOLIC BLOOD PRESSURE: 139 MMHG | RESPIRATION RATE: 16 BRPM | HEIGHT: 66 IN | DIASTOLIC BLOOD PRESSURE: 73 MMHG | HEART RATE: 76 BPM | BODY MASS INDEX: 24.27 KG/M2 | WEIGHT: 151 LBS

## 2022-06-29 DIAGNOSIS — M75.102 ROTATOR CUFF TEAR, NON-TRAUMATIC, LEFT: Primary | ICD-10-CM

## 2022-06-29 LAB — ALDOLASE SERPL-CCNC: 4.8 U/L (ref 1.2–7.6)

## 2022-06-29 PROCEDURE — 99999 PR PBB SHADOW E&M-EST. PATIENT-LVL IV: ICD-10-PCS | Mod: PBBFAC,,, | Performed by: ORTHOPAEDIC SURGERY

## 2022-06-29 PROCEDURE — 1101F PT FALLS ASSESS-DOCD LE1/YR: CPT | Mod: CPTII,S$GLB,, | Performed by: ORTHOPAEDIC SURGERY

## 2022-06-29 PROCEDURE — 3046F HEMOGLOBIN A1C LEVEL >9.0%: CPT | Mod: CPTII,S$GLB,, | Performed by: ORTHOPAEDIC SURGERY

## 2022-06-29 PROCEDURE — 4010F PR ACE/ARB THEARPY RXD/TAKEN: ICD-10-PCS | Mod: CPTII,S$GLB,, | Performed by: ORTHOPAEDIC SURGERY

## 2022-06-29 PROCEDURE — 1101F PR PT FALLS ASSESS DOC 0-1 FALLS W/OUT INJ PAST YR: ICD-10-PCS | Mod: CPTII,S$GLB,, | Performed by: ORTHOPAEDIC SURGERY

## 2022-06-29 PROCEDURE — 3008F BODY MASS INDEX DOCD: CPT | Mod: CPTII,S$GLB,, | Performed by: ORTHOPAEDIC SURGERY

## 2022-06-29 PROCEDURE — 3075F SYST BP GE 130 - 139MM HG: CPT | Mod: CPTII,S$GLB,, | Performed by: ORTHOPAEDIC SURGERY

## 2022-06-29 PROCEDURE — 99213 PR OFFICE/OUTPT VISIT, EST, LEVL III, 20-29 MIN: ICD-10-PCS | Mod: S$GLB,,, | Performed by: ORTHOPAEDIC SURGERY

## 2022-06-29 PROCEDURE — 1159F PR MEDICATION LIST DOCUMENTED IN MEDICAL RECORD: ICD-10-PCS | Mod: CPTII,S$GLB,, | Performed by: ORTHOPAEDIC SURGERY

## 2022-06-29 PROCEDURE — 3078F DIAST BP <80 MM HG: CPT | Mod: CPTII,S$GLB,, | Performed by: ORTHOPAEDIC SURGERY

## 2022-06-29 PROCEDURE — 4010F ACE/ARB THERAPY RXD/TAKEN: CPT | Mod: CPTII,S$GLB,, | Performed by: ORTHOPAEDIC SURGERY

## 2022-06-29 PROCEDURE — 3046F PR MOST RECENT HEMOGLOBIN A1C LEVEL > 9.0%: ICD-10-PCS | Mod: CPTII,S$GLB,, | Performed by: ORTHOPAEDIC SURGERY

## 2022-06-29 PROCEDURE — 1159F MED LIST DOCD IN RCRD: CPT | Mod: CPTII,S$GLB,, | Performed by: ORTHOPAEDIC SURGERY

## 2022-06-29 PROCEDURE — 99999 PR PBB SHADOW E&M-EST. PATIENT-LVL IV: CPT | Mod: PBBFAC,,, | Performed by: ORTHOPAEDIC SURGERY

## 2022-06-29 PROCEDURE — 3288F FALL RISK ASSESSMENT DOCD: CPT | Mod: CPTII,S$GLB,, | Performed by: ORTHOPAEDIC SURGERY

## 2022-06-29 PROCEDURE — 99213 OFFICE O/P EST LOW 20 MIN: CPT | Mod: S$GLB,,, | Performed by: ORTHOPAEDIC SURGERY

## 2022-06-29 PROCEDURE — 3288F PR FALLS RISK ASSESSMENT DOCUMENTED: ICD-10-PCS | Mod: CPTII,S$GLB,, | Performed by: ORTHOPAEDIC SURGERY

## 2022-06-29 PROCEDURE — 3008F PR BODY MASS INDEX (BMI) DOCUMENTED: ICD-10-PCS | Mod: CPTII,S$GLB,, | Performed by: ORTHOPAEDIC SURGERY

## 2022-06-29 PROCEDURE — 3075F PR MOST RECENT SYSTOLIC BLOOD PRESS GE 130-139MM HG: ICD-10-PCS | Mod: CPTII,S$GLB,, | Performed by: ORTHOPAEDIC SURGERY

## 2022-06-29 PROCEDURE — 1126F AMNT PAIN NOTED NONE PRSNT: CPT | Mod: CPTII,S$GLB,, | Performed by: ORTHOPAEDIC SURGERY

## 2022-06-29 PROCEDURE — 3078F PR MOST RECENT DIASTOLIC BLOOD PRESSURE < 80 MM HG: ICD-10-PCS | Mod: CPTII,S$GLB,, | Performed by: ORTHOPAEDIC SURGERY

## 2022-06-29 PROCEDURE — 1126F PR PAIN SEVERITY QUANTIFIED, NO PAIN PRESENT: ICD-10-PCS | Mod: CPTII,S$GLB,, | Performed by: ORTHOPAEDIC SURGERY

## 2022-06-29 NOTE — PROGRESS NOTES
Established patient    He is here for follow-up and states the injection helped tremendously.  His range of motion has improved quite a bit.  He is extremely happy with the way the shoulder is feeling right now.  He is still managing multiple other medical conditions at this time.    HISTORY OF PRESENT ILLNESS   66 y.o. Male with a history of left shoulder pain who is referred today by Dr. Taylor Choi. He is a current cancer patient with prostate cancer. He also has diabetes. He states he is having pain while lifting overhead and has recently not been able to lift the arm at all.  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  He does not recall when he could not significantly lift his left arm.  He does not remember how long it has been.  He also has uncontrolled diabetes at this time with his last hemoglobin A1c of 9.6.  In addition he complains of radiating bilateral lower extremity pain.    Is affecting ADLs.  Pain is 10/10 at it's worst.          REVIEW OF SYSTEMS   Constitution: Negative. Negative for chills, fever and night sweats.   HENT: Negative for congestion and headaches.    Eyes: Negative for blurred vision, left vision loss and right vision loss.   Cardiovascular: Negative for chest pain and syncope.   Respiratory: Negative for cough and shortness of breath.    Endocrine: Negative for polydipsia, polyphagia and polyuria.   Hematologic/Lymphatic: Negative for bleeding problem. Does not bruise/bleed easily.   Skin: Negative for dry skin, itching and rash.   Musculoskeletal: Negative for falls. Positive for left shoulder pain  Gastrointestinal: Negative for abdominal pain and bowel incontinence.   Genitourinary: Negative for bladder incontinence and nocturia.   Neurological: Negative for disturbances in coordination, loss of balance and seizures.   Psychiatric/Behavioral: Negative for depression. The patient does not have insomnia.    Allergic/Immunologic: Negative for hives and  "persistent infections.     PHYSICAL EXAMINATION    Vitals: /73 (BP Location: Right arm, Patient Position: Sitting, BP Method: Medium (Automatic))   Pulse 76   Resp 16   Ht 5' 6" (1.676 m)   Wt 68.5 kg (151 lb)   BMI 24.37 kg/m²     General: The patient appears active and healthy with no apparent physical problems.  No disturbance of mood or affect is demonstrated. Alert and Oriented.    HEENT: Eyes normal, pupils equally round, nose normal.    Resp: Equal and symmetrical chest rises. No wheezing    CV: Regular rate    Neck: Supple; nonpainful range of motion.    Extremities: no cyanosis, clubbing, edema, or diffuse swelling.  Palpable pulses, good capillary refill of the digits.  No coolness, discoloration, edema or obvious varicosities.    Skin: no lesions noted.    Lymphatic: no detected adenopathy in the upper or lower extremities.    Neurologic: normal mental status, normal reflexes, normal gait and balance.  Patient is alert and oriented to person, place and time.  No flaccidity or spasticity is noted.  No motor or sensory deficits are noted.  Light touch is intact    Orthopaedic:     SHOULDER EXAM - LEFT    Inspection:   Normal skin color and appearance with no scars.  No muscle atrophy noted.  No scapular winging.  Atrophy suprascapular fossa and posterior scapula    Palpation: No tenderness of the acromioclavicular joint, lateral edge of the acromion, biceps tendon, trapezius muscle or scapulothoracic bursa.      ROM:      PROM:     FE - 120°    Abd/ER -  30°  Abd/IR -  45°   Add/ER -  60°     AROM:    FE - 100°    Abd/ER -  30°  Abd/IR -  45°   Add/ER -  60°         Tests:     - Aguilar, - Neer's, - Cross Arm Adduction, - [place], - [place], - Speed. - Belly Press,  - Jobes, - Lift Off    Stability: - sulcus, - apprehension, - relocation, - load and shift, - DLS      Motor:  Rotator cuff strength is 4/5 supraspinatus, 4/5 infraspinatus, 4/5 subscapularis. Biceps, triceps and deltoid strength is " 5/5.      Neuro     Distally there are no paresthesias, and sensation is intact to light touch in the median, ulnar, and radial distributions.  Reflexes are 2/2 biceps, triceps and brachioradialis.        IMAGING    None    Addendum to his radiology note.  The patient has clear superior migration of his left glenohumeral joint which appears to be migrated approximately 9-10 mm from the inferior margin of his glenoid.  Greater tuberosity changes are noted with chronic rotator cuff pathology.  A type 3 acromion is noted.  This is likely a [place] 3 shoulder.    IMPRESSION       ICD-10-CM ICD-9-CM   1. Rotator cuff tear, non-traumatic, left  M75.102 727.61       MEDICATIONS PRESCRIBED      1. None    RECOMMENDATIONS     1. RTC PRN  2. I advised him that we can do these injections in the future if needed.  Considering had a good response to a nonsteroidal anti-inflammatory, this can be a good option for him.  I encouraged him to continue to manage his A1c.  If surgery is needed I did advise him that we need to have his A1c below 7      All questions were answered, pt will contact us for questions or concerns in the interim.

## 2022-07-05 ENCOUNTER — PATIENT MESSAGE (OUTPATIENT)
Dept: PRIMARY CARE CLINIC | Facility: CLINIC | Age: 67
End: 2022-07-05
Payer: MEDICARE

## 2022-07-24 NOTE — PROGRESS NOTES
[facility]  Emergency Department Encounter     Pt Name: Tyrel Payne  MRN: [id number]  [place] 1947  Date of evaluation: 7/24/22  PCP:  ROBB Renner CNP    CHIEF COMPLAINT       Chief Complaint   Patient presents with    Abdominal Pain     Upper abdominal pain, denies n/v/d. Started around noon       HISTORY OFPRESENT ILLNESS  (Location/Symptom, Timing/Onset, Context/Setting, Quality, Duration, Modifying Factors,Severity.)      Tyrel Payne is a 75 y.o. male who presents with nausea, vomiting, generalized abdominal pain. Reports last bowel movement yesterday. Denies any previous abdominal surgeries. States he thinks he ate some bad food yesterday. Pain has been intermittent. Mild. Entire abdomen. PAST MEDICAL / SURGICAL / SOCIAL / FAMILY HISTORY      has a past medical history of Diverticulosis, DJD (degenerative joint disease), Erectile dysfunction, Hemorrhoids, Hernia, Hypertension, Osteoarthritis, Sleep apnea, and Tubular adenoma of colon. has a past surgical history that includes hernia repair; joint replacement (right knee); Colonoscopy (2013; due 2018); and Colonoscopy (N/A, 7/7/2022).     Social History     Socioeconomic History    Marital status:      Spouse name: Not on file    Number of children: Not on file    Years of education: Not on file    Highest education level: Not on file   Occupational History    Not on file   Tobacco Use    Smoking status: Never    Smokeless tobacco: Never   Vaping Use    Vaping Use: Never used   Substance and Sexual Activity    Alcohol use: Yes     Comment: social    Drug use: No    Sexual activity: Not on file   Other Topics Concern    Not on file   Social History Narrative    Not on file     Social Determinants of Health     Financial Resource Strain: Low Risk     Difficulty of Paying Living Expenses: Not hard at all   Food Insecurity: No Food Insecurity    Worried About [address] in the Last Year: Never HPI     Eye Pain      Additional comments: Pt c/o painful OD with FB sensatio              Comments     URGENT TODAY  -Pt state that he was hanging up some lights yesterday and felt something   fall into right eye. Pt states that when he rubbed his eye, he felt some   pain and burning sensation. Pt rinsed his eye out and has been using AT's,   but he still feels FB sensation.    1. POAG  2. VF Defect OS  3. Chorioretinal Scar OS  4. Hx RD OS  5. NS OU  6. Type 2 DM no DR  7. Pupil Sphincter Rupture OS  8. Presbyopia    MEDS:  Latanoprost QDAY OU          Last edited by Mayda Sterling MA on 4/20/2020  1:48 PM. (History)              Assessment /Plan     For exam results, see Encounter Report.    Foreign body of right eye, initial encounter    Cornea abrasion, right, initial encounter      UCARE    Pt was hanging ligts yesterday and he felt something get in his right eye. Feels like it is still there.    + FB seen and removed once upper lid flipped.  + corneal abrasion     PLAN   Antibiotic gtts qid od x 1 week - ocuflox - Rx sent   AT's qid - more prn od   Ointment at night - refresh PM or lacrilube     Keep F/U 5/28/2020 for regular glaucoma follow up - see note 1/27/2020  For IOP check and HRT     Sooner prn if eye does not feel back to normal in 2 days        allergies. Neurological:  Negative for headaches. Psychiatric/Behavioral:  Negative for agitation and confusion. PHYSICAL EXAM   (up to 7 for level 4, 8 or more for level 5)     INITIAL VITALS:    height is 6' 3\" (1.905 m) and weight is 225 lb (102.1 kg). His oral temperature is 98.1 °F (36.7 °C). His blood pressure is 142/69 (abnormal) and his pulse is 60. His respiration is 16 and oxygen saturation is 96%. Physical Exam  Vitals and nursing note reviewed. Constitutional:       Appearance: He is well-developed. HENT:      Head: Normocephalic and atraumatic. Nose: Nose normal.      Mouth/Throat:      Mouth: Mucous membranes are moist.   Eyes:      General: No scleral icterus. Conjunctiva/sclera: Conjunctivae normal.      Pupils: Pupils are equal, round, and reactive to light. Neck:      Trachea: No tracheal deviation. Cardiovascular:      Rate and Rhythm: Normal rate and regular rhythm. Heart sounds: Normal heart sounds. No murmur heard. No friction rub. No gallop. Pulmonary:      Effort: Pulmonary effort is normal. No respiratory distress. Breath sounds: Normal breath sounds. No wheezing or rales. Abdominal:      General: There is distension. Palpations: Abdomen is soft. There is no mass. Tenderness: There is abdominal tenderness. There is no guarding. Hernia: No hernia is present. Comments: Generalized abdominal tenderness which is mild, no guarding or masses no rebound   Musculoskeletal:         General: Normal range of motion. Cervical back: Neck supple. Skin:     General: Skin is warm and dry. Findings: No erythema or rash. Neurological:      Mental Status: He is alert and oriented to person, place, and time.    Psychiatric:         Behavior: Behavior normal.       DIFFERENTIAL  DIAGNOSIS     PLAN (LABS / IMAGING / EKG):  Orders Placed This Encounter   Procedures    XR CHEST PORTABLE    CT ABDOMEN PELVIS W IV CONTRAST Additional Contrast? None    XR ABDOMEN FOR NG/OG/NE TUBE PLACEMENT    Urinalysis with Reflex to Culture    Microscopic Urinalysis    Basic Metabolic Panel    Brain Natriuretic Peptide    CBC with Auto Differential    Troponin    Lactate, Sepsis    Lipase    Comprehensive Metabolic Panel w/ Reflex to MG    Magnesium    Phosphorus    Amylase    Lipase    Diet NPO    Tube insertion    Nasogastric tube maintenance    Vital signs per unit routine    Notify physician    Up as tolerated    Daily weights    Intake and output    Nursing to document all stools for color and consistency    Full Code    Inpatient consult to General Surgery    Inpatient consult to Hospitalist    Initiate Oxygen Therapy Protocol    Pulse Oximetry Spot Check    EKG 12 Lead    Insert peripheral IV    ADMIT TO INPATIENT       MEDICATIONS ORDERED:  Orders Placed This Encounter   Medications    morphine (PF) injection 2 mg    ondansetron (ZOFRAN) injection 4 mg    0.9 % sodium chloride bolus    sodium chloride flush 0.9 % injection 10 mL    iopamidol (ISOVUE-370) 76 % injection 75 mL    HYDROmorphone (DILAUDID) injection 0.5 mg    dextrose 5 % and 0.45 % NaCl with KCl 20 mEq infusion    sodium chloride flush 0.9 % injection 5-40 mL    sodium chloride flush 0.9 % injection 10 mL    0.9 % sodium chloride infusion    OR Linked Order Group     potassium chloride (KLOR-CON M) extended release tablet 40 mEq     potassium bicarb-citric acid (EFFER-K) effervescent tablet 40 mEq     potassium chloride 10 mEq/100 mL IVPB (Peripheral Line)    magnesium sulfate 1000 mg in dextrose 5% 100 mL IVPB    DISCONTD: enoxaparin (LOVENOX) injection 40 mg     Order Specific Question:   Indication of Use     Answer:   Prophylaxis-DVT/PE    OR Linked Order Group     ondansetron (ZOFRAN-ODT) disintegrating tablet 4 mg     ondansetron (ZOFRAN) injection 4 mg    OR Linked Order Group     acetaminophen (TYLENOL) tablet 650 mg     acetaminophen (TYLENOL) suppository 650 mg    polyethylene glycol (GLYCOLAX) packet 17 g    pantoprazole (PROTONIX) 40 mg in sodium chloride (PF) 10 mL injection    heparin (porcine) injection 5,000 Units    hydrALAZINE (APRESOLINE) injection 10 mg       DDX: Bowel obstruction versus pancreatitis versus appendicitis    Initial MDM/Plan: 75 y.o. male who presents with generalized abdominal pain and distention. Will get troponin, ACS rule out, CT imaging rule out bowel obstruction. DIAGNOSTIC RESULTS / EMERGENCY DEPARTMENT COURSE / MDM     LABS:  Labs Reviewed   URINALYSIS WITH REFLEX TO CULTURE - Abnormal; Notable for the following components:       Result Value    Ketones, Urine TRACE (*)     Urine Hgb TRACE (*)     All other components within normal limits   MICROSCOPIC URINALYSIS - Abnormal; Notable for the following components:    Bacteria, UA RARE (*)     Mucus, UA 1+ (*)     All other components within normal limits   BASIC METABOLIC PANEL - Abnormal; Notable for the following components:    Glucose 142 (*)     Creatinine 1.38 (*)     GFR Non- 50 (*)     All other components within normal limits   CBC WITH AUTO DIFFERENTIAL - Abnormal; Notable for the following components:    Platelets 711 (*)     Seg Neutrophils 89 (*)     Lymphocytes 7 (*)     Eosinophils % 0 (*)     Segs Absolute 9.48 (*)     Absolute Lymph # 0.75 (*)     All other components within normal limits   BRAIN NATRIURETIC PEPTIDE   TROPONIN   LACTATE, SEPSIS   LIPASE         RADIOLOGY:  CT ABDOMEN PELVIS W IV CONTRAST Additional Contrast? None    Result Date: 7/24/2022  EXAMINATION: CT OF THE ABDOMEN AND PELVIS WITH CONTRAST 7/24/2022 2:56 am TECHNIQUE: CT of the abdomen and pelvis was performed with the administration of intravenous contrast. Multiplanar reformatted images are provided for review. Automated exposure control, iterative reconstruction, and/or weight based adjustment of the mA/kV was utilized to reduce the radiation dose to as low as reasonably achievable.  COMPARISON: None. HISTORY: ORDERING SYSTEM PROVIDED HISTORY: Generalized abdominal pain, nausea, abdominal bloating TECHNOLOGIST PROVIDED HISTORY: Generalized abdominal pain, nausea, abdominal bloating Decision Support Exception - unselect if not a suspected or confirmed emergency medical condition->Emergency Medical Condition (MA) FINDINGS: Lower Chest: Partially visualized clustered nodular opacities in the central right lower lobe. Bilateral ground-glass and linear opacities are also noted. Organs: The liver, gallbladder, pancreas, spleen, kidneys, and adrenals reveal no acute findings. Bilateral punctate nephrolithiasis. GI/Bowel: Dilated fluid-filled small bowel is noted with focal transition in the left mid abdomen with a closed loop configuration on axial image 136. No pneumatosis. Mesenteric edema is noted. Colonic diverticulosis. Pelvis: No acute findings. Prostatomegaly with findings of bladder wall hypertrophy. Peritoneum/Retroperitoneum: Trace abdominopelvic ascites. No free air. Bones/Soft Tissues: No acute findings. 1.  Small-bowel obstruction with closed loop configuration. Mild mesenteric edema and trace abdominopelvic ascites. 2.  Partially visualized clustered nodular opacities in the central right lower lobe. This favors an inflammatory process. Follow-up with chest CT is recommended as this is only partially visualized. 3.  Bilateral punctate nephrolithiasis. 4.  Diverticulosis. XR CHEST PORTABLE    Result Date: 7/24/2022  EXAMINATION: ONE XRAY VIEW OF THE CHEST 7/24/2022 2:08 am COMPARISON: 05/23/2019 HISTORY: ORDERING SYSTEM PROVIDED HISTORY: Gastric pain TECHNOLOGIST PROVIDED HISTORY: Gastric pain Reason for Exam: Gastric pain FINDINGS: Shallow inflation. The cardiomediastinal silhouette is within normal limits. Calcified granuloma in the mid right lung field again noted. There is no consolidation, pneumothorax or evidence for edema. No evidence for effusion.  No acute osseous abnormality is identified. No acute airspace disease identified. XR ABDOMEN FOR NG/OG/NE TUBE PLACEMENT    Result Date: 7/24/2022  EXAMINATION: ONE SUPINE XRAY VIEW(S) OF THE ABDOMEN 7/24/2022 4:47 am COMPARISON: CT abdomen and pelvis 07/24/2022 HISTORY: ORDERING SYSTEM PROVIDED HISTORY: Confirmation of course of NG/OG/NE tube and location of tip of tube TECHNOLOGIST PROVIDED HISTORY: Confirmation of course of NG/OG/NE tube and location of tip of tube Portable? ->Yes Reason for Exam: ng placement FINDINGS: Enteric tube tip and side port project over the gastric body. Unremarkable bowel gas pattern in the partially imaged abdomen. Enteric tube tip and side port project over the gastric body. EKG  Rhythm: normal sinus   Rate: normal  Axis: normal  Conduction: normal  ST Segments: no acute change  T Waves: no acute change  Q Waves: no acute change    Clinical Impression: no acute change, but this is a nonspecific EKG. All EKG's are interpreted by the Emergency Department Physician who either signs or Co-signs this chart in the absence of a cardiologist.    EMERGENCY DEPARTMENT COURSE:  ED Course as of 07/24/22 0756   Sun Jul 24, 2022   0423 Has closed-loop small bowel obstruction, [MS]      ED Course User Index  [MS] Ananya Stern, DO     Cardiac work-up negative. CT imaging showing large closed-loop small bowel obstruction. No previous abdominal surgeries. Discussed with surgeon on-call OSCAR Zavala, n.p.o., will see in AM.  Otherwise overall nontoxic, normal lactic acid. Discussed with hospitalist agreeable to admission. Based on the low acuity of concerning symptoms and improvement of symptoms, patient will be discharged with follow up and prescription information listed in the Disposition section. Patient states they will follow-up with primary care physician and/or return to the emergency department should they experience a change or worsening of symptoms.   Patient will be

## 2022-07-29 ENCOUNTER — LAB VISIT (OUTPATIENT)
Dept: LAB | Facility: HOSPITAL | Age: 67
End: 2022-07-29
Attending: NURSE PRACTITIONER
Payer: MEDICARE

## 2022-07-29 ENCOUNTER — CLINICAL SUPPORT (OUTPATIENT)
Dept: OPHTHALMOLOGY | Facility: CLINIC | Age: 67
End: 2022-07-29
Payer: COMMERCIAL

## 2022-07-29 ENCOUNTER — OFFICE VISIT (OUTPATIENT)
Dept: OPHTHALMOLOGY | Facility: CLINIC | Age: 67
End: 2022-07-29
Payer: COMMERCIAL

## 2022-07-29 DIAGNOSIS — E11.36 TYPE 2 DIABETES MELLITUS WITH DIABETIC CATARACT, WITHOUT LONG-TERM CURRENT USE OF INSULIN: ICD-10-CM

## 2022-07-29 DIAGNOSIS — E78.5 HYPERLIPIDEMIA ASSOCIATED WITH TYPE 2 DIABETES MELLITUS: ICD-10-CM

## 2022-07-29 DIAGNOSIS — H40.1111 PRIMARY OPEN-ANGLE GLAUCOMA, RIGHT EYE, MILD STAGE: Primary | ICD-10-CM

## 2022-07-29 DIAGNOSIS — H21.562 PUPILLARY SPHINCTER RUPTURE, LEFT: ICD-10-CM

## 2022-07-29 DIAGNOSIS — H40.1122 PRIMARY OPEN-ANGLE GLAUCOMA, LEFT EYE, MODERATE STAGE: ICD-10-CM

## 2022-07-29 DIAGNOSIS — E11.69 HYPERLIPIDEMIA ASSOCIATED WITH TYPE 2 DIABETES MELLITUS: ICD-10-CM

## 2022-07-29 DIAGNOSIS — H40.1131 PRIMARY OPEN ANGLE GLAUCOMA OF BOTH EYES, MILD STAGE: ICD-10-CM

## 2022-07-29 DIAGNOSIS — H53.432 ARCUATE VISUAL FIELD DEFECT OF LEFT EYE: ICD-10-CM

## 2022-07-29 DIAGNOSIS — H33.052 OLD SUBTOTAL RETINAL DETACHMENT, LEFT: ICD-10-CM

## 2022-07-29 DIAGNOSIS — H31.002 CHORIORETINAL SCAR, LEFT: ICD-10-CM

## 2022-07-29 DIAGNOSIS — E11.9 TYPE 2 DIABETES MELLITUS WITHOUT OPHTHALMIC MANIFESTATIONS: ICD-10-CM

## 2022-07-29 LAB
ALBUMIN SERPL BCP-MCNC: 3.9 G/DL (ref 3.5–5.2)
ALP SERPL-CCNC: 70 U/L (ref 55–135)
ALT SERPL W/O P-5'-P-CCNC: 12 U/L (ref 10–44)
ANION GAP SERPL CALC-SCNC: 7 MMOL/L (ref 8–16)
AST SERPL-CCNC: 18 U/L (ref 10–40)
BILIRUB SERPL-MCNC: 0.4 MG/DL (ref 0.1–1)
BUN SERPL-MCNC: 14 MG/DL (ref 8–23)
CALCIUM SERPL-MCNC: 9.6 MG/DL (ref 8.7–10.5)
CHLORIDE SERPL-SCNC: 104 MMOL/L (ref 95–110)
CHOLEST SERPL-MCNC: 244 MG/DL (ref 120–199)
CHOLEST/HDLC SERPL: 5 {RATIO} (ref 2–5)
CO2 SERPL-SCNC: 26 MMOL/L (ref 23–29)
CREAT SERPL-MCNC: 0.9 MG/DL (ref 0.5–1.4)
EST. GFR  (AFRICAN AMERICAN): >60 ML/MIN/1.73 M^2
EST. GFR  (NON AFRICAN AMERICAN): >60 ML/MIN/1.73 M^2
ESTIMATED AVG GLUCOSE: 134 MG/DL (ref 68–131)
GLUCOSE SERPL-MCNC: 110 MG/DL (ref 70–110)
HBA1C MFR BLD: 6.3 % (ref 4–5.6)
HDLC SERPL-MCNC: 49 MG/DL (ref 40–75)
HDLC SERPL: 20.1 % (ref 20–50)
LDLC SERPL CALC-MCNC: 175.2 MG/DL (ref 63–159)
NONHDLC SERPL-MCNC: 195 MG/DL
POTASSIUM SERPL-SCNC: 4.4 MMOL/L (ref 3.5–5.1)
PROT SERPL-MCNC: 7.1 G/DL (ref 6–8.4)
SODIUM SERPL-SCNC: 137 MMOL/L (ref 136–145)
TRIGL SERPL-MCNC: 99 MG/DL (ref 30–150)

## 2022-07-29 PROCEDURE — 99999 PR PBB SHADOW E&M-EST. PATIENT-LVL III: CPT | Mod: PBBFAC,,, | Performed by: OPHTHALMOLOGY

## 2022-07-29 PROCEDURE — 99499 UNLISTED E&M SERVICE: CPT | Mod: S$GLB,,, | Performed by: OPHTHALMOLOGY

## 2022-07-29 PROCEDURE — 1160F PR REVIEW ALL MEDS BY PRESCRIBER/CLIN PHARMACIST DOCUMENTED: ICD-10-PCS | Mod: CPTII,S$GLB,, | Performed by: OPHTHALMOLOGY

## 2022-07-29 PROCEDURE — 92014 COMPRE OPH EXAM EST PT 1/>: CPT | Mod: S$GLB,,, | Performed by: OPHTHALMOLOGY

## 2022-07-29 PROCEDURE — 1101F PT FALLS ASSESS-DOCD LE1/YR: CPT | Mod: CPTII,S$GLB,, | Performed by: OPHTHALMOLOGY

## 2022-07-29 PROCEDURE — 1101F PR PT FALLS ASSESS DOC 0-1 FALLS W/OUT INJ PAST YR: ICD-10-PCS | Mod: CPTII,S$GLB,, | Performed by: OPHTHALMOLOGY

## 2022-07-29 PROCEDURE — 99999 PR PBB SHADOW E&M-EST. PATIENT-LVL III: ICD-10-PCS | Mod: PBBFAC,,, | Performed by: OPHTHALMOLOGY

## 2022-07-29 PROCEDURE — 4010F ACE/ARB THERAPY RXD/TAKEN: CPT | Mod: CPTII,S$GLB,, | Performed by: OPHTHALMOLOGY

## 2022-07-29 PROCEDURE — 1126F PR PAIN SEVERITY QUANTIFIED, NO PAIN PRESENT: ICD-10-PCS | Mod: CPTII,S$GLB,, | Performed by: OPHTHALMOLOGY

## 2022-07-29 PROCEDURE — 92014 PR EYE EXAM, EST PATIENT,COMPREHESV: ICD-10-PCS | Mod: S$GLB,,, | Performed by: OPHTHALMOLOGY

## 2022-07-29 PROCEDURE — 83036 HEMOGLOBIN GLYCOSYLATED A1C: CPT | Performed by: NURSE PRACTITIONER

## 2022-07-29 PROCEDURE — 80061 LIPID PANEL: CPT | Performed by: NURSE PRACTITIONER

## 2022-07-29 PROCEDURE — 4010F PR ACE/ARB THEARPY RXD/TAKEN: ICD-10-PCS | Mod: CPTII,S$GLB,, | Performed by: OPHTHALMOLOGY

## 2022-07-29 PROCEDURE — 1159F MED LIST DOCD IN RCRD: CPT | Mod: CPTII,S$GLB,, | Performed by: OPHTHALMOLOGY

## 2022-07-29 PROCEDURE — 80053 COMPREHEN METABOLIC PANEL: CPT | Performed by: NURSE PRACTITIONER

## 2022-07-29 PROCEDURE — 1126F AMNT PAIN NOTED NONE PRSNT: CPT | Mod: CPTII,S$GLB,, | Performed by: OPHTHALMOLOGY

## 2022-07-29 PROCEDURE — 3044F HG A1C LEVEL LT 7.0%: CPT | Mod: CPTII,S$GLB,, | Performed by: OPHTHALMOLOGY

## 2022-07-29 PROCEDURE — 36415 COLL VENOUS BLD VENIPUNCTURE: CPT | Mod: PN | Performed by: NURSE PRACTITIONER

## 2022-07-29 PROCEDURE — 99499 RISK ADDL DX/OHS AUDIT: ICD-10-PCS | Mod: S$GLB,,, | Performed by: OPHTHALMOLOGY

## 2022-07-29 PROCEDURE — 3288F PR FALLS RISK ASSESSMENT DOCUMENTED: ICD-10-PCS | Mod: CPTII,S$GLB,, | Performed by: OPHTHALMOLOGY

## 2022-07-29 PROCEDURE — 3288F FALL RISK ASSESSMENT DOCD: CPT | Mod: CPTII,S$GLB,, | Performed by: OPHTHALMOLOGY

## 2022-07-29 PROCEDURE — 1160F RVW MEDS BY RX/DR IN RCRD: CPT | Mod: CPTII,S$GLB,, | Performed by: OPHTHALMOLOGY

## 2022-07-29 PROCEDURE — 3044F PR MOST RECENT HEMOGLOBIN A1C LEVEL <7.0%: ICD-10-PCS | Mod: CPTII,S$GLB,, | Performed by: OPHTHALMOLOGY

## 2022-07-29 PROCEDURE — 1159F PR MEDICATION LIST DOCUMENTED IN MEDICAL RECORD: ICD-10-PCS | Mod: CPTII,S$GLB,, | Performed by: OPHTHALMOLOGY

## 2022-07-29 RX ORDER — BRIMONIDINE TARTRATE 2 MG/ML
1 SOLUTION/ DROPS OPHTHALMIC 3 TIMES DAILY
Qty: 10 ML | Refills: 12 | Status: SHIPPED | OUTPATIENT
Start: 2022-07-29 | End: 2022-08-19 | Stop reason: SDUPTHER

## 2022-07-29 RX ORDER — MELOXICAM 15 MG/1
15 TABLET ORAL DAILY
COMMUNITY
Start: 2022-06-03 | End: 2022-08-19

## 2022-07-29 NOTE — PROGRESS NOTES
HPI     Glaucoma     Comments: HVF and OCT review today and pt states no changes since last   exam                Comments     DLS: 3/28/22    1. POAG  2. VF Defect OS  3. Chorioretinal Scar OS  4. Hx RD OS  5. NS OU  6. Type 2 DM no DR  7. Pupil Sphincter Rupture OS  8. Presbyopia    MEDS:  Cosopt BID OU   Latanoprost QDAY OU          Last edited by Mayda Sterling MA on 7/29/2022 11:33 AM. (History)            Assessment /Plan     For exam results, see Encounter Report.    Primary open-angle glaucoma, right eye, mild stage  -     brimonidine 0.2% (ALPHAGAN) 0.2 % Drop; Place 1 drop into both eyes 3 (three) times daily.  Dispense: 10 mL; Refill: 12    Primary open-angle glaucoma, left eye, moderate stage  -     brimonidine 0.2% (ALPHAGAN) 0.2 % Drop; Place 1 drop into both eyes 3 (three) times daily.  Dispense: 10 mL; Refill: 12    Arcuate visual field defect of left eye    Chorioretinal scar, left    Old subtotal retinal detachment, left    Pupillary sphincter rupture, left    Type 2 diabetes mellitus without ophthalmic manifestations          Pt initially dx with glaucoma at [facility] - stoppped his gtts on his own   Presented to [facility] - to [facility] 8/23/2006 - off gtts with a baseline / Tmax of 25/27  Referred by Courtney for consideration of SLT's   Pt with POAG and poorly controlled IOP's - does not use drops regularly       1. Primary open angle glaucoma, both eyes, mild stage        Glaucoma (type and duration)    POAG with elevated IOP ou - mild stage    First HVF   2007 - full od // SAD os 2/2 to CRS   First photos   2011   Treatment / Drops started   2006           Family history    ++ mother and 2 brothers         Glaucoma meds    Latanoprost  (( use to use cosopt as well)         H/O adverse rxn to glaucoma drops    None (has tired alphagan and timolol in past -non compliant)         LASERS    SLT os - 3/31/2016 // SLT od - 4/14/2016         GLAUCOMA SURGERIES    none        OTHER EYE SURGERIES    H/O RD  repair os - S/P laser repair         CDR    0.75 // 0.85-0.9         Tbase    25/27          Tmax    25/27            Ttarget    18/18             HVF    13 test 2006 to  2022 - full  od // SAD - 2/2 CRS from laser for RD repair os / new IAD         Gonio    +3 ou          CCT    486/502 - thin ou         OCT    8 test 2011 to 2022 - RNFL - dec T od ( fluctuate) od  - dec. G/N/TI/TS/ N  Bord T/NI  (has a CRS)  os        HRT    3 test 2017 to 2019 -MR -  DEC. Sn/n/in od // DEC. S/n/i, BORD t os /// CDR 0.73 od // 0.86 os        Disc photos    2011, 2015 , 2017// [facility] - 2022      - Ttoday   21/29  ( up - only using cosopt q day)   - Test done today  - IOP / DFE // photos      2. 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 Chorioretinal scar, left   -with 2nd VF defect      4. Old subtotal retinal detachment, left   S/P repair - retina flat - stable   -large CRS      5. Type 2 diabetes mellitus without ophthalmic manifestations      6. Senile nuclear sclerosis  -mild - monitor      7. pupil sphincter rupture os  -suggest old trauma  -does NOT appear to have a angle recession    7. Presbyopia       PLAN    POAG with OHT OS > OD - mild od // moderate os   IOP ok - below target of 18 ou - better after addition of cosopt ou - re-instruct on bid use of cosopt (3/2022)    The ON and VF's are still good ou   The VF loss os is 2/2 old CRS- ?  post laser for a RD repair   Good initial response to SLT ou 25/25 --> 16/17     LATANOPROST OU   Cont  cosopt ou bid -    Photo file updated 7/29/2022 7/29/2022  ++ IOP elevation os   ++ VF prog os   ++ OCT - RNFL prog os     Add / adjsut drops   cosopt tid ou - has at home   Brimonidine tid ou - Rx sent   rocklatan q hs ou (samples given )       - Consider repeat SLT - if the laser is available     F/U 3 weeks - IOP check with new eye drops - may need incisional surgery od - ?? If has a scleral  perez or not post RD sx         Bethany Lechuga MD

## 2022-07-29 NOTE — PROGRESS NOTES
OCT DONE OU     24-2 SS DONE OU     REL & FIX =  GOOD OU      COOP =     GOOD     PATIENT DENIES ANY ALLERGIES TO LATEX OR ADHESIVES AT THIS TIME    JTHOMAS    MRX      -1.25 + 1.00 X 153  OD     -1.50 + .75 X 012    OS

## 2022-08-01 ENCOUNTER — OFFICE VISIT (OUTPATIENT)
Dept: PRIMARY CARE CLINIC | Facility: CLINIC | Age: 67
End: 2022-08-01
Payer: COMMERCIAL

## 2022-08-01 VITALS
HEART RATE: 66 BPM | TEMPERATURE: 98 F | OXYGEN SATURATION: 98 % | WEIGHT: 152.31 LBS | BODY MASS INDEX: 24.48 KG/M2 | DIASTOLIC BLOOD PRESSURE: 80 MMHG | SYSTOLIC BLOOD PRESSURE: 138 MMHG | RESPIRATION RATE: 14 BRPM | HEIGHT: 66 IN

## 2022-08-01 DIAGNOSIS — E78.5 HYPERLIPIDEMIA ASSOCIATED WITH TYPE 2 DIABETES MELLITUS: ICD-10-CM

## 2022-08-01 DIAGNOSIS — E11.36 TYPE 2 DIABETES MELLITUS WITH DIABETIC CATARACT, WITHOUT LONG-TERM CURRENT USE OF INSULIN: Primary | ICD-10-CM

## 2022-08-01 DIAGNOSIS — I15.2 HYPERTENSION ASSOCIATED WITH DIABETES: ICD-10-CM

## 2022-08-01 DIAGNOSIS — E11.9 TYPE 2 DIABETES MELLITUS WITHOUT COMPLICATION, WITHOUT LONG-TERM CURRENT USE OF INSULIN: ICD-10-CM

## 2022-08-01 DIAGNOSIS — E11.69 HYPERLIPIDEMIA ASSOCIATED WITH TYPE 2 DIABETES MELLITUS: ICD-10-CM

## 2022-08-01 DIAGNOSIS — E11.59 HYPERTENSION ASSOCIATED WITH DIABETES: ICD-10-CM

## 2022-08-01 PROCEDURE — 3079F PR MOST RECENT DIASTOLIC BLOOD PRESSURE 80-89 MM HG: ICD-10-PCS | Mod: HCNC,CPTII,S$GLB, | Performed by: NURSE PRACTITIONER

## 2022-08-01 PROCEDURE — 1101F PT FALLS ASSESS-DOCD LE1/YR: CPT | Mod: HCNC,CPTII,S$GLB, | Performed by: NURSE PRACTITIONER

## 2022-08-01 PROCEDURE — 3075F PR MOST RECENT SYSTOLIC BLOOD PRESS GE 130-139MM HG: ICD-10-PCS | Mod: HCNC,CPTII,S$GLB, | Performed by: NURSE PRACTITIONER

## 2022-08-01 PROCEDURE — 3044F PR MOST RECENT HEMOGLOBIN A1C LEVEL <7.0%: ICD-10-PCS | Mod: HCNC,CPTII,S$GLB, | Performed by: NURSE PRACTITIONER

## 2022-08-01 PROCEDURE — 95251 PR GLUCOSE MONITOR, 72 HOUR, PHYS INTERP: ICD-10-PCS | Mod: HCNC,S$GLB,, | Performed by: NURSE PRACTITIONER

## 2022-08-01 PROCEDURE — 1126F PR PAIN SEVERITY QUANTIFIED, NO PAIN PRESENT: ICD-10-PCS | Mod: HCNC,CPTII,S$GLB, | Performed by: NURSE PRACTITIONER

## 2022-08-01 PROCEDURE — 4010F ACE/ARB THERAPY RXD/TAKEN: CPT | Mod: HCNC,CPTII,S$GLB, | Performed by: NURSE PRACTITIONER

## 2022-08-01 PROCEDURE — 3288F PR FALLS RISK ASSESSMENT DOCUMENTED: ICD-10-PCS | Mod: HCNC,CPTII,S$GLB, | Performed by: NURSE PRACTITIONER

## 2022-08-01 PROCEDURE — 1160F PR REVIEW ALL MEDS BY PRESCRIBER/CLIN PHARMACIST DOCUMENTED: ICD-10-PCS | Mod: HCNC,CPTII,S$GLB, | Performed by: NURSE PRACTITIONER

## 2022-08-01 PROCEDURE — 1101F PR PT FALLS ASSESS DOC 0-1 FALLS W/OUT INJ PAST YR: ICD-10-PCS | Mod: HCNC,CPTII,S$GLB, | Performed by: NURSE PRACTITIONER

## 2022-08-01 PROCEDURE — 1159F PR MEDICATION LIST DOCUMENTED IN MEDICAL RECORD: ICD-10-PCS | Mod: HCNC,CPTII,S$GLB, | Performed by: NURSE PRACTITIONER

## 2022-08-01 PROCEDURE — 99214 PR OFFICE/OUTPT VISIT, EST, LEVL IV, 30-39 MIN: ICD-10-PCS | Mod: HCNC,S$GLB,, | Performed by: NURSE PRACTITIONER

## 2022-08-01 PROCEDURE — 3008F BODY MASS INDEX DOCD: CPT | Mod: HCNC,CPTII,S$GLB, | Performed by: NURSE PRACTITIONER

## 2022-08-01 PROCEDURE — 3044F HG A1C LEVEL LT 7.0%: CPT | Mod: HCNC,CPTII,S$GLB, | Performed by: NURSE PRACTITIONER

## 2022-08-01 PROCEDURE — 95251 CONT GLUC MNTR ANALYSIS I&R: CPT | Mod: HCNC,S$GLB,, | Performed by: NURSE PRACTITIONER

## 2022-08-01 PROCEDURE — 4010F PR ACE/ARB THEARPY RXD/TAKEN: ICD-10-PCS | Mod: HCNC,CPTII,S$GLB, | Performed by: NURSE PRACTITIONER

## 2022-08-01 PROCEDURE — 99214 OFFICE O/P EST MOD 30 MIN: CPT | Mod: HCNC,S$GLB,, | Performed by: NURSE PRACTITIONER

## 2022-08-01 PROCEDURE — 3075F SYST BP GE 130 - 139MM HG: CPT | Mod: HCNC,CPTII,S$GLB, | Performed by: NURSE PRACTITIONER

## 2022-08-01 PROCEDURE — 1159F MED LIST DOCD IN RCRD: CPT | Mod: HCNC,CPTII,S$GLB, | Performed by: NURSE PRACTITIONER

## 2022-08-01 PROCEDURE — 99999 PR PBB SHADOW E&M-EST. PATIENT-LVL IV: ICD-10-PCS | Mod: PBBFAC,HCNC,, | Performed by: NURSE PRACTITIONER

## 2022-08-01 PROCEDURE — 3079F DIAST BP 80-89 MM HG: CPT | Mod: HCNC,CPTII,S$GLB, | Performed by: NURSE PRACTITIONER

## 2022-08-01 PROCEDURE — 1126F AMNT PAIN NOTED NONE PRSNT: CPT | Mod: HCNC,CPTII,S$GLB, | Performed by: NURSE PRACTITIONER

## 2022-08-01 PROCEDURE — 99999 PR PBB SHADOW E&M-EST. PATIENT-LVL IV: CPT | Mod: PBBFAC,HCNC,, | Performed by: NURSE PRACTITIONER

## 2022-08-01 PROCEDURE — 1160F RVW MEDS BY RX/DR IN RCRD: CPT | Mod: HCNC,CPTII,S$GLB, | Performed by: NURSE PRACTITIONER

## 2022-08-01 PROCEDURE — 3288F FALL RISK ASSESSMENT DOCD: CPT | Mod: HCNC,CPTII,S$GLB, | Performed by: NURSE PRACTITIONER

## 2022-08-01 PROCEDURE — 3008F PR BODY MASS INDEX (BMI) DOCUMENTED: ICD-10-PCS | Mod: HCNC,CPTII,S$GLB, | Performed by: NURSE PRACTITIONER

## 2022-08-01 RX ORDER — METFORMIN HYDROCHLORIDE 500 MG/1
1000 TABLET, EXTENDED RELEASE ORAL DAILY
Qty: 180 TABLET | Refills: 3 | Status: SHIPPED | OUTPATIENT
Start: 2022-08-01 | End: 2024-01-03 | Stop reason: SDUPTHER

## 2022-08-01 RX ORDER — FLASH GLUCOSE SENSOR
1 KIT MISCELLANEOUS
Qty: 6 KIT | Refills: 3 | Status: SHIPPED | OUTPATIENT
Start: 2022-08-01 | End: 2022-10-13 | Stop reason: SDUPTHER

## 2022-08-01 RX ORDER — EZETIMIBE 10 MG/1
10 TABLET ORAL NIGHTLY
Qty: 90 TABLET | Refills: 3 | Status: SHIPPED | OUTPATIENT
Start: 2022-08-01 | End: 2023-10-19 | Stop reason: SDUPTHER

## 2022-08-01 RX ORDER — METFORMIN HYDROCHLORIDE 500 MG/1
1000 TABLET, EXTENDED RELEASE ORAL DAILY
Qty: 180 TABLET | Refills: 3 | Status: SHIPPED | OUTPATIENT
Start: 2022-08-01 | End: 2022-08-01

## 2022-08-01 RX ORDER — EZETIMIBE 10 MG/1
10 TABLET ORAL NIGHTLY
Qty: 90 TABLET | Refills: 3 | Status: SHIPPED | OUTPATIENT
Start: 2022-08-01 | End: 2022-08-01

## 2022-08-01 NOTE — PROGRESS NOTES
67 y.o. male here for 2 month follow up visit for Diabetes type 2 -   Last seen June 2022 - those notes are below.     a1c down from 9.4% to 6.3%.   Continues on  metformin 1000mg XR (two 500mg tablets) in the morning time -   We had added jardiance 10 mg tablet in morning time.   Failed tradjenta in past.   He is still not always following ADA diet.   He did cut down on breads/carbs though -   He met with dietician - Anupama -     Free style richmond 2 - personal - see scanned in .   (gets sensors from [facility]) -   He needs refills.     Average glucose 117 -   a1c/gmi is @ 6.1%.   97% in target range.   0% lows.   3% highs.   0% extreme highs.       Last visit notes as follows from June 2022:   HPI: Eyad Garcia is a 67 y.o.  male c/I for visit to address Diabetes Type 2  This is the first time I am seeing him for care, follows with Dr. Taylor Choi MD for primary care needs.   Has not seen endocrinology, but did see SUNG Acharya in the past.     was diagnosed with T2DM about 40+ years ago - he reports he was in his 20's when he was diagnosed.   Mother had T2dm - as well as many siblings, he is 1 of 12 children.   Has never been hospitalized r/t DM.  Denies missing doses of DM medication.     a1c up from 7.2% to 9.4% currently.   Metformin 1000 mg XR just in the morning time - he cannot tolerate higher doses.   He has tried tradjenta in the past but had some cramping in his legs, so stopped taking.   Began using personal cgm most recently -     Free style richmond 2 personal downloaded and reviewed today -   He is using with his cell phone.   See scanned in .   Average glucose is 186   53% time in range.   37% highs.   10% highs.   0% lows.     Exercise prohibited due to pain secondary to left rotator cuff tear and chronic pain.   But not always following ADA diet.   Admits likes sweets, occasional snowballs especially with hot weather.     Complications from diabetes and  pertinent co-morbidities include:   Negative for DKA.   Has never taken insulin in life.   -negative for diabetic neuropathy.   -negative for nephropathy.   No microalbuminuria.   Eyes:  negative for Diabetic retinopathy - has cataracts. And glaucoma?    CV: Denies history of MI nor stroke.   CAD: Denies.  Takes aspirin 81mg tablet daily  BP: has history of HTN  Statin: Taking  ACE/ARB: Taking  Podiatry - seeing Dr. Alberto - last saw June 1st, 2022.   Prostate cancer?     Social History     Tobacco Use   Smoking Status Never Smoker   Smokeless Tobacco Never Used        Past medical History:   Past Medical History:   Diagnosis Date    Cataract     Diabetes mellitus type II     Difficult intubation     Erectile dysfunction     History of colon polyps 1/25/2019    Hyperlipidemia     Hypertension     OA (osteoarthritis)     POAG (primary open-angle glaucoma)     Retinal detachment     OS      Family hx:   Family History   Problem Relation Age of Onset    Diabetes Mother     Hypertension Mother     Glaucoma Mother     Diabetes Sister     Glaucoma Sister     Diabetes Brother     Glaucoma Brother     No Known Problems Father     Cancer Maternal Aunt     Colon cancer Maternal Aunt     No Known Problems Maternal Uncle     No Known Problems Paternal Aunt     No Known Problems Paternal Uncle     No Known Problems Maternal Grandmother     No Known Problems Maternal Grandfather     No Known Problems Paternal Grandmother     No Known Problems Paternal Grandfather     Anesthesia problems Neg Hx     Broken bones Neg Hx     Clotting disorder Neg Hx     Collagen disease Neg Hx     Dislocations Neg Hx     Osteoporosis Neg Hx     Rheumatologic disease Neg Hx     Scoliosis Neg Hx     Severe sprains Neg Hx     Amblyopia Neg Hx     Blindness Neg Hx     Cataracts Neg Hx     Macular degeneration Neg Hx     Retinal detachment Neg Hx     Strabismus Neg Hx     Stroke Neg Hx     Thyroid disease Neg  Hx     Esophageal cancer Neg Hx       Current meds:   Current Outpatient Medications:     albuterol (VENTOLIN HFA) 90 mcg/actuation inhaler, Inhale 2 puffs into the lungs every 6 (six) hours as needed for Wheezing or Shortness of Breath (cough). Rescue, Disp: 18 g, Rfl: 11    alcohol swabs (ALCOHOL WIPES) PadM, Monitor as directed X 2 daily. Per insurance coverage. ICD 10 E11.9, Disp: 200 each, Rfl: 3    amLODIPine (NORVASC) 10 MG tablet, Take 1 tablet (10 mg total) by mouth once daily., Disp: 90 tablet, Rfl: 3    brimonidine 0.2% (ALPHAGAN) 0.2 % Drop, Place 1 drop into both eyes 3 (three) times daily., Disp: 10 mL, Rfl: 12    dorzolamide-timolol 2-0.5% (COSOPT) 22.3-6.8 mg/mL ophthalmic solution, Place 1 drop into both eyes 2 (two) times daily., Disp: 30 mL, Rfl: 3    empagliflozin (JARDIANCE) 10 mg tablet, Take 1 tablet (10 mg total) by mouth once daily., Disp: 30 tablet, Rfl: 6    fluticasone propionate (FLONASE) 50 mcg/actuation nasal spray, 2 sprays (100 mcg total) by Each Nostril route 2 (two) times daily as needed for Rhinitis., Disp: 16 g, Rfl: 0    FREESTYLE ROME 2 SENSOR Kit, Use as directed. Change every 14 (fourteen) days., Disp: 2 kit, Rfl: 11    latanoprost 0.005 % ophthalmic solution, Place 1 drop into both eyes every evening., Disp: 15 mL, Rfl: 3    losartan (COZAAR) 100 MG tablet, Take 1 tablet (100 mg total) by mouth once daily., Disp: 90 tablet, Rfl: 3    meloxicam (MOBIC) 15 MG tablet, Take 15 mg by mouth once daily., Disp: , Rfl:     ezetimibe (ZETIA) 10 mg tablet, Take 1 tablet (10 mg total) by mouth every evening., Disp: 90 tablet, Rfl: 3    metFORMIN (GLUCOPHAGE-XR) 500 MG ER 24hr tablet, Take 2 tablets (1,000 mg total) by mouth once daily., Disp: 180 tablet, Rfl: 3    Current Facility-Administered Medications:     [START ON 9/28/2022] leuprolide acetate (6 month) injection 45 mg, 45 mg, Intramuscular, Q6 Months, Henrry Sampson Jr., MD     Current Diabetes medications:  "  Metformin 500 mg XR - takes 2 tablets in the morning.   jardiance 10 mg tablet daily in morning time.     Medications Tried and Failed:   tradjenta - cramping in legs.     Social:   Lives at home with: wife, and granddaughter 11 years old. (raising her - parents ).   Life changes/stressors currently: hurt shoulder   Diet: not always ADA diet now.   Meals: 3 per day and snacks.        Breakfast - grits/breakfast sausage or oatmeal - usually juice for lunch.        Lunch - snacks usually - fruits/tangerine or grapes.        Dinner - home cooked, red beans and rice, baked chicken. Squash and shrimp - fried seafood on Friday nights.        Snacks - occasional snowballs. Chocolate candies, icecream. Cookies.         Drinks - coke 1 per day, water, coffee  Exercise: none.   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      Glucose Monitoring:   Checking sugars with free style richmond 2 - new personal   See scanned in .   Gets from [employer].     Standards of care:   Eyes: .: 07/29/2022  Foot exam: : 06/06/2022   Diabetes education: yes    Vital Signs  /80 (BP Location: Left arm, Patient Position: Sitting, BP Method: Medium (Manual))   Pulse 66   Temp 97.5 °F (36.4 °C) (Temporal)   Resp 14   Ht 5' 6" (1.676 m)   Wt 69.1 kg (152 lb 5.4 oz)   SpO2 98%   BMI 24.59 kg/m²     Pertinent Labs:   Hgba1c   Lab Results   Component Value Date    HGBA1C 6.3 (H) 07/29/2022    HGBA1C 6.8 (H) 07/05/2022    HGBA1C 9.4 (H) 03/29/2022     Lipid panel   Lab Results   Component Value Date    CHOL 244 (H) 07/29/2022    CHOL 151 03/29/2022    CHOL 140 09/27/2021     Lab Results   Component Value Date    HDL 49 07/29/2022    HDL 38 (L) 03/29/2022    HDL 38 (L) 09/27/2021     Lab Results   Component Value Date    LDLCALC 175.2 (H) 07/29/2022    LDLCALC 97.0 03/29/2022    LDLCALC 90.2 09/27/2021     Lab Results   Component " Value Date    TRIG 99 07/29/2022    TRIG 80 03/29/2022    TRIG 59 09/27/2021     Lab Results   Component Value Date    CHOLHDL 20.1 07/29/2022    CHOLHDL 25.2 03/29/2022    CHOLHDL 27.1 09/27/2021      CMP  Glucose   Date Value Ref Range Status   07/29/2022 110 70 - 110 mg/dL Final     BUN   Date Value Ref Range Status   07/29/2022 14 8 - 23 mg/dL Final     Creatinine   Date Value Ref Range Status   07/29/2022 0.9 0.5 - 1.4 mg/dL Final     eGFR if    Date Value Ref Range Status   07/29/2022 >60.0 >60 mL/min/1.73 m^2 Final     eGFR if non    Date Value Ref Range Status   07/29/2022 >60.0 >60 mL/min/1.73 m^2 Final     Comment:     Calculation used to obtain the estimated glomerular filtration  rate (eGFR) is the CKD-EPI equation.        AST   Date Value Ref Range Status   07/29/2022 18 10 - 40 U/L Final     ALT   Date Value Ref Range Status   07/29/2022 12 10 - 44 U/L Final     Microalbumin creatinine ratio:   Lab Results   Component Value Date    MICALBCREAT 6.5 09/27/2021       Review Of Systems:   Gen: Appetite good, no weight gain or loss, increased energy levels now, Denies polydipsia.  Skin: Skin is intact and heals well, denies any rashes or hair changes.   EENT: Denies any acute visual disturbances, nor blurred vision.   Resp: Denies SOB or Dyspnea on exertion, denies cough.   Cardiac: Denies chest pain, palpitations, or swelling.   GI: Denies abdominal pain, nausea or vomiting, diarrhea, or constipation.   /GYN: Denies nocturia, nor burning, frequency or pain on urination.  MS/Neuro: Denies numbness/ tingling in BLE; Gait steady, speech clear  Psych: Denies drug/ETOH abuse, no hx of depression.  Other systems: negative.    Physical Exam:   GENERAL: Well developed, well nourished in appearance.   PSYCH: AAOx3, appropriate mood and affect, pleasant expression, conversant, appears relaxed, well groomed.   EYES: PERRL, Conjunctiva and corneas clear  NECK: Soft and Supple, trachea  midline  CHEST: Even, regular, and unlabored respirations  ABDOMEN: Soft, non-tender, non-distended.  VASCULAR: pedal pulses palpable bilaterally, no edema.  NEURO:  cranial nerves II - XII intact   MUSCULOSKELETAL: Good ROM, steady gait.   SKIN: Skin warm, dry, and intact     Assessment and Plan of Care:     Eyad was seen today for diabetes and follow-up.    Diagnoses and all orders for this visit:    Hypertension associated with diabetes    Hyperlipidemia associated with type 2 diabetes mellitus  -     Lipid Panel; Future  -     ezetimibe (ZETIA) 10 mg tablet; Take 1 tablet (10 mg total) by mouth every evening.    Type 2 diabetes mellitus without complication, without long-term current use of insulin  -     metFORMIN (GLUCOPHAGE-XR) 500 MG ER 24hr tablet; Take 2 tablets (1,000 mg total) by mouth once daily.  -     Hemoglobin A1C; Future  -     Comprehensive Metabolic Panel; Future         1. T2DM with hyperglycemia- Hgba1c goal is 7.5% or less without hypoglycemia - 7.3%--> 7.2%--> 9.4% --> 6.8%--> 6.3% current   discussed DM, progression of disease, long term complications, CV risk factors and tx options.   Advise compliance with ADA diet and encourage exercise  Continue metformin 1000mg XR in the morning time -   Failed tradjenta in past.   He is at goal weight - would use caution in starting a glp1 b/c of weight loss. Will hold off for now.   Patient is not wanting insulin - I think we could -   Continue jardiance 10mg tablet daily.   Take 1 tablet po daily.   Discussed MOA, possible side effects including yeast infection, UTI, dehydration and ketoacidosis, importance of maintaining hydration and avoiding No carb diets. Good use hygiene. Notify my office if any side effects. Will monitor renal function closely. Stable at present.   Reviewed  hypoglycemia, s/s and appropriate tx. Have/get quick acting glucose tablets at hand.   Instructed to monitor Blood glucose 2 - 4x/day and bring meter/ log to every clinic  visit.   Or can continue to use free style richmond 2 -   Sending orders to [facility] medical.     2. HTN- controlled, continue meds as previously prescribed and monitor.   No urine mac.     3. Lipids- LDL goal < 100. Not at goal.   Is not on statin anymore b/c he was having severe leg pain - will add zetia every HS now,   Will not do statins again.     4. Weight - BMI Body mass index is 24.59 kg/m².   Encourage Ada diet and exercise.     5. Renal Function - stable. No nephropathy.   No urine mac.   Continue jardiance.          Follow up in  3 - 4 months with OV and labs.

## 2022-08-01 NOTE — PATIENT INSTRUCTIONS
Continue on metformin 1000mg total in the morning time.   Continue on jardiance 10mg tablet daily.   Stay hydrated.     Continue to use the richmond 2 - scan as often as you can.     Continue diabetic diet.

## 2022-08-14 NOTE — PROGRESS NOTES
HPI     DLS: 7/29/2022    Pt here for 3 week IOP Check;  Pt states he needs refills on all his eye drops.     Meds;  Cosopt TID OU   Brimonidine TID OU  rocklatan QHS OU (has a sample)     1. POAG   2. VF Defect OS   3. Chorioretinal Scar OS   4. Hx RD OS   5. NS OU   6. Type 2 DM no DR   7. Pupil Sphincter Rupture OS   8. Presbyopia     Last edited by Bethany Lechuga MD on 8/19/2022  8:45 AM. (History)              Assessment /Plan     For exam results, see Encounter Report.    Primary open-angle glaucoma, right eye, mild stage    Primary open-angle glaucoma, left eye, moderate stage    Arcuate visual field defect of left eye    Chorioretinal scar, left    Old subtotal retinal detachment, left    Pupillary sphincter rupture, left    Type 2 diabetes mellitus without ophthalmic manifestations    Nuclear sclerotic cataract of both eyes          [facility]   Presented to [facility] - to [facility] 8/23/2006 - off gtts with a baseline / Tmax of 25/27  Referred by Courtney for consideration of SLT's   Pt with POAG and poorly controlled IOP's - does not use drops regularly       1. Primary open angle glaucoma, both eyes, mild stage        Glaucoma (type and duration)    POAG with elevated IOP ou - mild stage    First HVF   2007 - full od // SAD os 2/2 to CRS   First photos   2011   Treatment / Drops started   2006           Family history    ++ mother and 2 brothers         Glaucoma meds    Latanoprost  (( use to use cosopt as well)         H/O adverse rxn to glaucoma drops    None (has tired alphagan and timolol in past -non compliant)         LASERS    SLT os - 3/31/2016 // SLT od - 4/14/2016         GLAUCOMA SURGERIES    none        OTHER EYE SURGERIES    H/O RD repair os - S/P laser repair         CDR    0.75 // 0.85-0.9         Tbase    25/27          Tmax    25/27            Ttarget    18/18             HVF    13 test 2006 to  2022 - full  od // SAD - 2/2 CRS  from laser for RD repair os / new IAD         Gonio    +3 ou          CCT    486/502 - thin ou         OCT    8 test 2011 to 2022 - RNFL - dec T od ( fluctuate) od  - dec. G/N/TI/TS/ N  Bord T/NI  (has a CRS)  os        HRT    3 test 2017 to 2019 -MR -  DEC. Sn/n/in od // DEC. S/n/i, BORD t os /// CDR 0.73 od // 0.86 os        Disc photos    2011, 2015 , 2017// [facility] - 2022      - Ttoday   14/14 (down from21/29 )     - Test done today  - IOP / with adjustment of drops      2. 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 Chorioretinal scar, left   -with 2nd VF defect      4. Old subtotal retinal detachment, left   S/P repair - retina flat - stable   -large CRS      5. Type 2 diabetes mellitus without ophthalmic manifestations      6. Senile nuclear sclerosis  -mild - monitor      7. pupil sphincter rupture os  -suggest old trauma  -does NOT appear to have a angle recession    7. Presbyopia       PLAN    POAG with OHT OS > OD - mild od // moderate os   IOP ok - below target of 18 ou - better after addition of cosopt ou - re-instruct on bid use of cosopt (3/2022)    The ON and VF's are still good ou   The VF loss os is 2/2 old CRS- ?  post laser for a RD repair   Good initial response to SLT ou 25/25 --> 16/17     LATANOPROST OU   Cont  cosopt ou bid -    Photo file updated 7/29/2022 7/29/2022  ++ IOP elevation os   ++ VF prog os   ++ OCT - RNFL prog os     Add / adjsut drops   cosopt tid ou - has at home   Brimonidine tid ou - Rx sent   rocklatan q hs ou (samples given )       - Consider repeat SLT - if the laser is available     F/U 2-3 months  - IOP check to see if still ok with  new eye drops - may need incisional surgery od - ?? If has a scleral buckle or not post RD sx         Bethany Lechuga MD

## 2022-08-19 ENCOUNTER — OFFICE VISIT (OUTPATIENT)
Dept: OPHTHALMOLOGY | Facility: CLINIC | Age: 67
End: 2022-08-19
Payer: COMMERCIAL

## 2022-08-19 DIAGNOSIS — H21.562 PUPILLARY SPHINCTER RUPTURE, LEFT: ICD-10-CM

## 2022-08-19 DIAGNOSIS — H33.052 OLD SUBTOTAL RETINAL DETACHMENT, LEFT: ICD-10-CM

## 2022-08-19 DIAGNOSIS — H31.002 CHORIORETINAL SCAR, LEFT: ICD-10-CM

## 2022-08-19 DIAGNOSIS — H25.13 NUCLEAR SCLEROTIC CATARACT OF BOTH EYES: ICD-10-CM

## 2022-08-19 DIAGNOSIS — E11.9 TYPE 2 DIABETES MELLITUS WITHOUT OPHTHALMIC MANIFESTATIONS: ICD-10-CM

## 2022-08-19 DIAGNOSIS — H40.1111 PRIMARY OPEN-ANGLE GLAUCOMA, RIGHT EYE, MILD STAGE: Primary | ICD-10-CM

## 2022-08-19 DIAGNOSIS — H53.432 ARCUATE VISUAL FIELD DEFECT OF LEFT EYE: ICD-10-CM

## 2022-08-19 DIAGNOSIS — H40.1122 PRIMARY OPEN-ANGLE GLAUCOMA, LEFT EYE, MODERATE STAGE: ICD-10-CM

## 2022-08-19 PROCEDURE — 1160F PR REVIEW ALL MEDS BY PRESCRIBER/CLIN PHARMACIST DOCUMENTED: ICD-10-PCS | Mod: HCNC,CPTII,S$GLB, | Performed by: OPHTHALMOLOGY

## 2022-08-19 PROCEDURE — 1101F PR PT FALLS ASSESS DOC 0-1 FALLS W/OUT INJ PAST YR: ICD-10-PCS | Mod: HCNC,CPTII,S$GLB, | Performed by: OPHTHALMOLOGY

## 2022-08-19 PROCEDURE — 4010F ACE/ARB THERAPY RXD/TAKEN: CPT | Mod: HCNC,CPTII,S$GLB, | Performed by: OPHTHALMOLOGY

## 2022-08-19 PROCEDURE — 1160F RVW MEDS BY RX/DR IN RCRD: CPT | Mod: HCNC,CPTII,S$GLB, | Performed by: OPHTHALMOLOGY

## 2022-08-19 PROCEDURE — 99999 PR PBB SHADOW E&M-EST. PATIENT-LVL III: CPT | Mod: PBBFAC,HCNC,, | Performed by: OPHTHALMOLOGY

## 2022-08-19 PROCEDURE — 4010F PR ACE/ARB THEARPY RXD/TAKEN: ICD-10-PCS | Mod: HCNC,CPTII,S$GLB, | Performed by: OPHTHALMOLOGY

## 2022-08-19 PROCEDURE — 3044F HG A1C LEVEL LT 7.0%: CPT | Mod: HCNC,CPTII,S$GLB, | Performed by: OPHTHALMOLOGY

## 2022-08-19 PROCEDURE — 1126F AMNT PAIN NOTED NONE PRSNT: CPT | Mod: HCNC,CPTII,S$GLB, | Performed by: OPHTHALMOLOGY

## 2022-08-19 PROCEDURE — 1159F PR MEDICATION LIST DOCUMENTED IN MEDICAL RECORD: ICD-10-PCS | Mod: HCNC,CPTII,S$GLB, | Performed by: OPHTHALMOLOGY

## 2022-08-19 PROCEDURE — 3288F FALL RISK ASSESSMENT DOCD: CPT | Mod: HCNC,CPTII,S$GLB, | Performed by: OPHTHALMOLOGY

## 2022-08-19 PROCEDURE — 3288F PR FALLS RISK ASSESSMENT DOCUMENTED: ICD-10-PCS | Mod: HCNC,CPTII,S$GLB, | Performed by: OPHTHALMOLOGY

## 2022-08-19 PROCEDURE — 1126F PR PAIN SEVERITY QUANTIFIED, NO PAIN PRESENT: ICD-10-PCS | Mod: HCNC,CPTII,S$GLB, | Performed by: OPHTHALMOLOGY

## 2022-08-19 PROCEDURE — 99999 PR PBB SHADOW E&M-EST. PATIENT-LVL III: ICD-10-PCS | Mod: PBBFAC,HCNC,, | Performed by: OPHTHALMOLOGY

## 2022-08-19 PROCEDURE — 99499 RISK ADDL DX/OHS AUDIT: ICD-10-PCS | Mod: S$GLB,,, | Performed by: OPHTHALMOLOGY

## 2022-08-19 PROCEDURE — 1101F PT FALLS ASSESS-DOCD LE1/YR: CPT | Mod: HCNC,CPTII,S$GLB, | Performed by: OPHTHALMOLOGY

## 2022-08-19 PROCEDURE — 92012 PR EYE EXAM, EST PATIENT,INTERMED: ICD-10-PCS | Mod: HCNC,S$GLB,, | Performed by: OPHTHALMOLOGY

## 2022-08-19 PROCEDURE — 1159F MED LIST DOCD IN RCRD: CPT | Mod: HCNC,CPTII,S$GLB, | Performed by: OPHTHALMOLOGY

## 2022-08-19 PROCEDURE — 3044F PR MOST RECENT HEMOGLOBIN A1C LEVEL <7.0%: ICD-10-PCS | Mod: HCNC,CPTII,S$GLB, | Performed by: OPHTHALMOLOGY

## 2022-08-19 PROCEDURE — 99499 UNLISTED E&M SERVICE: CPT | Mod: S$GLB,,, | Performed by: OPHTHALMOLOGY

## 2022-08-19 PROCEDURE — 92012 INTRM OPH EXAM EST PATIENT: CPT | Mod: HCNC,S$GLB,, | Performed by: OPHTHALMOLOGY

## 2022-08-19 RX ORDER — NETARSUDIL AND LATANOPROST OPHTHALMIC SOLUTION, 0.02%/0.005% .2; .05 MG/ML; MG/ML
1 SOLUTION/ DROPS OPHTHALMIC; TOPICAL NIGHTLY
Qty: 7.5 ML | Refills: 3 | Status: SHIPPED | OUTPATIENT
Start: 2022-08-19 | End: 2023-04-24 | Stop reason: SDUPTHER

## 2022-08-19 RX ORDER — BRIMONIDINE TARTRATE 2 MG/ML
1 SOLUTION/ DROPS OPHTHALMIC 3 TIMES DAILY
Qty: 30 ML | Refills: 3 | Status: SHIPPED | OUTPATIENT
Start: 2022-08-19 | End: 2023-04-24 | Stop reason: SDUPTHER

## 2022-09-16 ENCOUNTER — NUTRITION (OUTPATIENT)
Dept: DIABETES | Facility: CLINIC | Age: 67
End: 2022-09-16
Payer: COMMERCIAL

## 2022-09-16 VITALS — WEIGHT: 148.31 LBS | HEIGHT: 66 IN | BODY MASS INDEX: 23.83 KG/M2

## 2022-09-16 DIAGNOSIS — E11.36 TYPE 2 DIABETES MELLITUS WITH DIABETIC CATARACT, WITHOUT LONG-TERM CURRENT USE OF INSULIN: Primary | ICD-10-CM

## 2022-09-16 PROCEDURE — 99999 PR PBB SHADOW E&M-EST. PATIENT-LVL II: CPT | Mod: PBBFAC,HCNC,, | Performed by: DIETITIAN, REGISTERED

## 2022-09-16 PROCEDURE — 99999 PR PBB SHADOW E&M-EST. PATIENT-LVL II: ICD-10-PCS | Mod: PBBFAC,HCNC,, | Performed by: DIETITIAN, REGISTERED

## 2022-09-16 PROCEDURE — G0108 PR DIAB MANAGE TRN  PER INDIV: ICD-10-PCS | Mod: HCNC,S$GLB,, | Performed by: DIETITIAN, REGISTERED

## 2022-09-16 PROCEDURE — G0108 DIAB MANAGE TRN  PER INDIV: HCPCS | Mod: HCNC,S$GLB,, | Performed by: DIETITIAN, REGISTERED

## 2022-09-16 NOTE — PROGRESS NOTES
"Diabetes Care Specialist Follow-up Note  Author: Anupama Brian RD, CDE  Date: 9/16/2022    Program Intake  Reason for Diabetes Program Visit:: Intervention  Type of Intervention:: Individual  Individual: Education  Education: Nutrition and Meal Planning, Self-Management Skill Review, Pattern Management  Current diabetes risk level:: moderate (HgbA1c 9.4)  In the last 12 months, have you:: none  Permission to speak with others about care:: no    Lab Results   Component Value Date    HGBA1C 6.3 (H) 07/29/2022     A1c Pre Diabetes Care Specialist Intervention:  9.4%    Height 5' 6" (1.676 m), weight 67.3 kg (148 lb 4.8 oz).    Clinical    Patient Health Rating  Compared to other people your age, how would you rate your health?: Good    Problem Review  Reviewed Problem List with Patient: yes  Active comorbidities affecting diabetes self-care.: yes  Comorbidities: Other (comment), Gastrointestinal Disorder, Cardiovascular Disease, Hypertension (pain, ED, retina detatchment, prostate CA, iron deficiency anemia)  Reviewed health maintenance: yes    Clinical Assessment  Current Diabetes Treatment: Oral Medication  Have you ever experienced hypoglycemia (low blood sugar)?: no  Have you ever experienced hyperglycemia (high blood sugar)?: no    Medication Information  How do you obtain your medications?: Mail order  How many days a week do you miss your medications?: Never  Do you use a pill box or medication chart to help you manage your medications?: Pill box  Do you sometimes have difficulty refilling your medications?: No  Medication adherence impacting ability to self-manage diabetes?: No    Labs  Do you have regular lab work to monitor your medications?: Yes  Type of Regular Lab Work: A1c, Cholesterol, Microalbumin, CBC, BMP  Where do you get your labs drawn?: [facility]  Lab Compliance Barriers: No    Nutritional Status  Diet: Regular  Meal Plan 24 Hour Recall: Breakfast, Lunch, Snack, Dinner  Meal Plan 24 Hour Recall - " Breakfast: grits, sausage - apple juice or oatmeal and a banana  Meal Plan 24 Hour Recall - Lunch: tunafish sandwich - water or skipped  Meal Plan 24 Hour Recall - Dinner: ribeye with green beans  Meal Plan 24 Hour Recall - Snack: apple sauce squeeze tube 2 chocolate silver bells, occassionally will have chips, drinks water: and 1 regular cold drink/soda a day  Change in appetite?: No  Dentation:: Intact  Recent Changes in Weight: Weight Loss  Was weight loss intentional or unintentional?: Unintentional  Current nutritional status an area of need that is impacting patient's ability to self-manage diabetes?: Yes    Additional Social History    Support  Does anyone support you with your diabetes care?: yes  Who supports you?: spouse, self  Who takes you to your medical appointments?: self, spouse  Does the current support meet the patient's needs?: Yes  Is Support an area impacting ability to self-manage diabetes?: No    Access to Mass Media & Technology  Does the patient have access to any of the following devices or technologies?: Smart phone  Media or technology needs impacting ability to self-manage diabetes?: No    Cognitive/Behavioral Health  Alert and Oriented: Yes  Difficulty Thinking: No  Requires Prompting: No  Requires assistance for routine expression?: No  Cognitive or behavioral barriers impacting ability to self-manage diabetes?: No    Culture/Sabianism  Culture or Mosque beliefs that may impact ability to access healthcare: No    Communication  Language preference: English  Hearing Problems: No  Vision Problems: Yes  Vision problem type:: Decreased Vision  Vision Assistance: Glasses  Communication needs impacting ability to self-manage diabetes?: No    Health Literacy  Preferred Learning Method: Face to Face, Reading Materials, Demonstration  How often do you need to have someone help you read instructions, pamphlets, or written material from your doctor or pharmacy?: Sometimes  Health literacy needs  impacting ability to self-manage diabetes?: No      Diabetes Self-Management Skills Assessment     Diabetes Disease Process/Treatment Options  Patient/caregiver able to state what happens when someone has diabetes.: yes  Patient/caregiver knows what type of diabetes they have.: yes  Diabetes Type : Type II  Patient/caregiver able to identify at least three signs and symptoms of diabetes.: yes  Identified signs and symptoms:: frequent urination, increased thirst  Patient able to identify at least three risk factors for diabetes.: yes  Identified risk factors:: family history  Diabetes Disease Process/Treatment Options: Skills Assessment Completed: Yes  Assessment indicates:: Adequate understanding  Area of need?: No    Nutrition/Healthy Eating  Challenges to healthy eating:: portion control, other (see comments) (sugary beverage)  Method of carbohydrate measurement:: plate method  Patient can identify foods that impact blood sugar.: yes  Patient-identified foods:: starches (bread, pasta, rice, cereal), sweets, starchy vegetables (corn, peas, beans), soda, fruit/fruit juice, milk, yogurt  Nutrition/Healthy Eating Skills Assessment Completed:: Yes  Assessment indicates:: Adequate understanding  Area of need?: No    Physical Activity/Exercise  Patient's daily activity level:: lightly active  Patient formally exercises outside of work.: no  Reasons for not exercising:: time constraints  Patient can identify forms of physical activity.: yes  Stated forms of physical activity:: moving to burn calories, recreational activities, yardwork, housework  Patient can identify reasons why exercise/physical activity is important in diabetes management.: no  Physical Activity/Exercise Skills Assessment Completed: : Yes  Assessment indicates:: Adequate understanding  Area of need?: No    Medications  Patient is able to describe current diabetes management routine.: yes  Diabetes management routine:: oral medications  Patient is able  to identify current diabetes medications, dosages, and appropriate timing of medications.: yes  Patient understands the purpose of the medications taken for diabetes.: yes  Patient reports problems or concerns with current medication regimen.: yes  Medication regimen problems/concerns:: concerned about side effects, other (see comments) (weight loss)  Medication Skills Assessment Completed:: Yes  Assessment indicates:: Adequate understanding  Area of need?: No    Home Blood Glucose Monitoring  Patient states that blood sugar is checked at home daily.: yes  Monitoring Method:: personal continuous glucose monitor  Personal CGM type:: freestlye libre2  Patient is able to use personal CGM appropriately.: no  CGM Report reviewed?: yes  Home Blood Glucose Monitoring Skills Assessment Completed: : Yes  Assessment indicates:: Instruction Needed, Knowledge deficit  Area of need?: Yes (starting freestyle [place] today)              Acute Complications  Patient is able to identify types of acute complications: Yes  Patient Identified:: Hypoglycemia, Hyperglycemia  Patient is able to state the basic meaning of hypoglycemia?: Yes  Able to state the blood sugar range for hypoglycemia?: yes  Patient stated range:: <70  Patient can identify general symptoms of hypoglycemia: yes  Patient identified:: shakiness  Able to state proper treatment of hypoglycemia?: yes  Patient identified:: 1/2 can soda/fruit juice  Patient is able to state the basic meaning of hyperglycemia?: Yes  Able to state the blood sugar range for hyperglycemia?: yes  Patient stated range:: >250  Patient able to state proper treatment of hyperglycemia?: yes  Patient identified:: take medication as recommended, increase water intake, monitor blood sugar  Patient able to verbalize sick day plan?: yes  Patient-stated sick day plan:: seek medical attention for nausea, diarrhea, vomiting, fever with high blood sugar, drink more fluids, check blood sugar every 2-3  hours  Acute Complications Skills Assessment Completed: : Yes  Assessment indicates:: Instruction Needed, Adequate understanding  Area of need?: Yes    Chronic Complications  Patient can identify major chronic complications of diabetes.: yes  Stated chronic complications:: kidney disease, neuropathy/nerve damage, retinopathy, stroke, sexual dysfunction, heart disease/heart attack  Patient can identify ways to prevent or delay diabetes complications.: yes  Stated ways to prevent complications:: controlling blood sugar  Patient is aware that having diabetes increases risk of heart disease?: Yes  Patient is aware that heart disease is the leading cause of death and disability in people with diabetes?: Yes  Patient able to state risk factors for heart disease?: Yes  Patient stated risk factors for heart disease:: High blood pressure, High cholesterol, Having diabetes  Patient is taking statin?: Yes  Chronic Complications Skills Assessment Completed: : Yes  Assessment indicates:: Adequate understanding  Area of need?: Yes    Psychosocial/Coping  Patient can identify ways of coping with chronic disease.: yes  Patient-stated ways of coping with chronic disease:: support from loved ones  Psychosocial/Coping Skills Assessment Completed: : Yes  Assessment indicates:: Adequate understanding  Area of need?: No      During today's follow-up visit,  the following areas required further assessment and content was provided/reviewed.    Based on today's diabetes care assessment, the following areas of need were identified:      Social 9/16/2022   Support No   Access to Mass Media/Tech No   Cognitive/Behavioral Health No   Culture/Adventism No   Communication No   Health Literacy No        Clinical 9/16/2022   Medication Adherence No   Lab Compliance No   Nutritional Status Yes        Diabetes Self-Management Skills 9/16/2022   Diabetes Disease Process/Treatment Options No   Nutrition/Healthy Eating No   Physical Activity/Exercise No    Medication No   Home Blood Glucose Monitoring Yes   Acute Complications Yes   Chronic Complications Yes   Psychosocial/Coping No        Today's interventions were provided through individual discussion, instruction, and written materials were provided.    Patient verbalized understanding of instruction and written materials.  Pt was able to return back demonstration of instructions today. Patient understood key points, needs reinforcement and further instruction.     Diabetes Self-Management Care Plan Review:  Diabetes Self-Management Care Plan Review and Evaluation of Progress:    During today's follow-up Eyad's Diabetes Self-Management Care Plan progress was reviewed and progress was evaluated including his/her input. Eyad has agreed to continue his/her journey to improve/maintain overall diabetes control by continuing to set health goals. See care plan progress below.      Care Plan: Diabetes Management   Updates made since 8/17/2022 12:00 AM        Problem: Medications         Goal: Patient Agrees to take Diabetes Medication(s) metformin and jardiance as prescribed, stop jentaduato.    Start Date: 5/13/2022   Expected End Date: 6/13/2022   This Visit's Progress: On track   Recent Progress: On track   Priority: High   Barriers: No Barriers Identified   Note:    Discussed MOA, onset, side effects, dosage of meds.  Provided with strategies for daily medication adherence (phone alarms, medication reminder apps, medication list, pill organizer, pill packing, pharmacy delivery options).   Discussed any concerns about regimen.   Reviewed significance of mealtimes and medication administration.   Reviewed correction scale insulin with mealtimes as prescribed.     Answered patient's questions regarding if he will ever be able to get off medication.  Reviewed need for medication and challenges of glucose control with steroid treatments and stress.  The patient was instructed on storage of insulin and/or injectable  medication, precautions related to hyper/hypoglycemia.   Patient was given instructions on when/who to call with problems.        Task: Reviewed action, peak, duration, dosing, and proper storage guidelines of all diabetes medications. Completed 9/16/2022        Task: Reviewed sources of Carbohydrate: Starch, Milk, Fruit, Sugar, and nonstarchy vegetables Completed 9/16/2022        Problem: Acute Complications         Goal: Patient agrees to identify and manage signs and symptoms of high/low blood sugar (hyper/hypoglycemia) by keeping a log of events and using proper treatment.    Start Date: 5/13/2022   Expected End Date: 8/13/2022   This Visit's Progress: On track   Recent Progress: On track   Priority: Medium   Barriers: No Barriers Identified   Note:    Hyperglycemia  ABC's of Diabetes   Discussed importance of A1c less than 6.0 to reduce risk of micro and macro complications, controlled Blood Pressure 130/80 and Cholesterol Lab Values--Total Cholesterol <200mg, LDL < 100, HDL >45 men and >50 women, Triglycerides <150mg for prevention heart disease, heart attack, stroke.  how to use a glucometer  reviewed understanding diabetes distress  reviewed current level and goal level for HgbA1c, blood glucose, microalbumin, and lipids  reviewed signs/symptoms of hyperglycemia  reviewed what level blood sugar is considered high   reviewed at what level to contact the doctor and/or go to the emergency room.   Reviewed what patient can do to decrease blood sugar (ie drink more water, exercise, take medication as prescribed, monitor blood glucose)     Hypoglycemia  Reviewed blood glucose goals, prevention, detection, and treatment of hypoglycemia, and when to contact the clinic.  reviewed signs/symptoms of hypoglycemia  reviewed what level blood sugar is considered low   reviewed at what level to contact the doctor and/or go to the emergency room.   Reviewed what patient can do to increase blood sugar (ie drink juice or ½ can  soda, eat 5-6 pieces of candy, 4 glucose tablets, 1 tbsp sugar or honey, glucagon)  Reviewed when glucagon should be used  Reviewed how to use glucagon device (injections and inhaled)    Freestyle Barak Education  Patient is here in clinic today for initial start of Freestyle Barak continuous glucose monitoring system (CGMA). Reviewed with patient how to insert sensor. Patient inserted sensor on left arm tricep region. Hypoglycemia trigger set for 70 and hyperglycemia set for 180. Patient provided with phone number for 24-hour help line if needed. Down loaded Barak view twan. Patient will use his phone to scan sensor and get readings. Patient accepted the invitation to share data with our clinic and will follow-up in 2 weeks. Questions addressed. Initialization finished. No futher questions.         Problem: Healthy Eating Resolved 9/16/2022        Goal: Eat 2-3 meals daily with 45-60g/3-4 servings of Carbohydrate per meal. Completed 9/16/2022   Start Date: 5/13/2022   Expected End Date: 5/13/2023   This Visit's Progress: On track   Recent Progress: On track   Priority: Low   Barriers: No Barriers Identified   Note:    Reviewed carb counting, portion control, importance of spacing meals throughout the day to prevent post prandial elevations.  Recommended low saturated fat, low sodium diet to aid in control of hypertension and cholesterol. Reviewed plate method and portion control, dining out tips, meal planning, reading a food label, healthy snack options, benefits of physical activity       Blood sugars improved greatly.  Patient is experiencing unintentional weight loss - down to 148 lbs from 160.5 lbs on initial visit    Follow Up Plan     Follow up in about 6 months (around 3/16/2023) for Personal CGM Upload.    Today's care plan and follow up schedule was discussed with patient.  Eyad verbalized understanding of the care plan, goals, and agrees to follow up plan.        The patient was encouraged to  communicate with his/her health care provider/physician and care team regarding his/her condition(s) and treatment.  I provided the patient with my contact information today and encouraged to contact me via phone or [facility]'s Patient Portal as needed.

## 2022-09-28 ENCOUNTER — OFFICE VISIT (OUTPATIENT)
Dept: RADIATION ONCOLOGY | Facility: CLINIC | Age: 67
End: 2022-09-28
Payer: MEDICARE

## 2022-09-28 VITALS
RESPIRATION RATE: 18 BRPM | HEIGHT: 66 IN | HEART RATE: 68 BPM | SYSTOLIC BLOOD PRESSURE: 141 MMHG | WEIGHT: 150.81 LBS | OXYGEN SATURATION: 97 % | BODY MASS INDEX: 24.24 KG/M2 | DIASTOLIC BLOOD PRESSURE: 72 MMHG | TEMPERATURE: 98 F

## 2022-09-28 DIAGNOSIS — C61 MALIGNANT NEOPLASM OF PROSTATE: Primary | ICD-10-CM

## 2022-09-28 PROCEDURE — 4010F PR ACE/ARB THEARPY RXD/TAKEN: ICD-10-PCS | Mod: CPTII,S$GLB,, | Performed by: RADIOLOGY

## 2022-09-28 PROCEDURE — 3077F PR MOST RECENT SYSTOLIC BLOOD PRESSURE >= 140 MM HG: ICD-10-PCS | Mod: CPTII,S$GLB,, | Performed by: RADIOLOGY

## 2022-09-28 PROCEDURE — 99212 PR OFFICE/OUTPT VISIT, EST, LEVL II, 10-19 MIN: ICD-10-PCS | Mod: 25,S$GLB,, | Performed by: RADIOLOGY

## 2022-09-28 PROCEDURE — 1126F AMNT PAIN NOTED NONE PRSNT: CPT | Mod: CPTII,S$GLB,, | Performed by: RADIOLOGY

## 2022-09-28 PROCEDURE — 96402 CHEMO HORMON ANTINEOPL SQ/IM: CPT | Mod: S$GLB,,, | Performed by: RADIOLOGY

## 2022-09-28 PROCEDURE — 96402 PR CHEMOTHER HORMON ANTINEOPL SUB-Q/IM: ICD-10-PCS | Mod: S$GLB,,, | Performed by: RADIOLOGY

## 2022-09-28 PROCEDURE — 3008F PR BODY MASS INDEX (BMI) DOCUMENTED: ICD-10-PCS | Mod: CPTII,S$GLB,, | Performed by: RADIOLOGY

## 2022-09-28 PROCEDURE — 1126F PR PAIN SEVERITY QUANTIFIED, NO PAIN PRESENT: ICD-10-PCS | Mod: CPTII,S$GLB,, | Performed by: RADIOLOGY

## 2022-09-28 PROCEDURE — 3078F DIAST BP <80 MM HG: CPT | Mod: CPTII,S$GLB,, | Performed by: RADIOLOGY

## 2022-09-28 PROCEDURE — 1160F RVW MEDS BY RX/DR IN RCRD: CPT | Mod: CPTII,S$GLB,, | Performed by: RADIOLOGY

## 2022-09-28 PROCEDURE — 99999 PR PBB SHADOW E&M-EST. PATIENT-LVL V: ICD-10-PCS | Mod: PBBFAC,,, | Performed by: RADIOLOGY

## 2022-09-28 PROCEDURE — 99999 PR PBB SHADOW E&M-EST. PATIENT-LVL V: CPT | Mod: PBBFAC,,, | Performed by: RADIOLOGY

## 2022-09-28 PROCEDURE — 3077F SYST BP >= 140 MM HG: CPT | Mod: CPTII,S$GLB,, | Performed by: RADIOLOGY

## 2022-09-28 PROCEDURE — 1159F PR MEDICATION LIST DOCUMENTED IN MEDICAL RECORD: ICD-10-PCS | Mod: CPTII,S$GLB,, | Performed by: RADIOLOGY

## 2022-09-28 PROCEDURE — 1160F PR REVIEW ALL MEDS BY PRESCRIBER/CLIN PHARMACIST DOCUMENTED: ICD-10-PCS | Mod: CPTII,S$GLB,, | Performed by: RADIOLOGY

## 2022-09-28 PROCEDURE — 3288F FALL RISK ASSESSMENT DOCD: CPT | Mod: CPTII,S$GLB,, | Performed by: RADIOLOGY

## 2022-09-28 PROCEDURE — 3008F BODY MASS INDEX DOCD: CPT | Mod: CPTII,S$GLB,, | Performed by: RADIOLOGY

## 2022-09-28 PROCEDURE — 3044F PR MOST RECENT HEMOGLOBIN A1C LEVEL <7.0%: ICD-10-PCS | Mod: CPTII,S$GLB,, | Performed by: RADIOLOGY

## 2022-09-28 PROCEDURE — 3044F HG A1C LEVEL LT 7.0%: CPT | Mod: CPTII,S$GLB,, | Performed by: RADIOLOGY

## 2022-09-28 PROCEDURE — 1101F PT FALLS ASSESS-DOCD LE1/YR: CPT | Mod: CPTII,S$GLB,, | Performed by: RADIOLOGY

## 2022-09-28 PROCEDURE — 1159F MED LIST DOCD IN RCRD: CPT | Mod: CPTII,S$GLB,, | Performed by: RADIOLOGY

## 2022-09-28 PROCEDURE — 1101F PR PT FALLS ASSESS DOC 0-1 FALLS W/OUT INJ PAST YR: ICD-10-PCS | Mod: CPTII,S$GLB,, | Performed by: RADIOLOGY

## 2022-09-28 PROCEDURE — 4010F ACE/ARB THERAPY RXD/TAKEN: CPT | Mod: CPTII,S$GLB,, | Performed by: RADIOLOGY

## 2022-09-28 PROCEDURE — 3288F PR FALLS RISK ASSESSMENT DOCUMENTED: ICD-10-PCS | Mod: CPTII,S$GLB,, | Performed by: RADIOLOGY

## 2022-09-28 PROCEDURE — 3078F PR MOST RECENT DIASTOLIC BLOOD PRESSURE < 80 MM HG: ICD-10-PCS | Mod: CPTII,S$GLB,, | Performed by: RADIOLOGY

## 2022-09-28 PROCEDURE — 99212 OFFICE O/P EST SF 10 MIN: CPT | Mod: 25,S$GLB,, | Performed by: RADIOLOGY

## 2022-09-29 NOTE — PROGRESS NOTES
Subjective:       Patient ID: Eyad Garcia is a 67 y.o. male.    Chief Complaint: Prostate Cancer (Follow up )    This patient returns for follow up visit.    Mr. Garcia has a history of recurrent prostate cancer. He initially presented in 2015 when biopsies from the [place] revealed [place] 8 (4+4) adenocarcinoma. He subsequently underwent RALP with bilateral pelvic node dissection in July of 2015. Pathology revealed a single focus of [place] 6 (3+3) adenocarcinoma in a background of extensive high-grade PIN. There was no extraprostatic extension, seminal vesicle invasion or lymphovascular invasion. The margins and 10 (5 Lt., 5 Rt.) pelvic nodes were negative for tumor involvement. Postoperative PSA in 2015 was 0.03 ng/ml. It continued to increase slowly. PSA in June of 2019 returned at 4 ng/ml. Work up revealed uptake in one small pelvic node on Axumin scan.  He completed 46.8 Gy to the prostate bed and nodes followed by a boost to the prostate bed and pelvic node 11/15/19.   His radiotherapy was combined with 6 months of androgen deprivation therapy. His PSA  continued to increase since that time.  Subsequent imaging with bone scan and Axumin scans have been negative.  We elected to start hormonal deprivation therapy in March of 2022 given a short (< 3 month) PSA doubling time.  The patient states he feels well  No complaints.          Review of Systems   Constitutional:  Negative for activity change, appetite change, chills and fatigue.   Respiratory:  Negative for cough and shortness of breath.    Gastrointestinal:  Negative for abdominal pain, constipation, diarrhea and fecal incontinence.   Genitourinary:  Negative for bladder incontinence, difficulty urinating, dysuria, frequency and hematuria.       Objective:      Physical Exam  Constitutional:       General: He is not in acute distress.     Appearance: Normal appearance.   Abdominal:      General: Abdomen is flat. There is no distension.       Tenderness: There is no abdominal tenderness.   Neurological:      Mental Status: He is alert and oriented to person, place, and time.   Psychiatric:         Mood and Affect: Mood normal.         Judgment: Judgment normal.        Latest Reference Range & Units 12/09/21 08:30 03/10/22 08:40 06/16/22 09:01 09/21/22 08:52   PSA Diagnostic 0.00 - 4.00 ng/mL 3.7 8.9 (H) 0.02 <0.01   (H): Data is abnormally high  Assessment:       Problem List Items Addressed This Visit       Malignant neoplasm of prostate - Primary         Plan:       Doing well, excellent response to hormonal therapy. Discussed his PSA results.  Will plan to continue Lupron therapy.  Will consider intermittent therapy.  Plan second injection today   Plan follow up PSA in 3 months with phone review.  RTC  in 6 months for Lupron injection with PSA

## 2022-10-13 DIAGNOSIS — E11.36 TYPE 2 DIABETES MELLITUS WITH DIABETIC CATARACT, WITHOUT LONG-TERM CURRENT USE OF INSULIN: ICD-10-CM

## 2022-10-13 NOTE — TELEPHONE ENCOUNTER
"----- Message from Dona Rice sent at 10/13/2022 10:53 AM CDT -----  Consult/Advisory:           Name Of Caller: self    Contact Preference?: [phone]    What is the nature of the call?: requesting a call back in regards to ordering more Barak however was told by it was deactivated and would like to know what to do           Additional Notes:  "Thank you for all that you do for our patients'"     "

## 2022-10-14 RX ORDER — FLASH GLUCOSE SENSOR
1 KIT MISCELLANEOUS
Qty: 6 KIT | Refills: 3 | OUTPATIENT
Start: 2022-10-14 | End: 2022-11-18 | Stop reason: SDUPTHER

## 2022-10-16 DIAGNOSIS — Z12.11 SCREEN FOR COLON CANCER: Primary | ICD-10-CM

## 2022-10-19 ENCOUNTER — LAB VISIT (OUTPATIENT)
Dept: LAB | Facility: HOSPITAL | Age: 67
End: 2022-10-19
Attending: INTERNAL MEDICINE
Payer: MEDICARE

## 2022-10-19 ENCOUNTER — PATIENT MESSAGE (OUTPATIENT)
Dept: PHARMACY | Facility: CLINIC | Age: 67
End: 2022-10-19
Payer: MEDICARE

## 2022-10-19 ENCOUNTER — OFFICE VISIT (OUTPATIENT)
Dept: PRIMARY CARE CLINIC | Facility: CLINIC | Age: 67
End: 2022-10-19
Payer: COMMERCIAL

## 2022-10-19 VITALS
TEMPERATURE: 98 F | SYSTOLIC BLOOD PRESSURE: 140 MMHG | OXYGEN SATURATION: 99 % | HEART RATE: 71 BPM | HEIGHT: 66 IN | RESPIRATION RATE: 17 BRPM | DIASTOLIC BLOOD PRESSURE: 70 MMHG | BODY MASS INDEX: 24.38 KG/M2 | WEIGHT: 151.69 LBS

## 2022-10-19 DIAGNOSIS — I15.2 HYPERTENSION ASSOCIATED WITH DIABETES: ICD-10-CM

## 2022-10-19 DIAGNOSIS — E11.9 TYPE 2 DIABETES MELLITUS WITHOUT COMPLICATION, WITHOUT LONG-TERM CURRENT USE OF INSULIN: ICD-10-CM

## 2022-10-19 DIAGNOSIS — E78.5 HYPERLIPIDEMIA ASSOCIATED WITH TYPE 2 DIABETES MELLITUS: ICD-10-CM

## 2022-10-19 DIAGNOSIS — H40.1131 PRIMARY OPEN ANGLE GLAUCOMA (POAG) OF BOTH EYES, MILD STAGE: ICD-10-CM

## 2022-10-19 DIAGNOSIS — E11.59 HYPERTENSION ASSOCIATED WITH DIABETES: ICD-10-CM

## 2022-10-19 DIAGNOSIS — K21.9 GASTROESOPHAGEAL REFLUX DISEASE WITHOUT ESOPHAGITIS: ICD-10-CM

## 2022-10-19 DIAGNOSIS — Z00.00 ROUTINE GENERAL MEDICAL EXAMINATION AT A HEALTH CARE FACILITY: Primary | ICD-10-CM

## 2022-10-19 DIAGNOSIS — E11.69 HYPERLIPIDEMIA ASSOCIATED WITH TYPE 2 DIABETES MELLITUS: ICD-10-CM

## 2022-10-19 DIAGNOSIS — D50.8 IRON DEFICIENCY ANEMIA SECONDARY TO INADEQUATE DIETARY IRON INTAKE: ICD-10-CM

## 2022-10-19 DIAGNOSIS — C61 MALIGNANT NEOPLASM OF PROSTATE: ICD-10-CM

## 2022-10-19 DIAGNOSIS — Z00.00 ROUTINE GENERAL MEDICAL EXAMINATION AT A HEALTH CARE FACILITY: ICD-10-CM

## 2022-10-19 DIAGNOSIS — H33.052 OLD TOTAL RETINAL DETACHMENT OF LEFT EYE: ICD-10-CM

## 2022-10-19 DIAGNOSIS — L30.9 ECZEMA, UNSPECIFIED TYPE: ICD-10-CM

## 2022-10-19 LAB
ALBUMIN SERPL BCP-MCNC: 3.9 G/DL (ref 3.5–5.2)
ALP SERPL-CCNC: 73 U/L (ref 55–135)
ALT SERPL W/O P-5'-P-CCNC: 17 U/L (ref 10–44)
ANION GAP SERPL CALC-SCNC: 10 MMOL/L (ref 8–16)
AST SERPL-CCNC: 20 U/L (ref 10–40)
BASOPHILS # BLD AUTO: 0.02 K/UL (ref 0–0.2)
BASOPHILS NFR BLD: 0.4 % (ref 0–1.9)
BILIRUB SERPL-MCNC: 0.5 MG/DL (ref 0.1–1)
BUN SERPL-MCNC: 18 MG/DL (ref 8–23)
CALCIUM SERPL-MCNC: 9.7 MG/DL (ref 8.7–10.5)
CHLORIDE SERPL-SCNC: 106 MMOL/L (ref 95–110)
CO2 SERPL-SCNC: 23 MMOL/L (ref 23–29)
CREAT SERPL-MCNC: 1 MG/DL (ref 0.5–1.4)
DIFFERENTIAL METHOD: ABNORMAL
EOSINOPHIL # BLD AUTO: 0.2 K/UL (ref 0–0.5)
EOSINOPHIL NFR BLD: 3.7 % (ref 0–8)
ERYTHROCYTE [DISTWIDTH] IN BLOOD BY AUTOMATED COUNT: 13.3 % (ref 11.5–14.5)
EST. GFR  (NO RACE VARIABLE): >60 ML/MIN/1.73 M^2
ESTIMATED AVG GLUCOSE: 143 MG/DL (ref 68–131)
GLUCOSE SERPL-MCNC: 114 MG/DL (ref 70–110)
HBA1C MFR BLD: 6.6 % (ref 4–5.6)
HCT VFR BLD AUTO: 39.1 % (ref 40–54)
HGB BLD-MCNC: 12.6 G/DL (ref 14–18)
IMM GRANULOCYTES # BLD AUTO: 0.01 K/UL (ref 0–0.04)
IMM GRANULOCYTES NFR BLD AUTO: 0.2 % (ref 0–0.5)
LYMPHOCYTES # BLD AUTO: 1.1 K/UL (ref 1–4.8)
LYMPHOCYTES NFR BLD: 20.9 % (ref 18–48)
MCH RBC QN AUTO: 28.1 PG (ref 27–31)
MCHC RBC AUTO-ENTMCNC: 32.2 G/DL (ref 32–36)
MCV RBC AUTO: 87 FL (ref 82–98)
MONOCYTES # BLD AUTO: 0.5 K/UL (ref 0.3–1)
MONOCYTES NFR BLD: 8.7 % (ref 4–15)
NEUTROPHILS # BLD AUTO: 3.6 K/UL (ref 1.8–7.7)
NEUTROPHILS NFR BLD: 66.1 % (ref 38–73)
NRBC BLD-RTO: 0 /100 WBC
PLATELET # BLD AUTO: 321 K/UL (ref 150–450)
PMV BLD AUTO: 10.1 FL (ref 9.2–12.9)
POTASSIUM SERPL-SCNC: 4.1 MMOL/L (ref 3.5–5.1)
PROT SERPL-MCNC: 6.9 G/DL (ref 6–8.4)
RBC # BLD AUTO: 4.48 M/UL (ref 4.6–6.2)
SODIUM SERPL-SCNC: 139 MMOL/L (ref 136–145)
TSH SERPL DL<=0.005 MIU/L-ACNC: 2 UIU/ML (ref 0.4–4)
WBC # BLD AUTO: 5.4 K/UL (ref 3.9–12.7)

## 2022-10-19 PROCEDURE — 1101F PT FALLS ASSESS-DOCD LE1/YR: CPT | Mod: CPTII,S$GLB,, | Performed by: INTERNAL MEDICINE

## 2022-10-19 PROCEDURE — 36415 COLL VENOUS BLD VENIPUNCTURE: CPT | Mod: PN | Performed by: INTERNAL MEDICINE

## 2022-10-19 PROCEDURE — 1160F RVW MEDS BY RX/DR IN RCRD: CPT | Mod: CPTII,S$GLB,, | Performed by: INTERNAL MEDICINE

## 2022-10-19 PROCEDURE — 1126F AMNT PAIN NOTED NONE PRSNT: CPT | Mod: CPTII,S$GLB,, | Performed by: INTERNAL MEDICINE

## 2022-10-19 PROCEDURE — 1159F PR MEDICATION LIST DOCUMENTED IN MEDICAL RECORD: ICD-10-PCS | Mod: CPTII,S$GLB,, | Performed by: INTERNAL MEDICINE

## 2022-10-19 PROCEDURE — 85025 COMPLETE CBC W/AUTO DIFF WBC: CPT | Performed by: INTERNAL MEDICINE

## 2022-10-19 PROCEDURE — 99499 UNLISTED E&M SERVICE: CPT | Mod: S$GLB,,, | Performed by: INTERNAL MEDICINE

## 2022-10-19 PROCEDURE — 99397 PR PREVENTIVE VISIT,EST,65 & OVER: ICD-10-PCS | Mod: S$GLB,,, | Performed by: INTERNAL MEDICINE

## 2022-10-19 PROCEDURE — 3288F FALL RISK ASSESSMENT DOCD: CPT | Mod: CPTII,S$GLB,, | Performed by: INTERNAL MEDICINE

## 2022-10-19 PROCEDURE — 99499 RISK ADDL DX/OHS AUDIT: ICD-10-PCS | Mod: S$GLB,,, | Performed by: INTERNAL MEDICINE

## 2022-10-19 PROCEDURE — 3288F PR FALLS RISK ASSESSMENT DOCUMENTED: ICD-10-PCS | Mod: CPTII,S$GLB,, | Performed by: INTERNAL MEDICINE

## 2022-10-19 PROCEDURE — 1159F MED LIST DOCD IN RCRD: CPT | Mod: CPTII,S$GLB,, | Performed by: INTERNAL MEDICINE

## 2022-10-19 PROCEDURE — 3078F PR MOST RECENT DIASTOLIC BLOOD PRESSURE < 80 MM HG: ICD-10-PCS | Mod: CPTII,S$GLB,, | Performed by: INTERNAL MEDICINE

## 2022-10-19 PROCEDURE — 3044F HG A1C LEVEL LT 7.0%: CPT | Mod: CPTII,S$GLB,, | Performed by: INTERNAL MEDICINE

## 2022-10-19 PROCEDURE — 80053 COMPREHEN METABOLIC PANEL: CPT | Performed by: INTERNAL MEDICINE

## 2022-10-19 PROCEDURE — 99999 PR PBB SHADOW E&M-EST. PATIENT-LVL V: ICD-10-PCS | Mod: PBBFAC,,, | Performed by: INTERNAL MEDICINE

## 2022-10-19 PROCEDURE — 99999 PR PBB SHADOW E&M-EST. PATIENT-LVL V: CPT | Mod: PBBFAC,,, | Performed by: INTERNAL MEDICINE

## 2022-10-19 PROCEDURE — 83036 HEMOGLOBIN GLYCOSYLATED A1C: CPT | Performed by: INTERNAL MEDICINE

## 2022-10-19 PROCEDURE — 3077F PR MOST RECENT SYSTOLIC BLOOD PRESSURE >= 140 MM HG: ICD-10-PCS | Mod: CPTII,S$GLB,, | Performed by: INTERNAL MEDICINE

## 2022-10-19 PROCEDURE — 1126F PR PAIN SEVERITY QUANTIFIED, NO PAIN PRESENT: ICD-10-PCS | Mod: CPTII,S$GLB,, | Performed by: INTERNAL MEDICINE

## 2022-10-19 PROCEDURE — 4010F PR ACE/ARB THEARPY RXD/TAKEN: ICD-10-PCS | Mod: CPTII,S$GLB,, | Performed by: INTERNAL MEDICINE

## 2022-10-19 PROCEDURE — 84443 ASSAY THYROID STIM HORMONE: CPT | Performed by: INTERNAL MEDICINE

## 2022-10-19 PROCEDURE — 4010F ACE/ARB THERAPY RXD/TAKEN: CPT | Mod: CPTII,S$GLB,, | Performed by: INTERNAL MEDICINE

## 2022-10-19 PROCEDURE — 1160F PR REVIEW ALL MEDS BY PRESCRIBER/CLIN PHARMACIST DOCUMENTED: ICD-10-PCS | Mod: CPTII,S$GLB,, | Performed by: INTERNAL MEDICINE

## 2022-10-19 PROCEDURE — 3077F SYST BP >= 140 MM HG: CPT | Mod: CPTII,S$GLB,, | Performed by: INTERNAL MEDICINE

## 2022-10-19 PROCEDURE — 99397 PER PM REEVAL EST PAT 65+ YR: CPT | Mod: S$GLB,,, | Performed by: INTERNAL MEDICINE

## 2022-10-19 PROCEDURE — 3044F PR MOST RECENT HEMOGLOBIN A1C LEVEL <7.0%: ICD-10-PCS | Mod: CPTII,S$GLB,, | Performed by: INTERNAL MEDICINE

## 2022-10-19 PROCEDURE — 1101F PR PT FALLS ASSESS DOC 0-1 FALLS W/OUT INJ PAST YR: ICD-10-PCS | Mod: CPTII,S$GLB,, | Performed by: INTERNAL MEDICINE

## 2022-10-19 PROCEDURE — 3078F DIAST BP <80 MM HG: CPT | Mod: CPTII,S$GLB,, | Performed by: INTERNAL MEDICINE

## 2022-10-19 RX ORDER — PRAVASTATIN SODIUM 20 MG/1
20 TABLET ORAL DAILY
Qty: 90 TABLET | Refills: 3 | Status: SHIPPED | OUTPATIENT
Start: 2022-10-19 | End: 2023-04-19

## 2022-10-19 RX ORDER — TRIAMCINOLONE ACETONIDE 0.25 MG/G
CREAM TOPICAL 2 TIMES DAILY
Qty: 80 G | Refills: 1 | Status: SHIPPED | OUTPATIENT
Start: 2022-10-19

## 2022-10-19 NOTE — PATIENT INSTRUCTIONS
Labs are fasting. Please do not eat or drink anything other than water for 6-8 hrs prior to your lab work.    6 months for well visit or sooner if needed.     Due for  Vaccines  - at your pharmacy    ================================  RECOMMENDATIONS FOR MALES   ================================    Your #1 MEDICINE is DAILY EXERCISE - 15-20 minutes of huffing & puffing EVERY DAY.     Prevent the #1 cause of death- cardiovascular disease (HEART ATTACK & STROKE) by checking for normal blood pressure, cholesterol, sugars, & by not smoking.     VACCINES: Yearly FLU shot, PNEUMONIA shot after 65,  SHINGLES shot after 50    COLON CANCER screening colonoscopy starting at 44 yo &  every 10 years (or Cologuard kit every 3 yrs) , repeat test sooner if POLYP is found    PROSTATE CANCER screening is controversial. We can discuss this & consider checking PSA from 55-69 years.     If you EVER SMOKED - Abdominal Aortic Aneurysm ultrasound once age 65-75      I recommend  high fiber (5 fresh fruits or vegetables daily), low fat diet and aerobic  exercise (huffing/ puffing/ sweating for 20 min straight at least 4 days a week)

## 2022-10-19 NOTE — PROGRESS NOTES
Subjective:      Patient ID: Eyad Garcia is a 67 y.o. male.    Chief Complaint: Annual Exam    68 yo male presents with past medical history of hypertension, erectile dysfunction, hyperlipidemia, type 2 diabetes, primary osteoarthritis of multiple joints, bronchitis, GERD, anemia, malignant neoplasm of prostate status post radiation to prostate bed; old retinal detachment, POAG both eyes.     Itchy rash: Started having rash of his elbow with discoloration. Most likely dry skin, will try steroid cream.    HLD: ASCVD elevated. He has previously tried atorvastatin with side effect. Given his increased ASCVD risk, would be prudent to try pravastatin to see if he could tolerate this instead.     Denies any chest pain, shortness of breath, nausea vomiting constipation diarrhea, blood in stool, heartburn    Review of Systems   Constitutional:  Negative for chills, fever and weight loss.   HENT:  Negative for congestion, ear pain and sore throat.    Eyes:  Negative for double vision.   Respiratory:  Negative for cough and shortness of breath.    Cardiovascular:  Negative for chest pain, palpitations and leg swelling.   Gastrointestinal:  Negative for abdominal pain, heartburn, nausea and vomiting.   Skin:  Negative for rash.   Neurological:  Negative for dizziness, tingling and headaches.   Psychiatric/Behavioral:  Negative for depression.        Current Outpatient Medications:     albuterol (VENTOLIN HFA) 90 mcg/actuation inhaler, Inhale 2 puffs into the lungs every 6 (six) hours as needed for Wheezing or Shortness of Breath (cough). Rescue, Disp: 18 g, Rfl: 11    alcohol swabs (ALCOHOL WIPES) PadM, Monitor as directed X 2 daily. Per insurance coverage. ICD 10 E11.9, Disp: 200 each, Rfl: 3    amLODIPine (NORVASC) 10 MG tablet, Take 1 tablet (10 mg total) by mouth once daily., Disp: 90 tablet, Rfl: 0    brimonidine 0.2% (ALPHAGAN) 0.2 % Drop, Place 1 drop into both eyes 3 (three) times daily., Disp: 30 mL, Rfl: 3     dorzolamide-timolol 2-0.5% (COSOPT) 22.3-6.8 mg/mL ophthalmic solution, Place 1 drop into both eyes 2 (two) times daily., Disp: 30 mL, Rfl: 3    empagliflozin (JARDIANCE) 10 mg tablet, Take 1 tablet (10 mg total) by mouth once daily., Disp: 30 tablet, Rfl: 6    ezetimibe (ZETIA) 10 mg tablet, Take 1 tablet (10 mg total) by mouth every evening., Disp: 90 tablet, Rfl: 3    fluticasone propionate (FLONASE) 50 mcg/actuation nasal spray, 2 sprays (100 mcg total) by Each Nostril route 2 (two) times daily as needed for Rhinitis., Disp: 16 g, Rfl: 0    FREESTYLE ROME 2 SENSOR Kit, Use as directed. Change every 14 (fourteen) days., Disp: 6 kit, Rfl: 3    losartan (COZAAR) 100 MG tablet, Take 1 tablet (100 mg total) by mouth once daily., Disp: 90 tablet, Rfl: 0    meloxicam (MOBIC) 15 MG tablet, Take 1 tablet (15 mg total) by mouth once daily., Disp: 90 tablet, Rfl: 0    metFORMIN (GLUCOPHAGE-XR) 500 MG ER 24hr tablet, Take 2 tablets (1,000 mg total) by mouth once daily., Disp: 180 tablet, Rfl: 3    netarsudiL-latanoprost (ROCKLATAN) 0.02-0.005 % Drop, Place 1 drop into both eyes every evening. Patient was not controlled on latanoprost - now needs to use rocklatan. Please run script through - it is now a covered medication if they have failed latanoprost, Disp: 7.5 mL, Rfl: 3    pravastatin (PRAVACHOL) 20 MG tablet, Take 1 tablet (20 mg total) by mouth once daily., Disp: 90 tablet, Rfl: 3    triamcinolone acetonide 0.025% (KENALOG) 0.025 % cream, Apply topically 2 (two) times daily., Disp: 80 g, Rfl: 1    Current Facility-Administered Medications:     leuprolide acetate (6 month) injection 45 mg, 45 mg, Intramuscular, Q6 Months, Henrry Sampson Jr., MD, 45 mg at 09/28/22 1045    [START ON 3/29/2023] leuprolide acetate (6 month) injection 45 mg, 45 mg, Intramuscular, Q6 Months, Henrry Sampson Jr., MD    Lab Results   Component Value Date    HGBA1C 6.3 (H) 07/29/2022    HGBA1C 6.8 (H) 07/05/2022    HGBA1C 9.4 (H)  03/29/2022     Lab Results   Component Value Date    MICALBCREAT 6.5 09/27/2021     Lab Results   Component Value Date    LDLCALC 175.2 (H) 07/29/2022    LDLCALC 97.0 03/29/2022    CHOL 244 (H) 07/29/2022    HDL 49 07/29/2022    TRIG 99 07/29/2022       Lab Results   Component Value Date     07/29/2022    K 4.4 07/29/2022     07/29/2022    CO2 26 07/29/2022     07/29/2022    BUN 14 07/29/2022    CREATININE 0.9 07/29/2022    CALCIUM 9.6 07/29/2022    PROT 7.1 07/29/2022    ALBUMIN 3.9 07/29/2022    BILITOT 0.4 07/29/2022    ALKPHOS 70 07/29/2022    AST 18 07/29/2022    ALT 12 07/29/2022    ANIONGAP 7 (L) 07/29/2022    ESTGFRAFRICA >60.0 07/29/2022    EGFRNONAA >60.0 07/29/2022    WBC 8.68 05/29/2022    HGB 10.1 (L) 05/29/2022    HGB 13.0 (L) 03/29/2022    HCT 30.6 (L) 05/29/2022    MCV 87 05/29/2022     05/29/2022    TSH 2.141 09/27/2021    PSA 0.14 03/27/2017    PSA 6.6 (H) 02/27/2015    PSADIAG <0.01 09/21/2022    PSADIAG 0.02 06/16/2022    HEPCAB Negative 05/29/2022       Lab Results   Component Value Date    YVKJNKZK16OQ 32 09/02/2015    DUGBKVSL74 836 10/07/2020    FERRITIN 93 03/29/2022    IRON 87 03/29/2022    TRANSFERRIN 295 03/29/2022    TIBC 437 03/29/2022    FESATURATED 20 03/29/2022         Past Medical History:   Diagnosis Date    Cataract     Diabetes mellitus type II     Difficult intubation     Erectile dysfunction     History of colon polyps 1/25/2019    Hyperlipidemia     Hypertension     OA (osteoarthritis)     POAG (primary open-angle glaucoma)     Retinal detachment     OS     Past Surgical History:   Procedure Laterality Date    COLONOSCOPY N/A 1/25/2019    Procedure: COLONOSCOPY;  Surgeon: Elder Guillermo MD;  Location: [facility] ([address] [place] [address]);  Service: Endoscopy;  Laterality: N/A;    CYSTOSCOPY      HERNIA REPAIR      KNEE SURGERY      left    PENILE PROSTHESIS IMPLANT      PROSTATECTOMY      RETINAL DETACHMENT SURGERY      OS    SELECTIVE LASER TRABECUPLASTY  "Bilateral 4/16    OU WITH      Social History     Social History Narrative    Works for [employer]     Family History   Problem Relation Age of Onset    Diabetes Mother     Hypertension Mother     Glaucoma Mother     Diabetes Sister     Glaucoma Sister     Diabetes Brother     Glaucoma Brother     No Known Problems Father     Cancer Maternal Aunt     Colon cancer Maternal Aunt     No Known Problems Maternal Uncle     No Known Problems Paternal Aunt     No Known Problems Paternal Uncle     No Known Problems Maternal Grandmother     No Known Problems Maternal Grandfather     No Known Problems Paternal Grandmother     No Known Problems Paternal Grandfather     Anesthesia problems Neg Hx     Broken bones Neg Hx     Clotting disorder Neg Hx     Collagen disease Neg Hx     Dislocations Neg Hx     Osteoporosis Neg Hx     Rheumatologic disease Neg Hx     Scoliosis Neg Hx     Severe sprains Neg Hx     Amblyopia Neg Hx     Blindness Neg Hx     Cataracts Neg Hx     Macular degeneration Neg Hx     Retinal detachment Neg Hx     Strabismus Neg Hx     Stroke Neg Hx     Thyroid disease Neg Hx     Esophageal cancer Neg Hx      Vitals:    10/19/22 0833 10/19/22 0845   BP: (!) 140/74 (!) 140/70   Pulse: 71    Resp: 17    Temp: 98 °F (36.7 °C)    SpO2: 99%    Weight: 68.8 kg (151 lb 10.8 oz)    Height: 5' 6" (1.676 m)    PainSc: 0-No pain      Objective:   Physical Exam  Vitals and nursing note reviewed.   Constitutional:       Appearance: Normal appearance.   HENT:      Head: Normocephalic and atraumatic.      Right Ear: Tympanic membrane, ear canal and external ear normal.      Left Ear: Tympanic membrane, ear canal and external ear normal.      Nose: Nose normal.      Mouth/Throat:      Mouth: Mucous membranes are moist.      Pharynx: Oropharynx is clear.   Eyes:      Extraocular Movements: Extraocular movements intact.      Pupils: Pupils are equal, round, and reactive to light.   Cardiovascular:      Rate and " Rhythm: Normal rate and regular rhythm.      Pulses: Normal pulses.      Heart sounds: Normal heart sounds.   Pulmonary:      Breath sounds: Normal breath sounds.   Abdominal:      General: Bowel sounds are normal.      Palpations: Abdomen is soft.   Musculoskeletal:      Cervical back: Normal range of motion and neck supple.   Skin:     General: Skin is warm.   Neurological:      General: No focal deficit present.      Mental Status: He is alert and oriented to person, place, and time.     Assessment:     1. Routine general medical examination at a health care facility    2. Hypertension associated with diabetes    3. Hyperlipidemia associated with type 2 diabetes mellitus    4. Malignant neoplasm of prostate    5. Old total retinal detachment of left eye    6. Primary open angle glaucoma (POAG) of both eyes, mild stage    7. Iron deficiency anemia secondary to inadequate dietary iron intake    8. Type 2 diabetes mellitus without complication, without long-term current use of insulin    9. Gastroesophageal reflux disease without esophagitis    10. Eczema, unspecified type      Plan:     Orders Placed This Encounter    Hemoglobin A1C    CBC Auto Differential    Comprehensive Metabolic Panel    TSH    Ambulatory referral/consult to Dermatology    triamcinolone acetonide 0.025% (KENALOG) 0.025 % cream    pravastatin (PRAVACHOL) 20 MG tablet       Patient Instructions   Labs are fasting. Please do not eat or drink anything other than water for 6-8 hrs prior to your lab work.    6 months for well visit or sooner if needed.     Due for  Vaccines  - at your pharmacy    ================================  RECOMMENDATIONS FOR MALES   ================================    Your #1 MEDICINE is DAILY EXERCISE - 15-20 minutes of huffing & puffing EVERY DAY.     Prevent the #1 cause of death- cardiovascular disease (HEART ATTACK & STROKE) by checking for normal blood pressure, cholesterol, sugars, & by not smoking.     VACCINES:  Yearly FLU shot, PNEUMONIA shot after 65,  SHINGLES shot after 50    COLON CANCER screening colonoscopy starting at 44 yo &  every 10 years (or Cologuard kit every 3 yrs) , repeat test sooner if POLYP is found    PROSTATE CANCER screening is controversial. We can discuss this & consider checking PSA from 55-69 years.     If you EVER SMOKED - Abdominal Aortic Aneurysm ultrasound once age 65-75      I recommend  high fiber (5 fresh fruits or vegetables daily), low fat diet and aerobic  exercise (huffing/ puffing/ sweating for 20 min straight at least 4 days a week)

## 2022-10-20 NOTE — PROGRESS NOTES
Hello!    Your blood count (CBC) is stable.    Your A1c is 6.6%.   Rest of your electrolytes are unremarkable.    Your kidney (BUN, Creatinine and GFT) function is unremarkable.   Your liver (AST, ALT) function is unremarkable.  These are the filters in your body for medicine, food and liquids that you ingest.    Your Thyroid numbers are normal.

## 2022-10-21 ENCOUNTER — IMMUNIZATION (OUTPATIENT)
Dept: PRIMARY CARE CLINIC | Facility: CLINIC | Age: 67
End: 2022-10-21
Payer: COMMERCIAL

## 2022-10-21 DIAGNOSIS — Z23 NEED FOR VACCINATION: Primary | ICD-10-CM

## 2022-10-21 PROCEDURE — 91313 COVID-19, MRNA, LNP-S, BIVALENT BOOSTER, PF, 50 MCG/0.5 ML: CPT | Mod: S$GLB,,, | Performed by: EMERGENCY MEDICINE

## 2022-10-21 PROCEDURE — 91313 COVID-19, MRNA, LNP-S, BIVALENT BOOSTER, PF, 50 MCG/0.5 ML: ICD-10-PCS | Mod: S$GLB,,, | Performed by: EMERGENCY MEDICINE

## 2022-10-21 PROCEDURE — 0134A COVID-19, MRNA, LNP-S, BIVALENT BOOSTER, PF, 50 MCG/0.5 ML: CPT | Mod: PBBFAC | Performed by: EMERGENCY MEDICINE

## 2022-10-25 ENCOUNTER — OFFICE VISIT (OUTPATIENT)
Dept: URGENT CARE | Facility: CLINIC | Age: 67
End: 2022-10-25
Payer: MEDICARE

## 2022-10-25 VITALS
TEMPERATURE: 96 F | BODY MASS INDEX: 24.38 KG/M2 | WEIGHT: 151.69 LBS | DIASTOLIC BLOOD PRESSURE: 90 MMHG | SYSTOLIC BLOOD PRESSURE: 168 MMHG | RESPIRATION RATE: 18 BRPM | HEIGHT: 66 IN | HEART RATE: 68 BPM | OXYGEN SATURATION: 100 %

## 2022-10-25 DIAGNOSIS — S05.01XA ABRASION OF RIGHT CORNEA, INITIAL ENCOUNTER: Primary | ICD-10-CM

## 2022-10-25 DIAGNOSIS — T15.91XA FOREIGN BODY OF RIGHT EYE, INITIAL ENCOUNTER: ICD-10-CM

## 2022-10-25 PROCEDURE — 99214 OFFICE O/P EST MOD 30 MIN: CPT | Mod: S$GLB,,, | Performed by: PHYSICIAN ASSISTANT

## 2022-10-25 PROCEDURE — 3080F DIAST BP >= 90 MM HG: CPT | Mod: CPTII,S$GLB,, | Performed by: PHYSICIAN ASSISTANT

## 2022-10-25 PROCEDURE — 3080F PR MOST RECENT DIASTOLIC BLOOD PRESSURE >= 90 MM HG: ICD-10-PCS | Mod: CPTII,S$GLB,, | Performed by: PHYSICIAN ASSISTANT

## 2022-10-25 PROCEDURE — 3077F SYST BP >= 140 MM HG: CPT | Mod: CPTII,S$GLB,, | Performed by: PHYSICIAN ASSISTANT

## 2022-10-25 PROCEDURE — 1125F PR PAIN SEVERITY QUANTIFIED, PAIN PRESENT: ICD-10-PCS | Mod: CPTII,S$GLB,, | Performed by: PHYSICIAN ASSISTANT

## 2022-10-25 PROCEDURE — 3044F PR MOST RECENT HEMOGLOBIN A1C LEVEL <7.0%: ICD-10-PCS | Mod: CPTII,S$GLB,, | Performed by: PHYSICIAN ASSISTANT

## 2022-10-25 PROCEDURE — 4010F ACE/ARB THERAPY RXD/TAKEN: CPT | Mod: CPTII,S$GLB,, | Performed by: PHYSICIAN ASSISTANT

## 2022-10-25 PROCEDURE — 3077F PR MOST RECENT SYSTOLIC BLOOD PRESSURE >= 140 MM HG: ICD-10-PCS | Mod: CPTII,S$GLB,, | Performed by: PHYSICIAN ASSISTANT

## 2022-10-25 PROCEDURE — 1159F MED LIST DOCD IN RCRD: CPT | Mod: CPTII,S$GLB,, | Performed by: PHYSICIAN ASSISTANT

## 2022-10-25 PROCEDURE — 4010F PR ACE/ARB THEARPY RXD/TAKEN: ICD-10-PCS | Mod: CPTII,S$GLB,, | Performed by: PHYSICIAN ASSISTANT

## 2022-10-25 PROCEDURE — 1160F RVW MEDS BY RX/DR IN RCRD: CPT | Mod: CPTII,S$GLB,, | Performed by: PHYSICIAN ASSISTANT

## 2022-10-25 PROCEDURE — 99214 PR OFFICE/OUTPT VISIT, EST, LEVL IV, 30-39 MIN: ICD-10-PCS | Mod: S$GLB,,, | Performed by: PHYSICIAN ASSISTANT

## 2022-10-25 PROCEDURE — 3044F HG A1C LEVEL LT 7.0%: CPT | Mod: CPTII,S$GLB,, | Performed by: PHYSICIAN ASSISTANT

## 2022-10-25 PROCEDURE — 1160F PR REVIEW ALL MEDS BY PRESCRIBER/CLIN PHARMACIST DOCUMENTED: ICD-10-PCS | Mod: CPTII,S$GLB,, | Performed by: PHYSICIAN ASSISTANT

## 2022-10-25 PROCEDURE — 1125F AMNT PAIN NOTED PAIN PRSNT: CPT | Mod: CPTII,S$GLB,, | Performed by: PHYSICIAN ASSISTANT

## 2022-10-25 PROCEDURE — 1159F PR MEDICATION LIST DOCUMENTED IN MEDICAL RECORD: ICD-10-PCS | Mod: CPTII,S$GLB,, | Performed by: PHYSICIAN ASSISTANT

## 2022-10-25 RX ORDER — OFLOXACIN 3 MG/ML
1 SOLUTION/ DROPS OPHTHALMIC 4 TIMES DAILY
Qty: 5 ML | Refills: 0 | Status: SHIPPED | OUTPATIENT
Start: 2022-10-25 | End: 2022-11-01

## 2022-10-25 NOTE — PROGRESS NOTES
"Subjective:       Patient ID: Eyad Garcia is a 67 y.o. male.    Vitals:  height is 5' 6" (1.676 m) and weight is 68.8 kg (151 lb 10.8 oz). His temperature is 96.3 °F (35.7 °C). His blood pressure is 168/90 (abnormal) and his pulse is 68. His respiration is 18 and oxygen saturation is 100%.     Chief Complaint: Eye Problem    Pt presents for eye redness and irritation and foreign body sensation. Pt was walking by construction site yesterday and something flew in his eye (presumed sawdust or dirt).  Pt reports it feels like something is in his eye. Pt reports having a burning feeling and redness and watering to eye.     Eye Problem   The right eye is affected. This is a new problem. The injury mechanism was a foreign body. The pain is at a severity of 10/10. The pain is moderate. There is No known exposure to pink eye. He Does not wear contacts. Associated symptoms include an eye discharge (watery), eye redness and a foreign body sensation. Pertinent negatives include no blurred vision, double vision, fever, itching, nausea, photophobia or vomiting. He has tried eye drops for the symptoms. The treatment provided mild relief.     Constitution: Negative for chills, sweating, fatigue and fever.   HENT:  Negative for congestion, sinus pain and sore throat.    Neck: Negative for neck pain and neck stiffness.   Cardiovascular:  Negative for chest pain, leg swelling and palpitations.   Eyes:  Positive for foreign body in eye (sensation), eye discharge (watery), eye pain and eye redness. Negative for eye trauma, eye itching, photophobia, vision loss, double vision, blurred vision and eyelid swelling.   Gastrointestinal:  Negative for abdominal pain, nausea, vomiting and diarrhea.   Musculoskeletal:  Negative for pain and joint pain.   Skin:  Negative for color change and rash.   Neurological:  Negative for dizziness and passing out.     Objective:      Physical Exam   Constitutional: He is oriented to person, place, and " time. He appears well-developed.  Non-toxic appearance. He does not appear ill. No distress.   HENT:   Head: Normocephalic and atraumatic.   Ears:   Right Ear: External ear normal.   Left Ear: External ear normal.   Nose: Nose normal.   Mouth/Throat: Oropharynx is clear and moist.   Eyes: EOM and lids are normal. Pupils are equal, round, and reactive to light. Foreign body (small white foreign body less than 1 mm under upper eyelid. removed with cotton tip applicator.) present in the right eye. Right conjunctiva is injected.     Extraocular movement intact vision grossly intact gaze aligned appropriately      Comments: Pt had resolution of discomfort with application of anesthetic drop. EOMI PERRLA. Small foreign body under upper eyelid removed with cotton tip applicator.    Neck: Trachea normal and phonation normal. Neck supple.   Musculoskeletal: Normal range of motion.         General: Normal range of motion.   Neurological: He is alert and oriented to person, place, and time.   Skin: Skin is warm, dry, intact and not diaphoretic.   Psychiatric: His speech is normal and behavior is normal. Judgment and thought content normal.   Nursing note and vitals reviewed.      Assessment:       1. Abrasion of right cornea, initial encounter    2. Foreign body of right eye, initial encounter        Vision Screening    Right eye Left eye Both eyes   Without correction 20/40 20/25 20/25   With correction          Plan:       Small foreign body present, lid everted, removed with cotton tip applicator. There is also corneal abrasion present on exam. Pt endorses rubbing eye a lot last night and today aggressively rinsing it out, may have scratched cornea.   Follow up with eye doctor in 1-2 days if not completely resolved.   - Discussed ddx, home care, tx options, and given follow up precautions.     Abrasion of right cornea, initial encounter  -     ofloxacin (OCUFLOX) 0.3 % ophthalmic solution; Place 1 drop into the right eye 4  (four) times daily. for 7 days  Dispense: 5 mL; Refill: 0    Foreign body of right eye, initial encounter       Patient Instructions   - Rest.    - Drink plenty of fluids.    - Acetaminophen (tylenol) or Ibuprofen (advil,motrin) as directed as needed for fever/pain. Avoid tylenol if you have a history of liver disease. Do not take ibuprofen if you have a history of GI bleeding, kidney disease, or if you take blood thinners.     - Follow up with your PCP or specialty clinic as directed in the next 1-2 weeks if not improved or as needed.  You can call [phone] to schedule an appointment with the appropriate provider.    - Go to the ER or seek medical attention immediately if you develop new or worsening symptoms.     - You must understand that you have received an Urgent Care treatment only and that you may be released before all of your medical problems are known or treated.   - You, the patient, will arrange for follow up care as instructed.   - If your condition worsens or fails to improve we recommend that you receive another evaluation at the ER immediately or contact your PCP to discuss your concerns or return here.    Elevated Blood Pressure  Your blood pressure was elevated during your visit to the urgent care.  It was not so high that immediate care was needed but it is recommended that you monitor your blood pressure over the next week or two to make sure that it is not staying elevated.  Please have your blood pressure taken 2-3 times daily at different times of the day.  Write all of those blood pressures down and record the time that they were taken.  Keep all that information and take it with you to see your Primary Care Physician.  If your blood pressure is consistently above 140/90 you will need to follow up with your PCP more quickly

## 2022-10-25 NOTE — PATIENT INSTRUCTIONS
- Rest.    - Drink plenty of fluids.    - Acetaminophen (tylenol) or Ibuprofen (advil,motrin) as directed as needed for fever/pain. Avoid tylenol if you have a history of liver disease. Do not take ibuprofen if you have a history of GI bleeding, kidney disease, or if you take blood thinners.     - Follow up with your PCP or specialty clinic as directed in the next 1-2 weeks if not improved or as needed.  You can call [phone] to schedule an appointment with the appropriate provider.    - Go to the ER or seek medical attention immediately if you develop new or worsening symptoms.     - You must understand that you have received an Urgent Care treatment only and that you may be released before all of your medical problems are known or treated.   - You, the patient, will arrange for follow up care as instructed.   - If your condition worsens or fails to improve we recommend that you receive another evaluation at the ER immediately or contact your PCP to discuss your concerns or return here.    Elevated Blood Pressure  Your blood pressure was elevated during your visit to the urgent care.  It was not so high that immediate care was needed but it is recommended that you monitor your blood pressure over the next week or two to make sure that it is not staying elevated.  Please have your blood pressure taken 2-3 times daily at different times of the day.  Write all of those blood pressures down and record the time that they were taken.  Keep all that information and take it with you to see your Primary Care Physician.  If your blood pressure is consistently above 140/90 you will need to follow up with your PCP more quickly

## 2022-10-31 ENCOUNTER — OFFICE VISIT (OUTPATIENT)
Dept: OPHTHALMOLOGY | Facility: CLINIC | Age: 67
End: 2022-10-31
Payer: COMMERCIAL

## 2022-10-31 DIAGNOSIS — H53.432 ARCUATE VISUAL FIELD DEFECT OF LEFT EYE: ICD-10-CM

## 2022-10-31 DIAGNOSIS — H40.1111 PRIMARY OPEN-ANGLE GLAUCOMA, RIGHT EYE, MILD STAGE: Primary | ICD-10-CM

## 2022-10-31 DIAGNOSIS — H40.1122 PRIMARY OPEN-ANGLE GLAUCOMA, LEFT EYE, MODERATE STAGE: ICD-10-CM

## 2022-10-31 DIAGNOSIS — H25.13 NUCLEAR SCLEROTIC CATARACT OF BOTH EYES: ICD-10-CM

## 2022-10-31 DIAGNOSIS — H31.002 CHORIORETINAL SCAR, LEFT: ICD-10-CM

## 2022-10-31 DIAGNOSIS — H21.562 PUPILLARY SPHINCTER RUPTURE, LEFT: ICD-10-CM

## 2022-10-31 DIAGNOSIS — H52.4 BILATERAL PRESBYOPIA: ICD-10-CM

## 2022-10-31 DIAGNOSIS — H33.052 OLD SUBTOTAL RETINAL DETACHMENT, LEFT: ICD-10-CM

## 2022-10-31 DIAGNOSIS — E11.9 TYPE 2 DIABETES MELLITUS WITHOUT OPHTHALMIC MANIFESTATIONS: ICD-10-CM

## 2022-10-31 PROCEDURE — 92012 INTRM OPH EXAM EST PATIENT: CPT | Mod: S$GLB,,, | Performed by: OPHTHALMOLOGY

## 2022-10-31 PROCEDURE — 4010F PR ACE/ARB THEARPY RXD/TAKEN: ICD-10-PCS | Mod: CPTII,S$GLB,, | Performed by: OPHTHALMOLOGY

## 2022-10-31 PROCEDURE — 1160F PR REVIEW ALL MEDS BY PRESCRIBER/CLIN PHARMACIST DOCUMENTED: ICD-10-PCS | Mod: CPTII,S$GLB,, | Performed by: OPHTHALMOLOGY

## 2022-10-31 PROCEDURE — 1160F RVW MEDS BY RX/DR IN RCRD: CPT | Mod: CPTII,S$GLB,, | Performed by: OPHTHALMOLOGY

## 2022-10-31 PROCEDURE — 99999 PR PBB SHADOW E&M-EST. PATIENT-LVL III: CPT | Mod: PBBFAC,,, | Performed by: OPHTHALMOLOGY

## 2022-10-31 PROCEDURE — 99999 PR PBB SHADOW E&M-EST. PATIENT-LVL III: ICD-10-PCS | Mod: PBBFAC,,, | Performed by: OPHTHALMOLOGY

## 2022-10-31 PROCEDURE — 99499 UNLISTED E&M SERVICE: CPT | Mod: S$GLB,,, | Performed by: OPHTHALMOLOGY

## 2022-10-31 PROCEDURE — 99499 RISK ADDL DX/OHS AUDIT: ICD-10-PCS | Mod: S$GLB,,, | Performed by: OPHTHALMOLOGY

## 2022-10-31 PROCEDURE — 1159F MED LIST DOCD IN RCRD: CPT | Mod: CPTII,S$GLB,, | Performed by: OPHTHALMOLOGY

## 2022-10-31 PROCEDURE — 1126F AMNT PAIN NOTED NONE PRSNT: CPT | Mod: CPTII,S$GLB,, | Performed by: OPHTHALMOLOGY

## 2022-10-31 PROCEDURE — 1126F PR PAIN SEVERITY QUANTIFIED, NO PAIN PRESENT: ICD-10-PCS | Mod: CPTII,S$GLB,, | Performed by: OPHTHALMOLOGY

## 2022-10-31 PROCEDURE — 4010F ACE/ARB THERAPY RXD/TAKEN: CPT | Mod: CPTII,S$GLB,, | Performed by: OPHTHALMOLOGY

## 2022-10-31 PROCEDURE — 3044F HG A1C LEVEL LT 7.0%: CPT | Mod: CPTII,S$GLB,, | Performed by: OPHTHALMOLOGY

## 2022-10-31 PROCEDURE — 3044F PR MOST RECENT HEMOGLOBIN A1C LEVEL <7.0%: ICD-10-PCS | Mod: CPTII,S$GLB,, | Performed by: OPHTHALMOLOGY

## 2022-10-31 PROCEDURE — 92012 PR EYE EXAM, EST PATIENT,INTERMED: ICD-10-PCS | Mod: S$GLB,,, | Performed by: OPHTHALMOLOGY

## 2022-10-31 PROCEDURE — 1159F PR MEDICATION LIST DOCUMENTED IN MEDICAL RECORD: ICD-10-PCS | Mod: CPTII,S$GLB,, | Performed by: OPHTHALMOLOGY

## 2022-10-31 NOTE — PROGRESS NOTES
HPI     Glaucoma            Comments: 3 month ck  and pt states he got sawdust in his eye last week   and had to go to urgent care          Comments    DLS: 8/19/22    Pt states he got sawdust in his eyes last week and had to go to urgent   care. Pt had FB in his eye and an abrasion OD and was given antibiotic   drops to use. Pt states eye feels better now    1. POAG  2. VF Defect OS  3. Chorioretinal Scar OS  4. Hx RD OS  5. NS OU  6. Type 2 DM no DR  7. Pupil Sphincter Rupture OS  8. Presbyopia    MEDS:  Cosopt BID OU   Brimonidine TID OU  Rocklatan QDAY OU          Last edited by Mayda Sterling MA on 10/31/2022  8:32 AM.            Assessment /Plan     For exam results, see Encounter Report.    Primary open-angle glaucoma, right eye, mild stage    Primary open-angle glaucoma, left eye, moderate stage    Arcuate visual field defect of left eye    Chorioretinal scar, left    Old subtotal retinal detachment, left    Pupillary sphincter rupture, left    Type 2 diabetes mellitus without ophthalmic manifestations    Nuclear sclerotic cataract of both eyes    Bilateral presbyopia        Pt initially dx with glaucoma at [facility] - stoped his gtts on his own   Presented to [facility] - susana Valdez 8/23/2006 - off gtts with a baseline / Tmax of 25/27  Referred by Courtney for consideration of SLT's   Pt with POAG and poorly controlled IOP's - does not use drops regularly       1. Primary open angle glaucoma, both eyes, mild stage        Glaucoma (type and duration)    POAG with elevated IOP ou - mild stage    First HVF   2007 - full od // SAD os 2/2 to CRS   First photos   2011   Treatment / Drops started   2006           Family history    ++ mother and 2 brothers         Glaucoma meds    Latanoprost  (( use to use cosopt as well)         H/O adverse rxn to glaucoma drops    None (has tired alphagan and timolol in past -non compliant)         LASERS    SLT os - 3/31/2016 // SLT od - 4/14/2016         GLAUCOMA SURGERIES     none        OTHER EYE SURGERIES    H/O RD repair os - S/P laser repair         CDR    0.75 // 0.85-0.9         Tbase    25/27          Tmax    25/27            Ttarget    18/18             HVF    13 test 2006 to  2022 - full  od // SAD - 2/2 CRS from laser for RD repair os / new IAD         Gonio    +3 ou          CCT    486/502 - thin ou         OCT    8 test 2011 to 2022 - RNFL - dec T od ( fluctuate) od  - dec. G/N/TI/TS/ N  Bord T/NI  (has a CRS)  os        HRT    3 test 2017 to 2019 -MR -  DEC. Sn/n/in od // DEC. S/n/i, BORD t os /// CDR 0.73 od // 0.86 os        Disc photos    2011, 2015 , 2017// [facility] - 2022      - Ttoday   13/14 (down from21/29 )     - Test done today  - IOP / with adjustment of drops      2. 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 Chorioretinal scar, left   -with 2nd VF defect      4. Old subtotal retinal detachment, left   S/P repair - retina flat - stable   -large CRS      5. Type 2 diabetes mellitus without ophthalmic manifestations      6. Senile nuclear sclerosis  -mild - monitor      7. pupil sphincter rupture os  -suggest old trauma  -does NOT appear to have a angle recession    7. Presbyopia       PLAN    POAG with OHT OS > OD - mild od // moderate os   IOP ok - below target of 18 ou - better after addition of cosopt ou - re-instruct on bid use of cosopt (3/2022)    The ON and VF's are still good ou   The VF loss os is 2/2 old CRS- ?  post laser for a RD repair   Good initial response to SLT ou 25/25 --> 16/17    Photo file updated 10/31/2022    7/29/2022  ++ IOP elevation os   ++ VF prog os   ++ OCT - RNFL prog os     Cont drops   IOP below target ou 10/31/2022 (( good resp to rocklatan ou)   cosopt tid ou - has at home   Brimonidine tid ou - Rx sent   rocklatan q hs ou (samples given )     - Consider repeat SLT -prn - but H/O trauma os   --  if  needs incisional surgery - may or may not have a  scleral buckle in one eye     Bethany Lechuga MD

## 2022-11-18 DIAGNOSIS — E11.36 TYPE 2 DIABETES MELLITUS WITH DIABETIC CATARACT, WITHOUT LONG-TERM CURRENT USE OF INSULIN: ICD-10-CM

## 2022-11-18 NOTE — TELEPHONE ENCOUNTER
Please send prescription to [facility] - patient is having trouble getting it through the other company  Thank you

## 2022-11-21 RX ORDER — FLASH GLUCOSE SENSOR
1 KIT MISCELLANEOUS
Qty: 2 KIT | Refills: 5 | Status: SHIPPED | OUTPATIENT
Start: 2022-11-21 | End: 2024-01-03 | Stop reason: SDUPTHER

## 2022-12-01 ENCOUNTER — LAB VISIT (OUTPATIENT)
Dept: LAB | Facility: HOSPITAL | Age: 67
End: 2022-12-01
Attending: NURSE PRACTITIONER
Payer: MEDICARE

## 2022-12-01 DIAGNOSIS — C61 MALIGNANT NEOPLASM OF PROSTATE: ICD-10-CM

## 2022-12-01 DIAGNOSIS — E78.5 HYPERLIPIDEMIA ASSOCIATED WITH TYPE 2 DIABETES MELLITUS: ICD-10-CM

## 2022-12-01 DIAGNOSIS — E11.9 TYPE 2 DIABETES MELLITUS WITHOUT COMPLICATION, WITHOUT LONG-TERM CURRENT USE OF INSULIN: ICD-10-CM

## 2022-12-01 DIAGNOSIS — E11.69 HYPERLIPIDEMIA ASSOCIATED WITH TYPE 2 DIABETES MELLITUS: ICD-10-CM

## 2022-12-01 LAB
ALBUMIN SERPL BCP-MCNC: 4 G/DL (ref 3.5–5.2)
ALP SERPL-CCNC: 72 U/L (ref 55–135)
ALT SERPL W/O P-5'-P-CCNC: 14 U/L (ref 10–44)
ANION GAP SERPL CALC-SCNC: 8 MMOL/L (ref 8–16)
AST SERPL-CCNC: 17 U/L (ref 10–40)
BILIRUB SERPL-MCNC: 0.5 MG/DL (ref 0.1–1)
BUN SERPL-MCNC: 15 MG/DL (ref 8–23)
CALCIUM SERPL-MCNC: 10.2 MG/DL (ref 8.7–10.5)
CHLORIDE SERPL-SCNC: 105 MMOL/L (ref 95–110)
CHOLEST SERPL-MCNC: 233 MG/DL (ref 120–199)
CHOLEST/HDLC SERPL: 4.4 {RATIO} (ref 2–5)
CO2 SERPL-SCNC: 25 MMOL/L (ref 23–29)
COMPLEXED PSA SERPL-MCNC: <0.01 NG/ML (ref 0–4)
CREAT SERPL-MCNC: 0.8 MG/DL (ref 0.5–1.4)
EST. GFR  (NO RACE VARIABLE): >60 ML/MIN/1.73 M^2
ESTIMATED AVG GLUCOSE: 148 MG/DL (ref 68–131)
GLUCOSE SERPL-MCNC: 125 MG/DL (ref 70–110)
HBA1C MFR BLD: 6.8 % (ref 4–5.6)
HDLC SERPL-MCNC: 53 MG/DL (ref 40–75)
HDLC SERPL: 22.7 % (ref 20–50)
LDLC SERPL CALC-MCNC: 163.6 MG/DL (ref 63–159)
NONHDLC SERPL-MCNC: 180 MG/DL
POTASSIUM SERPL-SCNC: 4.6 MMOL/L (ref 3.5–5.1)
PROT SERPL-MCNC: 7.3 G/DL (ref 6–8.4)
SODIUM SERPL-SCNC: 138 MMOL/L (ref 136–145)
TRIGL SERPL-MCNC: 82 MG/DL (ref 30–150)

## 2022-12-01 PROCEDURE — 36415 COLL VENOUS BLD VENIPUNCTURE: CPT | Mod: PN | Performed by: RADIOLOGY

## 2022-12-01 PROCEDURE — 84153 ASSAY OF PSA TOTAL: CPT | Performed by: RADIOLOGY

## 2022-12-01 PROCEDURE — 83036 HEMOGLOBIN GLYCOSYLATED A1C: CPT | Performed by: NURSE PRACTITIONER

## 2022-12-01 PROCEDURE — 80061 LIPID PANEL: CPT | Performed by: NURSE PRACTITIONER

## 2022-12-01 PROCEDURE — 80053 COMPREHEN METABOLIC PANEL: CPT | Performed by: NURSE PRACTITIONER

## 2022-12-02 ENCOUNTER — PATIENT MESSAGE (OUTPATIENT)
Dept: INTERNAL MEDICINE | Facility: CLINIC | Age: 67
End: 2022-12-02
Payer: MEDICARE

## 2022-12-16 ENCOUNTER — NUTRITION (OUTPATIENT)
Dept: DIABETES | Facility: CLINIC | Age: 67
End: 2022-12-16
Payer: MEDICARE

## 2022-12-16 DIAGNOSIS — E11.36 TYPE 2 DIABETES MELLITUS WITH DIABETIC CATARACT, WITHOUT LONG-TERM CURRENT USE OF INSULIN: Primary | ICD-10-CM

## 2022-12-16 PROCEDURE — G0108 PR DIAB MANAGE TRN  PER INDIV: ICD-10-PCS | Mod: S$GLB,,, | Performed by: DIETITIAN, REGISTERED

## 2022-12-16 PROCEDURE — 99999 PR PBB SHADOW E&M-EST. PATIENT-LVL I: ICD-10-PCS | Mod: PBBFAC,,, | Performed by: DIETITIAN, REGISTERED

## 2022-12-16 PROCEDURE — G0108 DIAB MANAGE TRN  PER INDIV: HCPCS | Mod: S$GLB,,, | Performed by: DIETITIAN, REGISTERED

## 2022-12-16 PROCEDURE — 99999 PR PBB SHADOW E&M-EST. PATIENT-LVL I: CPT | Mod: PBBFAC,,, | Performed by: DIETITIAN, REGISTERED

## 2022-12-22 NOTE — PROGRESS NOTES
Diabetes Care Specialist Follow-up Note  Author: Anupama Brian RD, CDE  Date: 12/22/2022    Program Intake  Reason for Diabetes Program Visit:: Intervention  Type of Intervention:: Individual  Individual: Education  Education: Nutrition and Meal Planning, Self-Management Skill Review, Pattern Management  Device Training: Personal CGM  Current diabetes risk level:: moderate (HgbA1c 9.4)  In the last 12 months, have you:: none  Permission to speak with others about care:: no    Lab Results   Component Value Date    HGBA1C 6.8 (H) 12/01/2022     A1c Pre Diabetes Care Specialist Intervention:  9.4%    Clinical    Patient Health Rating  Compared to other people your age, how would you rate your health?: Good    Problem Review  Reviewed Problem List with Patient: yes  Active comorbidities affecting diabetes self-care.: yes  Comorbidities: Other (comment), Gastrointestinal Disorder, Cardiovascular Disease, Hypertension (pain, ED, retina detatchment, prostate CA, iron deficiency anemia)  Reviewed health maintenance: yes    Clinical Assessment  Current Diabetes Treatment: Oral Medication  Have you ever experienced hypoglycemia (low blood sugar)?: yes  In the last month, how often have you experienced low blood sugar?: once a week  Are you able to tell when your blood sugar is low?: Yes  What symptoms do you experience?: Dizzy/Light-headed, Shaky  Have you ever been hospitalized because your blood sugar was too low?: no  How do you treat hypoglycemia (low blood sugar)?: 1/2 can soda/fruit juice, 5-6 pieces of hard candy  Have you ever experienced hyperglycemia (high blood sugar)?: no    Medication Information  How do you obtain your medications?: Mail order  How many days a week do you miss your medications?: Never  Do you use a pill box or medication chart to help you manage your medications?: Pill box  Do you sometimes have difficulty refilling your medications?: No  Medication adherence impacting ability to self-manage  diabetes?: No                                          Labs  Do you have regular lab work to monitor your medications?: Yes  Type of Regular Lab Work: A1c, Cholesterol, Microalbumin, CBC, BMP  Where do you get your labs drawn?: [facility]  Lab Compliance Barriers: No    Nutritional Status  Diet: Regular  Meal Plan 24 Hour Recall: Breakfast, Lunch, Snack, Dinner  Meal Plan 24 Hour Recall - Breakfast: grits, sausage - apple juice or oatmeal and a banana  Meal Plan 24 Hour Recall - Lunch: tunafish sandwich - water or skipped  Meal Plan 24 Hour Recall - Dinner: ribeye with green beans  Meal Plan 24 Hour Recall - Snack: apple sauce squeeze tube 2 chocolate silver bells, occassionally will have chips, drinks water: and 1 regular cold drink/soda a day  Change in appetite?: No  Dentation:: Intact  Recent Changes in Weight: No Recent Weight Change  Current nutritional status an area of need that is impacting patient's ability to self-manage diabetes?: No    Additional Social History    Support  Does anyone support you with your diabetes care?: yes  Who supports you?: self, spouse  Who takes you to your medical appointments?: self, spouse  Does the current support meet the patient's needs?: Yes  Is Support an area impacting ability to self-manage diabetes?: No    Access to Mass Media & Technology  Does the patient have access to any of the following devices or technologies?: Smart phone  Media or technology needs impacting ability to self-manage diabetes?: No    Cognitive/Behavioral Health  Alert and Oriented: Yes  Difficulty Thinking: No  Requires Prompting: No  Requires assistance for routine expression?: No  Cognitive or behavioral barriers impacting ability to self-manage diabetes?: No    Culture/Druze  Culture or Temple beliefs that may impact ability to access healthcare: No    Communication  Language preference: English  Hearing Problems: No  Vision Problems: Yes  Vision problem type:: Decreased Vision  Vision  Assistance: Glasses  Communication needs impacting ability to self-manage diabetes?: No    Health Literacy  Preferred Learning Method: Face to Face, Reading Materials, Demonstration  How often do you need to have someone help you read instructions, pamphlets, or written material from your doctor or pharmacy?: Sometimes  Health literacy needs impacting ability to self-manage diabetes?: No      Diabetes Self-Management Skills Assessment     Diabetes Disease Process/Treatment Options  Patient/caregiver able to state what happens when someone has diabetes.: yes  Patient/caregiver knows what type of diabetes they have.: yes  Diabetes Type : Type II  Patient/caregiver able to identify at least three signs and symptoms of diabetes.: yes  Identified signs and symptoms:: frequent urination, increased thirst  Patient able to identify at least three risk factors for diabetes.: yes  Identified risk factors:: family history  Diabetes Disease Process/Treatment Options: Skills Assessment Completed: No  Assessment indicates:: Adequate understanding  Deferred due to:: Other (comment) (previously completed, there has been no change and patient has adequate understanding)  Area of need?: No    Nutrition/Healthy Eating  Challenges to healthy eating:: portion control, other (see comments) (sugary beverage)  Method of carbohydrate measurement:: plate method  Patient can identify foods that impact blood sugar.: yes  Patient-identified foods:: starches (bread, pasta, rice, cereal), sweets, starchy vegetables (corn, peas, beans), soda, fruit/fruit juice, milk, yogurt  Nutrition/Healthy Eating Skills Assessment Completed:: No  Assessment indicates:: Adequate understanding  Deffered due to:: Other (comment) (previously completed, there has been no change and patient has adequate understanding)  Area of need?: No    Physical Activity/Exercise  Patient's daily activity level:: lightly active  Patient formally exercises outside of work.:  no  Reasons for not exercising:: time constraints  Patient can identify forms of physical activity.: yes  Stated forms of physical activity:: moving to burn calories, recreational activities, yardwork, housework  Patient can identify reasons why exercise/physical activity is important in diabetes management.: no  Physical Activity/Exercise Skills Assessment Completed: : No  Assessment indicates:: Adequate understanding  Deffered due to:: Other (comment) (previously completed, there has been no change and patient has adequate understanding)  Area of need?: No    Medications  Patient is able to describe current diabetes management routine.: yes  Diabetes management routine:: oral medications  Patient is able to identify current diabetes medications, dosages, and appropriate timing of medications.: yes  Patient understands the purpose of the medications taken for diabetes.: yes  Patient reports problems or concerns with current medication regimen.: yes  Medication regimen problems/concerns:: concerned about side effects, other (see comments) (weight loss)  Medication Skills Assessment Completed:: No  Assessment indicates:: Adequate understanding  Deffered due to:: Other (comment) (previously completed, there has been no change and patient has adequate understanding)  Area of need?: No    Home Blood Glucose Monitoring  Patient states that blood sugar is checked at home daily.: yes  Monitoring Method:: personal continuous glucose monitor  Personal CGM type:: freestlye libre2  Patient is able to use personal CGM appropriately.: no  CGM Report reviewed?: yes  Home Blood Glucose Monitoring Skills Assessment Completed: : Yes  Assessment indicates:: Adequate understanding  Area of need?: No (starting freestyle richmond today)    Acute Complications  Patient is able to identify types of acute complications: Yes  Patient Identified:: Hypoglycemia, Hyperglycemia  Patient is able to state the basic meaning of hypoglycemia?:  Yes  Able to state the blood sugar range for hypoglycemia?: yes  Patient can identify general symptoms of hypoglycemia: yes  Patient identified:: shakiness  Able to state proper treatment of hypoglycemia?: yes  Patient identified:: 1/2 can soda/fruit juice  Patient is able to state the basic meaning of hyperglycemia?: Yes  Able to state the blood sugar range for hyperglycemia?: yes  Patient able to state proper treatment of hyperglycemia?: yes  Patient identified:: take medication as recommended, increase water intake, monitor blood sugar  Patient able to verbalize sick day plan?: yes  Patient-stated sick day plan:: seek medical attention for nausea, diarrhea, vomiting, fever with high blood sugar, drink more fluids, check blood sugar every 2-3 hours  Acute Complications Skills Assessment Completed: : Yes  Assessment indicates:: Adequate understanding  Area of need?: No    Chronic Complications  Patient can identify major chronic complications of diabetes.: yes  Stated chronic complications:: kidney disease, neuropathy/nerve damage, retinopathy, stroke, sexual dysfunction, heart disease/heart attack  Patient can identify ways to prevent or delay diabetes complications.: yes  Stated ways to prevent complications:: controlling blood sugar  Patient is aware that having diabetes increases risk of heart disease?: Yes  Patient is aware that heart disease is the leading cause of death and disability in people with diabetes?: Yes  Patient able to state risk factors for heart disease?: Yes  Patient stated risk factors for heart disease:: High blood pressure, High cholesterol, Having diabetes  Patient is taking statin?: Yes  Chronic Complications Skills Assessment Completed: : Yes  Assessment indicates:: Adequate understanding  Area of need?: Yes    Psychosocial/Coping  Patient can identify ways of coping with chronic disease.: yes  Patient-stated ways of coping with chronic disease:: support from loved  ones  Psychosocial/Coping Skills Assessment Completed: : Yes  Assessment indicates:: Adequate understanding  Area of need?: No      During today's follow-up visit,  the following areas required further assessment and content was provided/reviewed.    Based on today's diabetes care assessment, the following areas of need were identified:      Social 12/16/2022   Support No   Access to Mass Media/Tech No   Cognitive/Behavioral Health No   Culture/Gnosticist No   Communication No   Health Literacy No        Clinical 12/16/2022   Medication Adherence No   Lab Compliance No   Nutritional Status No        Diabetes Self-Management Skills 12/16/2022   Diabetes Disease Process/Treatment Options No   Nutrition/Healthy Eating No   Physical Activity/Exercise No   Medication No   Home Blood Glucose Monitoring No   Acute Complications No   Chronic Complications Yes   Psychosocial/Coping No        Today's interventions were provided through individual discussion, instruction, and written materials were provided.    Patient verbalized understanding of instruction and written materials.  Pt was able to return back demonstration of instructions today. Patient understood key points, needs reinforcement and further instruction.     Diabetes Self-Management Care Plan Review:  Diabetes Self-Management Care Plan Review and Evaluation of Progress:    During today's follow-up Eyad's Diabetes Self-Management Care Plan progress was reviewed and progress was evaluated including his/her input. Eyad has agreed to continue his/her journey to improve/maintain overall diabetes control by continuing to set health goals. See care plan progress below.      Care Plan: Diabetes Management   Updates made since 11/22/2022 12:00 AM        Problem: Medications Resolved 12/16/2022        Goal: Patient Agrees to take Diabetes Medication(s) metformin and jardiance as prescribed, stop jentaduato. Completed 12/16/2022   Start Date: 5/13/2022   Expected End  Date: 6/13/2022   This Visit's Progress: On track   Recent Progress: On track   Priority: High   Barriers: No Barriers Identified   Note:    Discussed MOA, onset, side effects, dosage of meds.  Provided with strategies for daily medication adherence (phone alarms, medication reminder apps, medication list, pill organizer, pill packing, pharmacy delivery options).   Discussed any concerns about regimen.   Reviewed significance of mealtimes and medication administration.   Reviewed correction scale insulin with mealtimes as prescribed.     Answered patient's questions regarding if he will ever be able to get off medication.  Reviewed need for medication and challenges of glucose control with steroid treatments and stress.  The patient was instructed on storage of insulin and/or injectable medication, precautions related to hyper/hypoglycemia.   Patient was given instructions on when/who to call with problems.        Problem: Acute Complications Resolved 12/16/2022        Goal: Patient agrees to identify and manage signs and symptoms of high/low blood sugar (hyper/hypoglycemia) by keeping a log of events and using proper treatment. Completed 12/16/2022   Start Date: 5/13/2022   Expected End Date: 8/13/2022   This Visit's Progress: On track   Recent Progress: On track   Priority: Medium   Barriers: No Barriers Identified   Note:    Hyperglycemia  ABC's of Diabetes   Discussed importance of A1c less than 6.0 to reduce risk of micro and macro complications, controlled Blood Pressure 130/80 and Cholesterol Lab Values--Total Cholesterol <200mg, LDL < 100, HDL >45 men and >50 women, Triglycerides <150mg for prevention heart disease, heart attack, stroke.  how to use a glucometer  reviewed understanding diabetes distress  reviewed current level and goal level for HgbA1c, blood glucose, microalbumin, and lipids  reviewed signs/symptoms of hyperglycemia  reviewed what level blood sugar is considered high   reviewed at what  level to contact the doctor and/or go to the emergency room.   Reviewed what patient can do to decrease blood sugar (ie drink more water, exercise, take medication as prescribed, monitor blood glucose)     Hypoglycemia  Reviewed blood glucose goals, prevention, detection, and treatment of hypoglycemia, and when to contact the clinic.  reviewed signs/symptoms of hypoglycemia  reviewed what level blood sugar is considered low   reviewed at what level to contact the doctor and/or go to the emergency room.   Reviewed what patient can do to increase blood sugar (ie drink juice or ½ can soda, eat 5-6 pieces of candy, 4 glucose tablets, 1 tbsp sugar or honey, glucagon)  Reviewed when glucagon should be used  Reviewed how to use glucagon device (injections and inhaled)    Freestyle Barak Education  Patient is here in clinic today for initial start of Freestyle Barak continuous glucose monitoring system (CGMA). Reviewed with patient how to insert sensor. Patient inserted sensor on left arm tricep region. Hypoglycemia trigger set for 70 and hyperglycemia set for 180. Patient provided with phone number for 24-hour help line if needed. Down loaded Barak view twan. Patient will use his phone to scan sensor and get readings. Patient accepted the invitation to share data with our clinic and will follow-up in 2 weeks. Questions addressed. Initialization finished. No futher questions.           Follow Up Plan     Follow up in about 6 months (around 6/16/2023) for Post Program Follow-Up.    Today's care plan and follow up schedule was discussed with patient.  Eyad verbalized understanding of the care plan, goals, and agrees to follow up plan.        The patient was encouraged to communicate with his/her health care provider/physician and care team regarding his/her condition(s) and treatment.  I provided the patient with my contact information today and encouraged to contact me via phone or [facility] Patient Portal as needed.

## 2023-01-26 ENCOUNTER — TELEPHONE (OUTPATIENT)
Dept: PRIMARY CARE CLINIC | Facility: CLINIC | Age: 68
End: 2023-01-26
Payer: COMMERCIAL

## 2023-01-26 ENCOUNTER — PATIENT OUTREACH (OUTPATIENT)
Dept: ADMINISTRATIVE | Facility: HOSPITAL | Age: 68
End: 2023-01-26
Payer: COMMERCIAL

## 2023-01-26 VITALS — DIASTOLIC BLOOD PRESSURE: 76 MMHG | SYSTOLIC BLOOD PRESSURE: 135 MMHG

## 2023-01-26 NOTE — PROGRESS NOTES
Recorded remote blood pressure reading  135/76     Immunizations: reviewed and updated  Care Everywhere: triggered  Care Teams: up to date  Outreach: none needed

## 2023-02-07 DIAGNOSIS — Z00.00 ENCOUNTER FOR MEDICARE ANNUAL WELLNESS EXAM: ICD-10-CM

## 2023-02-09 DIAGNOSIS — Z00.00 ENCOUNTER FOR MEDICARE ANNUAL WELLNESS EXAM: ICD-10-CM

## 2023-02-19 NOTE — PROGRESS NOTES
HPI     67 Year old male Present for 4 month Iop Check.   Pt states VA is stable. Compliant with gtts Denies any pain, Fl or   floaters.   1. POAG  2. VF Defect OS  3. Chorioretinal Scar OS  4. Hx RD OS  5. NS OU  6. Type 2 DM no DR  7. Pupil Sphincter Rupture OS  8. Presbyopia     MEDS:  Cosopt BID OU   Brimonidine TID OU  Rocklatan QDAY OU     Last edited by Rin Mixon on 2/20/2023  9:20 AM.            Assessment /Plan     For exam results, see Encounter Report.    Primary open-angle glaucoma, right eye, mild stage    Primary open-angle glaucoma, left eye, moderate stage    Arcuate visual field defect of left eye    Old subtotal retinal detachment, left    Pupillary sphincter rupture, left    Type 2 diabetes mellitus without ophthalmic manifestations    Bilateral presbyopia        Pt initially dx with glaucoma at [facility] - stoppped his gtts on his own   Presented to [facility] - susana Valdez 8/23/2006 - off gtts with a baseline / Tmax of 25/27  Referred by Courtney for consideration of SLT's   Pt with POAG and poorly controlled IOP's - does not use drops regularly       1. Primary open angle glaucoma, both eyes, mild stage        Glaucoma (type and duration)    POAG with elevated IOP ou - mild stage    First HVF   2007 - full od // SAD os 2/2 to CRS   First photos   2011   Treatment / Drops started   2006           Family history    ++ mother and 2 brothers         Glaucoma meds    Latanoprost  (( use to use cosopt as well)         H/O adverse rxn to glaucoma drops    None (has tired alphagan and timolol in past -non compliant)         LASERS    SLT os - 3/31/2016 // SLT od - 4/14/2016         GLAUCOMA SURGERIES    none        OTHER EYE SURGERIES    H/O RD repair os - S/P laser repair         CDR    0.75 // 0.85-0.9         Tbase    25/27          Tmax    25/27            Ttarget    18/18             HVF    13 test 2006 to  2022 - full  od // SAD - 2/2 CRS from laser for RD repair os / new IAD         Gonio    +3  ou          CCT    486/502 - thin ou         OCT    8 test 2011 to 2022 - RNFL - dec T od ( fluctuate) od  - dec. G/N/TI/TS/ N  Bord T/NI  (has a CRS)  os        HRT    3 test 2017 to 2019 -MR -  DEC. Sn/n/in od // DEC. S/n/i, BORD t os /// CDR 0.73 od // 0.86 os        Disc photos    2011, 2015 , 2017// harmony - 2022      - Ttoday   13//19   (down from21/29 )     - Test done today  - IOP / with adjustment of drops // gonio     2. 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 Chorioretinal scar, left   -with 2nd VF defect      4. Old subtotal retinal detachment, left   S/P repair - retina flat - stable   -large CRS      5. Type 2 diabetes mellitus without ophthalmic manifestations      6. Senile nuclear sclerosis  -mild - monitor      7. pupil sphincter rupture os  -suggest old trauma  -does NOT appear to have a angle recession    7. Presbyopia       PLAN    POAG with OHT OS > OD - mild od // moderate os   IOP ok - below target of 18 ou - better after addition of cosopt ou - re-instruct on bid use of cosopt (3/2022)    The ON and VF's are still good ou   The VF loss os is 2/2 old CRS- ?  post laser for a RD repair   Good initial response to SLT ou 25/25 --> 16/17    Photo file updated 2/20/2023 7/29/2022  ++ IOP elevation os   ++ VF prog os   ++ OCT - RNFL prog os     Cont drops   IOP below target ou 10/31/2022 (( good resp to rocklatan ou)   cosopt tid ou - has at home   Brimonidine tid ou - Rx sent   rocklatan q hs ou (samples given )      02/20/2023  - IOP check - IOP 13//19  - On max drops    - good mobile conj, open on gonio - good xen candidate   - Should consider surgery as progressing on last testing with elevated IOP at 19 on max drops. Book vs repeat studies in 6-8 weeks   - can try repeat slt os and if ineffective - can go ahead with incisional surgery     - Consider repeat SLT -prn - but H/O trauma os   --  if  needs  incisional surgery - may or may not have a scleral buckle left eye // + free conj sup - ccould try a trab / ? Xen os     Bethany Lechuga MD

## 2023-02-20 ENCOUNTER — OFFICE VISIT (OUTPATIENT)
Dept: OPHTHALMOLOGY | Facility: CLINIC | Age: 68
End: 2023-02-20
Payer: MEDICARE

## 2023-02-20 DIAGNOSIS — H21.562 PUPILLARY SPHINCTER RUPTURE, LEFT: ICD-10-CM

## 2023-02-20 DIAGNOSIS — H53.432 ARCUATE VISUAL FIELD DEFECT OF LEFT EYE: ICD-10-CM

## 2023-02-20 DIAGNOSIS — H52.4 BILATERAL PRESBYOPIA: ICD-10-CM

## 2023-02-20 DIAGNOSIS — H40.1122 PRIMARY OPEN-ANGLE GLAUCOMA, LEFT EYE, MODERATE STAGE: ICD-10-CM

## 2023-02-20 DIAGNOSIS — H40.1111 PRIMARY OPEN-ANGLE GLAUCOMA, RIGHT EYE, MILD STAGE: Primary | ICD-10-CM

## 2023-02-20 DIAGNOSIS — E11.9 TYPE 2 DIABETES MELLITUS WITHOUT OPHTHALMIC MANIFESTATIONS: ICD-10-CM

## 2023-02-20 DIAGNOSIS — H33.052 OLD SUBTOTAL RETINAL DETACHMENT, LEFT: ICD-10-CM

## 2023-02-20 PROCEDURE — 92020 GONIOSCOPY: CPT | Mod: HCNC,S$GLB,, | Performed by: OPHTHALMOLOGY

## 2023-02-20 PROCEDURE — 1160F PR REVIEW ALL MEDS BY PRESCRIBER/CLIN PHARMACIST DOCUMENTED: ICD-10-PCS | Mod: HCNC,CPTII,S$GLB, | Performed by: OPHTHALMOLOGY

## 2023-02-20 PROCEDURE — 99999 PR PBB SHADOW E&M-EST. PATIENT-LVL III: ICD-10-PCS | Mod: PBBFAC,HCNC,, | Performed by: OPHTHALMOLOGY

## 2023-02-20 PROCEDURE — 1160F RVW MEDS BY RX/DR IN RCRD: CPT | Mod: HCNC,CPTII,S$GLB, | Performed by: OPHTHALMOLOGY

## 2023-02-20 PROCEDURE — 99499 UNLISTED E&M SERVICE: CPT | Mod: S$GLB,,, | Performed by: OPHTHALMOLOGY

## 2023-02-20 PROCEDURE — 3288F PR FALLS RISK ASSESSMENT DOCUMENTED: ICD-10-PCS | Mod: HCNC,CPTII,S$GLB, | Performed by: OPHTHALMOLOGY

## 2023-02-20 PROCEDURE — 99499 RISK ADDL DX/OHS AUDIT: ICD-10-PCS | Mod: S$GLB,,, | Performed by: OPHTHALMOLOGY

## 2023-02-20 PROCEDURE — 3288F FALL RISK ASSESSMENT DOCD: CPT | Mod: HCNC,CPTII,S$GLB, | Performed by: OPHTHALMOLOGY

## 2023-02-20 PROCEDURE — 1126F AMNT PAIN NOTED NONE PRSNT: CPT | Mod: HCNC,CPTII,S$GLB, | Performed by: OPHTHALMOLOGY

## 2023-02-20 PROCEDURE — 1159F PR MEDICATION LIST DOCUMENTED IN MEDICAL RECORD: ICD-10-PCS | Mod: HCNC,CPTII,S$GLB, | Performed by: OPHTHALMOLOGY

## 2023-02-20 PROCEDURE — 1159F MED LIST DOCD IN RCRD: CPT | Mod: HCNC,CPTII,S$GLB, | Performed by: OPHTHALMOLOGY

## 2023-02-20 PROCEDURE — 92012 PR EYE EXAM, EST PATIENT,INTERMED: ICD-10-PCS | Mod: HCNC,S$GLB,, | Performed by: OPHTHALMOLOGY

## 2023-02-20 PROCEDURE — 99999 PR PBB SHADOW E&M-EST. PATIENT-LVL III: CPT | Mod: PBBFAC,HCNC,, | Performed by: OPHTHALMOLOGY

## 2023-02-20 PROCEDURE — 1101F PR PT FALLS ASSESS DOC 0-1 FALLS W/OUT INJ PAST YR: ICD-10-PCS | Mod: HCNC,CPTII,S$GLB, | Performed by: OPHTHALMOLOGY

## 2023-02-20 PROCEDURE — 1101F PT FALLS ASSESS-DOCD LE1/YR: CPT | Mod: HCNC,CPTII,S$GLB, | Performed by: OPHTHALMOLOGY

## 2023-02-20 PROCEDURE — 92020 PR SPECIAL EYE EVAL,GONIOSCOPY: ICD-10-PCS | Mod: HCNC,S$GLB,, | Performed by: OPHTHALMOLOGY

## 2023-02-20 PROCEDURE — 92012 INTRM OPH EXAM EST PATIENT: CPT | Mod: HCNC,S$GLB,, | Performed by: OPHTHALMOLOGY

## 2023-02-20 PROCEDURE — 1126F PR PAIN SEVERITY QUANTIFIED, NO PAIN PRESENT: ICD-10-PCS | Mod: HCNC,CPTII,S$GLB, | Performed by: OPHTHALMOLOGY

## 2023-03-13 NOTE — PROGRESS NOTES
HPI    DLS: 2/20/2023    Pt here for SLT OS;  Pt states no eye pain or discomfort.     Meds;  Cosopt BID OU  Brimonidine TID OU  Rocklatan QDAY OU    1. POAG   2. VF Defect OS   3. Chorioretinal Scar OS   4. Hx RD OS   5. NS OU   6. Type 2 DM no DR   7. Pupil Sphincter Rupture OS   8. Presbyopia     Last edited by Talya Hobson on 3/16/2023 10:28 AM.            Assessment /Plan     For exam results, see Encounter Report.    Primary open-angle glaucoma, right eye, mild stage    Primary open-angle glaucoma, left eye, moderate stage    Arcuate visual field defect of left eye    Old subtotal retinal detachment, left    Pupillary sphincter rupture, left    Type 2 diabetes mellitus without ophthalmic manifestations    Bilateral presbyopia    Chorioretinal scar, left    Nuclear sclerotic cataract of both eyes          Pt initially dx with glaucoma at [facility] - stoppped his gtts on his own   Presented to [facility] - susana Valdez 8/23/2006 - off gtts with a baseline / Tmax of 25/27  Referred by Courtney for consideration of SLT's   Pt with POAG and poorly controlled IOP's - does not use drops regularly       1. Primary open angle glaucoma, both eyes, mild stage        Glaucoma (type and duration)    POAG with elevated IOP ou - mild stage    First HVF   2007 - full od // SAD os 2/2 to CRS   First photos   2011   Treatment / Drops started   2006           Family history    ++ mother and 2 brothers         Glaucoma meds    Latanoprost  (( use to use cosopt as well)         H/O adverse rxn to glaucoma drops    None (has tired alphagan and timolol in past -non compliant)         LASERS    SLT os - 3/31/2016 // SLT od - 4/14/2016         GLAUCOMA SURGERIES    none        OTHER EYE SURGERIES    H/O RD repair os - S/P laser repair         CDR    0.75 // 0.85-0.9         Tbase    25/27          Tmax    25/27            Ttarget    18/18             HVF    13 test 2006 to  2022 - full  od // SAD - 2/2 CRS from laser for RD repair os / new  IAD         Gonio    +3 ou          CCT    486/502 - thin ou         OCT    8 test 2011 to 2022 - RNFL - dec T od ( fluctuate) od  - dec. G/N/TI/TS/ N  Bord T/NI  (has a CRS)  os        HRT    3 test 2017 to 2019 -MR -  DEC. Sn/n/in od // DEC. S/n/i, BORD t os /// CDR 0.73 od // 0.86 os        Disc photos    2011, 2015 , 2017// harmony - 2022      - Ttoday   15/20   (down from21/29 )     - Test done today  - SLT os      2. 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 Chorioretinal scar, left   -with 2nd VF defect      4. Old subtotal retinal detachment, left   S/P repair - retina flat - stable   -large CRS   - pt states he did NOT go to OR - just Rx with laser at the slit lamp     5. Type 2 diabetes mellitus without ophthalmic manifestations      6. Senile nuclear sclerosis  -mild - monitor      7. pupil sphincter rupture os  -suggest old trauma  -does NOT appear to have a angle recession    7. Presbyopia       PLAN    POAG with OHT OS > OD - mild od // moderate os   IOP ok - below target of 18 ou - better after addition of cosopt ou - re-instruct on bid use of cosopt (3/2022)    The ON and VF's are still good ou   The VF loss os is 2/2 old CRS- ?  post laser for a RD repair   Good initial response to SLT ou 25/25 --> 16/17    Photo file updated 2/20/2023 7/29/2022  ++ IOP elevation os   ++ VF prog os   ++ OCT - RNFL prog os     Cont drops   IOP below target ou 10/31/2022 (( good resp to rocklatan ou)   cosopt tid ou - has at home   Brimonidine tid ou - Rx sent   rocklatan q hs ou (samples given )      02/20/2023  - IOP check - IOP 13//19  - On max drops    - good mobile conj, open on gonio - good xen candidate   - Should consider surgery as progressing on last testing with elevated IOP at 19 on max drops. Book vs repeat studies in 6-8 weeks   - can try repeat slt os and if ineffective - can go ahead with incisional surgery     -  Consider repeat SLT -prn - but H/O trauma os   - done 3/16/2023 - steroid taper over 12 days - written instructions given   - at this time does NOT need a slt od     --  if  needs incisional surgery - doubt he has a scleral buckle  // + free conj sup - could try a trab w/ [facility]  / ? Xen os / express minishunt or no shunt     F/U 4-6 weeks - IOP check post slt OS    Bethany Lechuga MD

## 2023-03-16 ENCOUNTER — OFFICE VISIT (OUTPATIENT)
Dept: OPHTHALMOLOGY | Facility: CLINIC | Age: 68
End: 2023-03-16
Payer: COMMERCIAL

## 2023-03-16 DIAGNOSIS — H40.1111 PRIMARY OPEN-ANGLE GLAUCOMA, RIGHT EYE, MILD STAGE: Primary | ICD-10-CM

## 2023-03-16 DIAGNOSIS — H33.052 OLD SUBTOTAL RETINAL DETACHMENT, LEFT: ICD-10-CM

## 2023-03-16 DIAGNOSIS — H25.13 NUCLEAR SCLEROTIC CATARACT OF BOTH EYES: ICD-10-CM

## 2023-03-16 DIAGNOSIS — H21.562 PUPILLARY SPHINCTER RUPTURE, LEFT: ICD-10-CM

## 2023-03-16 DIAGNOSIS — E11.9 TYPE 2 DIABETES MELLITUS WITHOUT OPHTHALMIC MANIFESTATIONS: ICD-10-CM

## 2023-03-16 DIAGNOSIS — H53.432 ARCUATE VISUAL FIELD DEFECT OF LEFT EYE: ICD-10-CM

## 2023-03-16 DIAGNOSIS — H31.002 CHORIORETINAL SCAR, LEFT: ICD-10-CM

## 2023-03-16 DIAGNOSIS — H40.1122 PRIMARY OPEN-ANGLE GLAUCOMA, LEFT EYE, MODERATE STAGE: ICD-10-CM

## 2023-03-16 DIAGNOSIS — H52.4 BILATERAL PRESBYOPIA: ICD-10-CM

## 2023-03-16 PROCEDURE — 65855 TRABECULOPLASTY LASER SURG: CPT | Mod: LT,S$GLB,, | Performed by: OPHTHALMOLOGY

## 2023-03-16 PROCEDURE — 99999 PR PBB SHADOW E&M-EST. PATIENT-LVL II: ICD-10-PCS | Mod: PBBFAC,,, | Performed by: OPHTHALMOLOGY

## 2023-03-16 PROCEDURE — 1101F PT FALLS ASSESS-DOCD LE1/YR: CPT | Mod: CPTII,S$GLB,, | Performed by: OPHTHALMOLOGY

## 2023-03-16 PROCEDURE — 99499 UNLISTED E&M SERVICE: CPT | Mod: S$GLB,,, | Performed by: OPHTHALMOLOGY

## 2023-03-16 PROCEDURE — 1101F PR PT FALLS ASSESS DOC 0-1 FALLS W/OUT INJ PAST YR: ICD-10-PCS | Mod: CPTII,S$GLB,, | Performed by: OPHTHALMOLOGY

## 2023-03-16 PROCEDURE — 1126F AMNT PAIN NOTED NONE PRSNT: CPT | Mod: CPTII,S$GLB,, | Performed by: OPHTHALMOLOGY

## 2023-03-16 PROCEDURE — 99499 RISK ADDL DX/OHS AUDIT: ICD-10-PCS | Mod: S$GLB,,, | Performed by: OPHTHALMOLOGY

## 2023-03-16 PROCEDURE — 1160F RVW MEDS BY RX/DR IN RCRD: CPT | Mod: CPTII,S$GLB,, | Performed by: OPHTHALMOLOGY

## 2023-03-16 PROCEDURE — 1159F MED LIST DOCD IN RCRD: CPT | Mod: CPTII,S$GLB,, | Performed by: OPHTHALMOLOGY

## 2023-03-16 PROCEDURE — 1159F PR MEDICATION LIST DOCUMENTED IN MEDICAL RECORD: ICD-10-PCS | Mod: CPTII,S$GLB,, | Performed by: OPHTHALMOLOGY

## 2023-03-16 PROCEDURE — 3288F FALL RISK ASSESSMENT DOCD: CPT | Mod: CPTII,S$GLB,, | Performed by: OPHTHALMOLOGY

## 2023-03-16 PROCEDURE — 65855 ARGON TRABECULOPLASTY: ICD-10-PCS | Mod: LT,S$GLB,, | Performed by: OPHTHALMOLOGY

## 2023-03-16 PROCEDURE — 1160F PR REVIEW ALL MEDS BY PRESCRIBER/CLIN PHARMACIST DOCUMENTED: ICD-10-PCS | Mod: CPTII,S$GLB,, | Performed by: OPHTHALMOLOGY

## 2023-03-16 PROCEDURE — 1126F PR PAIN SEVERITY QUANTIFIED, NO PAIN PRESENT: ICD-10-PCS | Mod: CPTII,S$GLB,, | Performed by: OPHTHALMOLOGY

## 2023-03-16 PROCEDURE — 99999 PR PBB SHADOW E&M-EST. PATIENT-LVL II: CPT | Mod: PBBFAC,,, | Performed by: OPHTHALMOLOGY

## 2023-03-16 PROCEDURE — 3288F PR FALLS RISK ASSESSMENT DOCUMENTED: ICD-10-PCS | Mod: CPTII,S$GLB,, | Performed by: OPHTHALMOLOGY

## 2023-03-22 DIAGNOSIS — C61 MALIGNANT NEOPLASM OF PROSTATE: Primary | ICD-10-CM

## 2023-03-26 ENCOUNTER — PATIENT MESSAGE (OUTPATIENT)
Dept: OPHTHALMOLOGY | Facility: CLINIC | Age: 68
End: 2023-03-26
Payer: MEDICARE

## 2023-03-27 ENCOUNTER — TELEPHONE (OUTPATIENT)
Dept: OPHTHALMOLOGY | Facility: CLINIC | Age: 68
End: 2023-03-27
Payer: MEDICARE

## 2023-03-27 NOTE — TELEPHONE ENCOUNTER
I spoke to pt who stated that left eye has been bothering him with intermittent pain and blurred vision with FB sensation.  will have pt re-start steroid taper and also try using artifical tears 3-4 times a day. Pt advised and to call back if problems worsens or persists.

## 2023-04-07 ENCOUNTER — PATIENT MESSAGE (OUTPATIENT)
Dept: INTERNAL MEDICINE | Facility: CLINIC | Age: 68
End: 2023-04-07
Payer: MEDICARE

## 2023-04-17 DIAGNOSIS — H40.1131 PRIMARY OPEN ANGLE GLAUCOMA OF BOTH EYES, MILD STAGE: ICD-10-CM

## 2023-04-18 RX ORDER — DORZOLAMIDE HYDROCHLORIDE AND TIMOLOL MALEATE 20; 5 MG/ML; MG/ML
1 SOLUTION/ DROPS OPHTHALMIC
Qty: 30 ML | Refills: 3 | Status: SHIPPED | OUTPATIENT
Start: 2023-04-19 | End: 2023-04-20 | Stop reason: SDUPTHER

## 2023-04-19 ENCOUNTER — OFFICE VISIT (OUTPATIENT)
Dept: PRIMARY CARE CLINIC | Facility: CLINIC | Age: 68
End: 2023-04-19
Payer: COMMERCIAL

## 2023-04-19 ENCOUNTER — LAB VISIT (OUTPATIENT)
Dept: LAB | Facility: HOSPITAL | Age: 68
End: 2023-04-19
Attending: INTERNAL MEDICINE
Payer: MEDICARE

## 2023-04-19 VITALS
DIASTOLIC BLOOD PRESSURE: 70 MMHG | TEMPERATURE: 98 F | SYSTOLIC BLOOD PRESSURE: 130 MMHG | WEIGHT: 160.06 LBS | OXYGEN SATURATION: 99 % | BODY MASS INDEX: 25.72 KG/M2 | HEIGHT: 66 IN | RESPIRATION RATE: 18 BRPM | HEART RATE: 66 BPM

## 2023-04-19 DIAGNOSIS — E11.59 HYPERTENSION ASSOCIATED WITH DIABETES: ICD-10-CM

## 2023-04-19 DIAGNOSIS — Z78.9 STATIN INTOLERANCE: ICD-10-CM

## 2023-04-19 DIAGNOSIS — E78.5 HYPERLIPIDEMIA ASSOCIATED WITH TYPE 2 DIABETES MELLITUS: ICD-10-CM

## 2023-04-19 DIAGNOSIS — D50.8 IRON DEFICIENCY ANEMIA SECONDARY TO INADEQUATE DIETARY IRON INTAKE: ICD-10-CM

## 2023-04-19 DIAGNOSIS — N52.31 ERECTILE DYSFUNCTION AFTER RADICAL PROSTATECTOMY: ICD-10-CM

## 2023-04-19 DIAGNOSIS — Z12.11 SCREENING FOR COLON CANCER: ICD-10-CM

## 2023-04-19 DIAGNOSIS — M15.9 PRIMARY OSTEOARTHRITIS INVOLVING MULTIPLE JOINTS: ICD-10-CM

## 2023-04-19 DIAGNOSIS — H40.1131 PRIMARY OPEN ANGLE GLAUCOMA (POAG) OF BOTH EYES, MILD STAGE: ICD-10-CM

## 2023-04-19 DIAGNOSIS — K21.9 GASTROESOPHAGEAL REFLUX DISEASE WITHOUT ESOPHAGITIS: ICD-10-CM

## 2023-04-19 DIAGNOSIS — E11.69 HYPERLIPIDEMIA ASSOCIATED WITH TYPE 2 DIABETES MELLITUS: ICD-10-CM

## 2023-04-19 DIAGNOSIS — I15.2 HYPERTENSION ASSOCIATED WITH DIABETES: ICD-10-CM

## 2023-04-19 DIAGNOSIS — I70.0 AORTIC ATHEROSCLEROSIS: Primary | ICD-10-CM

## 2023-04-19 DIAGNOSIS — C61 MALIGNANT NEOPLASM OF PROSTATE: ICD-10-CM

## 2023-04-19 LAB
ALBUMIN SERPL BCP-MCNC: 4.1 G/DL (ref 3.5–5.2)
ALP SERPL-CCNC: 92 U/L (ref 55–135)
ALT SERPL W/O P-5'-P-CCNC: 19 U/L (ref 10–44)
ANION GAP SERPL CALC-SCNC: 9 MMOL/L (ref 8–16)
AST SERPL-CCNC: 21 U/L (ref 10–40)
BILIRUB SERPL-MCNC: 0.6 MG/DL (ref 0.1–1)
BUN SERPL-MCNC: 13 MG/DL (ref 8–23)
CALCIUM SERPL-MCNC: 10.1 MG/DL (ref 8.7–10.5)
CHLORIDE SERPL-SCNC: 103 MMOL/L (ref 95–110)
CO2 SERPL-SCNC: 27 MMOL/L (ref 23–29)
CREAT SERPL-MCNC: 0.9 MG/DL (ref 0.5–1.4)
EST. GFR  (NO RACE VARIABLE): >60 ML/MIN/1.73 M^2
ESTIMATED AVG GLUCOSE: 171 MG/DL (ref 68–131)
GLUCOSE SERPL-MCNC: 143 MG/DL (ref 70–110)
HBA1C MFR BLD: 7.6 % (ref 4–5.6)
POTASSIUM SERPL-SCNC: 4.7 MMOL/L (ref 3.5–5.1)
PROT SERPL-MCNC: 7.5 G/DL (ref 6–8.4)
SODIUM SERPL-SCNC: 139 MMOL/L (ref 136–145)

## 2023-04-19 PROCEDURE — 3078F PR MOST RECENT DIASTOLIC BLOOD PRESSURE < 80 MM HG: ICD-10-PCS | Mod: CPTII,S$GLB,, | Performed by: INTERNAL MEDICINE

## 2023-04-19 PROCEDURE — 80053 COMPREHEN METABOLIC PANEL: CPT | Performed by: INTERNAL MEDICINE

## 2023-04-19 PROCEDURE — 1160F RVW MEDS BY RX/DR IN RCRD: CPT | Mod: CPTII,S$GLB,, | Performed by: INTERNAL MEDICINE

## 2023-04-19 PROCEDURE — 3075F SYST BP GE 130 - 139MM HG: CPT | Mod: CPTII,S$GLB,, | Performed by: INTERNAL MEDICINE

## 2023-04-19 PROCEDURE — 3008F BODY MASS INDEX DOCD: CPT | Mod: CPTII,S$GLB,, | Performed by: INTERNAL MEDICINE

## 2023-04-19 PROCEDURE — 1101F PR PT FALLS ASSESS DOC 0-1 FALLS W/OUT INJ PAST YR: ICD-10-PCS | Mod: CPTII,S$GLB,, | Performed by: INTERNAL MEDICINE

## 2023-04-19 PROCEDURE — 3288F FALL RISK ASSESSMENT DOCD: CPT | Mod: CPTII,S$GLB,, | Performed by: INTERNAL MEDICINE

## 2023-04-19 PROCEDURE — 3008F PR BODY MASS INDEX (BMI) DOCUMENTED: ICD-10-PCS | Mod: CPTII,S$GLB,, | Performed by: INTERNAL MEDICINE

## 2023-04-19 PROCEDURE — 99999 PR PBB SHADOW E&M-EST. PATIENT-LVL V: ICD-10-PCS | Mod: PBBFAC,,, | Performed by: INTERNAL MEDICINE

## 2023-04-19 PROCEDURE — 1126F PR PAIN SEVERITY QUANTIFIED, NO PAIN PRESENT: ICD-10-PCS | Mod: CPTII,S$GLB,, | Performed by: INTERNAL MEDICINE

## 2023-04-19 PROCEDURE — 3288F PR FALLS RISK ASSESSMENT DOCUMENTED: ICD-10-PCS | Mod: CPTII,S$GLB,, | Performed by: INTERNAL MEDICINE

## 2023-04-19 PROCEDURE — 99214 OFFICE O/P EST MOD 30 MIN: CPT | Mod: S$GLB,,, | Performed by: INTERNAL MEDICINE

## 2023-04-19 PROCEDURE — 99999 PR PBB SHADOW E&M-EST. PATIENT-LVL V: CPT | Mod: PBBFAC,,, | Performed by: INTERNAL MEDICINE

## 2023-04-19 PROCEDURE — 3075F PR MOST RECENT SYSTOLIC BLOOD PRESS GE 130-139MM HG: ICD-10-PCS | Mod: CPTII,S$GLB,, | Performed by: INTERNAL MEDICINE

## 2023-04-19 PROCEDURE — 1159F MED LIST DOCD IN RCRD: CPT | Mod: CPTII,S$GLB,, | Performed by: INTERNAL MEDICINE

## 2023-04-19 PROCEDURE — 83036 HEMOGLOBIN GLYCOSYLATED A1C: CPT | Performed by: INTERNAL MEDICINE

## 2023-04-19 PROCEDURE — 3078F DIAST BP <80 MM HG: CPT | Mod: CPTII,S$GLB,, | Performed by: INTERNAL MEDICINE

## 2023-04-19 PROCEDURE — 1126F AMNT PAIN NOTED NONE PRSNT: CPT | Mod: CPTII,S$GLB,, | Performed by: INTERNAL MEDICINE

## 2023-04-19 PROCEDURE — 99214 PR OFFICE/OUTPT VISIT, EST, LEVL IV, 30-39 MIN: ICD-10-PCS | Mod: S$GLB,,, | Performed by: INTERNAL MEDICINE

## 2023-04-19 PROCEDURE — 1159F PR MEDICATION LIST DOCUMENTED IN MEDICAL RECORD: ICD-10-PCS | Mod: CPTII,S$GLB,, | Performed by: INTERNAL MEDICINE

## 2023-04-19 PROCEDURE — 1101F PT FALLS ASSESS-DOCD LE1/YR: CPT | Mod: CPTII,S$GLB,, | Performed by: INTERNAL MEDICINE

## 2023-04-19 PROCEDURE — 36415 COLL VENOUS BLD VENIPUNCTURE: CPT | Mod: PN | Performed by: INTERNAL MEDICINE

## 2023-04-19 PROCEDURE — 1160F PR REVIEW ALL MEDS BY PRESCRIBER/CLIN PHARMACIST DOCUMENTED: ICD-10-PCS | Mod: CPTII,S$GLB,, | Performed by: INTERNAL MEDICINE

## 2023-04-19 RX ORDER — PRAVASTATIN SODIUM 40 MG/1
40 TABLET ORAL DAILY
Qty: 90 TABLET | Refills: 3 | Status: SHIPPED | OUTPATIENT
Start: 2023-04-19 | End: 2023-10-19 | Stop reason: SDUPTHER

## 2023-04-19 NOTE — PROGRESS NOTES
Subjective:      Patient ID: Eyad Garcia is a 67 y.o. male.    Chief Complaint: Follow-up      Eyad Garcia is a 67 y.o. male with PMH significant for hypertension, erectile dysfunction, hyperlipidemia, type 2 diabetes, primary osteoarthritis of multiple joints, bronchitis, GERD, anemia, malignant neoplasm of prostate status post radiation to prostate bed; old retinal detachment, POAG both eyes.   Presenting today to follow up. Date of last annual is 10/19/2022       HLD: ASCVD elevated. He has previously tried atorvastatin with side effect, including severe myopathy of his thighs. Given his increased ASCVD risk, was started on pravastatin. He is tolerating pravastatin well. Increase pravastatin to 40mg at this time, ultimately plan on pravastatin 80mg. Continue zetia.    The 10-year ASCVD risk score (Anjel NGO, et al., 2019) is: 32.7%    Values used to calculate the score:      Age: 67 years      Sex: Male      Is Non- : Yes      Diabetic: Yes      Tobacco smoker: No      Systolic Blood Pressure: 130 mmHg      Is BP treated: Yes      HDL Cholesterol: 53 mg/dL      Total Cholesterol: 233 mg/dL     Hx prostate CA: Initially diagnosed 2015, recurrence 2019. Currently on lupron therapy. Follows with rad onc.     Bilateral POAG: S/p left eye laser surgery 2/20/2023. Follows with opthalmology.     T2DM: A1c controlled at 6.8%   Continues on  metformin 1000mg XR (two 500mg tablets) in the morning Continue jardiance 10 mg tablet in morning time.   Failed tradjenta in past.     Colonoscopy due - ordered    Denies any chest pain, shortness of breath, nausea vomiting constipation diarrhea, blood in stool, heartburn    Review of Systems   Constitutional:  Negative for chills, fever and weight loss.   HENT:  Negative for congestion, ear pain and sore throat.    Eyes:  Negative for double vision.   Respiratory:  Negative for cough and shortness of breath.    Cardiovascular:  Negative for chest  pain, palpitations and leg swelling.   Gastrointestinal:  Negative for abdominal pain, heartburn, nausea and vomiting.   Skin:  Negative for rash.   Neurological:  Negative for dizziness, tingling and headaches.   Psychiatric/Behavioral:  Negative for depression.         Current Outpatient Medications:     albuterol (VENTOLIN HFA) 90 mcg/actuation inhaler, Inhale 2 puffs into the lungs every 6 (six) hours as needed for Wheezing or Shortness of Breath (cough). Rescue, Disp: 18 g, Rfl: 11    alcohol swabs (ALCOHOL WIPES) PadM, Monitor as directed X 2 daily. Per insurance coverage. ICD 10 E11.9, Disp: 200 each, Rfl: 3    amLODIPine (NORVASC) 10 MG tablet, Take 1 tablet (10 mg total) by mouth once daily., Disp: 90 tablet, Rfl: 0    brimonidine 0.2% (ALPHAGAN) 0.2 % Drop, Place 1 drop into both eyes 3 (three) times daily., Disp: 30 mL, Rfl: 3    dorzolamide-timolol 2-0.5% (COSOPT) 22.3-6.8 mg/mL ophthalmic solution, Place 1 drop into both eyes 3 (three) times a week., Disp: 30 mL, Rfl: 3    ezetimibe (ZETIA) 10 mg tablet, Take 1 tablet (10 mg total) by mouth every evening., Disp: 90 tablet, Rfl: 3    fluticasone propionate (FLONASE) 50 mcg/actuation nasal spray, 2 sprays (100 mcg total) by Each Nostril route 2 (two) times daily as needed for Rhinitis., Disp: 16 g, Rfl: 0    FREESTYLE ROME 2 SENSOR Kit, Use as directed. Change every 14 (fourteen) days., Disp: 2 kit, Rfl: 5    losartan (COZAAR) 100 MG tablet, Take 1 tablet (100 mg total) by mouth once daily., Disp: 90 tablet, Rfl: 0    meloxicam (MOBIC) 15 MG tablet, Take 1 tablet (15 mg total) by mouth once daily., Disp: 90 tablet, Rfl: 0    metFORMIN (GLUCOPHAGE-XR) 500 MG ER 24hr tablet, Take 2 tablets (1,000 mg total) by mouth once daily., Disp: 180 tablet, Rfl: 3    netarsudiL-latanoprost (ROCKLATAN) 0.02-0.005 % Drop, Place 1 drop into both eyes every evening. Patient was not controlled on latanoprost - now needs to use rocklatan. Please run script through - it is  now a covered medication if they have failed latanoprost, Disp: 7.5 mL, Rfl: 3    triamcinolone acetonide 0.025% (KENALOG) 0.025 % cream, Apply topically 2 (two) times daily., Disp: 80 g, Rfl: 1    pravastatin (PRAVACHOL) 40 MG tablet, Take 1 tablet (40 mg total) by mouth once daily., Disp: 90 tablet, Rfl: 3    Current Facility-Administered Medications:     leuprolide acetate (6 month) injection 45 mg, 45 mg, Intramuscular, Q6 Months, Henrry Sampson Jr., MD, 45 mg at 09/28/22 1045    leuprolide acetate (6 month) injection 45 mg, 45 mg, Intramuscular, Q6 Months, Henrry Sampson Jr., MD    Lab Results   Component Value Date    HGBA1C 6.8 (H) 12/01/2022    HGBA1C 6.6 (H) 10/19/2022    HGBA1C 6.3 (H) 07/29/2022     Lab Results   Component Value Date    MICALBCREAT 6.5 09/27/2021     Lab Results   Component Value Date    LDLCALC 163.6 (H) 12/01/2022    LDLCALC 175.2 (H) 07/29/2022    CHOL 233 (H) 12/01/2022    HDL 53 12/01/2022    TRIG 82 12/01/2022       Lab Results   Component Value Date     12/01/2022    K 4.6 12/01/2022     12/01/2022    CO2 25 12/01/2022     (H) 12/01/2022    BUN 15 12/01/2022    CREATININE 0.8 12/01/2022    CALCIUM 10.2 12/01/2022    PROT 7.3 12/01/2022    ALBUMIN 4.0 12/01/2022    BILITOT 0.5 12/01/2022    ALKPHOS 72 12/01/2022    AST 17 12/01/2022    ALT 14 12/01/2022    ANIONGAP 8 12/01/2022    ESTGFRAFRICA >60.0 07/29/2022    EGFRNONAA >60.0 07/29/2022    WBC 5.40 10/19/2022    HGB 12.6 (L) 10/19/2022    HGB 10.1 (L) 05/29/2022    HCT 39.1 (L) 10/19/2022    MCV 87 10/19/2022     10/19/2022    TSH 1.998 10/19/2022    PSA 0.14 03/27/2017    PSA 6.6 (H) 02/27/2015    PSADIAG <0.01 03/22/2023    PSADIAG <0.01 12/01/2022    HEPCAB Negative 05/29/2022       Lab Results   Component Value Date    SKGCFJLG35BJ 32 09/02/2015    XZWCBPVH86 836 10/07/2020    FERRITIN 93 03/29/2022    IRON 87 03/29/2022    TRANSFERRIN 295 03/29/2022    TIBC 437 03/29/2022    FESATURATED 20  03/29/2022         Past Medical History:   Diagnosis Date    Cataract     Diabetes mellitus type II     Difficult intubation     Erectile dysfunction     History of colon polyps 1/25/2019    Hyperlipidemia     Hypertension     OA (osteoarthritis)     POAG (primary open-angle glaucoma)     Retinal detachment     OS     Past Surgical History:   Procedure Laterality Date    COLONOSCOPY N/A 1/25/2019    Procedure: COLONOSCOPY;  Surgeon: Elder Guillermo MD;  Location: [facility] ([address] [place] [address]);  Service: Endoscopy;  Laterality: N/A;    CYSTOSCOPY      HERNIA REPAIR      KNEE SURGERY      left    PENILE PROSTHESIS IMPLANT      PROSTATECTOMY      RETINAL DETACHMENT SURGERY      OS    SELECTIVE LASER TRABECUPLASTY Bilateral 4/16    OU WITH      Social History     Social History Narrative    Works for [employer]     Family History   Problem Relation Age of Onset    Diabetes Mother     Hypertension Mother     Glaucoma Mother     Diabetes Sister     Glaucoma Sister     Diabetes Brother     Glaucoma Brother     No Known Problems Father     Cancer Maternal Aunt     Colon cancer Maternal Aunt     No Known Problems Maternal Uncle     No Known Problems Paternal Aunt     No Known Problems Paternal Uncle     No Known Problems Maternal Grandmother     No Known Problems Maternal Grandfather     No Known Problems Paternal Grandmother     No Known Problems Paternal Grandfather     Anesthesia problems Neg Hx     Broken bones Neg Hx     Clotting disorder Neg Hx     Collagen disease Neg Hx     Dislocations Neg Hx     Osteoporosis Neg Hx     Rheumatologic disease Neg Hx     Scoliosis Neg Hx     Severe sprains Neg Hx     Amblyopia Neg Hx     Blindness Neg Hx     Cataracts Neg Hx     Macular degeneration Neg Hx     Retinal detachment Neg Hx     Strabismus Neg Hx     Stroke Neg Hx     Thyroid disease Neg Hx     Esophageal cancer Neg Hx      Vitals:    04/19/23 0853   BP: 130/70   Pulse: 66   Resp: 18   Temp: 98 °F (36.7 °C)  "  SpO2: 99%   Weight: 72.6 kg (160 lb 0.9 oz)   Height: 5' 6" (1.676 m)   PainSc: 0-No pain     Objective:   Physical Exam  Vitals and nursing note reviewed.   Constitutional:       Appearance: Normal appearance.   HENT:      Head: Normocephalic and atraumatic.      Right Ear: Tympanic membrane, ear canal and external ear normal.      Left Ear: Tympanic membrane, ear canal and external ear normal.      Nose: Nose normal.      Mouth/Throat:      Mouth: Mucous membranes are moist.      Pharynx: Oropharynx is clear.   Eyes:      Extraocular Movements: Extraocular movements intact.      Pupils: Pupils are equal, round, and reactive to light.   Cardiovascular:      Rate and Rhythm: Normal rate and regular rhythm.      Pulses: Normal pulses.      Heart sounds: Normal heart sounds.   Pulmonary:      Breath sounds: Normal breath sounds.   Abdominal:      General: Bowel sounds are normal.      Palpations: Abdomen is soft.   Musculoskeletal:      Cervical back: Normal range of motion and neck supple.   Skin:     General: Skin is warm.   Neurological:      General: No focal deficit present.      Mental Status: He is alert and oriented to person, place, and time.     Assessment/Plan     Eyad Garcia is a 67 y.o.male with:    Aortic atherosclerosis  -     pravastatin (PRAVACHOL) 40 MG tablet; Take 1 tablet (40 mg total) by mouth once daily.  Dispense: 90 tablet; Refill: 3    Malignant neoplasm of prostate  - follows with rad/onc    Hypertension associated with diabetes  -     Comprehensive Metabolic Panel; Future; Expected date: 04/19/2023  -     Ambulatory referral/consult to Diabetic Advanced Practice Providers (Medical Management); Future; Expected date: 04/26/2023    Primary open angle glaucoma (POAG) of both eyes, mild stage  - s/p laser 2/2023  - cont to follow with ophthalmology    Hyperlipidemia associated with type 2 diabetes mellitus  -     Hemoglobin A1C; Future; Expected date: 04/19/2023  -     Comprehensive " Metabolic Panel; Future; Expected date: 04/19/2023  -     pravastatin (PRAVACHOL) 40 MG tablet; Take 1 tablet (40 mg total) by mouth once daily.  Dispense: 90 tablet; Refill: 3  -     Ambulatory referral/consult to Diabetic Advanced Practice Providers (Medical Management); Future; Expected date: 04/26/2023    Erectile dysfunction after radical prostatectomy    Iron deficiency anemia secondary to inadequate dietary iron intake    Gastroesophageal reflux disease without esophagitis    Statin intolerance  -     pravastatin (PRAVACHOL) 40 MG tablet; Take 1 tablet (40 mg total) by mouth once daily.  Dispense: 90 tablet; Refill: 3    Screening for colon cancer  -     Ambulatory referral/consult to Endo Procedure ; Future; Expected date: 04/20/2023    Primary osteoarthritis involving multiple joints         Chronic conditions status updated as per HPI.  Other than changes above, cont current medications and maintain follow up with specialists.  Return to clinic in Follow up in about 6 months (around 10/19/2023).      Dominique Rojas MD  [facility]    Patient Instructions   Labs are fasting. Please do not eat or drink anything other than water for 8-10 hrs prior to your lab work.    6 months for well visit or sooner if needed.   Tests to Keep You Healthy    Eye Exam: Met on 2/20/2023  Colon Cancer Screening: DUE  Last Blood Pressure <= 139/89 (4/19/2023): Yes  Last HbA1c < 8 (12/01/2022): Yes

## 2023-04-19 NOTE — PATIENT INSTRUCTIONS
Labs are fasting. Please do not eat or drink anything other than water for 8-10 hrs prior to your lab work.    6 months for well visit or sooner if needed.

## 2023-04-20 ENCOUNTER — PATIENT MESSAGE (OUTPATIENT)
Dept: PRIMARY CARE CLINIC | Facility: CLINIC | Age: 68
End: 2023-04-20
Payer: MEDICARE

## 2023-04-20 DIAGNOSIS — H40.1131 PRIMARY OPEN ANGLE GLAUCOMA OF BOTH EYES, MILD STAGE: ICD-10-CM

## 2023-04-20 RX ORDER — DAPAGLIFLOZIN 5 MG/1
5 TABLET, FILM COATED ORAL DAILY
Qty: 90 TABLET | Refills: 3 | Status: SHIPPED | OUTPATIENT
Start: 2023-04-20 | End: 2023-10-19 | Stop reason: SDUPTHER

## 2023-04-20 RX ORDER — DORZOLAMIDE HYDROCHLORIDE AND TIMOLOL MALEATE 20; 5 MG/ML; MG/ML
1 SOLUTION/ DROPS OPHTHALMIC 3 TIMES DAILY
Qty: 30 ML | Refills: 3 | Status: SHIPPED | OUTPATIENT
Start: 2023-04-20 | End: 2023-04-24 | Stop reason: SDUPTHER

## 2023-04-20 NOTE — PROGRESS NOTES
"  Your A1c is 7.6%.   With your increase in diabetes number, I would like to add another medication to your diabetes regimen. I sent over a medication named "Farxiga" for you to take once a day in addition to metformin. Farxiga has to be prior authorized by your insurance, so I went head and sent the medication to our pharmacy at [facility] to assist with the prior authorization process.      Rest of your electrolytes are unremarkable.    Your kidney (BUN, Creatinine and GFT) function is unremarkable.   Your liver (AST, ALT) function is unremarkable."

## 2023-04-20 NOTE — TELEPHONE ENCOUNTER
Given the patient's uncontrolled diabetes, would be prudent to add SGLT2 inhibitors at this time to his diabetes regimen. Continue metformin 1000mg BID, add farxiga 5mg qday.

## 2023-04-24 DIAGNOSIS — H40.1131 PRIMARY OPEN ANGLE GLAUCOMA OF BOTH EYES, MILD STAGE: ICD-10-CM

## 2023-04-24 DIAGNOSIS — H40.1111 PRIMARY OPEN-ANGLE GLAUCOMA, RIGHT EYE, MILD STAGE: ICD-10-CM

## 2023-04-24 DIAGNOSIS — H40.1122 PRIMARY OPEN-ANGLE GLAUCOMA, LEFT EYE, MODERATE STAGE: ICD-10-CM

## 2023-04-24 RX ORDER — NETARSUDIL AND LATANOPROST OPHTHALMIC SOLUTION, 0.02%/0.005% .2; .05 MG/ML; MG/ML
1 SOLUTION/ DROPS OPHTHALMIC; TOPICAL NIGHTLY
Qty: 7.5 ML | Refills: 3 | Status: SHIPPED | OUTPATIENT
Start: 2023-04-24

## 2023-04-24 RX ORDER — BRIMONIDINE TARTRATE 2 MG/ML
1 SOLUTION/ DROPS OPHTHALMIC 3 TIMES DAILY
Qty: 30 ML | Refills: 3 | Status: SHIPPED | OUTPATIENT
Start: 2023-04-24

## 2023-04-24 RX ORDER — DORZOLAMIDE HYDROCHLORIDE AND TIMOLOL MALEATE 20; 5 MG/ML; MG/ML
1 SOLUTION/ DROPS OPHTHALMIC 3 TIMES DAILY
Qty: 30 ML | Refills: 3 | Status: SHIPPED | OUTPATIENT
Start: 2023-04-24 | End: 2023-05-16 | Stop reason: SDUPTHER

## 2023-04-24 NOTE — TELEPHONE ENCOUNTER
----- Message from Laurent Martins sent at 4/24/2023 11:51 AM CDT -----  Consult/Advisory    Name Of Caller:Eyad      Contact Preference:[phone]    Nature of call: Pt stated he called a few weeks ago about getting approval for a refill on his drops. He stated the pharmacy called and said that they were still waiting for approval.

## 2023-04-26 ENCOUNTER — PATIENT MESSAGE (OUTPATIENT)
Dept: INTERNAL MEDICINE | Facility: CLINIC | Age: 68
End: 2023-04-26
Payer: MEDICARE

## 2023-05-02 ENCOUNTER — OFFICE VISIT (OUTPATIENT)
Dept: OPHTHALMOLOGY | Facility: CLINIC | Age: 68
End: 2023-05-02
Payer: COMMERCIAL

## 2023-05-02 DIAGNOSIS — H53.432 ARCUATE VISUAL FIELD DEFECT OF LEFT EYE: ICD-10-CM

## 2023-05-02 DIAGNOSIS — H31.002 CHORIORETINAL SCAR, LEFT: ICD-10-CM

## 2023-05-02 DIAGNOSIS — E11.9 TYPE 2 DIABETES MELLITUS WITHOUT OPHTHALMIC MANIFESTATIONS: ICD-10-CM

## 2023-05-02 DIAGNOSIS — H40.1111 PRIMARY OPEN-ANGLE GLAUCOMA, RIGHT EYE, MILD STAGE: Primary | ICD-10-CM

## 2023-05-02 DIAGNOSIS — H40.1122 PRIMARY OPEN-ANGLE GLAUCOMA, LEFT EYE, MODERATE STAGE: ICD-10-CM

## 2023-05-02 DIAGNOSIS — H25.13 NUCLEAR SCLEROTIC CATARACT OF BOTH EYES: ICD-10-CM

## 2023-05-02 DIAGNOSIS — H52.4 BILATERAL PRESBYOPIA: ICD-10-CM

## 2023-05-02 DIAGNOSIS — H21.562 PUPILLARY SPHINCTER RUPTURE, LEFT: ICD-10-CM

## 2023-05-02 DIAGNOSIS — H33.052 OLD SUBTOTAL RETINAL DETACHMENT, LEFT: ICD-10-CM

## 2023-05-02 PROCEDURE — 99214 OFFICE O/P EST MOD 30 MIN: CPT | Mod: S$GLB,,, | Performed by: OPHTHALMOLOGY

## 2023-05-02 PROCEDURE — 1160F PR REVIEW ALL MEDS BY PRESCRIBER/CLIN PHARMACIST DOCUMENTED: ICD-10-PCS | Mod: CPTII,S$GLB,, | Performed by: OPHTHALMOLOGY

## 2023-05-02 PROCEDURE — 3288F FALL RISK ASSESSMENT DOCD: CPT | Mod: CPTII,S$GLB,, | Performed by: OPHTHALMOLOGY

## 2023-05-02 PROCEDURE — 1101F PR PT FALLS ASSESS DOC 0-1 FALLS W/OUT INJ PAST YR: ICD-10-PCS | Mod: CPTII,S$GLB,, | Performed by: OPHTHALMOLOGY

## 2023-05-02 PROCEDURE — 99499 RISK ADDL DX/OHS AUDIT: ICD-10-PCS | Mod: S$GLB,,, | Performed by: OPHTHALMOLOGY

## 2023-05-02 PROCEDURE — 1126F AMNT PAIN NOTED NONE PRSNT: CPT | Mod: CPTII,S$GLB,, | Performed by: OPHTHALMOLOGY

## 2023-05-02 PROCEDURE — 3051F PR MOST RECENT HEMOGLOBIN A1C LEVEL 7.0 - < 8.0%: ICD-10-PCS | Mod: CPTII,S$GLB,, | Performed by: OPHTHALMOLOGY

## 2023-05-02 PROCEDURE — 1159F MED LIST DOCD IN RCRD: CPT | Mod: CPTII,S$GLB,, | Performed by: OPHTHALMOLOGY

## 2023-05-02 PROCEDURE — 99999 PR PBB SHADOW E&M-EST. PATIENT-LVL III: ICD-10-PCS | Mod: PBBFAC,,, | Performed by: OPHTHALMOLOGY

## 2023-05-02 PROCEDURE — 1159F PR MEDICATION LIST DOCUMENTED IN MEDICAL RECORD: ICD-10-PCS | Mod: CPTII,S$GLB,, | Performed by: OPHTHALMOLOGY

## 2023-05-02 PROCEDURE — 3051F HG A1C>EQUAL 7.0%<8.0%: CPT | Mod: CPTII,S$GLB,, | Performed by: OPHTHALMOLOGY

## 2023-05-02 PROCEDURE — 1101F PT FALLS ASSESS-DOCD LE1/YR: CPT | Mod: CPTII,S$GLB,, | Performed by: OPHTHALMOLOGY

## 2023-05-02 PROCEDURE — 99499 UNLISTED E&M SERVICE: CPT | Mod: S$GLB,,, | Performed by: OPHTHALMOLOGY

## 2023-05-02 PROCEDURE — 99999 PR PBB SHADOW E&M-EST. PATIENT-LVL III: CPT | Mod: PBBFAC,,, | Performed by: OPHTHALMOLOGY

## 2023-05-02 PROCEDURE — 99214 PR OFFICE/OUTPT VISIT, EST, LEVL IV, 30-39 MIN: ICD-10-PCS | Mod: S$GLB,,, | Performed by: OPHTHALMOLOGY

## 2023-05-02 PROCEDURE — 3288F PR FALLS RISK ASSESSMENT DOCUMENTED: ICD-10-PCS | Mod: CPTII,S$GLB,, | Performed by: OPHTHALMOLOGY

## 2023-05-02 PROCEDURE — 1160F RVW MEDS BY RX/DR IN RCRD: CPT | Mod: CPTII,S$GLB,, | Performed by: OPHTHALMOLOGY

## 2023-05-02 PROCEDURE — 1126F PR PAIN SEVERITY QUANTIFIED, NO PAIN PRESENT: ICD-10-PCS | Mod: CPTII,S$GLB,, | Performed by: OPHTHALMOLOGY

## 2023-05-03 ENCOUNTER — OFFICE VISIT (OUTPATIENT)
Dept: RADIATION ONCOLOGY | Facility: CLINIC | Age: 68
End: 2023-05-03
Payer: COMMERCIAL

## 2023-05-03 VITALS
SYSTOLIC BLOOD PRESSURE: 145 MMHG | HEIGHT: 66 IN | WEIGHT: 159 LBS | HEART RATE: 68 BPM | DIASTOLIC BLOOD PRESSURE: 80 MMHG | BODY MASS INDEX: 25.55 KG/M2 | RESPIRATION RATE: 16 BRPM

## 2023-05-03 DIAGNOSIS — C61 MALIGNANT NEOPLASM OF PROSTATE: Primary | ICD-10-CM

## 2023-05-03 PROCEDURE — 96402 PR CHEMOTHER HORMON ANTINEOPL SUB-Q/IM: ICD-10-PCS | Mod: S$GLB,,, | Performed by: RADIOLOGY

## 2023-05-03 PROCEDURE — 99999 PR PBB SHADOW E&M-EST. PATIENT-LVL V: ICD-10-PCS | Mod: PBBFAC,,, | Performed by: RADIOLOGY

## 2023-05-03 PROCEDURE — 3077F PR MOST RECENT SYSTOLIC BLOOD PRESSURE >= 140 MM HG: ICD-10-PCS | Mod: CPTII,S$GLB,, | Performed by: RADIOLOGY

## 2023-05-03 PROCEDURE — 3051F HG A1C>EQUAL 7.0%<8.0%: CPT | Mod: CPTII,S$GLB,, | Performed by: RADIOLOGY

## 2023-05-03 PROCEDURE — 1101F PT FALLS ASSESS-DOCD LE1/YR: CPT | Mod: CPTII,S$GLB,, | Performed by: RADIOLOGY

## 2023-05-03 PROCEDURE — 3288F PR FALLS RISK ASSESSMENT DOCUMENTED: ICD-10-PCS | Mod: CPTII,S$GLB,, | Performed by: RADIOLOGY

## 2023-05-03 PROCEDURE — 1126F AMNT PAIN NOTED NONE PRSNT: CPT | Mod: CPTII,S$GLB,, | Performed by: RADIOLOGY

## 2023-05-03 PROCEDURE — 3077F SYST BP >= 140 MM HG: CPT | Mod: CPTII,S$GLB,, | Performed by: RADIOLOGY

## 2023-05-03 PROCEDURE — 99999 PR PBB SHADOW E&M-EST. PATIENT-LVL V: CPT | Mod: PBBFAC,,, | Performed by: RADIOLOGY

## 2023-05-03 PROCEDURE — 1101F PR PT FALLS ASSESS DOC 0-1 FALLS W/OUT INJ PAST YR: ICD-10-PCS | Mod: CPTII,S$GLB,, | Performed by: RADIOLOGY

## 2023-05-03 PROCEDURE — 99212 OFFICE O/P EST SF 10 MIN: CPT | Mod: 25,S$GLB,, | Performed by: RADIOLOGY

## 2023-05-03 PROCEDURE — 3008F PR BODY MASS INDEX (BMI) DOCUMENTED: ICD-10-PCS | Mod: CPTII,S$GLB,, | Performed by: RADIOLOGY

## 2023-05-03 PROCEDURE — 99212 PR OFFICE/OUTPT VISIT, EST, LEVL II, 10-19 MIN: ICD-10-PCS | Mod: 25,S$GLB,, | Performed by: RADIOLOGY

## 2023-05-03 PROCEDURE — 3288F FALL RISK ASSESSMENT DOCD: CPT | Mod: CPTII,S$GLB,, | Performed by: RADIOLOGY

## 2023-05-03 PROCEDURE — 1160F PR REVIEW ALL MEDS BY PRESCRIBER/CLIN PHARMACIST DOCUMENTED: ICD-10-PCS | Mod: CPTII,S$GLB,, | Performed by: RADIOLOGY

## 2023-05-03 PROCEDURE — 1126F PR PAIN SEVERITY QUANTIFIED, NO PAIN PRESENT: ICD-10-PCS | Mod: CPTII,S$GLB,, | Performed by: RADIOLOGY

## 2023-05-03 PROCEDURE — 1160F RVW MEDS BY RX/DR IN RCRD: CPT | Mod: CPTII,S$GLB,, | Performed by: RADIOLOGY

## 2023-05-03 PROCEDURE — 1159F MED LIST DOCD IN RCRD: CPT | Mod: CPTII,S$GLB,, | Performed by: RADIOLOGY

## 2023-05-03 PROCEDURE — 1159F PR MEDICATION LIST DOCUMENTED IN MEDICAL RECORD: ICD-10-PCS | Mod: CPTII,S$GLB,, | Performed by: RADIOLOGY

## 2023-05-03 PROCEDURE — 3079F PR MOST RECENT DIASTOLIC BLOOD PRESSURE 80-89 MM HG: ICD-10-PCS | Mod: CPTII,S$GLB,, | Performed by: RADIOLOGY

## 2023-05-03 PROCEDURE — 3008F BODY MASS INDEX DOCD: CPT | Mod: CPTII,S$GLB,, | Performed by: RADIOLOGY

## 2023-05-03 PROCEDURE — 3079F DIAST BP 80-89 MM HG: CPT | Mod: CPTII,S$GLB,, | Performed by: RADIOLOGY

## 2023-05-03 PROCEDURE — 96402 CHEMO HORMON ANTINEOPL SQ/IM: CPT | Mod: S$GLB,,, | Performed by: RADIOLOGY

## 2023-05-03 PROCEDURE — 3051F PR MOST RECENT HEMOGLOBIN A1C LEVEL 7.0 - < 8.0%: ICD-10-PCS | Mod: CPTII,S$GLB,, | Performed by: RADIOLOGY

## 2023-05-03 NOTE — PROGRESS NOTES
Patient ID: Eyad Garcia is a 67 y.o. male.    Chief Complaint: Prostate Cancer (6 mo f/u;psa;lupron)    This patient presents to continue his hormonal deprivation therapy    Mr. Garcia has a history of recurrent prostate cancer. He initially presented in 2015 when biopsies from the Rt. base revealed [place] 8 (4+4) adenocarcinoma. He underwent RALP with bilateral pelvic node dissection in July of 2015. Pathology revealed a single focus of [place] 6 (3+3) adenocarcinoma in a background of extensive high-grade PIN. There was no extraprostatic extension, seminal vesicle invasion or lymphovascular invasion. The margins and 10 (5 Lt., 5 Rt.) pelvic nodes were negative for tumor involvement. Postoperative PSA in 2015 was 0.03 ng/ml. It continued to increase slowly. PSA in June of 2019 returned at 4 ng/ml. Work up revealed uptake in one small pelvic node on Axumin scan.  He completed 46.8 Gy to the prostate bed and nodes followed by a boost to the prostate bed and pelvic node 11/15/19.   His radiotherapy was combined with 6 months of androgen deprivation therapy. His PSA  continued to increase following radiotherapy. Subsequent imaging with bone scan and Axumin scan were negative.  We elected to start hormonal deprivation therapy in March of 2022 given a short (< 3 month) PSA doubling time.  Today the patient states he feels well.   Notes hot flashes.  No other significant complaints.     Review of Systems   Constitutional:  Negative for activity change, appetite change, chills, fatigue and fever.   Gastrointestinal:  Negative for abdominal pain, change in bowel habit, constipation, diarrhea, nausea and change in bowel habit.   Genitourinary:  Positive for erectile dysfunction. Negative for bladder incontinence, difficulty urinating, dysuria, frequency and hematuria.   Musculoskeletal:  Negative for back pain and joint swelling.     Physical Exam  Constitutional:       General: He is not in acute distress.      Appearance: Normal appearance.   Pulmonary:      Effort: Pulmonary effort is normal. No respiratory distress.   Abdominal:      General: Abdomen is flat. There is no distension.   Neurological:      Mental Status: He is alert and oriented to person, place, and time.   Psychiatric:         Mood and Affect: Mood normal.         Judgment: Judgment normal.      Latest Reference Range & Units 03/10/22 08:40 06/16/22 09:01 09/21/22 08:52 12/01/22 08:20 03/22/23 09:10   PSA Diagnostic 0.00 - 4.00 ng/mL 8.9 (H) 0.02 <0.01 <0.01 <0.01   (H): Data is abnormally high    Problem List Items Addressed This Visit       Malignant neoplasm of prostate - Primary       Continue hormonal therapy.  Lupron injection today.  Plan bone density. RTC in 6 months with PSA for Lupron injection

## 2023-05-15 NOTE — PROGRESS NOTES
Assessment /Plan     For exam results, see Encounter Report.    Primary open-angle glaucoma, right eye, mild stage    Primary open-angle glaucoma, left eye, moderate stage    Arcuate visual field defect of left eye    Old subtotal retinal detachment, left    Pupillary sphincter rupture, left    Chorioretinal scar, left    Nuclear sclerotic cataract of both eyes    Bilateral presbyopia        Pt initially dx with glaucoma at [facility] - stoped his gtts on his own   Presented to [facility] - to [facility] 8/23/2006 - off gtts with a baseline / Tmax of 25/27  Referred by Courtney for consideration of SLT's   Pt with POAG and poorly controlled IOP's - does not use drops regularly       1. Primary open angle glaucoma, both eyes, mild stage        Glaucoma (type and duration)    POAG with elevated IOP ou - mild stage    First HVF   2007 - full od // SAD os 2/2 to CRS   First photos   2011   Treatment / Drops started   2006           Family history    ++ mother and 2 brothers         Glaucoma meds    Latanoprost // brimonidine //  cosopt (out of cosopt - 5/2/2023)         H/O adverse rxn to glaucoma drops    None (has tired alphagan and timolol in past -non compliant)         LASERS    SLT os - 3/31/2016 // SLT od - 4/14/2016 // repeat SLT OS 3/16/2023        GLAUCOMA SURGERIES    none        OTHER EYE SURGERIES    H/O RD repair os - S/P laser repair         CDR    0.75 // 0.85-0.9         Tbase    25/27          Tmax    25/27            Ttarget    18/18             HVF    13 test 2006 to  2022 - full  od // SAD - 2/2 CRS from laser for RD repair os / new IAD         Gonio    +3 ou          CCT    486/502 - thin ou         OCT    8 test 2011 to 2022 - RNFL - dec T od ( fluctuate) od  - dec. G/N/TI/TS/ N  Bord T/NI  (has a CRS)  os        HRT    3 test 2017 to 2019 -MR -  DEC. Sn/n/in od // DEC. S/n/i, BORD t os /// CDR 0.73 od // 0.86 os        Disc photos    2011, 2015 , 2017// harmony - 2022      - Ttoday   12/26  (down  from21/29 )     - Test done today  - IOP check - back on all gtts      2. 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 Chorioretinal scar, left   -with 2nd VF defect      4. Old subtotal retinal detachment, left   S/P repair - retina flat - stable   -large CRS   - pt states he did NOT go to OR - just Rx with laser at the slit lamp     5. Type 2 diabetes mellitus without ophthalmic manifestations      6. Senile nuclear sclerosis  -mild - monitor      7. pupil sphincter rupture os  -suggest old trauma  -does NOT appear to have a angle recession    7. Presbyopia       PLAN    POAG with OHT OS > OD - mild od // moderate os   IOP below target od and too high os   S/P repeat slt os 3/16/2023 - ? Residual steroid response vs out of cosopt vs no response     The ON and VF's are still good ou   ?? Trace APD os - first noted on 5/2/2023   The VF loss os is 2/2 old CRS- ?  post laser for a RD repair   Good initial response to SLT ou 25/25 --> 16/17 7/29/2022  ++ IOP elevation os   ++ VF prog os   ++ OCT - RNFL prog os     Cont drops   cosopt tid ou - has run out - sample given and trixie called   Brimonidine tid ou - Rx sent   rocklatan q hs ou (samples given )      02/20/2023  - IOP check - IOP 13//19  - On max drops    - good mobile conj, open on gonio - good xen candidate   - Should consider surgery as progressing on last testing with elevated IOP at 19 on max drops. Book vs repeat studies in 6-8 weeks   - can try repeat slt os and if ineffective - can go ahead with incisional surgery     Photo file updated 5/2/2023 5/16/2023   IOP still too high os (( IOP is ok od))    S/p repeat slt and on mult gtts     --  rec incisional surgery - doubt he has a scleral buckle  // + free conj sup - could try a trab w/ [facility]  / ? Xen os / express minishunt or no shunt or a GDD     Pt is NOT a diamox candidate - H/O sulfa allergy - hives     Rec  trabeculectomy with [facility] os - with ? Xen  or express or no stent    Pt is still phakic / H/O trauma     Bethany Lechuga MD

## 2023-05-16 ENCOUNTER — OFFICE VISIT (OUTPATIENT)
Dept: OPHTHALMOLOGY | Facility: CLINIC | Age: 68
End: 2023-05-16
Payer: MEDICARE

## 2023-05-16 DIAGNOSIS — H31.002 CHORIORETINAL SCAR, LEFT: ICD-10-CM

## 2023-05-16 DIAGNOSIS — H21.562 PUPILLARY SPHINCTER RUPTURE, LEFT: ICD-10-CM

## 2023-05-16 DIAGNOSIS — H25.13 NUCLEAR SCLEROTIC CATARACT OF BOTH EYES: ICD-10-CM

## 2023-05-16 DIAGNOSIS — H53.432 ARCUATE VISUAL FIELD DEFECT OF LEFT EYE: ICD-10-CM

## 2023-05-16 DIAGNOSIS — H40.1111 PRIMARY OPEN-ANGLE GLAUCOMA, RIGHT EYE, MILD STAGE: Primary | ICD-10-CM

## 2023-05-16 DIAGNOSIS — H52.4 BILATERAL PRESBYOPIA: ICD-10-CM

## 2023-05-16 DIAGNOSIS — H40.1131 PRIMARY OPEN ANGLE GLAUCOMA OF BOTH EYES, MILD STAGE: ICD-10-CM

## 2023-05-16 DIAGNOSIS — H40.1122 PRIMARY OPEN-ANGLE GLAUCOMA, LEFT EYE, MODERATE STAGE: ICD-10-CM

## 2023-05-16 DIAGNOSIS — H33.052 OLD SUBTOTAL RETINAL DETACHMENT, LEFT: ICD-10-CM

## 2023-05-16 PROCEDURE — 3288F PR FALLS RISK ASSESSMENT DOCUMENTED: ICD-10-PCS | Mod: CPTII,,, | Performed by: OPHTHALMOLOGY

## 2023-05-16 PROCEDURE — 99499 UNLISTED E&M SERVICE: CPT | Mod: HCNC,S$GLB,, | Performed by: OPHTHALMOLOGY

## 2023-05-16 PROCEDURE — 3051F HG A1C>EQUAL 7.0%<8.0%: CPT | Mod: CPTII,,, | Performed by: OPHTHALMOLOGY

## 2023-05-16 PROCEDURE — 1159F MED LIST DOCD IN RCRD: CPT | Mod: CPTII,,, | Performed by: OPHTHALMOLOGY

## 2023-05-16 PROCEDURE — 99499 RISK ADDL DX/OHS AUDIT: ICD-10-PCS | Mod: HCNC,S$GLB,, | Performed by: OPHTHALMOLOGY

## 2023-05-16 PROCEDURE — 99999 PR PBB SHADOW E&M-EST. PATIENT-LVL III: ICD-10-PCS | Mod: PBBFAC,,, | Performed by: OPHTHALMOLOGY

## 2023-05-16 PROCEDURE — 1101F PT FALLS ASSESS-DOCD LE1/YR: CPT | Mod: CPTII,,, | Performed by: OPHTHALMOLOGY

## 2023-05-16 PROCEDURE — 99214 PR OFFICE/OUTPT VISIT, EST, LEVL IV, 30-39 MIN: ICD-10-PCS | Mod: ,,, | Performed by: OPHTHALMOLOGY

## 2023-05-16 PROCEDURE — 1159F PR MEDICATION LIST DOCUMENTED IN MEDICAL RECORD: ICD-10-PCS | Mod: CPTII,,, | Performed by: OPHTHALMOLOGY

## 2023-05-16 PROCEDURE — 1126F PR PAIN SEVERITY QUANTIFIED, NO PAIN PRESENT: ICD-10-PCS | Mod: CPTII,,, | Performed by: OPHTHALMOLOGY

## 2023-05-16 PROCEDURE — 99999 PR PBB SHADOW E&M-EST. PATIENT-LVL III: CPT | Mod: PBBFAC,,, | Performed by: OPHTHALMOLOGY

## 2023-05-16 PROCEDURE — 3051F PR MOST RECENT HEMOGLOBIN A1C LEVEL 7.0 - < 8.0%: ICD-10-PCS | Mod: CPTII,,, | Performed by: OPHTHALMOLOGY

## 2023-05-16 PROCEDURE — 1160F PR REVIEW ALL MEDS BY PRESCRIBER/CLIN PHARMACIST DOCUMENTED: ICD-10-PCS | Mod: CPTII,,, | Performed by: OPHTHALMOLOGY

## 2023-05-16 PROCEDURE — 1101F PR PT FALLS ASSESS DOC 0-1 FALLS W/OUT INJ PAST YR: ICD-10-PCS | Mod: CPTII,,, | Performed by: OPHTHALMOLOGY

## 2023-05-16 PROCEDURE — 99214 OFFICE O/P EST MOD 30 MIN: CPT | Mod: ,,, | Performed by: OPHTHALMOLOGY

## 2023-05-16 PROCEDURE — 1126F AMNT PAIN NOTED NONE PRSNT: CPT | Mod: CPTII,,, | Performed by: OPHTHALMOLOGY

## 2023-05-16 PROCEDURE — 3288F FALL RISK ASSESSMENT DOCD: CPT | Mod: CPTII,,, | Performed by: OPHTHALMOLOGY

## 2023-05-16 PROCEDURE — 1160F RVW MEDS BY RX/DR IN RCRD: CPT | Mod: CPTII,,, | Performed by: OPHTHALMOLOGY

## 2023-05-16 RX ORDER — DORZOLAMIDE HYDROCHLORIDE AND TIMOLOL MALEATE 20; 5 MG/ML; MG/ML
1 SOLUTION/ DROPS OPHTHALMIC 2 TIMES DAILY
Qty: 30 ML | Refills: 3 | Status: ON HOLD | OUTPATIENT
Start: 2023-05-16 | End: 2023-11-17 | Stop reason: HOSPADM

## 2023-05-16 NOTE — Clinical Note
Trabeculectomy with [facility] - os with possible xen  ?? June 14th ( after he has a bone scan on June 9th )  Phone [phone]

## 2023-05-24 ENCOUNTER — TELEPHONE (OUTPATIENT)
Dept: OPHTHALMOLOGY | Facility: CLINIC | Age: 68
End: 2023-05-24
Payer: MEDICARE

## 2023-05-24 DIAGNOSIS — H40.1122 PRIMARY OPEN-ANGLE GLAUCOMA, LEFT EYE, MODERATE STAGE: Primary | ICD-10-CM

## 2023-06-02 ENCOUNTER — OFFICE VISIT (OUTPATIENT)
Dept: OPHTHALMOLOGY | Facility: CLINIC | Age: 68
End: 2023-06-02
Payer: COMMERCIAL

## 2023-06-02 DIAGNOSIS — H40.1122 PRIMARY OPEN-ANGLE GLAUCOMA, LEFT EYE, MODERATE STAGE: Primary | ICD-10-CM

## 2023-06-02 DIAGNOSIS — H25.13 NUCLEAR SCLEROTIC CATARACT OF BOTH EYES: ICD-10-CM

## 2023-06-02 DIAGNOSIS — E11.9 TYPE 2 DIABETES MELLITUS WITHOUT OPHTHALMIC MANIFESTATIONS: ICD-10-CM

## 2023-06-02 DIAGNOSIS — H52.203 MYOPIA OF BOTH EYES WITH ASTIGMATISM AND PRESBYOPIA: ICD-10-CM

## 2023-06-02 DIAGNOSIS — H53.432 ARCUATE VISUAL FIELD DEFECT OF LEFT EYE: ICD-10-CM

## 2023-06-02 DIAGNOSIS — H52.4 MYOPIA OF BOTH EYES WITH ASTIGMATISM AND PRESBYOPIA: ICD-10-CM

## 2023-06-02 DIAGNOSIS — H40.1111 PRIMARY OPEN-ANGLE GLAUCOMA, RIGHT EYE, MILD STAGE: ICD-10-CM

## 2023-06-02 DIAGNOSIS — H21.562 PUPILLARY SPHINCTER RUPTURE, LEFT: ICD-10-CM

## 2023-06-02 DIAGNOSIS — H33.052 OLD SUBTOTAL RETINAL DETACHMENT, LEFT: ICD-10-CM

## 2023-06-02 DIAGNOSIS — H52.13 MYOPIA OF BOTH EYES WITH ASTIGMATISM AND PRESBYOPIA: ICD-10-CM

## 2023-06-02 DIAGNOSIS — H31.002 CHORIORETINAL SCAR, LEFT: ICD-10-CM

## 2023-06-02 PROCEDURE — 99999 PR PBB SHADOW E&M-EST. PATIENT-LVL III: CPT | Mod: PBBFAC,,, | Performed by: OPHTHALMOLOGY

## 2023-06-02 PROCEDURE — 99499 UNLISTED E&M SERVICE: CPT | Mod: S$GLB,,, | Performed by: OPHTHALMOLOGY

## 2023-06-02 PROCEDURE — 99499 NO LOS: ICD-10-PCS | Mod: S$GLB,,, | Performed by: OPHTHALMOLOGY

## 2023-06-02 PROCEDURE — 99999 PR PBB SHADOW E&M-EST. PATIENT-LVL III: ICD-10-PCS | Mod: PBBFAC,,, | Performed by: OPHTHALMOLOGY

## 2023-06-02 NOTE — PROGRESS NOTES
HPI    Preop Trab with [facility] and Poss Xen gel stent or Mini shunt OS (sx 6/14/23)    1. POAG  2. VF Defect OS  3. Chorioretinal Scar OS  4. Hx RD OS  5. NS OU  6. Type 2 DM no DR  7. Pupil Sphincter Rupture OS  8. Presbyopia    MEDS:  Cosopt TID OU   Brimonidine TID OU  Rocklatan QDAY OU  Last edited by Mayda Sterling MA on 6/2/2023  8:58 AM.            Assessment /Plan     For exam results, see Encounter Report.    Primary open-angle glaucoma, left eye, moderate stage    Primary open-angle glaucoma, right eye, mild stage    Arcuate visual field defect of left eye    Old subtotal retinal detachment, left    Pupillary sphincter rupture, left    Chorioretinal scar, left    Nuclear sclerotic cataract of both eyes    Type 2 diabetes mellitus without ophthalmic manifestations    Myopia of both eyes with astigmatism and presbyopia        [facility]   Presented to [facility] 8/23/2006 - off gtts with a baseline / Tmax of 25/27  Referred by Courtney for consideration of SLT's   Pt with POAG and poorly controlled IOP's - does not use drops regularly       1. Primary open angle glaucoma, both eyes, mild stage        Glaucoma (type and duration)    POAG with elevated IOP ou - mild stage    First HVF   2007 - full od // SAD os 2/2 to CRS   First photos   2011   Treatment / Drops started   2006           Family history    ++ mother and 2 brothers         Glaucoma meds    Latanoprost // brimonidine //  cosopt (out of cosopt - 5/2/2023)         H/O adverse rxn to glaucoma drops    None (has tired alphagan and timolol in past -non compliant)         LASERS    SLT os - 3/31/2016 // SLT od - 4/14/2016 // repeat SLT OS 3/16/2023        GLAUCOMA SURGERIES    none        OTHER EYE SURGERIES    H/O RD repair os - S/P laser repair         CDR    0.75 // 0.85-0.9         Tbase    25/27          Tmax    25/27            Ttarget    18/18             HVF    13 test 2006 to  2022  - full  od // SAD - 2/2 CRS from laser for RD repair os / new IAD         Gonio    +3 ou          CCT    486/502 - thin ou         OCT    8 test 2011 to 2022 - RNFL - dec T od ( fluctuate) od  - dec. G/N/TI/TS/ N  Bord T/NI  (has a CRS)  os        HRT    3 test 2017 to 2019 -MR -  DEC. Sn/n/in od // DEC. S/n/i, BORD t os /// CDR 0.73 od // 0.86 os        Disc photos    2011, 2015 , 2017// harmony - 2022      - Ttoday   13/30   (down from 21 od /still high os at 29 - unchanged  )     - Test done today  - IOP check - back on all gtts //  pre-op trab os      2. 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 Chorioretinal scar, left   -with 2nd VF defect      4. Old subtotal retinal detachment, left   S/P repair - retina flat - stable   -large CRS   - pt states he did NOT go to OR - just Rx with laser at the slit lamp     5. Type 2 diabetes mellitus without ophthalmic manifestations      6. Senile nuclear sclerosis  -mild - monitor      7. pupil sphincter rupture os  -suggest old trauma  -does NOT appear to have a angle recession    7. Presbyopia       PLAN    POAG with OHT OS > OD - mild od // moderate os   IOP below target od and too high os   S/P repeat slt os 3/16/2023 - ? Residual steroid response vs out of cosopt vs no response     The ON and VF's are still good ou   ?? Trace APD os - first noted on 5/2/2023   The VF loss os is 2/2 old CRS- ?  post laser for a RD repair   Good initial response to SLT ou 25/25 --> 16/17 7/29/2022  ++ IOP elevation os   ++ VF prog os   ++ OCT - RNFL prog os     Cont drops   cosopt tid ou - has run out - sample given and trixie called   Brimonidine tid ou - Rx sent   rocklatan q hs ou (samples given )      02/20/2023  - IOP check - IOP 13//19  - On max drops    - good mobile conj, open on gonio - good xen candidate   - Should consider surgery as progressing on last testing with elevated IOP at 19 on  max drops. Book vs repeat studies in 6-8 weeks   - can try repeat slt os and if ineffective - can go ahead with incisional surgery     Photo file updated 5/2/2023 6/2/2023  IOP still too high os (( IOP is ok od))    S/p repeat slt and on mult gtts     --  rec incisional surgery - doubt he has a scleral buckle  // + free conj sup - could try a trab w/ [facility]  / ? Xen os / express minishunt or no shunt or a GDD     Pt is NOT a diamox candidate - H/O sulfa allergy - hives     Rec trabeculectomy with [facility] os - with ? Xen  or express or no stent    Pt is still phakic / H/O trauma     Risk discussed including but not limited to risk of decrease or even loss of vision   Pt is aware that he will need frequent follow ups post surgery   Risks and benefits discussed and consent signed.    F/U 1 day post op     Bethany Lechuga MD

## 2023-06-06 RX ORDER — ZOSTER VACCINE RECOMBINANT, ADJUVANTED 50 MCG/0.5
0.5 KIT INTRAMUSCULAR ONCE
Qty: 1 EACH | Refills: 0 | Status: CANCELLED | OUTPATIENT
Start: 2023-06-06 | End: 2023-06-06

## 2023-06-06 RX ORDER — MOXIFLOXACIN 5 MG/ML
1 SOLUTION/ DROPS OPHTHALMIC
Status: CANCELLED | OUTPATIENT
Start: 2023-06-06

## 2023-06-06 RX ORDER — PROPARACAINE HYDROCHLORIDE 5 MG/ML
1 SOLUTION/ DROPS OPHTHALMIC
Status: CANCELLED | OUTPATIENT
Start: 2023-06-06

## 2023-06-06 RX ORDER — SODIUM CHLORIDE 0.9 % (FLUSH) 0.9 %
10 SYRINGE (ML) INJECTION
Status: DISCONTINUED | OUTPATIENT
Start: 2023-06-06 | End: 2023-06-14 | Stop reason: HOSPADM

## 2023-06-06 NOTE — H&P (VIEW-ONLY)
Subjective     Patient ID: Eyad Garcia is a 67 y.o. male.    Chief Complaint: Pre-op Exam    HPI  Review of Systems   Constitutional: Negative.    HENT: Negative.     Eyes:  Positive for visual disturbance.   Respiratory: Negative.     Cardiovascular: Negative.    Gastrointestinal: Negative.    Endocrine: Negative.    Genitourinary: Negative.         Objective     Physical Exam  HENT:      Head: Normocephalic and atraumatic.   Cardiovascular:      Rate and Rhythm: Normal rate.   Pulmonary:      Effort: Pulmonary effort is normal.   Skin:     General: Skin is warm and dry.   Neurological:      Mental Status: He is alert and oriented to person, place, and time.          Assessment and Plan     1. Primary open-angle glaucoma, left eye, moderate stage    2. Primary open-angle glaucoma, right eye, mild stage    3. Arcuate visual field defect of left eye    4. Old subtotal retinal detachment, left    5. Pupillary sphincter rupture, left    6. Chorioretinal scar, left    7. Nuclear sclerotic cataract of both eyes    8. Type 2 diabetes mellitus without ophthalmic manifestations    9. Myopia of both eyes with astigmatism and presbyopia        Rec trabeculectomy with [facility] OS +/- some form of stent         No follow-ups on file.

## 2023-06-09 ENCOUNTER — HOSPITAL ENCOUNTER (OUTPATIENT)
Dept: RADIOLOGY | Facility: CLINIC | Age: 68
Discharge: HOME | End: 2023-06-09
Attending: RADIOLOGY
Payer: COMMERCIAL

## 2023-06-09 DIAGNOSIS — C61 MALIGNANT NEOPLASM OF PROSTATE: ICD-10-CM

## 2023-06-09 PROCEDURE — 77080 DXA BONE DENSITY AXIAL SKELETON 1 OR MORE SITES: ICD-10-PCS | Mod: 26,,, | Performed by: INTERNAL MEDICINE

## 2023-06-09 PROCEDURE — 77080 DXA BONE DENSITY AXIAL: CPT | Mod: 26,,, | Performed by: INTERNAL MEDICINE

## 2023-06-09 PROCEDURE — 77080 DXA BONE DENSITY AXIAL: CPT | Mod: TC

## 2023-06-13 ENCOUNTER — TELEPHONE (OUTPATIENT)
Dept: OPHTHALMOLOGY | Facility: CLINIC | Age: 68
End: 2023-06-13
Payer: MEDICARE

## 2023-06-14 ENCOUNTER — ANESTHESIA (OUTPATIENT)
Dept: SURGERY | Facility: HOSPITAL | Age: 68
End: 2023-06-14
Payer: MEDICARE

## 2023-06-14 ENCOUNTER — ANESTHESIA EVENT (OUTPATIENT)
Dept: SURGERY | Facility: HOSPITAL | Age: 68
End: 2023-06-14
Payer: MEDICARE

## 2023-06-14 ENCOUNTER — HOSPITAL ENCOUNTER (OUTPATIENT)
Facility: HOSPITAL | Age: 68
Discharge: HOME | End: 2023-06-14
Attending: OPHTHALMOLOGY | Admitting: OPHTHALMOLOGY
Payer: MEDICARE

## 2023-06-14 VITALS
SYSTOLIC BLOOD PRESSURE: 148 MMHG | DIASTOLIC BLOOD PRESSURE: 98 MMHG | TEMPERATURE: 98 F | WEIGHT: 158 LBS | HEART RATE: 65 BPM | BODY MASS INDEX: 25.5 KG/M2 | OXYGEN SATURATION: 100 % | RESPIRATION RATE: 16 BRPM

## 2023-06-14 DIAGNOSIS — H40.1122 PRIMARY OPEN-ANGLE GLAUCOMA, LEFT EYE, MODERATE STAGE: Primary | ICD-10-CM

## 2023-06-14 LAB — POCT GLUCOSE: 141 MG/DL (ref 70–110)

## 2023-06-14 PROCEDURE — 63600175 PHARM REV CODE 636 W HCPCS: Performed by: NURSE ANESTHETIST, CERTIFIED REGISTERED

## 2023-06-14 PROCEDURE — 66170 PR GLAUCOMA SURG,TRABECU AB EXTERNO: ICD-10-PCS | Mod: LT,,, | Performed by: OPHTHALMOLOGY

## 2023-06-14 PROCEDURE — 66170 GLAUCOMA SURGERY: CPT | Mod: LT,,, | Performed by: OPHTHALMOLOGY

## 2023-06-14 PROCEDURE — 36000707: Performed by: OPHTHALMOLOGY

## 2023-06-14 PROCEDURE — 25000003 PHARM REV CODE 250: Performed by: OPHTHALMOLOGY

## 2023-06-14 PROCEDURE — D9220A PRA ANESTHESIA: ICD-10-PCS | Mod: CRNA,,, | Performed by: NURSE ANESTHETIST, CERTIFIED REGISTERED

## 2023-06-14 PROCEDURE — D9220A PRA ANESTHESIA: ICD-10-PCS | Mod: ANES,,, | Performed by: ANESTHESIOLOGY

## 2023-06-14 PROCEDURE — D9220A PRA ANESTHESIA: Mod: ANES,,, | Performed by: ANESTHESIOLOGY

## 2023-06-14 PROCEDURE — 71000015 HC POSTOP RECOV 1ST HR: Performed by: OPHTHALMOLOGY

## 2023-06-14 PROCEDURE — 25000003 PHARM REV CODE 250

## 2023-06-14 PROCEDURE — 37000008 HC ANESTHESIA 1ST 15 MINUTES: Performed by: OPHTHALMOLOGY

## 2023-06-14 PROCEDURE — 25000003 PHARM REV CODE 250: Performed by: NURSE ANESTHETIST, CERTIFIED REGISTERED

## 2023-06-14 PROCEDURE — 71000044 HC DOSC ROUTINE RECOVERY FIRST HOUR: Performed by: OPHTHALMOLOGY

## 2023-06-14 PROCEDURE — 37000009 HC ANESTHESIA EA ADD 15 MINS: Performed by: OPHTHALMOLOGY

## 2023-06-14 PROCEDURE — 82962 GLUCOSE BLOOD TEST: CPT | Performed by: OPHTHALMOLOGY

## 2023-06-14 PROCEDURE — D9220A PRA ANESTHESIA: Mod: CRNA,,, | Performed by: NURSE ANESTHETIST, CERTIFIED REGISTERED

## 2023-06-14 PROCEDURE — 36000706: Performed by: OPHTHALMOLOGY

## 2023-06-14 RX ORDER — MIDAZOLAM HYDROCHLORIDE 1 MG/ML
INJECTION, SOLUTION INTRAMUSCULAR; INTRAVENOUS
Status: DISCONTINUED | OUTPATIENT
Start: 2023-06-14 | End: 2023-06-14

## 2023-06-14 RX ORDER — LIDOCAINE HYDROCHLORIDE 40 MG/ML
INJECTION, SOLUTION RETROBULBAR
Status: DISCONTINUED
Start: 2023-06-14 | End: 2023-06-14 | Stop reason: HOSPADM

## 2023-06-14 RX ORDER — MOXIFLOXACIN 5 MG/ML
SOLUTION/ DROPS OPHTHALMIC
Status: DISCONTINUED
Start: 2023-06-14 | End: 2023-06-14 | Stop reason: HOSPADM

## 2023-06-14 RX ORDER — LIDOCAINE HYDROCHLORIDE 10 MG/ML
INJECTION, SOLUTION EPIDURAL; INFILTRATION; INTRACAUDAL; PERINEURAL
Status: DISCONTINUED
Start: 2023-06-14 | End: 2023-06-14 | Stop reason: HOSPADM

## 2023-06-14 RX ORDER — MOXIFLOXACIN 5 MG/ML
1 SOLUTION/ DROPS OPHTHALMIC
Status: DISCONTINUED | OUTPATIENT
Start: 2023-06-14 | End: 2023-06-14 | Stop reason: HOSPADM

## 2023-06-14 RX ORDER — ATROPINE SULFATE 10 MG/ML
SOLUTION/ DROPS OPHTHALMIC
Status: DISCONTINUED
Start: 2023-06-14 | End: 2023-06-14 | Stop reason: HOSPADM

## 2023-06-14 RX ORDER — LIDOCAINE HYDROCHLORIDE 10 MG/ML
INJECTION, SOLUTION EPIDURAL; INFILTRATION; INTRACAUDAL; PERINEURAL
Status: DISCONTINUED | OUTPATIENT
Start: 2023-06-14 | End: 2023-06-14 | Stop reason: HOSPADM

## 2023-06-14 RX ORDER — DEXAMETHASONE SODIUM PHOSPHATE 4 MG/ML
INJECTION, SOLUTION INTRA-ARTICULAR; INTRALESIONAL; INTRAMUSCULAR; INTRAVENOUS; SOFT TISSUE
Status: DISCONTINUED
Start: 2023-06-14 | End: 2023-06-14 | Stop reason: HOSPADM

## 2023-06-14 RX ORDER — FENTANYL CITRATE 50 UG/ML
INJECTION, SOLUTION INTRAMUSCULAR; INTRAVENOUS
Status: DISCONTINUED | OUTPATIENT
Start: 2023-06-14 | End: 2023-06-14

## 2023-06-14 RX ORDER — SODIUM CHLORIDE 0.9 % (FLUSH) 0.9 %
10 SYRINGE (ML) INJECTION
Status: DISCONTINUED | OUTPATIENT
Start: 2023-06-14 | End: 2023-06-14 | Stop reason: HOSPADM

## 2023-06-14 RX ORDER — LIDOCAINE HYDROCHLORIDE 40 MG/ML
INJECTION, SOLUTION RETROBULBAR
Status: DISCONTINUED | OUTPATIENT
Start: 2023-06-14 | End: 2023-06-14 | Stop reason: HOSPADM

## 2023-06-14 RX ORDER — ACETAMINOPHEN 325 MG/1
650 TABLET ORAL EVERY 6 HOURS PRN
Status: DISCONTINUED | OUTPATIENT
Start: 2023-06-14 | End: 2023-06-14 | Stop reason: HOSPADM

## 2023-06-14 RX ORDER — PREDNISOLONE ACETATE 10 MG/ML
SUSPENSION/ DROPS OPHTHALMIC
Status: DISCONTINUED
Start: 2023-06-14 | End: 2023-06-14 | Stop reason: HOSPADM

## 2023-06-14 RX ORDER — PROPARACAINE HYDROCHLORIDE 5 MG/ML
1 SOLUTION/ DROPS OPHTHALMIC
Status: DISCONTINUED | OUTPATIENT
Start: 2023-06-14 | End: 2023-06-14 | Stop reason: HOSPADM

## 2023-06-14 RX ADMIN — PROPARACAINE HYDROCHLORIDE 1 DROP: 5 SOLUTION/ DROPS OPHTHALMIC at 11:06

## 2023-06-14 RX ADMIN — MOXIFLOXACIN OPHTHALMIC 1 DROP: 5 SOLUTION/ DROPS OPHTHALMIC at 11:06

## 2023-06-14 RX ADMIN — MIDAZOLAM HYDROCHLORIDE 1 MG: 1 INJECTION, SOLUTION INTRAMUSCULAR; INTRAVENOUS at 12:06

## 2023-06-14 RX ADMIN — SODIUM CHLORIDE: 0.9 INJECTION, SOLUTION INTRAVENOUS at 12:06

## 2023-06-14 RX ADMIN — MIDAZOLAM HYDROCHLORIDE 1 MG: 1 INJECTION, SOLUTION INTRAMUSCULAR; INTRAVENOUS at 01:06

## 2023-06-14 RX ADMIN — FENTANYL CITRATE 25 MCG: 50 INJECTION, SOLUTION INTRAMUSCULAR; INTRAVENOUS at 01:06

## 2023-06-14 RX ADMIN — MOXIFLOXACIN OPHTHALMIC 1 DROP: 5 SOLUTION/ DROPS OPHTHALMIC at 10:06

## 2023-06-14 NOTE — PROGRESS NOTES
Discharge instructions reviewed w/pt and wife, verbalized understanding. Pt in NADN.No complaints at this time. Tolerated liquids w/ no issues. To be d/c'd home w/ wife.

## 2023-06-14 NOTE — INTERVAL H&P NOTE
H&P completed on 6/2/2023 has been reviewed, the patient examined and I concur with the findings and plan.

## 2023-06-14 NOTE — OP NOTE
DATE OF PROCEDURE: 06/14/2023    PREOPERATIVE DIAGNOSIS: Uncontrolled glaucoma of the OS Primary open-angle glaucoma, left eye, moderate stage    POSTOPERATIVE DIAGNOSIS: Uncontrolled glaucoma of the OS, status post   procedure. Primary open-angle glaucoma, left eye, moderate stage    PROCEDURE PERFORMED: Trabeculectomy of the OS with mitomycin C.     ATTENDING SURGEON: Bethany Lechuga M.D.     ASSISTANT:  none      ANESTHESIA:  Local MAC    COMPLICATIONS: None.     BLOOD LOSS: Less than 5 mL     PROCEDURE IN DETAIL: After informed consent including the risks, benefits and   alternatives, the patient was brought to the Operating Room table. Monitored   anesthesia care was used throughout the entire case without any complications.   Once adequate anesthesia was achieved, the OS eye was then prepped and draped   in the usual sterile fashion for intraocular surgery. A wire speculum was used   to hold the eyelids apart, and the procedure was initiated by passing a   6-0 Vicryl suture, partial thickness, through superior cornea for inferior   traction. Next, lidocaine on a 30-gauge cannula was placed subconjunctivally   superiorly for blunt dissection. Vanbecca and Westcotts were then used to make a   peritomy from 1 to 11 o'clock on the eye with blunt dissection in the superior   quadrant. Next, wet-field cautery was used in the underlying scleral bed, and a   partial thickness scleral flap was performed superior conj. Then, 0.25 mg/mL   of mitomycin was placed on pledgets and placed under the superior conj for 2  minutes. Copious BSS irrigation was performed afterwards. Next, the scleral   flap was extended barely into clear cornea, and a supersharp blade was then used   to enter in to the anterior chamber and the angle, followed by a Sharon punch to   perform the sclerotomy. Next, a surgical iridectomy was performed superiorly   with Vannas scissors followed by a paracentesis being performed temporally.    Next, the scleral flap was secured with 4 10-0 nylon sutures. BSS was placed   in the anterior chamber, which maintained good depth throughout the entire case   and tested for adequate flow. Once appropriate flow was noted, the overlying   conjunctiva was closed with 10-0 and 8-0 Vicryl sutures. Additional BSS was   placed in the anterior chamber, and the eye was noted to have good physiological   pressure with positive bleb formation and no apparent leaks. At this time, the   wire speculum was removed under the microscope. Atropine drops were placed on   the eye, and the eye was covered with a collagen shield soaked in Pred Forte and   Vigamox. The traction suture was removed and the eye was covered with a patch   and shield and turned over to Anesthesia in stable condition.

## 2023-06-14 NOTE — TRANSFER OF CARE
Anesthesia Transfer of Care Note    Patient: Eyad Garcia    Procedure(s) Performed: Procedure(s) (LRB):  TRABECULECTOMY (Left)    Patient location: PACU    Anesthesia Type: MAC    Transport from OR: Transported from OR on room air with adequate spontaneous ventilation    Post pain: adequate analgesia    Post assessment: no apparent anesthetic complications and tolerated procedure well    Post vital signs: stable    Level of consciousness: awake    Nausea/Vomiting: no nausea/vomiting    Complications: none    Transfer of care protocol was followed      Last vitals:   Visit Vitals  BP (!) 150/77   Pulse 65   Temp 36.7 °C (98.1 °F) (Skin)   Resp 16   Wt 71.7 kg (158 lb)   SpO2 100%   BMI 25.50 kg/m²

## 2023-06-14 NOTE — DISCHARGE SUMMARY
[facility] - [facility] (1st Fl)  Discharge Note  Short Stay    Procedure(s) (LRB):  TRABECULECTOMY (Left)      OUTCOME: Patient tolerated treatment/procedure well without complication and is now ready for discharge.    DISPOSITION: Home or Self Care    FINAL DIAGNOSIS:  Primary open-angle glaucoma, left eye, moderate stage    FOLLOWUP: In clinic    DISCHARGE INSTRUCTIONS:    Discharge Procedure Orders   Leave dressing on - Keep it clean, dry, and intact until clinic visit        TIME SPENT ON DISCHARGE: 10 minutes

## 2023-06-14 NOTE — ANESTHESIA POSTPROCEDURE EVALUATION
Anesthesia Post Evaluation    Patient: Eyad Garcia    Procedure(s) Performed: Procedure(s) (LRB):  TRABECULECTOMY (Left)    Final Anesthesia Type: MAC      Patient location during evaluation: PACU  Patient participation: Yes- Able to Participate  Level of consciousness: awake and alert  Post-procedure vital signs: reviewed and stable  Pain management: adequate  Airway patency: patent    PONV status at discharge: No PONV  Anesthetic complications: no      Cardiovascular status: blood pressure returned to baseline  Respiratory status: unassisted  Hydration status: euvolemic  Follow-up not needed.          Vitals Value Taken Time   /98 06/14/23 1424   Temp 36.5 °C (97.7 °F) 06/14/23 1424   Pulse 65 06/14/23 1424   Resp 16 06/14/23 1424   SpO2 100 % 06/14/23 1424         No case tracking events are documented in the log.      Pain/Omid Score: Omid Score: 9 (6/14/2023  2:02 PM)

## 2023-06-14 NOTE — ANESTHESIA PREPROCEDURE EVALUATION
06/14/2023    Eyad Garcia is a 67 y.o. male     Patient Active Problem List   Diagnosis    Hyperlipidemia associated with type 2 diabetes mellitus    Old retinal detachment, total or subtotal    POAG (primary open-angle glaucoma) - Both Eyes    Hypertension associated with diabetes    OA (osteoarthritis)    Hip pain    Malignant neoplasm of prostate    Erectile dysfunction after radical prostatectomy    Adenomatous polyp of colon    Type 2 diabetes mellitus without complication, without long-term current use of insulin    Erectile dysfunction    Iron deficiency anemia secondary to inadequate dietary iron intake    Gastroesophageal reflux disease without esophagitis    Bronchitis    Aortic atherosclerosis    Statin intolerance       Review of patient's allergies indicates:   Allergen Reactions    Sulfa (sulfonamide antibiotics) Hives and Rash           Aspirin      Stomach upset       No current facility-administered medications on file prior to encounter.     Current Outpatient Medications on File Prior to Encounter   Medication Sig Dispense Refill    albuterol (VENTOLIN HFA) 90 mcg/actuation inhaler Inhale 2 puffs into the lungs every 6 (six) hours as needed for Wheezing or Shortness of Breath (cough). Rescue 18 g 11    alcohol swabs (ALCOHOL WIPES) PadM Monitor as directed X 2 daily. Per insurance coverage. ICD 10 E11.9 200 each 3    amLODIPine (NORVASC) 10 MG tablet Take 1 tablet (10 mg total) by mouth once daily. 90 tablet 0    brimonidine 0.2% (ALPHAGAN) 0.2 % Drop Place 1 drop into both eyes 3 (three) times daily. 30 mL 3    dapagliflozin (FARXIGA) 5 mg Tab tablet Take 1 tablet (5 mg total) by mouth once daily. 90 tablet 3    dorzolamide-timolol 2-0.5% (COSOPT) 22.3-6.8 mg/mL ophthalmic solution Place 1 drop into both eyes 2 (two) times daily. (Patient taking differently:  Place 1 drop into both eyes 3 (three) times daily.) 30 mL 3    ezetimibe (ZETIA) 10 mg tablet Take 1 tablet (10 mg total) by mouth every evening. 90 tablet 3    fluticasone propionate (FLONASE) 50 mcg/actuation nasal spray 2 sprays (100 mcg total) by Each Nostril route 2 (two) times daily as needed for Rhinitis. 16 g 0    FREESTYLE ROME 2 SENSOR Kit Use as directed. Change every 14 (fourteen) days. 2 kit 5    losartan (COZAAR) 100 MG tablet Take 1 tablet (100 mg total) by mouth once daily. 90 tablet 0    meloxicam (MOBIC) 15 MG tablet Take 1 tablet (15 mg total) by mouth once daily. 90 tablet 0    metFORMIN (GLUCOPHAGE-XR) 500 MG ER 24hr tablet Take 2 tablets (1,000 mg total) by mouth once daily. 180 tablet 3    netarsudiL-latanoprost (ROCKLATAN) 0.02-0.005 % Drop Place 1 drop into both eyes every evening. Patient was not controlled on latanoprost - now needs to use rocklatan. Please run script through - it is now a covered medication if they have failed latanoprost 7.5 mL 3    pravastatin (PRAVACHOL) 40 MG tablet Take 1 tablet (40 mg total) by mouth once daily. 90 tablet 3    triamcinolone acetonide 0.025% (KENALOG) 0.025 % cream Apply topically 2 (two) times daily. 80 g 1    [DISCONTINUED] bicalutamide (CASODEX) 50 MG Tab Take 1 tablet (50 mg total) by mouth once daily. (Patient not taking: No sig reported) 30 tablet 1    [DISCONTINUED] TRUE METRIX GLUCOSE METER [facility]          Past Surgical History:   Procedure Laterality Date    COLONOSCOPY N/A 01/25/2019    Procedure: COLONOSCOPY;  Surgeon: Elder Guillermo MD;  Location: [facility] ([address] [place] [address]);  Service: Endoscopy;  Laterality: N/A;    CYSTOSCOPY      HERNIA REPAIR      KNEE SURGERY      left    PENILE PROSTHESIS IMPLANT      PROSTATECTOMY      RETINAL DETACHMENT SURGERY      OS    SELECTIVE LASER TRABECULOPLASTY Left 03/16/2023        SELECTIVE LASER TRABECUPLASTY Bilateral 04/2016    OU WITH        Social History      Socioeconomic History    Marital status:     Number of children: 2   Occupational History     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    I have reviewed the Nursing Notes.    I have reviewed the Medications.     Review of Systems  Anesthesia Hx:  Hx of difficult intubation; video laryngoscopy used with previous anesthetic: Grade 1 view   Cardiovascular:   Hypertension    Pulmonary:  Pulmonary Normal    Renal/:  Renal/ Normal     Hepatic/GI:  Hepatic/GI Normal    Musculoskeletal:   Arthritis     Endocrine:   Diabetes        Physical Exam  General:  Well nourished      Airway/Jaw/Neck:  Airway Findings: Mouth Opening: Normal   Tongue: Normal   General Airway Assessment: Adult Mallampati: II  TM Distance: Normal, at least 6 cm        Chest/Lungs:  Chest/Lungs Findings: Clear to auscultation, Normal Respiratory Rate      Heart/Vascular:  Heart Findings: Rate: Normal  Rhythm: Regular Rhythm             Anesthesia Plan  Type of Anesthesia, risks & benefits discussed:  Anesthesia Type:  general, MAC    Patient's Preference:   Plan Factors:          Intra-op Monitoring Plan: standard ASA monitors  Intra-op Monitoring Plan Comments:   Post Op Pain Control Plan: multimodal analgesia and IV/PO Opioids PRN  Post Op Pain Control Plan Comments:     Induction:   IV  Beta Blocker:         Informed Consent: Informed consent signed with the Patient and all parties understand the risks and agree with anesthesia plan.  All questions answered.  Anesthesia consent signed with patient.  ASA Score: 2     Day of Surgery Review of History & Physical:              Ready For Surgery From Anesthesia  Perspective.           Physical Exam  General: Well nourished    Airway:  Mallampati: II   Mouth Opening: Normal  TM Distance: Normal, at least 6 cm  Tongue: Normal    Chest/Lungs:  Clear to auscultation, Normal Respiratory Rate    Heart:  Rate: Normal  Rhythm: Regular Rhythm          Anesthesia Plan  Type of Anesthesia, risks & benefits discussed:    Anesthesia Type: general, MAC  Intra-op Monitoring Plan: standard ASA monitors  Post Op Pain Control Plan: multimodal analgesia and IV/PO Opioids PRN  Induction:  IV  Informed Consent: Informed consent signed with the Patient and all parties understand the risks and agree with anesthesia plan.  All questions answered.   ASA Score: 2    Ready For Surgery From Anesthesia Perspective.       .

## 2023-06-15 ENCOUNTER — OFFICE VISIT (OUTPATIENT)
Dept: OPHTHALMOLOGY | Facility: CLINIC | Age: 68
End: 2023-06-15
Payer: COMMERCIAL

## 2023-06-15 VITALS — DIASTOLIC BLOOD PRESSURE: 93 MMHG | HEART RATE: 68 BPM | SYSTOLIC BLOOD PRESSURE: 170 MMHG

## 2023-06-15 DIAGNOSIS — Z48.89 ENCOUNTER FOR POSTOPERATIVE CARE: Primary | ICD-10-CM

## 2023-06-15 DIAGNOSIS — H31.002 CHORIORETINAL SCAR, LEFT: ICD-10-CM

## 2023-06-15 DIAGNOSIS — H25.13 NUCLEAR SCLEROTIC CATARACT OF BOTH EYES: ICD-10-CM

## 2023-06-15 DIAGNOSIS — H40.1111 PRIMARY OPEN-ANGLE GLAUCOMA, RIGHT EYE, MILD STAGE: ICD-10-CM

## 2023-06-15 DIAGNOSIS — H40.1122 PRIMARY OPEN-ANGLE GLAUCOMA, LEFT EYE, MODERATE STAGE: ICD-10-CM

## 2023-06-15 DIAGNOSIS — H52.4 MYOPIA OF BOTH EYES WITH ASTIGMATISM AND PRESBYOPIA: ICD-10-CM

## 2023-06-15 DIAGNOSIS — H21.562 PUPILLARY SPHINCTER RUPTURE, LEFT: ICD-10-CM

## 2023-06-15 DIAGNOSIS — H52.13 MYOPIA OF BOTH EYES WITH ASTIGMATISM AND PRESBYOPIA: ICD-10-CM

## 2023-06-15 DIAGNOSIS — E11.9 TYPE 2 DIABETES MELLITUS WITHOUT OPHTHALMIC MANIFESTATIONS: ICD-10-CM

## 2023-06-15 DIAGNOSIS — H53.432 ARCUATE VISUAL FIELD DEFECT OF LEFT EYE: ICD-10-CM

## 2023-06-15 DIAGNOSIS — H33.052 OLD SUBTOTAL RETINAL DETACHMENT, LEFT: ICD-10-CM

## 2023-06-15 DIAGNOSIS — H52.203 MYOPIA OF BOTH EYES WITH ASTIGMATISM AND PRESBYOPIA: ICD-10-CM

## 2023-06-15 PROCEDURE — 99999 PR PBB SHADOW E&M-EST. PATIENT-LVL IV: ICD-10-PCS | Mod: PBBFAC,,, | Performed by: OPHTHALMOLOGY

## 2023-06-15 PROCEDURE — 1160F RVW MEDS BY RX/DR IN RCRD: CPT | Mod: CPTII,S$GLB,, | Performed by: OPHTHALMOLOGY

## 2023-06-15 PROCEDURE — 99499 UNLISTED E&M SERVICE: CPT | Mod: S$GLB,,, | Performed by: OPHTHALMOLOGY

## 2023-06-15 PROCEDURE — 3051F PR MOST RECENT HEMOGLOBIN A1C LEVEL 7.0 - < 8.0%: ICD-10-PCS | Mod: CPTII,S$GLB,, | Performed by: OPHTHALMOLOGY

## 2023-06-15 PROCEDURE — 99024 POSTOP FOLLOW-UP VISIT: CPT | Mod: S$GLB,,, | Performed by: OPHTHALMOLOGY

## 2023-06-15 PROCEDURE — 3080F PR MOST RECENT DIASTOLIC BLOOD PRESSURE >= 90 MM HG: ICD-10-PCS | Mod: CPTII,S$GLB,, | Performed by: OPHTHALMOLOGY

## 2023-06-15 PROCEDURE — 3051F HG A1C>EQUAL 7.0%<8.0%: CPT | Mod: CPTII,S$GLB,, | Performed by: OPHTHALMOLOGY

## 2023-06-15 PROCEDURE — 3077F PR MOST RECENT SYSTOLIC BLOOD PRESSURE >= 140 MM HG: ICD-10-PCS | Mod: CPTII,S$GLB,, | Performed by: OPHTHALMOLOGY

## 2023-06-15 PROCEDURE — 1101F PT FALLS ASSESS-DOCD LE1/YR: CPT | Mod: CPTII,S$GLB,, | Performed by: OPHTHALMOLOGY

## 2023-06-15 PROCEDURE — 3288F FALL RISK ASSESSMENT DOCD: CPT | Mod: CPTII,S$GLB,, | Performed by: OPHTHALMOLOGY

## 2023-06-15 PROCEDURE — 1126F PR PAIN SEVERITY QUANTIFIED, NO PAIN PRESENT: ICD-10-PCS | Mod: CPTII,S$GLB,, | Performed by: OPHTHALMOLOGY

## 2023-06-15 PROCEDURE — 3288F PR FALLS RISK ASSESSMENT DOCUMENTED: ICD-10-PCS | Mod: CPTII,S$GLB,, | Performed by: OPHTHALMOLOGY

## 2023-06-15 PROCEDURE — 99499 RISK ADDL DX/OHS AUDIT: ICD-10-PCS | Mod: S$GLB,,, | Performed by: OPHTHALMOLOGY

## 2023-06-15 PROCEDURE — 1159F MED LIST DOCD IN RCRD: CPT | Mod: CPTII,S$GLB,, | Performed by: OPHTHALMOLOGY

## 2023-06-15 PROCEDURE — 99999 PR PBB SHADOW E&M-EST. PATIENT-LVL IV: CPT | Mod: PBBFAC,,, | Performed by: OPHTHALMOLOGY

## 2023-06-15 PROCEDURE — 3077F SYST BP >= 140 MM HG: CPT | Mod: CPTII,S$GLB,, | Performed by: OPHTHALMOLOGY

## 2023-06-15 PROCEDURE — 1101F PR PT FALLS ASSESS DOC 0-1 FALLS W/OUT INJ PAST YR: ICD-10-PCS | Mod: CPTII,S$GLB,, | Performed by: OPHTHALMOLOGY

## 2023-06-15 PROCEDURE — 1159F PR MEDICATION LIST DOCUMENTED IN MEDICAL RECORD: ICD-10-PCS | Mod: CPTII,S$GLB,, | Performed by: OPHTHALMOLOGY

## 2023-06-15 PROCEDURE — 99024 PR POST-OP FOLLOW-UP VISIT: ICD-10-PCS | Mod: S$GLB,,, | Performed by: OPHTHALMOLOGY

## 2023-06-15 PROCEDURE — 1160F PR REVIEW ALL MEDS BY PRESCRIBER/CLIN PHARMACIST DOCUMENTED: ICD-10-PCS | Mod: CPTII,S$GLB,, | Performed by: OPHTHALMOLOGY

## 2023-06-15 PROCEDURE — 1126F AMNT PAIN NOTED NONE PRSNT: CPT | Mod: CPTII,S$GLB,, | Performed by: OPHTHALMOLOGY

## 2023-06-15 PROCEDURE — 3080F DIAST BP >= 90 MM HG: CPT | Mod: CPTII,S$GLB,, | Performed by: OPHTHALMOLOGY

## 2023-06-15 RX ORDER — ZOSTER VACCINE RECOMBINANT, ADJUVANTED 50 MCG/0.5
0.5 KIT INTRAMUSCULAR ONCE
Qty: 1 EACH | Refills: 0 | Status: CANCELLED | OUTPATIENT
Start: 2023-06-06 | End: 2023-06-06

## 2023-06-15 RX ORDER — PREDNISOLONE ACETATE 10 MG/ML
1 SUSPENSION/ DROPS OPHTHALMIC 2 TIMES DAILY
COMMUNITY
Start: 2023-06-15 | End: 2023-08-03 | Stop reason: SDUPTHER

## 2023-06-15 RX ORDER — MOXIFLOXACIN 5 MG/ML
1 SOLUTION/ DROPS OPHTHALMIC 4 TIMES DAILY
COMMUNITY
Start: 2023-06-15 | End: 2023-06-24

## 2023-06-15 NOTE — PROGRESS NOTES
HPI    DLS: 6/02/2023    Pt here for 1 day post traditional trab with [facility]- (no stent) - OS  -   6/14/2023  Pt states yesterday he had some pain to OS but this AM feels like a   headache over his OS.     Meds;  Cosopt TID OU   Brimonidine TID OU   Rocklatan QDAY OU    1. POAG   2. VF Defect OS   3. Chorioretinal Scar OS   4. Hx RD OS   5. NS OU   6. Type 2 DM no DR   7. Pupil Sphincter Rupture OS   8. Presbyopia       Last edited by Bethany Lechuga MD on 6/15/2023  9:10 AM.            Assessment /Plan     For exam results, see Encounter Report.    Encounter for postoperative care    Primary open-angle glaucoma, left eye, moderate stage    Primary open-angle glaucoma, right eye, mild stage    Arcuate visual field defect of left eye    Old subtotal retinal detachment, left    Pupillary sphincter rupture, left    Chorioretinal scar, left    Nuclear sclerotic cataract of both eyes    Type 2 diabetes mellitus without ophthalmic manifestations    Myopia of both eyes with astigmatism and presbyopia        Pt initially dx with glaucoma at [facility] - stoppped his gtts on his own   Presented to [facility] - susana Valdez 8/23/2006 - off gtts with a baseline / Tmax of 25/27  Referred by Courtney for consideration of SLT's   Pt with POAG and poorly controlled IOP's - does not use drops regularly       1. Primary open angle glaucoma, both eyes, mild stage        Glaucoma (type and duration)    POAG with elevated IOP ou - mild stage    First HVF   2007 - full od // SAD os 2/2 to CRS   First photos   2011   Treatment / Drops started   2006           Family history    ++ mother and 2 brothers         Glaucoma meds    Latanoprost // brimonidine //  cosopt (out of cosopt - 5/2/2023)         H/O adverse rxn to glaucoma drops    None (has tired alphagan and timolol in past -non compliant)         LASERS    SLT os - 3/31/2016 // SLT od - 4/14/2016 // repeat SLT OS 3/16/2023        GLAUCOMA SURGERIES    none        OTHER EYE SURGERIES    H/O  RD repair os - S/P laser repair         CDR    0.75 // 0.85-0.9         Tbase    25/27          Tmax    25/27            Ttarget    18/18             HVF    13 test 2006 to  2022 - full  od // SAD - 2/2 CRS from laser for RD repair os / new IAD         Gonio    +3 ou          CCT    486/502 - thin ou         OCT    8 test 2011 to 2022 - RNFL - dec T od ( fluctuate) od  - dec. G/N/TI/TS/ N  Bord T/NI  (has a CRS)  os        HRT    3 test 2017 to 2019 -MR -  DEC. Sn/n/in od // DEC. S/n/i, BORD t os /// CDR 0.73 od // 0.86 os        Disc photos    2011, 2015 , 2017// [facility] - 2022      - Ttoday   13/30   (down from 21 od /still high os at 29 - unchanged  )     - Test done today  - IOP check - back on all gtts //  pre-op trab os      2. 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 Chorioretinal scar, left   -with 2nd VF defect      4. Old subtotal retinal detachment, left   S/P repair - retina flat - stable   -large CRS   - pt states he did NOT go to OR - just Rx with laser at the slit lamp     5. Type 2 diabetes mellitus without ophthalmic manifestations      6. Senile nuclear sclerosis  -mild - monitor      7. pupil sphincter rupture os  -suggest old trauma  -does NOT appear to have a angle recession    7. Presbyopia       PLAN    POAG with OHT OS > OD - mild od // moderate os   IOP below target od and too high os   S/P repeat slt os 3/16/2023 - ? Residual steroid response vs out of cosopt vs no response     The ON and VF's are still good ou   ?? Trace APD os - first noted on 5/2/2023   The VF loss os is 2/2 old CRS- ?  post laser for a RD repair   Good initial response to SLT ou 25/25 --> 16/17 7/29/2022  ++ IOP elevation os   ++ VF prog os   ++ OCT - RNFL prog os     Cont drops   cosopt tid ou - has run out - sample mary and trixie called   Brimonidine tid ou - Rx sent   rocklatan q hs ou (samples given )      02/20/2023  - IOP  check - IOP 13//19  - On max drops    - good mobile conj, open on gonio - good xen candidate   - Should consider surgery as progressing on last testing with elevated IOP at 19 on max drops. Book vs repeat studies in 6-8 weeks   - can try repeat slt os and if ineffective - can go ahead with incisional surgery     Photo file updated 5/2/2023 6/2/2023  IOP still too high os (( IOP is ok od))    S/p repeat slt and on mult gtts     --  rec incisional surgery - doubt he has a scleral buckle  // + free conj sup - could try a trab w/ [facility]  / ? Xen os / express minishunt or no shunt or a GDD     Pt is NOT a diamox candidate - H/O sulfa allergy - hives     Rec trabeculectomy with [facility] os -no stent    Pt is still phakic / H/O trauma     S/P trabeculectomy with mitomycin OS  Date:6/14/2023   POD # 1  without any stent or shunt   Number or sutures in flap  4  Number of sutures already cut  none  anterior chamber depth  deep fully formed   Bleb appearance - flat prior to digital massage //  elevated after digital massage next to bleb   sidell neg  IOP  52--> 18    Meds: - operated eye  Pred acetate qid  vigamox qid  Atropine bid    Cont cosopt ou tid - until can do LSL  Cont brim tid ou - until can do LSL    Rocklatan - OD only     F/U 1 day     Bethany Lechuga MD

## 2023-06-16 ENCOUNTER — OFFICE VISIT (OUTPATIENT)
Dept: OPHTHALMOLOGY | Facility: CLINIC | Age: 68
End: 2023-06-16
Payer: COMMERCIAL

## 2023-06-16 DIAGNOSIS — H21.562 PUPILLARY SPHINCTER RUPTURE, LEFT: ICD-10-CM

## 2023-06-16 DIAGNOSIS — H40.1111 PRIMARY OPEN-ANGLE GLAUCOMA, RIGHT EYE, MILD STAGE: ICD-10-CM

## 2023-06-16 DIAGNOSIS — H25.13 NUCLEAR SCLEROTIC CATARACT OF BOTH EYES: ICD-10-CM

## 2023-06-16 DIAGNOSIS — H33.052 OLD SUBTOTAL RETINAL DETACHMENT, LEFT: ICD-10-CM

## 2023-06-16 DIAGNOSIS — H52.13 MYOPIA OF BOTH EYES WITH ASTIGMATISM AND PRESBYOPIA: ICD-10-CM

## 2023-06-16 DIAGNOSIS — Z98.83 GLAUCOMA FILTERING BLEB OF LEFT EYE: ICD-10-CM

## 2023-06-16 DIAGNOSIS — Z48.89 ENCOUNTER FOR POSTOPERATIVE CARE: Primary | ICD-10-CM

## 2023-06-16 DIAGNOSIS — H52.4 MYOPIA OF BOTH EYES WITH ASTIGMATISM AND PRESBYOPIA: ICD-10-CM

## 2023-06-16 DIAGNOSIS — H40.1122 PRIMARY OPEN-ANGLE GLAUCOMA, LEFT EYE, MODERATE STAGE: ICD-10-CM

## 2023-06-16 DIAGNOSIS — H52.203 MYOPIA OF BOTH EYES WITH ASTIGMATISM AND PRESBYOPIA: ICD-10-CM

## 2023-06-16 DIAGNOSIS — H52.4 BILATERAL PRESBYOPIA: ICD-10-CM

## 2023-06-16 DIAGNOSIS — E11.9 TYPE 2 DIABETES MELLITUS WITHOUT OPHTHALMIC MANIFESTATIONS: ICD-10-CM

## 2023-06-16 DIAGNOSIS — H31.002 CHORIORETINAL SCAR, LEFT: ICD-10-CM

## 2023-06-16 DIAGNOSIS — H53.432 ARCUATE VISUAL FIELD DEFECT OF LEFT EYE: ICD-10-CM

## 2023-06-16 PROCEDURE — 99999 PR PBB SHADOW E&M-EST. PATIENT-LVL II: CPT | Mod: PBBFAC,,, | Performed by: OPHTHALMOLOGY

## 2023-06-16 PROCEDURE — 3051F HG A1C>EQUAL 7.0%<8.0%: CPT | Mod: CPTII,S$GLB,, | Performed by: OPHTHALMOLOGY

## 2023-06-16 PROCEDURE — 99499 UNLISTED E&M SERVICE: CPT | Mod: S$GLB,,, | Performed by: OPHTHALMOLOGY

## 2023-06-16 PROCEDURE — 3288F FALL RISK ASSESSMENT DOCD: CPT | Mod: CPTII,S$GLB,, | Performed by: OPHTHALMOLOGY

## 2023-06-16 PROCEDURE — 3051F PR MOST RECENT HEMOGLOBIN A1C LEVEL 7.0 - < 8.0%: ICD-10-PCS | Mod: CPTII,S$GLB,, | Performed by: OPHTHALMOLOGY

## 2023-06-16 PROCEDURE — 99999 PR PBB SHADOW E&M-EST. PATIENT-LVL II: ICD-10-PCS | Mod: PBBFAC,,, | Performed by: OPHTHALMOLOGY

## 2023-06-16 PROCEDURE — 1160F PR REVIEW ALL MEDS BY PRESCRIBER/CLIN PHARMACIST DOCUMENTED: ICD-10-PCS | Mod: CPTII,S$GLB,, | Performed by: OPHTHALMOLOGY

## 2023-06-16 PROCEDURE — 3288F PR FALLS RISK ASSESSMENT DOCUMENTED: ICD-10-PCS | Mod: CPTII,S$GLB,, | Performed by: OPHTHALMOLOGY

## 2023-06-16 PROCEDURE — 1101F PT FALLS ASSESS-DOCD LE1/YR: CPT | Mod: CPTII,S$GLB,, | Performed by: OPHTHALMOLOGY

## 2023-06-16 PROCEDURE — 99024 PR POST-OP FOLLOW-UP VISIT: ICD-10-PCS | Mod: S$GLB,,, | Performed by: OPHTHALMOLOGY

## 2023-06-16 PROCEDURE — 1159F PR MEDICATION LIST DOCUMENTED IN MEDICAL RECORD: ICD-10-PCS | Mod: CPTII,S$GLB,, | Performed by: OPHTHALMOLOGY

## 2023-06-16 PROCEDURE — 99024 POSTOP FOLLOW-UP VISIT: CPT | Mod: S$GLB,,, | Performed by: OPHTHALMOLOGY

## 2023-06-16 PROCEDURE — 1101F PR PT FALLS ASSESS DOC 0-1 FALLS W/OUT INJ PAST YR: ICD-10-PCS | Mod: CPTII,S$GLB,, | Performed by: OPHTHALMOLOGY

## 2023-06-16 PROCEDURE — 1159F MED LIST DOCD IN RCRD: CPT | Mod: CPTII,S$GLB,, | Performed by: OPHTHALMOLOGY

## 2023-06-16 PROCEDURE — 99499 RISK ADDL DX/OHS AUDIT: ICD-10-PCS | Mod: S$GLB,,, | Performed by: OPHTHALMOLOGY

## 2023-06-16 PROCEDURE — 1126F AMNT PAIN NOTED NONE PRSNT: CPT | Mod: CPTII,S$GLB,, | Performed by: OPHTHALMOLOGY

## 2023-06-16 PROCEDURE — 1160F RVW MEDS BY RX/DR IN RCRD: CPT | Mod: CPTII,S$GLB,, | Performed by: OPHTHALMOLOGY

## 2023-06-16 PROCEDURE — 1126F PR PAIN SEVERITY QUANTIFIED, NO PAIN PRESENT: ICD-10-PCS | Mod: CPTII,S$GLB,, | Performed by: OPHTHALMOLOGY

## 2023-06-16 RX ORDER — ATROPINE SULFATE 10 MG/ML
1 SOLUTION/ DROPS OPHTHALMIC DAILY
COMMUNITY
Start: 2023-06-15 | End: 2024-01-02

## 2023-06-16 NOTE — PROGRESS NOTES
HPI     Post-op Evaluation     Additional comments: Pt c/o FB sensation OD like the sutures vincent   sticking eye           Comments    S/p Trab with [facility] OS 6/14/23 (no stent or shunt)    1. POAG  2. VF Defect OS  3. Chorioretinal Scar OS  4. Hx RD OS  5. NS OU  6. Type 2 DM no DR  7. Pupil Sphincter Rupture OS  8. Presbyopia    MEDS:  PA QID OS  Vigamox QID OS  Atropine BID OS  Cosopt TID OU   Brimonidine TID OU  Rocklatan QHS OD          Last edited by Mayda Sterling MA on 6/16/2023  9:02 AM.            Assessment /Plan     For exam results, see Encounter Report.    Encounter for postoperative care    Primary open-angle glaucoma, left eye, moderate stage    Primary open-angle glaucoma, right eye, mild stage    Arcuate visual field defect of left eye    Old subtotal retinal detachment, left    Pupillary sphincter rupture, left    Chorioretinal scar, left    Nuclear sclerotic cataract of both eyes    Type 2 diabetes mellitus without ophthalmic manifestations    Myopia of both eyes with astigmatism and presbyopia    Bilateral presbyopia    Glaucoma filtering bleb of left eye        Pt initially dx with glaucoma at [facility] - stoppped his gtts on his own   Presented to [facility] - susana Valdez 8/23/2006 - off gtts with a baseline / Tmax of 25/27  Referred by Courtney for consideration of SLT's   Pt with POAG and poorly controlled IOP's - does not use drops regularly       1. Primary open angle glaucoma, both eyes, mild stage        Glaucoma (type and duration)    POAG with elevated IOP ou - mild stage    First HVF   2007 - full od // SAD os 2/2 to CRS   First photos   2011   Treatment / Drops started   2006           Family history    ++ mother and 2 brothers         Glaucoma meds    Latanoprost // brimonidine //  cosopt (out of cosopt - 5/2/2023)         H/O adverse rxn to glaucoma drops    None (has tired alphagan and timolol in past -non compliant)         LASERS    SLT os - 3/31/2016 // SLT od - 4/14/2016 // repeat SLT OS  3/16/2023        GLAUCOMA SURGERIES    none        OTHER EYE SURGERIES    H/O RD repair os - S/P laser repair         CDR    0.75 // 0.85-0.9         Tbase    25/27          Tmax    25/27            Ttarget    18/18             HVF    13 test 2006 to  2022 - full  od // SAD - 2/2 CRS from laser for RD repair os / new IAD         Gonio    +3 ou          CCT    486/502 - thin ou         OCT    8 test 2011 to 2022 - RNFL - dec T od ( fluctuate) od  - dec. G/N/TI/TS/ N  Bord T/NI  (has a CRS)  os        HRT    3 test 2017 to 2019 -MR -  DEC. Sn/n/in od // DEC. S/n/i, BORD t os /// CDR 0.73 od // 0.86 os        Disc photos    2011, 2015 , 2017// [facility] - 2022      - Ttoday   ?? /30 --> 16 w. massge  (down from 21 od /still high os at 29 - unchanged  )     - Test done today  -S/P trab os 6/14/2023     2. 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 Chorioretinal scar, left   -with 2nd VF defect      4. Old subtotal retinal detachment, left   S/P repair - retina flat - stable   -large CRS   - pt states he did NOT go to OR - just Rx with laser at the slit lamp     5. Type 2 diabetes mellitus without ophthalmic manifestations      6. Senile nuclear sclerosis  -mild - monitor      7. pupil sphincter rupture os  -suggest old trauma  -does NOT appear to have a angle recession    7. Presbyopia       PLAN    POAG with OHT OS > OD - mild od // moderate os   IOP below target od and too high os   S/P repeat slt os 3/16/2023 - ? Residual steroid response vs out of cosopt vs no response     The ON and VF's are still good ou   ?? Trace APD os - first noted on 5/2/2023   The VF loss os is 2/2 old CRS- ?  post laser for a RD repair   Good initial response to SLT ou 25/25 --> 16/17 7/29/2022  ++ IOP elevation os   ++ VF prog os   ++ OCT - RNFL prog os     Cont drops   cosopt tid ou - has run out - sample given and trixie called   Brimonidine tid ou - Rx  sent   rocklatan q hs ou (samples given )      02/20/2023  - IOP check - IOP 13//19  - On max drops    - good mobile conj, open on gonio - good xen candidate   - Should consider surgery as progressing on last testing with elevated IOP at 19 on max drops. Book vs repeat studies in 6-8 weeks   - can try repeat slt os and if ineffective - can go ahead with incisional surgery     Photo file updated 5/2/2023 6/2/2023  IOP still too high os (( IOP is ok od))    S/p repeat slt and on mult gtts     --  rec incisional surgery - doubt he has a scleral buckle  // + free conj sup - could try a trab w/ [facility]  / ? Xen os / express minishunt or no shunt or a GDD     Pt is NOT a diamox candidate - H/O sulfa allergy - hives     Rec trabeculectomy with [facility] os -no stent    Pt is still phakic / H/O trauma     S/P trabeculectomy with mitomycin OS  Date:6/14/2023   POD # 2  without any stent or shunt   Number or sutures in flap  4  Number of sutures already cut  none  anterior chamber depth  deep fully formed   Bleb appearance - flat prior to digital massage //  elevated after digital massage next to bleb   sidell neg  IOP  30--> 16 w/ massage     Meds: - operated eye  Pred acetate qid  vigamox qid  Atropine bid    Cont cosopt ou tid - until can do LSL os  Cont brim tid ou - until can do LSL os    Rocklatan - OD only     F/U 3 days - consider LSL     Bethany Lechuga MD

## 2023-06-17 NOTE — PROGRESS NOTES
HPI    DLS: 6/16/2023    Pt here for post trab with [facility] OS- 6/14/2023 (no stent or shunt)- possible   suture lysis  Pt states no eye pain but feels like the sutures are bothering him.     Meds;  PA QID OS   Vigamox QID OS   Atropine BID OS   Cosopt TID OU   Brimonidine TID OU   Rocklatan QHS OD      1. POAG   2. VF Defect OS   3. Chorioretinal Scar OS   4. Hx RD OS   5. NS OU   6. Type 2 DM no DR   7. Pupil Sphincter Rupture OS   8. Presbyopia     Last edited by Talya Hobson on 6/19/2023  9:36 AM.            Assessment /Plan     For exam results, see Encounter Report.    Encounter for postoperative care    Primary open-angle glaucoma, left eye, moderate stage    Primary open-angle glaucoma, right eye, mild stage    Old subtotal retinal detachment, left    Pupillary sphincter rupture, left    Chorioretinal scar, left    Nuclear sclerotic cataract of both eyes    Type 2 diabetes mellitus without ophthalmic manifestations    Myopia of both eyes with astigmatism and presbyopia    Glaucoma filtering bleb of left eye        Pt initially dx with glaucoma at [facility] - stoppped his gtts on his own   Presented to [facility] - susana Valdez 8/23/2006 - off gtts with a baseline / Tmax of 25/27  Referred by Courtney for consideration of SLT's   Pt with POAG and poorly controlled IOP's - does not use drops regularly       1. Primary open angle glaucoma, both eyes, mild stage        Glaucoma (type and duration)    POAG with elevated IOP ou - mild stage    First HVF   2007 - full od // SAD os 2/2 to CRS   First photos   2011   Treatment / Drops started   2006           Family history    ++ mother and 2 brothers         Glaucoma meds    Latanoprost // brimonidine //  cosopt (out of cosopt - 5/2/2023)         H/O adverse rxn to glaucoma drops    None (has tired alphagan and timolol in past -non compliant)         LASERS    SLT os - 3/31/2016 // SLT od - 4/14/2016 // repeat SLT OS 3/16/2023        GLAUCOMA SURGERIES    none        OTHER EYE  SURGERIES    H/O RD repair os - S/P laser repair         CDR    0.75 // 0.85-0.9         Tbase    25/27          Tmax    25/27            Ttarget    18/18             HVF    13 test 2006 to  2022 - full  od // SAD - 2/2 CRS from laser for RD repair os / new IAD         Gonio    +3 ou          CCT    486/502 - thin ou         OCT    8 test 2011 to 2022 - RNFL - dec T od ( fluctuate) od  - dec. G/N/TI/TS/ N  Bord T/NI  (has a CRS)  os        HRT    3 test 2017 to 2019 -MR -  DEC. Sn/n/in od // DEC. S/n/i, BORD t os /// CDR 0.73 od // 0.86 os        Disc photos    2011, 2015 , 2017// [facility] - 2022      - Ttoday   ?? /24  (on glaucoma drops)    - Test done today  -S/P trab OS 6/14/2023     2. 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 Chorioretinal scar, left   -with 2nd VF defect      4. Old subtotal retinal detachment, left   S/P repair - retina flat - stable   -large CRS   - pt states he did NOT go to OR - just Rx with laser at the slit lamp     5. Type 2 diabetes mellitus without ophthalmic manifestations      6. Senile nuclear sclerosis  -mild - monitor      7. pupil sphincter rupture os  -suggest old trauma  -does NOT appear to have a angle recession    7. Presbyopia       PLAN    POAG with OHT OS > OD - mild od // moderate os   IOP below target od and too high os   S/P repeat slt os 3/16/2023 - ? Residual steroid response vs out of cosopt vs no response     The ON and VF's are still good ou   ?? Trace APD os - first noted on 5/2/2023   The VF loss os is 2/2 old CRS- ?  post laser for a RD repair   Good initial response to SLT ou 25/25 --> 16/17 7/29/2022  ++ IOP elevation os   ++ VF prog os   ++ OCT - RNFL prog os     Cont drops   cosopt tid ou - has run out - sample given and trixie called   Brimonidine tid ou - Rx sent   rocklatan q hs ou (samples given )      02/20/2023  - IOP check - IOP 13//19  - On max drops    - good  mobile conj, open on gonio - good xen candidate   - Should consider surgery as progressing on last testing with elevated IOP at 19 on max drops. Book vs repeat studies in 6-8 weeks   - can try repeat slt os and if ineffective - can go ahead with incisional surgery     Photo file updated 5/2/2023 6/2/2023  IOP still too high os (( IOP is ok od))    S/p repeat slt and on mult gtts     --  rec incisional surgery - doubt he has a scleral buckle  // + free conj sup - could try a trab w/ [facility]  / ? Xen os / express minishunt or no shunt or a GDD     Pt is NOT a diamox candidate - H/O sulfa allergy - hives     Rec trabeculectomy with [facility] os -no stent    Pt is still phakic / H/O trauma     S/P trabeculectomy with mitomycin OS  Date:6/14/2023   POD # 5  without any stent or shunt   Number or sutures in flap  4  Number of sutures already cut  none  anterior chamber depth  deep fully formed   Bleb appearance - flat prior to digital massage //  elevated after digital massage next to bleb   sidell neg  IOP 24 (on cosopt and brimonidine)     Meds: - operated eye  Pred acetate qid  vigamox qid  Atropine bid    Cont cosopt ou tid - until can do LSL os  Cont brim tid ou - until can do LSL os     Rocklatan - OD only     F/U 3 days - for attempt at LSL - pt has trouble looking down - difficult to see posterior sutures - ? Try a lid speculum and use new to rona vessels    Bethany Lechuga MD

## 2023-06-19 ENCOUNTER — PATIENT MESSAGE (OUTPATIENT)
Dept: OPHTHALMOLOGY | Facility: CLINIC | Age: 68
End: 2023-06-19

## 2023-06-19 ENCOUNTER — OFFICE VISIT (OUTPATIENT)
Dept: OPHTHALMOLOGY | Facility: CLINIC | Age: 68
End: 2023-06-19
Payer: COMMERCIAL

## 2023-06-19 DIAGNOSIS — H40.1111 PRIMARY OPEN-ANGLE GLAUCOMA, RIGHT EYE, MILD STAGE: ICD-10-CM

## 2023-06-19 DIAGNOSIS — H25.13 NUCLEAR SCLEROTIC CATARACT OF BOTH EYES: ICD-10-CM

## 2023-06-19 DIAGNOSIS — H33.052 OLD SUBTOTAL RETINAL DETACHMENT, LEFT: ICD-10-CM

## 2023-06-19 DIAGNOSIS — H40.1122 PRIMARY OPEN-ANGLE GLAUCOMA, LEFT EYE, MODERATE STAGE: ICD-10-CM

## 2023-06-19 DIAGNOSIS — Z98.83 GLAUCOMA FILTERING BLEB OF LEFT EYE: ICD-10-CM

## 2023-06-19 DIAGNOSIS — H52.203 MYOPIA OF BOTH EYES WITH ASTIGMATISM AND PRESBYOPIA: ICD-10-CM

## 2023-06-19 DIAGNOSIS — E11.9 TYPE 2 DIABETES MELLITUS WITHOUT OPHTHALMIC MANIFESTATIONS: ICD-10-CM

## 2023-06-19 DIAGNOSIS — H52.13 MYOPIA OF BOTH EYES WITH ASTIGMATISM AND PRESBYOPIA: ICD-10-CM

## 2023-06-19 DIAGNOSIS — H21.562 PUPILLARY SPHINCTER RUPTURE, LEFT: ICD-10-CM

## 2023-06-19 DIAGNOSIS — H52.4 MYOPIA OF BOTH EYES WITH ASTIGMATISM AND PRESBYOPIA: ICD-10-CM

## 2023-06-19 DIAGNOSIS — H31.002 CHORIORETINAL SCAR, LEFT: ICD-10-CM

## 2023-06-19 DIAGNOSIS — Z48.89 ENCOUNTER FOR POSTOPERATIVE CARE: Primary | ICD-10-CM

## 2023-06-19 PROCEDURE — 1159F MED LIST DOCD IN RCRD: CPT | Mod: CPTII,S$GLB,, | Performed by: OPHTHALMOLOGY

## 2023-06-19 PROCEDURE — 3288F PR FALLS RISK ASSESSMENT DOCUMENTED: ICD-10-PCS | Mod: CPTII,S$GLB,, | Performed by: OPHTHALMOLOGY

## 2023-06-19 PROCEDURE — 1126F AMNT PAIN NOTED NONE PRSNT: CPT | Mod: CPTII,S$GLB,, | Performed by: OPHTHALMOLOGY

## 2023-06-19 PROCEDURE — 3288F FALL RISK ASSESSMENT DOCD: CPT | Mod: CPTII,S$GLB,, | Performed by: OPHTHALMOLOGY

## 2023-06-19 PROCEDURE — 99999 PR PBB SHADOW E&M-EST. PATIENT-LVL II: ICD-10-PCS | Mod: PBBFAC,,, | Performed by: OPHTHALMOLOGY

## 2023-06-19 PROCEDURE — 1101F PT FALLS ASSESS-DOCD LE1/YR: CPT | Mod: CPTII,S$GLB,, | Performed by: OPHTHALMOLOGY

## 2023-06-19 PROCEDURE — 99499 RISK ADDL DX/OHS AUDIT: ICD-10-PCS | Mod: S$GLB,,, | Performed by: OPHTHALMOLOGY

## 2023-06-19 PROCEDURE — 99024 PR POST-OP FOLLOW-UP VISIT: ICD-10-PCS | Mod: S$GLB,,, | Performed by: OPHTHALMOLOGY

## 2023-06-19 PROCEDURE — 3051F PR MOST RECENT HEMOGLOBIN A1C LEVEL 7.0 - < 8.0%: ICD-10-PCS | Mod: CPTII,S$GLB,, | Performed by: OPHTHALMOLOGY

## 2023-06-19 PROCEDURE — 1126F PR PAIN SEVERITY QUANTIFIED, NO PAIN PRESENT: ICD-10-PCS | Mod: CPTII,S$GLB,, | Performed by: OPHTHALMOLOGY

## 2023-06-19 PROCEDURE — 99024 POSTOP FOLLOW-UP VISIT: CPT | Mod: S$GLB,,, | Performed by: OPHTHALMOLOGY

## 2023-06-19 PROCEDURE — 99999 PR PBB SHADOW E&M-EST. PATIENT-LVL II: CPT | Mod: PBBFAC,,, | Performed by: OPHTHALMOLOGY

## 2023-06-19 PROCEDURE — 1160F RVW MEDS BY RX/DR IN RCRD: CPT | Mod: CPTII,S$GLB,, | Performed by: OPHTHALMOLOGY

## 2023-06-19 PROCEDURE — 1101F PR PT FALLS ASSESS DOC 0-1 FALLS W/OUT INJ PAST YR: ICD-10-PCS | Mod: CPTII,S$GLB,, | Performed by: OPHTHALMOLOGY

## 2023-06-19 PROCEDURE — 3051F HG A1C>EQUAL 7.0%<8.0%: CPT | Mod: CPTII,S$GLB,, | Performed by: OPHTHALMOLOGY

## 2023-06-19 PROCEDURE — 99499 UNLISTED E&M SERVICE: CPT | Mod: S$GLB,,, | Performed by: OPHTHALMOLOGY

## 2023-06-19 PROCEDURE — 1160F PR REVIEW ALL MEDS BY PRESCRIBER/CLIN PHARMACIST DOCUMENTED: ICD-10-PCS | Mod: CPTII,S$GLB,, | Performed by: OPHTHALMOLOGY

## 2023-06-19 PROCEDURE — 1159F PR MEDICATION LIST DOCUMENTED IN MEDICAL RECORD: ICD-10-PCS | Mod: CPTII,S$GLB,, | Performed by: OPHTHALMOLOGY

## 2023-06-22 ENCOUNTER — OFFICE VISIT (OUTPATIENT)
Dept: OPHTHALMOLOGY | Facility: CLINIC | Age: 68
End: 2023-06-22
Payer: COMMERCIAL

## 2023-06-22 DIAGNOSIS — H33.052 OLD SUBTOTAL RETINAL DETACHMENT, LEFT: ICD-10-CM

## 2023-06-22 DIAGNOSIS — Z48.89 ENCOUNTER FOR POSTOPERATIVE CARE: Primary | ICD-10-CM

## 2023-06-22 DIAGNOSIS — H25.13 NUCLEAR SCLEROTIC CATARACT OF BOTH EYES: ICD-10-CM

## 2023-06-22 DIAGNOSIS — Z98.83 GLAUCOMA FILTERING BLEB OF LEFT EYE: ICD-10-CM

## 2023-06-22 DIAGNOSIS — H40.1122 PRIMARY OPEN-ANGLE GLAUCOMA, LEFT EYE, MODERATE STAGE: ICD-10-CM

## 2023-06-22 DIAGNOSIS — H31.002 CHORIORETINAL SCAR, LEFT: ICD-10-CM

## 2023-06-22 DIAGNOSIS — H40.1111 PRIMARY OPEN-ANGLE GLAUCOMA, RIGHT EYE, MILD STAGE: ICD-10-CM

## 2023-06-22 DIAGNOSIS — H52.4 MYOPIA OF BOTH EYES WITH ASTIGMATISM AND PRESBYOPIA: ICD-10-CM

## 2023-06-22 DIAGNOSIS — H52.13 MYOPIA OF BOTH EYES WITH ASTIGMATISM AND PRESBYOPIA: ICD-10-CM

## 2023-06-22 DIAGNOSIS — H21.562 PUPILLARY SPHINCTER RUPTURE, LEFT: ICD-10-CM

## 2023-06-22 DIAGNOSIS — E11.9 TYPE 2 DIABETES MELLITUS WITHOUT OPHTHALMIC MANIFESTATIONS: ICD-10-CM

## 2023-06-22 DIAGNOSIS — H52.203 MYOPIA OF BOTH EYES WITH ASTIGMATISM AND PRESBYOPIA: ICD-10-CM

## 2023-06-22 PROCEDURE — 1159F PR MEDICATION LIST DOCUMENTED IN MEDICAL RECORD: ICD-10-PCS | Mod: CPTII,S$GLB,, | Performed by: OPHTHALMOLOGY

## 2023-06-22 PROCEDURE — 1126F PR PAIN SEVERITY QUANTIFIED, NO PAIN PRESENT: ICD-10-PCS | Mod: CPTII,S$GLB,, | Performed by: OPHTHALMOLOGY

## 2023-06-22 PROCEDURE — 99024 POSTOP FOLLOW-UP VISIT: CPT | Mod: S$GLB,,, | Performed by: OPHTHALMOLOGY

## 2023-06-22 PROCEDURE — 99499 RISK ADDL DX/OHS AUDIT: ICD-10-PCS | Mod: S$GLB,,, | Performed by: OPHTHALMOLOGY

## 2023-06-22 PROCEDURE — 99024 PR POST-OP FOLLOW-UP VISIT: ICD-10-PCS | Mod: S$GLB,,, | Performed by: OPHTHALMOLOGY

## 2023-06-22 PROCEDURE — 3288F FALL RISK ASSESSMENT DOCD: CPT | Mod: CPTII,S$GLB,, | Performed by: OPHTHALMOLOGY

## 2023-06-22 PROCEDURE — 1126F AMNT PAIN NOTED NONE PRSNT: CPT | Mod: CPTII,S$GLB,, | Performed by: OPHTHALMOLOGY

## 2023-06-22 PROCEDURE — 1159F MED LIST DOCD IN RCRD: CPT | Mod: CPTII,S$GLB,, | Performed by: OPHTHALMOLOGY

## 2023-06-22 PROCEDURE — 3051F PR MOST RECENT HEMOGLOBIN A1C LEVEL 7.0 - < 8.0%: ICD-10-PCS | Mod: CPTII,S$GLB,, | Performed by: OPHTHALMOLOGY

## 2023-06-22 PROCEDURE — 1160F RVW MEDS BY RX/DR IN RCRD: CPT | Mod: CPTII,S$GLB,, | Performed by: OPHTHALMOLOGY

## 2023-06-22 PROCEDURE — 99999 PR PBB SHADOW E&M-EST. PATIENT-LVL IV: CPT | Mod: PBBFAC,,, | Performed by: OPHTHALMOLOGY

## 2023-06-22 PROCEDURE — 99999 PR PBB SHADOW E&M-EST. PATIENT-LVL IV: ICD-10-PCS | Mod: PBBFAC,,, | Performed by: OPHTHALMOLOGY

## 2023-06-22 PROCEDURE — 1101F PT FALLS ASSESS-DOCD LE1/YR: CPT | Mod: CPTII,S$GLB,, | Performed by: OPHTHALMOLOGY

## 2023-06-22 PROCEDURE — 3288F PR FALLS RISK ASSESSMENT DOCUMENTED: ICD-10-PCS | Mod: CPTII,S$GLB,, | Performed by: OPHTHALMOLOGY

## 2023-06-22 PROCEDURE — 1101F PR PT FALLS ASSESS DOC 0-1 FALLS W/OUT INJ PAST YR: ICD-10-PCS | Mod: CPTII,S$GLB,, | Performed by: OPHTHALMOLOGY

## 2023-06-22 PROCEDURE — 1160F PR REVIEW ALL MEDS BY PRESCRIBER/CLIN PHARMACIST DOCUMENTED: ICD-10-PCS | Mod: CPTII,S$GLB,, | Performed by: OPHTHALMOLOGY

## 2023-06-22 PROCEDURE — 3051F HG A1C>EQUAL 7.0%<8.0%: CPT | Mod: CPTII,S$GLB,, | Performed by: OPHTHALMOLOGY

## 2023-06-22 PROCEDURE — 99499 UNLISTED E&M SERVICE: CPT | Mod: S$GLB,,, | Performed by: OPHTHALMOLOGY

## 2023-06-25 ENCOUNTER — PATIENT MESSAGE (OUTPATIENT)
Dept: RADIATION ONCOLOGY | Facility: CLINIC | Age: 68
End: 2023-06-25
Payer: MEDICARE

## 2023-06-26 ENCOUNTER — OFFICE VISIT (OUTPATIENT)
Dept: OPHTHALMOLOGY | Facility: CLINIC | Age: 68
End: 2023-06-26
Payer: COMMERCIAL

## 2023-06-26 DIAGNOSIS — Z48.89 ENCOUNTER FOR POSTOPERATIVE CARE: Primary | ICD-10-CM

## 2023-06-26 DIAGNOSIS — H25.13 NUCLEAR SCLEROTIC CATARACT OF BOTH EYES: ICD-10-CM

## 2023-06-26 DIAGNOSIS — E11.9 TYPE 2 DIABETES MELLITUS WITHOUT OPHTHALMIC MANIFESTATIONS: ICD-10-CM

## 2023-06-26 DIAGNOSIS — H52.203 MYOPIA OF BOTH EYES WITH ASTIGMATISM AND PRESBYOPIA: ICD-10-CM

## 2023-06-26 DIAGNOSIS — H40.1122 PRIMARY OPEN-ANGLE GLAUCOMA, LEFT EYE, MODERATE STAGE: ICD-10-CM

## 2023-06-26 DIAGNOSIS — H31.002 CHORIORETINAL SCAR, LEFT: ICD-10-CM

## 2023-06-26 DIAGNOSIS — H33.052 OLD SUBTOTAL RETINAL DETACHMENT, LEFT: ICD-10-CM

## 2023-06-26 DIAGNOSIS — H40.1111 PRIMARY OPEN-ANGLE GLAUCOMA, RIGHT EYE, MILD STAGE: ICD-10-CM

## 2023-06-26 DIAGNOSIS — H52.4 MYOPIA OF BOTH EYES WITH ASTIGMATISM AND PRESBYOPIA: ICD-10-CM

## 2023-06-26 DIAGNOSIS — H21.562 PUPILLARY SPHINCTER RUPTURE, LEFT: ICD-10-CM

## 2023-06-26 DIAGNOSIS — H52.13 MYOPIA OF BOTH EYES WITH ASTIGMATISM AND PRESBYOPIA: ICD-10-CM

## 2023-06-26 PROCEDURE — 99499 UNLISTED E&M SERVICE: CPT | Mod: S$GLB,,, | Performed by: OPHTHALMOLOGY

## 2023-06-26 PROCEDURE — 1101F PT FALLS ASSESS-DOCD LE1/YR: CPT | Mod: CPTII,S$GLB,, | Performed by: OPHTHALMOLOGY

## 2023-06-26 PROCEDURE — 1159F PR MEDICATION LIST DOCUMENTED IN MEDICAL RECORD: ICD-10-PCS | Mod: CPTII,S$GLB,, | Performed by: OPHTHALMOLOGY

## 2023-06-26 PROCEDURE — 3288F PR FALLS RISK ASSESSMENT DOCUMENTED: ICD-10-PCS | Mod: CPTII,S$GLB,, | Performed by: OPHTHALMOLOGY

## 2023-06-26 PROCEDURE — 3288F FALL RISK ASSESSMENT DOCD: CPT | Mod: CPTII,S$GLB,, | Performed by: OPHTHALMOLOGY

## 2023-06-26 PROCEDURE — 99024 PR POST-OP FOLLOW-UP VISIT: ICD-10-PCS | Mod: S$GLB,,, | Performed by: OPHTHALMOLOGY

## 2023-06-26 PROCEDURE — 99999 PR PBB SHADOW E&M-EST. PATIENT-LVL IV: ICD-10-PCS | Mod: PBBFAC,,, | Performed by: OPHTHALMOLOGY

## 2023-06-26 PROCEDURE — 3051F HG A1C>EQUAL 7.0%<8.0%: CPT | Mod: CPTII,S$GLB,, | Performed by: OPHTHALMOLOGY

## 2023-06-26 PROCEDURE — 1101F PR PT FALLS ASSESS DOC 0-1 FALLS W/OUT INJ PAST YR: ICD-10-PCS | Mod: CPTII,S$GLB,, | Performed by: OPHTHALMOLOGY

## 2023-06-26 PROCEDURE — 1159F MED LIST DOCD IN RCRD: CPT | Mod: CPTII,S$GLB,, | Performed by: OPHTHALMOLOGY

## 2023-06-26 PROCEDURE — 99024 POSTOP FOLLOW-UP VISIT: CPT | Mod: S$GLB,,, | Performed by: OPHTHALMOLOGY

## 2023-06-26 PROCEDURE — 1126F AMNT PAIN NOTED NONE PRSNT: CPT | Mod: CPTII,S$GLB,, | Performed by: OPHTHALMOLOGY

## 2023-06-26 PROCEDURE — 1126F PR PAIN SEVERITY QUANTIFIED, NO PAIN PRESENT: ICD-10-PCS | Mod: CPTII,S$GLB,, | Performed by: OPHTHALMOLOGY

## 2023-06-26 PROCEDURE — 1160F RVW MEDS BY RX/DR IN RCRD: CPT | Mod: CPTII,S$GLB,, | Performed by: OPHTHALMOLOGY

## 2023-06-26 PROCEDURE — 3051F PR MOST RECENT HEMOGLOBIN A1C LEVEL 7.0 - < 8.0%: ICD-10-PCS | Mod: CPTII,S$GLB,, | Performed by: OPHTHALMOLOGY

## 2023-06-26 PROCEDURE — 99999 PR PBB SHADOW E&M-EST. PATIENT-LVL IV: CPT | Mod: PBBFAC,,, | Performed by: OPHTHALMOLOGY

## 2023-06-26 PROCEDURE — 99499 RISK ADDL DX/OHS AUDIT: ICD-10-PCS | Mod: S$GLB,,, | Performed by: OPHTHALMOLOGY

## 2023-06-26 PROCEDURE — 1160F PR REVIEW ALL MEDS BY PRESCRIBER/CLIN PHARMACIST DOCUMENTED: ICD-10-PCS | Mod: CPTII,S$GLB,, | Performed by: OPHTHALMOLOGY

## 2023-06-26 NOTE — PROGRESS NOTES
HPI     Post-op Evaluation     Additional comments: Pt c/o FB sensation OD and states vision is very   blurry but denies any pain or pther symptoms           Comments    S/p Trab with [facility] OS 6/14/23 (no stent or shunt)    1. POAG  2. VF Defect OS  3. Chorioretinal Scar OS  4. Hx RD OS  5. NS OU  6. Type 2 DM no DR  7. Pupil Sphincter Rupture OS  8. Presbyopia    MEDS:  PA QID OS  Atropine BID OS    Cosopt TID OD  Brimonidine TID OD  Rocklatan QHS OD          Last edited by Mayda Sterling MA on 6/26/2023  9:13 AM.            Assessment /Plan     For exam results, see Encounter Report.    Encounter for postoperative care    Primary open-angle glaucoma, left eye, moderate stage    Primary open-angle glaucoma, right eye, mild stage    Old subtotal retinal detachment, left    Pupillary sphincter rupture, left    Chorioretinal scar, left    Nuclear sclerotic cataract of both eyes    Type 2 diabetes mellitus without ophthalmic manifestations    Myopia of both eyes with astigmatism and presbyopia      Pt initially dx with glaucoma at [facility] - stoped his gtts on his own   Presented to [facility] - susana Valdez 8/23/2006 - off gtts with a baseline / Tmax of 25/27  Referred by Courtney for consideration of SLT's   Pt with POAG and poorly controlled IOP's - does not use drops regularly       1. Primary open angle glaucoma, both eyes, mild stage        Glaucoma (type and duration)    POAG with elevated IOP ou - mild stage    First HVF   2007 - full od // SAD os 2/2 to CRS   First photos   2011   Treatment / Drops started   2006           Family history    ++ mother and 2 brothers         Glaucoma meds    Latanoprost // brimonidine //  cosopt (out of cosopt - 5/2/2023)         H/O adverse rxn to glaucoma drops    None (has tired alphagan and timolol in past -non compliant)         LASERS    SLT os - 3/31/2016 // SLT od - 4/14/2016 // repeat SLT OS 3/16/2023        GLAUCOMA SURGERIES    none        OTHER EYE SURGERIES    H/O RD repair  os - S/P laser repair         CDR    0.75 // 0.85-0.9         Tbase    25/27          Tmax    25/27            Ttarget    18/18             HVF    13 test 2006 to  2022 - full  od // SAD - 2/2 CRS from laser for RD repair os / new IAD         Gonio    +3 ou          CCT    486/502 - thin ou         OCT    8 test 2011 to 2022 - RNFL - dec T od ( fluctuate) od  - dec. G/N/TI/TS/ N  Bord T/NI  (has a CRS)  os        HRT    3 test 2017 to 2019 -MR -  DEC. Sn/n/in od // DEC. S/n/i, BORD t os /// CDR 0.73 od // 0.86 os        Disc photos    2011, 2015 , 2017// [facility] - 2022      - Ttoday   ?? /24  (on glaucoma drops)    - Test done today  -S/P trab OS 6/14/2023     2. 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 Chorioretinal scar, left   -with 2nd VF defect      4. Old subtotal retinal detachment, left   S/P repair - retina flat - stable   -large CRS   - pt states he did NOT go to OR - just Rx with laser at the slit lamp     5. Type 2 diabetes mellitus without ophthalmic manifestations      6. Senile nuclear sclerosis  -mild - monitor      7. pupil sphincter rupture os  -suggest old trauma  -does NOT appear to have a angle recession    7. Presbyopia       PLAN    POAG with OHT OS > OD - mild od // moderate os   IOP below target od and too high os   S/P repeat slt os 3/16/2023 - ? Residual steroid response vs out of cosopt vs no response     The ON and VF's are still good ou   ?? Trace APD os - first noted on 5/2/2023   The VF loss os is 2/2 old CRS- ?  post laser for a RD repair   Good initial response to SLT ou 25/25 --> 16/17 7/29/2022  ++ IOP elevation os   ++ VF prog os   ++ OCT - RNFL prog os     Cont drops   cosopt tid ou - has run out - sample given and trixie called   Brimonidine tid ou - Rx sent   rocklatan q hs ou (samples given )      02/20/2023  - IOP check - IOP 13//19  - On max drops    - good mobile conj, open on gonio -  "good xen candidate   - Should consider surgery as progressing on last testing with elevated IOP at 19 on max drops. Book vs repeat studies in 6-8 weeks   - can try repeat slt os and if ineffective - can go ahead with incisional surgery     Photo file updated 5/2/2023 6/2/2023  IOP still too high os (( IOP is ok od))    S/p repeat slt and on mult gtts     --  rec incisional surgery - doubt he has a scleral buckle  // + free conj sup - could try a trab w/ [facility]  / ? Xen os / express minishunt or no shunt or a GDD     Pt is NOT a diamox candidate - H/O sulfa allergy - hives     Rec trabeculectomy with [facility] os -no stent    Pt is still phakic / H/O trauma     S/P trabeculectomy with mitomycin OS  Date:6/14/2023   POD # 12   without any stent or shunt   Number or sutures in flap  4  Number of sutures already cut  none (1st cut 6/22/2023) // 2nd cut 6/26/2023  anterior chamber depth  deep fully formed   Bleb appearance - flat prior to digital massage //  elevated after digital massage next to bleb   sidell neg  IOP 64--> 40 w/ LSL #2 ---> 12 with gentle massage post lysis of 2nd suture- bleb now elevated nasally     Meds: - operated eye  Pred acetate qid  vigamox qid - stop   Atropine bid    cosopt cont OD // hold os   brim tid  cont OD // hold os  -  Rocklatan - OD only     F/U 1  days  w/ [place] to see if IOP too high or too low and if the AC is formed and check up for choroidals-  IOP check post LSL # 2 and off glaucoma drops   - the 2 posterior sutures have had LSL already  - there are 2 left - one on each side near the limbus  // if IOP goes up suggest releasing the more temporal one - easily seen - see "cornea drawing"     F/U me (petra)  10 days - July 6th     Bethany Lechuga MD      "

## 2023-06-27 ENCOUNTER — OFFICE VISIT (OUTPATIENT)
Dept: OPHTHALMOLOGY | Facility: CLINIC | Age: 68
End: 2023-06-27
Payer: COMMERCIAL

## 2023-06-27 DIAGNOSIS — H40.1122 PRIMARY OPEN-ANGLE GLAUCOMA, LEFT EYE, MODERATE STAGE: Primary | ICD-10-CM

## 2023-06-27 DIAGNOSIS — H25.13 NUCLEAR SCLEROTIC CATARACT OF BOTH EYES: ICD-10-CM

## 2023-06-27 DIAGNOSIS — H40.1131 PRIMARY OPEN ANGLE GLAUCOMA (POAG) OF BOTH EYES, MILD STAGE: ICD-10-CM

## 2023-06-27 PROCEDURE — 92020 GONIOSCOPY: CPT | Mod: S$GLB,,, | Performed by: OPHTHALMOLOGY

## 2023-06-27 PROCEDURE — 1159F PR MEDICATION LIST DOCUMENTED IN MEDICAL RECORD: ICD-10-PCS | Mod: CPTII,S$GLB,, | Performed by: OPHTHALMOLOGY

## 2023-06-27 PROCEDURE — 99999 PR PBB SHADOW E&M-EST. PATIENT-LVL III: CPT | Mod: PBBFAC,,, | Performed by: OPHTHALMOLOGY

## 2023-06-27 PROCEDURE — 92020 PR SPECIAL EYE EVAL,GONIOSCOPY: ICD-10-PCS | Mod: S$GLB,,, | Performed by: OPHTHALMOLOGY

## 2023-06-27 PROCEDURE — 1160F RVW MEDS BY RX/DR IN RCRD: CPT | Mod: CPTII,S$GLB,, | Performed by: OPHTHALMOLOGY

## 2023-06-27 PROCEDURE — 3051F PR MOST RECENT HEMOGLOBIN A1C LEVEL 7.0 - < 8.0%: ICD-10-PCS | Mod: CPTII,S$GLB,, | Performed by: OPHTHALMOLOGY

## 2023-06-27 PROCEDURE — 99024 POSTOP FOLLOW-UP VISIT: CPT | Mod: S$GLB,,, | Performed by: OPHTHALMOLOGY

## 2023-06-27 PROCEDURE — 1159F MED LIST DOCD IN RCRD: CPT | Mod: CPTII,S$GLB,, | Performed by: OPHTHALMOLOGY

## 2023-06-27 PROCEDURE — 99499 RISK ADDL DX/OHS AUDIT: ICD-10-PCS | Mod: S$GLB,,, | Performed by: OPHTHALMOLOGY

## 2023-06-27 PROCEDURE — 99499 UNLISTED E&M SERVICE: CPT | Mod: S$GLB,,, | Performed by: OPHTHALMOLOGY

## 2023-06-27 PROCEDURE — 3051F HG A1C>EQUAL 7.0%<8.0%: CPT | Mod: CPTII,S$GLB,, | Performed by: OPHTHALMOLOGY

## 2023-06-27 PROCEDURE — 1126F AMNT PAIN NOTED NONE PRSNT: CPT | Mod: CPTII,S$GLB,, | Performed by: OPHTHALMOLOGY

## 2023-06-27 PROCEDURE — 1160F PR REVIEW ALL MEDS BY PRESCRIBER/CLIN PHARMACIST DOCUMENTED: ICD-10-PCS | Mod: CPTII,S$GLB,, | Performed by: OPHTHALMOLOGY

## 2023-06-27 PROCEDURE — 1126F PR PAIN SEVERITY QUANTIFIED, NO PAIN PRESENT: ICD-10-PCS | Mod: CPTII,S$GLB,, | Performed by: OPHTHALMOLOGY

## 2023-06-27 PROCEDURE — 99024 PR POST-OP FOLLOW-UP VISIT: ICD-10-PCS | Mod: S$GLB,,, | Performed by: OPHTHALMOLOGY

## 2023-06-27 PROCEDURE — 99999 PR PBB SHADOW E&M-EST. PATIENT-LVL III: ICD-10-PCS | Mod: PBBFAC,,, | Performed by: OPHTHALMOLOGY

## 2023-06-27 NOTE — PROGRESS NOTES
HPI    DLS: 06/26/2023  S/p Trab with MMC OS 6/14/23 (no stent or shunt)     1. POAG   2. VF Defect OS   3. Chorioretinal Scar OS   4. Hx RD OS   5. NS OU   6. Type 2 DM no DR   7. Pupil Sphincter Rupture OS   8. Presbyopia     MEDS:   PA QID OS   Atropine BID OS     Cosopt TID OD   Brimonidine TID OD   Rocklatan QHS OD    Last edited by Parisa Bach MA on 6/27/2023  8:27 AM.            Assessment /Plan     For exam results, see Encounter Report.    Primary open-angle glaucoma, left eye, moderate stage    Primary open angle glaucoma (POAG) of both eyes, mild stage    Nuclear sclerotic cataract of both eyes      [facility]   Presented to [facility] - susana Valdez 8/23/2006 - off gtts with a baseline / Tmax of 25/27  Referred by Courtney for consideration of SLT's   Pt with POAG and poorly controlled IOP's - does not use drops regularly       1. Primary open angle glaucoma, both eyes, mild stage        Glaucoma (type and duration)    POAG with elevated IOP ou - mild stage    First HVF   2007 - full od // SAD os 2/2 to CRS   First photos   2011   Treatment / Drops started   2006           Family history    ++ mother and 2 brothers         Glaucoma meds    Latanoprost // brimonidine //  cosopt (out of cosopt - 5/2/2023)         H/O adverse rxn to glaucoma drops    None (has tired alphagan and timolol in past -non compliant)         LASERS    SLT os - 3/31/2016 // SLT od - 4/14/2016 // repeat SLT OS 3/16/2023        GLAUCOMA SURGERIES    none        OTHER EYE SURGERIES    H/O RD repair os - S/P laser repair         CDR    0.75 // 0.85-0.9         Tbase    25/27          Tmax    25/27            Ttarget    18/18             HVF    13 test 2006 to  2022 - full  od // SAD - 2/2 CRS from laser for RD repair os / new IAD         Gonio    +3 ou          CCT    486/502 - thin ou         OCT    8 test 2011 to 2022 - RNFL - dec T od ( fluctuate) od  - dec. G/N/TI/TS/ N   Jorge T/NI  (has a CRS)  os        HRT    3 test 2017 to 2019 -MR -  DEC. Sn/n/in od // DEC. S/n/i, JORGE t os /// CDR 0.73 od // 0.86 os        Disc photos    2011, 2015 , 2017// [facility] - 2022      - Ttoday   ?? /24  (on glaucoma drops)    - Test done today  -S/P trab OS 6/14/2023     2. 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 Chorioretinal scar, left   -with 2nd VF defect      4. Old subtotal retinal detachment, left   S/P repair - retina flat - stable   -large CRS   - pt states he did NOT go to OR - just Rx with laser at the slit lamp     5. Type 2 diabetes mellitus without ophthalmic manifestations      6. Senile nuclear sclerosis  -mild - monitor      7. pupil sphincter rupture os  -suggest old trauma  -does NOT appear to have a angle recession    7. Presbyopia       PLAN    POAG with OHT OS > OD - mild od // moderate os   IOP below target od and too high os   S/P repeat slt os 3/16/2023 - ? Residual steroid response vs out of cosopt vs no response     The ON and VF's are still good ou   ?? Trace APD os - first noted on 5/2/2023   The VF loss os is 2/2 old CRS- ?  post laser for a RD repair   Good initial response to SLT ou 25/25 --> 16/17 7/29/2022  ++ IOP elevation os   ++ VF prog os   ++ OCT - RNFL prog os     Cont drops   cosopt tid ou - has run out - sample given and trixie called   Brimonidine tid ou - Rx sent   rocklatan q hs ou (samples given )      02/20/2023  - IOP check - IOP 13//19  - On max drops    - good mobile conj, open on gonio - good xen candidate   - Should consider surgery as progressing on last testing with elevated IOP at 19 on max drops. Book vs repeat studies in 6-8 weeks   - can try repeat slt os and if ineffective - can go ahead with incisional surgery     Photo file updated 5/2/2023 6/2/2023  IOP still too high os (( IOP is ok od))    S/p repeat slt and on mult gtts     --  rec incisional  surgery - doubt he has a scleral buckle  // + free conj sup - could try a trab w/ MMC  / ? Xen os / express minishunt or no shunt or a GDD     Pt is NOT a diamox candidate - H/O sulfa allergy - hives     Rec trabeculectomy with MMC os -no stent    Pt is still phakic / H/O trauma     S/P trabeculectomy with mitomycin OS  Date:6/14/2023   POD # 12   without any stent or shunt   Number or sutures in flap  4  Number of sutures already cut  none (1st cut 6/22/2023) // 2nd cut 6/26/2023  anterior chamber depth  deep fully formed   Bleb appearance - flat prior to digital massage //  elevated after digital massage next to bleb   sidell neg  IOP 64--> 40 w/ LSL #2 ---> 12 with gentle massage post lysis of 2nd suture- bleb now elevated nasally    IOP settled to LSL 4 --> Temporal Ant suture sc complication      Meds: - operated eye --> good adherence --> CSM  Pred acetate qid  Atropine bid    cosopt cont OD // hold os   brim tid  cont OD // hold os  -  Rocklatan - OD only       Plan  RTC [place] 2-3 days IOP p LSL OS  RTC sooner prn with good understanding    F/U me (petra)  10 days - July 6th     Bethany Lechuga MD

## 2023-06-30 ENCOUNTER — OFFICE VISIT (OUTPATIENT)
Dept: OPHTHALMOLOGY | Facility: CLINIC | Age: 68
End: 2023-06-30
Payer: COMMERCIAL

## 2023-06-30 DIAGNOSIS — Z98.83 GLAUCOMA FILTERING BLEB OF LEFT EYE: Primary | ICD-10-CM

## 2023-06-30 DIAGNOSIS — H40.1122 PRIMARY OPEN-ANGLE GLAUCOMA, LEFT EYE, MODERATE STAGE: ICD-10-CM

## 2023-06-30 DIAGNOSIS — H25.13 NUCLEAR SCLEROTIC CATARACT OF BOTH EYES: ICD-10-CM

## 2023-06-30 PROCEDURE — 3051F HG A1C>EQUAL 7.0%<8.0%: CPT | Mod: CPTII,S$GLB,, | Performed by: OPHTHALMOLOGY

## 2023-06-30 PROCEDURE — 3288F FALL RISK ASSESSMENT DOCD: CPT | Mod: CPTII,S$GLB,, | Performed by: OPHTHALMOLOGY

## 2023-06-30 PROCEDURE — 1160F RVW MEDS BY RX/DR IN RCRD: CPT | Mod: CPTII,S$GLB,, | Performed by: OPHTHALMOLOGY

## 2023-06-30 PROCEDURE — 1126F PR PAIN SEVERITY QUANTIFIED, NO PAIN PRESENT: ICD-10-PCS | Mod: CPTII,S$GLB,, | Performed by: OPHTHALMOLOGY

## 2023-06-30 PROCEDURE — 3051F PR MOST RECENT HEMOGLOBIN A1C LEVEL 7.0 - < 8.0%: ICD-10-PCS | Mod: CPTII,S$GLB,, | Performed by: OPHTHALMOLOGY

## 2023-06-30 PROCEDURE — 99999 PR PBB SHADOW E&M-EST. PATIENT-LVL IV: CPT | Mod: PBBFAC,,, | Performed by: OPHTHALMOLOGY

## 2023-06-30 PROCEDURE — 1101F PT FALLS ASSESS-DOCD LE1/YR: CPT | Mod: CPTII,S$GLB,, | Performed by: OPHTHALMOLOGY

## 2023-06-30 PROCEDURE — 1126F AMNT PAIN NOTED NONE PRSNT: CPT | Mod: CPTII,S$GLB,, | Performed by: OPHTHALMOLOGY

## 2023-06-30 PROCEDURE — 99024 POSTOP FOLLOW-UP VISIT: CPT | Mod: S$GLB,,, | Performed by: OPHTHALMOLOGY

## 2023-06-30 PROCEDURE — 3288F PR FALLS RISK ASSESSMENT DOCUMENTED: ICD-10-PCS | Mod: CPTII,S$GLB,, | Performed by: OPHTHALMOLOGY

## 2023-06-30 PROCEDURE — 99024 PR POST-OP FOLLOW-UP VISIT: ICD-10-PCS | Mod: S$GLB,,, | Performed by: OPHTHALMOLOGY

## 2023-06-30 PROCEDURE — 1101F PR PT FALLS ASSESS DOC 0-1 FALLS W/OUT INJ PAST YR: ICD-10-PCS | Mod: CPTII,S$GLB,, | Performed by: OPHTHALMOLOGY

## 2023-06-30 PROCEDURE — 1159F PR MEDICATION LIST DOCUMENTED IN MEDICAL RECORD: ICD-10-PCS | Mod: CPTII,S$GLB,, | Performed by: OPHTHALMOLOGY

## 2023-06-30 PROCEDURE — 99499 RISK ADDL DX/OHS AUDIT: ICD-10-PCS | Mod: S$GLB,,, | Performed by: OPHTHALMOLOGY

## 2023-06-30 PROCEDURE — 99999 PR PBB SHADOW E&M-EST. PATIENT-LVL IV: ICD-10-PCS | Mod: PBBFAC,,, | Performed by: OPHTHALMOLOGY

## 2023-06-30 PROCEDURE — 1160F PR REVIEW ALL MEDS BY PRESCRIBER/CLIN PHARMACIST DOCUMENTED: ICD-10-PCS | Mod: CPTII,S$GLB,, | Performed by: OPHTHALMOLOGY

## 2023-06-30 PROCEDURE — 1159F MED LIST DOCD IN RCRD: CPT | Mod: CPTII,S$GLB,, | Performed by: OPHTHALMOLOGY

## 2023-06-30 PROCEDURE — 99499 UNLISTED E&M SERVICE: CPT | Mod: S$GLB,,, | Performed by: OPHTHALMOLOGY

## 2023-06-30 NOTE — PROGRESS NOTES
HPI     Post-op Evaluation     Additional comments: 3 day po chk           Comments    DLS: 06/27/2023  S/p Trab with MMC OS 6/14/23 (no stent or shunt)     1. POAG   2. VF Defect OS   3. Chorioretinal Scar OS   4. Hx RD OS   5. NS OU   6. Type 2 DM no DR   7. Pupil Sphincter Rupture OS   8. Presbyopia     MEDS:   PA QID OS   Atropine BID OS     Cosopt TID OD   Brimonidine TID OD   Rocklatan QHS OD            Last edited by Alpesh Sawant on 6/30/2023  8:03 AM.            Assessment /Plan     For exam results, see Encounter Report.    Glaucoma filtering bleb of left eye    Primary open-angle glaucoma, left eye, moderate stage    Nuclear sclerotic cataract of both eyes      [facility]   Presented to [facility] - susana Val[facility] 8/23/2006 - off gtts with a baseline / Tmax of 25/27  Referred by Courtney for consideration of SLT's   Pt with POAG and poorly controlled IOP's - does not use drops regularly       1. Primary open angle glaucoma, both eyes, mild stage        Glaucoma (type and duration)    POAG with elevated IOP ou - mild stage    First HVF   2007 - full od // SAD os 2/2 to CRS   First photos   2011   Treatment / Drops started   2006           Family history    ++ mother and 2 brothers         Glaucoma meds    Latanoprost // brimonidine //  cosopt (out of cosopt - 5/2/2023)         H/O adverse rxn to glaucoma drops    None (has tired alphagan and timolol in past -non compliant)         LASERS    SLT os - 3/31/2016 // SLT od - 4/14/2016 // repeat SLT OS 3/16/2023        GLAUCOMA SURGERIES    none        OTHER EYE SURGERIES    H/O RD repair os - S/P laser repair         CDR    0.75 // 0.85-0.9         Tbase    25/27          Tmax    25/27            Ttarget    18/18             HVF    13 test 2006 to  2022 - full  od // SAD - 2/2 CRS from laser for RD repair os / new IAD         Gonio    +3 ou          CCT    486/502 - thin ou         OCT    8 test 2011 to 2022 -  RNFL - dec T od ( fluctuate) od  - dec. G/N/TI/TS/ N  Bord T/NI  (has a CRS)  os        HRT    3 test 2017 to 2019 -MR -  DEC. Sn/n/in od // DEC. S/n/i, CHUNG t os /// CDR 0.73 od // 0.86 os        Disc photos    2011, 2015 , 2017// [facility] - 2022      - Ttoday   ?? /24  (on glaucoma drops)    - Test done today  -S/P trab OS 6/14/2023     2. 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 Chorioretinal scar, left   -with 2nd VF defect      4. Old subtotal retinal detachment, left   S/P repair - retina flat - stable   -large CRS   - pt states he did NOT go to OR - just Rx with laser at the slit lamp     5. Type 2 diabetes mellitus without ophthalmic manifestations      6. Senile nuclear sclerosis  -mild - monitor      7. pupil sphincter rupture os  -suggest old trauma  -does NOT appear to have a angle recession    7. Presbyopia       PLAN    POAG with OHT OS > OD - mild od // moderate os   IOP below target od and too high os   S/P repeat slt os 3/16/2023 - ? Residual steroid response vs out of cosopt vs no response     The ON and VF's are still good ou   ?? Trace APD os - first noted on 5/2/2023   The VF loss os is 2/2 old CRS- ?  post laser for a RD repair   Good initial response to SLT ou 25/25 --> 16/17 7/29/2022  ++ IOP elevation os   ++ VF prog os   ++ OCT - RNFL prog os     Cont drops   cosopt tid ou - has run out - sample given and trixie called   Brimonidine tid ou - Rx sent   rocklatan q hs ou (samples given )      02/20/2023  - IOP check - IOP 13//19  - On max drops    - good mobile conj, open on gonio - good xen candidate   - Should consider surgery as progressing on last testing with elevated IOP at 19 on max drops. Book vs repeat studies in 6-8 weeks   - can try repeat slt os and if ineffective - can go ahead with incisional surgery     Photo file updated 5/2/2023 6/2/2023  IOP still too high os (( IOP is ok od))    S/p  repeat slt and on mult gtts     --  rec incisional surgery - doubt he has a scleral buckle  // + free conj sup - could try a trab w/ [facility]  / ? Xen os / express minishunt or no shunt or a GDD     Pt is NOT a diamox candidate - H/O sulfa allergy - hives     Rec trabeculectomy with MMC os -no stent    Pt is still phakic / H/O trauma     S/P trabeculectomy with mitomycin OS  Date:6/14/2023   POD # 12   without any stent or shunt   Number or sutures in flap  4  Number of sutures already cut  none (1st cut 6/22/2023) // 2nd cut 6/26/2023  anterior chamber depth  deep fully formed   Bleb appearance - flat prior to digital massage //  elevated after digital massage next to bleb   sidell neg  IOP 64--> 40 w/ LSL #2 ---> 12 with gentle massage post lysis of 2nd suture- bleb now elevated nasally        06/27/2023  IOP settled to LSL 4 --> Temporal Ant suture sc complication    6/30/2023  IOP acceptable at 11 OS  Deep / no choroidals  Hold suture lysis / massage  Patient doing well, reviewed post-op instructions handout with limited activity & eye care instructions, eye drop therapy and endophthalmitis precautions.  The patient and family voice good understanding and will return as scheduled or sooner as needed.     Vig QID  PF 1% QID  A 1% q day       Meds: - operated eye --> good adherence --> adjust  Pred acetate qid  Atropine bid --> q day    cosopt cont OD // hold os   brim tid  cont OD // hold os  -  Rocklatan - OD only       Plan  RTC [place] as scheduled  RTC sooner prn with good understanding    F/U me (petra)  10 days - July 6th     Bethany Lechuga MD

## 2023-07-04 RX ORDER — MELOXICAM 15 MG/1
TABLET ORAL
Qty: 90 TABLET | Refills: 0 | Status: SHIPPED | OUTPATIENT
Start: 2023-07-04

## 2023-07-05 NOTE — PROGRESS NOTES
HPI    DLS: 06/30/2023 Dr. Lechuga    S/p Trab with [facility] OS 6/14/23 (no stent or shunt)     1. POAG   2. VF Defect OS   3. Chorioretinal Scar OS   4. Hx RD OS   5. NS OU   6. Type 2 DM no DR   7. Pupil Sphincter Rupture OS   8. Presbyopia     MEDS:   PA QID OS   Atropine BID OS     Cosopt TID OD   Brimonidine TID OD   Rocklatan QHS OD (need refill ran out of drops)    68 y.o. male is here for 10 days f/u. Denies eye pain and   flashes/floaters. Vision is slightly blurry, have to blink a few times for   vision to become clear. No discomfort or light sensitivity.      Last edited by YARITZA Escalera on 7/6/2023  9:26 AM.              Assessment /Plan     For exam results, see Encounter Report.    Encounter for postoperative care    Primary open-angle glaucoma, left eye, moderate stage    Nuclear sclerotic cataract of both eyes    Glaucoma filtering bleb of left eye    Old subtotal retinal detachment, left    Pupillary sphincter rupture, left    Chorioretinal scar, left    Type 2 diabetes mellitus without ophthalmic manifestations    Myopia of both eyes with astigmatism and presbyopia        Pt initially dx with glaucoma at [facility] - stoped his gtts on his own   Presented to [facility] - to [facility] 8/23/2006 - off gtts with a baseline / Tmax of 25/27  Referred by Courtney for consideration of SLT's   Pt with POAG and poorly controlled IOP's - does not use drops regularly       1. Primary open angle glaucoma, both eyes, mild stage        Glaucoma (type and duration)    POAG with elevated IOP ou - mild stage    First HVF   2007 - full od // SAD os 2/2 to CRS   First photos   2011   Treatment / Drops started   2006           Family history    ++ mother and 2 brothers         Glaucoma meds    Latanoprost // brimonidine //  cosopt (out of cosopt - 5/2/2023)         H/O adverse rxn to glaucoma drops    None (has tired alphagan and timolol in past -non compliant)         LASERS    SLT os - 3/31/2016 // SLT od - 4/14/2016 //  repeat SLT OS 3/16/2023        GLAUCOMA SURGERIES    none        OTHER EYE SURGERIES    H/O RD repair os - S/P laser repair         CDR    0.75 // 0.85-0.9         Tbase    25/27          Tmax    25/27            Ttarget    18/18             HVF    13 test 2006 to  2022 - full  od // SAD - 2/2 CRS from laser for RD repair os / new IAD         Gonio    +3 ou          CCT    486/502 - thin ou         OCT    8 test 2011 to 2022 - RNFL - dec T od ( fluctuate) od  - dec. G/N/TI/TS/ N  Bord T/NI  (has a CRS)  os        HRT    3 test 2017 to 2019 -MR -  DEC. Sn/n/in od // DEC. S/n/i, BORD t os /// CDR 0.73 od // 0.86 os        Disc photos    2011, 2015 , 2017// [facility] - 2022      - Ttoday  20/10  (on glaucoma drops - od // post trab os) )    - Test done today  -S/P trab OS 6/14/2023     2. 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 Chorioretinal scar, left   -with 2nd VF defect      4. Old subtotal retinal detachment, left   S/P repair - retina flat - stable   -large CRS   - pt states he did NOT go to OR - just Rx with laser at the slit lamp     5. Type 2 diabetes mellitus without ophthalmic manifestations      6. Senile nuclear sclerosis  -mild - monitor      7. pupil sphincter rupture os  -suggest old trauma  -does NOT appear to have a angle recession    7. Presbyopia       PLAN    POAG with OHT OS > OD - mild od // moderate os   IOP below target od and too high os   S/P repeat slt os 3/16/2023 - ? Residual steroid response vs out of cosopt vs no response     The ON and VF's are still good ou   ?? Trace APD os - first noted on 5/2/2023   The VF loss os is 2/2 old CRS- ?  post laser for a RD repair   Good initial response to SLT ou 25/25 --> 16/17 7/29/2022  ++ IOP elevation os   ++ VF prog os   ++ OCT - RNFL prog os     Cont drops   cosopt tid ou - has run out - sample given and trixie called   Brimonidine tid ou - Rx sent    rocklatan q hs ou (samples given )      02/20/2023  - IOP check - IOP 13//19  - On max drops    - good mobile conj, open on gonio - good xen candidate   - Should consider surgery as progressing on last testing with elevated IOP at 19 on max drops. Book vs repeat studies in 6-8 weeks   - can try repeat slt os and if ineffective - can go ahead with incisional surgery     Photo file updated 5/2/2023 6/2/2023  IOP still too high os (( IOP is ok od))    S/p repeat slt and on mult gtts     --  rec incisional surgery - doubt he has a scleral buckle  // + free conj sup - could try a trab w/ MMC  / ? Xen os / express minishunt or no shunt or a GDD     Pt is NOT a diamox candidate - H/O sulfa allergy - hives     Rec trabeculectomy with MMC os -no stent    Pt is still phakic / H/O trauma     S/P trabeculectomy with mitomycin OS  Date:6/14/2023   POW # 3  without any stent or shunt   Number or sutures in flap  4  Number of sutures already cut  none (1st cut 6/22/2023) // 2nd cut 6/26/2023// 3rd suture cut 6/27/2023   anterior chamber depth  deep fully formed   Bleb appearance - flat prior to digital massage //  elevated after digital massage next to bleb   sidell neg  IOP  10  (S/P LSL x 3 - only one suture left)     Meds: - operated eye  Pred acetate qid  Atropine bid    cosopt cont OD // hold os   brim tid  cont OD // hold os  -  Rocklatan - OD only     F/U 1 week     Bethany Lechuga MD

## 2023-07-06 ENCOUNTER — OFFICE VISIT (OUTPATIENT)
Dept: OPHTHALMOLOGY | Facility: CLINIC | Age: 68
End: 2023-07-06
Payer: COMMERCIAL

## 2023-07-06 DIAGNOSIS — H25.13 NUCLEAR SCLEROTIC CATARACT OF BOTH EYES: ICD-10-CM

## 2023-07-06 DIAGNOSIS — E11.9 TYPE 2 DIABETES MELLITUS WITHOUT OPHTHALMIC MANIFESTATIONS: ICD-10-CM

## 2023-07-06 DIAGNOSIS — H21.562 PUPILLARY SPHINCTER RUPTURE, LEFT: ICD-10-CM

## 2023-07-06 DIAGNOSIS — H52.203 MYOPIA OF BOTH EYES WITH ASTIGMATISM AND PRESBYOPIA: ICD-10-CM

## 2023-07-06 DIAGNOSIS — H52.13 MYOPIA OF BOTH EYES WITH ASTIGMATISM AND PRESBYOPIA: ICD-10-CM

## 2023-07-06 DIAGNOSIS — H52.4 MYOPIA OF BOTH EYES WITH ASTIGMATISM AND PRESBYOPIA: ICD-10-CM

## 2023-07-06 DIAGNOSIS — H33.052 OLD SUBTOTAL RETINAL DETACHMENT, LEFT: ICD-10-CM

## 2023-07-06 DIAGNOSIS — Z48.89 ENCOUNTER FOR POSTOPERATIVE CARE: Primary | ICD-10-CM

## 2023-07-06 DIAGNOSIS — H40.1122 PRIMARY OPEN-ANGLE GLAUCOMA, LEFT EYE, MODERATE STAGE: ICD-10-CM

## 2023-07-06 DIAGNOSIS — H31.002 CHORIORETINAL SCAR, LEFT: ICD-10-CM

## 2023-07-06 DIAGNOSIS — Z98.83 GLAUCOMA FILTERING BLEB OF LEFT EYE: ICD-10-CM

## 2023-07-06 PROCEDURE — 99499 UNLISTED E&M SERVICE: CPT | Mod: S$GLB,,, | Performed by: OPHTHALMOLOGY

## 2023-07-06 PROCEDURE — 1159F PR MEDICATION LIST DOCUMENTED IN MEDICAL RECORD: ICD-10-PCS | Mod: CPTII,S$GLB,, | Performed by: OPHTHALMOLOGY

## 2023-07-06 PROCEDURE — 3288F FALL RISK ASSESSMENT DOCD: CPT | Mod: CPTII,S$GLB,, | Performed by: OPHTHALMOLOGY

## 2023-07-06 PROCEDURE — 99499 RISK ADDL DX/OHS AUDIT: ICD-10-PCS | Mod: S$GLB,,, | Performed by: OPHTHALMOLOGY

## 2023-07-06 PROCEDURE — 99024 POSTOP FOLLOW-UP VISIT: CPT | Mod: S$GLB,,, | Performed by: OPHTHALMOLOGY

## 2023-07-06 PROCEDURE — 1126F AMNT PAIN NOTED NONE PRSNT: CPT | Mod: CPTII,S$GLB,, | Performed by: OPHTHALMOLOGY

## 2023-07-06 PROCEDURE — 3051F PR MOST RECENT HEMOGLOBIN A1C LEVEL 7.0 - < 8.0%: ICD-10-PCS | Mod: CPTII,S$GLB,, | Performed by: OPHTHALMOLOGY

## 2023-07-06 PROCEDURE — 1101F PR PT FALLS ASSESS DOC 0-1 FALLS W/OUT INJ PAST YR: ICD-10-PCS | Mod: CPTII,S$GLB,, | Performed by: OPHTHALMOLOGY

## 2023-07-06 PROCEDURE — 99999 PR PBB SHADOW E&M-EST. PATIENT-LVL III: ICD-10-PCS | Mod: PBBFAC,,, | Performed by: OPHTHALMOLOGY

## 2023-07-06 PROCEDURE — 99999 PR PBB SHADOW E&M-EST. PATIENT-LVL III: CPT | Mod: PBBFAC,,, | Performed by: OPHTHALMOLOGY

## 2023-07-06 PROCEDURE — 3051F HG A1C>EQUAL 7.0%<8.0%: CPT | Mod: CPTII,S$GLB,, | Performed by: OPHTHALMOLOGY

## 2023-07-06 PROCEDURE — 1101F PT FALLS ASSESS-DOCD LE1/YR: CPT | Mod: CPTII,S$GLB,, | Performed by: OPHTHALMOLOGY

## 2023-07-06 PROCEDURE — 1159F MED LIST DOCD IN RCRD: CPT | Mod: CPTII,S$GLB,, | Performed by: OPHTHALMOLOGY

## 2023-07-06 PROCEDURE — 1126F PR PAIN SEVERITY QUANTIFIED, NO PAIN PRESENT: ICD-10-PCS | Mod: CPTII,S$GLB,, | Performed by: OPHTHALMOLOGY

## 2023-07-06 PROCEDURE — 3288F PR FALLS RISK ASSESSMENT DOCUMENTED: ICD-10-PCS | Mod: CPTII,S$GLB,, | Performed by: OPHTHALMOLOGY

## 2023-07-06 PROCEDURE — 99024 PR POST-OP FOLLOW-UP VISIT: ICD-10-PCS | Mod: S$GLB,,, | Performed by: OPHTHALMOLOGY

## 2023-07-10 NOTE — PROGRESS NOTES
HPI     Post-op Evaluation     Additional comments: Pt feels OS is draining a lot and is also blinking a   lot to clear vision  OS but denies any pain           Comments    S/p Trab with MMC OS 6/14/23 (no stent or shunt)     1. POAG   2. VF Defect OS   3. Chorioretinal Scar OS   4. Hx RD OS   5. NS OU   6. Type 2 DM no DR   7. Pupil Sphincter Rupture OS   8. Presbyopia     MEDS:   PA QID OS   Atropine BID OS     Cosopt TID OD   Brimonidine TID OD   Rocklatan QHS OD            Last edited by Mayda Sterling MA on 7/13/2023  8:26 AM.            Assessment /Plan     For exam results, see Encounter Report.    Encounter for postoperative care    Primary open-angle glaucoma, left eye, moderate stage    Nuclear sclerotic cataract of both eyes    Glaucoma filtering bleb of left eye    Old subtotal retinal detachment, left    Pupillary sphincter rupture, left    Chorioretinal scar, left    Type 2 diabetes mellitus without ophthalmic manifestations        Pt initially dx with glaucoma at [facility] his gtts on his own   Presented to [facility] - susana [facility] 8/23/2006 - off gtts with a baseline / Tmax of 25/27  Referred by Courtney for consideration of SLT's   Pt with POAG and poorly controlled IOP's - does not use drops regularly       1. Primary open angle glaucoma, both eyes, mild stage        Glaucoma (type and duration)    POAG with elevated IOP ou - mild stage    First HVF   2007 - full od // SAD os 2/2 to CRS   First photos   2011   Treatment / Drops started   2006           Family history    ++ mother and 2 brothers         Glaucoma meds    Latanoprost // brimonidine //  cosopt (out of cosopt - 5/2/2023)         H/O adverse rxn to glaucoma drops    None (has tired alphagan and timolol in past -non compliant)         LASERS    SLT os - 3/31/2016 // SLT od - 4/14/2016 // repeat SLT OS 3/16/2023        GLAUCOMA SURGERIES    trab os - 6/14/2023        OTHER EYE SURGERIES    H/O RD repair os - S/P laser repair         CDR     0.75 // 0.85-0.9         Tbase    25/27          Tmax    25/27            Ttarget    18/18             HVF    13 test 2006 to  2022 - full  od // SAD - 2/2 CRS from laser for RD repair os / new IAD         Gonio    +3 ou          CCT    486/502 - thin ou         OCT    8 test 2011 to 2022 - RNFL - dec T od ( fluctuate) od  - dec. G/N/TI/TS/ N  Bord T/NI  (has a CRS)  os        HRT    3 test 2017 to 2019 -MR -  DEC. Sn/n/in od // DEC. S/n/i, BORD t os /// CDR 0.73 od // 0.86 os        Disc photos    2011, 2015 , 2017// [facility] - 2022      - Ttoday  16/6  (on glaucoma drops - od // post trab os) )    - Test done today  -S/P trab OS 6/14/2023     2. 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 Chorioretinal scar, left   -with 2nd VF defect      4. Old subtotal retinal detachment, left   S/P repair - retina flat - stable   -large CRS   - pt states he did NOT go to OR - just Rx with laser at the slit lamp     5. Type 2 diabetes mellitus without ophthalmic manifestations      6. Senile nuclear sclerosis  -mild - monitor      7. pupil sphincter rupture os  -suggest old trauma  -does NOT appear to have a angle recession    7. Presbyopia       PLAN    POAG with OHT OS > OD - mild od // moderate os   IOP below target od and too high os   S/P repeat slt os 3/16/2023 - ? Residual steroid response vs out of cosopt vs no response     The ON and VF's are still good ou   ?? Trace APD os - first noted on 5/2/2023   The VF loss os is 2/2 old CRS- ?  post laser for a RD repair   Good initial response to SLT ou 25/25 --> 16/17 7/29/2022  ++ IOP elevation os   ++ VF prog os   ++ OCT - RNFL prog os     Cont drops   cosopt tid ou - has run out - sample given and trixie called   Brimonidine tid ou - Rx sent   rocklatan q hs ou (samples given )      Photo file updated 5/2/2023 6/2/2023  IOP still too high os (( IOP is ok od))    S/p repeat slt and  on mult gtts     --  rec incisional surgery - doubt he has a scleral buckle  // + free conj sup - could try a trab w/ MMC  / ? Xen os / express minishunt or no shunt or a GDD     Pt is NOT a diamox candidate - H/O sulfa allergy - hives     Rec trabeculectomy with MMC os -no stent    Pt is still phakic / H/O trauma     S/P trabeculectomy with mitomycin OS  Date:6/14/2023   POW # 4   without any stent or shunt   Number or sutures in flap  4  Number of sutures already cut - 3 -  (1st cut 6/22/2023) // 2nd cut 6/26/2023// 3rd suture cut 6/27/2023   anterior chamber depth  deep fully formed   Bleb appearance - flat prior to digital massage //  elevated after digital massage next to bleb   sidell neg  IOP  6  (S/P LSL x 3 - only one suture left)       Shield at night - as long as IOP low     Meds: - operated eye  Pred acetate qid- decrease to tid   Atropine bid    cosopt cont OD // hold os   brim tid  cont OD // hold os  -  Rocklatan - OD only     F/U 1 week     Bethany Lechuga MD

## 2023-07-12 ENCOUNTER — PATIENT MESSAGE (OUTPATIENT)
Dept: INTERNAL MEDICINE | Facility: CLINIC | Age: 68
End: 2023-07-12
Payer: MEDICARE

## 2023-07-13 ENCOUNTER — OFFICE VISIT (OUTPATIENT)
Dept: OPHTHALMOLOGY | Facility: CLINIC | Age: 68
End: 2023-07-13
Payer: COMMERCIAL

## 2023-07-13 DIAGNOSIS — E11.9 TYPE 2 DIABETES MELLITUS WITHOUT OPHTHALMIC MANIFESTATIONS: ICD-10-CM

## 2023-07-13 DIAGNOSIS — H25.13 NUCLEAR SCLEROTIC CATARACT OF BOTH EYES: ICD-10-CM

## 2023-07-13 DIAGNOSIS — H40.1122 PRIMARY OPEN-ANGLE GLAUCOMA, LEFT EYE, MODERATE STAGE: ICD-10-CM

## 2023-07-13 DIAGNOSIS — Z98.83 GLAUCOMA FILTERING BLEB OF LEFT EYE: ICD-10-CM

## 2023-07-13 DIAGNOSIS — H33.052 OLD SUBTOTAL RETINAL DETACHMENT, LEFT: ICD-10-CM

## 2023-07-13 DIAGNOSIS — Z48.89 ENCOUNTER FOR POSTOPERATIVE CARE: Primary | ICD-10-CM

## 2023-07-13 DIAGNOSIS — H31.002 CHORIORETINAL SCAR, LEFT: ICD-10-CM

## 2023-07-13 DIAGNOSIS — H21.562 PUPILLARY SPHINCTER RUPTURE, LEFT: ICD-10-CM

## 2023-07-13 PROCEDURE — 99024 PR POST-OP FOLLOW-UP VISIT: ICD-10-PCS | Mod: S$GLB,,, | Performed by: OPHTHALMOLOGY

## 2023-07-13 PROCEDURE — 1126F PR PAIN SEVERITY QUANTIFIED, NO PAIN PRESENT: ICD-10-PCS | Mod: CPTII,S$GLB,, | Performed by: OPHTHALMOLOGY

## 2023-07-13 PROCEDURE — 99999 PR PBB SHADOW E&M-EST. PATIENT-LVL IV: CPT | Mod: PBBFAC,,, | Performed by: OPHTHALMOLOGY

## 2023-07-13 PROCEDURE — 1101F PT FALLS ASSESS-DOCD LE1/YR: CPT | Mod: CPTII,S$GLB,, | Performed by: OPHTHALMOLOGY

## 2023-07-13 PROCEDURE — 1101F PR PT FALLS ASSESS DOC 0-1 FALLS W/OUT INJ PAST YR: ICD-10-PCS | Mod: CPTII,S$GLB,, | Performed by: OPHTHALMOLOGY

## 2023-07-13 PROCEDURE — 1159F PR MEDICATION LIST DOCUMENTED IN MEDICAL RECORD: ICD-10-PCS | Mod: CPTII,S$GLB,, | Performed by: OPHTHALMOLOGY

## 2023-07-13 PROCEDURE — 3288F FALL RISK ASSESSMENT DOCD: CPT | Mod: CPTII,S$GLB,, | Performed by: OPHTHALMOLOGY

## 2023-07-13 PROCEDURE — 1160F PR REVIEW ALL MEDS BY PRESCRIBER/CLIN PHARMACIST DOCUMENTED: ICD-10-PCS | Mod: CPTII,S$GLB,, | Performed by: OPHTHALMOLOGY

## 2023-07-13 PROCEDURE — 1160F RVW MEDS BY RX/DR IN RCRD: CPT | Mod: CPTII,S$GLB,, | Performed by: OPHTHALMOLOGY

## 2023-07-13 PROCEDURE — 3051F HG A1C>EQUAL 7.0%<8.0%: CPT | Mod: CPTII,S$GLB,, | Performed by: OPHTHALMOLOGY

## 2023-07-13 PROCEDURE — 99999 PR PBB SHADOW E&M-EST. PATIENT-LVL IV: ICD-10-PCS | Mod: PBBFAC,,, | Performed by: OPHTHALMOLOGY

## 2023-07-13 PROCEDURE — 3051F PR MOST RECENT HEMOGLOBIN A1C LEVEL 7.0 - < 8.0%: ICD-10-PCS | Mod: CPTII,S$GLB,, | Performed by: OPHTHALMOLOGY

## 2023-07-13 PROCEDURE — 3288F PR FALLS RISK ASSESSMENT DOCUMENTED: ICD-10-PCS | Mod: CPTII,S$GLB,, | Performed by: OPHTHALMOLOGY

## 2023-07-13 PROCEDURE — 1159F MED LIST DOCD IN RCRD: CPT | Mod: CPTII,S$GLB,, | Performed by: OPHTHALMOLOGY

## 2023-07-13 PROCEDURE — 1126F AMNT PAIN NOTED NONE PRSNT: CPT | Mod: CPTII,S$GLB,, | Performed by: OPHTHALMOLOGY

## 2023-07-13 PROCEDURE — 99024 POSTOP FOLLOW-UP VISIT: CPT | Mod: S$GLB,,, | Performed by: OPHTHALMOLOGY

## 2023-07-13 PROCEDURE — 99499 UNLISTED E&M SERVICE: CPT | Mod: S$GLB,,, | Performed by: OPHTHALMOLOGY

## 2023-07-13 PROCEDURE — 99499 RISK ADDL DX/OHS AUDIT: ICD-10-PCS | Mod: S$GLB,,, | Performed by: OPHTHALMOLOGY

## 2023-07-18 NOTE — PROGRESS NOTES
HPI    DLS: 7/13/2023    Pt here for S/P Trab with MMC OS 6/14/2023 (no stent or shunt)  Pt states no eye pain or discomfort.     Meds;  PA TID OS  Atropine BID OS    Cosopt TID OD  Brimonidine TID OD  Rocklatan QDAY OD    1. POAG   2. VF Defect OS   3. Chorioretinal Scar OS   4. Hx RD OS   5. NS OU   6. Type 2 DM no DR   7. Pupil Sphincter Rupture OS   8. Presbyopia     Last edited by Talya Hobson on 7/20/2023  8:11 AM.            Assessment /Plan     For exam results, see Encounter Report.    Encounter for postoperative care    Primary open-angle glaucoma, left eye, moderate stage    Nuclear sclerotic cataract of both eyes    Pupillary sphincter rupture, left    Chorioretinal scar, left    Old subtotal retinal detachment, left    Glaucoma filtering bleb of left eye    Type 2 diabetes mellitus without ophthalmic manifestations          [facility]   Presented to [facility] - to [facility] 8/23/2006 - off gtts with a baseline / Tmax of 25/27  Referred by Courtney for consideration of SLT's   Pt with POAG and poorly controlled IOP's - does not use drops regularly       1. Primary open angle glaucoma, both eyes, mild stage        Glaucoma (type and duration)    POAG with elevated IOP ou - mild stage    First HVF   2007 - full od // SAD os 2/2 to CRS   First photos   2011   Treatment / Drops started   2006           Family history    ++ mother and 2 brothers         Glaucoma meds    Latanoprost // brimonidine //  cosopt (out of cosopt - 5/2/2023)         H/O adverse rxn to glaucoma drops    None (has tired alphagan and timolol in past -non compliant)         LASERS    SLT os - 3/31/2016 // SLT od - 4/14/2016 // repeat SLT OS 3/16/2023        GLAUCOMA SURGERIES    trab os - 6/14/2023        OTHER EYE SURGERIES    H/O RD repair os - S/P laser repair         CDR    0.75 // 0.85-0.9         Tbase    25/27          Tmax    25/27            Ttarget    18/18              HVF    13 test 2006 to  2022 - full  od // SAD - 2/2 CRS from laser for RD repair os / new IAD         Gonio    +3 ou          CCT    486/502 - thin ou         OCT    8 test 2011 to 2022 - RNFL - dec T od ( fluctuate) od  - dec. G/N/TI/TS/ N  Bord T/NI  (has a CRS)  os        HRT    3 test 2017 to 2019 -MR -  DEC. Sn/n/in od // DEC. S/n/i, BORD t os /// CDR 0.73 od // 0.86 os        Disc photos    2011, 2015 , 2017// [facility] - 2022      - Ttoday  12/17  (on glaucoma drops - od // post trab os) )    - Test done today  -S/P trab OS 6/14/2023     2. 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 Chorioretinal scar, left   -with 2nd VF defect      4. Old subtotal retinal detachment, left   S/P repair - retina flat - stable   -large CRS   - pt states he did NOT go to OR - just Rx with laser at the slit lamp     5. Type 2 diabetes mellitus without ophthalmic manifestations      6. Senile nuclear sclerosis  -mild - monitor      7. pupil sphincter rupture os  -suggest old trauma  -does NOT appear to have a angle recession    7. Presbyopia       PLAN    POAG with OHT OS > OD - mild od // moderate os   IOP below target od and too high os   S/P repeat slt os 3/16/2023 - ? Residual steroid response vs out of cosopt vs no response     The ON and VF's are still good ou   ?? Trace APD os - first noted on 5/2/2023   The VF loss os is 2/2 old CRS- ?  post laser for a RD repair   Good initial response to SLT ou 25/25 --> 16/17 7/29/2022  ++ IOP elevation os   ++ VF prog os   ++ OCT - RNFL prog os     Cont drops   cosopt tid ou - has run out - sample given and trixie called   Brimonidine tid ou - Rx sent   rocklatan q hs ou (samples given )      Photo file updated 5/2/2023 6/2/2023  IOP still too high os (( IOP is ok od))    S/p repeat slt and on mult gtts     --  rec incisional surgery - doubt he has a scleral buckle  // + free conj sup - could  try a trab w/ MMC  / ? Xen os / express minishunt or no shunt or a GDD     Pt is NOT a diamox candidate - H/O sulfa allergy - hives     Rec trabeculectomy with MMC os -no stent    Pt is still phakic / H/O trauma     S/P trabeculectomy with mitomycin OS  Date:6/14/2023   POW # 5  without any stent or shunt   Number or sutures in flap  4  Number of sutures already cut - 3 -  (1st cut 6/22/2023) // 2nd cut 6/26/2023// 3rd suture cut 6/27/2023   anterior chamber depth  deep fully formed   Bleb appearance - flat prior to digital massage //  elevated after digital massage next to bleb   sidell neg  IOP  7  (S/P LSL x 3 - only one suture left)       Shield at night - as long as IOP low     Meds: - operated eye  Pred acetate decrease to bid   Atropine - decrease to 1 xday     cosopt cont OD // hold os   brim tid  cont OD // hold os  -  Rocklatan - OD only   2 week - AR/MR ou      Bethany Lechuga MD

## 2023-07-20 ENCOUNTER — OFFICE VISIT (OUTPATIENT)
Dept: OPHTHALMOLOGY | Facility: CLINIC | Age: 68
End: 2023-07-20
Payer: COMMERCIAL

## 2023-07-20 DIAGNOSIS — H21.562 PUPILLARY SPHINCTER RUPTURE, LEFT: ICD-10-CM

## 2023-07-20 DIAGNOSIS — H33.052 OLD SUBTOTAL RETINAL DETACHMENT, LEFT: ICD-10-CM

## 2023-07-20 DIAGNOSIS — E11.9 TYPE 2 DIABETES MELLITUS WITHOUT OPHTHALMIC MANIFESTATIONS: ICD-10-CM

## 2023-07-20 DIAGNOSIS — H40.1122 PRIMARY OPEN-ANGLE GLAUCOMA, LEFT EYE, MODERATE STAGE: ICD-10-CM

## 2023-07-20 DIAGNOSIS — H31.002 CHORIORETINAL SCAR, LEFT: ICD-10-CM

## 2023-07-20 DIAGNOSIS — H25.13 NUCLEAR SCLEROTIC CATARACT OF BOTH EYES: ICD-10-CM

## 2023-07-20 DIAGNOSIS — Z98.83 GLAUCOMA FILTERING BLEB OF LEFT EYE: ICD-10-CM

## 2023-07-20 DIAGNOSIS — Z48.89 ENCOUNTER FOR POSTOPERATIVE CARE: Primary | ICD-10-CM

## 2023-07-20 PROCEDURE — 1126F AMNT PAIN NOTED NONE PRSNT: CPT | Mod: CPTII,S$GLB,, | Performed by: OPHTHALMOLOGY

## 2023-07-20 PROCEDURE — 99499 RISK ADDL DX/OHS AUDIT: ICD-10-PCS | Mod: S$GLB,,, | Performed by: OPHTHALMOLOGY

## 2023-07-20 PROCEDURE — 99999 PR PBB SHADOW E&M-EST. PATIENT-LVL III: ICD-10-PCS | Mod: PBBFAC,,, | Performed by: OPHTHALMOLOGY

## 2023-07-20 PROCEDURE — 99024 POSTOP FOLLOW-UP VISIT: CPT | Mod: S$GLB,,, | Performed by: OPHTHALMOLOGY

## 2023-07-20 PROCEDURE — 1159F PR MEDICATION LIST DOCUMENTED IN MEDICAL RECORD: ICD-10-PCS | Mod: CPTII,S$GLB,, | Performed by: OPHTHALMOLOGY

## 2023-07-20 PROCEDURE — 3051F PR MOST RECENT HEMOGLOBIN A1C LEVEL 7.0 - < 8.0%: ICD-10-PCS | Mod: CPTII,S$GLB,, | Performed by: OPHTHALMOLOGY

## 2023-07-20 PROCEDURE — 1159F MED LIST DOCD IN RCRD: CPT | Mod: CPTII,S$GLB,, | Performed by: OPHTHALMOLOGY

## 2023-07-20 PROCEDURE — 99499 UNLISTED E&M SERVICE: CPT | Mod: S$GLB,,, | Performed by: OPHTHALMOLOGY

## 2023-07-20 PROCEDURE — 1101F PR PT FALLS ASSESS DOC 0-1 FALLS W/OUT INJ PAST YR: ICD-10-PCS | Mod: CPTII,S$GLB,, | Performed by: OPHTHALMOLOGY

## 2023-07-20 PROCEDURE — 3288F PR FALLS RISK ASSESSMENT DOCUMENTED: ICD-10-PCS | Mod: CPTII,S$GLB,, | Performed by: OPHTHALMOLOGY

## 2023-07-20 PROCEDURE — 3051F HG A1C>EQUAL 7.0%<8.0%: CPT | Mod: CPTII,S$GLB,, | Performed by: OPHTHALMOLOGY

## 2023-07-20 PROCEDURE — 1101F PT FALLS ASSESS-DOCD LE1/YR: CPT | Mod: CPTII,S$GLB,, | Performed by: OPHTHALMOLOGY

## 2023-07-20 PROCEDURE — 99024 PR POST-OP FOLLOW-UP VISIT: ICD-10-PCS | Mod: S$GLB,,, | Performed by: OPHTHALMOLOGY

## 2023-07-20 PROCEDURE — 1126F PR PAIN SEVERITY QUANTIFIED, NO PAIN PRESENT: ICD-10-PCS | Mod: CPTII,S$GLB,, | Performed by: OPHTHALMOLOGY

## 2023-07-20 PROCEDURE — 3288F FALL RISK ASSESSMENT DOCD: CPT | Mod: CPTII,S$GLB,, | Performed by: OPHTHALMOLOGY

## 2023-07-20 PROCEDURE — 99999 PR PBB SHADOW E&M-EST. PATIENT-LVL III: CPT | Mod: PBBFAC,,, | Performed by: OPHTHALMOLOGY

## 2023-07-30 NOTE — PROGRESS NOTES
HPI     Post-op Evaluation     Additional comments: Pt states no pain or other symptoms but sometimes   vision takes awhile to focus and he has to blink a lot             Comments    S/p Trab with MMC OS 6/14/23 (no stent or shunt)     1. POAG   2. VF Defect OS   3. Chorioretinal Scar OS   4. Hx RD OS   5. NS OU   6. Type 2 DM no DR   7. Pupil Sphincter Rupture OS   8. Presbyopia     MEDS:   PA BID OS   Atropine QDAY OS     Cosopt TID OD   Brimonidine TID OD   Rocklatan QHS OD            Last edited by Mayda Sterling MA on 8/3/2023  8:26 AM.              Assessment /Plan     For exam results, see Encounter Report.    Encounter for postoperative care    Primary open-angle glaucoma, left eye, moderate stage    Nuclear sclerotic cataract of both eyes    Pupillary sphincter rupture, left    Chorioretinal scar, left    Old subtotal retinal detachment, left    Glaucoma filtering bleb of left eye          Pt initially dx with glaucoma at [facility] g[facility] on his own   Presented to [facility] - to [facility] 8/23/2006 - off gtts with a baseline / Tmax of 25/27  Referred by Courtney for consideration of SLT's   Pt with POAG and poorly controlled IOP's - does not use drops regularly       1. Primary open angle glaucoma, both eyes, mild stage        Glaucoma (type and duration)    POAG with elevated IOP ou - mild stage    First HVF   2007 - full od // SAD os 2/2 to CRS   First photos   2011   Treatment / Drops started   2006           Family history    ++ mother and 2 brothers         Glaucoma meds    Latanoprost // brimonidine //  cosopt (out of cosopt - 5/2/2023)         H/O adverse rxn to glaucoma drops    None (has tired alphagan and timolol in past -non compliant)         LASERS    SLT os - 3/31/2016 // SLT od - 4/14/2016 // repeat SLT OS 3/16/2023        GLAUCOMA SURGERIES    trab os - 6/14/2023        OTHER EYE SURGERIES    H/O RD repair os - S/P laser repair         CDR    0.75 // 0.85-0.9         Tbase    25/27           Tmax    25/27            Ttarget    18/18             HVF    13 test 2006 to  2022 - full  od // SAD - 2/2 CRS from laser for RD repair os / new IAD         Gonio    +3 ou          CCT    486/502 - thin ou         OCT    8 test 2011 to 2022 - RNFL - dec T od ( fluctuate) od  - dec. G/N/TI/TS/ N  Bord T/NI  (has a CRS)  os        HRT    3 test 2017 to 2019 -MR -  DEC. Sn/n/in od // DEC. S/n/i, BORD t os /// CDR 0.73 od // 0.86 os        Disc photos    2011, 2015 , 2017// [facility] - 2022      - Ttoday  15/5  (on glaucoma drops - od // post trab os) )    - Test done today  -S/P trab OS 6/14/2023     2. 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 Chorioretinal scar, left   -with 2nd VF defect      4. Old subtotal retinal detachment, left   S/P repair - retina flat - stable   -large CRS   - pt states he did NOT go to OR - just Rx with laser at the slit lamp     5. Type 2 diabetes mellitus without ophthalmic manifestations      6. Senile nuclear sclerosis  -mild - monitor      7. pupil sphincter rupture os  -suggest old trauma  -does NOT appear to have a angle recession    7. Presbyopia       PLAN    POAG with OHT OS > OD - mild od // moderate os   IOP below target od and too high os   S/P repeat slt os 3/16/2023 - ? Residual steroid response vs out of cosopt vs no response     The ON and VF's are still good ou   ?? Trace APD os - first noted on 5/2/2023   The VF loss os is 2/2 old CRS- ?  post laser for a RD repair   Good initial response to SLT ou 25/25 --> 16/17 7/29/2022  ++ IOP elevation os   ++ VF prog os   ++ OCT - RNFL prog os     Cont drops   cosopt tid ou - has run out - sample given and trixie called   Brimonidine tid ou - Rx sent   rocklatan q hs ou (samples given )      Photo file updated 5/2/2023 6/2/2023  IOP still too high os (( IOP is ok od))    S/p repeat slt and on mult gtts     --  rec incisional surgery -  doubt he has a scleral buckle  // + free conj sup - could try a trab w/ MMC  / ? Xen os / express minishunt or no shunt or a GDD     Pt is NOT a diamox candidate - H/O sulfa allergy - hives     Rec trabeculectomy with MMC os -no stent    Pt is still phakic / H/O trauma     S/P trabeculectomy with mitomycin OS  Date:6/14/2023   POW # 7  without any stent or shunt   Number or sutures in flap  4  Number of sutures already cut - 3 -  (1st cut 6/22/2023) // 2nd cut 6/26/2023// 3rd suture cut 6/27/2023   anterior chamber depth  deep fully formed   Bleb appearance - flat prior to digital massage //  elevated after digital massage next to bleb   sidell neg  IOP  5 (S/P LSL x 3 - only one suture left)       Shield at night - as long as IOP low     Meds: - operated eye  Pred acetate bid   Atropine -  1 xday     cosopt cont OD // hold os   brim tid  cont OD // hold os  -  Rocklatan - OD only     F/U 2- 3   week - AR/MR ou      Bethany Lechuga MD

## 2023-08-03 ENCOUNTER — OFFICE VISIT (OUTPATIENT)
Dept: OPHTHALMOLOGY | Facility: CLINIC | Age: 68
End: 2023-08-03
Payer: COMMERCIAL

## 2023-08-03 DIAGNOSIS — H31.002 CHORIORETINAL SCAR, LEFT: ICD-10-CM

## 2023-08-03 DIAGNOSIS — H40.1122 PRIMARY OPEN-ANGLE GLAUCOMA, LEFT EYE, MODERATE STAGE: ICD-10-CM

## 2023-08-03 DIAGNOSIS — H33.052 OLD SUBTOTAL RETINAL DETACHMENT, LEFT: ICD-10-CM

## 2023-08-03 DIAGNOSIS — H21.562 PUPILLARY SPHINCTER RUPTURE, LEFT: ICD-10-CM

## 2023-08-03 DIAGNOSIS — Z48.89 ENCOUNTER FOR POSTOPERATIVE CARE: Primary | ICD-10-CM

## 2023-08-03 DIAGNOSIS — H25.13 NUCLEAR SCLEROTIC CATARACT OF BOTH EYES: ICD-10-CM

## 2023-08-03 DIAGNOSIS — Z98.83 GLAUCOMA FILTERING BLEB OF LEFT EYE: ICD-10-CM

## 2023-08-03 PROCEDURE — 99999 PR PBB SHADOW E&M-EST. PATIENT-LVL III: ICD-10-PCS | Mod: PBBFAC,,, | Performed by: OPHTHALMOLOGY

## 2023-08-03 PROCEDURE — 1101F PR PT FALLS ASSESS DOC 0-1 FALLS W/OUT INJ PAST YR: ICD-10-PCS | Mod: CPTII,S$GLB,, | Performed by: OPHTHALMOLOGY

## 2023-08-03 PROCEDURE — 1159F PR MEDICATION LIST DOCUMENTED IN MEDICAL RECORD: ICD-10-PCS | Mod: CPTII,S$GLB,, | Performed by: OPHTHALMOLOGY

## 2023-08-03 PROCEDURE — 1160F PR REVIEW ALL MEDS BY PRESCRIBER/CLIN PHARMACIST DOCUMENTED: ICD-10-PCS | Mod: CPTII,S$GLB,, | Performed by: OPHTHALMOLOGY

## 2023-08-03 PROCEDURE — 99024 PR POST-OP FOLLOW-UP VISIT: ICD-10-PCS | Mod: S$GLB,,, | Performed by: OPHTHALMOLOGY

## 2023-08-03 PROCEDURE — 99999 PR PBB SHADOW E&M-EST. PATIENT-LVL III: CPT | Mod: PBBFAC,,, | Performed by: OPHTHALMOLOGY

## 2023-08-03 PROCEDURE — 1101F PT FALLS ASSESS-DOCD LE1/YR: CPT | Mod: CPTII,S$GLB,, | Performed by: OPHTHALMOLOGY

## 2023-08-03 PROCEDURE — 3051F HG A1C>EQUAL 7.0%<8.0%: CPT | Mod: CPTII,S$GLB,, | Performed by: OPHTHALMOLOGY

## 2023-08-03 PROCEDURE — 3051F PR MOST RECENT HEMOGLOBIN A1C LEVEL 7.0 - < 8.0%: ICD-10-PCS | Mod: CPTII,S$GLB,, | Performed by: OPHTHALMOLOGY

## 2023-08-03 PROCEDURE — 3288F FALL RISK ASSESSMENT DOCD: CPT | Mod: CPTII,S$GLB,, | Performed by: OPHTHALMOLOGY

## 2023-08-03 PROCEDURE — 99499 UNLISTED E&M SERVICE: CPT | Mod: S$GLB,,, | Performed by: OPHTHALMOLOGY

## 2023-08-03 PROCEDURE — 1126F PR PAIN SEVERITY QUANTIFIED, NO PAIN PRESENT: ICD-10-PCS | Mod: CPTII,S$GLB,, | Performed by: OPHTHALMOLOGY

## 2023-08-03 PROCEDURE — 1159F MED LIST DOCD IN RCRD: CPT | Mod: CPTII,S$GLB,, | Performed by: OPHTHALMOLOGY

## 2023-08-03 PROCEDURE — 99499 RISK ADDL DX/OHS AUDIT: ICD-10-PCS | Mod: S$GLB,,, | Performed by: OPHTHALMOLOGY

## 2023-08-03 PROCEDURE — 1126F AMNT PAIN NOTED NONE PRSNT: CPT | Mod: CPTII,S$GLB,, | Performed by: OPHTHALMOLOGY

## 2023-08-03 PROCEDURE — 3288F PR FALLS RISK ASSESSMENT DOCUMENTED: ICD-10-PCS | Mod: CPTII,S$GLB,, | Performed by: OPHTHALMOLOGY

## 2023-08-03 PROCEDURE — 1160F RVW MEDS BY RX/DR IN RCRD: CPT | Mod: CPTII,S$GLB,, | Performed by: OPHTHALMOLOGY

## 2023-08-03 PROCEDURE — 99024 POSTOP FOLLOW-UP VISIT: CPT | Mod: S$GLB,,, | Performed by: OPHTHALMOLOGY

## 2023-08-03 RX ORDER — PREDNISOLONE ACETATE 10 MG/ML
1 SUSPENSION/ DROPS OPHTHALMIC 2 TIMES DAILY
Qty: 5 ML | Refills: 1 | Status: SHIPPED | OUTPATIENT
Start: 2023-08-03 | End: 2024-01-02

## 2023-08-14 ENCOUNTER — PES CALL (OUTPATIENT)
Dept: ADMINISTRATIVE | Facility: CLINIC | Age: 68
End: 2023-08-14
Payer: MEDICARE

## 2023-08-19 NOTE — PROGRESS NOTES
HPI    DLS: 8/03/2023    Pt here for S/P Trab with MMC OS- 6/14/2023 (No stent or shunt)  Pt states no eye pain but at times vision takes a while to get focus and   has to blink a lot to clear up the vision.     Meds;  PA BID OS   Atropine QDAY OS     Cosopt TI D OD   Brimonidine TID OD   Rocklatan QHS OD     Cosopt TI D OD   Brimonidine TID OD   Rocklatan QHS OD       1. POAG   2. VF Defect OS   3. Chorioretinal Scar OS   4. Hx RD OS   5. NS OU   6. Type 2 DM no DR   7. Pupil Sphincter Rupture OS   8. Presbyopia     Last edited by Talya Hobson on 8/24/2023  8:57 AM.            Assessment /Plan     For exam results, see Encounter Report.    Encounter for postoperative care    Primary open-angle glaucoma, left eye, moderate stage    Nuclear sclerotic cataract of both eyes    Pupillary sphincter rupture, left    Chorioretinal scar, left    Old subtotal retinal detachment, left    Glaucoma filtering bleb of left eye    Type 2 diabetes mellitus without ophthalmic manifestations          Pt initially dx with glaucoma at [facility] - stoped his gtts on his own   Presented to [facility] - to [facility] 8/23/2006 - off gtts with a baseline / Tmax of 25/27  Referred by Courtney for consideration of SLT's   Pt with POAG and poorly controlled IOP's - does not use drops regularly       1. Primary open angle glaucoma, both eyes, mild stage        Glaucoma (type and duration)    POAG with elevated IOP ou - mild stage    First HVF   2007 - full od // SAD os 2/2 to CRS   First photos   2011   Treatment / Drops started   2006           Family history    ++ mother and 2 brothers         Glaucoma meds    Latanoprost // brimonidine //  cosopt (out of cosopt - 5/2/2023)         H/O adverse rxn to glaucoma drops    None (has tired alphagan and timolol in past -non compliant)         LASERS    SLT os - 3/31/2016 // SLT od - 4/14/2016 // repeat SLT OS 3/16/2023        GLAUCOMA SURGERIES    trab os - 6/14/2023        OTHER EYE SURGERIES    H/O RD  repair os - S/P laser repair         CDR    0.75 // 0.85-0.9         Tbase    25/27          Tmax    25/27            Ttarget    18/18             HVF    13 test 2006 to  2022 - full  od // SAD - 2/2 CRS from laser for RD repair os / new IAD         Gonio    +3 ou          CCT    486/502 - thin ou         OCT    8 test 2011 to 2022 - RNFL - dec T od ( fluctuate) od  - dec. G/N/TI/TS/ N  Bord T/NI  (has a CRS)  os        HRT    3 test 2017 to 2019 -MR -  DEC. Sn/n/in od // DEC. S/n/i, BORD t os /// CDR 0.73 od // 0.86 os        Disc photos    2011, 2015 , 2017// [facility] - 2022      - Ttoday  15/6   (on glaucoma drops - od // post trab os) )    - Test done today  -S/P trab OS 6/14/2023     2. 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 Chorioretinal scar, left   -with 2nd VF defect      4. Old subtotal retinal detachment, left   S/P repair - retina flat - stable   -large CRS   - pt states he did NOT go to OR - just Rx with laser at the slit lamp     5. Type 2 diabetes mellitus without ophthalmic manifestations      6. Senile nuclear sclerosis  -mild - monitor      7. pupil sphincter rupture os  -suggest old trauma  -does NOT appear to have a angle recession    7. Presbyopia       PLAN    POAG with OHT OS > OD - mild od // moderate os   IOP below target od and too high os   S/P repeat slt os 3/16/2023 - ? Residual steroid response vs out of cosopt vs no response     The ON and VF's are still good ou   ?? Trace APD os - first noted on 5/2/2023   The VF loss os is 2/2 old CRS- ?  post laser for a RD repair   Good initial response to SLT ou 25/25 --> 16/17 7/29/2022  ++ IOP elevation os   ++ VF prog os   ++ OCT - RNFL prog os     Cont drops   cosopt tid ou - has run out - sample given and trixie called   Brimonidine tid ou - Rx sent   rocklatan q hs ou (samples given )      Photo file updated 5/2/2023 6/2/2023  IOP still too high os  (( IOP is ok od))    S/p repeat slt and on mult gtts     --  rec incisional surgery - doubt he has a scleral buckle  // + free conj sup - could try a trab w/ MMC  / ? Xen os / express minishunt or no shunt or a GDD     Pt is NOT a diamox candidate - H/O sulfa allergy - hives     Rec trabeculectomy with MMC os -no stent    Pt is still phakic / H/O trauma     S/P trabeculectomy with mitomycin OS  Date:6/14/2023   POW # 9  without any stent or shunt   Number or sutures in flap  4  Number of sutures already cut - 3 -  (1st cut 6/22/2023) // 2nd cut 6/26/2023// 3rd suture cut 6/27/2023   anterior chamber depth  deep fully formed   Bleb appearance - flat prior to digital massage //  elevated after digital massage next to bleb   sidell neg  IOP  6  (S/P LSL x 3 - only one suture left)     Shield at night - as long as IOP low     Meds: - operated eye  Pred acetate bid - decrease to 1 x day   Atropine -  1 xday - ok to stop when bottle runs out     cosopt cont OD // hold os   brim tid  cont OD // hold os  -  Rocklatan - OD only     F/U 4-5  week - AR/MR ou  (will be outside global period )     Bethany Lechuga MD

## 2023-08-21 ENCOUNTER — TELEPHONE (OUTPATIENT)
Dept: ENDOSCOPY | Facility: HOSPITAL | Age: 68
End: 2023-08-21

## 2023-08-21 ENCOUNTER — CLINICAL SUPPORT (OUTPATIENT)
Dept: ENDOSCOPY | Facility: HOSPITAL | Age: 68
End: 2023-08-21
Payer: MEDICARE

## 2023-08-21 DIAGNOSIS — Z12.11 SCREENING FOR COLON CANCER: ICD-10-CM

## 2023-08-21 RX ORDER — POLYETHYLENE GLYCOL 3350, SODIUM SULFATE ANHYDROUS, SODIUM BICARBONATE, SODIUM CHLORIDE, POTASSIUM CHLORIDE 236; 22.74; 6.74; 5.86; 2.97 G/4L; G/4L; G/4L; G/4L; G/4L
4 POWDER, FOR SOLUTION ORAL ONCE
Qty: 4000 ML | Refills: 0 | Status: SHIPPED | OUTPATIENT
Start: 2023-08-21 | End: 2023-08-21

## 2023-08-21 NOTE — PLAN OF CARE
Spoke to patient to schedule procedure(s) Colonoscopy       Physician to perform procedure(s) Dr. JORDAN Guillermo  Date of Procedure (s) 11/17/23  Arrival Time 9:30 AM  Time of Procedure(s) 10:30 AM   Location of Procedure(s) [place] 4th Floor  Type of Rx Prep sent to patient: PEG  Instructions provided to patient via [facility]    Patient was informed on the following information and verbalized understanding. Screening questionnaire reviewed with patient and complete. If procedure requires anesthesia, a responsible adult needs to be present to accompany the patient home, patient cannot drive after receiving anesthesia. Appointment details are tentative, especially check-in time. Patient will receive a prep-op call 4 days prior to confirm check-in time for procedure. If applicable the patient should contact their pharmacy to verify Rx for procedure prep is ready for pick-up. Patient was advised to call the scheduling department at [phone] if pharmacy states no Rx is available. Patient was advised to call the endoscopy scheduling department if any questions or concerns arise.      [facility] Endoscopy Scheduling Department

## 2023-08-21 NOTE — TELEPHONE ENCOUNTER
Spoke to patient to schedule procedure(s) Colonoscopy       Physician to perform procedure(s) Dr. JORDAN Guillermo  Date of Procedure (s) 11/17/23  Arrival Time 9:30 AM  Time of Procedure(s) 10:30 AM   Location of Procedure(s) [address]  Type of Rx Prep sent to patient: PEG  Instructions provided to patient via [facility]    Patient was informed on the following information and verbalized understanding. Screening questionnaire reviewed with patient and complete. If procedure requires anesthesia, a responsible adult needs to be present to accompany the patient home, patient cannot drive after receiving anesthesia. Appointment details are tentative, especially check-in time. Patient will receive a prep-op call 4 days prior to confirm check-in time for procedure. If applicable the patient should contact their pharmacy to verify Rx for procedure prep is ready for pick-up. Patient was advised to call the scheduling department at [phone] if pharmacy states no Rx is available. Patient was advised to call the endoscopy scheduling department if any questions or concerns arise.       Endoscopy Scheduling Department         Colonoscopy Procedure Prep Instructions     Date of procedure: 11/17/23 Arrive at: 9:30 AM     Location of Department:   [facility] [address] [place] [address]  Take the [facility] Elevators to 4th Floor Endoscopy Lab     As soon as possible:   your prep from pharmacy and over the counter DULCOLAX LAXATIVE TABLETS             On the day before your procedure   What You CAN do:   You may have clear liquids ONLY-see below for list.      Liquids That Are OK to Drink:   Water  Sports drinks (Gatorade, Power-Aid)  Coffee or tea (no cream or nondairy creamer)  Clear juices without pulp (apple, white grape)  Gelatin desserts (no fruit or toppings)  Clear soda (sprite, coke, ginger ale)  Chicken broth (until 12 midnight the night before procedure)     What You  CANNOT do:   Do not EAT solid food, drink milk or anything   colored red.  Do not drink alcohol.  Do not take oral medications within 1 hour of starting   each dose of prep.  No gum chewing or candy morning of procedure                       Note:   (Please disregard the insert instructions from pharmacy).  PEG Bowel Prep is indicated for cleansing of the colon as a preparation for colonoscopy in adults.   Be sure to tell your doctor about all the medicines you take, including prescription and non-prescription medicines, vitamins, and herbal supplements. PEG Bowel Prep may affect how other medicines work.  Medication taken by mouth may not be absorbed properly when taken within 1 hour before the start of each dose of PEG Bowel Prep.     It is not uncommon to experience some abdominal cramping, nausea and/or vomiting when taking the prep. If you have nausea and/or vomiting while taking the prep, stop drinking for 20 to 30 minutes then continue.        How to take prep:     PEG Bowel Prep is a (2-day) prep.    One (1) bottle of prep are required for a complete preparation for colonoscopy. Dilute the solution concentrate as directed prior to use. You must drink water with each dose of prep, and additional water after each dose.     DOSE 1--Day Before Colonoscopy 11/16/23      Drink at least 6 to 8 glasses of clear liquids from time you wake up until you begin your prep and then continue until bedtime to avoid dehydration.      12:00 pm (NOON) Mix your entire container of prep with lukewarm water and refrigerate. Take four (4) Dulcolax (Bisacodyl) tablets with at least 8 ounces or more of clear liquids.        6:00 pm:     You must complete Steps 1 and 2 below before going to bed:     Step 1-Drink half the liquid in the container within one (1) hour.   Step 2-Refrigerate the remaining half of the liquid for dose 2. See below when to begin this step.                       IMPORTANT: If you experience preparation-related  symptoms (for example, nausea, bloating, or cramping), stop, or slow the rate of drinking the additional water until your symptoms decrease.     DOSE 2--Day of the Colonoscopy 11/17/23 at 2-3 AM.     For this dose, repeat Step 1 shown above using the remaining half of the liquid prep.   You may continue drinking water/clear liquids until   4 hours before your colonoscopy or as directed by the scheduling nurse  6:30 AM.     For more information about your procedure, please watch this informational video. It is important to watch this animated consent video prior to your arrival.   If you haven't watched the video prior to arriving, you are required to watch it during admission which can cause delays.      Options for viewing:  Using a keyboard:  press and hold the control tab (Ctrl) and left mouse click to follow link          Colonoscopy Instructional Video                                                        OR     Type link address into your web browser's address bar:  [email or web address]     Using a mobile phone: tap on web address/link.              IMPORTANT INFORMATION TO KNOW BEFORE YOUR PROCEDURE     [facility] [place] 4th Floor     If your procedure requires the administration of anesthesia, it is necessary for a responsible adult to drive you home. (Medical Transportation, Uber, Lyft, Taxi, etc. may ONLY be used if a responsible adult is present to accompany you home.  The responsible adult CAN'T be the  of the service).       person must be available to return to pick you up within 30 minutes of being notified of discharge.      Due to the limited socially distant seating in our waiting room, please limit your guest (1) who accompany you for this procedure. If someone accompanies you for this procedure into the facility:     Consider having them proceed to an area that is socially distant other than the lobby until a member of the medical team  contacts them to provide an update after the procedure.      Also, please consider being dropped off and picked up from the facility.        Please bring a picture ID, insurance card, & copayment     Take Medications as directed below:           If you begin taking any blood thinning medications, please contact the  listed below as soon as possible.     If you are diabetic see the attached instruction sheet regarding your medication.      If you take HEART, BLOOD PRESSURE, SEIZURE, PAIN, LUNG (including inhalers/nebulizers), ANTI-REJECTION (transplant patients), or PSYCHIATRIC medications, please take at your regular times with a sip of water or as directed by the scheduling nurse.      Important contact information:     Endoscopy [phone] Hours of operation Monday-Friday 8:00-4:30pm.     Questions about insurance or financial obligations call [phone] or [phone].     If you have questions regarding the prep or need to reschedule, please call [phone]. After hours questions requiring immediate assistance, contact [facility] On-Call nurse line at [phone] or [phone].   NOTE:      On occasion, unforeseen circumstances may cause a delay in your procedure start time. We respect your time and appreciate your patience during these circumstances.            Diabetes Medication Instructions

## 2023-08-24 ENCOUNTER — OFFICE VISIT (OUTPATIENT)
Dept: OPHTHALMOLOGY | Facility: CLINIC | Age: 68
End: 2023-08-24
Payer: MEDICARE

## 2023-08-24 DIAGNOSIS — Z48.89 ENCOUNTER FOR POSTOPERATIVE CARE: Primary | ICD-10-CM

## 2023-08-24 DIAGNOSIS — H31.002 CHORIORETINAL SCAR, LEFT: ICD-10-CM

## 2023-08-24 DIAGNOSIS — H33.052 OLD SUBTOTAL RETINAL DETACHMENT, LEFT: ICD-10-CM

## 2023-08-24 DIAGNOSIS — Z98.83 GLAUCOMA FILTERING BLEB OF LEFT EYE: ICD-10-CM

## 2023-08-24 DIAGNOSIS — H25.13 NUCLEAR SCLEROTIC CATARACT OF BOTH EYES: ICD-10-CM

## 2023-08-24 DIAGNOSIS — E11.9 TYPE 2 DIABETES MELLITUS WITHOUT OPHTHALMIC MANIFESTATIONS: ICD-10-CM

## 2023-08-24 DIAGNOSIS — H21.562 PUPILLARY SPHINCTER RUPTURE, LEFT: ICD-10-CM

## 2023-08-24 DIAGNOSIS — H40.1122 PRIMARY OPEN-ANGLE GLAUCOMA, LEFT EYE, MODERATE STAGE: ICD-10-CM

## 2023-08-24 PROCEDURE — 1160F RVW MEDS BY RX/DR IN RCRD: CPT | Mod: CPTII,S$GLB,, | Performed by: OPHTHALMOLOGY

## 2023-08-24 PROCEDURE — 1159F MED LIST DOCD IN RCRD: CPT | Mod: CPTII,S$GLB,, | Performed by: OPHTHALMOLOGY

## 2023-08-24 PROCEDURE — 3288F PR FALLS RISK ASSESSMENT DOCUMENTED: ICD-10-PCS | Mod: CPTII,S$GLB,, | Performed by: OPHTHALMOLOGY

## 2023-08-24 PROCEDURE — 1126F PR PAIN SEVERITY QUANTIFIED, NO PAIN PRESENT: ICD-10-PCS | Mod: CPTII,S$GLB,, | Performed by: OPHTHALMOLOGY

## 2023-08-24 PROCEDURE — 1159F PR MEDICATION LIST DOCUMENTED IN MEDICAL RECORD: ICD-10-PCS | Mod: CPTII,S$GLB,, | Performed by: OPHTHALMOLOGY

## 2023-08-24 PROCEDURE — 3051F PR MOST RECENT HEMOGLOBIN A1C LEVEL 7.0 - < 8.0%: ICD-10-PCS | Mod: CPTII,S$GLB,, | Performed by: OPHTHALMOLOGY

## 2023-08-24 PROCEDURE — 3051F HG A1C>EQUAL 7.0%<8.0%: CPT | Mod: CPTII,S$GLB,, | Performed by: OPHTHALMOLOGY

## 2023-08-24 PROCEDURE — 1126F AMNT PAIN NOTED NONE PRSNT: CPT | Mod: CPTII,S$GLB,, | Performed by: OPHTHALMOLOGY

## 2023-08-24 PROCEDURE — 3288F FALL RISK ASSESSMENT DOCD: CPT | Mod: CPTII,S$GLB,, | Performed by: OPHTHALMOLOGY

## 2023-08-24 PROCEDURE — 99024 POSTOP FOLLOW-UP VISIT: CPT | Mod: S$GLB,,, | Performed by: OPHTHALMOLOGY

## 2023-08-24 PROCEDURE — 99999 PR PBB SHADOW E&M-EST. PATIENT-LVL III: ICD-10-PCS | Mod: PBBFAC,,, | Performed by: OPHTHALMOLOGY

## 2023-08-24 PROCEDURE — 99999 PR PBB SHADOW E&M-EST. PATIENT-LVL III: CPT | Mod: PBBFAC,,, | Performed by: OPHTHALMOLOGY

## 2023-08-24 PROCEDURE — 99024 PR POST-OP FOLLOW-UP VISIT: ICD-10-PCS | Mod: S$GLB,,, | Performed by: OPHTHALMOLOGY

## 2023-08-24 PROCEDURE — 99499 UNLISTED E&M SERVICE: CPT | Mod: S$GLB,,, | Performed by: OPHTHALMOLOGY

## 2023-08-24 PROCEDURE — 1101F PR PT FALLS ASSESS DOC 0-1 FALLS W/OUT INJ PAST YR: ICD-10-PCS | Mod: CPTII,S$GLB,, | Performed by: OPHTHALMOLOGY

## 2023-08-24 PROCEDURE — 1101F PT FALLS ASSESS-DOCD LE1/YR: CPT | Mod: CPTII,S$GLB,, | Performed by: OPHTHALMOLOGY

## 2023-08-24 PROCEDURE — 99499 RISK ADDL DX/OHS AUDIT: ICD-10-PCS | Mod: S$GLB,,, | Performed by: OPHTHALMOLOGY

## 2023-08-24 PROCEDURE — 1160F PR REVIEW ALL MEDS BY PRESCRIBER/CLIN PHARMACIST DOCUMENTED: ICD-10-PCS | Mod: CPTII,S$GLB,, | Performed by: OPHTHALMOLOGY

## 2023-09-24 NOTE — PROGRESS NOTES
HPI    DLS: 08/24/2023 DR. Lechuga    1. POAG   2. VF Defect OS   3. Chorioretinal Scar OS   4. Hx RD OS   5. NS OU   6. Type 2 DM no DR   7. Pupil Sphincter Rupture OS   8. Presbyopi a       68 y.o. male is here for 4-5 week AR/MR OU. Pt states that 3 days ago   blood pressure was high, pt was experiencing headaches and pressure, left   eye. Denies flashes/floaters. Upon awaking in the morning have blink a few   times to focus the eyes.     Eye Med's:   PA qday OS   Atropine QDAY OS     Cosopt TID OD   Brimonidine TID OD   Rocklatan QHS OD         Last edited by Gabriela Hernandez OA on 9/29/2023  9:19 AM.            Assessment /Plan     For exam results, see Encounter Report.    Primary open-angle glaucoma, left eye, moderate stage    Nuclear sclerotic cataract of both eyes    Pupillary sphincter rupture, left    Chorioretinal scar, left    Old subtotal retinal detachment, left    Glaucoma filtering bleb of left eye    Type 2 diabetes mellitus without ophthalmic manifestations        Pt initially dx with glaucoma at [facility] - stoppped his gtts on his own   Presented to [facility] - susana Valdez 8/23/2006 - off gtts with a baseline / Tmax of 25/27  Referred by Courtney for consideration of SLT's   Pt with POAG and poorly controlled IOP's - does not use drops regularly       1. Primary open angle glaucoma, both eyes, mild stage        Glaucoma (type and duration)    POAG with elevated IOP ou - mild stage    First HVF   2007 - full od // SAD os 2/2 to CRS   First photos   2011   Treatment / Drops started   2006           Family history    ++ mother and 2 brothers         Glaucoma meds    Latanoprost // brimonidine //  cosopt (out of cosopt - 5/2/2023)         H/O adverse rxn to glaucoma drops    None (has tired alphagan and timolol in past -non compliant)         LASERS    SLT os - 3/31/2016 // SLT od - 4/14/2016 // repeat SLT OS 3/16/2023        GLAUCOMA SURGERIES    trab os - 6/14/2023        OTHER EYE SURGERIES    H/O  RD repair os - S/P laser repair         CDR    0.75 // 0.85-0.9         Tbase    25/27          Tmax    25/27            Ttarget    18/18             HVF    13 test 2006 to  2022 - full  od // SAD - 2/2 CRS from laser for RD repair os / new IAD         Gonio    +3 ou          CCT    486/502 - thin ou         OCT    8 test 2011 to 2022 - RNFL - dec T od ( fluctuate) od  - dec. G/N/TI/TS/ N  Bord T/NI  (has a CRS)  os        HRT    3 test 2017 to 2019 -MR -  DEC. Sn/n/in od // DEC. S/n/i, BORD t os /// CDR 0.73 od // 0.86 os        Disc photos    2011, 2015 , 2017// harmony - 2022      - Ttoday  17/7   (on glaucoma drops - od // post trab os) )    - Test done today  -S/P trab OS 6/14/2023     2. 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 Chorioretinal scar, left   -with 2nd VF defect      4. Old subtotal retinal detachment, left   S/P repair - retina flat - stable   -large CRS   - pt states he did NOT go to OR - just Rx with laser at the slit lamp     5. Type 2 diabetes mellitus without ophthalmic manifestations      6. Senile nuclear sclerosis  -mild - monitor      7. pupil sphincter rupture os  -suggest old trauma  -does NOT appear to have a angle recession    7. Presbyopia       PLAN    POAG with OHT OS > OD - mild od // moderate os   IOP below target od and too high os   S/P repeat slt os 3/16/2023 - ? Residual steroid response vs out of cosopt vs no response     The ON and VF's are still good ou   ?? Trace APD os - first noted on 5/2/2023   The VF loss os is 2/2 old CRS- ?  post laser for a RD repair   Good initial response to SLT ou 25/25 --> 16/17 7/29/2022  ++ IOP elevation os   ++ VF prog os   ++ OCT - RNFL prog os     Cont drops   cosopt tid ou - has run out - sample given and trixie called   Brimonidine tid ou - Rx sent   rocklatan q hs ou (samples given )      Photo file updated 5/2/2023 6/2/2023  IOP still too high  os (( IOP is ok od))    S/p repeat slt and on mult gtts     --  rec incisional surgery - doubt he has a scleral buckle  // + free conj sup - could try a trab w/ [facility]  / ? Xen os / express minishunt or no shunt or a GDD     Pt is NOT a diamox candidate - H/O sulfa allergy - hives     Rec trabeculectomy with [facility] os -no stent    Pt is still phakic / H/O trauma     S/P trabeculectomy with mitomycin OS  Date:6/14/2023   POM 3.5  without any stent or shunt   Number or sutures in flap  4  Number of sutures already cut - 3 -  (1st cut 6/22/2023) // 2nd cut 6/26/2023// 3rd suture cut 6/27/2023   anterior chamber depth  deep fully formed   Bleb appearance - flat prior to digital massage //  elevated after digital massage next to bleb   sidell neg  IOP  7    (S/P LSL x 3 - only one suture left)     Shield at night - as long as IOP low     Meds: - operated eye  Pred acetate 1 x day stop when runs out   Atropine -  stop     cosopt cont OD // hold os   brim tid  cont OD // hold os  -  Rocklatan - OD only     F/U 3-4 months with HVF / DFE / OCT     Bethany Lechuga MD

## 2023-09-29 ENCOUNTER — OFFICE VISIT (OUTPATIENT)
Dept: OPHTHALMOLOGY | Facility: CLINIC | Age: 68
End: 2023-09-29
Payer: MEDICARE

## 2023-09-29 DIAGNOSIS — H21.562 PUPILLARY SPHINCTER RUPTURE, LEFT: ICD-10-CM

## 2023-09-29 DIAGNOSIS — H31.002 CHORIORETINAL SCAR, LEFT: ICD-10-CM

## 2023-09-29 DIAGNOSIS — H40.1122 PRIMARY OPEN-ANGLE GLAUCOMA, LEFT EYE, MODERATE STAGE: Primary | ICD-10-CM

## 2023-09-29 DIAGNOSIS — Z98.83 GLAUCOMA FILTERING BLEB OF LEFT EYE: ICD-10-CM

## 2023-09-29 DIAGNOSIS — E11.9 TYPE 2 DIABETES MELLITUS WITHOUT OPHTHALMIC MANIFESTATIONS: ICD-10-CM

## 2023-09-29 DIAGNOSIS — H33.052 OLD SUBTOTAL RETINAL DETACHMENT, LEFT: ICD-10-CM

## 2023-09-29 DIAGNOSIS — H25.13 NUCLEAR SCLEROTIC CATARACT OF BOTH EYES: ICD-10-CM

## 2023-09-29 PROCEDURE — 1101F PR PT FALLS ASSESS DOC 0-1 FALLS W/OUT INJ PAST YR: ICD-10-PCS | Mod: HCNC,CPTII,S$GLB, | Performed by: OPHTHALMOLOGY

## 2023-09-29 PROCEDURE — 99999 PR PBB SHADOW E&M-EST. PATIENT-LVL III: ICD-10-PCS | Mod: PBBFAC,HCNC,, | Performed by: OPHTHALMOLOGY

## 2023-09-29 PROCEDURE — 1160F RVW MEDS BY RX/DR IN RCRD: CPT | Mod: HCNC,CPTII,S$GLB, | Performed by: OPHTHALMOLOGY

## 2023-09-29 PROCEDURE — 3288F PR FALLS RISK ASSESSMENT DOCUMENTED: ICD-10-PCS | Mod: HCNC,CPTII,S$GLB, | Performed by: OPHTHALMOLOGY

## 2023-09-29 PROCEDURE — 99214 PR OFFICE/OUTPT VISIT, EST, LEVL IV, 30-39 MIN: ICD-10-PCS | Mod: HCNC,S$GLB,, | Performed by: OPHTHALMOLOGY

## 2023-09-29 PROCEDURE — 1159F PR MEDICATION LIST DOCUMENTED IN MEDICAL RECORD: ICD-10-PCS | Mod: HCNC,CPTII,S$GLB, | Performed by: OPHTHALMOLOGY

## 2023-09-29 PROCEDURE — 1126F PR PAIN SEVERITY QUANTIFIED, NO PAIN PRESENT: ICD-10-PCS | Mod: HCNC,CPTII,S$GLB, | Performed by: OPHTHALMOLOGY

## 2023-09-29 PROCEDURE — 99499 UNLISTED E&M SERVICE: CPT | Mod: HCNC,S$GLB,, | Performed by: OPHTHALMOLOGY

## 2023-09-29 PROCEDURE — 3051F PR MOST RECENT HEMOGLOBIN A1C LEVEL 7.0 - < 8.0%: ICD-10-PCS | Mod: HCNC,CPTII,S$GLB, | Performed by: OPHTHALMOLOGY

## 2023-09-29 PROCEDURE — 99999 PR PBB SHADOW E&M-EST. PATIENT-LVL III: CPT | Mod: PBBFAC,HCNC,, | Performed by: OPHTHALMOLOGY

## 2023-09-29 PROCEDURE — 3288F FALL RISK ASSESSMENT DOCD: CPT | Mod: HCNC,CPTII,S$GLB, | Performed by: OPHTHALMOLOGY

## 2023-09-29 PROCEDURE — 99214 OFFICE O/P EST MOD 30 MIN: CPT | Mod: HCNC,S$GLB,, | Performed by: OPHTHALMOLOGY

## 2023-09-29 PROCEDURE — 1159F MED LIST DOCD IN RCRD: CPT | Mod: HCNC,CPTII,S$GLB, | Performed by: OPHTHALMOLOGY

## 2023-09-29 PROCEDURE — 3051F HG A1C>EQUAL 7.0%<8.0%: CPT | Mod: HCNC,CPTII,S$GLB, | Performed by: OPHTHALMOLOGY

## 2023-09-29 PROCEDURE — 4010F PR ACE/ARB THEARPY RXD/TAKEN: ICD-10-PCS | Mod: HCNC,CPTII,S$GLB, | Performed by: OPHTHALMOLOGY

## 2023-09-29 PROCEDURE — 99499 RISK ADDL DX/OHS AUDIT: ICD-10-PCS | Mod: HCNC,S$GLB,, | Performed by: OPHTHALMOLOGY

## 2023-09-29 PROCEDURE — 4010F ACE/ARB THERAPY RXD/TAKEN: CPT | Mod: HCNC,CPTII,S$GLB, | Performed by: OPHTHALMOLOGY

## 2023-09-29 PROCEDURE — 1126F AMNT PAIN NOTED NONE PRSNT: CPT | Mod: HCNC,CPTII,S$GLB, | Performed by: OPHTHALMOLOGY

## 2023-09-29 PROCEDURE — 1101F PT FALLS ASSESS-DOCD LE1/YR: CPT | Mod: HCNC,CPTII,S$GLB, | Performed by: OPHTHALMOLOGY

## 2023-09-29 PROCEDURE — 1160F PR REVIEW ALL MEDS BY PRESCRIBER/CLIN PHARMACIST DOCUMENTED: ICD-10-PCS | Mod: HCNC,CPTII,S$GLB, | Performed by: OPHTHALMOLOGY

## 2023-09-29 RX ORDER — POLYETHYLENE GLYCOL-3350 AND ELECTROLYTES 236; 6.74; 5.86; 2.97; 22.74 G/274.31G; G/274.31G; G/274.31G; G/274.31G; G/274.31G
4000 POWDER, FOR SOLUTION ORAL ONCE
Status: ON HOLD | COMMUNITY
Start: 2023-08-21 | End: 2023-11-17 | Stop reason: HOSPADM

## 2023-10-19 ENCOUNTER — OFFICE VISIT (OUTPATIENT)
Dept: PRIMARY CARE CLINIC | Facility: CLINIC | Age: 68
End: 2023-10-19
Payer: MEDICARE

## 2023-10-19 VITALS
WEIGHT: 162.25 LBS | HEIGHT: 66 IN | BODY MASS INDEX: 26.08 KG/M2 | RESPIRATION RATE: 18 BRPM | DIASTOLIC BLOOD PRESSURE: 75 MMHG | TEMPERATURE: 98 F | SYSTOLIC BLOOD PRESSURE: 138 MMHG | OXYGEN SATURATION: 99 % | HEART RATE: 71 BPM

## 2023-10-19 DIAGNOSIS — C61 MALIGNANT NEOPLASM OF PROSTATE: ICD-10-CM

## 2023-10-19 DIAGNOSIS — E11.69 HYPERLIPIDEMIA ASSOCIATED WITH TYPE 2 DIABETES MELLITUS: ICD-10-CM

## 2023-10-19 DIAGNOSIS — E78.5 HYPERLIPIDEMIA ASSOCIATED WITH TYPE 2 DIABETES MELLITUS: ICD-10-CM

## 2023-10-19 DIAGNOSIS — Z00.00 ROUTINE GENERAL MEDICAL EXAMINATION AT A HEALTH CARE FACILITY: Primary | ICD-10-CM

## 2023-10-19 DIAGNOSIS — D50.8 IRON DEFICIENCY ANEMIA SECONDARY TO INADEQUATE DIETARY IRON INTAKE: ICD-10-CM

## 2023-10-19 DIAGNOSIS — I10 ESSENTIAL HYPERTENSION: ICD-10-CM

## 2023-10-19 DIAGNOSIS — K21.9 GASTROESOPHAGEAL REFLUX DISEASE WITHOUT ESOPHAGITIS: ICD-10-CM

## 2023-10-19 DIAGNOSIS — E11.9 TYPE 2 DIABETES MELLITUS WITHOUT COMPLICATION, WITHOUT LONG-TERM CURRENT USE OF INSULIN: ICD-10-CM

## 2023-10-19 DIAGNOSIS — H40.1131 PRIMARY OPEN ANGLE GLAUCOMA (POAG) OF BOTH EYES, MILD STAGE: ICD-10-CM

## 2023-10-19 DIAGNOSIS — I70.0 AORTIC ATHEROSCLEROSIS: ICD-10-CM

## 2023-10-19 DIAGNOSIS — Z78.9 STATIN INTOLERANCE: ICD-10-CM

## 2023-10-19 PROCEDURE — 3008F BODY MASS INDEX DOCD: CPT | Mod: CPTII,S$GLB,, | Performed by: INTERNAL MEDICINE

## 2023-10-19 PROCEDURE — 3078F PR MOST RECENT DIASTOLIC BLOOD PRESSURE < 80 MM HG: ICD-10-PCS | Mod: CPTII,S$GLB,, | Performed by: INTERNAL MEDICINE

## 2023-10-19 PROCEDURE — 1126F AMNT PAIN NOTED NONE PRSNT: CPT | Mod: CPTII,S$GLB,, | Performed by: INTERNAL MEDICINE

## 2023-10-19 PROCEDURE — 4010F PR ACE/ARB THEARPY RXD/TAKEN: ICD-10-PCS | Mod: CPTII,S$GLB,, | Performed by: INTERNAL MEDICINE

## 2023-10-19 PROCEDURE — 3075F SYST BP GE 130 - 139MM HG: CPT | Mod: CPTII,S$GLB,, | Performed by: INTERNAL MEDICINE

## 2023-10-19 PROCEDURE — 99999 PR PBB SHADOW E&M-EST. PATIENT-LVL V: ICD-10-PCS | Mod: PBBFAC,,, | Performed by: INTERNAL MEDICINE

## 2023-10-19 PROCEDURE — 1126F PR PAIN SEVERITY QUANTIFIED, NO PAIN PRESENT: ICD-10-PCS | Mod: CPTII,S$GLB,, | Performed by: INTERNAL MEDICINE

## 2023-10-19 PROCEDURE — 82043 UR ALBUMIN QUANTITATIVE: CPT | Performed by: INTERNAL MEDICINE

## 2023-10-19 PROCEDURE — 99397 PER PM REEVAL EST PAT 65+ YR: CPT | Mod: S$GLB,,, | Performed by: INTERNAL MEDICINE

## 2023-10-19 PROCEDURE — 99397 PR PREVENTIVE VISIT,EST,65 & OVER: ICD-10-PCS | Mod: S$GLB,,, | Performed by: INTERNAL MEDICINE

## 2023-10-19 PROCEDURE — 4010F ACE/ARB THERAPY RXD/TAKEN: CPT | Mod: CPTII,S$GLB,, | Performed by: INTERNAL MEDICINE

## 2023-10-19 PROCEDURE — 99999 PR PBB SHADOW E&M-EST. PATIENT-LVL V: CPT | Mod: PBBFAC,,, | Performed by: INTERNAL MEDICINE

## 2023-10-19 PROCEDURE — 1101F PR PT FALLS ASSESS DOC 0-1 FALLS W/OUT INJ PAST YR: ICD-10-PCS | Mod: CPTII,S$GLB,, | Performed by: INTERNAL MEDICINE

## 2023-10-19 PROCEDURE — 3051F HG A1C>EQUAL 7.0%<8.0%: CPT | Mod: CPTII,S$GLB,, | Performed by: INTERNAL MEDICINE

## 2023-10-19 PROCEDURE — 1159F MED LIST DOCD IN RCRD: CPT | Mod: CPTII,S$GLB,, | Performed by: INTERNAL MEDICINE

## 2023-10-19 PROCEDURE — 1160F RVW MEDS BY RX/DR IN RCRD: CPT | Mod: CPTII,S$GLB,, | Performed by: INTERNAL MEDICINE

## 2023-10-19 PROCEDURE — 1159F PR MEDICATION LIST DOCUMENTED IN MEDICAL RECORD: ICD-10-PCS | Mod: CPTII,S$GLB,, | Performed by: INTERNAL MEDICINE

## 2023-10-19 PROCEDURE — 3075F PR MOST RECENT SYSTOLIC BLOOD PRESS GE 130-139MM HG: ICD-10-PCS | Mod: CPTII,S$GLB,, | Performed by: INTERNAL MEDICINE

## 2023-10-19 PROCEDURE — 3078F DIAST BP <80 MM HG: CPT | Mod: CPTII,S$GLB,, | Performed by: INTERNAL MEDICINE

## 2023-10-19 PROCEDURE — 3008F PR BODY MASS INDEX (BMI) DOCUMENTED: ICD-10-PCS | Mod: CPTII,S$GLB,, | Performed by: INTERNAL MEDICINE

## 2023-10-19 PROCEDURE — 1101F PT FALLS ASSESS-DOCD LE1/YR: CPT | Mod: CPTII,S$GLB,, | Performed by: INTERNAL MEDICINE

## 2023-10-19 PROCEDURE — 3051F PR MOST RECENT HEMOGLOBIN A1C LEVEL 7.0 - < 8.0%: ICD-10-PCS | Mod: CPTII,S$GLB,, | Performed by: INTERNAL MEDICINE

## 2023-10-19 PROCEDURE — 3288F FALL RISK ASSESSMENT DOCD: CPT | Mod: CPTII,S$GLB,, | Performed by: INTERNAL MEDICINE

## 2023-10-19 PROCEDURE — 3288F PR FALLS RISK ASSESSMENT DOCUMENTED: ICD-10-PCS | Mod: CPTII,S$GLB,, | Performed by: INTERNAL MEDICINE

## 2023-10-19 PROCEDURE — 1160F PR REVIEW ALL MEDS BY PRESCRIBER/CLIN PHARMACIST DOCUMENTED: ICD-10-PCS | Mod: CPTII,S$GLB,, | Performed by: INTERNAL MEDICINE

## 2023-10-19 RX ORDER — DAPAGLIFLOZIN 5 MG/1
5 TABLET, FILM COATED ORAL DAILY
Qty: 90 TABLET | Refills: 3 | Status: SHIPPED | OUTPATIENT
Start: 2023-10-19 | End: 2023-11-15

## 2023-10-19 RX ORDER — PRAVASTATIN SODIUM 40 MG/1
40 TABLET ORAL DAILY
Qty: 90 TABLET | Refills: 3 | Status: SHIPPED | OUTPATIENT
Start: 2023-10-19 | End: 2024-01-18 | Stop reason: SDUPTHER

## 2023-10-19 RX ORDER — EZETIMIBE 10 MG/1
10 TABLET ORAL NIGHTLY
Qty: 90 TABLET | Refills: 3 | Status: SHIPPED | OUTPATIENT
Start: 2023-10-19 | End: 2024-10-18

## 2023-10-19 RX ORDER — HYDROCHLOROTHIAZIDE 25 MG/1
25 TABLET ORAL DAILY
Qty: 90 TABLET | Refills: 3 | Status: SHIPPED | OUTPATIENT
Start: 2023-10-19 | End: 2024-10-18

## 2023-10-19 RX ORDER — AMLODIPINE BESYLATE 10 MG/1
10 TABLET ORAL DAILY
Qty: 90 TABLET | Refills: 3 | Status: SHIPPED | OUTPATIENT
Start: 2023-10-19 | End: 2024-01-18 | Stop reason: SDUPTHER

## 2023-10-19 NOTE — PATIENT INSTRUCTIONS
Bloodwork (fasting) pm 11/15  Please get fasting bloodwork BEFORE you come back for your follow up in 3 months

## 2023-10-19 NOTE — PROGRESS NOTES
Subjective:      Patient ID: Eyad Garcia is a 68 y.o. male.    Chief Complaint: Follow-up      Eyad Garcia is a 68 y.o. male with chronic conditions significant for HTN, erectile dysfunction, hyperlipidemia, type 2 diabetes, primary osteoarthritis of multiple joints, bronchitis, GERD, anemia, malignant neoplasm of prostate status post radiation to prostate bed; old retinal detachment, POAG both eyes  Presenting today for follow up / annual. Date of last annual is 10/19/2022    HTN: Office BP is 138/75 . No CP/SOB/lightheadedness/dizziness/palpitations. Has been having increased readings at home. His regimen was just changed and now takes amlodipine 10mg, olmesartan 40mg. Add HCTZ 25mg to regimen.     T2DM: A1c slowly increasing at this time. Last A1C AT 7.6%. Previously was on metformin and jardiance but he discontinued jardiance as it was making him urinate more frequently. Continue metformin at this time, add farxiga to regimen. Diabetes NP referral placed today.      HLD: ASCVD elevated. He has previously tried atorvastatin with side effect. Tolerates pravastatin and zetia. Continue current regimen.    Hx prostate CA: Follows with rad/onc. PSA wnl, repeat bloodwork with Dr. Sampson on 11/15.     Denies any chest pain, shortness of breath, nausea vomiting constipation diarrhea, blood in stool, heartburn    Review of Systems   Constitutional:  Negative for chills, fever and weight loss.   HENT:  Negative for congestion, ear pain and sore throat.    Eyes:  Negative for double vision.   Respiratory:  Negative for cough and shortness of breath.    Cardiovascular:  Negative for chest pain, palpitations and leg swelling.   Gastrointestinal:  Negative for abdominal pain, heartburn, nausea and vomiting.   Skin:  Negative for rash.   Neurological:  Negative for dizziness, tingling and headaches.   Psychiatric/Behavioral:  Negative for depression.           Current Outpatient Medications:     albuterol (VENTOLIN  HFA) 90 mcg/actuation inhaler, Inhale 2 puffs into the lungs every 6 (six) hours as needed for Wheezing or Shortness of Breath (cough). Rescue, Disp: 18 g, Rfl: 11    alcohol swabs (ALCOHOL WIPES) PadM, Monitor as directed X 2 daily. Per insurance coverage. ICD 10 E11.9, Disp: 200 each, Rfl: 3    atropine 1% (ISOPTO ATROPINE) 1 % Drop, Place 1 drop into the left eye Daily., Disp: , Rfl:     brimonidine 0.2% (ALPHAGAN) 0.2 % Drop, Place 1 drop into both eyes 3 (three) times daily. (Patient taking differently: Place 1 drop into the right eye 3 (three) times daily.), Disp: 30 mL, Rfl: 3    fluticasone propionate (FLONASE) 50 mcg/actuation nasal spray, 2 sprays (100 mcg total) by Each Nostril route 2 (two) times daily as needed for Rhinitis., Disp: 16 g, Rfl: 0    [employer] 2 SENSOR Kit, Use as directed. Change every 14 (fourteen) days., Disp: 2 kit, Rfl: 5    GAVILYTE-G 236-22.74-6.74 -5.86 gram suspension, Take 4,000 mLs by mouth once., Disp: , Rfl:     meloxicam (MOBIC) 15 MG tablet, TAKE 1 TABLET ONE TIME DAILY, Disp: 90 tablet, Rfl: 0    netarsudiL-latanoprost (ROCKLATAN) 0.02-0.005 % Drop, Place 1 drop into both eyes every evening. Patient was not controlled on latanoprost - now needs to use rocklatan. Please run script through - it is now a covered medication if they have failed latanoprost (Patient taking differently: Place 1 drop into the right eye every evening. Patient was not controlled on latanoprost - now needs to use rocklatan. Please run script through - it is now a covered medication if they have failed latanoprost), Disp: 7.5 mL, Rfl: 3    olmesartan (BENICAR) 40 MG tablet, Take 1 tablet (40 mg total) by mouth once daily., Disp: 90 tablet, Rfl: 1    prednisoLONE acetate (PRED FORTE) 1 % DrpS, Place 1 drop into the left eye 2 (two) times daily., Disp: 5 mL, Rfl: 1    triamcinolone acetonide 0.025% (KENALOG) 0.025 % cream, Apply topically 2 (two) times daily., Disp: 80 g, Rfl: 1    amLODIPine  (NORVASC) 10 MG tablet, Take 1 tablet (10 mg total) by mouth once daily., Disp: 90 tablet, Rfl: 3    dapagliflozin propanediol (FARXIGA) 5 mg Tab tablet, Take 1 tablet (5 mg total) by mouth once daily., Disp: 90 tablet, Rfl: 3    dorzolamide-timolol 2-0.5% (COSOPT) 22.3-6.8 mg/mL ophthalmic solution, Place 1 drop into both eyes 2 (two) times daily. (Patient not taking: Reported on 9/29/2023), Disp: 30 mL, Rfl: 3    ezetimibe (ZETIA) 10 mg tablet, Take 1 tablet (10 mg total) by mouth every evening., Disp: 90 tablet, Rfl: 3    hydroCHLOROthiazide (HYDRODIURIL) 25 MG tablet, Take 1 tablet (25 mg total) by mouth once daily., Disp: 90 tablet, Rfl: 3    metFORMIN (GLUCOPHAGE-XR) 500 MG ER 24hr tablet, Take 2 tablets (1,000 mg total) by mouth once daily., Disp: 180 tablet, Rfl: 3    pravastatin (PRAVACHOL) 40 MG tablet, Take 1 tablet (40 mg total) by mouth once daily., Disp: 90 tablet, Rfl: 3    Current Facility-Administered Medications:     leuprolide acetate (6 month) injection 45 mg, 45 mg, Intramuscular, Q6 Months, Henrry Sampson Jr., MD, 45 mg at 05/03/23 1231    [START ON 11/8/2023] leuprolide acetate (6 month) injection 45 mg, 45 mg, Intramuscular, Q6 Months, Henrry Sampson Jr., MD    Lab Results   Component Value Date    HGBA1C 7.6 (H) 04/19/2023    HGBA1C 6.8 (H) 12/01/2022    HGBA1C 6.6 (H) 10/19/2022     Lab Results   Component Value Date    MICALBCREAT 6.5 09/27/2021     Lab Results   Component Value Date    LDLCALC 163.6 (H) 12/01/2022    LDLCALC 175.2 (H) 07/29/2022    CHOL 233 (H) 12/01/2022    HDL 53 12/01/2022    TRIG 82 12/01/2022       Lab Results   Component Value Date     04/19/2023    K 4.7 04/19/2023     04/19/2023    CO2 27 04/19/2023     (H) 04/19/2023    BUN 13 04/19/2023    CREATININE 0.9 04/19/2023    CALCIUM 10.1 04/19/2023    PROT 7.5 04/19/2023    ALBUMIN 4.1 04/19/2023    BILITOT 0.6 04/19/2023    ALKPHOS 92 04/19/2023    AST 21 04/19/2023    ALT 19 04/19/2023     ANIONGAP 9 04/19/2023    ESTGFRAFRICA >60.0 07/29/2022    EGFRNONAA >60.0 07/29/2022    WBC 5.40 10/19/2022    HGB 12.6 (L) 10/19/2022    HGB 10.1 (L) 05/29/2022    HCT 39.1 (L) 10/19/2022    MCV 87 10/19/2022     10/19/2022    TSH 1.998 10/19/2022    PSA 0.14 03/27/2017    PSA 6.6 (H) 02/27/2015    PSADIAG <0.01 03/22/2023    PSADIAG <0.01 12/01/2022    HEPCAB Negative 05/29/2022       Lab Results   Component Value Date    QAJFFBIJ57LB 32 09/02/2015    LWTLRFFE62 836 10/07/2020    FERRITIN 93 03/29/2022    IRON 87 03/29/2022    TRANSFERRIN 295 03/29/2022    TIBC 437 03/29/2022    FESATURATED 20 03/29/2022         Past Medical History:   Diagnosis Date    Cataract     Diabetes mellitus type II     Difficult intubation     Erectile dysfunction     History of colon polyps 01/25/2019    Hyperlipidemia     Hypertension     OA (osteoarthritis)     POAG (primary open-angle glaucoma)     Retinal detachment     OS     Past Surgical History:   Procedure Laterality Date    COLONOSCOPY N/A 01/25/2019    Procedure: COLONOSCOPY;  Surgeon: Elder Guillermo MD;  [facility]  Service: Endoscopy;  Laterality: N/A;    CYSTOSCOPY      HERNIA REPAIR      KNEE SURGERY      left    PENILE PROSTHESIS IMPLANT      PROSTATECTOMY      RETINAL DETACHMENT SURGERY      OS    SELECTIVE LASER TRABECULOPLASTY Left 03/16/2023        SELECTIVE LASER TRABECUPLASTY Bilateral 04/2016    OU WITH     TRABECULECTOMY Left 6/14/2023    Procedure: TRABECULECTOMY;  Surgeon: Bethany Lechuga MD;  [address]  Service: Ophthalmology;  Laterality: Left;  Trabeculectomy w/[facility]     Social History     Social History Narrative    Works for [employer]     Family History   Problem Relation Age of Onset    Diabetes Mother     Hypertension Mother     Glaucoma Mother     Diabetes Sister     Glaucoma Sister     Diabetes Brother     Glaucoma Brother     No Known Problems Father     Cancer Maternal  "Aunt     Colon cancer Maternal Aunt     No Known Problems Maternal Uncle     No Known Problems Paternal Aunt     No Known Problems Paternal Uncle     No Known Problems Maternal Grandmother     No Known Problems Maternal Grandfather     No Known Problems Paternal Grandmother     No Known Problems Paternal Grandfather     Anesthesia problems Neg Hx     Broken bones Neg Hx     Clotting disorder Neg Hx     Collagen disease Neg Hx     Dislocations Neg Hx     Osteoporosis Neg Hx     Rheumatologic disease Neg Hx     Scoliosis Neg Hx     Severe sprains Neg Hx     Amblyopia Neg Hx     Blindness Neg Hx     Cataracts Neg Hx     Macular degeneration Neg Hx     Retinal detachment Neg Hx     Strabismus Neg Hx     Stroke Neg Hx     Thyroid disease Neg Hx     Esophageal cancer Neg Hx      Vitals:    10/19/23 0834 10/19/23 0904   BP: (!) 142/72 138/75   Pulse: 71    Resp: 18    Temp: 98 °F (36.7 °C)    SpO2: 99%    Weight: 73.6 kg (162 lb 4.1 oz)    Height: 5' 6" (1.676 m)    PainSc: 0-No pain      Objective:   Physical Exam  Vitals and nursing note reviewed.   Constitutional:       Appearance: Normal appearance.   HENT:      Head: Normocephalic and atraumatic.      Right Ear: Tympanic membrane, ear canal and external ear normal.      Left Ear: Tympanic membrane, ear canal and external ear normal.      Nose: Nose normal.      Mouth/Throat:      Mouth: Mucous membranes are moist.      Pharynx: Oropharynx is clear.   Eyes:      Extraocular Movements: Extraocular movements intact.      Pupils: Pupils are equal, round, and reactive to light.   Cardiovascular:      Rate and Rhythm: Normal rate and regular rhythm.      Pulses: Normal pulses.      Heart sounds: Normal heart sounds.   Pulmonary:      Breath sounds: Normal breath sounds.   Abdominal:      General: Bowel sounds are normal.      Palpations: Abdomen is soft.   Musculoskeletal:      Cervical back: Normal range of motion and neck supple.   Skin:     General: Skin is warm. "   Neurological:      General: No focal deficit present.      Mental Status: He is alert and oriented to person, place, and time.       Assessment/Plan     Eyad Garcia is a 68 y.o.male with:    Routine general medical examination at a health care facility  -     Hemoglobin A1C; Future; Expected date: 10/19/2023  -     CBC Auto Differential; Future; Expected date: 10/19/2023  -     Comprehensive Metabolic Panel; Future; Expected date: 10/19/2023  -     TSH; Future; Expected date: 10/19/2023  -     Lipid Panel; Future; Expected date: 10/19/2023  -     Microalbumin/Creatinine Ratio, Urine    Malignant neoplasm of prostate  - Stable. Continue current management, monitor.    Iron deficiency anemia secondary to inadequate dietary iron intake  -     CBC Auto Differential; Future; Expected date: 10/19/2023  -     TSH; Future; Expected date: 10/19/2023    Type 2 diabetes mellitus without complication, without long-term current use of insulin  -     Ambulatory referral/consult to Diabetic Advanced Practice Providers (Medical Management); Future; Expected date: 10/26/2023  -     dapagliflozin propanediol (FARXIGA) 5 mg Tab tablet; Take 1 tablet (5 mg total) by mouth once daily.  Dispense: 90 tablet; Refill: 3  -     Hemoglobin A1C; Future; Expected date: 10/19/2023  -     Comprehensive Metabolic Panel; Future; Expected date: 10/19/2023  -     Microalbumin/Creatinine Ratio, Urine  -     Hemoglobin A1C; Future; Expected date: 01/19/2024  -     Comprehensive Metabolic Panel; Future; Expected date: 01/19/2024    Gastroesophageal reflux disease without esophagitis    Statin intolerance  -     pravastatin (PRAVACHOL) 40 MG tablet; Take 1 tablet (40 mg total) by mouth once daily.  Dispense: 90 tablet; Refill: 3    Hyperlipidemia associated with type 2 diabetes mellitus  -     pravastatin (PRAVACHOL) 40 MG tablet; Take 1 tablet (40 mg total) by mouth once daily.  Dispense: 90 tablet; Refill: 3  -     ezetimibe (ZETIA) 10 mg tablet;  Take 1 tablet (10 mg total) by mouth every evening.  Dispense: 90 tablet; Refill: 3  -     Lipid Panel; Future; Expected date: 10/19/2023    Primary open angle glaucoma (POAG) of both eyes, mild stage    Essential hypertension  -     amLODIPine (NORVASC) 10 MG tablet; Take 1 tablet (10 mg total) by mouth once daily.  Dispense: 90 tablet; Refill: 3  -     hydroCHLOROthiazide (HYDRODIURIL) 25 MG tablet; Take 1 tablet (25 mg total) by mouth once daily.  Dispense: 90 tablet; Refill: 3  -     CBC Auto Differential; Future; Expected date: 10/19/2023  -     Comprehensive Metabolic Panel; Future; Expected date: 10/19/2023  -     TSH; Future; Expected date: 10/19/2023  -     Lipid Panel; Future; Expected date: 10/19/2023    Aortic atherosclerosis  -     pravastatin (PRAVACHOL) 40 MG tablet; Take 1 tablet (40 mg total) by mouth once daily.  Dispense: 90 tablet; Refill: 3  -     Lipid Panel; Future; Expected date: 10/19/2023         Chronic conditions status updated as per HPI.  Other than changes above, cont current medications and maintain follow up with specialists.  Return to clinic in Follow up in about 3 months (around 1/19/2024).      Dominique Rojas MD  [facility]    Patient Instructions   Bloodwork (fasting) pm 11/15  Please get fasting bloodwork BEFORE you come back for your follow up in 3 months  Tests to Keep You Healthy    Eye Exam: Met on 2/20/2023  Colon Cancer Screening: SCHEDULED  Last Blood Pressure <= 139/89 (10/19/2023): NO  Last HbA1c < 8 (04/19/2023): Yes

## 2023-10-20 LAB
ALBUMIN/CREAT UR: 10.3 UG/MG (ref 0–30)
CREAT UR-MCNC: 68 MG/DL (ref 23–375)
MICROALBUMIN UR DL<=1MG/L-MCNC: 7 UG/ML

## 2023-11-10 ENCOUNTER — TELEPHONE (OUTPATIENT)
Dept: ENDOSCOPY | Facility: HOSPITAL | Age: 68
End: 2023-11-10
Payer: MEDICARE

## 2023-11-12 ENCOUNTER — PATIENT MESSAGE (OUTPATIENT)
Dept: DIABETES | Facility: CLINIC | Age: 68
End: 2023-11-12
Payer: MEDICARE

## 2023-11-14 ENCOUNTER — TELEPHONE (OUTPATIENT)
Dept: ENDOSCOPY | Facility: HOSPITAL | Age: 68
End: 2023-11-14
Payer: MEDICARE

## 2023-11-14 NOTE — TELEPHONE ENCOUNTER
----- Message from Sylvia Cardoza sent at 11/14/2023  8:18 AM CST -----  Regarding: FW: Appt  Contact: Pt  [phone]    ----- Message -----  From: Rylee Gutierrez RN  Sent: 11/13/2023   1:01 PM CST  To: [facility] End[facility] Schedulers  Subject: FW: Appt                                           ----- Message -----  From: Mindi Gage  Sent: 11/13/2023  12:22 PM CST  To: [facility] Staff  Subject: Appt                                             Pt is calling to verify appt please call

## 2023-11-15 ENCOUNTER — LAB VISIT (OUTPATIENT)
Dept: LAB | Facility: HOSPITAL | Age: 68
End: 2023-11-15
Attending: RADIOLOGY
Payer: MEDICARE

## 2023-11-15 DIAGNOSIS — E11.65 TYPE 2 DIABETES MELLITUS WITH HYPERGLYCEMIA, WITHOUT LONG-TERM CURRENT USE OF INSULIN: Primary | ICD-10-CM

## 2023-11-15 DIAGNOSIS — Z00.00 ROUTINE GENERAL MEDICAL EXAMINATION AT A HEALTH CARE FACILITY: ICD-10-CM

## 2023-11-15 DIAGNOSIS — D50.8 IRON DEFICIENCY ANEMIA SECONDARY TO INADEQUATE DIETARY IRON INTAKE: ICD-10-CM

## 2023-11-15 DIAGNOSIS — E78.5 HYPERLIPIDEMIA ASSOCIATED WITH TYPE 2 DIABETES MELLITUS: ICD-10-CM

## 2023-11-15 DIAGNOSIS — I10 ESSENTIAL HYPERTENSION: ICD-10-CM

## 2023-11-15 DIAGNOSIS — I70.0 AORTIC ATHEROSCLEROSIS: ICD-10-CM

## 2023-11-15 DIAGNOSIS — E11.9 TYPE 2 DIABETES MELLITUS WITHOUT COMPLICATION, WITHOUT LONG-TERM CURRENT USE OF INSULIN: ICD-10-CM

## 2023-11-15 DIAGNOSIS — C61 MALIGNANT NEOPLASM OF PROSTATE: ICD-10-CM

## 2023-11-15 DIAGNOSIS — E11.69 HYPERLIPIDEMIA ASSOCIATED WITH TYPE 2 DIABETES MELLITUS: ICD-10-CM

## 2023-11-15 LAB
ALBUMIN SERPL BCP-MCNC: 4 G/DL (ref 3.5–5.2)
ALP SERPL-CCNC: 80 U/L (ref 55–135)
ALT SERPL W/O P-5'-P-CCNC: 42 U/L (ref 10–44)
ANION GAP SERPL CALC-SCNC: 9 MMOL/L (ref 8–16)
AST SERPL-CCNC: 34 U/L (ref 10–40)
BASOPHILS # BLD AUTO: 0.05 K/UL (ref 0–0.2)
BASOPHILS NFR BLD: 0.8 % (ref 0–1.9)
BILIRUB SERPL-MCNC: 0.4 MG/DL (ref 0.1–1)
BUN SERPL-MCNC: 13 MG/DL (ref 8–23)
CALCIUM SERPL-MCNC: 9.5 MG/DL (ref 8.7–10.5)
CHLORIDE SERPL-SCNC: 105 MMOL/L (ref 95–110)
CHOLEST SERPL-MCNC: 217 MG/DL (ref 120–199)
CHOLEST/HDLC SERPL: 4.7 {RATIO} (ref 2–5)
CO2 SERPL-SCNC: 25 MMOL/L (ref 23–29)
COMPLEXED PSA SERPL-MCNC: <0.01 NG/ML (ref 0–4)
CREAT SERPL-MCNC: 0.9 MG/DL (ref 0.5–1.4)
DIFFERENTIAL METHOD: ABNORMAL
EOSINOPHIL # BLD AUTO: 0.3 K/UL (ref 0–0.5)
EOSINOPHIL NFR BLD: 4.4 % (ref 0–8)
ERYTHROCYTE [DISTWIDTH] IN BLOOD BY AUTOMATED COUNT: 12.1 % (ref 11.5–14.5)
EST. GFR  (NO RACE VARIABLE): >60 ML/MIN/1.73 M^2
ESTIMATED AVG GLUCOSE: 194 MG/DL (ref 68–131)
GLUCOSE SERPL-MCNC: 182 MG/DL (ref 70–110)
HBA1C MFR BLD: 8.4 % (ref 4–5.6)
HCT VFR BLD AUTO: 37.1 % (ref 40–54)
HDLC SERPL-MCNC: 46 MG/DL (ref 40–75)
HDLC SERPL: 21.2 % (ref 20–50)
HGB BLD-MCNC: 12.3 G/DL (ref 14–18)
IMM GRANULOCYTES # BLD AUTO: 0.03 K/UL (ref 0–0.04)
IMM GRANULOCYTES NFR BLD AUTO: 0.5 % (ref 0–0.5)
LDLC SERPL CALC-MCNC: 143.4 MG/DL (ref 63–159)
LYMPHOCYTES # BLD AUTO: 1.4 K/UL (ref 1–4.8)
LYMPHOCYTES NFR BLD: 21.2 % (ref 18–48)
MCH RBC QN AUTO: 29.9 PG (ref 27–31)
MCHC RBC AUTO-ENTMCNC: 33.2 G/DL (ref 32–36)
MCV RBC AUTO: 90 FL (ref 82–98)
MONOCYTES # BLD AUTO: 0.6 K/UL (ref 0.3–1)
MONOCYTES NFR BLD: 9.7 % (ref 4–15)
NEUTROPHILS # BLD AUTO: 4 K/UL (ref 1.8–7.7)
NEUTROPHILS NFR BLD: 63.4 % (ref 38–73)
NONHDLC SERPL-MCNC: 171 MG/DL
NRBC BLD-RTO: 0 /100 WBC
PLATELET # BLD AUTO: 295 K/UL (ref 150–450)
PMV BLD AUTO: 9.6 FL (ref 9.2–12.9)
POTASSIUM SERPL-SCNC: 4 MMOL/L (ref 3.5–5.1)
PROT SERPL-MCNC: 7.1 G/DL (ref 6–8.4)
RBC # BLD AUTO: 4.11 M/UL (ref 4.6–6.2)
SODIUM SERPL-SCNC: 139 MMOL/L (ref 136–145)
TRIGL SERPL-MCNC: 138 MG/DL (ref 30–150)
TSH SERPL DL<=0.005 MIU/L-ACNC: 1.8 UIU/ML (ref 0.4–4)
WBC # BLD AUTO: 6.37 K/UL (ref 3.9–12.7)

## 2023-11-15 PROCEDURE — 36415 COLL VENOUS BLD VENIPUNCTURE: CPT | Mod: PN | Performed by: RADIOLOGY

## 2023-11-15 PROCEDURE — 80053 COMPREHEN METABOLIC PANEL: CPT | Performed by: INTERNAL MEDICINE

## 2023-11-15 PROCEDURE — 84153 ASSAY OF PSA TOTAL: CPT | Performed by: RADIOLOGY

## 2023-11-15 PROCEDURE — 80061 LIPID PANEL: CPT | Performed by: INTERNAL MEDICINE

## 2023-11-15 PROCEDURE — 84443 ASSAY THYROID STIM HORMONE: CPT | Performed by: INTERNAL MEDICINE

## 2023-11-15 PROCEDURE — 83036 HEMOGLOBIN GLYCOSYLATED A1C: CPT | Performed by: INTERNAL MEDICINE

## 2023-11-15 PROCEDURE — 85025 COMPLETE CBC W/AUTO DIFF WBC: CPT | Performed by: INTERNAL MEDICINE

## 2023-11-15 RX ORDER — DAPAGLIFLOZIN 10 MG/1
10 TABLET, FILM COATED ORAL DAILY
Qty: 90 TABLET | Refills: 3 | Status: SHIPPED | OUTPATIENT
Start: 2023-11-15 | End: 2024-01-03 | Stop reason: SDUPTHER

## 2023-11-15 NOTE — PROGRESS NOTES
Your blood count (CBC) is unremarkable.    Your sugar number (Glucose) is elevated  Your A1c is 8.4%. Given your increase in diabetes levels, I am increasing your farxiga dose at this time. I am also placing a referral for diabetes NP to help us manage your diabetes better. You NEED to follow up with them.   Rest of your electrolytes are unremarkable.    Your kidney (BUN, Creatinine and GFT) function is unremarkable.   Your liver (AST, ALT) function is unremarkable.    Your thyroid numbers are within normal limits.     Your Cholesterol is ELEVATED. Here are some recommendations:  --------------------------------------------------------------------------------------------------------------  Increase FIBER INTAKE - your body is happiest with FIVE FRESH COLORS of fruits and vegetables DAILY    With respect to FIBER intake, you should have 5-10 grams of viscous soluble fiber daily. This Includes fruit (bananas, apples, pears, blackberries, citrus, peaches, plums, nectarines), whole grains (oatmeal, oat bran, all-bran cereal, granola, shredded wheat, wheat germ, adrienne barley, brown rice), legumes (northern/jefferson/navy/lima/kidney/black beans, peas, lentils), seeds (psyllium), and vegetables (carrots, broccoli, brussels sprouts, artichokes).    GET MOVING - 20 min of exercise (huffing & puffing ) most days of the week can strengthen your blood vessels & help to pump out the cholesterol that is sitting around stagnant  ----------------------------------------------------------------------------------------------------------------  The 10-year ASCVD risk score (Anjel NGO, et al., 2019) is: 37.5%    Values used to calculate the score:      Age: 68 years      Sex: Male      Is Non- : Yes      Diabetic: Yes      Tobacco smoker: No      Systolic Blood Pressure: 138 mmHg      Is BP treated: Yes      HDL Cholesterol: 46 mg/dL      Total Cholesterol: 217 mg/dL

## 2023-11-17 ENCOUNTER — HOSPITAL ENCOUNTER (OUTPATIENT)
Facility: HOSPITAL | Age: 68
Discharge: HOME | End: 2023-11-17
Attending: COLON & RECTAL SURGERY | Admitting: COLON & RECTAL SURGERY
Payer: MEDICARE

## 2023-11-17 ENCOUNTER — ANESTHESIA EVENT (OUTPATIENT)
Dept: ENDOSCOPY | Facility: HOSPITAL | Age: 68
End: 2023-11-17
Payer: MEDICARE

## 2023-11-17 ENCOUNTER — ANESTHESIA (OUTPATIENT)
Dept: ENDOSCOPY | Facility: HOSPITAL | Age: 68
End: 2023-11-17
Payer: MEDICARE

## 2023-11-17 VITALS
BODY MASS INDEX: 25.55 KG/M2 | SYSTOLIC BLOOD PRESSURE: 119 MMHG | HEART RATE: 71 BPM | RESPIRATION RATE: 17 BRPM | WEIGHT: 159 LBS | TEMPERATURE: 98 F | HEIGHT: 66 IN | DIASTOLIC BLOOD PRESSURE: 69 MMHG | OXYGEN SATURATION: 100 %

## 2023-11-17 DIAGNOSIS — Z86.010 HISTORY OF ADENOMATOUS POLYP OF COLON: ICD-10-CM

## 2023-11-17 LAB — POCT GLUCOSE: 158 MG/DL (ref 70–110)

## 2023-11-17 PROCEDURE — 88305 TISSUE EXAM BY PATHOLOGIST: CPT | Mod: HCNC | Performed by: STUDENT IN AN ORGANIZED HEALTH CARE EDUCATION/TRAINING PROGRAM

## 2023-11-17 PROCEDURE — 88305 TISSUE EXAM BY PATHOLOGIST: CPT | Mod: 26,HCNC,, | Performed by: STUDENT IN AN ORGANIZED HEALTH CARE EDUCATION/TRAINING PROGRAM

## 2023-11-17 PROCEDURE — 45380 PR COLONOSCOPY,BIOPSY: ICD-10-PCS | Mod: PT,HCNC,, | Performed by: COLON & RECTAL SURGERY

## 2023-11-17 PROCEDURE — 45380 COLONOSCOPY AND BIOPSY: CPT | Mod: PT,HCNC,, | Performed by: COLON & RECTAL SURGERY

## 2023-11-17 PROCEDURE — 37000009 HC ANESTHESIA EA ADD 15 MINS: Mod: HCNC | Performed by: COLON & RECTAL SURGERY

## 2023-11-17 PROCEDURE — 37000008 HC ANESTHESIA 1ST 15 MINUTES: Mod: HCNC | Performed by: COLON & RECTAL SURGERY

## 2023-11-17 PROCEDURE — 45380 COLONOSCOPY AND BIOPSY: CPT | Mod: PT,HCNC | Performed by: COLON & RECTAL SURGERY

## 2023-11-17 PROCEDURE — 88305 TISSUE EXAM BY PATHOLOGIST: ICD-10-PCS | Mod: 26,HCNC,, | Performed by: STUDENT IN AN ORGANIZED HEALTH CARE EDUCATION/TRAINING PROGRAM

## 2023-11-17 PROCEDURE — 25000003 PHARM REV CODE 250: Mod: HCNC | Performed by: NURSE ANESTHETIST, CERTIFIED REGISTERED

## 2023-11-17 PROCEDURE — E9220 PRA ENDO ANESTHESIA: HCPCS | Mod: PT,HCNC,, | Performed by: NURSE ANESTHETIST, CERTIFIED REGISTERED

## 2023-11-17 PROCEDURE — 27201012 HC FORCEPS, HOT/COLD, DISP: Mod: HCNC | Performed by: COLON & RECTAL SURGERY

## 2023-11-17 PROCEDURE — E9220 PRA ENDO ANESTHESIA: ICD-10-PCS | Mod: PT,HCNC,, | Performed by: NURSE ANESTHETIST, CERTIFIED REGISTERED

## 2023-11-17 PROCEDURE — 63600175 PHARM REV CODE 636 W HCPCS: Mod: HCNC | Performed by: NURSE ANESTHETIST, CERTIFIED REGISTERED

## 2023-11-17 RX ORDER — PROPOFOL 10 MG/ML
VIAL (ML) INTRAVENOUS CONTINUOUS PRN
Status: DISCONTINUED | OUTPATIENT
Start: 2023-11-17 | End: 2023-11-17

## 2023-11-17 RX ORDER — LIDOCAINE HYDROCHLORIDE 20 MG/ML
INJECTION INTRAVENOUS
Status: DISCONTINUED | OUTPATIENT
Start: 2023-11-17 | End: 2023-11-17

## 2023-11-17 RX ORDER — SODIUM CHLORIDE 9 MG/ML
INJECTION, SOLUTION INTRAVENOUS CONTINUOUS
Status: DISCONTINUED | OUTPATIENT
Start: 2023-11-17 | End: 2023-11-17 | Stop reason: HOSPADM

## 2023-11-17 RX ADMIN — SODIUM CHLORIDE: 0.9 INJECTION, SOLUTION INTRAVENOUS at 11:11

## 2023-11-17 RX ADMIN — LIDOCAINE HYDROCHLORIDE 50 MG: 20 INJECTION INTRAVENOUS at 11:11

## 2023-11-17 RX ADMIN — PROPOFOL 150 MCG/KG/MIN: 10 INJECTION, EMULSION INTRAVENOUS at 11:11

## 2023-11-17 RX ADMIN — PROPOFOL 70 MG: 10 INJECTION, EMULSION INTRAVENOUS at 11:11

## 2023-11-17 NOTE — ANESTHESIA POSTPROCEDURE EVALUATION
Anesthesia Post Evaluation    Patient: Eyad Garcia    Procedure(s) Performed: Procedure(s) (LRB):  COLONOSCOPY (N/A)    Final Anesthesia Type: general      Patient location during evaluation: PACU  Patient participation: Yes- Able to Participate  Level of consciousness: awake and alert and oriented  Post-procedure vital signs: reviewed and stable  Pain management: adequate  Airway patency: patent    PONV status at discharge: No PONV  Anesthetic complications: no      Cardiovascular status: hemodynamically stable  Respiratory status: unassisted, spontaneous ventilation and room air  Hydration status: euvolemic  Follow-up not needed.          Vitals Value Taken Time   /69 11/17/23 1210   Temp 36.7 °C (98.1 °F) 11/17/23 1141   Pulse 71 11/17/23 1210   Resp 17 11/17/23 1210   SpO2 100 % 11/17/23 1210         Event Time   Out of Recovery 12:12:28         Pain/Omid Score: Omid Score: 9 (11/17/2023 11:41 AM)

## 2023-11-17 NOTE — H&P
COLONOSCOPY HISTORY AND PHYSICAL EXAM    Procedure : Colonoscopy      INDICATIONS: personal history of colon polyps    Family Hx of CRC: no first degree relatives (aunt recently  of colon cancer)    Last Colonoscopy:  19  Findings: rectal TA x3  Sedation Problems: NO  Fam Hx of Sedation Problems: NO     Past Medical History:   Diagnosis Date    Cataract     Diabetes mellitus type II     Difficult intubation     Erectile dysfunction     History of colon polyps 2019    Hyperlipidemia     Hypertension     OA (osteoarthritis)     POAG (primary open-angle glaucoma)     Retinal detachment     OS     Family History   Problem Relation Age of Onset    Diabetes Mother     Hypertension Mother     Glaucoma Mother     Diabetes Sister     Glaucoma Sister     Diabetes Brother     Glaucoma Brother     No Known Problems Father     Cancer Maternal Aunt     Colon cancer Maternal Aunt     No Known Problems Maternal Uncle     No Known Problems Paternal Aunt     No Known Problems Paternal Uncle     No Known Problems Maternal Grandmother     No Known Problems Maternal Grandfather     No Known Problems Paternal Grandmother     No Known Problems Paternal Grandfather     Anesthesia problems Neg Hx     Broken bones Neg Hx     Clotting disorder Neg Hx     Collagen disease Neg Hx     Dislocations Neg Hx     Osteoporosis Neg Hx     Rheumatologic disease Neg Hx     Scoliosis Neg Hx     Severe sprains Neg Hx     Amblyopia Neg Hx     Blindness Neg Hx     Cataracts Neg Hx     Macular degeneration Neg Hx     Retinal detachment Neg Hx     Strabismus Neg Hx     Stroke Neg Hx     Thyroid disease Neg Hx     Esophageal cancer Neg Hx      Social History     Socioeconomic History    Marital status:     Number of children: 2   Occupational History     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     Tobacco Use    Smoking status: Never    Smokeless tobacco: Never   Substance and Sexual Activity    Alcohol use: Yes     Comment: A beer  "once a year    Drug use: No    Sexual activity: Yes     Partners: Female   Social History Narrative    Works for [employer]     Social Determinants of Health     Financial Resource Strain: Low Risk  (4/28/2020)    Overall Financial Resource Strain (CARDIA)     Difficulty of Paying Living Expenses: Not hard at all   Food Insecurity: No Food Insecurity (4/28/2020)    Hunger Vital Sign     Worried About Running Out of Food in the Last Year: Never true     Ran Out of Food in the Last Year: Never true   Transportation Needs: No Transportation Needs (4/28/2020)    PRAPARE - Transportation     Lack of Transportation (Medical): No     Lack of Transportation (Non-Medical): No   Physical Activity: Inactive (4/28/2020)    Exercise Vital Sign     Days of Exercise per Week: 0 days     Minutes of Exercise per Session: 0 min   Stress: No Stress Concern Present (4/28/2020)    [place] [employer] - Occupational Stress Questionnaire     Feeling of Stress : Not at all   Social Connections: Unknown (4/28/2020)    Social Connection and Isolation Panel [NHANES]     Frequency of Communication with Friends and Family: More than three times a week       Review of Systems - Negative except   Respiratory ROS: no dyspnea  Cardiovascular ROS: no exertional chest pain  Gastrointestinal ROS: NO abdominal discomfort,  NO rectal bleeding  Musculoskeletal ROS: no muscular pain  Neurological ROS: no recent stroke    Physical Exam:  BP (!) 166/79 (BP Location: Left arm, Patient Position: Lying)   Pulse 80   Temp 97.9 °F (36.6 °C) (Temporal)   Resp 16   Ht 5' 6" (1.676 m)   Wt 72.1 kg (159 lb)   SpO2 100%   BMI 25.66 kg/m²     General: Alert and oriented x 3. No acute distress. Well-nourished.  HEENT: EOMI. Sclera anicteric. Moist mucous membranes. No scleral icterus. No cervical lymphadenopathy.  Lungs: Clear to auscultation bilaterally. No accessory muscle use.  Cardiac: Regular rate and rhythm. No murmur. No " JVD.  Abdomen: soft, non tender  Extremities: No edema. Non-tender.  Skin: No rashes or lesions. Warm.  Neuro: No focal neurological deficits. CN II-XII grossly intact, but not individually tested.  Psych: Cooperative. Appropriate mood and affect.    Anorectal:      ASA:  II    PLAN  COLONOSCOPY.    SedationPlan :MAC    The details of the procedure, the possible need for biopsy or polypectomy and the potential risks including bleeding, perforation, missed polyps were discussed in detail.    Vishnu Garland MD  Colon & Rectal Surgery Fellow

## 2023-11-17 NOTE — PROVATION PATIENT INSTRUCTIONS
Discharge Summary/Instructions after an Endoscopic Procedure  Patient Name: Eyad Garcia  Patient MRN: [id number]  Patient YOB: 1955 Friday, November 17, 2023  Elder Guillermo MD  Dear patient,  As a result of recent federal legislation (The Federal Cures Act), you may   receive lab or pathology results from your procedure in your [facility]   account before your physician is able to contact you. Your physician or   their representative will relay the results to you with their   recommendations at their soonest availability.  Thank you,  RESTRICTIONS:  During your procedure today, you received medications for sedation.  These   medications may affect your judgment, balance and coordination.  Therefore,   for 24 hours, you have the following restrictions:   - DO NOT drive a car, operate machinery, make legal/financial decisions,   sign important papers or drink alcohol.    ACTIVITY:  Today: no heavy lifting, straining or running due to procedural   sedation/anesthesia.  The following day: return to full activity including work.  DIET:  Eat and drink normally unless instructed otherwise.     TREATMENT FOR COMMON SIDE EFFECTS:  - Mild abdominal pain, nausea, belching, bloating or excessive gas:  rest,   eat lightly and use a heating pad.  - Sore Throat: treat with throat lozenges and/or gargle with warm salt   water.  - Because air was used during the procedure, expelling large amounts of air   from your rectum or belching is normal.  - If a bowel prep was taken, you may not have a bowel movement for 1-3 days.    This is normal.  SYMPTOMS TO WATCH FOR AND REPORT TO YOUR PHYSICIAN:  1. Abdominal pain or bloating, other than gas cramps.  2. Chest pain.  3. Back pain.  4. Signs of infection such as: chills or fever occurring within 24 hours   after the procedure.  5. Rectal bleeding, which would show as bright red, maroon, or black stools.   (A tablespoon of blood from the rectum is not serious, especially  if   hemorrhoids are present.)  6. Vomiting.  7. Weakness or dizziness.  GO DIRECTLY TO THE NEAREST EMERGENCY ROOM IF YOU HAVE ANY OF THE FOLLOWING:      Difficulty breathing              Chills and/or fever over 101 F   Persistent vomiting and/or vomiting blood   Severe abdominal pain   Severe chest pain   Black, tarry stools   Bleeding- more than one tablespoon   Any other symptom or condition that you feel may need urgent attention  Your doctor recommends these additional instructions:  If any biopsies were taken, your doctors clinic will contact you in 1 to 2   weeks with any results.  - Discharge patient to home (ambulatory).   - Resume previous diet.   - Continue present medications.   - Await pathology results.   - Repeat colonoscopy in 5 years for surveillance.  For questions, problems or results please call your physician - Elder Guillermo MD at Work:  [phone].  [facility] EM[facility]ENC[facility] RO[facility] PHONE NUMBER: [phone]  IF A COMPLICATION OR EMERGENCY SITUATION ARISES AND YOU ARE UNABLE TO REACH   YOUR PHYSICIAN - GO DIRECTLY TO THE EMERGENCY ROOM.  Elder Guillermo MD  11/17/2023 11:42:00 AM  This report has been verified and signed electronically.  Dear patient,  As a result of recent federal legislation (The Federal Cures Act), you may   receive lab or pathology results from your procedure in your [facility]   account before your physician is able to contact you. Your physician or   their representative will relay the results to you with their   recommendations at their soonest availability.  Thank you,  PROVATION

## 2023-11-17 NOTE — ANESTHESIA PREPROCEDURE EVALUATION
11/17/2023  Eyad Garcia is a 68 y.o., male.      Pre-op Assessment    I have reviewed the Patient Summary Reports.    I have reviewed the NPO Status.   I have reviewed the Medications.     Review of Systems  Anesthesia Hx:  No problems with previous Anesthesia             Denies Family Hx of Anesthesia complications.    Denies Personal Hx of Anesthesia complications.                    Hematology/Oncology:  Hematology Normal   Oncology Normal                                   EENT/Dental:  EENT/Dental Normal           Cardiovascular:  Cardiovascular Normal Exercise tolerance: good                                           Renal/:  Renal/ Normal                 Hepatic/GI:  Hepatic/GI Normal                 Musculoskeletal:  Musculoskeletal Normal                Neurological:  Neurology Normal                                      Endocrine:  Endocrine Normal            Dermatological:  Skin Normal    Psych:  Psychiatric Normal                    Physical Exam  General: Well nourished, Cooperative, Alert and Oriented    Airway:  Mallampati: II   Mouth Opening: Normal  TM Distance: Normal  Neck ROM: Normal ROM    Dental:  Intact        Anesthesia Plan  Type of Anesthesia, risks & benefits discussed:    Anesthesia Type: Gen Natural Airway  Intra-op Monitoring Plan: Standard ASA Monitors  Post Op Pain Control Plan: multimodal analgesia  Induction:  IV  Informed Consent: Informed consent signed with the Patient and all parties understand the risks and agree with anesthesia plan.  All questions answered.   ASA Score: 2  Day of Surgery Review of History & Physical: H&P Update referred to the surgeon/provider.    Ready For Surgery From Anesthesia Perspective.     .

## 2023-11-17 NOTE — TRANSFER OF CARE
"Anesthesia Transfer of Care Note    Patient: Eyad Garcia    Procedure(s) Performed: Procedure(s) (LRB):  COLONOSCOPY (N/A)    Patient location: PACU    Anesthesia Type: MAC    Transport from OR: Transported from OR on room air with adequate spontaneous ventilation    Post pain: adequate analgesia    Post assessment: no apparent anesthetic complications and tolerated procedure well    Post vital signs: stable    Level of consciousness: sedated    Nausea/Vomiting: no nausea/vomiting    Complications: none    Transfer of care protocol was followed      Last vitals: Visit Vitals  BP (!) 95/56   Pulse 68   Temp 36.7 °C (98.1 °F)   Resp 18   Ht 5' 6" (1.676 m)   Wt 72.1 kg (159 lb)   SpO2 95%   BMI 25.66 kg/m²     "

## 2023-11-22 ENCOUNTER — OFFICE VISIT (OUTPATIENT)
Dept: RADIATION ONCOLOGY | Facility: CLINIC | Age: 68
End: 2023-11-22
Payer: MEDICARE

## 2023-11-22 VITALS
SYSTOLIC BLOOD PRESSURE: 147 MMHG | HEIGHT: 65 IN | BODY MASS INDEX: 26.52 KG/M2 | DIASTOLIC BLOOD PRESSURE: 71 MMHG | HEART RATE: 77 BPM | TEMPERATURE: 98 F | WEIGHT: 159.19 LBS

## 2023-11-22 DIAGNOSIS — C61 MALIGNANT NEOPLASM OF PROSTATE: Primary | ICD-10-CM

## 2023-11-22 LAB
COMMENT: NORMAL
FINAL PATHOLOGIC DIAGNOSIS: NORMAL
GROSS: NORMAL
Lab: NORMAL

## 2023-11-22 PROCEDURE — 3052F PR MOST RECENT HEMOGLOBIN A1C LEVEL 8.0 - < 9.0%: ICD-10-PCS | Mod: CPTII,S$GLB,, | Performed by: RADIOLOGY

## 2023-11-22 PROCEDURE — 3061F PR NEG MICROALBUMINURIA RESULT DOCUMENTED/REVIEW: ICD-10-PCS | Mod: CPTII,S$GLB,, | Performed by: RADIOLOGY

## 2023-11-22 PROCEDURE — 3066F PR DOCUMENTATION OF TREATMENT FOR NEPHROPATHY: ICD-10-PCS | Mod: CPTII,S$GLB,, | Performed by: RADIOLOGY

## 2023-11-22 PROCEDURE — 1160F PR REVIEW ALL MEDS BY PRESCRIBER/CLIN PHARMACIST DOCUMENTED: ICD-10-PCS | Mod: CPTII,S$GLB,, | Performed by: RADIOLOGY

## 2023-11-22 PROCEDURE — 3066F NEPHROPATHY DOC TX: CPT | Mod: CPTII,S$GLB,, | Performed by: RADIOLOGY

## 2023-11-22 PROCEDURE — 3077F SYST BP >= 140 MM HG: CPT | Mod: CPTII,S$GLB,, | Performed by: RADIOLOGY

## 2023-11-22 PROCEDURE — 3008F PR BODY MASS INDEX (BMI) DOCUMENTED: ICD-10-PCS | Mod: CPTII,S$GLB,, | Performed by: RADIOLOGY

## 2023-11-22 PROCEDURE — 1126F PR PAIN SEVERITY QUANTIFIED, NO PAIN PRESENT: ICD-10-PCS | Mod: CPTII,S$GLB,, | Performed by: RADIOLOGY

## 2023-11-22 PROCEDURE — 3288F PR FALLS RISK ASSESSMENT DOCUMENTED: ICD-10-PCS | Mod: CPTII,S$GLB,, | Performed by: RADIOLOGY

## 2023-11-22 PROCEDURE — 1126F AMNT PAIN NOTED NONE PRSNT: CPT | Mod: CPTII,S$GLB,, | Performed by: RADIOLOGY

## 2023-11-22 PROCEDURE — 1101F PT FALLS ASSESS-DOCD LE1/YR: CPT | Mod: CPTII,S$GLB,, | Performed by: RADIOLOGY

## 2023-11-22 PROCEDURE — 3077F PR MOST RECENT SYSTOLIC BLOOD PRESSURE >= 140 MM HG: ICD-10-PCS | Mod: CPTII,S$GLB,, | Performed by: RADIOLOGY

## 2023-11-22 PROCEDURE — 4010F PR ACE/ARB THEARPY RXD/TAKEN: ICD-10-PCS | Mod: CPTII,S$GLB,, | Performed by: RADIOLOGY

## 2023-11-22 PROCEDURE — 96402 CHEMO HORMON ANTINEOPL SQ/IM: CPT | Mod: S$GLB,,, | Performed by: RADIOLOGY

## 2023-11-22 PROCEDURE — 99212 OFFICE O/P EST SF 10 MIN: CPT | Mod: 25,S$GLB,, | Performed by: RADIOLOGY

## 2023-11-22 PROCEDURE — 3078F DIAST BP <80 MM HG: CPT | Mod: CPTII,S$GLB,, | Performed by: RADIOLOGY

## 2023-11-22 PROCEDURE — 3288F FALL RISK ASSESSMENT DOCD: CPT | Mod: CPTII,S$GLB,, | Performed by: RADIOLOGY

## 2023-11-22 PROCEDURE — 1159F MED LIST DOCD IN RCRD: CPT | Mod: CPTII,S$GLB,, | Performed by: RADIOLOGY

## 2023-11-22 PROCEDURE — 4010F ACE/ARB THERAPY RXD/TAKEN: CPT | Mod: CPTII,S$GLB,, | Performed by: RADIOLOGY

## 2023-11-22 PROCEDURE — 96402 PR CHEMOTHER HORMON ANTINEOPL SUB-Q/IM: ICD-10-PCS | Mod: S$GLB,,, | Performed by: RADIOLOGY

## 2023-11-22 PROCEDURE — 99999 PR PBB SHADOW E&M-EST. PATIENT-LVL IV: CPT | Mod: PBBFAC,,, | Performed by: RADIOLOGY

## 2023-11-22 PROCEDURE — 3052F HG A1C>EQUAL 8.0%<EQUAL 9.0%: CPT | Mod: CPTII,S$GLB,, | Performed by: RADIOLOGY

## 2023-11-22 PROCEDURE — 3061F NEG MICROALBUMINURIA REV: CPT | Mod: CPTII,S$GLB,, | Performed by: RADIOLOGY

## 2023-11-22 PROCEDURE — 3078F PR MOST RECENT DIASTOLIC BLOOD PRESSURE < 80 MM HG: ICD-10-PCS | Mod: CPTII,S$GLB,, | Performed by: RADIOLOGY

## 2023-11-22 PROCEDURE — 99212 PR OFFICE/OUTPT VISIT, EST, LEVL II, 10-19 MIN: ICD-10-PCS | Mod: 25,S$GLB,, | Performed by: RADIOLOGY

## 2023-11-22 PROCEDURE — 99999 PR PBB SHADOW E&M-EST. PATIENT-LVL IV: ICD-10-PCS | Mod: PBBFAC,,, | Performed by: RADIOLOGY

## 2023-11-22 PROCEDURE — 1101F PR PT FALLS ASSESS DOC 0-1 FALLS W/OUT INJ PAST YR: ICD-10-PCS | Mod: CPTII,S$GLB,, | Performed by: RADIOLOGY

## 2023-11-22 PROCEDURE — 1159F PR MEDICATION LIST DOCUMENTED IN MEDICAL RECORD: ICD-10-PCS | Mod: CPTII,S$GLB,, | Performed by: RADIOLOGY

## 2023-11-22 PROCEDURE — 3008F BODY MASS INDEX DOCD: CPT | Mod: CPTII,S$GLB,, | Performed by: RADIOLOGY

## 2023-11-22 PROCEDURE — 1160F RVW MEDS BY RX/DR IN RCRD: CPT | Mod: CPTII,S$GLB,, | Performed by: RADIOLOGY

## 2023-11-22 NOTE — PROGRESS NOTES
[facility] - Radiation Oncology     Patient ID: Eyad Garcia is a 68 y.o. male.    Chief Complaint: Follow-up    This patient presents to continue hormonal deprivation therapy.      Mr. Garcia has a history of recurrent prostate cancer. He initially presented in 2015 when biopsies from the Rt. base revealed Scooter 8 (4+4) adenocarcinoma. He underwent RALP with bilateral pelvic node dissection in July of 2015. Pathology revealed a single focus of [place] 6 (3+3) adenocarcinoma in a background of extensive high-grade PIN. There was no extraprostatic extension, seminal vesicle invasion or lymphovascular invasion. The margins and 10 (5 Lt., 5 Rt.) pelvic nodes were negative for tumor involvement. Postoperative PSA in 2015 was 0.03 ng/ml. It continued to increase slowly. PSA in June of 2019 returned at 4 ng/ml. Work up revealed uptake in one small pelvic node on Axumin scan.  He completed 46.8 Gy to the prostate bed and nodes followed by a boost to the prostate bed and pelvic node 11/15/19.   His radiotherapy was combined with 6 months of androgen deprivation therapy. His PSA  continued to increase following radiotherapy. Subsequent imaging with bone scan and Axumin scan were negative but we elected to start hormonal deprivation therapy in March of 2022 given a short (< 3 month) PSA doubling time.  Today the patient states he feels well.  No complaints.       Review of Systems   Constitutional:  Negative for activity change, appetite change, chills, diaphoresis and fatigue.   Respiratory:  Negative for cough and shortness of breath.    Gastrointestinal:  Negative for abdominal pain, constipation, diarrhea and fecal incontinence.   Genitourinary:  Negative for bladder incontinence, difficulty urinating, dysuria, erectile dysfunction, frequency and hematuria.       Physical Exam  Constitutional:       General: He is not in acute distress.     Appearance: Normal appearance.   Pulmonary:       Effort: Pulmonary effort is normal. No respiratory distress.   Abdominal:      General: Abdomen is flat. There is no distension.   Neurological:      Mental Status: He is alert and oriented to person, place, and time.   Psychiatric:         Mood and Affect: Mood normal.         Judgment: Judgment normal.        Latest Reference Range & Units 03/22/23 09:10 11/15/23 08:15   PSA Diagnostic 0.00 - 4.00 ng/mL <0.01 <0.01          Assessment and Plan     1. Malignant neoplasm of prostate    No evidence of progressive disease.  Will plan to continue his hormonal deprivation therapy.  He continues on Calcium and Vit  D.  Plan follow up in 6 months with PSA.

## 2023-12-22 ENCOUNTER — TELEPHONE (OUTPATIENT)
Dept: DIABETES | Facility: CLINIC | Age: 68
End: 2023-12-22
Payer: MEDICARE

## 2024-01-01 NOTE — PROGRESS NOTES
HPI    DLS: 9/29/2023    Pt here for HVF review/OCT;  Pt states no eye pain or discomfort. Pt denies any vision changes.     Meds:  AT's PRN OU    Cosopt TID OD  Brimonidine TID OD  Rocklatan QHS OD    1. POAG   2. VF Defect OS   3. Chorioretinal Scar OS   4. Hx RD OS   5. NS OU   6. Type 2 DM no DR   7. Pupil Sphincter Rupture OS   8. Presbyopia     Last edited by Talya Hobson on 1/2/2024  2:41 PM.            Assessment /Plan     For exam results, see Encounter Report.    Primary open-angle glaucoma, left eye, moderate stage    Nuclear sclerotic cataract of both eyes    Pupillary sphincter rupture, left    Chorioretinal scar, left    Old subtotal retinal detachment, left    Glaucoma filtering bleb of left eye    Type 2 diabetes mellitus without ophthalmic manifestations        Pt initially dx with glaucoma at [facility] - stoppped his gtts on his own   Presented to [facility] - susana Valdez 8/23/2006 - off gtts with a baseline / Tmax of 25/27  Referred by Courtney for consideration of SLT's   Pt with POAG and poorly controlled IOP's - does not use drops regularly       1. Primary open angle glaucoma, both eyes, mild stage        Glaucoma (type and duration)    POAG with elevated IOP ou - mild stage    First HVF   2007 - full od // SAD os 2/2 to CRS   First photos   2011   Treatment / Drops started   2006           Family history    ++ mother and 2 brothers         Glaucoma meds    Latanoprost // brimonidine //  cosopt (out of cosopt - 5/2/2023)         H/O adverse rxn to glaucoma drops    None (has tired alphagan and timolol in past -non compliant)         LASERS    SLT os - 3/31/2016 // SLT od - 4/14/2016 // repeat SLT OS 3/16/2023        GLAUCOMA SURGERIES    trab os - 6/14/2023 (LSL x 3)         OTHER EYE SURGERIES    H/O RD repair os - S/P laser repair         CDR    0.75 // 0.85-0.9         Tbase    25/27          Tmax    25/27            Ttarget    18/18             HVF    14 test 2006 to  2024 - near  full  od //  SAD - 2/2 CRS from laser for RD repair os / new IAD (+prog when IOP up in the high 20's to 40's just pre- and post trab - 6/2023        Gonio    +3 ou          CCT    486/502 - thin ou         OCT    5 test 2018 to 2024 - RNFL - dec elizabeth CORLEYpradeep ROBERT od - dec. G/NI/TI/N /NS/ TS  Jorge CORLEY  (has a CRS)  os        HRT    3 test 2017 to 2019 -MR -  DEC. Sn/n/in od // DEC. S/n/iJORGE t os /// CDR 0.73 od // 0.86 os        Disc photos    2011, 2015 , 2017// harmony - 2022      - Ttoday  17/8    (on glaucoma drops - od // post trab os) )    - Test done today  - HVF / DFE / OCT  -  // S/P trab OS 6/14/2023     2. 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 Chorioretinal scar, left   -with 2nd VF defect      4. Old subtotal retinal detachment, left   S/P repair - retina flat - stable   -large CRS   - pt states he did NOT go to OR - just Rx with laser at the slit lamp     5. Type 2 diabetes mellitus without ophthalmic manifestations      6. Senile nuclear sclerosis  -mild - monitor      7. pupil sphincter rupture os  -suggest old trauma  -does NOT appear to have a angle recession    7. Presbyopia       PLAN    POAG with OHT OS > OD - mild od // moderate os   IOP below target od and too high os   S/P repeat slt os 3/16/2023 - ? Residual steroid response vs out of cosopt vs no response     The ON and VF's are still good od  ?? Trace APD os - first noted on 5/2/2023   The VF loss os is 2/2 old CRS- ?  post laser for a RD repair   Good initial response to SLT ou 25/25 --> 16/17 7/29/2022  ++ IOP elevation os   ++ VF prog os   ++ OCT - RNFL prog os     Cont drops   cosopt tid ou - has run out - sample given and trixie called   Brimonidine tid ou - Rx sent   rocklatan q hs ou (samples given )      Photo file updated 5/2/2023 6/2/2023  IOP still too high os (( IOP is ok od))    S/p repeat slt and on mult gtts     --  rec incisional surgery - doubt he  has a scleral buckle  // + free conj sup - could try a trab w/ MMC  / ? Xen os / express minishunt or no shunt or a GDD     Pt is NOT a diamox candidate - H/O sulfa allergy - hives     Rec trabeculectomy with MMC os -no stent    Pt is still phakic / H/O trauma     S/P trabeculectomy with mitomycin OS  Date:6/14/2023   POM 7  without any stent or shunt   Number or sutures in flap  4  Number of sutures already cut - 3 -  (1st cut 6/22/2023) // 2nd cut 6/26/2023// 3rd suture cut 6/27/2023   anterior chamber depth  deep fully formed   Bleb appearance - flat prior to digital massage //  elevated after digital massage next to bleb   sidell neg  IOP  8    (S/P LSL x 3 - only one suture left)     cosopt cont OD // hold os   brim tid  cont OD // hold os  -  Rocklatan - OD only     1/2/2024   ++ VF porg os - occurred whn IOP up pre-op and high post op until 3rd suture lysis done   IOP ok od on gtts   IOP ok (low) off gtts post trab os     F/U 4 months - sooner prn // IOP // gonio / check on APD os   In future try a 10-2 ss os     Bethany Lechuga MD

## 2024-01-02 ENCOUNTER — OFFICE VISIT (OUTPATIENT)
Dept: OPHTHALMOLOGY | Facility: CLINIC | Age: 69
End: 2024-01-02
Payer: MEDICARE

## 2024-01-02 ENCOUNTER — CLINICAL SUPPORT (OUTPATIENT)
Dept: OPHTHALMOLOGY | Facility: CLINIC | Age: 69
End: 2024-01-02
Payer: MEDICARE

## 2024-01-02 DIAGNOSIS — H25.13 NUCLEAR SCLEROTIC CATARACT OF BOTH EYES: ICD-10-CM

## 2024-01-02 DIAGNOSIS — Z98.83 GLAUCOMA FILTERING BLEB OF LEFT EYE: ICD-10-CM

## 2024-01-02 DIAGNOSIS — H21.562 PUPILLARY SPHINCTER RUPTURE, LEFT: ICD-10-CM

## 2024-01-02 DIAGNOSIS — H31.002 CHORIORETINAL SCAR, LEFT: ICD-10-CM

## 2024-01-02 DIAGNOSIS — H33.052 OLD SUBTOTAL RETINAL DETACHMENT, LEFT: ICD-10-CM

## 2024-01-02 DIAGNOSIS — E11.9 TYPE 2 DIABETES MELLITUS WITHOUT OPHTHALMIC MANIFESTATIONS: ICD-10-CM

## 2024-01-02 DIAGNOSIS — H40.1122 PRIMARY OPEN-ANGLE GLAUCOMA, LEFT EYE, MODERATE STAGE: Primary | ICD-10-CM

## 2024-01-02 PROCEDURE — 99214 OFFICE O/P EST MOD 30 MIN: CPT | Mod: S$GLB,,, | Performed by: OPHTHALMOLOGY

## 2024-01-02 PROCEDURE — 99999 PR PBB SHADOW E&M-EST. PATIENT-LVL III: CPT | Mod: PBBFAC,,, | Performed by: OPHTHALMOLOGY

## 2024-01-02 PROCEDURE — 3288F FALL RISK ASSESSMENT DOCD: CPT | Mod: CPTII,S$GLB,, | Performed by: OPHTHALMOLOGY

## 2024-01-02 PROCEDURE — 1101F PT FALLS ASSESS-DOCD LE1/YR: CPT | Mod: CPTII,S$GLB,, | Performed by: OPHTHALMOLOGY

## 2024-01-02 PROCEDURE — 92133 CPTRZD OPH DX IMG PST SGM ON: CPT | Mod: S$GLB,,, | Performed by: OPHTHALMOLOGY

## 2024-01-02 PROCEDURE — 92083 EXTENDED VISUAL FIELD XM: CPT | Mod: S$GLB,,, | Performed by: OPHTHALMOLOGY

## 2024-01-02 PROCEDURE — 1126F AMNT PAIN NOTED NONE PRSNT: CPT | Mod: CPTII,S$GLB,, | Performed by: OPHTHALMOLOGY

## 2024-01-02 PROCEDURE — 1160F RVW MEDS BY RX/DR IN RCRD: CPT | Mod: CPTII,S$GLB,, | Performed by: OPHTHALMOLOGY

## 2024-01-02 PROCEDURE — 1159F MED LIST DOCD IN RCRD: CPT | Mod: CPTII,S$GLB,, | Performed by: OPHTHALMOLOGY

## 2024-01-02 NOTE — PROGRESS NOTES
Oct done ou     24-2  ss done ou     Rel & Fix =  good ou      Coop =     good     Patient has no allergies to latex or adhesives at this time    Jthomas      -1.50 + 1.00 x 137  od    -1.50 + .75 x 10   os

## 2024-01-03 ENCOUNTER — OFFICE VISIT (OUTPATIENT)
Dept: DIABETES | Facility: CLINIC | Age: 69
End: 2024-01-03
Payer: MEDICARE

## 2024-01-03 VITALS
DIASTOLIC BLOOD PRESSURE: 79 MMHG | HEART RATE: 79 BPM | WEIGHT: 165 LBS | OXYGEN SATURATION: 99 % | SYSTOLIC BLOOD PRESSURE: 131 MMHG | BODY MASS INDEX: 27.49 KG/M2 | HEIGHT: 65 IN

## 2024-01-03 DIAGNOSIS — Z71.9 HEALTH EDUCATION/COUNSELING: ICD-10-CM

## 2024-01-03 DIAGNOSIS — Z78.9 STATIN INTOLERANCE: ICD-10-CM

## 2024-01-03 DIAGNOSIS — E11.36 TYPE 2 DIABETES MELLITUS WITH DIABETIC CATARACT, WITHOUT LONG-TERM CURRENT USE OF INSULIN: ICD-10-CM

## 2024-01-03 DIAGNOSIS — I15.2 HYPERTENSION ASSOCIATED WITH DIABETES: ICD-10-CM

## 2024-01-03 DIAGNOSIS — Z91.148 NONCOMPLIANCE W/MEDICATION TREATMENT DUE TO INTERMIT USE OF MEDICATION: ICD-10-CM

## 2024-01-03 DIAGNOSIS — C61 MALIGNANT NEOPLASM OF PROSTATE: ICD-10-CM

## 2024-01-03 DIAGNOSIS — E78.5 HYPERLIPIDEMIA ASSOCIATED WITH TYPE 2 DIABETES MELLITUS: ICD-10-CM

## 2024-01-03 DIAGNOSIS — E66.3 OVERWEIGHT (BMI 25.0-29.9): ICD-10-CM

## 2024-01-03 DIAGNOSIS — E11.69 HYPERLIPIDEMIA ASSOCIATED WITH TYPE 2 DIABETES MELLITUS: ICD-10-CM

## 2024-01-03 DIAGNOSIS — D50.8 IRON DEFICIENCY ANEMIA SECONDARY TO INADEQUATE DIETARY IRON INTAKE: ICD-10-CM

## 2024-01-03 DIAGNOSIS — E11.65 TYPE 2 DIABETES MELLITUS WITH HYPERGLYCEMIA, WITHOUT LONG-TERM CURRENT USE OF INSULIN: Primary | ICD-10-CM

## 2024-01-03 DIAGNOSIS — E11.59 HYPERTENSION ASSOCIATED WITH DIABETES: ICD-10-CM

## 2024-01-03 DIAGNOSIS — E11.9 TYPE 2 DIABETES MELLITUS WITHOUT COMPLICATION, WITHOUT LONG-TERM CURRENT USE OF INSULIN: ICD-10-CM

## 2024-01-03 DIAGNOSIS — Z59.9 FINANCIAL DIFFICULTIES: ICD-10-CM

## 2024-01-03 LAB — GLUCOSE SERPL-MCNC: 274 MG/DL (ref 70–110)

## 2024-01-03 PROCEDURE — 3008F BODY MASS INDEX DOCD: CPT | Mod: CPTII,S$GLB,, | Performed by: NURSE PRACTITIONER

## 2024-01-03 PROCEDURE — 1160F RVW MEDS BY RX/DR IN RCRD: CPT | Mod: CPTII,S$GLB,, | Performed by: NURSE PRACTITIONER

## 2024-01-03 PROCEDURE — 1126F AMNT PAIN NOTED NONE PRSNT: CPT | Mod: CPTII,S$GLB,, | Performed by: NURSE PRACTITIONER

## 2024-01-03 PROCEDURE — 1101F PT FALLS ASSESS-DOCD LE1/YR: CPT | Mod: CPTII,S$GLB,, | Performed by: NURSE PRACTITIONER

## 2024-01-03 PROCEDURE — 99999 PR PBB SHADOW E&M-EST. PATIENT-LVL V: CPT | Mod: PBBFAC,,, | Performed by: NURSE PRACTITIONER

## 2024-01-03 PROCEDURE — 3078F DIAST BP <80 MM HG: CPT | Mod: CPTII,S$GLB,, | Performed by: NURSE PRACTITIONER

## 2024-01-03 PROCEDURE — 3075F SYST BP GE 130 - 139MM HG: CPT | Mod: CPTII,S$GLB,, | Performed by: NURSE PRACTITIONER

## 2024-01-03 PROCEDURE — 1159F MED LIST DOCD IN RCRD: CPT | Mod: CPTII,S$GLB,, | Performed by: NURSE PRACTITIONER

## 2024-01-03 PROCEDURE — 82962 GLUCOSE BLOOD TEST: CPT | Mod: S$GLB,,, | Performed by: NURSE PRACTITIONER

## 2024-01-03 PROCEDURE — 3288F FALL RISK ASSESSMENT DOCD: CPT | Mod: CPTII,S$GLB,, | Performed by: NURSE PRACTITIONER

## 2024-01-03 PROCEDURE — 99215 OFFICE O/P EST HI 40 MIN: CPT | Mod: S$GLB,,, | Performed by: NURSE PRACTITIONER

## 2024-01-03 RX ORDER — METFORMIN HYDROCHLORIDE 500 MG/1
1000 TABLET, EXTENDED RELEASE ORAL DAILY
Qty: 180 TABLET | Refills: 3 | Status: SHIPPED | OUTPATIENT
Start: 2024-01-03 | End: 2025-01-02

## 2024-01-03 RX ORDER — FLASH GLUCOSE SENSOR
1 KIT MISCELLANEOUS
Qty: 2 KIT | Refills: 11 | Status: SHIPPED | OUTPATIENT
Start: 2024-01-03

## 2024-01-03 RX ORDER — GLIMEPIRIDE 2 MG/1
2 TABLET ORAL
Qty: 90 TABLET | Refills: 3 | Status: SHIPPED | OUTPATIENT
Start: 2024-01-03 | End: 2024-01-18

## 2024-01-03 SDOH — SOCIAL DETERMINANTS OF HEALTH (SDOH): PROBLEM RELATED TO HOUSING AND ECONOMIC CIRCUMSTANCES, UNSPECIFIED: Z59.9

## 2024-01-03 NOTE — ASSESSMENT & PLAN NOTE
Uncontrolled   Not performing SBGM   + dietary indiscretions       -- Medication Changes:   Continue Metformin XR 1000 mg daily int he AM ( 2 tablet in the AM)     Continue Farxiga 10 mg daily     Start Glimepiride 2 mg daily with first meal of the day     Get back on barak 2       -- Reviewed goals of therapy are to get the best control we can without hypoglycemia.  -- Advised frequent self blood glucose monitoring.  Patient encouraged to document glucose results and bring them to every clinic visit. Barak 2 rx to pharmacy   -- Hypoglycemia precautions discussed. Instructed on precautions before driving.    -- Call for Bg repeatedly < 70 or > 180.   -- Close adherence to lifestyle changes recommended.   -- Periodic follow ups for eye evaluations, foot care and dental care suggested.  -- Refer to diabetes education-- diet and meal planning

## 2024-01-03 NOTE — PATIENT INSTRUCTIONS
Continue Metformin XR 1000 mg daily int he AM ( 2 tablet in the AM)     Continue Farxiga 10 mg daily     Start Glimepiride 2 mg daily with first meal of the day     Get back on richmond 2

## 2024-01-03 NOTE — PROGRESS NOTES
CC:   Chief Complaint   Patient presents with    Diabetes Mellitus       HPI: Eyad Garcia is a 68 y.o. male presents for an initial visit today for the management of Diabetes   He has seen Ivelisse Graham NP before- but only once -- June 2022--     He was diagnosed with Type 2 diabetes more than 40 years ago-- in his 20's-30's     Family hx of diabetes: mother, many siblings - he is 1 of 12 children   Hospitalized for diabetes: denies    Insulin therapy: denies     No personal or FH of thyroid cancer or personal of pancreatic cancer or pancreatitis.       A1c increased from 7.6% to 8.4% -- so he started Farxiga   He has seen MERCED Altman, TAMMYE with education       Had richmond 2- uses the twan on his phone   Had issues with [facility]   No data for the last 90 days on his phone       DIABETES COMPLICATIONS: none    Hx of glaucoma       Diabetes Management Status    ASA:  No    Statin: Taking-- Pravastatin 40 mg -- unable to tolerate Lipitor-- had severe myalgia with Lipitor   Also on zetia 10 mg nightly   ACE/ARB: Taking-- Benicar 40 mg       The 10-year ASCVD risk score (Anjel NGO, et al., 2019) is: 34.7%    Values used to calculate the score:      Age: 68 years      Sex: Male      Is Non- : Yes      Diabetic: Yes      Tobacco smoker: No      Systolic Blood Pressure: 131 mmHg      Is BP treated: Yes      HDL Cholesterol: 46 mg/dL      Total Cholesterol: 217 mg/dL      Screening or Prevention Patient's value Goal Complete/Controlled?   HgA1C Testing and Control   Lab Results   Component Value Date    HGBA1C 8.4 (H) 11/15/2023      Annually/Less than 8% No   Lipid profile : 11/15/2023 Annually Yes   LDL control Lab Results   Component Value Date    LDLCALC 143.4 11/15/2023    Annually/Less than 100 mg/dl  No   Nephropathy screening Lab Results   Component Value Date    LABMICR 7.0 10/19/2023     Lab Results   Component Value Date    PROTEINUA Negative 05/29/2022    Annually Yes   Blood pressure  BP Readings from Last 1 Encounters:   01/03/24 131/79    Less than 140/90 No   Dilated retinal exam : 07/29/2022 Annually No   Foot exam   : 01/03/2024 Annually No       CURRENT A1C:    Hemoglobin A1C   Date Value Ref Range Status   11/15/2023 8.4 (H) 4.0 - 5.6 % Final     Comment:     ADA Screening Guidelines:  5.7-6.4%  Consistent with prediabetes  >or=6.5%  Consistent with diabetes    High levels of fetal hemoglobin interfere with the HbA1C  assay. Heterozygous hemoglobin variants (HbS, HgC, etc)do  not significantly interfere with this assay.   However, presence of multiple variants may affect accuracy.     04/19/2023 7.6 (H) 4.0 - 5.6 % Final     Comment:     ADA Screening Guidelines:  5.7-6.4%  Consistent with prediabetes  >or=6.5%  Consistent with diabetes    High levels of fetal hemoglobin interfere with the HbA1C  assay. Heterozygous hemoglobin variants (HbS, HgC, etc)do  not significantly interfere with this assay.   However, presence of multiple variants may affect accuracy.     12/01/2022 6.8 (H) 4.0 - 5.6 % Final     Comment:     ADA Screening Guidelines:  5.7-6.4%  Consistent with prediabetes  >or=6.5%  Consistent with diabetes    High levels of fetal hemoglobin interfere with the HbA1C  assay. Heterozygous hemoglobin variants (HbS, HgC, etc)do  not significantly interfere with this assay.   However, presence of multiple variants may affect accuracy.         GOAL A1C: 7% without hypoglycemia     DM MEDICATIONS USED IN THE PAST: Metformin XR  Farxiga    Jardiance- urinary frequently   Tradjenta         CURRENT DIABETES MEDICATIONS: Metformin XR 1000 mg daily -- when he takes the Metformin BID- it causes GI upset   Farxiga 10 mg daily (started in November after elevated A1c)   Insulin: N/A   Missed doses: rarely           BLOOD GLUCOSE MONITORING:    Off richmond 2   Not pricking finger off richmond   Been off richmond for more than 90 days      Bg in clinic   Results for orders placed or performed in visit on  01/03/24   POCT Glucose, Hand-Held Device   Result Value Ref Range    POC Glucose 274 (A) 70 - 110 MG/DL     *Note: Due to a large number of results and/or encounters for the requested time period, some results have not been displayed. A complete set of results can be found in Results Review.           HYPOGLYCEMIA:  No      MEALS: eating 3 meals per day   BF: cheerios or sandwich or full meal   Lunch: sandwich - ham   Dinner: home cooked - cabbage/ mustard greens, sweet potato - black eyed peas   Snack:occ- 1 cookie   Drinks: coffee with splenda          CURRENT EXERCISE:  No      Review of Systems  Review of Systems   Constitutional:  Negative for appetite change, fatigue and unexpected weight change.   HENT:  Negative for trouble swallowing.    Eyes:  Negative for visual disturbance.   Respiratory:  Negative for shortness of breath.    Cardiovascular:  Negative for chest pain.   Gastrointestinal:  Negative for nausea.   Endocrine: Negative for polydipsia, polyphagia and polyuria.   Genitourinary:  Positive for frequency.        No Nocturia    Skin:  Negative for wound.   Neurological:  Positive for numbness (intermittent pins and needles to right hand - comes and goes).       Physical Exam   Physical Exam  Vitals and nursing note reviewed.   Constitutional:       Appearance: He is well-developed.      Comments: Overweight male    HENT:      Head: Normocephalic and atraumatic.      Right Ear: External ear normal.      Left Ear: External ear normal.      Nose: Nose normal.   Neck:      Thyroid: No thyromegaly.      Trachea: No tracheal deviation.   Cardiovascular:      Rate and Rhythm: Normal rate and regular rhythm.      Heart sounds: No murmur heard.  Pulmonary:      Effort: Pulmonary effort is normal. No respiratory distress.      Breath sounds: Normal breath sounds.   Abdominal:      Palpations: Abdomen is soft.      Tenderness: There is no abdominal tenderness.      Hernia: No hernia is present.    Musculoskeletal:      Cervical back: Normal range of motion and neck supple.   Skin:     General: Skin is warm and dry.      Capillary Refill: Capillary refill takes less than 2 seconds.      Findings: No rash.      Comments:      Neurological:      Mental Status: He is alert and oriented to person, place, and time.      Cranial Nerves: No cranial nerve deficit.   Psychiatric:         Behavior: Behavior normal.         Judgment: Judgment normal.         FOOT EXAMINATION: Appropriate footwear     Protective Sensation (w/ 10 gram monofilament):  Right: Intact  Left: Intact    Visual Inspection:  Normal -  Bilateral, Nails Intact - without Evidence of Foot Deformity- Bilateral, Callus -  Bilateral, Dry Skin -  Bilateral, and Onychomycosis -  Bilateral    Pedal Pulses:   Right: Present  Left: Present    Posterior Tibialis Pulses:   Right:Present  Left: Present        Lab Results   Component Value Date    TSH 1.796 11/15/2023           Type 2 diabetes mellitus with hyperglycemia, without long-term current use of insulin  Uncontrolled   Not performing SBGM   + dietary indiscretions       -- Medication Changes:   Continue Metformin XR 1000 mg daily int he AM ( 2 tablet in the AM)     Continue Farxiga 10 mg daily     Start Glimepiride 2 mg daily with first meal of the day     Get back on barak 2       -- Reviewed goals of therapy are to get the best control we can without hypoglycemia.  -- Advised frequent self blood glucose monitoring.  Patient encouraged to document glucose results and bring them to every clinic visit. Barak 2 rx to pharmacy   -- Hypoglycemia precautions discussed. Instructed on precautions before driving.    -- Call for Bg repeatedly < 70 or > 180.   -- Close adherence to lifestyle changes recommended.   -- Periodic follow ups for eye evaluations, foot care and dental care suggested.  -- Refer to diabetes education-- diet and meal planning       Hypertension associated with diabetes  BP goal is <  140/90.   Tolerating  ARB  Controlled   Blood pressure goals discussed with patient      Statin intolerance  Unable to tolerate Lipitor     Hyperlipidemia associated with type 2 diabetes mellitus  On statin per ADA recommendations  LDL goal < 100. Last LDL above goal   On Pravastatin 40 mg + zetia   Lipids with RTC   May increase pravastatin to 80 mg at RTC     Malignant neoplasm of prostate  Following with oncology   Injections every 6 months   No steroids that he is aware     Iron deficiency anemia secondary to inadequate dietary iron intake  May skew A1c level     Overweight (BMI 25.0-29.9)  Body mass index is 27.46 kg/m².  Increases insulin resistance.   Discussed DM diet and exercise.         I spent a total of 45 minutes on the day of the visit.This includes face to face time and non-face to face time preparing to see the patient (eg, review of tests), obtaining and/or reviewing separately obtained history, documenting clinical information in the electronic or other health record, independently interpreting results and communicating results to the patient/family/caregiver, or care coordinator.        Follow up in about 3 months (around 4/3/2024).  See mono to review diet   Follow up with me in 3 months with labs prior         Orders Placed This Encounter   Procedures    Hemoglobin A1C     Standing Status:   Future     Standing Expiration Date:   7/3/2025    Comprehensive Metabolic Panel     Standing Status:   Future     Standing Expiration Date:   7/3/2025    Lipid Panel     Standing Status:   Future     Standing Expiration Date:   7/3/2025    Ambulatory referral/consult to Diabetes Education     Standing Status:   Future     Standing Expiration Date:   7/3/2025     Referral Priority:   Routine     Referral Type:   Consultation     Referral Reason:   Specialty Services Required     Referred to Provider:   Mono Biran RD, CDE     Requested Specialty:   Diabetes     Number of Visits Requested:   1    POCT  Glucose, Hand-Held Device       Recommendations were discussed with the patient in detail  The patient verbalized understanding and agrees with the plan outlined as above.     This note was partly generated with [employer] voice recognition software. I apologize for any possible typographical errors.

## 2024-01-04 ENCOUNTER — CLINICAL SUPPORT (OUTPATIENT)
Dept: DIABETES | Facility: CLINIC | Age: 69
End: 2024-01-04
Payer: MEDICARE

## 2024-01-04 VITALS — WEIGHT: 163.81 LBS | HEIGHT: 65 IN | BODY MASS INDEX: 27.29 KG/M2

## 2024-01-04 DIAGNOSIS — E11.9 TYPE 2 DIABETES MELLITUS WITHOUT COMPLICATION, WITHOUT LONG-TERM CURRENT USE OF INSULIN: Primary | ICD-10-CM

## 2024-01-04 DIAGNOSIS — E11.36 TYPE 2 DIABETES MELLITUS WITH DIABETIC CATARACT, WITHOUT LONG-TERM CURRENT USE OF INSULIN: ICD-10-CM

## 2024-01-04 DIAGNOSIS — E11.65 TYPE 2 DIABETES MELLITUS WITH HYPERGLYCEMIA, WITHOUT LONG-TERM CURRENT USE OF INSULIN: ICD-10-CM

## 2024-01-04 PROCEDURE — 99999 PR PBB SHADOW E&M-EST. PATIENT-LVL II: CPT | Mod: PBBFAC,,, | Performed by: DIETITIAN, REGISTERED

## 2024-01-04 PROCEDURE — G0108 DIAB MANAGE TRN  PER INDIV: HCPCS | Mod: S$GLB,,, | Performed by: DIETITIAN, REGISTERED

## 2024-01-04 NOTE — PROGRESS NOTES
"Diabetes Care Specialist Progress Note  Author: Anupama Brian RD, CDE  Date: 1/4/2024    Program Intake  Reason for Diabetes Program Visit:: Initial Diabetes Assessment  Current diabetes risk level:: moderate (HgbA1c 8.4)  In the last 12 months, have you:: none  Permission to speak with others about care:: no    Lab Results   Component Value Date    HGBA1C 8.4 (H) 11/15/2023       Clinical    Weight: 74.3 kg (163 lb 12.8 oz)   Height: 5' 5" (165.1 cm)   Body mass index is 27.26 kg/m².    Patient Health Rating  Compared to other people your age, how would you rate your health?: Good    Problem Review  Reviewed Problem List with Patient: yes  Active comorbidities affecting diabetes self-care.: yes  Comorbidities: Other (comment), Gastrointestinal Disorder, Cardiovascular Disease, Hypertension  Reviewed health maintenance: yes    Clinical Assessment  Current Diabetes Treatment: Oral Medication, Diet (glimepiride, metformin, farxiga)  Have you ever experienced hypoglycemia (low blood sugar)?: yes  In the last month, how often have you experienced low blood sugar?: other (see comments) (has not happened in over a year)  Are you able to tell when your blood sugar is low?: Yes  What symptoms do you experience?: Dizzy/Light-headed, Shaky  Have you ever been hospitalized because your blood sugar was too low?: no  How do you treat hypoglycemia (low blood sugar)?: 1/2 can soda/fruit juice  Have you ever experienced hyperglycemia (high blood sugar)?: no    Medication Information  How do you obtain your medications?: Mail order, Family picks up, Patient drives  How many days a week do you miss your medications?: Never (currently taking medication as prescribed had stopped for a period of time)  Do you use a pill box or medication chart to help you manage your medications?: No  Do you sometimes have difficulty refilling your medications?: No  Medication adherence impacting ability to self-manage diabetes?: Yes    Labs  Do you " have regular lab work to monitor your medications?: Yes  Type of Regular Lab Work: A1c, Cholesterol, Microalbumin, CBC, BMP  Where do you get your labs drawn?: [facility]  Lab Compliance Barriers: No    Nutritional Status  Diet: Regular  Meal Plan 24 Hour Recall: Breakfast, Lunch, Snack, Dinner  Meal Plan 24 Hour Recall - Breakfast: cheerios and milk or grits and sausage or oatmeal and a banana; always coffee with splenda  Meal Plan 24 Hour Recall - Lunch: tunafish, ham, or PB&J sandwich with water or skipped  Meal Plan 24 Hour Recall - Dinner: ribeye with green beans  Meal Plan 24 Hour Recall - Snack: occassionally an oreo, drinks water and occassionally apple juice  Change in appetite?: No  Dentation:: Intact  Recent Changes in Weight: No Recent Weight Change  Current nutritional status an area of need that is impacting patient's ability to self-manage diabetes?: No    Additional Social History    Support  Does anyone support you with your diabetes care?: yes  Who supports you?: self, spouse  Who takes you to your medical appointments?: spouse  Does the current support meet the patient's needs?: Yes  Is Support an area impacting ability to self-manage diabetes?: No    Access to Mass Media & Technology  Does the patient have access to any of the following devices or technologies?: Smart phone  Media or technology needs impacting ability to self-manage diabetes?: No    Cognitive/Behavioral Health  Alert and Oriented: Yes  Difficulty Thinking: No  Requires Prompting: No  Requires assistance for routine expression?: No  Cognitive or behavioral barriers impacting ability to self-manage diabetes?: No    Culture/Jewish  Culture or Hoahaoism beliefs that may impact ability to access healthcare: No    Communication  Language preference: English  Hearing Problems: No  Vision Problems: Yes  Vision problem type:: Decreased Vision  Vision Assistance: Glasses, Contact lenses  Communication needs impacting ability to self-manage  diabetes?: No    Health Literacy  Preferred Learning Method: Face to Face, Reading Materials, Demonstration  How often do you need to have someone help you read instructions, pamphlets, or written material from your doctor or pharmacy?: Sometimes  Health literacy needs impacting ability to self-manage diabetes?: No      Diabetes Self-Management Skills Assessment    Diabetes Disease Process/Treatment Options  Patient/caregiver able to state what happens when someone has diabetes.: yes  Patient/caregiver knows what type of diabetes they have.: yes  Diabetes Type : Type II  Patient/caregiver able to identify at least three signs and symptoms of diabetes.: yes  Identified signs and symptoms:: frequent urination, increased thirst  Patient able to identify at least three risk factors for diabetes.: yes  Identified risk factors:: family history  Diabetes Disease Process/Treatment Options: Skills Assessment Completed: Yes  Assessment indicates:: Adequate understanding  Area of need?: No    Nutrition/Healthy Eating  Challenges to healthy eating:: portion control, other (see comments)  Method of carbohydrate measurement:: plate method  Patient can identify foods that impact blood sugar.: yes  Patient-identified foods:: starches (bread, pasta, rice, cereal), sweets, starchy vegetables (corn, peas, beans), soda, fruit/fruit juice, milk, yogurt  Nutrition/Healthy Eating Skills Assessment Completed:: Yes  Assessment indicates:: Adequate understanding, Other (comment)  Area of need?: Yes    Physical Activity/Exercise  Patient's daily activity level:: lightly active  Patient formally exercises outside of work.: no  Reasons for not exercising:: time constraints  Patient can identify forms of physical activity.: yes  Stated forms of physical activity:: moving to burn calories, recreational activities, yardwork, housework  Patient can identify reasons why exercise/physical activity is important in diabetes management.: yes  Identified  reasons:: lowers blood glucose, blood pressure, and cholesterol  Physical Activity/Exercise Skills Assessment Completed: : Yes  Assessment indicates:: Adequate understanding  Area of need?: No    Medications  Patient is able to describe current diabetes management routine.: yes  Diabetes management routine:: oral medications (farxiga, glimepiride, metformin)  Patient is able to identify current diabetes medications, dosages, and appropriate timing of medications.: yes  Patient understands the purpose of the medications taken for diabetes.: yes  Patient reports problems or concerns with current medication regimen.: no  Medication Skills Assessment Completed:: Yes  Assessment indicates:: Adequate understanding  Area of need?: No    Home Blood Glucose Monitoring  Patient states that blood sugar is checked at home daily.: yes  Monitoring Method:: personal continuous glucose monitor, home glucometer  How often do you check your blood sugar?: Occasionally  When do you check your blood sugar?: Before breakfast  Blood glucose logs:: no  Blood glucose logs reviewed today?: no  Personal CGM type:: [address]  Patient is able to use personal CGM appropriately.: yes  CGM Report reviewed?: no  Unable to download personal CGM: not currently wearing it  Home Blood Glucose Monitoring Skills Assessment Completed: : Yes  Assessment indicates:: Adequate understanding, Other (comment)  Area of need?: Yes    Acute Complications  Patient is able to identify types of acute complications: Yes  Patient Identified:: Hypoglycemia, Hyperglycemia  Patient is able to state the basic meaning of hypoglycemia?: Yes  Able to state the blood sugar range for hypoglycemia?: yes  Patient stated range:: <70  Patient can identify general symptoms of hypoglycemia: yes  Patient identified:: shakiness  Able to state proper treatment of hypoglycemia?: yes  Patient identified:: 1/2 can soda/fruit juice (eat something sweet)  Patient is able to state the  basic meaning of hyperglycemia?: Yes  Able to state the blood sugar range for hyperglycemia?: yes  Patient stated range:: >250  Patient able to state proper treatment of hyperglycemia?: yes  Patient identified:: take medication as recommended, increase water intake, monitor blood sugar  Patient able to verbalize sick day plan?: yes  Patient-stated sick day plan:: seek medical attention for nausea, diarrhea, vomiting, fever with high blood sugar, drink more fluids, check blood sugar every 2-3 hours  Acute Complications Skills Assessment Completed: : Yes  Assessment indicates:: Adequate understanding  Area of need?: No    Chronic Complications  Patient can identify major chronic complications of diabetes.: yes  Stated chronic complications:: kidney disease, neuropathy/nerve damage, retinopathy, stroke, sexual dysfunction, heart disease/heart attack  Patient can identify ways to prevent or delay diabetes complications.: yes  Stated ways to prevent complications:: controlling blood sugar  Patient is aware that having diabetes increases risk of heart disease?: Yes  Patient is aware that heart disease is the leading cause of death and disability in people with diabetes?: Yes  Patient able to state risk factors for heart disease?: Yes  Patient stated risk factors for heart disease:: High blood pressure, High cholesterol, Having diabetes  Patient is taking statin?: Yes  Chronic Complications Skills Assessment Completed: : Yes  Assessment indicates:: Adequate understanding  Area of need?: No    Psychosocial/Coping  Patient can identify ways of coping with chronic disease.: yes  Patient-stated ways of coping with chronic disease:: support from loved ones  Psychosocial/Coping Skills Assessment Completed: : Yes  Assessment indicates:: Adequate understanding  Area of need?: No      Assessment Summary and Plan    Based on today's diabetes care assessment, the following areas of need were identified:          1/4/2024    12:01 AM    Social   Support No   Access to Mass Media/Tech No   Cognitive/Behavioral Health No   Culture/Oriental orthodox No   Communication No   Health Literacy No            1/4/2024    12:01 AM   Clinical   Medication Adherence Yes, started the farxiga, previously on the jardiance   Lab Compliance No   Nutritional Status No            1/4/2024    12:01 AM   Diabetes Self-Management Skills   Diabetes Disease Process/Treatment Options No   Nutrition/Healthy Eating Yes, Reviewed carb counting, portion control, importance of spacing meals throughout the day to prevent post prandial elevations.  Recommended low saturated fat, low sodium diet to aid in control of hypertension and cholesterol. Reviewed plate method and portion control, dining out tips, meal planning, reading a food label, healthy snack options, benefits of physical activity  Provided with sample menus and printed dietary recommendations   Physical Activity/Exercise No   Medication No   Home Blood Glucose Monitoring Yes, encouraged to restart the richmond CGM, patient states that he went to [facility] to pick it up and they were not open, will go back to get it   Acute Complications No   Chronic Complications No   Psychosocial/Coping No          Today's interventions were provided through individual discussion, instruction, and written materials were provided.      Patient verbalized understanding of instruction and written materials.  Pt was able to return back demonstration of instructions today. Patient understood key points, needs reinforcement and further instruction.     Diabetes Self-Management Care Plan:    Today's Diabetes Self-Management Care Plan was developed with Eyad's input. Eyad has agreed to work toward the following goal(s) to improve his/her overall diabetes control.      Care Plan: Diabetes Management   Updates made since 12/5/2023 12:00 AM        Problem: Healthy Eating         Goal: Eat 3 meals daily with 45-60g/3-4 servings of Carbohydrate per meal for  the next 6 months.    Start Date: 1/4/2024   Expected End Date: 7/4/2024   This Visit's Progress: Deferred   Priority: High   Barriers: No Barriers Identified        Task: Reviewed the sources and role of Carbohydrate, Protein, and Fat and how each nutrient impacts blood sugar. Completed 1/4/2024        Task: Provided visual examples using dry measuring cups, food models, and other familiar objects such as computer mouse, deck or cards, tennis ball etc. to help with visualization of portions. Completed 1/4/2024        Task: Explained how to count carbohydrates using the food label and the use of dry measuring cups for accurate carb counting. Completed 1/4/2024        Task: Discussed strategies for choosing healthier menu options when dining out. Completed 1/4/2024        Task: Recommended replacing beverages containing high sugar content with noncaloric/sugar free options and/or water. Completed 1/4/2024        Task: Review the importance of balancing carbohydrates with each meal using portion control techniques to count servings of carbohydrate and label reading to identify serving size and amount of total carbs per serving. Completed 1/4/2024        Task: Provided Sample plate method and reviewed the use of the plate to estimate amounts of carbohydrate per meal. Completed 1/4/2024          Follow Up Plan     Follow up in about 3 months (around 4/4/2024) for General Follow-up, Personal CGM Upload.    Today's care plan and follow up schedule was discussed with patient.  Eyad verbalized understanding of the care plan, goals, and agrees to follow up plan.        The patient was encouraged to communicate with his/her health care provider/physician and care team regarding his/her condition(s) and treatment.  I provided the patient with my contact information today and encouraged to contact me via phone or [facility]'s Patient Portal as needed.     Length of Visit   Total Time: 80 Minutes

## 2024-01-12 ENCOUNTER — LAB VISIT (OUTPATIENT)
Dept: LAB | Facility: HOSPITAL | Age: 69
End: 2024-01-12
Attending: INTERNAL MEDICINE
Payer: MEDICARE

## 2024-01-12 DIAGNOSIS — E11.9 TYPE 2 DIABETES MELLITUS WITHOUT COMPLICATION, WITHOUT LONG-TERM CURRENT USE OF INSULIN: ICD-10-CM

## 2024-01-12 LAB
ALBUMIN SERPL BCP-MCNC: 4 G/DL (ref 3.5–5.2)
ALP SERPL-CCNC: 77 U/L (ref 55–135)
ALT SERPL W/O P-5'-P-CCNC: 54 U/L (ref 10–44)
ANION GAP SERPL CALC-SCNC: 11 MMOL/L (ref 8–16)
AST SERPL-CCNC: 46 U/L (ref 10–40)
BILIRUB SERPL-MCNC: 0.7 MG/DL (ref 0.1–1)
BUN SERPL-MCNC: 26 MG/DL (ref 8–23)
CALCIUM SERPL-MCNC: 9.9 MG/DL (ref 8.7–10.5)
CHLORIDE SERPL-SCNC: 103 MMOL/L (ref 95–110)
CO2 SERPL-SCNC: 21 MMOL/L (ref 23–29)
CREAT SERPL-MCNC: 1.4 MG/DL (ref 0.5–1.4)
EST. GFR  (NO RACE VARIABLE): 54.4 ML/MIN/1.73 M^2
ESTIMATED AVG GLUCOSE: 229 MG/DL (ref 68–131)
GLUCOSE SERPL-MCNC: 152 MG/DL (ref 70–110)
HBA1C MFR BLD: 9.6 % (ref 4–5.6)
POTASSIUM SERPL-SCNC: 4.3 MMOL/L (ref 3.5–5.1)
PROT SERPL-MCNC: 7.3 G/DL (ref 6–8.4)
SODIUM SERPL-SCNC: 135 MMOL/L (ref 136–145)

## 2024-01-12 PROCEDURE — 80053 COMPREHEN METABOLIC PANEL: CPT | Performed by: INTERNAL MEDICINE

## 2024-01-12 PROCEDURE — 36415 COLL VENOUS BLD VENIPUNCTURE: CPT | Mod: PN | Performed by: INTERNAL MEDICINE

## 2024-01-12 PROCEDURE — 83036 HEMOGLOBIN GLYCOSYLATED A1C: CPT | Performed by: INTERNAL MEDICINE

## 2024-01-18 ENCOUNTER — OFFICE VISIT (OUTPATIENT)
Dept: INTERNAL MEDICINE | Facility: CLINIC | Age: 69
End: 2024-01-18
Payer: MEDICARE

## 2024-01-18 VITALS
OXYGEN SATURATION: 99 % | DIASTOLIC BLOOD PRESSURE: 66 MMHG | WEIGHT: 162.81 LBS | HEART RATE: 76 BPM | SYSTOLIC BLOOD PRESSURE: 134 MMHG | BODY MASS INDEX: 27.13 KG/M2 | HEIGHT: 65 IN

## 2024-01-18 DIAGNOSIS — E11.65 TYPE 2 DIABETES MELLITUS WITH HYPERGLYCEMIA, WITH LONG-TERM CURRENT USE OF INSULIN: Primary | ICD-10-CM

## 2024-01-18 DIAGNOSIS — H40.1131 PRIMARY OPEN ANGLE GLAUCOMA (POAG) OF BOTH EYES, MILD STAGE: ICD-10-CM

## 2024-01-18 DIAGNOSIS — E78.5 HYPERLIPIDEMIA ASSOCIATED WITH TYPE 2 DIABETES MELLITUS: ICD-10-CM

## 2024-01-18 DIAGNOSIS — E11.69 HYPERLIPIDEMIA ASSOCIATED WITH TYPE 2 DIABETES MELLITUS: ICD-10-CM

## 2024-01-18 DIAGNOSIS — I70.0 AORTIC ATHEROSCLEROSIS: ICD-10-CM

## 2024-01-18 DIAGNOSIS — Z79.4 TYPE 2 DIABETES MELLITUS WITH HYPERGLYCEMIA, WITH LONG-TERM CURRENT USE OF INSULIN: Primary | ICD-10-CM

## 2024-01-18 DIAGNOSIS — Z78.9 STATIN INTOLERANCE: ICD-10-CM

## 2024-01-18 DIAGNOSIS — E11.65 TYPE 2 DIABETES MELLITUS WITH HYPERGLYCEMIA, WITHOUT LONG-TERM CURRENT USE OF INSULIN: ICD-10-CM

## 2024-01-18 DIAGNOSIS — H25.13 NUCLEAR SCLEROTIC CATARACT OF BOTH EYES: ICD-10-CM

## 2024-01-18 DIAGNOSIS — I10 ESSENTIAL HYPERTENSION: ICD-10-CM

## 2024-01-18 PROCEDURE — 99214 OFFICE O/P EST MOD 30 MIN: CPT | Mod: S$GLB,,, | Performed by: INTERNAL MEDICINE

## 2024-01-18 PROCEDURE — 1126F AMNT PAIN NOTED NONE PRSNT: CPT | Mod: CPTII,S$GLB,, | Performed by: INTERNAL MEDICINE

## 2024-01-18 PROCEDURE — 3046F HEMOGLOBIN A1C LEVEL >9.0%: CPT | Mod: CPTII,S$GLB,, | Performed by: INTERNAL MEDICINE

## 2024-01-18 PROCEDURE — 3078F DIAST BP <80 MM HG: CPT | Mod: CPTII,S$GLB,, | Performed by: INTERNAL MEDICINE

## 2024-01-18 PROCEDURE — 1159F MED LIST DOCD IN RCRD: CPT | Mod: CPTII,S$GLB,, | Performed by: INTERNAL MEDICINE

## 2024-01-18 PROCEDURE — 1101F PT FALLS ASSESS-DOCD LE1/YR: CPT | Mod: CPTII,S$GLB,, | Performed by: INTERNAL MEDICINE

## 2024-01-18 PROCEDURE — 99999 PR PBB SHADOW E&M-EST. PATIENT-LVL IV: CPT | Mod: PBBFAC,,, | Performed by: INTERNAL MEDICINE

## 2024-01-18 PROCEDURE — 3075F SYST BP GE 130 - 139MM HG: CPT | Mod: CPTII,S$GLB,, | Performed by: INTERNAL MEDICINE

## 2024-01-18 PROCEDURE — 3288F FALL RISK ASSESSMENT DOCD: CPT | Mod: CPTII,S$GLB,, | Performed by: INTERNAL MEDICINE

## 2024-01-18 PROCEDURE — 3008F BODY MASS INDEX DOCD: CPT | Mod: CPTII,S$GLB,, | Performed by: INTERNAL MEDICINE

## 2024-01-18 PROCEDURE — 4010F ACE/ARB THERAPY RXD/TAKEN: CPT | Mod: CPTII,S$GLB,, | Performed by: INTERNAL MEDICINE

## 2024-01-18 PROCEDURE — 1160F RVW MEDS BY RX/DR IN RCRD: CPT | Mod: CPTII,S$GLB,, | Performed by: INTERNAL MEDICINE

## 2024-01-18 RX ORDER — GLIMEPIRIDE 2 MG/1
2 TABLET ORAL
Qty: 90 TABLET | Refills: 3 | Status: SHIPPED | OUTPATIENT
Start: 2024-01-18 | End: 2024-04-30

## 2024-01-18 RX ORDER — OLMESARTAN MEDOXOMIL 40 MG/1
40 TABLET ORAL DAILY
Qty: 90 TABLET | Refills: 3 | Status: SHIPPED | OUTPATIENT
Start: 2024-01-18 | End: 2024-04-29 | Stop reason: SDUPTHER

## 2024-01-18 RX ORDER — PRAVASTATIN SODIUM 40 MG/1
40 TABLET ORAL DAILY
Qty: 90 TABLET | Refills: 3 | Status: SHIPPED | OUTPATIENT
Start: 2024-01-18 | End: 2024-06-10 | Stop reason: ALTCHOICE

## 2024-01-18 RX ORDER — AMLODIPINE BESYLATE 10 MG/1
10 TABLET ORAL DAILY
Qty: 90 TABLET | Refills: 3 | Status: SHIPPED | OUTPATIENT
Start: 2024-01-18 | End: 2024-04-29 | Stop reason: SDUPTHER

## 2024-01-18 NOTE — PROGRESS NOTES
Subjective:      Patient ID: Eyad Garcia is a 68 y.o. male.    Chief Complaint: Annual Exam      Eyad Garcia is a 68 y.o. male with chronic conditions significant for HTN, erectile dysfunction, hyperlipidemia, type 2 diabetes, primary osteoarthritis of multiple joints, bronchitis, GERD, anemia, malignant neoplasm of prostate status post radiation to prostate bed; old retinal detachment, POAG both eyes   Presenting today for follow up / annual. Date of last annual is 10/19/2023    HTN: Office BP is 134/66. No CP/SOB/lightheadedness/dizziness/palpitations. Continues on amlodipine 10mg, olmesartan 40mg and HCTZ 25mg.     T2DM: A1c slowly increasing at this time. Last A1c at 9.6%. Previously was on metformin and jardiance but he discontinued jardiance as it was making him urinate more frequently. Continue metformin, glimepiride at this time, farxiga was added to the regimen last time. Follows with diabetes NP. Discussed food and increased activity today. Further inquiry revealed that he has not been taking his glimepiride. Will resend rx at this time.      HLD: ASCVD elevated. He has previously tried atorvastatin with side effect. Tolerates pravastatin and zetia. Continue current regimen.     Hx prostate CA: Follows with rad/onc. PSA wnl, cont to follow with Dr. Sampson    Denies any chest pain, shortness of breath, nausea vomiting constipation diarrhea, blood in stool, heartburn    Review of Systems   Constitutional:  Negative for chills, fever and weight loss.   HENT:  Negative for congestion, ear pain and sore throat.    Eyes:  Negative for double vision.   Respiratory:  Negative for cough and shortness of breath.    Cardiovascular:  Negative for chest pain, palpitations and leg swelling.   Gastrointestinal:  Negative for abdominal pain, heartburn, nausea and vomiting.   Skin:  Negative for rash.   Neurological:  Negative for dizziness, tingling and headaches.   Psychiatric/Behavioral:  Negative for  depression.           Current Outpatient Medications:     albuterol (VENTOLIN HFA) 90 mcg/actuation inhaler, Inhale 2 puffs into the lungs every 6 (six) hours as needed for Wheezing or Shortness of Breath (cough). Rescue, Disp: 18 g, Rfl: 11    alcohol swabs (ALCOHOL WIPES) PadM, Monitor as directed X 2 daily. Per insurance coverage. ICD 10 E11.9, Disp: 200 each, Rfl: 3    brimonidine 0.2% (ALPHAGAN) 0.2 % Drop, Place 1 drop into both eyes 3 (three) times daily. (Patient taking differently: Place 1 drop into the right eye 3 (three) times daily.), Disp: 30 mL, Rfl: 3    dapagliflozin propanediol (FARXIGA) 10 mg tablet, Take 1 tablet (10 mg total) by mouth once daily., Disp: 90 tablet, Rfl: 3    ezetimibe (ZETIA) 10 mg tablet, Take 1 tablet (10 mg total) by mouth every evening., Disp: 90 tablet, Rfl: 3    fluticasone propionate (FLONASE) 50 mcg/actuation nasal spray, 2 sprays (100 mcg total) by Each Nostril route 2 (two) times daily as needed for Rhinitis., Disp: 16 g, Rfl: 0    FREESTYLE ROME 2 SENSOR Kit, Use as directed. Change every 14 (fourteen) days., Disp: 2 kit, Rfl: 11    hydroCHLOROthiazide (HYDRODIURIL) 25 MG tablet, Take 1 tablet (25 mg total) by mouth once daily., Disp: 90 tablet, Rfl: 3    meloxicam (MOBIC) 15 MG tablet, TAKE 1 TABLET ONE TIME DAILY, Disp: 90 tablet, Rfl: 0    metFORMIN (GLUCOPHAGE-XR) 500 MG ER 24hr tablet, Take 2 tablets (1,000 mg total) by mouth once daily., Disp: 180 tablet, Rfl: 3    netarsudiL-latanoprost (ROCKLATAN) 0.02-0.005 % Drop, Place 1 drop into both eyes every evening. Patient was not controlled on latanoprost - now needs to use rocklatan. Please run script through - it is now a covered medication if they have failed latanoprost (Patient taking differently: Place 1 drop into the right eye every evening. Patient was not controlled on latanoprost - now needs to use rocklatan. Please run script through - it is now a covered medication if they have failed latanoprost), Disp:  7.5 mL, Rfl: 3    triamcinolone acetonide 0.025% (KENALOG) 0.025 % cream, Apply topically 2 (two) times daily., Disp: 80 g, Rfl: 1    amLODIPine (NORVASC) 10 MG tablet, Take 1 tablet (10 mg total) by mouth once daily., Disp: 90 tablet, Rfl: 3    glimepiride (AMARYL) 2 MG tablet, Take 1 tablet (2 mg total) by mouth before breakfast., Disp: 90 tablet, Rfl: 3    olmesartan (BENICAR) 40 MG tablet, Take 1 tablet (40 mg total) by mouth once daily., Disp: 90 tablet, Rfl: 3    pravastatin (PRAVACHOL) 40 MG tablet, Take 1 tablet (40 mg total) by mouth once daily., Disp: 90 tablet, Rfl: 3    Current Facility-Administered Medications:     leuprolide acetate (6 month) injection 45 mg, 45 mg, Intramuscular, Q6 Months, Henrry Sampson Jr., MD, 45 mg at 05/03/23 1231    leuprolide acetate (6 month) injection 45 mg, 45 mg, Intramuscular, Q6 Months, Henrry Sampson Jr., MD, 45 mg at 11/22/23 1132    [START ON 5/22/2024] leuprolide acetate (6 month) injection 45 mg, 45 mg, Intramuscular, Q6 Months, Henrry Sampson Jr., MD    Lab Results   Component Value Date    HGBA1C 9.6 (H) 01/12/2024    HGBA1C 8.4 (H) 11/15/2023    HGBA1C 7.6 (H) 04/19/2023     Lab Results   Component Value Date    MICALBCREAT 10.3 10/19/2023     Lab Results   Component Value Date    LDLCALC 143.4 11/15/2023    LDLCALC 163.6 (H) 12/01/2022    CHOL 217 (H) 11/15/2023    HDL 46 11/15/2023    TRIG 138 11/15/2023       Lab Results   Component Value Date     (L) 01/12/2024    K 4.3 01/12/2024     01/12/2024    CO2 21 (L) 01/12/2024     (H) 01/12/2024    BUN 26 (H) 01/12/2024    CREATININE 1.4 01/12/2024    CALCIUM 9.9 01/12/2024    PROT 7.3 01/12/2024    ALBUMIN 4.0 01/12/2024    BILITOT 0.7 01/12/2024    ALKPHOS 77 01/12/2024    AST 46 (H) 01/12/2024    ALT 54 (H) 01/12/2024    ANIONGAP 11 01/12/2024    ESTGFRAFRICA >60.0 07/29/2022    EGFRNONAA >60.0 07/29/2022    WBC 6.37 11/15/2023    HGB 12.3 (L) 11/15/2023    HGB 12.6 (L) 10/19/2022     HCT 37.1 (L) 11/15/2023    MCV 90 11/15/2023     11/15/2023    TSH 1.796 11/15/2023    PSA 0.14 03/27/2017    PSA 6.6 (H) 02/27/2015    PSADIAG <0.01 11/15/2023    PSADIAG <0.01 03/22/2023    HEPCAB Negative 05/29/2022       Lab Results   Component Value Date    MEPGFVDY22HY 32 09/02/2015    SLDYOKLR63 836 10/07/2020    FERRITIN 93 03/29/2022    IRON 87 03/29/2022    TRANSFERRIN 295 03/29/2022    TIBC 437 03/29/2022    FESATURATED 20 03/29/2022         Past Medical History:   Diagnosis Date    Cataract     Diabetes mellitus type II     Difficult intubation     Erectile dysfunction     History of colon polyps 01/25/2019    Hyperlipidemia     Hypertension     OA (osteoarthritis)     POAG (primary open-angle glaucoma)     Retinal detachment     OS     Past Surgical History:   Procedure Laterality Date    COLONOSCOPY N/A 01/25/2019    Procedure: COLONOSCOPY;  Surgeon: Elder Guillermo MD;  [facility]  Service: Endoscopy;  Laterality: N/A;    COLONOSCOPY N/A 11/17/2023    Procedure: COLONOSCOPY;  Surgeon: JORDAN Guillermo MD;  [facility]  Service: Endoscopy;  Laterality: N/A;  ref by / basim inst portal-RB  11/10-lvm for precall-MS  11/10-confirmed arrival time of 10am-tb    CYSTOSCOPY      HERNIA REPAIR      KNEE SURGERY      left    PENILE PROSTHESIS IMPLANT      PROSTATECTOMY      RETINAL DETACHMENT SURGERY      OS    SELECTIVE LASER TRABECULOPLASTY Left 03/16/2023        SELECTIVE LASER TRABECUPLASTY Bilateral 04/2016    OU WITH     TRABECULECTOMY Left 6/14/2023    Procedure: TRABECULECTOMY;  Surgeon: Bethany Lechuga MD;  Location: [facility] OR [address] [place] [address];  Service: Ophthalmology;  Laterality: Left;  Trabeculectomy w/[facility]     Social History     Social History Narrative    Works for [employer]     Family History   Problem Relation Age of Onset    Diabetes Mother     Hypertension Mother     Glaucoma Mother     Diabetes Sister   "   Glaucoma Sister     Diabetes Brother     Glaucoma Brother     No Known Problems Father     Cancer Maternal Aunt     Colon cancer Maternal Aunt     No Known Problems Maternal Uncle     No Known Problems Paternal Aunt     No Known Problems Paternal Uncle     No Known Problems Maternal Grandmother     No Known Problems Maternal Grandfather     No Known Problems Paternal Grandmother     No Known Problems Paternal Grandfather     Anesthesia problems Neg Hx     Broken bones Neg Hx     Clotting disorder Neg Hx     Collagen disease Neg Hx     Dislocations Neg Hx     Osteoporosis Neg Hx     Rheumatologic disease Neg Hx     Scoliosis Neg Hx     Severe sprains Neg Hx     Amblyopia Neg Hx     Blindness Neg Hx     Cataracts Neg Hx     Macular degeneration Neg Hx     Retinal detachment Neg Hx     Strabismus Neg Hx     Stroke Neg Hx     Thyroid disease Neg Hx     Esophageal cancer Neg Hx      Vitals:    01/18/24 0905   BP: 134/66   Pulse: 76   SpO2: 99%   Weight: 73.9 kg (162 lb 13 oz)   Height: 5' 5" (1.651 m)   PainSc: 0-No pain     Objective:   Physical Exam  Vitals and nursing note reviewed.   Constitutional:       Appearance: Normal appearance.   HENT:      Head: Normocephalic and atraumatic.      Right Ear: Tympanic membrane, ear canal and external ear normal.      Left Ear: Tympanic membrane, ear canal and external ear normal.      Nose: Nose normal.      Mouth/Throat:      Mouth: Mucous membranes are moist.      Pharynx: Oropharynx is clear.   Eyes:      Extraocular Movements: Extraocular movements intact.      Pupils: Pupils are equal, round, and reactive to light.   Cardiovascular:      Rate and Rhythm: Normal rate and regular rhythm.      Pulses: Normal pulses.      Heart sounds: Normal heart sounds.   Pulmonary:      Breath sounds: Normal breath sounds.   Abdominal:      General: Bowel sounds are normal.      Palpations: Abdomen is soft.   Musculoskeletal:      Cervical back: Normal range of motion and neck supple. "   Skin:     General: Skin is warm.   Neurological:      General: No focal deficit present.      Mental Status: He is alert and oriented to person, place, and time.       Assessment/Plan     Eyad Garcia is a 68 y.o.male with:    Type 2 diabetes mellitus with hyperglycemia, with long-term current use of insulin  -     Diabetes Digital Medicine (DDMP) Enrollment Order  -     Diabetes Digital Medicine (DDMP): Assign Onboarding Questionnaires    Essential hypertension  -     amLODIPine (NORVASC) 10 MG tablet; Take 1 tablet (10 mg total) by mouth once daily.  Dispense: 90 tablet; Refill: 3  -     olmesartan (BENICAR) 40 MG tablet; Take 1 tablet (40 mg total) by mouth once daily.  Dispense: 90 tablet; Refill: 3    Statin intolerance  -     pravastatin (PRAVACHOL) 40 MG tablet; Take 1 tablet (40 mg total) by mouth once daily.  Dispense: 90 tablet; Refill: 3    Hyperlipidemia associated with type 2 diabetes mellitus  -     pravastatin (PRAVACHOL) 40 MG tablet; Take 1 tablet (40 mg total) by mouth once daily.  Dispense: 90 tablet; Refill: 3    Aortic atherosclerosis  -     pravastatin (PRAVACHOL) 40 MG tablet; Take 1 tablet (40 mg total) by mouth once daily.  Dispense: 90 tablet; Refill: 3    Aortic atherosclerosis  -     pravastatin (PRAVACHOL) 40 MG tablet; Take 1 tablet (40 mg total) by mouth once daily.  Dispense: 90 tablet; Refill: 3    Primary open angle glaucoma (POAG) of both eyes, mild stage    Nuclear sclerotic cataract of both eyes    Type 2 diabetes mellitus with hyperglycemia, without long-term current use of insulin  -     glimepiride (AMARYL) 2 MG tablet; Take 1 tablet (2 mg total) by mouth before breakfast.  Dispense: 90 tablet; Refill: 3         Chronic conditions status updated as per HPI.  Other than changes above, cont current medications and maintain follow up with specialists.  Return to clinic in Follow up in about 7 months (around 8/18/2024).      Dominique Rojas MD  [facility]    Patient  Instructions     Tests to Keep You Healthy    Eye Exam: Met on 1/2/2024  Colon Cancer Screening: Met on 11/17/2023  Last Blood Pressure <= 139/89 (1/18/2024): Yes  Last HbA1c < 8 (01/12/2024): NO

## 2024-01-29 ENCOUNTER — PATIENT OUTREACH (OUTPATIENT)
Dept: ADMINISTRATIVE | Facility: HOSPITAL | Age: 69
End: 2024-01-29
Payer: MEDICARE

## 2024-01-29 DIAGNOSIS — E11.65 TYPE 2 DIABETES MELLITUS WITH HYPERGLYCEMIA, WITHOUT LONG-TERM CURRENT USE OF INSULIN: Primary | ICD-10-CM

## 2024-01-29 NOTE — PROGRESS NOTES
Population Health Chart Review & Patient Outreach Details    Outreach Performed: NO    Additional Pop Health Notes:           Updates Requested / Reviewed:      Updated Care Coordination Note, Care Everywhere, Care Team Updated, Removed  or Duplicate Orders, and Immunizations Reconciliation Completed or Queried: [place]         Health Maintenance Topics Overdue:    Health Maintenance Due   Topic Date Due    RSV Vaccine (Age 60+ and Pregnant patients) (1 - 1-dose 60+ series) Never done    COVID-19 Vaccine (2023-24 season) 2023         Health Maintenance Topic(s) Outreach Outcomes & Actions Taken:    Lab(s) - Outreach Outcomes & Actions Taken  : Overdue Lab(s) Scheduled

## 2024-01-30 ENCOUNTER — OFFICE VISIT (OUTPATIENT)
Dept: PODIATRY | Facility: CLINIC | Age: 69
End: 2024-01-30
Payer: MEDICARE

## 2024-01-30 ENCOUNTER — TELEPHONE (OUTPATIENT)
Dept: PODIATRY | Facility: CLINIC | Age: 69
End: 2024-01-30

## 2024-01-30 VITALS
HEIGHT: 65 IN | BODY MASS INDEX: 27.15 KG/M2 | HEART RATE: 80 BPM | SYSTOLIC BLOOD PRESSURE: 112 MMHG | DIASTOLIC BLOOD PRESSURE: 76 MMHG | WEIGHT: 162.94 LBS

## 2024-01-30 DIAGNOSIS — L60.0 INGROWN TOENAIL OF LEFT FOOT: ICD-10-CM

## 2024-01-30 DIAGNOSIS — E11.65 TYPE 2 DIABETES MELLITUS WITH HYPERGLYCEMIA, WITHOUT LONG-TERM CURRENT USE OF INSULIN: Primary | ICD-10-CM

## 2024-01-30 DIAGNOSIS — M79.675 PAIN AROUND TOENAIL, LEFT FOOT: ICD-10-CM

## 2024-01-30 PROCEDURE — 3078F DIAST BP <80 MM HG: CPT | Mod: CPTII,S$GLB,, | Performed by: PODIATRIST

## 2024-01-30 PROCEDURE — 99999 PR PBB SHADOW E&M-EST. PATIENT-LVL IV: CPT | Mod: PBBFAC,,, | Performed by: PODIATRIST

## 2024-01-30 PROCEDURE — 1160F RVW MEDS BY RX/DR IN RCRD: CPT | Mod: CPTII,S$GLB,, | Performed by: PODIATRIST

## 2024-01-30 PROCEDURE — 3074F SYST BP LT 130 MM HG: CPT | Mod: CPTII,S$GLB,, | Performed by: PODIATRIST

## 2024-01-30 PROCEDURE — 99213 OFFICE O/P EST LOW 20 MIN: CPT | Mod: S$GLB,,, | Performed by: PODIATRIST

## 2024-01-30 PROCEDURE — 1159F MED LIST DOCD IN RCRD: CPT | Mod: CPTII,S$GLB,, | Performed by: PODIATRIST

## 2024-01-30 PROCEDURE — 1126F AMNT PAIN NOTED NONE PRSNT: CPT | Mod: CPTII,S$GLB,, | Performed by: PODIATRIST

## 2024-01-30 PROCEDURE — 3046F HEMOGLOBIN A1C LEVEL >9.0%: CPT | Mod: CPTII,S$GLB,, | Performed by: PODIATRIST

## 2024-01-30 PROCEDURE — 4010F ACE/ARB THERAPY RXD/TAKEN: CPT | Mod: CPTII,S$GLB,, | Performed by: PODIATRIST

## 2024-01-30 PROCEDURE — 3008F BODY MASS INDEX DOCD: CPT | Mod: CPTII,S$GLB,, | Performed by: PODIATRIST

## 2024-01-30 NOTE — PROGRESS NOTES
Subjective:      Patient ID: Eyad Garcia is a 68 y.o. male.    Chief Complaint:   Toe Pain (Left big toe) and Diabetes Mellitus (PCP- 01/18/2024/Jessica Rojas MD)    Eyad is a 68 y.o. male who presents to the clinic upon referral from Dr. Arleen aguirre. provider found  for evaluation and treatment of diabetic feet. Eyad has a past medical history of Cataract, Diabetes mellitus type II, Difficult intubation, Erectile dysfunction, History of colon polyps (01/25/2019), Hyperlipidemia, Hypertension, OA (osteoarthritis), POAG (primary open-angle glaucoma), and Retinal detachment. Patient relates no major problem with feet. Only complaints today consist of diabetic foot exam.    Pt presents with son. No foot pain. Denies numbness/tingling in feet.   Was told not to cut nails since he is diabetic.    Patient runs a [employer]    Main complaint is upper body aches pains arthritis    01/30/2024: follow up for L big toe ingrown nail. Started bothering him a few weeks ago. Tried to cut it out himself but made it worse. Soaking in warm water, alcohol and hydrogen peroxide.         PCP: Dominique Rojas MD    Date Last Seen by PCP: 5/30/22    Current shoe gear: Extra depth shoes    Hemoglobin A1C   Date Value Ref Range Status   01/12/2024 9.6 (H) 4.0 - 5.6 % Final     Comment:     ADA Screening Guidelines:  5.7-6.4%  Consistent with prediabetes  >or=6.5%  Consistent with diabetes    High levels of fetal hemoglobin interfere with the HbA1C  assay. Heterozygous hemoglobin variants (HbS, HgC, etc)do  not significantly interfere with this assay.   However, presence of multiple variants may affect accuracy.     11/15/2023 8.4 (H) 4.0 - 5.6 % Final     Comment:     ADA Screening Guidelines:  5.7-6.4%  Consistent with prediabetes  >or=6.5%  Consistent with diabetes    High levels of fetal hemoglobin interfere with the HbA1C  assay. Heterozygous hemoglobin variants (HbS, HgC, etc)do  not significantly interfere with this assay.    However, presence of multiple variants may affect accuracy.     04/19/2023 7.6 (H) 4.0 - 5.6 % Final     Comment:     ADA Screening Guidelines:  5.7-6.4%  Consistent with prediabetes  >or=6.5%  Consistent with diabetes    High levels of fetal hemoglobin interfere with the HbA1C  assay. Heterozygous hemoglobin variants (HbS, HgC, etc)do  not significantly interfere with this assay.   However, presence of multiple variants may affect accuracy.              Past Medical History:   Diagnosis Date    Cataract     Diabetes mellitus type II     Difficult intubation     Erectile dysfunction     History of colon polyps 01/25/2019    Hyperlipidemia     Hypertension     OA (osteoarthritis)     POAG (primary open-angle glaucoma)     Retinal detachment     OS     Past Surgical History:   Procedure Laterality Date    COLONOSCOPY N/A 01/25/2019    Procedure: COLONOSCOPY;  Surgeon: Elder Guillermo MD;  Location: [facility] (4TH FLR);  Service: Endoscopy;  Laterality: N/A;    COLONOSCOPY N/A 11/17/2023    Procedure: COLONOSCOPY;  Surgeon: JORDAN Guillermo MD;  Location: [facility] (4TH FLR);  Service: Endoscopy;  Laterality: N/A;  ref by / basim inst portal-RB  11/10-lvm for precall-MS  11/10-confirmed arrival time of 10am-tb    CYSTOSCOPY      HERNIA REPAIR      KNEE SURGERY      left    PENILE PROSTHESIS IMPLANT      PROSTATECTOMY      RETINAL DETACHMENT SURGERY      OS    SELECTIVE LASER TRABECULOPLASTY Left 03/16/2023        SELECTIVE LASER TRABECUPLASTY Bilateral 04/2016    OU WITH     TRABECULECTOMY Left 6/14/2023    Procedure: TRABECULECTOMY;  Surgeon: Bethany Lechuga MD;  Location: [facility] OR [address] [place] [address];  Service: Ophthalmology;  Laterality: Left;  Trabeculectomy w/[facility]     Current Outpatient Medications on File Prior to Visit   Medication Sig Dispense Refill    albuterol (VENTOLIN HFA) 90 mcg/actuation inhaler Inhale 2 puffs into the lungs every 6 (six) hours as needed for Wheezing  or Shortness of Breath (cough). Rescue 18 g 11    alcohol swabs (ALCOHOL WIPES) PadM Monitor as directed X 2 daily. Per insurance coverage. ICD 10 E11.9 200 each 3    amLODIPine (NORVASC) 10 MG tablet Take 1 tablet (10 mg total) by mouth once daily. 90 tablet 3    brimonidine 0.2% (ALPHAGAN) 0.2 % Drop Place 1 drop into both eyes 3 (three) times daily. (Patient taking differently: Place 1 drop into the right eye 3 (three) times daily.) 30 mL 3    dapagliflozin propanediol (FARXIGA) 10 mg tablet Take 1 tablet (10 mg total) by mouth once daily. 90 tablet 3    ezetimibe (ZETIA) 10 mg tablet Take 1 tablet (10 mg total) by mouth every evening. 90 tablet 3    fluticasone propionate (FLONASE) 50 mcg/actuation nasal spray 2 sprays (100 mcg total) by Each Nostril route 2 (two) times daily as needed for Rhinitis. 16 g 0    FREESTYLE ROME 2 SENSOR Kit Use as directed. Change every 14 (fourteen) days. 2 kit 11    glimepiride (AMARYL) 2 MG tablet Take 1 tablet (2 mg total) by mouth before breakfast. 90 tablet 3    hydroCHLOROthiazide (HYDRODIURIL) 25 MG tablet Take 1 tablet (25 mg total) by mouth once daily. 90 tablet 3    meloxicam (MOBIC) 15 MG tablet TAKE 1 TABLET ONE TIME DAILY 90 tablet 0    metFORMIN (GLUCOPHAGE-XR) 500 MG ER 24hr tablet Take 2 tablets (1,000 mg total) by mouth once daily. 180 tablet 3    netarsudiL-latanoprost (ROCKLATAN) 0.02-0.005 % Drop Place 1 drop into both eyes every evening. Patient was not controlled on latanoprost - now needs to use rocklatan. Please run script through - it is now a covered medication if they have failed latanoprost (Patient taking differently: Place 1 drop into the right eye every evening. Patient was not controlled on latanoprost - now needs to use rocklatan. Please run script through - it is now a covered medication if they have failed latanoprost) 7.5 mL 3    olmesartan (BENICAR) 40 MG tablet Take 1 tablet (40 mg total) by mouth once daily. 90 tablet 3    pravastatin  (PRAVACHOL) 40 MG tablet Take 1 tablet (40 mg total) by mouth once daily. 90 tablet 3    triamcinolone acetonide 0.025% (KENALOG) 0.025 % cream Apply topically 2 (two) times daily. 80 g 1    [DISCONTINUED] bicalutamide (CASODEX) 50 MG Tab Take 1 tablet (50 mg total) by mouth once daily. (Patient not taking: No sig reported) 30 tablet 1    [DISCONTINUED] TRUE METRIX GLUCOSE METER [facility]        Current Facility-Administered Medications on File Prior to Visit   Medication Dose Route Frequency Provider Last Rate Last Admin    leuprolide acetate (6 month) injection 45 mg  45 mg Intramuscular Q6 Months Henrry Sampson Jr., MD   45 mg at 05/03/23 1231    leuprolide acetate (6 month) injection 45 mg  45 mg Intramuscular Q6 Months Henrry Sampson Jr., MD   45 mg at 11/22/23 1132    [START ON 5/22/2024] leuprolide acetate (6 month) injection 45 mg  45 mg Intramuscular Q6 Months Henrry Sampson Jr., MD         Review of patient's allergies indicates:   Allergen Reactions    Lipitor [atorvastatin]      Muscle pain    Sulfa (sulfonamide antibiotics) Hives and Rash           Aspirin      Stomach upset       Review of Systems   Constitutional: Negative for chills, decreased appetite, fever, malaise/fatigue, night sweats, weight gain and weight loss.   Cardiovascular:  Negative for chest pain, claudication, dyspnea on exertion, leg swelling, palpitations and syncope.   Respiratory:  Negative for cough and shortness of breath.    Endocrine: Negative for cold intolerance and heat intolerance.   Hematologic/Lymphatic: Negative for bleeding problem. Does not bruise/bleed easily.   Skin:  Positive for nail changes. Negative for color change, dry skin, flushing, itching, poor wound healing, rash, skin cancer, suspicious lesions and unusual hair distribution.   Musculoskeletal:  Positive for arthritis, joint pain, muscle cramps and myalgias. Negative for back pain, falls, gout, joint swelling, muscle weakness, neck pain and  "stiffness.        Toenail pain L   Gastrointestinal:  Negative for diarrhea, nausea and vomiting.   Neurological:  Negative for dizziness, focal weakness, light-headedness, numbness, paresthesias, tremors, vertigo and weakness.   Psychiatric/Behavioral:  Negative for altered mental status and depression. The patient does not have insomnia.    Allergic/Immunologic: Negative.            Objective:       Vitals:    01/30/24 0833   BP: 112/76   Pulse: 80   Weight: 73.9 kg (162 lb 14.7 oz)   Height: 5' 5" (1.651 m)   PainSc: 0-No pain   73.9 kg (162 lb 14.7 oz)     Physical Exam  Vitals reviewed.   Constitutional:       General: He is not in acute distress.     Appearance: He is well-developed. He is not ill-appearing, toxic-appearing or diaphoretic.      Comments: Proper supportive shoegear      Cardiovascular:      Pulses:           Dorsalis pedis pulses are 2+ on the right side and 2+ on the left side.        Posterior tibial pulses are 2+ on the right side and 2+ on the left side.   Musculoskeletal:         General: No swelling or deformity.      Right lower leg: No edema.      Left lower leg: No edema.      Right ankle: Normal.      Right Achilles Tendon: Normal. No tenderness.      Left ankle: Normal.      Left Achilles Tendon: Normal. No tenderness.      Right foot: Decreased range of motion. Bunion present. No deformity, tenderness or bony tenderness.      Left foot: Decreased range of motion. Bunion present. No deformity, tenderness or bony tenderness.      Comments: Pain with palpation of L hallux distal medial border, see skin section.    Feet:      Right foot:      Protective Sensation: 10 sites tested.  10 sites sensed.      Skin integrity: No ulcer, blister, skin breakdown, erythema, warmth, callus or dry skin.      Toenail Condition: Right toenails are abnormally thick and long.      Left foot:      Protective Sensation: 10 sites tested.  10 sites sensed.      Skin integrity: No ulcer, blister, skin " breakdown, erythema, warmth, callus or dry skin.      Toenail Condition: Left toenails are abnormally thick and long.      Comments: SWM intact  Nails dystrophic no signs of mycosis    No open lesions signs of infection    Interspaces clear  Skin:     General: Skin is warm.      Capillary Refill: Capillary refill takes 2 to 3 seconds.      Coloration: Skin is not pale.      Findings: No erythema or rash.      Nails: There is no clubbing.      Comments: L hallux distal medial border moderately incurvated with sharp spicule embedded within skin. Small open wound with small amount of purulence. No erythema or odor present.    Neurological:      Mental Status: He is alert and oriented to person, place, and time.      Sensory: No sensory deficit.      Gait: Gait abnormal.   Psychiatric:         Attention and Perception: Attention normal.         Mood and Affect: Mood normal.         Speech: Speech normal.         Behavior: Behavior normal.         Thought Content: Thought content normal.         Cognition and Memory: Cognition normal.         Judgment: Judgment normal.               Assessment:       Encounter Diagnoses   Name Primary?    Type 2 diabetes mellitus with hyperglycemia, without long-term current use of insulin Yes    Ingrown toenail of left foot     Pain around toenail, left foot            Plan:       Eyad was seen today for toe pain and diabetes mellitus.    Diagnoses and all orders for this visit:    Type 2 diabetes mellitus with hyperglycemia, without long-term current use of insulin    Ingrown toenail of left foot    Pain around toenail, left foot        I counseled the patient on his conditions, their implications and medical management.    - Shoe inspection. Diabetic Foot Education. Patient reminded of the importance of good nutrition and blood sugar control to help prevent podiatric complications of diabetes. Patient instructed on proper foot hygeine. We discussed wearing proper shoe gear, daily  foot inspections, never walking without protective shoe gear, caution putting sharp instruments to feet     - Discussed DM foot care:  Wear comfortable, proper fitting shoes. Wash feet daily. Dry well. After drying, apply moisturizer to feet (no lotion to webspaces). Inspect feet daily for skin breaks, blisters, swelling, or redness. Wear cotton socks (preferably white)  Change socks every day. Do NOT walk barefoot. Do NOT use heating pads or warm/hot water soaks     Utilizing sterile toenail clippers I aggressively trimmed the offending nail border/s approximately 3 mm from its/their edge and carried the nail plate down at an angle in order to wedge out the offending cryptotic portion of the nail plate. The offending border was then removed in toto. The area was cleansed with alcohol. Patient tolerated the procedure well and related significant relief. No wound or signs of infection noted to the area at this time.     Briefly discussed phenol matrixectomy of affected borders of affected toenail if routine trimming does not help improve the pain. Discussed alternatives, risks and complications of the procedure along with post procedure protocol and expectations. Patient will consider this if symptoms fail to improve with today's trimming.     Medial L hallux border    Betadine and bandaid applied today. No medicaiton or bandaid needed after today. Wash and dry well. Ok to leave open.     Check shoe sizes to make sure shoes area appropriate size.     RTC 3-4 weeks for ingrown toenail check, sooner PRN

## 2024-02-15 ENCOUNTER — PATIENT MESSAGE (OUTPATIENT)
Dept: PODIATRY | Facility: CLINIC | Age: 69
End: 2024-02-15
Payer: MEDICARE

## 2024-02-16 ENCOUNTER — PATIENT MESSAGE (OUTPATIENT)
Dept: INTERNAL MEDICINE | Facility: CLINIC | Age: 69
End: 2024-02-16
Payer: MEDICARE

## 2024-02-17 ENCOUNTER — PATIENT MESSAGE (OUTPATIENT)
Dept: ADMINISTRATIVE | Facility: CLINIC | Age: 69
End: 2024-02-17
Payer: MEDICARE

## 2024-02-19 ENCOUNTER — TELEPHONE (OUTPATIENT)
Dept: ADMINISTRATIVE | Facility: CLINIC | Age: 69
End: 2024-02-19
Payer: MEDICARE

## 2024-02-29 ENCOUNTER — PROCEDURE VISIT (OUTPATIENT)
Dept: PODIATRY | Facility: CLINIC | Age: 69
End: 2024-02-29
Payer: MEDICARE

## 2024-02-29 VITALS
RESPIRATION RATE: 18 BRPM | HEART RATE: 74 BPM | WEIGHT: 163.13 LBS | DIASTOLIC BLOOD PRESSURE: 81 MMHG | SYSTOLIC BLOOD PRESSURE: 142 MMHG | HEIGHT: 65 IN | BODY MASS INDEX: 27.18 KG/M2

## 2024-02-29 DIAGNOSIS — L60.0 INGROWN TOENAIL OF LEFT FOOT: Primary | ICD-10-CM

## 2024-02-29 PROCEDURE — 99213 OFFICE O/P EST LOW 20 MIN: CPT | Mod: S$GLB,,, | Performed by: PODIATRIST

## 2024-02-29 NOTE — PROGRESS NOTES
Subjective:      Patient ID: Eyad Garcia is a 68 y.o. male.    Chief Complaint:   Follow-up (After nail removal )    Eyad is a 68 y.o. male who presents to the clinic upon referral from Dr. Arleen aguirre. provider found  for evaluation and treatment of diabetic feet. Eyad has a past medical history of Cataract, Diabetes mellitus type II, Difficult intubation, Erectile dysfunction, History of colon polyps (01/25/2019), Hyperlipidemia, Hypertension, OA (osteoarthritis), POAG (primary open-angle glaucoma), and Retinal detachment. Patient relates no major problem with feet. Only complaints today consist of diabetic foot exam.    Pt presents with son. No foot pain. Denies numbness/tingling in feet.   Was told not to cut nails since he is diabetic.    Patient runs a [employer]    Main complaint is upper body aches pains arthritis    01/30/2024: follow up for L big toe ingrown nail. Started bothering him a few weeks ago. Tried to cut it out himself but made it worse. Soaking in warm water, alcohol and hydrogen peroxide.     02/29/2024: follow up for L big toe ingrown nail. Has been soaking in warm water with epsom salt and alcohol and using betadine on corner of nail. Feels much better. No further pain, open wound or drainage per patient.       PCP: Dominique Rojas MD    Date Last Seen by PCP: 5/30/22    Current shoe gear: Extra depth shoes    Hemoglobin A1C   Date Value Ref Range Status   01/12/2024 9.6 (H) 4.0 - 5.6 % Final     Comment:     ADA Screening Guidelines:  5.7-6.4%  Consistent with prediabetes  >or=6.5%  Consistent with diabetes    High levels of fetal hemoglobin interfere with the HbA1C  assay. Heterozygous hemoglobin variants (HbS, HgC, etc)do  not significantly interfere with this assay.   However, presence of multiple variants may affect accuracy.     11/15/2023 8.4 (H) 4.0 - 5.6 % Final     Comment:     ADA Screening Guidelines:  5.7-6.4%  Consistent with prediabetes  >or=6.5%  Consistent with  diabetes    High levels of fetal hemoglobin interfere with the HbA1C  assay. Heterozygous hemoglobin variants (HbS, HgC, etc)do  not significantly interfere with this assay.   However, presence of multiple variants may affect accuracy.     04/19/2023 7.6 (H) 4.0 - 5.6 % Final     Comment:     ADA Screening Guidelines:  5.7-6.4%  Consistent with prediabetes  >or=6.5%  Consistent with diabetes    High levels of fetal hemoglobin interfere with the HbA1C  assay. Heterozygous hemoglobin variants (HbS, HgC, etc)do  not significantly interfere with this assay.   However, presence of multiple variants may affect accuracy.              Past Medical History:   Diagnosis Date    Cataract     Diabetes mellitus type II     Difficult intubation     Erectile dysfunction     History of colon polyps 01/25/2019    Hyperlipidemia     Hypertension     OA (osteoarthritis)     POAG (primary open-angle glaucoma)     Retinal detachment     OS     Past Surgical History:   Procedure Laterality Date    COLONOSCOPY N/A 01/25/2019    Procedure: COLONOSCOPY;  Surgeon: Elder Guillermo MD;  Location: [facility] ([address] [place] [address]);  Service: Endoscopy;  Laterality: N/A;    COLONOSCOPY N/A 11/17/2023    Procedure: COLONOSCOPY;  Surgeon: JORDAN Guillermo MD;  Location: [facility] ([address] [place] [address]);  Service: Endoscopy;  Laterality: N/A;  ref by / [facility]im [facility] portal-RB  11/10-lvm for precall-MS  11/10-confirmed arrival time of 10am-tb    CYSTOSCOPY      HERNIA REPAIR      KNEE SURGERY      left    PENILE PROSTHESIS IMPLANT      PROSTATECTOMY      RETINAL DETACHMENT SURGERY      OS    SELECTIVE LASER TRABECULOPLASTY Left 03/16/2023        SELECTIVE LASER TRABECUPLASTY Bilateral 04/2016    OU WITH     TRABECULECTOMY Left 6/14/2023    Procedure: TRABECULECTOMY;  Surgeon: Bethany Lechuga MD;  Location: [facility] [address] [place] [address];  Service: Ophthalmology;  Laterality: Left;  Trabeculectomy w/[facility]     Current Outpatient Medications on File  Prior to Visit   Medication Sig Dispense Refill    albuterol (VENTOLIN HFA) 90 mcg/actuation inhaler Inhale 2 puffs into the lungs every 6 (six) hours as needed for Wheezing or Shortness of Breath (cough). Rescue 18 g 11    alcohol swabs (ALCOHOL WIPES) PadM Monitor as directed X 2 daily. Per insurance coverage. ICD 10 E11.9 200 each 3    amLODIPine (NORVASC) 10 MG tablet Take 1 tablet (10 mg total) by mouth once daily. 90 tablet 3    brimonidine 0.2% (ALPHAGAN) 0.2 % Drop Place 1 drop into both eyes 3 (three) times daily. (Patient taking differently: Place 1 drop into the right eye 3 (three) times daily.) 30 mL 3    dapagliflozin propanediol (FARXIGA) 10 mg tablet Take 1 tablet (10 mg total) by mouth once daily. 90 tablet 3    ezetimibe (ZETIA) 10 mg tablet Take 1 tablet (10 mg total) by mouth every evening. 90 tablet 3    fluticasone propionate (FLONASE) 50 mcg/actuation nasal spray 2 sprays (100 mcg total) by Each Nostril route 2 (two) times daily as needed for Rhinitis. 16 g 0    FREESTYLE ROME 2 SENSOR Kit Use as directed. Change every 14 (fourteen) days. 2 kit 11    glimepiride (AMARYL) 2 MG tablet Take 1 tablet (2 mg total) by mouth before breakfast. 90 tablet 3    hydroCHLOROthiazide (HYDRODIURIL) 25 MG tablet Take 1 tablet (25 mg total) by mouth once daily. 90 tablet 3    meloxicam (MOBIC) 15 MG tablet TAKE 1 TABLET ONE TIME DAILY 90 tablet 0    metFORMIN (GLUCOPHAGE-XR) 500 MG ER 24hr tablet Take 2 tablets (1,000 mg total) by mouth once daily. 180 tablet 3    netarsudiL-latanoprost (ROCKLATAN) 0.02-0.005 % Drop Place 1 drop into both eyes every evening. Patient was not controlled on latanoprost - now needs to use rocklatan. Please run script through - it is now a covered medication if they have failed latanoprost (Patient taking differently: Place 1 drop into the right eye every evening. Patient was not controlled on latanoprost - now needs to use rocklatan. Please run script through - it is now a covered  medication if they have failed latanoprost) 7.5 mL 3    olmesartan (BENICAR) 40 MG tablet Take 1 tablet (40 mg total) by mouth once daily. 90 tablet 3    pravastatin (PRAVACHOL) 40 MG tablet Take 1 tablet (40 mg total) by mouth once daily. 90 tablet 3    triamcinolone acetonide 0.025% (KENALOG) 0.025 % cream Apply topically 2 (two) times daily. 80 g 1    [DISCONTINUED] bicalutamide (CASODEX) 50 MG Tab Take 1 tablet (50 mg total) by mouth once daily. (Patient not taking: No sig reported) 30 tablet 1    [DISCONTINUED] TRUE METRIX GLUCOSE METER [facility]        Current Facility-Administered Medications on File Prior to Visit   Medication Dose Route Frequency Provider Last Rate Last Admin    leuprolide acetate (6 month) injection 45 mg  45 mg Intramuscular Q6 Months Henrry Sampson Jr., MD   45 mg at 05/03/23 1231    leuprolide acetate (6 month) injection 45 mg  45 mg Intramuscular Q6 Months Henrry Sampson Jr., MD   45 mg at 11/22/23 1132    [START ON 5/22/2024] leuprolide acetate (6 month) injection 45 mg  45 mg Intramuscular Q6 Months Henrry Sampson Jr., MD         Review of patient's allergies indicates:   Allergen Reactions    Lipitor [atorvastatin]      Muscle pain    Sulfa (sulfonamide antibiotics) Hives and Rash           Aspirin      Stomach upset       Review of Systems   Constitutional: Negative for chills, decreased appetite, fever, malaise/fatigue, night sweats, weight gain and weight loss.   Cardiovascular:  Negative for chest pain, claudication, dyspnea on exertion, leg swelling, palpitations and syncope.   Respiratory:  Negative for cough and shortness of breath.    Endocrine: Negative for cold intolerance and heat intolerance.   Hematologic/Lymphatic: Negative for bleeding problem. Does not bruise/bleed easily.   Skin:  Positive for nail changes. Negative for color change, dry skin, flushing, itching, poor wound healing, rash, skin cancer, suspicious lesions and unusual hair distribution.  "  Musculoskeletal:  Positive for arthritis, joint pain, muscle cramps and myalgias. Negative for back pain, falls, gout, joint swelling, muscle weakness, neck pain and stiffness.        Toenail pain L   Gastrointestinal:  Negative for diarrhea, nausea and vomiting.   Neurological:  Negative for dizziness, focal weakness, light-headedness, numbness, paresthesias, tremors, vertigo and weakness.   Psychiatric/Behavioral:  Negative for altered mental status and depression. The patient does not have insomnia.    Allergic/Immunologic: Negative.            Objective:       Vitals:    02/29/24 0813   BP: (!) 142/81   Pulse: 74   Resp: 18   Weight: 74 kg (163 lb 2.3 oz)   Height: 5' 5" (1.651 m)   PainSc: 0-No pain   74 kg (163 lb 2.3 oz)     Physical Exam  Vitals reviewed.   Constitutional:       General: He is not in acute distress.     Appearance: He is well-developed. He is not ill-appearing, toxic-appearing or diaphoretic.      Comments: Proper supportive shoegear      Cardiovascular:      Pulses:           Dorsalis pedis pulses are 2+ on the right side and 2+ on the left side.        Posterior tibial pulses are 2+ on the right side and 2+ on the left side.   Musculoskeletal:         General: No swelling or deformity.      Right lower leg: No edema.      Left lower leg: No edema.      Right ankle: Normal.      Right Achilles Tendon: Normal. No tenderness.      Left ankle: Normal.      Left Achilles Tendon: Normal. No tenderness.      Right foot: Decreased range of motion. Bunion present. No deformity, tenderness or bony tenderness.      Left foot: Decreased range of motion. Bunion present. No deformity, tenderness or bony tenderness.      Comments: No further pain with palpation of L hallux distal medial border, see skin section.    Feet:      Right foot:      Protective Sensation: 10 sites tested.  10 sites sensed.      Skin integrity: No ulcer, blister, skin breakdown, erythema, warmth, callus or dry skin.      " Toenail Condition: Right toenails are abnormally thick and long.      Left foot:      Protective Sensation: 10 sites tested.  10 sites sensed.      Skin integrity: No ulcer, blister, skin breakdown, erythema, warmth, callus or dry skin.      Toenail Condition: Left toenails are abnormally thick and long.      Comments: SWM intact  Nails dystrophic no signs of mycosis    No open lesions signs of infection    Interspaces clear  Skin:     General: Skin is warm.      Capillary Refill: Capillary refill takes 2 to 3 seconds.      Coloration: Skin is not pale.      Findings: No erythema or rash.      Nails: There is no clubbing.      Comments: L hallux distal medial border with signs of previous slant back nail trimming with no further open wound, drainage or SOI. Healed with mild hpk tissue.    Neurological:      Mental Status: He is alert and oriented to person, place, and time.      Sensory: No sensory deficit.      Gait: Gait abnormal.   Psychiatric:         Attention and Perception: Attention normal.         Mood and Affect: Mood normal.         Speech: Speech normal.         Behavior: Behavior normal.         Thought Content: Thought content normal.         Cognition and Memory: Cognition normal.         Judgment: Judgment normal.               Assessment:       Encounter Diagnosis   Name Primary?    Ingrown toenail of left foot Yes             Plan:       Eyad was seen today for follow-up.    Diagnoses and all orders for this visit:    Ingrown toenail of left foot          I counseled the patient on his conditions, their implications and medical management.    - Shoe inspection. Diabetic Foot Education. Patient reminded of the importance of good nutrition and blood sugar control to help prevent podiatric complications of diabetes. Patient instructed on proper foot hygeine. We discussed wearing proper shoe gear, daily foot inspections, never walking without protective shoe gear, caution putting sharp instruments to  feet     - Discussed DM foot care:  Wear comfortable, proper fitting shoes. Wash feet daily. Dry well. After drying, apply moisturizer to feet (no lotion to webspaces). Inspect feet daily for skin breaks, blisters, swelling, or redness. Wear cotton socks (preferably white)  Change socks every day. Do NOT walk barefoot. Do NOT use heating pads or warm/hot water soaks      Ingrown toenail resolved. Monitor.     Discussed appropriate trimming/filing of nails.     Long discussion with patient regarding appropriate, supportive and comfortable shoes. Recommended supportive style shoe brands with adequate arch supports to alleviate abnormal pressure and improve stability of foot while walking. Avoid flat shoes and barefoot walking as these will exacerbate or worsen symptoms.     I warned patient of reopening of wound along with signs and symptoms of infection including redness, drainage, purulence, odor, streaking, fever, chills, etc and I advised patient to call clinic during business hours or seek medical attention (ER or urgent care) after hours if these symptoms arise.     RTC 1 year for annual DM foot exam, sooner PRN

## 2024-03-13 ENCOUNTER — OFFICE VISIT (OUTPATIENT)
Dept: INTERNAL MEDICINE | Facility: CLINIC | Age: 69
End: 2024-03-13
Payer: MEDICARE

## 2024-03-13 VITALS
HEIGHT: 65 IN | DIASTOLIC BLOOD PRESSURE: 72 MMHG | WEIGHT: 162.69 LBS | SYSTOLIC BLOOD PRESSURE: 130 MMHG | BODY MASS INDEX: 27.1 KG/M2 | OXYGEN SATURATION: 95 % | HEART RATE: 70 BPM

## 2024-03-13 DIAGNOSIS — D50.8 IRON DEFICIENCY ANEMIA SECONDARY TO INADEQUATE DIETARY IRON INTAKE: ICD-10-CM

## 2024-03-13 DIAGNOSIS — J84.10 CALCIFIED GRANULOMA OF LUNG: ICD-10-CM

## 2024-03-13 DIAGNOSIS — Z00.00 ENCOUNTER FOR PREVENTIVE HEALTH EXAMINATION: Primary | ICD-10-CM

## 2024-03-13 DIAGNOSIS — E66.3 OVERWEIGHT (BMI 25.0-29.9): ICD-10-CM

## 2024-03-13 DIAGNOSIS — C61 MALIGNANT NEOPLASM OF PROSTATE: ICD-10-CM

## 2024-03-13 DIAGNOSIS — E11.59 HYPERTENSION ASSOCIATED WITH DIABETES: ICD-10-CM

## 2024-03-13 DIAGNOSIS — K21.9 GASTROESOPHAGEAL REFLUX DISEASE WITHOUT ESOPHAGITIS: ICD-10-CM

## 2024-03-13 DIAGNOSIS — I70.0 AORTIC ATHEROSCLEROSIS: ICD-10-CM

## 2024-03-13 DIAGNOSIS — I15.2 HYPERTENSION ASSOCIATED WITH DIABETES: ICD-10-CM

## 2024-03-13 DIAGNOSIS — E11.65 TYPE 2 DIABETES MELLITUS WITH HYPERGLYCEMIA, WITHOUT LONG-TERM CURRENT USE OF INSULIN: ICD-10-CM

## 2024-03-13 DIAGNOSIS — E11.69 HYPERLIPIDEMIA ASSOCIATED WITH TYPE 2 DIABETES MELLITUS: ICD-10-CM

## 2024-03-13 DIAGNOSIS — R94.4 DECREASED GFR: ICD-10-CM

## 2024-03-13 DIAGNOSIS — E78.5 HYPERLIPIDEMIA ASSOCIATED WITH TYPE 2 DIABETES MELLITUS: ICD-10-CM

## 2024-03-13 PROBLEM — J98.4 CALCIFIED GRANULOMA OF LUNG: Status: ACTIVE | Noted: 2024-03-13

## 2024-03-13 PROBLEM — J40 BRONCHITIS: Status: RESOLVED | Noted: 2022-04-05 | Resolved: 2024-03-13

## 2024-03-13 PROCEDURE — 1170F FXNL STATUS ASSESSED: CPT | Mod: CPTII,S$GLB,, | Performed by: NURSE PRACTITIONER

## 2024-03-13 PROCEDURE — 3075F SYST BP GE 130 - 139MM HG: CPT | Mod: CPTII,S$GLB,, | Performed by: NURSE PRACTITIONER

## 2024-03-13 PROCEDURE — 1159F MED LIST DOCD IN RCRD: CPT | Mod: CPTII,S$GLB,, | Performed by: NURSE PRACTITIONER

## 2024-03-13 PROCEDURE — 4010F ACE/ARB THERAPY RXD/TAKEN: CPT | Mod: CPTII,S$GLB,, | Performed by: NURSE PRACTITIONER

## 2024-03-13 PROCEDURE — 99999 PR PBB SHADOW E&M-EST. PATIENT-LVL V: CPT | Mod: PBBFAC,,, | Performed by: NURSE PRACTITIONER

## 2024-03-13 PROCEDURE — G0439 PPPS, SUBSEQ VISIT: HCPCS | Mod: S$GLB,,, | Performed by: NURSE PRACTITIONER

## 2024-03-13 PROCEDURE — 1126F AMNT PAIN NOTED NONE PRSNT: CPT | Mod: CPTII,S$GLB,, | Performed by: NURSE PRACTITIONER

## 2024-03-13 PROCEDURE — 1101F PT FALLS ASSESS-DOCD LE1/YR: CPT | Mod: CPTII,S$GLB,, | Performed by: NURSE PRACTITIONER

## 2024-03-13 PROCEDURE — 3046F HEMOGLOBIN A1C LEVEL >9.0%: CPT | Mod: CPTII,S$GLB,, | Performed by: NURSE PRACTITIONER

## 2024-03-13 PROCEDURE — 1160F RVW MEDS BY RX/DR IN RCRD: CPT | Mod: CPTII,S$GLB,, | Performed by: NURSE PRACTITIONER

## 2024-03-13 PROCEDURE — 3288F FALL RISK ASSESSMENT DOCD: CPT | Mod: CPTII,S$GLB,, | Performed by: NURSE PRACTITIONER

## 2024-03-13 PROCEDURE — 3078F DIAST BP <80 MM HG: CPT | Mod: CPTII,S$GLB,, | Performed by: NURSE PRACTITIONER

## 2024-03-13 RX ORDER — ASCORBIC ACID 250 MG
250 TABLET ORAL DAILY
COMMUNITY

## 2024-03-13 NOTE — PATIENT INSTRUCTIONS
Counseling and Referral of Other Preventative  (Italic type indicates deductible and co-insurance are waived)    Patient Name: Eyad Garcia  Today's Date: 3/13/2024    Health Maintenance         Date Due Completion Date    RSV Vaccine (Age 60+ and Pregnant patients) (1 - 1-dose 60+ series) Never done ---    Hemoglobin A1c 04/12/2024 1/12/2024    COVID-19 Vaccine (8 - 2023-24 season) 04/23/2024 (Originally 12/3/2023) 10/8/2023    Diabetes Urine Screening 10/19/2024 10/19/2023    Lipid Panel 11/15/2024 11/15/2023    Eye Exam 01/02/2025 1/2/2024    High Dose Statin 02/28/2025 2/29/2024    Foot Exam 02/28/2025 2/29/2024    Override on 12/8/2017: Done    Override on 6/27/2016: Done    Override on 6/5/2015: Done    DEXA Scan 06/09/2025 6/9/2023    TETANUS VACCINE 06/27/2026 6/27/2016    Colorectal Cancer Screening 11/17/2028 11/17/2023    Override on 6/13/2013: Done    Override on 3/22/2006: Done            Obtain 100-1200mg calcium daily - food source is preferable  Take 1394-3591 units vitamin D daily    Estimated Calcium Content of Foods:  Produce  Serving Size Estimated Calcium*    Malcolm greens, frozen 8 oz 360 mg   Broccoli david 8 oz 200 mg   Kale, frozen 8 oz 180 mg   Soy Beans, green, boiled 8 oz 175 mg   Bok Kboy, cooked, boiled 8 oz 160 mg   Figs, dried 2 figs 65 mg   Broccoli, fresh, cooked 8 oz 60 mg   Oranges 1 whole 55 mg   Seafood Serving Size Estimated Calcium*    Sardines, canned with bones 3 oz 325 mg   [place], canned with bones 3 oz 180 mg   Shrimp, canned 3 oz 125 mg   Dairy Serving Size Estimated Calcium*    Ricotta, part-skim 4 oz 335 mg   Yogurt, plain, low-fat 6 oz 310 mg   Milk, skim, low-fat, whole 8 oz 300 mg   Yogurt with fruit, low-fat 6 oz 260 mg   Mozzarella, part-skim 1 oz 210 mg   Cheddar 1 oz 205 mg   Yogurt, Greek 6 oz 200 mg   American Cheese 1 oz 195 mg   Feta Cheese 4 oz 140 mg   Cottage Cheese, 2% 4 oz 105 mg   Frozen yogurt, vanilla 8 oz 105 mg   Ice Cream, vanilla 8 oz 85 mg    Parmesan 1 tbsp 55 mg   Fortified Food Serving Size Estimated Calcium*   Taylor milk, rice milk or soy milk, fortified 8 oz 300 mg   Orange juice and other fruit juices, fortified 8 oz 300 mg   Tofu, prepared with calcium 4 oz 205 mg   Waffle, frozen, fortified 2 pieces 200 mg   Oatmeal, fortified 1 packet 140 mg   English muffin, fortified 1 muffin 100 mg   Cereal, fortified 8 oz 100-1,000 mg   Other Serving Size Estimated Calcium*   Mac & cheese, frozen 1 package 325 mg   Pizza, cheese, frozen 1 serving 115 mg   Pudding, chocolate, prepared with 2% milk 4 oz 160 mg   Beans, baked, canned 4 oz 160 mg   *The calcium content listed for most foods is estimated and can vary due to multiple factors. Check the food label to determine how much calcium is in a particular product.  If you read the nutrition label for a food source, it lists the % calcium in that food.  For an 8 oz glass of milk, for example, the label states calcium 30%.  This is equivalent to 300 mg of calcium (multiply the listed number by 10).   **Table from the National Osteoporosis Foundation          Oranges 1 whole 55 mg   Seafood Serving Size Estimated Calcium*    Sardines, canned with bones 3 oz 325 mg   Anderson, canned with bones 3 oz 180 mg   Shrimp, canned 3 oz 125 mg   Dairy Serving Size Estimated Calcium*    Ricotta, part-skim 4 oz 335 mg   Yogurt, plain, low-fat 6 oz 310 mg   Milk, skim, low-fat, whole 8 oz 300 mg   Yogurt with fruit, low-fat 6 oz 260 mg   Mozzarella, part-skim 1 oz 210 mg   Cheddar 1 oz 205 mg   Yogurt, Greek 6 oz 200 mg   American Cheese 1 oz 195 mg   Feta Cheese 4 oz 140 mg   Cottage Cheese, 2% 4 oz 105 mg   Frozen yogurt, vanilla 8 oz 105 mg   Ice Cream, vanilla 8 oz 85 mg        Fortified Food Serving Size Estimated Calcium*   Taylor milk, rice milk or soy milk, fortified 8 oz 300 mg   Orange juice and other fruit juices, fortified 8 oz 300 mg   Tofu, prepared with calcium 4 oz 205 mg   Waffle, frozen, fortified 2  pieces 200 mg   Oatmeal, fortified 1 packet 140 mg   English muffin, fortified 1 muffin 100 mg   Cereal, fortified 8 oz 100-1,000 mg   Other Serving Size Estimated Calcium*   Mac & cheese, frozen 1 package 325 mg   Pizza, cheese, frozen 1 serving 115 mg   Pudding, chocolate, prepared with 2% milk 4 oz 160 mg   Beans, baked, canned 4 oz 160 mg   *The calcium content listed for most foods is estimated and can vary due to multiple factors. Check the food label to determine how much calcium is in a particular product.  If you read the nutrition label for a food source, it lists the % calcium in that food.  For an 8 oz glass of milk, for example, the label states calcium 30%.  This is equivalent to 300 mg of calcium (multiply the listed number by 10).   **Table from the National Osteoporosis Foundation     Take vitamin D3 about 3786-1050 units daily       No orders of the defined types were placed in this encounter.      The following information is provided to all patients.  This information is to help you find resources for any of the problems found today that may be affecting your health:                  Living healthy guide: www.[facility].[place].gov      Understanding Diabetes: www.diabetes.org      Eating healthy: www.cdc.gov/healthyweight      CDC home safety checklist: www.cdc.gov/steadi/patient.html      Agency on Aging: www.goea.[place].gov      Alcoholics anonymous (AA): www.aa.org      Physical Activity: www.jeremy.nih.gov/cx2wyca      Tobacco use: www.quitwithusla.org

## 2024-03-13 NOTE — PROGRESS NOTES
"  Eyad Garcia presented for an initial Medicare AWV today. The following components were reviewed and updated:    Medical history  Family History  Social history  Allergies and Current Medications  Health Risk Assessment  Health Maintenance  Care Team    **See Completed Assessments for Annual Wellness visit with in the encounter summary    The following assessments were completed:  Depression Screening  Cognitive function Screening    Timed Get Up Test  Whisper Test      Opioid documentation:      Patient does not have a current opioid prescription.          Vitals:    03/13/24 0757   BP: 130/72   BP Location: Left arm   Patient Position: Sitting   BP Method: Small (Manual)   Pulse: 70   SpO2: 95%   Weight: 73.8 kg (162 lb 11.2 oz)   Height: 5' 5" (1.651 m)     Body mass index is 27.07 kg/m².       Physical Exam  Vitals and nursing note reviewed.   Constitutional:       Appearance: He is well-developed.   HENT:      Head: Normocephalic.   Cardiovascular:      Rate and Rhythm: Normal rate and regular rhythm.      Heart sounds: Normal heart sounds. No murmur heard.  Pulmonary:      Effort: Pulmonary effort is normal.      Breath sounds: Normal breath sounds.   Abdominal:      General: Bowel sounds are normal.      Palpations: Abdomen is soft.   Musculoskeletal:         General: Normal range of motion.   Neurological:      Mental Status: He is alert and oriented to person, place, and time.      Motor: No abnormal muscle tone.   Psychiatric:         Mood and Affect: Mood normal.            Diagnoses and health risks identified today and associated recommendations/orders:    1. Encounter for preventive health examination  Here for Health Risk Assessment/Annual Wellness Visit.  Health maintenance reviewed and updated. Follow up in one year.     2. Hypertension associated with diabetes  Chronic, stable on current medications. Followed by PCP.     3. Aortic atherosclerosis  Chronic, stable on current medications. Noted " PET CT 3/21/23. Followed by PCP.     4. Hyperlipidemia associated with type 2 diabetes mellitus  Chronic, stable on current medication. Followed by PCP.     5. Type 2 diabetes mellitus with hyperglycemia, without long-term current use of insulin  Chronic, not at goal. A1c 9.6. Has resumed glimepiride. Followed by PCP.     6. Malignant neoplasm of prostate  Chronic, stable with leuprolide injections. PSA WNL. . Followed by PCP.     7. Calcified granuloma of lung  Noted in [facility] on PET CT 7/22/19. Followed by PCP.     8. Iron deficiency anemia secondary to inadequate dietary iron intake  Chronic, stable on current medication. H&H stable. Followed by PCP.     9. Overweight (BMI 25.0-29.9)  Chronic, reinforced diet/exercise per PCP recommendations. Followed by PCP.     10. Gastroesophageal reflux disease without esophagitis  Chronic, stable. Followed by PCP.     11. Decreased GFR  New onset. Followed by PCP.      Provided Eyad with a 5-10 year written screening schedule and personal prevention plan. Recommendations were developed using the USPSTF age appropriate recommendations. Education, counseling, and referrals were provided as needed.  After Visit Summary printed and given to patient which includes a list of additional screenings\tests needed.    Follow up in about 4 months (around 7/16/2024).with PCP      Marisol Alexander NP

## 2024-03-19 ENCOUNTER — NURSE TRIAGE (OUTPATIENT)
Dept: ADMINISTRATIVE | Facility: CLINIC | Age: 69
End: 2024-03-19
Payer: MEDICARE

## 2024-03-19 ENCOUNTER — TELEPHONE (OUTPATIENT)
Dept: DIABETES | Facility: CLINIC | Age: 69
End: 2024-03-19
Payer: MEDICARE

## 2024-03-19 NOTE — TELEPHONE ENCOUNTER
"Pt calls with questions regarding a low blood sugar of 60 last night. He states that he followed his endocrinologist's guidance of eating a piece of chocolate, but that it "took a couple of hours for my numbers to get out of the red" Pt is asymptomatic at this time. Pt missed a call from Clara Florentino LPN. Secure chat sent to her.     Pt is advised that a high priority message is being routed to his endocrinology team requesting a direct callback to pt. Pt is instructed to call back with any new/worsening sxs, questions, or concerns. He verbalizes understanding.   Reason for Disposition   Nursing judgment    Protocols used: Information Only Call - No Triage-A-OH    "

## 2024-03-19 NOTE — TELEPHONE ENCOUNTER
Spoke to pt stated that his blood sugar is now 142, stated last night his richmond was reading 63, stated he was not having any low blood sugars symptoms, suggested that the pt prick his finger when his blood sugar it low just to compare numbers and make sure that he is having an accurate low blood sugar, re educated pt on low blood sugar treatments, stated to pt to reach out to  us with any other concerns I regards to his blood sugars , pt verbalized understanding

## 2024-04-19 ENCOUNTER — PATIENT MESSAGE (OUTPATIENT)
Dept: ADMINISTRATIVE | Facility: HOSPITAL | Age: 69
End: 2024-04-19
Payer: MEDICARE

## 2024-04-23 ENCOUNTER — PATIENT MESSAGE (OUTPATIENT)
Dept: DIABETES | Facility: CLINIC | Age: 69
End: 2024-04-23
Payer: MEDICARE

## 2024-04-29 ENCOUNTER — TELEPHONE (OUTPATIENT)
Dept: DIABETES | Facility: CLINIC | Age: 69
End: 2024-04-29
Payer: MEDICARE

## 2024-04-29 NOTE — PROGRESS NOTES
CC:   Chief Complaint   Patient presents with    Diabetes Mellitus       HPI: Eyad Garcia is a 68 y.o. male presents for a follow up visit today for the management of Diabetes       He was diagnosed with Type 2 diabetes more than 40 years ago-- in his 20's-30's     Family hx of diabetes: mother, many siblings - he is 1 of 12 children   Hospitalized for diabetes: denies    Insulin therapy: denies       No personal or FH of thyroid cancer or personal of pancreatic cancer or pancreatitis.         Our 1st and last visit was in January of 2024  At that visit we continued the metformin 1000 mg daily---he takes 2 tablets of the extended release  We continued the Farxiga 10 mg daily  We started him on glimepiride 2 mg daily with the 1st meal of the day  Instructed him to get back on his Barak 2  He is back on the Barak.  See attached download readings are well controlled  His A1c improved from 9.6% to 7%  He is having some symptomatic hypoglcyemia --not checking his Bg when it happens     BP is elevated this AM   He reports that sometimes he has missed his medications   He took his medications today                                     DIABETES COMPLICATIONS: none    Hx of glaucoma       Diabetes Management Status    ASA:  No    Statin: Taking-- Pravastatin 40 mg -- unable to tolerate Lipitor-- had severe myalgia with Lipitor   Also on zetia 10 mg nightly   ACE/ARB: Taking-- Benicar 40 mg       The 10-year ASCVD risk score (Anjel NGO, et al., 2019) is: 41.7%    Values used to calculate the score:      Age: 68 years      Sex: Male      Is Non- : Yes      Diabetic: Yes      Tobacco smoker: No      Systolic Blood Pressure: 156 mmHg      Is BP treated: Yes      HDL Cholesterol: 48 mg/dL      Total Cholesterol: 163 mg/dL      Screening or Prevention Patient's value Goal Complete/Controlled?   HgA1C Testing and Control   Lab Results   Component Value Date    HGBA1C 7.0 (H) 04/23/2024       Annually/Less than 8% No   Lipid profile : 04/23/2024 Annually Yes   LDL control Lab Results   Component Value Date    LDLCALC 101.0 04/23/2024    Annually/Less than 100 mg/dl  No   Nephropathy screening Lab Results   Component Value Date    LABMICR 5.0 04/23/2024     Lab Results   Component Value Date    PROTEINUA Negative 05/29/2022    Annually Yes   Blood pressure BP Readings from Last 1 Encounters:   04/30/24 (!) 156/82    Less than 140/90 No   Dilated retinal exam : 01/02/2024 Annually No   Foot exam   : 02/29/2024 Annually No       CURRENT A1C:    Hemoglobin A1C   Date Value Ref Range Status   04/23/2024 7.0 (H) 4.0 - 5.6 % Final     Comment:     ADA Screening Guidelines:  5.7-6.4%  Consistent with prediabetes  >or=6.5%  Consistent with diabetes    High levels of fetal hemoglobin interfere with the HbA1C  assay. Heterozygous hemoglobin variants (HbS, HgC, etc)do  not significantly interfere with this assay.   However, presence of multiple variants may affect accuracy.     01/12/2024 9.6 (H) 4.0 - 5.6 % Final     Comment:     ADA Screening Guidelines:  5.7-6.4%  Consistent with prediabetes  >or=6.5%  Consistent with diabetes    High levels of fetal hemoglobin interfere with the HbA1C  assay. Heterozygous hemoglobin variants (HbS, HgC, etc)do  not significantly interfere with this assay.   However, presence of multiple variants may affect accuracy.     11/15/2023 8.4 (H) 4.0 - 5.6 % Final     Comment:     ADA Screening Guidelines:  5.7-6.4%  Consistent with prediabetes  >or=6.5%  Consistent with diabetes    High levels of fetal hemoglobin interfere with the HbA1C  assay. Heterozygous hemoglobin variants (HbS, HgC, etc)do  not significantly interfere with this assay.   However, presence of multiple variants may affect accuracy.         GOAL A1C: 7% without hypoglycemia     DM MEDICATIONS USED IN THE PAST: Metformin XR  Farxiga    Jardiance- urinary frequently   Tradjenta   Glimepiride         CURRENT DIABETES  MEDICATIONS: Metformin XR 1000 mg daily -- when he takes the Metformin BID- it causes GI upset   Farxiga 10 mg daily   Glimepiride 2 mg daily in the AM-- sometimes taking without food    Insulin: N/A   Missed doses: rarely         BLOOD GLUCOSE MONITORING:      Sensor type: Freestyle richmond 2   Average BG readin  Time in range: 90%  8% high, 0% very high, 2% low, 0% very low  Site change: q14 days.    Sensor was downloaded in clinic today and reviewed with patient.   Please see attached document for download.     Supplies- [facility]     BG in clinic   Results for orders placed or performed in visit on 24   POCT Glucose, Hand-Held Device   Result Value Ref Range    POC Glucose 105 70 - 110 MG/DL     *Note: Due to a large number of results and/or encounters for the requested time period, some results have not been displayed. A complete set of results can be found in Results Review.         HYPOGLYCEMIA:  2% low -- occ down to the 60's --68, 69-- possibly from missing meals   0% very low       MEALS: eating 3 meals per day   BF: cheerios or sandwich or full meal   Lunch: sandwich - ham   Dinner: home cooked - cabbage/ mustard greens, sweet potato - black eyed peas   Snack:occ- 1 cookie   Drinks water   Stopped coffee in the AM        CURRENT EXERCISE:  No      Review of Systems  Review of Systems   Constitutional:  Negative for appetite change, fatigue and unexpected weight change.   HENT:  Negative for trouble swallowing.    Eyes:  Negative for visual disturbance.   Respiratory:  Negative for shortness of breath.    Cardiovascular:  Negative for chest pain.   Gastrointestinal:  Negative for nausea.   Endocrine: Negative for polydipsia, polyphagia and polyuria.   Genitourinary:         No Nocturia    Skin:  Negative for wound.   Neurological:  Positive for numbness (intermittent pins and needles to right hand - comes and goes).       Physical Exam   Physical Exam  Vitals and nursing note reviewed.    Constitutional:       Appearance: He is well-developed.      Comments: Overweight male    HENT:      Head: Normocephalic and atraumatic.      Right Ear: External ear normal.      Left Ear: External ear normal.      Nose: Nose normal.   Neck:      Thyroid: No thyromegaly.      Trachea: No tracheal deviation.   Cardiovascular:      Rate and Rhythm: Normal rate and regular rhythm.      Heart sounds: No murmur heard.  Pulmonary:      Effort: Pulmonary effort is normal. No respiratory distress.      Breath sounds: Normal breath sounds.   Abdominal:      Palpations: Abdomen is soft.      Tenderness: There is no abdominal tenderness.      Hernia: No hernia is present.   Musculoskeletal:      Cervical back: Normal range of motion and neck supple.   Skin:     General: Skin is warm and dry.      Capillary Refill: Capillary refill takes less than 2 seconds.      Findings: No rash.      Comments:      Neurological:      Mental Status: He is alert and oriented to person, place, and time.      Cranial Nerves: No cranial nerve deficit.   Psychiatric:         Behavior: Behavior normal.         Judgment: Judgment normal.         FOOT EXAMINATION: Appropriate footwear           Lab Results   Component Value Date    TSH 1.796 11/15/2023           Type 2 diabetes mellitus with hyperglycemia, without long-term current use of insulin  A1c has greatly improved   Concerned about hypoglycemia   He is symptomatic which is reassuring that it is real   Will cut back on Glimepiride to help with hypoglycemia       -- Medication Changes:   Continue Metformin XR 1000 mg daily int he AM ( 2 tablet in the AM)     Continue Farxiga 10 mg daily     Cut back on Glimepiride to 1 mg daily with first meal of the day     Continue richmond 2       -- Reviewed goals of therapy are to get the best control we can without hypoglycemia.  -- Advised frequent self blood glucose monitoring.  Patient encouraged to document glucose results and bring them to every  clinic visit. Continue Barak 2-- supplies from pharmacy   -- Hypoglycemia precautions discussed. Instructed on precautions before driving.    -- Call for Bg repeatedly < 70 or > 180.   -- Close adherence to lifestyle changes recommended.   -- Periodic follow ups for eye evaluations, foot care and dental care suggested.  -- Refer to diabetes education-- barak download in 6 weeks to make sure hypoglycemia resolves     Hypertension associated with diabetes  BP goal is < 140/90.   Tolerating  ARB  Blood pressure goals discussed with patient  Uncontrolled   Above goal in clinic   Reports that he is periodically missing medications.   He is following with digital HTn   Will defer to them for management       Hyperlipidemia associated with type 2 diabetes mellitus  On statin per ADA recommendations  LDL goal < 100. Last LDL improving closer to goal.   On Pravastatin 40 mg + zetia   Lipids with RTC if LDL remains above 100 -- will increase pravastatin to 80 mg nightly       Statin intolerance  Unable to tolerate Lipitor     Malignant neoplasm of prostate  Following with oncology   Injections every 6 months   No steroids that he is aware     Iron deficiency anemia secondary to inadequate dietary iron intake  May skew A1c level     Overweight (BMI 25.0-29.9)  Body mass index is 26.54 kg/m².  Increases insulin resistance.   Discussed DM diet and exercise.         I spent a total of 30 minutes on the day of the visit.This includes face to face time and non-face to face time preparing to see the patient (eg, review of tests), obtaining and/or reviewing separately obtained history, documenting clinical information in the electronic or other health record, independently interpreting results and communicating results to the patient/family/caregiver, or care coordinator.          Follow up in about 4 months (around 8/30/2024).  Barak technical support number please   Follow up with me in 4 months with labs prior   See mono in 6  weeks for richmond download and review. Make sure hypoglycemia resolves       Orders Placed This Encounter   Procedures    Hemoglobin A1C     Standing Status:   Future     Standing Expiration Date:   10/30/2025    Comprehensive Metabolic Panel     Standing Status:   Future     Standing Expiration Date:   10/30/2025    Lipid Panel     Standing Status:   Future     Standing Expiration Date:   10/30/2025    CBC Auto Differential     Standing Status:   Future     Standing Expiration Date:   10/30/2025    TSH     Standing Status:   Future     Standing Expiration Date:   10/30/2025    Ambulatory referral/consult to Diabetes Education     Standing Status:   Future     Standing Expiration Date:   10/30/2025     Referral Priority:   Routine     Referral Type:   Consultation     Referral Reason:   Specialty Services Required     Referred to Provider:   Anupama Brian RD, CDE     Requested Specialty:   Diabetes     Number of Visits Requested:   1    POCT Glucose, Hand-Held Device       Recommendations were discussed with the patient in detail  The patient verbalized understanding and agrees with the plan outlined as above.     This note was partly generated with [employer] voice recognition software. I apologize for any possible typographical errors.

## 2024-04-30 ENCOUNTER — TELEPHONE (OUTPATIENT)
Dept: DIABETES | Facility: CLINIC | Age: 69
End: 2024-04-30

## 2024-04-30 ENCOUNTER — OFFICE VISIT (OUTPATIENT)
Dept: DIABETES | Facility: CLINIC | Age: 69
End: 2024-04-30
Payer: MEDICARE

## 2024-04-30 ENCOUNTER — NUTRITION (OUTPATIENT)
Dept: DIABETES | Facility: CLINIC | Age: 69
End: 2024-04-30
Payer: MEDICARE

## 2024-04-30 VITALS
DIASTOLIC BLOOD PRESSURE: 82 MMHG | HEIGHT: 65 IN | OXYGEN SATURATION: 99 % | HEART RATE: 73 BPM | WEIGHT: 159.5 LBS | BODY MASS INDEX: 26.57 KG/M2 | SYSTOLIC BLOOD PRESSURE: 156 MMHG

## 2024-04-30 DIAGNOSIS — E11.65 TYPE 2 DIABETES MELLITUS WITH HYPERGLYCEMIA, WITHOUT LONG-TERM CURRENT USE OF INSULIN: ICD-10-CM

## 2024-04-30 DIAGNOSIS — C61 MALIGNANT NEOPLASM OF PROSTATE: ICD-10-CM

## 2024-04-30 DIAGNOSIS — E11.59 HYPERTENSION ASSOCIATED WITH DIABETES: ICD-10-CM

## 2024-04-30 DIAGNOSIS — E66.3 OVERWEIGHT (BMI 25.0-29.9): ICD-10-CM

## 2024-04-30 DIAGNOSIS — D50.8 IRON DEFICIENCY ANEMIA SECONDARY TO INADEQUATE DIETARY IRON INTAKE: ICD-10-CM

## 2024-04-30 DIAGNOSIS — Z71.9 HEALTH EDUCATION/COUNSELING: ICD-10-CM

## 2024-04-30 DIAGNOSIS — I15.2 HYPERTENSION ASSOCIATED WITH DIABETES: ICD-10-CM

## 2024-04-30 DIAGNOSIS — E11.65 TYPE 2 DIABETES MELLITUS WITH HYPERGLYCEMIA, WITHOUT LONG-TERM CURRENT USE OF INSULIN: Primary | ICD-10-CM

## 2024-04-30 DIAGNOSIS — E78.5 HYPERLIPIDEMIA ASSOCIATED WITH TYPE 2 DIABETES MELLITUS: ICD-10-CM

## 2024-04-30 DIAGNOSIS — Z78.9 STATIN INTOLERANCE: ICD-10-CM

## 2024-04-30 DIAGNOSIS — E11.649 TYPE 2 DIABETES MELLITUS WITH HYPOGLYCEMIA WITHOUT COMA, WITHOUT LONG-TERM CURRENT USE OF INSULIN: ICD-10-CM

## 2024-04-30 DIAGNOSIS — I10 ELEVATED BLOOD PRESSURE READING IN OFFICE WITH DIAGNOSIS OF HYPERTENSION: ICD-10-CM

## 2024-04-30 DIAGNOSIS — E11.69 HYPERLIPIDEMIA ASSOCIATED WITH TYPE 2 DIABETES MELLITUS: ICD-10-CM

## 2024-04-30 LAB — GLUCOSE SERPL-MCNC: 105 MG/DL (ref 70–110)

## 2024-04-30 PROCEDURE — 99999 PR PBB SHADOW E&M-EST. PATIENT-LVL V: CPT | Mod: PBBFAC,,, | Performed by: NURSE PRACTITIONER

## 2024-04-30 PROCEDURE — 1160F RVW MEDS BY RX/DR IN RCRD: CPT | Mod: CPTII,S$GLB,, | Performed by: NURSE PRACTITIONER

## 2024-04-30 PROCEDURE — 3066F NEPHROPATHY DOC TX: CPT | Mod: CPTII,S$GLB,, | Performed by: NURSE PRACTITIONER

## 2024-04-30 PROCEDURE — 3061F NEG MICROALBUMINURIA REV: CPT | Mod: CPTII,S$GLB,, | Performed by: NURSE PRACTITIONER

## 2024-04-30 PROCEDURE — 3008F BODY MASS INDEX DOCD: CPT | Mod: CPTII,S$GLB,, | Performed by: NURSE PRACTITIONER

## 2024-04-30 PROCEDURE — 4010F ACE/ARB THERAPY RXD/TAKEN: CPT | Mod: CPTII,S$GLB,, | Performed by: NURSE PRACTITIONER

## 2024-04-30 PROCEDURE — 99999 PR PBB SHADOW E&M-EST. PATIENT-LVL II: CPT | Mod: PBBFAC,,, | Performed by: DIETITIAN, REGISTERED

## 2024-04-30 PROCEDURE — G0108 DIAB MANAGE TRN  PER INDIV: HCPCS | Mod: S$GLB,,, | Performed by: DIETITIAN, REGISTERED

## 2024-04-30 PROCEDURE — 1101F PT FALLS ASSESS-DOCD LE1/YR: CPT | Mod: CPTII,S$GLB,, | Performed by: NURSE PRACTITIONER

## 2024-04-30 PROCEDURE — 3288F FALL RISK ASSESSMENT DOCD: CPT | Mod: CPTII,S$GLB,, | Performed by: NURSE PRACTITIONER

## 2024-04-30 PROCEDURE — 3051F HG A1C>EQUAL 7.0%<8.0%: CPT | Mod: CPTII,S$GLB,, | Performed by: NURSE PRACTITIONER

## 2024-04-30 PROCEDURE — 95251 CONT GLUC MNTR ANALYSIS I&R: CPT | Mod: S$GLB,,, | Performed by: NURSE PRACTITIONER

## 2024-04-30 PROCEDURE — 82962 GLUCOSE BLOOD TEST: CPT | Mod: S$GLB,,, | Performed by: NURSE PRACTITIONER

## 2024-04-30 PROCEDURE — 1159F MED LIST DOCD IN RCRD: CPT | Mod: CPTII,S$GLB,, | Performed by: NURSE PRACTITIONER

## 2024-04-30 PROCEDURE — 99214 OFFICE O/P EST MOD 30 MIN: CPT | Mod: S$GLB,,, | Performed by: NURSE PRACTITIONER

## 2024-04-30 PROCEDURE — 1126F AMNT PAIN NOTED NONE PRSNT: CPT | Mod: CPTII,S$GLB,, | Performed by: NURSE PRACTITIONER

## 2024-04-30 PROCEDURE — 3079F DIAST BP 80-89 MM HG: CPT | Mod: CPTII,S$GLB,, | Performed by: NURSE PRACTITIONER

## 2024-04-30 PROCEDURE — 3077F SYST BP >= 140 MM HG: CPT | Mod: CPTII,S$GLB,, | Performed by: NURSE PRACTITIONER

## 2024-04-30 RX ORDER — FLASH GLUCOSE SENSOR
1 KIT MISCELLANEOUS
Qty: 2 KIT | Refills: 11 | Status: SHIPPED | OUTPATIENT
Start: 2024-04-30 | End: 2024-05-13 | Stop reason: SDUPTHER

## 2024-04-30 RX ORDER — METFORMIN HYDROCHLORIDE 500 MG/1
1000 TABLET, EXTENDED RELEASE ORAL DAILY
Qty: 180 TABLET | Refills: 3 | Status: SHIPPED | OUTPATIENT
Start: 2024-04-30 | End: 2025-04-30

## 2024-04-30 RX ORDER — DAPAGLIFLOZIN 10 MG/1
10 TABLET, FILM COATED ORAL DAILY
Qty: 90 TABLET | Refills: 3 | Status: SHIPPED | OUTPATIENT
Start: 2024-04-30

## 2024-04-30 RX ORDER — GLIMEPIRIDE 1 MG/1
1 TABLET ORAL
Qty: 90 TABLET | Refills: 3 | Status: SHIPPED | OUTPATIENT
Start: 2024-04-30 | End: 2025-04-30

## 2024-04-30 NOTE — PATIENT INSTRUCTIONS
Continue Metformin XR 1000 mg daily int he AM ( 2 tablet in the AM)     Continue Farxiga 10 mg daily     Cut back on Glimepiride to 1 mg daily with first meal of the day     Continue richmond 2

## 2024-04-30 NOTE — Clinical Note
Hey!  His BP was elevated in office today  I wanted to make you aware. We checked it twice -- I personally checked it as well.   Thanks Marisol

## 2024-04-30 NOTE — ASSESSMENT & PLAN NOTE
A1c has greatly improved   Concerned about hypoglycemia   He is symptomatic which is reassuring that it is real   Will cut back on Glimepiride to help with hypoglycemia       -- Medication Changes:   Continue Metformin XR 1000 mg daily int he AM ( 2 tablet in the AM)     Continue Farxiga 10 mg daily     Cut back on Glimepiride to 1 mg daily with first meal of the day     Continue barak 2       -- Reviewed goals of therapy are to get the best control we can without hypoglycemia.  -- Advised frequent self blood glucose monitoring.  Patient encouraged to document glucose results and bring them to every clinic visit. Continue Barak 2-- supplies from pharmacy   -- Hypoglycemia precautions discussed. Instructed on precautions before driving.    -- Call for Bg repeatedly < 70 or > 180.   -- Close adherence to lifestyle changes recommended.   -- Periodic follow ups for eye evaluations, foot care and dental care suggested.  -- Refer to diabetes education-- barak download in 6 weeks to make sure hypoglycemia resolves

## 2024-04-30 NOTE — ASSESSMENT & PLAN NOTE
On statin per ADA recommendations  LDL goal < 100. Last LDL improving closer to goal.   On Pravastatin 40 mg + zetia   Lipids with RTC if LDL remains above 100 -- will increase pravastatin to 80 mg nightly

## 2024-04-30 NOTE — TELEPHONE ENCOUNTER
----- Message from Izabella Hill PharmD sent at 4/30/2024 11:10 AM CDT -----  Thank you so much! Just called patient who declines changes today - will follow up x 2 weeks. Thanks again! KB  ----- Message -----  From: Marisol Bob NP  Sent: 4/30/2024  10:00 AM CDT  To: Izabella Hill PharmD    Hey!   His BP was elevated in office today   I wanted to make you aware. We checked it twice -- I personally checked it as well.     Thanks  Marisol

## 2024-04-30 NOTE — ASSESSMENT & PLAN NOTE
BP goal is < 140/90.   Tolerating  ARB  Blood pressure goals discussed with patient  Uncontrolled   Above goal in clinic   Reports that he is periodically missing medications.   He is following with digital HTn   Will defer to them for management

## 2024-04-30 NOTE — ASSESSMENT & PLAN NOTE
Body mass index is 26.54 kg/m².  Increases insulin resistance.   Discussed DM diet and exercise.

## 2024-04-30 NOTE — PROGRESS NOTES
"  HPI     Glaucoma            Comments: 4 month IOP ck today and pt states OS always feels like   "something is in it" which makes vision blurry and he has to blink a lot   to clear it up          Eye Problem            Comments: Pt states OS always feels like "something is in it" which makes   vision blurry and he has to blink a lot to clear it up            Comments    DLS: 1/2/24    1. POAG OU  2. VF Defect OS   3. Chorioretinal Scar OS   4. Hx RD OS   5. NS OU   6. Type 2 DM no DR   7. Pupil Sphincter Rupture OS   8. Presbyopia   9 Trace APD OS  10. S/P trab os - 6/2023 - (LSLx3)    MEDS:   Cosopt TID OD   Brimonidine TID OD   Rocklatan QHS OD            Last edited by Bethany Lechuga MD on 5/6/2024  9:23 AM.            Assessment /Plan     For exam results, see Encounter Report.    Primary open-angle glaucoma, left eye, moderate stage    Nuclear sclerotic cataract of both eyes    Pupillary sphincter rupture, left    Chorioretinal scar, left    Old subtotal retinal detachment, left    Glaucoma filtering bleb of left eye    Type 2 diabetes mellitus without ophthalmic manifestations      Pt initially dx with glaucoma at [facility] - stoppped his gtts on his own   Presented to [facility] - to [facility] 8/23/2006 - off gtts with a baseline / Tmax of 25/27  Referred by Courtney for consideration of SLT's   Pt with POAG and poorly controlled IOP's - does not use drops regularly       1. Primary open angle glaucoma, both eyes, mild stage        Glaucoma (type and duration)    POAG with elevated IOP ou - mild stage    First HVF   2007 - full od // SAD os 2/2 to CRS   First photos   2011   Treatment / Drops started   2006           Family history    ++ mother and 2 brothers         Glaucoma meds    Latanoprost // brimonidine //  cosopt (out of cosopt - 5/2/2023)         H/O adverse rxn to glaucoma drops    None (has tired alphagan and timolol in past -non compliant)         LASERS    SLT os - 3/31/2016 // SLT od - 4/14/2016 " // repeat SLT OS 3/16/2023        GLAUCOMA SURGERIES    trab os - 6/14/2023 (LSL x 3)         OTHER EYE SURGERIES    H/O RD repair os - S/P laser repair         CDR    0.75 // 0.85-0.9         Tbase    25/27          Tmax    25/27            Ttarget    18/18             HVF    14 test 2006 to  2024 - near  full  od // SAD - 2/2 CRS from laser for RD repair os / new IAD (+prog when IOP up in the high 20's to 40's just pre- and post trab - 6/2023        Gonio    +3 ou          CCT    486/502 - thin ou         OCT    5 test 2018 to 2024 - RNFL - dec Tjorge od - dec. G/NI/TI/N /NS/ TS  Jorge CORLEY  (has a CRS)  os        HRT    3 test 2017 to 2019 -MR -  DEC. Sn/n/in od // DEC. S/n/iJORGE os /// CDR 0.73 od // 0.86 os        Disc photos    2011, 2015 , 2017// harmony - 2022      - Ttoday  12/6    (on glaucoma drops - od // off gtts post trab os) )    - Test done today  - IOP,  gonio   -  // S/P trab OS 6/14/2023      2. 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 Chorioretinal scar, left   -with 2nd VF defect      4. Old subtotal retinal detachment, left   S/P repair - retina flat - stable   -large CRS   - pt states he did NOT go to OR - just Rx with laser at the slit lamp     5. Type 2 diabetes mellitus without ophthalmic manifestations      6. Senile nuclear sclerosis  -mild - monitor      7. pupil sphincter rupture os  -suggest old trauma  -does NOT appear to have a angle recession    8. Trace apd os      PLAN    POAG with OHT OS > OD - mild od // moderate os   IOP below target od and too high os   S/P repeat slt os 3/16/2023 - ? Residual steroid response vs out of cosopt vs no response     The ON and VF's are still good od  ?? Trace APD os - first noted on 5/2/2023   The VF loss os is 2/2 old CRS- ?  post laser for a RD repair   Good initial response to SLT ou 25/25 --> 16/17 7/29/2022  ++ IOP elevation os   ++ VF prog os   ++ OCT -  RNFL prog os     Cont drops   cosopt tid ou - has run out - sample given and trixie called   Brimonidine tid ou - Rx sent   rocklatan q hs ou (samples given )      Photo file updated 5/2/2023 6/2/2023  IOP still too high os (( IOP is ok od))    S/p repeat slt and on mult gtts     --  rec incisional surgery - doubt he has a scleral buckle  // + free conj sup - could try a trab w/ [facility]  / ? Xen os / express minishunt or no shunt or a GDD     Pt is NOT a diamox candidate - H/O sulfa allergy - hives     Rec trabeculectomy with [facility] os -no stent    Pt is still phakic / H/O trauma     S/P trabeculectomy with mitomycin OS  Date:6/14/2023   POM 11  without any stent or shunt   Number or sutures in flap  4  Number of sutures already cut - 3 -  (1st cut 6/22/2023) // 2nd cut 6/26/2023// 3rd suture cut 6/27/2023   anterior chamber depth  deep fully formed   Bleb appearance - flat prior to digital massage //  elevated after digital massage next to bleb   sidell neg  IOP  6   (S/P LSL x 3 - only one suture left)     cosopt cont OD // hold os   brim tid  cont OD // hold os  -  Rocklatan - OD only     1/2/2024    ++ VF porg os - occurred whn IOP up pre-op and high post op until 3rd suture lysis done   IOP ok od on gtts   IOP ok (low) off gtts post trab os       5/6/2024   IOP ok ou - on gtts od / off gtts post trab os   VA stable     F/U 4 months - sooner prn // IOP   In future try a 10-2 ss os     Bethany Lechuga MD

## 2024-05-06 ENCOUNTER — OFFICE VISIT (OUTPATIENT)
Dept: OPHTHALMOLOGY | Facility: CLINIC | Age: 69
End: 2024-05-06
Payer: MEDICARE

## 2024-05-06 DIAGNOSIS — E11.9 TYPE 2 DIABETES MELLITUS WITHOUT OPHTHALMIC MANIFESTATIONS: ICD-10-CM

## 2024-05-06 DIAGNOSIS — H40.1122 PRIMARY OPEN-ANGLE GLAUCOMA, LEFT EYE, MODERATE STAGE: Primary | ICD-10-CM

## 2024-05-06 DIAGNOSIS — H33.052 OLD SUBTOTAL RETINAL DETACHMENT, LEFT: ICD-10-CM

## 2024-05-06 DIAGNOSIS — H21.562 PUPILLARY SPHINCTER RUPTURE, LEFT: ICD-10-CM

## 2024-05-06 DIAGNOSIS — Z98.83 GLAUCOMA FILTERING BLEB OF LEFT EYE: ICD-10-CM

## 2024-05-06 DIAGNOSIS — H31.002 CHORIORETINAL SCAR, LEFT: ICD-10-CM

## 2024-05-06 DIAGNOSIS — H40.1111 PRIMARY OPEN-ANGLE GLAUCOMA, RIGHT EYE, MILD STAGE: ICD-10-CM

## 2024-05-06 DIAGNOSIS — H25.13 NUCLEAR SCLEROTIC CATARACT OF BOTH EYES: ICD-10-CM

## 2024-05-06 PROCEDURE — 1101F PT FALLS ASSESS-DOCD LE1/YR: CPT | Mod: CPTII,S$GLB,, | Performed by: OPHTHALMOLOGY

## 2024-05-06 PROCEDURE — 1126F AMNT PAIN NOTED NONE PRSNT: CPT | Mod: CPTII,S$GLB,, | Performed by: OPHTHALMOLOGY

## 2024-05-06 PROCEDURE — 1160F RVW MEDS BY RX/DR IN RCRD: CPT | Mod: CPTII,S$GLB,, | Performed by: OPHTHALMOLOGY

## 2024-05-06 PROCEDURE — 92020 GONIOSCOPY: CPT | Mod: S$GLB,,, | Performed by: OPHTHALMOLOGY

## 2024-05-06 PROCEDURE — 4010F ACE/ARB THERAPY RXD/TAKEN: CPT | Mod: CPTII,S$GLB,, | Performed by: OPHTHALMOLOGY

## 2024-05-06 PROCEDURE — 99999 PR PBB SHADOW E&M-EST. PATIENT-LVL III: CPT | Mod: PBBFAC,,, | Performed by: OPHTHALMOLOGY

## 2024-05-06 PROCEDURE — 99214 OFFICE O/P EST MOD 30 MIN: CPT | Mod: S$GLB,,, | Performed by: OPHTHALMOLOGY

## 2024-05-06 PROCEDURE — 3051F HG A1C>EQUAL 7.0%<8.0%: CPT | Mod: CPTII,S$GLB,, | Performed by: OPHTHALMOLOGY

## 2024-05-06 PROCEDURE — 3066F NEPHROPATHY DOC TX: CPT | Mod: CPTII,S$GLB,, | Performed by: OPHTHALMOLOGY

## 2024-05-06 PROCEDURE — 1159F MED LIST DOCD IN RCRD: CPT | Mod: CPTII,S$GLB,, | Performed by: OPHTHALMOLOGY

## 2024-05-06 PROCEDURE — 3061F NEG MICROALBUMINURIA REV: CPT | Mod: CPTII,S$GLB,, | Performed by: OPHTHALMOLOGY

## 2024-05-06 PROCEDURE — 3288F FALL RISK ASSESSMENT DOCD: CPT | Mod: CPTII,S$GLB,, | Performed by: OPHTHALMOLOGY

## 2024-05-06 RX ORDER — DORZOLAMIDE HYDROCHLORIDE AND TIMOLOL MALEATE 20; 5 MG/ML; MG/ML
1 SOLUTION/ DROPS OPHTHALMIC 3 TIMES DAILY
Qty: 320 ML | Refills: 3 | Status: SHIPPED | OUTPATIENT
Start: 2024-05-06

## 2024-05-06 RX ORDER — BRIMONIDINE TARTRATE 2 MG/ML
1 SOLUTION/ DROPS OPHTHALMIC 3 TIMES DAILY
Qty: 20 ML | Refills: 3 | Status: SHIPPED | OUTPATIENT
Start: 2024-05-06

## 2024-05-06 NOTE — PROGRESS NOTES
Diabetes Care Specialist Follow-up Note  Author: Anupama Brian RD, CDE  Date: 5/6/2024    Program Intake  Reason for Diabetes Program Visit:: Intervention  Type of Intervention:: Individual  Individual: Education  Education: Pattern Management, Nutrition and Meal Planning  Current diabetes risk level:: moderate (HgbA1c 8.4)  In the last 12 months, have you:: none  Permission to speak with others about care:: no  Continuous Glucose Monitoring  Patient has CGM: Yes  Personal CGM type:: freestlye libre2  GMI Date: 04/30/24  GMI Value: 6.3 %    Lab Results   Component Value Date    HGBA1C 7.0 (H) 04/23/2024     A1c Pre Diabetes Care Specialist Intervention:  8.4%    Clinical    Patient Health Rating  Compared to other people your age, how would you rate your health?: Good    Problem Review  Reviewed health maintenance: yes         Medication Information  Medication adherence impacting ability to self-manage diabetes?: No    Labs  Lab Compliance Barriers: No    Nutritional Status  Diet: Regular  Meal Plan 24 Hour Recall: Breakfast, Lunch, Snack, Dinner  Meal Plan 24 Hour Recall - Breakfast: cheerios and milk or grits and sausage or oatmeal and a banana; always coffee with splenda  Meal Plan 24 Hour Recall - Lunch: tunafish, ham, or PB&J sandwich with water or skipped  Meal Plan 24 Hour Recall - Dinner: baked chicken and fries or stewed turkey neck with red beaks, or Friday's will have boiled or fried seafood  Meal Plan 24 Hour Recall - Snack: occassionally an oreo, drinks water and occassionally apple juice  Change in appetite?: No  Current nutritional status an area of need that is impacting patient's ability to self-manage diabetes?: No    Physical activity/Exercise:   No change    SMBG: using the freestyle richmond 2      Additional Social History    Support  Does anyone support you with your diabetes care?: yes  Who supports you?: self, spouse, son/daughter, family member  Who takes you to your medical appointments?:  self  Does the current support meet the patient's needs?: Yes  Is Support an area impacting ability to self-manage diabetes?: No    Access to Mass Media & Technology  Media or technology needs impacting ability to self-manage diabetes?: No    Cognitive/Behavioral Health  Cognitive or behavioral barriers impacting ability to self-manage diabetes?: No    Culture/Congregational  Culture or Rastafarian beliefs that may impact ability to access healthcare: No    Communication  Communication needs impacting ability to self-manage diabetes?: No    Health Literacy  Health literacy needs impacting ability to self-manage diabetes?: No      Diabetes Self-Management Skills Assessment     Diabetes Disease Process/Treatment Options  Diabetes Disease Process/Treatment Options: Skills Assessment Completed: No  Assessment indicates:: Adequate understanding  Deferred due to:: Other (comment)  Area of need?: No    Nutrition/Healthy Eating  Challenges to healthy eating:: portion control, other (see comments)  Method of carbohydrate measurement:: plate method  Patient can identify foods that impact blood sugar.: yes  Patient-identified foods:: starches (bread, pasta, rice, cereal), sweets, starchy vegetables (corn, peas, beans), soda, fruit/fruit juice, milk, yogurt  Nutrition/Healthy Eating Skills Assessment Completed:: Yes  Assessment indicates:: Adequate understanding, Other (comment)  Area of need?: No    Physical Activity/Exercise  Physical Activity/Exercise Skills Assessment Completed: : No  Assessment indicates:: Adequate understanding  Deffered due to:: Other (comment)  Area of need?: No    Medications  Medication Skills Assessment Completed:: No  Assessment indicates:: Adequate understanding  Deffered due to:: Other (comment)  Area of need?: No    Home Blood Glucose Monitoring  Patient states that blood sugar is checked at home daily.: yes  Monitoring Method:: personal continuous glucose monitor  Personal CGM type:: rui  libre2  Patient is able to use personal CGM appropriately.: yes  CGM Report reviewed?: yes  Home Blood Glucose Monitoring Skills Assessment Completed: : Yes  Assessment indicates:: Adequate understanding  Area of need?: No    Acute Complications  Acute Complications Skills Assessment Completed: : No  Assessment indicates:: Adequate understanding  Deffered due to:: Other (comment) (previously completed, there has been no change and patient has adequate understanding)  Area of need?: No    Chronic Complications  Chronic Complications Skills Assessment Completed: : No  Assessment indicates:: Adequate understanding  Deferred due to:: Other (comment) (previously completed, there has been no change and patient has adequate understanding)  Area of need?: No    Psychosocial/Coping  Psychosocial/Coping Skills Assessment Completed: : No  Assessment indicates:: Adequate understanding  Deffered due to:: Other (comment) (previously completed, there has been no change and patient has adequate understanding)  Area of need?: No      During today's follow-up visit,  the following areas required further assessment and content was provided/reviewed.    Based on today's diabetes care assessment, the following areas of need were identified:          4/30/2024    12:02 AM   Social   Support No   Access to Mass Media/Tech No   Cognitive/Behavioral Health No   Culture/Synagogue No   Communication No   Health Literacy No            4/30/2024    12:02 AM   Clinical   Medication Adherence No   Lab Compliance No   Nutritional Status No            4/30/2024    12:02 AM   Diabetes Self-Management Skills   Diabetes Disease Process/Treatment Options No   Nutrition/Healthy Eating No   Physical Activity/Exercise No   Medication No   Home Blood Glucose Monitoring No   Acute Complications No   Chronic Complications No   Psychosocial/Coping No        Today's interventions were provided through individual discussion, instruction, and written materials  were provided.    Patient verbalized understanding of instruction and written materials.  Pt was able to return back demonstration of instructions today. Patient understood key points, needs reinforcement and further instruction.     Diabetes Self-Management Care Plan Review and Evaluation of Progress:    During today's follow-up Kevins Diabetes Self-Management Care Plan progress was reviewed and progress was evaluated including his/her input. Eyad has agreed to continue his/her journey to improve/maintain overall diabetes control by continuing to set health goals. See care plan progress below.      Care Plan: Diabetes Management   Updates made since 4/6/2024 12:00 AM        Problem: Healthy Eating         Goal: Eat 3 meals daily with 45-60g/3-4 servings of Carbohydrate per meal for the next 6 months.    Start Date: 1/4/2024   Expected End Date: 7/4/2024   This Visit's Progress: On track   Recent Progress: Deferred   Priority: High   Barriers: No Barriers Identified          Follow Up Plan     Follow up in about 3 months (around 7/30/2024) for Post Program Follow-Up, Personal CGM Upload.    Today's care plan and follow up schedule was discussed with patient.  Eyad verbalized understanding of the care plan, goals, and agrees to follow up plan.        The patient was encouraged to communicate with his/her health care provider/physician and care team regarding his/her condition(s) and treatment.  I provided the patient with my contact information today and encouraged to contact me via phone or [facility]'s Patient Portal as needed.     Length of Visit   Total Time: 35 Minutes

## 2024-05-13 DIAGNOSIS — E11.65 TYPE 2 DIABETES MELLITUS WITH HYPERGLYCEMIA, WITHOUT LONG-TERM CURRENT USE OF INSULIN: ICD-10-CM

## 2024-05-13 RX ORDER — FLASH GLUCOSE SENSOR
1 KIT MISCELLANEOUS
Qty: 2 KIT | Refills: 11 | Status: SHIPPED | OUTPATIENT
Start: 2024-05-13

## 2024-05-29 ENCOUNTER — OFFICE VISIT (OUTPATIENT)
Dept: RADIATION ONCOLOGY | Facility: CLINIC | Age: 69
End: 2024-05-29
Payer: MEDICARE

## 2024-05-29 VITALS
HEIGHT: 67 IN | DIASTOLIC BLOOD PRESSURE: 79 MMHG | TEMPERATURE: 98 F | RESPIRATION RATE: 17 BRPM | WEIGHT: 158.5 LBS | HEART RATE: 76 BPM | OXYGEN SATURATION: 98 % | BODY MASS INDEX: 24.88 KG/M2 | SYSTOLIC BLOOD PRESSURE: 160 MMHG

## 2024-05-29 DIAGNOSIS — C61 MALIGNANT NEOPLASM OF PROSTATE: Primary | ICD-10-CM

## 2024-05-29 PROCEDURE — 3051F HG A1C>EQUAL 7.0%<8.0%: CPT | Mod: CPTII,S$GLB,, | Performed by: RADIOLOGY

## 2024-05-29 PROCEDURE — 3061F NEG MICROALBUMINURIA REV: CPT | Mod: CPTII,S$GLB,, | Performed by: RADIOLOGY

## 2024-05-29 PROCEDURE — 3078F DIAST BP <80 MM HG: CPT | Mod: CPTII,S$GLB,, | Performed by: RADIOLOGY

## 2024-05-29 PROCEDURE — 4010F ACE/ARB THERAPY RXD/TAKEN: CPT | Mod: CPTII,S$GLB,, | Performed by: RADIOLOGY

## 2024-05-29 PROCEDURE — 1160F RVW MEDS BY RX/DR IN RCRD: CPT | Mod: CPTII,S$GLB,, | Performed by: RADIOLOGY

## 2024-05-29 PROCEDURE — 99212 OFFICE O/P EST SF 10 MIN: CPT | Mod: 25,S$GLB,, | Performed by: RADIOLOGY

## 2024-05-29 PROCEDURE — 3288F FALL RISK ASSESSMENT DOCD: CPT | Mod: CPTII,S$GLB,, | Performed by: RADIOLOGY

## 2024-05-29 PROCEDURE — 3066F NEPHROPATHY DOC TX: CPT | Mod: CPTII,S$GLB,, | Performed by: RADIOLOGY

## 2024-05-29 PROCEDURE — 99999 PR PBB SHADOW E&M-EST. PATIENT-LVL III: CPT | Mod: PBBFAC,,, | Performed by: RADIOLOGY

## 2024-05-29 PROCEDURE — 1159F MED LIST DOCD IN RCRD: CPT | Mod: CPTII,S$GLB,, | Performed by: RADIOLOGY

## 2024-05-29 PROCEDURE — 1101F PT FALLS ASSESS-DOCD LE1/YR: CPT | Mod: CPTII,S$GLB,, | Performed by: RADIOLOGY

## 2024-05-29 PROCEDURE — 3077F SYST BP >= 140 MM HG: CPT | Mod: CPTII,S$GLB,, | Performed by: RADIOLOGY

## 2024-05-29 PROCEDURE — 96402 CHEMO HORMON ANTINEOPL SQ/IM: CPT | Mod: S$GLB,,, | Performed by: RADIOLOGY

## 2024-05-29 PROCEDURE — 3008F BODY MASS INDEX DOCD: CPT | Mod: CPTII,S$GLB,, | Performed by: RADIOLOGY

## 2024-05-29 PROCEDURE — 1126F AMNT PAIN NOTED NONE PRSNT: CPT | Mod: CPTII,S$GLB,, | Performed by: RADIOLOGY

## 2024-06-07 NOTE — PROGRESS NOTES
[facility]: New patient visit  Best Contact Phone Number(s): [phone] (home)       Cancer/Stage/TNM:    Cancer Staging   Malignant neoplasm of prostate  Staging form: Prostate, AJCC 7th Edition  - Pathologic stage from 7/15/2015: T2, N0, cM0, PSA: Less than 10 - Signed by Jarret Mehta MD on 6/10/2024        Reason for visit:  Biochemical recurrence of prostate cancer following RALP and salvage RT on single agent ADT    Molecular:  Germline Testing: N/A  Somatic Testing: N/A    Treatment History:   2015   RALP  2019   Pelvic EBRT and ADT x 6 months  03/2022 - 06/2024 ADT     HPI:   Eyad Garcia is a 68 y.o. 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 Currently on single agent ADT since 2022 with undetectable PSA.    Overall patient feels generally well.  Tolerating hormone shots generally well. Energy is good. Does get hot flashes at night several times a week. No sigfnciant hot flashes during the day. Denies signficant urinary issues. Does reports some urgency without signficant incontinence. No stress incotninence. No hesitancy and normal flow. Reports nocturia about 1x per night.  No CP, SOB or cough.     He takes amlodipine, olmesartan, and HCTZ for HTN. He has DM2 on metformin, Farxiga, and glimeperide. He takes Zetia for HLD. Intolerant of ASA and statin. Has glaucoma; L>R - follows with ophthalmology.     History has been obtained by chart review and discussion with the patient.         Oncology History   Malignant neoplasm of prostate   2/2015 Initial Diagnosis    02/27/15: PSA 6.6    Prostate biopsy: biopsies from the Rt. base revealed Keystone 8 (4+4) adenocarcinoma      7/2015 Surgery    07/2015: Prostatectomy with PLND. Pathology revealed a single focus of Scooter 6 (3+3) adenocarcinoma in a background of extensive high-grade PIN. There was no extraprostatic extension, seminal vesicle  invasion or lymphovascular invasion. The margins and 10 (5 Lt., 5 Rt.) pelvic nodes were negative for tumor involvement.      7/2015 Imaging Significant Findings    072019: Axumin Scan  Impression:  1.  No evidence of local recurrence.  2.  Small pelvic lymph nodes, one of which demonstrates mild uptake that may represent early justa metastases.  Attention on follow-up.     7/15/2015 Cancer Staged    Staging form: Prostate, AJCC 7th Edition  - Pathologic stage from 7/15/2015: T2, N0, cM0, PSA: Less than 10     8/2015 Tumor Markers    08/27/15: PSA 0.03    06/2016: PSA 0.11    11/2018: PSA 1.9    06/2019: PSA 4.0     8/6/2019 -  Hormone Therapy    6 month Lupron given      9/25/2019 - 11/15/2019 Radiation Therapy    Treating physician: Henrry Sampson MD   Total Dose: 46.8 Gy to the prostate bed and pelvic nodes  21.6 Gy boost to the prostate bed   Fractions: 38 fractions of 1.8 Gy per fraction      2/2020 Tumor Markers    02/2020: PSA <0.01    05/2021: PSA 0.90     5/2021 Imaging Significant Findings    05/2021: Axumin scan  Impression:     No definite evidence of active prostate carcinoma.  There is questionable focal uptake near the vesicoureteral anastomosis, and there are no other foci of significant uptake to suggest active tumor.     12/2021 Tumor Markers      12/2021: PSA 3.7    03/2022: PSA 8.9     3/2022 Imaging Significant Findings    03/2022: Axumuin scan  In this patient with prostate cancer, there are no definite foci of increased tracer avidity to indicate recurrence or metastatic disease.  Questionable focal uptake in the vesicoureteral anastomosis is no longer appreciated.     New nonspecific noncalcified right lower lobe nodule, which could represent infection, inflammation, atelectasis with malignancy felt less likely.  Clinical correlation and continued follow-up advised.  This nodule is amenable to percutaneous biopsy if clinically warranted.     3/23/2022 - 6/2024 Hormone Therapy    Restart  Lupron     5/2024 Tumor Markers    05/2024: PSA <0.01           Past Medical History:   Diagnosis Date    Cataract     Diabetes mellitus type II     Difficult intubation     Erectile dysfunction     History of colon polyps 01/25/2019    Hyperlipidemia     Hypertension     OA (osteoarthritis)     POAG (primary open-angle glaucoma)     Retinal detachment     OS         Past Surgical History:   Procedure Laterality Date    COLONOSCOPY N/A 01/25/2019    Procedure: COLONOSCOPY;  Surgeon: Elder Guillermo MD;  [place] [facility] [place] [facility] [place] [facility]  Service: Endoscopy;  Laterality: N/A;    COLONOSCOPY N/A 11/17/2023    Procedure: COLONOSCOPY;  Surgeon: JORDAN Guillermo MD;  [place] [facility] [place] [facility] (4TH F[facility]);  Service: Endoscopy;  Laterality: N/A;  ref by / basim inst portal-RB  11/10-lvm for precall-MS  11/10-confirmed arrival time of 10am-tb    CYSTOSCOPY      HERNIA REPAIR      KNEE SURGERY      left    PENILE PROSTHESIS IMPLANT      PROSTATECTOMY      RETINAL DETACHMENT SURGERY      OS    SELECTIVE LASER TRABECULOPLASTY Left 03/16/2023        SELECTIVE LASER TRABECUPLASTY Bilateral 04/2016    OU WITH     TRABECULECTOMY Left 6/14/2023    Procedure: TRABECULECTOMY;  Surgeon: Bethany Lechuga MD;  [facility];  Service: Ophthalmology;  Laterality: Left;  Trabeculectomy w/[facility]         Review of patient's allergies indicates:   Allergen Reactions    Lipitor [atorvastatin]      Muscle pain    Sulfa (sulfonamide antibiotics) Hives and Rash           Aspirin      Stomach upset         Current Outpatient Medications   Medication Sig Dispense Refill    albuterol (VENTOLIN HFA) 90 mcg/actuation inhaler Inhale 2 puffs into the lungs every 6 (six) hours as needed for Wheezing or Shortness of Breath (cough). Rescue 18 g 11    alcohol swabs (ALCOHOL WIPES) PadM Monitor as directed X 2 daily. Per insurance coverage. ICD 10 E11.9 200 each 3    amLODIPine (NORVASC) 10 MG tablet Take 1  tablet (10 mg total) by mouth once daily. 90 tablet 1    ascorbic acid, vitamin C, (VITAMIN C) 250 MG tablet Take 250 mg by mouth once daily.      brimonidine 0.2% (ALPHAGAN) 0.2 % Drop Place 1 drop into the right eye 3 (three) times daily. 20 mL 3    dapagliflozin propanediol (FARXIGA) 10 mg tablet Take 1 tablet (10 mg total) by mouth once daily. 90 tablet 3    dorzolamide-timolol 2-0.5% (COSOPT) 22.3-6.8 mg/mL ophthalmic solution Place 1 drop into the right eye 3 (three) times daily. 320 mL 3    ezetimibe (ZETIA) 10 mg tablet Take 1 tablet (10 mg total) by mouth every evening. 90 tablet 3    fluticasone propionate (FLONASE) 50 mcg/actuation nasal spray 2 sprays (100 mcg total) by Each Nostril route 2 (two) times daily as needed for Rhinitis. 16 g 0    FREESTYLE ROME 2 SENSOR Kit Use as directed. Change every 14 (fourteen) days. 2 kit 11    glimepiride (AMARYL) 1 MG tablet Take 1 tablet (1 mg total) by mouth before breakfast. 90 tablet 3    hydroCHLOROthiazide (HYDRODIURIL) 25 MG tablet Take 1 tablet (25 mg total) by mouth once daily. 90 tablet 1    meloxicam (MOBIC) 15 MG tablet TAKE 1 TABLET ONE TIME DAILY 90 tablet 0    metFORMIN (GLUCOPHAGE-XR) 500 MG ER 24hr tablet Take 2 tablets (1,000 mg total) by mouth once daily. 180 tablet 3    netarsudiL-latanoprost (ROCKLATAN) 0.02-0.005 % Drop Place 1 drop into both eyes every evening. Patient was not controlled on latanoprost - now needs to use rocklatan. Please run script through - it is now a covered medication if they have failed latanoprost (Patient taking differently: Place 1 drop into the right eye every evening. Patient was not controlled on latanoprost - now needs to use rocklatan. Please run script through - it is now a covered medication if they have failed latanoprost) 7.5 mL 3    olmesartan (BENICAR) 40 MG tablet Take 1 tablet (40 mg total) by mouth once daily. 90 tablet 1    triamcinolone acetonide 0.025% (KENALOG) 0.025 % cream Apply topically 2 (two)  times daily. 80 g 1     Current Facility-Administered Medications   Medication Dose Route Frequency Provider Last Rate Last Admin    leuprolide acetate (6 month) injection 45 mg  45 mg Intramuscular Q6 Months Henrry Sampson Jr., MD   45 mg at 05/03/23 1231    leuprolide acetate (6 month) injection 45 mg  45 mg Intramuscular Q6 Months Henrry Sampson Jr., MD   45 mg at 11/22/23 1132    leuprolide acetate (6 month) injection 45 mg  45 mg Intramuscular Q6 Months Henrry Sampson Jr., MD   45 mg at 05/29/24 1138        Objective:      Physical Exam:   /67 (BP Location: Left arm, Patient Position: Sitting, BP Method: Large (Automatic))   Pulse 71   Temp 97.3 °F (36.3 °C) (Temporal)   Wt 71.2 kg (156 lb 15.5 oz)   SpO2 97%   BMI 24.96 kg/m²       ECOG Performance status: (0) Fully active, able to carry on all predisease performance without restriction     Physical Exam  Constitutional:       General: He is not in acute distress.     Appearance: Normal appearance.   HENT:      Head: Normocephalic.   Eyes:      General: No scleral icterus.     Extraocular Movements: Extraocular movements intact.      Conjunctiva/sclera: Conjunctivae normal.   Cardiovascular:      Rate and Rhythm: Normal rate.   Pulmonary:      Effort: Pulmonary effort is normal. No respiratory distress.   Abdominal:      General: There is no distension.      Palpations: Abdomen is soft.   Skin:     General: Skin is warm and dry.   Neurological:      Mental Status: He is alert and oriented to person, place, and time.      Motor: No weakness.   Psychiatric:         Mood and Affect: Mood normal.         Behavior: Behavior normal.         Thought Content: Thought content normal.          Recent Labs:   Lab Results   Component Value Date    WBC 6.37 11/15/2023    RBC 4.11 (L) 11/15/2023    HGB 12.3 (L) 11/15/2023    HCT 37.1 (L) 11/15/2023    MCV 90 11/15/2023    MCH 29.9 11/15/2023    MCHC 33.2 11/15/2023    RDW 12.1 11/15/2023      11/15/2023    MPV 9.6 11/15/2023    IMMGR 0.5 11/15/2023    GRAN 4.0 11/15/2023    GRAN 63.4 11/15/2023    IGABS 0.03 11/15/2023    LYMPH 1.4 11/15/2023    LYMPH 21.2 11/15/2023    MONO 0.6 11/15/2023    MONO 9.7 11/15/2023    EOS 0.3 11/15/2023    BASO 0.05 11/15/2023    NRBC 0 11/15/2023    EOSINOPHIL 4.4 11/15/2023    BASOPHIL 0.8 11/15/2023    DIFFMETHOD Automated 11/15/2023       Lab Results   Component Value Date     04/23/2024    K 4.3 04/23/2024    CL 97 04/23/2024    CO2 27 04/23/2024     (H) 04/23/2024    BUN 19 04/23/2024    CREATININE 1.2 04/23/2024    CALCIUM 10.1 04/23/2024    PROT 7.7 04/23/2024    ALBUMIN 4.2 04/23/2024    BILITOT 0.5 04/23/2024    ALKPHOS 69 04/23/2024    AST 26 04/23/2024    ALT 28 04/23/2024    ANIONGAP 13 04/23/2024    EGFRNORACEVR >60.0 04/23/2024        Lab Results   Component Value Date    PSADIAG <0.01 05/22/2024    PSADIAG <0.01 11/15/2023    PSADIAG <0.01 03/22/2023    PSADIAG <0.01 12/01/2022    PSADIAG <0.01 09/21/2022    PSADIAG 0.02 06/16/2022    PSADIAG 8.9 (H) 03/10/2022    PSADIAG 3.7 12/09/2021    PSADIAG 2.6 09/20/2021    PSADIAG 0.90 05/03/2021    PSA 0.14 03/27/2017    PSA 6.6 (H) 02/27/2015    PSA 5.48 (H) 05/13/2013    PSA 5.28 (H) 04/09/2012    PSA 4.5 (H) 06/01/2011    PSA 3.9 02/14/2011    PSA 7.6 (H) 12/28/2010    PSA 4.3 (H) 11/12/2010    PSA 2.3 06/30/2009    PSA 2.5 02/28/2009        Cardiovascular Screening:  Primary care physician: Dominique Rojas MD      The 10-year ASCVD risk score (Anjel NGO, et al., 2019) is: 29.3%    Values used to calculate the score:      Age: 68 years      Sex: Male      Is Non- : Yes      Diabetic: Yes      Tobacco smoker: No      Systolic Blood Pressure: 124 mmHg      Is BP treated: Yes      HDL Cholesterol: 48 mg/dL      Total Cholesterol: 163 mg/dL    ASCVD Risk Level: High risk >= 20%    EKG:   Results for orders placed or performed during the hospital encounter of 05/29/22   EKG 12-lead     Collection Time: 05/29/22  2:15 PM    Narrative    Test Reason : I10,    Vent. Rate : 090 BPM     Atrial Rate : 090 BPM     P-R Int : 166 ms          QRS Dur : 082 ms      QT Int : 356 ms       P-R-T Axes : 051 025 050 degrees     QTc Int : 435 ms    Normal sinus rhythm  Normal ECG    Confirmed by Jose C Mahan MD ([id number]) on 6/6/2022 6:53:05 AM    Referred By: [email or web address]   SELF           Confirmed By:Jose C Mahan MD       High blood pressure:  Antihypertensive agents: amLODIPine - 10 MG  dorzolamide-timolol 2-0.5% - 22.3-6.8 mg/mL  hydroCHLOROthiazide - 25 MG  olmesartan - 40 MG      DM2:  Antidiabetic agents: dapagliflozin propanediol - 10 mg  glimepiride - 1 MG  metFORMIN - 500 MG      Antilipid therapy:  Lipid lowering agents: [unfilled]      Antiplatelet therapy:  Agent: This patient does not have an active medication from one of the medication groupers.     Body mass index is 24.96 kg/m².    Lab Results   Component Value Date    CHOL 163 04/23/2024    LDLCALC 101.0 04/23/2024    HDL 48 04/23/2024    TRIG 70 04/23/2024    HGBA1C 7.0 (H) 04/23/2024          Bone Health    Lab Results   Component Value Date    GHZEWOGZ89IU 32 09/02/2015        Results for orders placed during the hospital encounter of 06/09/23    DXA Bone Density Axial Skeleton 1 or more sites    Impression  *Low bone mass (Osteopenia); FRAX calculations do not support treatment as osteoporosis.    RECOMMENDATIONS:  *Daily calcium intake 6726-3072 mg, dietary sources preferred; Vitamin D 0014-9773 IU daily.  *Weight bearing exercise and fall precautions.  *No additional pharmacologic therapy recommended at this time.  *Repeat BMD in 2 years.      Electronically signed by: Zuleima Becker MD  Date:    06/15/2023  Time:    14:49         Staging Imaging     No results found for this or any previous visit.       Results for orders placed during the hospital encounter of 09/29/21    NM Bone Scan Whole Body    Impression  No  scintigraphic evidence of osseous metastatic disease.    Redemonstration of nonspecific focal abnormal radiotracer uptake in the left inguinal region concerning which could be artifactual related to presence of known left inguinal penile prosthesis reservoir near this site.    I, Syd Blankenship MD, attest that I reviewed and interpreted the images.    Electronically signed by resident: Owen Nunn  Date:    09/29/2021  Time:    13:37    Electronically signed by: Syd Blankenship  Date:    09/29/2021  Time:    14:20      No results found for this or any previous visit.       Results for orders placed during the hospital encounter of 05/10/21    CT Abdomen Pelvis With Contrast    Impression  1. Postsurgical changes of prostatectomy and pelvic node dissection.  No evidence of the metastatic lesions.  No measurable lesions per recist criteria.  2. Right renal cyst and left renal hypodensity too small to further characterize, likely also cysts.  3.  Additional findings as detailed in body of the report.    Electronically signed by resident: Chantel Fierro  Date:    05/10/2021  Time:    16:36    Electronically signed by: Lavern Lopez MD  Date:    05/10/2021  Time:    17:16       I have personally reviewed the above imaging.     Path:   Reviewed pathology as documented above.    Genomic testing:     Germline genetic testing  No results found. However, due to the size of the patient record, not all encounters were searched. Please check Results Review for a complete set of results.     Somatic tumor genotyping:      ctDNA genotyping:       Diagnoses:     1. Malignant neoplasm of prostate    2. Hypertension associated with diabetes    3. Hyperlipidemia associated with type 2 diabetes mellitus    4. Type 2 diabetes mellitus with hyperglycemia, without long-term current use of insulin    5. Long term current use of therapeutic drug    6. Androgen deprivation therapy          Assessment and Plan:     1. Malignant neoplasm of  prostate  Overview:  Biochemically recurrent postate cancer following RALP 2015 and salvage RT for metachronous justa recurrence in 2019. Currently on single agent ADT since 2022 with undetectable PSA.    Assessment & Plan:  He has been on single agent ADT since 03/2022 for high risk biochemical / pelvic justa recurrence following RALP. Received 2 years of ADT and salvage RT. Has ongoing excellent PSA control.     Favor holding ADT at this time. Upon signficant PSA progression can repeat PSMA PET CT If remains M0 on PSA progression may consider intermitent ADT + enzalutamide per EMBARK.    - Hold further ADT  - FU 6 months PSA check  - Genetics referral  - Due for DEXA 06/2025; con't Ca/D  - Recommend aggressive CV risk reduction - patient may consider resuming low dose ASA and/or statin  - PSMA PET CT on signficant PSA progression    Orders:  -     Ambulatory referral/consult to Hematology / Oncology  -     CBC Oncology; Standing  -     Comprehensive Metabolic Panel; Standing  -     Prostate Specific Antigen, Diagnostic; Standing  -     Testosterone; Standing  -     Vitamin D; Future; Expected date: 06/10/2024  -     Ambulatory referral/consult to Genetics; Future; Expected date: 06/17/2024    2. Hypertension associated with diabetes  Overview:  Home medications include amlodipine, olemsartan, and HCTZ      3. Hyperlipidemia associated with type 2 diabetes mellitus  Assessment & Plan:  Intolerant of statin. Home medications include Zetia      4. Type 2 diabetes mellitus with hyperglycemia, without long-term current use of insulin  Overview:  Home medications include metformin, glimedperide, and Farxiga.       5. Long term current use of therapeutic drug  -     Testosterone; Standing  -     Vitamin D; Future; Expected date: 06/10/2024    6. Androgen deprivation therapy  -     Testosterone; Standing  -     Vitamin D; Future; Expected date: 06/10/2024            Follow up:   Route Chart for Scheduling  Med Onc Route  Chart for Scheduling             The above information has been reviewed with the patient and all questions have been answered to their apparent satisfaction.  They understand that they can call the clinic with any questions.    Jarret Mehta MD MPH  Staff Physician     [facility] [address] [address]  Email: [email or web address]  Phone: o) [phone] (c) [phone]

## 2024-06-10 ENCOUNTER — TELEPHONE (OUTPATIENT)
Dept: DIABETES | Facility: CLINIC | Age: 69
End: 2024-06-10
Payer: MEDICARE

## 2024-06-10 ENCOUNTER — OFFICE VISIT (OUTPATIENT)
Dept: HEMATOLOGY/ONCOLOGY | Facility: CLINIC | Age: 69
End: 2024-06-10
Payer: MEDICARE

## 2024-06-10 VITALS
WEIGHT: 156.94 LBS | BODY MASS INDEX: 24.96 KG/M2 | DIASTOLIC BLOOD PRESSURE: 67 MMHG | TEMPERATURE: 97 F | HEART RATE: 71 BPM | OXYGEN SATURATION: 97 % | SYSTOLIC BLOOD PRESSURE: 124 MMHG

## 2024-06-10 DIAGNOSIS — E11.69 HYPERLIPIDEMIA ASSOCIATED WITH TYPE 2 DIABETES MELLITUS: ICD-10-CM

## 2024-06-10 DIAGNOSIS — E11.59 HYPERTENSION ASSOCIATED WITH DIABETES: ICD-10-CM

## 2024-06-10 DIAGNOSIS — E11.65 TYPE 2 DIABETES MELLITUS WITH HYPERGLYCEMIA, WITHOUT LONG-TERM CURRENT USE OF INSULIN: ICD-10-CM

## 2024-06-10 DIAGNOSIS — I15.2 HYPERTENSION ASSOCIATED WITH DIABETES: ICD-10-CM

## 2024-06-10 DIAGNOSIS — Z79.899 LONG TERM CURRENT USE OF THERAPEUTIC DRUG: ICD-10-CM

## 2024-06-10 DIAGNOSIS — E78.5 HYPERLIPIDEMIA ASSOCIATED WITH TYPE 2 DIABETES MELLITUS: ICD-10-CM

## 2024-06-10 DIAGNOSIS — Z79.818 ANDROGEN DEPRIVATION THERAPY: ICD-10-CM

## 2024-06-10 DIAGNOSIS — C61 MALIGNANT NEOPLASM OF PROSTATE: Primary | ICD-10-CM

## 2024-06-10 PROCEDURE — 3061F NEG MICROALBUMINURIA REV: CPT | Mod: CPTII,S$GLB,, | Performed by: HOSPITALIST

## 2024-06-10 PROCEDURE — G2211 COMPLEX E/M VISIT ADD ON: HCPCS | Mod: S$GLB,,, | Performed by: HOSPITALIST

## 2024-06-10 PROCEDURE — 3288F FALL RISK ASSESSMENT DOCD: CPT | Mod: CPTII,S$GLB,, | Performed by: HOSPITALIST

## 2024-06-10 PROCEDURE — 3051F HG A1C>EQUAL 7.0%<8.0%: CPT | Mod: CPTII,S$GLB,, | Performed by: HOSPITALIST

## 2024-06-10 PROCEDURE — 1126F AMNT PAIN NOTED NONE PRSNT: CPT | Mod: CPTII,S$GLB,, | Performed by: HOSPITALIST

## 2024-06-10 PROCEDURE — 99999 PR PBB SHADOW E&M-EST. PATIENT-LVL V: CPT | Mod: PBBFAC,,, | Performed by: HOSPITALIST

## 2024-06-10 PROCEDURE — 1101F PT FALLS ASSESS-DOCD LE1/YR: CPT | Mod: CPTII,S$GLB,, | Performed by: HOSPITALIST

## 2024-06-10 PROCEDURE — 3008F BODY MASS INDEX DOCD: CPT | Mod: CPTII,S$GLB,, | Performed by: HOSPITALIST

## 2024-06-10 PROCEDURE — 4010F ACE/ARB THERAPY RXD/TAKEN: CPT | Mod: CPTII,S$GLB,, | Performed by: HOSPITALIST

## 2024-06-10 PROCEDURE — 1159F MED LIST DOCD IN RCRD: CPT | Mod: CPTII,S$GLB,, | Performed by: HOSPITALIST

## 2024-06-10 PROCEDURE — 3074F SYST BP LT 130 MM HG: CPT | Mod: CPTII,S$GLB,, | Performed by: HOSPITALIST

## 2024-06-10 PROCEDURE — 99205 OFFICE O/P NEW HI 60 MIN: CPT | Mod: S$GLB,,, | Performed by: HOSPITALIST

## 2024-06-10 PROCEDURE — 3078F DIAST BP <80 MM HG: CPT | Mod: CPTII,S$GLB,, | Performed by: HOSPITALIST

## 2024-06-10 PROCEDURE — 3066F NEPHROPATHY DOC TX: CPT | Mod: CPTII,S$GLB,, | Performed by: HOSPITALIST

## 2024-06-10 NOTE — ASSESSMENT & PLAN NOTE
He has been on single agent ADT since 03/2022 for high risk biochemical / pelvic justa recurrence following RALP. Received 2 years of ADT and salvage RT. Has ongoing excellent PSA control.     Favor holding ADT at this time. Upon signficant PSA progression can repeat PSMA PET CT If remains M0 on PSA progression may consider intermitent ADT + enzalutamide per EMBARK.    - Hold further ADT  - FU 6 months PSA check  - Genetics referral  - Due for DEXA 06/2025; con't Ca/D  - Recommend aggressive CV risk reduction - patient may consider resuming low dose ASA and/or statin  - PSMA PET CT on signficant PSA progression

## 2024-06-11 ENCOUNTER — CLINICAL SUPPORT (OUTPATIENT)
Dept: DIABETES | Facility: CLINIC | Age: 69
End: 2024-06-11
Payer: MEDICARE

## 2024-06-11 DIAGNOSIS — E11.65 TYPE 2 DIABETES MELLITUS WITH HYPERGLYCEMIA, WITHOUT LONG-TERM CURRENT USE OF INSULIN: Primary | ICD-10-CM

## 2024-06-11 DIAGNOSIS — E11.649 TYPE 2 DIABETES MELLITUS WITH HYPOGLYCEMIA WITHOUT COMA, WITHOUT LONG-TERM CURRENT USE OF INSULIN: ICD-10-CM

## 2024-06-11 PROCEDURE — G0108 DIAB MANAGE TRN  PER INDIV: HCPCS | Mod: S$GLB,,, | Performed by: DIETITIAN, REGISTERED

## 2024-06-11 NOTE — PROGRESS NOTES
Diabetes Care Specialist Follow-up Note  Author: Anupama Brian RD, CDE  Date: 6/11/2024    Program Intake  Reason for Diabetes Program Visit:: Intervention  Type of Intervention:: Individual  Individual: Education  Education: Pattern Management  Current diabetes risk level:: moderate (HgbA1c 8.4)  In the last 12 months, have you:: none  Permission to speak with others about care:: no  Continuous Glucose Monitoring  Patient has CGM: Yes  Personal CGM type:: rui libre2  GMI Date: 06/11/24  GMI Value: 6.8 %    Lab Results   Component Value Date    HGBA1C 7.0 (H) 04/23/2024     A1c Pre Diabetes Care Specialist Intervention:  8.4%    Clinical    Patient Health Rating  Compared to other people your age, how would you rate your health?: Good    Problem Review  Reviewed Problem List with Patient: yes  Active comorbidities affecting diabetes self-care.: yes  Comorbidities: Other (comment), Gastrointestinal Disorder, Cardiovascular Disease, Hypertension  Reviewed health maintenance: yes    Clinical Assessment  Current Diabetes Treatment: Oral Medication, Diet  Have you ever experienced hypoglycemia (low blood sugar)?: yes  In the last month, how often have you experienced low blood sugar?: once a week  Are you able to tell when your blood sugar is low?: No  Have you ever been hospitalized because your blood sugar was too low?: no  How do you treat hypoglycemia (low blood sugar)?: 1/2 can soda/fruit juice  Have you ever experienced hyperglycemia (high blood sugar)?: no    Medication Information  Medication adherence impacting ability to self-manage diabetes?: No    Labs  Lab Compliance Barriers: No    Nutritional Status  Meal Plan 24 Hour Recall: Breakfast, Lunch, Snack, Dinner  Meal Plan 24 Hour Recall - Breakfast: cheerios and milk or grits and sausage or oatmeal and a banana; always coffee with splenda  Meal Plan 24 Hour Recall - Lunch: tunafish, ham, or PB&J sandwich with water or skipped  Meal Plan 24 Hour Recall -  Dinner: baked chicken and fries or stewed turkey neck with red beaks, or Friday's will have boiled or fried seafood  Meal Plan 24 Hour Recall - Snack: occassionally an oreo, drinks water and occassionally apple juice  Current nutritional status an area of need that is impacting patient's ability to self-manage diabetes?: No    Physical activity/Exercise:   No change, still working in the yard and running the snowball stand    SMBG: using the richmond 2      Additional Social History    Support  Is Support an area impacting ability to self-manage diabetes?: No    Access to Mass Media & Technology  Media or technology needs impacting ability to self-manage diabetes?: No    Cognitive/Behavioral Health  Cognitive or behavioral barriers impacting ability to self-manage diabetes?: No    Culture/Yarsani  Culture or Mandaen beliefs that may impact ability to access healthcare: No    Communication  Communication needs impacting ability to self-manage diabetes?: No    Health Literacy  Health literacy needs impacting ability to self-manage diabetes?: No      Diabetes Self-Management Skills Assessment     Diabetes Disease Process/Treatment Options  Diabetes Disease Process/Treatment Options: Skills Assessment Completed: No  Assessment indicates:: Adequate understanding  Deferred due to:: Other (comment)  Area of need?: No    Nutrition/Healthy Eating  Nutrition/Healthy Eating Skills Assessment Completed:: No  Assessment indicates:: Adequate understanding  Deffered due to:: Other (comment)  Area of need?: No    Physical Activity/Exercise  Physical Activity/Exercise Skills Assessment Completed: : No  Assessment indicates:: Adequate understanding  Deffered due to:: Other (comment)  Area of need?: No    Medications  Medication Skills Assessment Completed:: No  Assessment indicates:: Adequate understanding  Deffered due to:: Other (comment)  Area of need?: No    Home Blood Glucose Monitoring  Patient states that blood sugar is  checked at home daily.: yes  Monitoring Method:: personal continuous glucose monitor  Personal CGM type:: freesagare libre2  Patient is able to use personal CGM appropriately.: yes  CGM Report reviewed?: yes  Home Blood Glucose Monitoring Skills Assessment Completed: : Yes  Assessment indicates:: Adequate understanding  Area of need?: No    Acute Complications  Acute Complications Skills Assessment Completed: : No  Assessment indicates:: Adequate understanding  Deffered due to:: Other (comment) (previously completed, there has been no change and patient has adequate understanding)  Area of need?: No    Chronic Complications  Chronic Complications Skills Assessment Completed: : No  Assessment indicates:: Adequate understanding  Deferred due to:: Other (comment) (previously completed, there has been no change and patient has adequate understanding)  Area of need?: No    Psychosocial/Coping  Psychosocial/Coping Skills Assessment Completed: : No  Assessment indicates:: Adequate understanding  Deffered due to:: Other (comment) (previously completed, there has been no change and patient has adequate understanding)  Area of need?: No      During today's follow-up visit,  the following areas required further assessment and content was provided/reviewed.    Based on today's diabetes care assessment, the following areas of need were identified:          6/11/2024    12:01 AM   Social   Support No   Access to Mass Media/Tech No   Cognitive/Behavioral Health No   Culture/Latter day No   Communication No   Health Literacy No            6/11/2024    12:01 AM   Clinical   Medication Adherence No   Lab Compliance No   Nutritional Status No            6/11/2024    12:01 AM   Diabetes Self-Management Skills   Diabetes Disease Process/Treatment Options No   Nutrition/Healthy Eating No   Physical Activity/Exercise No   Medication No   Home Blood Glucose Monitoring No, Comparison of previous CGM data 5/3/2024 to current    Average Glucose  144 increased from 124  Standard Deviation 28.7% increased slightly from 28.3%  GMI 6.8 % increased from 6.3 %    Time in Range  2% of time patient was in Very High Range increased from 0%  16% of time patient was in High Range increased from 8%  82% of time patient was in Range decreased from 90%  0% of time patient was in Low Range decreased from 2%  0% of time patient was in Very Low Range no change from 0%    Reviewed with patient and given to provider for further review and recommendations  No changes made to medication regimen at this time   Acute Complications No   Chronic Complications No   Psychosocial/Coping No        Today's interventions were provided through individual discussion, instruction, and written materials were provided.    Patient verbalized understanding of instruction and written materials.  Pt was able to return back demonstration of instructions today. Patient understood key points, needs reinforcement and further instruction.     Diabetes Self-Management Care Plan Review and Evaluation of Progress:    During today's follow-up Eyad's Diabetes Self-Management Care Plan progress was reviewed and progress was evaluated including his/her input. Eyad has agreed to continue his/her journey to improve/maintain overall diabetes control by continuing to set health goals. See care plan progress below.      Care Plan: Diabetes Management   Updates made since 5/12/2024 12:00 AM   Completed 6/11/2024     Problem: Healthy Eating Resolved 6/11/2024        Goal: Eat 3 meals daily with 45-60g/3-4 servings of Carbohydrate per meal for the next 6 months. Completed 6/11/2024   Start Date: 1/4/2024   Expected End Date: 7/4/2024   This Visit's Progress: Met   Recent Progress: On track   Priority: High   Barriers: No Barriers Identified          Follow Up Plan     Follow up in about 3 months (around 9/11/2024) for General Follow-up, Post Program Follow-Up.    Today's care plan and follow up schedule was  discussed with patient.  Eyad verbalized understanding of the care plan, goals, and agrees to follow up plan.        The patient was encouraged to communicate with his/her health care provider/physician and care team regarding his/her condition(s) and treatment.  I provided the patient with my contact information today and encouraged to contact me via phone or [facility] Patient Portal as needed.     Length of Visit   Total Time: 45 Minutes

## 2024-06-11 NOTE — LETTER
June 11, 2024    Marisol Bob, KHLOE  [address]  [address]  [place] [address]         Patient: Eyad Garcia   MR Number: [id number]   YOB: 1955   Date of Visit: 6/11/2024     Dear Dr. Bob:    Thank you for referring Eyad for diabetes self-management education and support services. He has completed all components of our Diabetes Management Program. Below is a summary of his clinical outcomes and goal progress.    Patient Outcomes:    A1c Status:   Lab Results   Component Value Date    HGBA1C 7.0 (H) 04/23/2024    HGBA1C 9.6 (H) 01/12/2024       Care Plan: Diabetes Management   Updates made since 5/12/2024 12:00 AM   Completed 6/11/2024     Problem: Healthy Eating Resolved 6/11/2024        Goal: Eat 3 meals daily with 45-60g/3-4 servings of Carbohydrate per meal for the next 6 months. Completed 6/11/2024   Start Date: 1/4/2024   Expected End Date: 7/4/2024   This Visit's Progress: Met   Recent Progress: On track   Priority: High   Barriers: No Barriers Identified          Follow up:   Eyad to attend medical appointments as scheduled  Eyad to update you on his DM education progress as needed      If you have questions, please do not hesitate to call me. I look forward to providing additional education and support as needed.    Sincerely,    Anupama Brian, RD, CDE  Diabetes Care and

## 2024-06-12 ENCOUNTER — PATIENT MESSAGE (OUTPATIENT)
Dept: DIABETES | Facility: CLINIC | Age: 69
End: 2024-06-12

## 2024-06-12 ENCOUNTER — PATIENT OUTREACH (OUTPATIENT)
Dept: DIABETES | Facility: CLINIC | Age: 69
End: 2024-06-12
Payer: MEDICARE

## 2024-06-12 DIAGNOSIS — E11.65 TYPE 2 DIABETES MELLITUS WITH HYPERGLYCEMIA, WITHOUT LONG-TERM CURRENT USE OF INSULIN: Primary | ICD-10-CM

## 2024-06-12 PROCEDURE — 95251 CONT GLUC MNTR ANALYSIS I&R: CPT | Mod: S$GLB,,, | Performed by: NURSE PRACTITIONER

## 2024-06-12 NOTE — Clinical Note
Please call patient let him know that overall his blood sugars look good  I see some elevations which may be from certain things that he eats  I also see some of the highs come from after a low blood sugar  I would continue the metformin and continue the Farxiga  We can continue the glimepiride but if his sugar is under 100  in the morning I would hold the glimepiride

## 2024-06-12 NOTE — PROGRESS NOTES
Continuous Glucose Sensor Report of Personal Device    Eyad Garcia is a 68 y.o.male with type 2 diabetes     Interpretation of data from FreeStyle Libre2    Reason for review: Currently on anti-hyperglycemic therapy and failing to achieve glycemic goals.    Lab Results   Component Value Date    HGBA1C 7.0 (H) 04/23/2024         Current diabetes medications and doses:     Continue Metformin XR 1000 mg daily int he AM ( 2 tablet in the AM)      Continue Farxiga 10 mg daily      Cut back on Glimepiride to 1 mg daily with first meal of the day      Continue richmond 2   ________________________________________________________________    SUMMARY of KEY FINDINGS:      Average glucose:  144  Above 180 mg/dL:  16%  (Above 250 mg/dL:  2%)  Within  mg/dL:  82%  Below 70 mg/dL:  0%  (Below 54 mg/dL:  0%)      CGM data reviewed and notable for the following trends:    Well controlled overall   Some prandial excursions noted   Some near hypoglycemia noted      Will make the following changes based on review of data:   Continue current regimen   Hold the glimepiride if blood sugar is under 100      Marisol Bob NP

## 2024-07-01 ENCOUNTER — OFFICE VISIT (OUTPATIENT)
Dept: URGENT CARE | Facility: CLINIC | Age: 69
End: 2024-07-01
Payer: MEDICARE

## 2024-07-01 VITALS
OXYGEN SATURATION: 96 % | WEIGHT: 159.81 LBS | SYSTOLIC BLOOD PRESSURE: 112 MMHG | DIASTOLIC BLOOD PRESSURE: 67 MMHG | RESPIRATION RATE: 20 BRPM | HEIGHT: 65 IN | HEART RATE: 77 BPM | TEMPERATURE: 98 F | BODY MASS INDEX: 26.63 KG/M2

## 2024-07-01 DIAGNOSIS — M62.838 TRAPEZIUS MUSCLE SPASM: ICD-10-CM

## 2024-07-01 DIAGNOSIS — M54.2 ACUTE NECK PAIN: ICD-10-CM

## 2024-07-01 DIAGNOSIS — S46.812A STRAIN OF LEFT TRAPEZIUS MUSCLE, INITIAL ENCOUNTER: ICD-10-CM
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
## 2024-07-01 DIAGNOSIS — S46.811A STRAIN OF RIGHT TRAPEZIUS MUSCLE, INITIAL ENCOUNTER: ICD-10-CM

## 2024-07-01 PROBLEM — E11.36 TYPE 2 DIABETES MELLITUS WITH DIABETIC CATARACT: Status: ACTIVE | Noted: 2024-07-01

## 2024-07-01 PROCEDURE — 99213 OFFICE O/P EST LOW 20 MIN: CPT | Mod: 25,S$GLB,, | Performed by: NURSE PRACTITIONER

## 2024-07-01 PROCEDURE — 96372 THER/PROPH/DIAG INJ SC/IM: CPT | Mod: S$GLB,,, | Performed by: NURSE PRACTITIONER

## 2024-07-01 RX ORDER — GLIMEPIRIDE 2 MG/1
2 TABLET ORAL
COMMUNITY
Start: 2024-06-29

## 2024-07-01 RX ORDER — METHOCARBAMOL 750 MG/1
750 TABLET, FILM COATED ORAL 2 TIMES DAILY PRN
Qty: 10 TABLET | Refills: 0 | Status: SHIPPED | OUTPATIENT
Start: 2024-07-01 | End: 2024-07-06

## 2024-07-01 RX ORDER — METHOCARBAMOL 750 MG/1
750 TABLET, FILM COATED ORAL 2 TIMES DAILY PRN
Qty: 10 TABLET | Refills: 0 | Status: SHIPPED | OUTPATIENT
Start: 2024-07-01 | End: 2024-07-01

## 2024-07-01 RX ORDER — KETOROLAC TROMETHAMINE 30 MG/ML
30 INJECTION, SOLUTION INTRAMUSCULAR; INTRAVENOUS
Status: COMPLETED | OUTPATIENT
Start: 2024-07-01 | End: 2024-07-01

## 2024-07-01 RX ADMIN — KETOROLAC TROMETHAMINE 30 MG: 30 INJECTION, SOLUTION INTRAMUSCULAR; INTRAVENOUS at 03:07

## 2024-07-01 NOTE — PATIENT INSTRUCTIONS
Ice or heat may help ease your pain. Either one may help stop a spasm, but most people find that heat is more helpful.  Soak the sore area in warm water or using a heating pad can help stop the spasm and lower pain. Heat also helps muscles stretch easier. Do not leave a heating pad on more than 20 minutes at a time. Be sure to check your skin while the heating pad is on to avoid burns. Never go to sleep with a heating pad on.  Putting ice on a muscle that is in spasm can help ease the spasm and reduce pain. Use an ice pack or bag of frozen vegetables wrapped in a towel over the painful part. Never put ice right on the skin. Do not leave the ice on more than 10 to 15 minutes at a time. Do not try to stretch the muscle right after icing.  Soft neck collar as needed  Massaging the cramping muscle with firm pressure may help ease the spasm.  Drinking extra fluids can help muscle spasms if they are caused by a loss of body fluids. Avoid intense exercise in hot and humid weather to lower the chance of getting muscle spasms.  Sometimes, you may get muscle spasms if you dont get enough of certain nutrients in your diet, like potassium, magnesium, or carbohydrates. If this is the case, changing your diet can help you to avoid muscle cramps. Talk to your doctor about what to eat and drink before and after exercise.  You may want to take medicine like ibuprofen, naproxen, or acetaminophen to help with pain.  If you were prescribed a narcotic medication, do not drive or operate heavy equipment or machinery while taking these medications.  Please follow up with your primary care doctor or specialist as needed.  Please return here or go to the Emergency Department for any concerns or worsening of condition.
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
## 2024-07-02 RX ORDER — MELOXICAM 15 MG/1
15 TABLET ORAL DAILY
Qty: 90 TABLET | Refills: 0 | Status: SHIPPED | OUTPATIENT
Start: 2024-07-02

## 2024-08-21 ENCOUNTER — TELEPHONE (OUTPATIENT)
Dept: HEMATOLOGY/ONCOLOGY | Facility: CLINIC | Age: 69
End: 2024-08-21
Payer: MEDICARE

## 2024-08-22 ENCOUNTER — OFFICE VISIT (OUTPATIENT)
Dept: HEMATOLOGY/ONCOLOGY | Facility: CLINIC | Age: 69
End: 2024-08-22
Payer: MEDICARE

## 2024-08-22 DIAGNOSIS — Z80.0 FAMILY HISTORY OF COLON CANCER: ICD-10-CM

## 2024-08-22 DIAGNOSIS — C61 MALIGNANT NEOPLASM OF PROSTATE: Primary | ICD-10-CM

## 2024-08-22 PROCEDURE — 99999 PR PBB SHADOW E&M-EST. PATIENT-LVL II: CPT | Mod: PBBFAC,,, | Performed by: GENETIC COUNSELOR, MS

## 2024-08-22 NOTE — PROGRESS NOTES
"Cancer Genetics  Hereditary and High-Risk Clinic  Department of Hematology and Oncology  [facility]    Date of Service:  24  Visit Provider:  Ana Luisa Ordonez, [facility] [place], [facility]    Patient ID  Name: Eyad Garcia    : 1955    MRN: [id number]      Referring Provider  Jarret Mehta MD  [address] [place] [address]    IMPRESSION      Eyad is a 70yo male who was diagnosed with a high-risk prostate cancer in  at 58yo. While his family history is not suspicious of a hereditary form of cancer, especially given its very large size, Eyad meets NCCN criteria for genetic testing. Therefore, a sample will be submitted on 24 to [employer] [facility] [employer] for CancerNext +RNAinsight panel testing.    FOCUSED PERSONAL HISTORY     Chief Complaint: Genetic Evaluation (Prostate Cancer)    History of Present Illness (HPI):  Eyad Garcia ("Eyad"), 69 y.o., assigned male sex at birth, is established with the [facility] but new to me.  He was referred by Dr. Mehta with Medical Oncology for hereditary cancer risk assessment given his diagnosis of Scooter 8 prostate cancer at 58yo after biopsy on 5/19/15. He underwent RALP with PLND in 7/23/15, pathology of which identified pT2a pN0 [place] 6 (3+3) acinar adenocarcinoma in a background of extensive high-grade PIN and chronic prostatitis. Radha metastasis was identified in , along with a rising PSA, for which he underwent salvage radiation therapy with Dr. Sampson. Since , Eyad has been treated by Dr. Mehta for a biochemical recurrence with ADT.    -CT Chest w/o contast on 22:  Mild bilateral male gynecomastia.  Coronary artery calcifications.  -NM PET CT on 3/21/22:  In this patient with prostate cancer, there are no definite foci of increased tracer avidity to indicate recurrence or metastatic disease.  Questionable focal uptake in the vesicoureteral anastomosis is no " longer appreciated.  New nonspecific noncalcified right lower lobe nodule, which could represent infection, inflammation, atelectasis with malignancy felt less likely.  Clinical correlation and continued follow-up advised.  This nodule is amenable to percutaneous biopsy if clinically warranted.  -NM Bone Scan on 21:  No scintigraphic evidence of osseous metastatic disease.  Redemonstration of nonspecific focal abnormal radiotracer uptake in the left inguinal region concerning which could be artifactual related to presence of known left inguinal penile prosthesis reservoir near this site.    Colon polyps: 5 tubular adenomas  -Colonoscopy on 23: 2 tubular adenomas  -Colonoscopy on 19: 3 tubular adenomas  -Colonoscopy on 13: 3 polyps that were nonneoplastic colonic mucosa with cautery artifact upon pathology    Tobacco Use  Tobacco Use: Low Risk  (2024)    Patient History     Smoking Tobacco Use: Never     Smokeless Tobacco Use: Never     Passive Exposure: Not on file     Review of Systems   Patient's Distress Score today was 5/10 (with 10 being the worst).  Patient attributes this to work and delayed projects that keep getting rescheduled.  Patient denies experiencing suicidal or homicidal ideations (SI/HI).  Offered patient a referral to [facility], and patient declined.      FAMILY HISTORY         Eyad reported his family history of cancer as follows:  Maternal aunt: diagnosed with colon cancer at unknown older and  in her 80s    A family history of birth defects, intellectual disability, SIDS, sudden early death, multiple miscarriages and consanguinity were denied. Please refer to above pedigree for further details. A larger copy is available for review in the Media tab.     DISCUSSION     Approximately 5-10% of prostate cancers are due to hereditary causes, with high-risk and metastatic prostate cancers having a higher likelihood of a hereditary cause. Hereditary  prostate cancer can be caused by mutations in genes such as JUAN PABLO, BRCA1, BRCA2, CHEK2, HOXB13, PALB2 and others. Eyad meets NCCN criteria for genetic testing based on diagnosis of high-risk prostate cancer. He also has a family history of colon cancer. Therefore, he was offered phenotype-driven and broad panel testing. Eyad opted for the CancerNext +[employer] MR Photoelectricity panel through [employer] of the following 34 genes associated with hereditary breast, gastrointestinal, gynecologic, pancreatic, prostate, skin and other cancers:    APC, JUAN PABLO, AXIN2, BARD1, BMPR1A, BRCA1, BRCA2, BRIP1, CDH1, CDK4, CDKN2A, CHEK2, DICER1, EPCAM, GREM1, HOXB13, MLH1, MSH2, MSH3, MSH6, MUTYH, NF1, NTHL1, PALB2, PMS2, POLD1, POLE, PTEN, RAD51C, RAD51D, SMAD4, SMARCA4, STK11, TP53     We reviewed that mutations in the highly penetrant genes put an individual at a significantly increased risk of prostate and/or other cancers.  There are established screening and surgery guidelines for these syndromes. Mutations in the moderately penetrant genes increase the risk of prostate and other cancers, but less is understood regarding their role in cancer risk. There may not be standard screening or management guidelines for individuals who have mutations in these genes. We discussed the autosomal dominant inheritance of most of these conditions, and that if Eyad tests positive for a mutation in one of these genes, then there would be a 50% chance his first degree relatives (parents, siblings, children) could also have inherited the same mutation. Individuals in his family could consider undergoing predictive genetic testing at an appropriate age to determine their mutation status and their lifetime risk of cancer.     Furthermore, we discussed the psychosocial implications of a positive result, including anxiety, fear and guilt if a mutation is passed on to a child. Eyad did not express concern.    The potential outcomes of testing, including the  "high VUS (variant of unknown significance) rate seen in panel testing, were reviewed and implications were discussed. There is also a possibility for the patient to incur out-of-pocket costs related to this testing. Issues regarding insurance discrimination were discussed. TANVIR protects against employment and health insurance discrimination, but it does not apply to life insurance, long term care or disability policies. It also does not apply to  personnel or employers with less than 15 employees. Eyad appeared to have a good understanding of the information as he asked appropriate questions.  A sample will be submitted on 8/29/24 to [facility].  Eyad's results should be available in approximately 3 weeks.  In the meantime, he is welcome to contact me if he has any questions, concerns, or updates to his family history.     Eyad received comprehensive counseling regarding panel testing and has elected to proceed with this testing.     ASSESSMENT / PLAN      Eyad Garcia ("Eyad"), 69 y.o., presented today for hereditary cancer risk assessment and genetic counseling given his diagnosis of high-risk prostate cancer in 2015 at 58yo. While his family history is not suspicious of a hereditary form of cancer, especially given its very large size, Eyad meets NCCN criteria for genetic testing. Therefore, a sample will be submitted to [facility], and I will call Eyad once his results are available. A follow-up genetic counseling appointment will be scheduled if results are positive for a pathogenic mutation.      1. Malignant neoplasm of prostate  - Ambulatory referral/consult to Genetics  - Genetic Misc Sendout Test, Blood; Future    2. Family history of colon cancer  - Genetic Misc Sendout Test, Blood; Future       Genetic Test Information  Testing lab: [facility]   Test panel: CancerNext +RNAinsight  (Core:  BRCA1/BRCA2)   ICD-10 code(s): C61, Z80.0   Verbal informed consent: Obtained "   Written informed consent: N/A   Specimen type: Blood  (Patient denies blood disorders that would necessitate a skin fibroblast specimen)   Specimen collection by: [facility]   Specimen collection date: 08/29/2024   Results expected by: Approximately 2-3 weeks after the genetic testing lab receives the specimen   Results disclosure plan: Post-test visit if positive or complex result; otherwise, results will be communicated through phone call       Follow-up:  Follow up if genetic test results are positive for a pathogenic mutation.    Questions were encouraged and answered to the patient's satisfaction, and he verbalized understanding of the information and agreement with the plan.         Approximately 30 minutes were spent face-to-face with the patient.  Approximately 90 minutes in total were spent on this encounter, which includes face-to-face time and non-face-to-face time preparing to see the patient (e.g., review of tests), obtaining and/or reviewing separately obtained history, documenting clinical information in the electronic or other health record, independently interpreting results (not separately reported) and communicating results to the patient/family/caregiver, or care coordination (not separately reported).     This assessment is based on the history and reports provided, as well as the current scientific knowledge regarding cancer genetics.         Ana Luisa Ordonez, [facility], [facility]  Senior Genetic Counselor, Hereditary and High-Risk Clinic  Department of Hematology and Oncology  [facility]        CC:  Dr. Jarret Mehta

## 2024-08-28 ENCOUNTER — PATIENT MESSAGE (OUTPATIENT)
Dept: DIABETES | Facility: CLINIC | Age: 69
End: 2024-08-28
Payer: MEDICARE

## 2024-08-30 ENCOUNTER — TELEPHONE (OUTPATIENT)
Dept: DIABETES | Facility: CLINIC | Age: 69
End: 2024-08-30
Payer: MEDICARE

## 2024-09-04 ENCOUNTER — TELEPHONE (OUTPATIENT)
Dept: DIABETES | Facility: CLINIC | Age: 69
End: 2024-09-04
Payer: MEDICARE

## 2024-09-05 ENCOUNTER — OFFICE VISIT (OUTPATIENT)
Dept: DIABETES | Facility: CLINIC | Age: 69
End: 2024-09-05
Payer: MEDICARE

## 2024-09-05 VITALS
SYSTOLIC BLOOD PRESSURE: 108 MMHG | OXYGEN SATURATION: 99 % | HEIGHT: 65 IN | HEART RATE: 67 BPM | DIASTOLIC BLOOD PRESSURE: 60 MMHG | BODY MASS INDEX: 26.99 KG/M2 | WEIGHT: 162 LBS

## 2024-09-05 DIAGNOSIS — Z71.9 HEALTH EDUCATION/COUNSELING: ICD-10-CM

## 2024-09-05 DIAGNOSIS — E78.5 HYPERLIPIDEMIA ASSOCIATED WITH TYPE 2 DIABETES MELLITUS: ICD-10-CM

## 2024-09-05 DIAGNOSIS — Z91.199 NONCOMPLIANCE WITH DIABETES TREATMENT: ICD-10-CM

## 2024-09-05 DIAGNOSIS — Z91.148 NONCOMPLIANCE W/MEDICATION TREATMENT DUE TO INTERMIT USE OF MEDICATION: ICD-10-CM

## 2024-09-05 DIAGNOSIS — E11.65 TYPE 2 DIABETES MELLITUS WITH HYPERGLYCEMIA, WITHOUT LONG-TERM CURRENT USE OF INSULIN: Primary | ICD-10-CM

## 2024-09-05 DIAGNOSIS — E66.3 OVERWEIGHT (BMI 25.0-29.9): ICD-10-CM

## 2024-09-05 DIAGNOSIS — E11.69 HYPERLIPIDEMIA ASSOCIATED WITH TYPE 2 DIABETES MELLITUS: ICD-10-CM

## 2024-09-05 DIAGNOSIS — Z78.9 STATIN INTOLERANCE: ICD-10-CM

## 2024-09-05 DIAGNOSIS — I15.2 HYPERTENSION ASSOCIATED WITH DIABETES: ICD-10-CM

## 2024-09-05 DIAGNOSIS — C61 MALIGNANT NEOPLASM OF PROSTATE: ICD-10-CM

## 2024-09-05 DIAGNOSIS — E11.59 HYPERTENSION ASSOCIATED WITH DIABETES: ICD-10-CM

## 2024-09-05 DIAGNOSIS — D50.8 IRON DEFICIENCY ANEMIA SECONDARY TO INADEQUATE DIETARY IRON INTAKE: ICD-10-CM

## 2024-09-05 LAB — GLUCOSE SERPL-MCNC: 152 MG/DL (ref 70–110)

## 2024-09-05 PROCEDURE — 82962 GLUCOSE BLOOD TEST: CPT | Mod: S$GLB,,, | Performed by: NURSE PRACTITIONER

## 2024-09-05 PROCEDURE — 99999 PR PBB SHADOW E&M-EST. PATIENT-LVL V: CPT | Mod: PBBFAC,,, | Performed by: NURSE PRACTITIONER

## 2024-09-05 RX ORDER — ORAL SEMAGLUTIDE 3 MG/1
3 TABLET ORAL DAILY
Qty: 30 TABLET | Refills: 0 | Status: SHIPPED | OUTPATIENT
Start: 2024-09-05

## 2024-09-05 RX ORDER — EZETIMIBE 10 MG/1
10 TABLET ORAL NIGHTLY
Qty: 90 TABLET | Refills: 3 | Status: SHIPPED | OUTPATIENT
Start: 2024-09-05 | End: 2025-09-05

## 2024-09-05 RX ORDER — FLASH GLUCOSE SENSOR
1 KIT MISCELLANEOUS
Qty: 2 KIT | Refills: 11 | Status: SHIPPED | OUTPATIENT
Start: 2024-09-05

## 2024-09-05 RX ORDER — METFORMIN HYDROCHLORIDE 500 MG/1
1000 TABLET, EXTENDED RELEASE ORAL DAILY
Qty: 180 TABLET | Refills: 3 | Status: SHIPPED | OUTPATIENT
Start: 2024-09-05 | End: 2025-09-05

## 2024-09-05 RX ORDER — DAPAGLIFLOZIN 10 MG/1
10 TABLET, FILM COATED ORAL DAILY
Qty: 90 TABLET | Refills: 3 | Status: SHIPPED | OUTPATIENT
Start: 2024-09-05

## 2024-09-05 RX ORDER — ORAL SEMAGLUTIDE 7 MG/1
7 TABLET ORAL DAILY
Qty: 30 TABLET | Refills: 5 | Status: SHIPPED | OUTPATIENT
Start: 2024-09-05

## 2024-09-05 NOTE — ASSESSMENT & PLAN NOTE
Body mass index is 26.96 kg/m².  Increases insulin resistance.   Discussed DM diet and exercise.

## 2024-09-05 NOTE — ASSESSMENT & PLAN NOTE
Off the statin therapy due to intolerance   LDL goal <100.   Continue the zetia 10 mg nightly   Start repatha injections every 2 weeks-- I sent this to [facility] and they will ship it to you. Call us- [phone]. We will help you with the shot       The 10-year ASCVD risk score (Anjel NGO, et al., 2019) is: 28%    Values used to calculate the score:      Age: 69 years      Sex: Male      Is Non- : Yes      Diabetic: Yes      Tobacco smoker: No      Systolic Blood Pressure: 108 mmHg      Is BP treated: Yes      HDL Cholesterol: 44 mg/dL      Total Cholesterol: 267 mg/dL

## 2024-09-05 NOTE — PROGRESS NOTES
CC:   Chief Complaint   Patient presents with    Diabetes Mellitus       HPI: Eyad Garcia is a 69 y.o. male presents for a follow up visit today for the management of Diabetes       He was diagnosed with Type 2 diabetes more than 40 years ago-- in his 20's-30's     Family hx of diabetes: mother, many siblings - he is 1 of 12 children   Hospitalized for diabetes: denies    Insulin therapy: denies       No personal or FH of thyroid cancer or personal of pancreatic cancer or pancreatitis.       Our last visit was in April of 2024  At that visit his A1c was 7%  We continued his metformin a 1000 mg daily in the morning  We continued his Farxiga 10 mg daily  We cut back on his glimepiride to 1 mg daily with the 1st meal of the day to help with hypoglycemia  We continued his Barak 2  His A1c has since increased 8%  See attached download  He does attest to sometimes missing his medications-- like once a week   He says it he thinks that he is taking the Glimepiride 2 mg daily instead of 1 mg daily     He saw Anupama in June of 2024 for a Barak download and review  His average blood sugar was 144 and he was in range 82% of the time  Will make the following changes based on review of data:   Continue current regimen   Hold the glimepiride if blood sugar is under 100          Dispenses     Dispensed Days Supply Quantity Provider Pharmacy   GLIMEPIRIDE 2 MG TABLET 06/29/2024 90 90 each Marisol Bob NP [id number] - [place]...   glimepiride (AMARYL) 1 MG tablet 05/09/2024 90 90 tablet Marisol Bob NP [facility] ...   GLIMEPIRIDE 2 MG TABLET 04/01/2024 90 90 each Marisol Bob NP [id number] - [place]...   GLIMEPIRIDE 2 MG TABLET 01/03/2024 90 90 each Marisol Bob                                             DIABETES COMPLICATIONS: none    Hx of glaucoma       Diabetes Management Status    ASA:  No    Statin: not taking it -  stopped the Pravastatin due to issues with walking  ( looks like it was d/c by  oncology)-- unable to tolerate Lipitor-- had severe myalgia with Lipitor   Also on zetia 10 mg nightly   ACE/ARB: Taking-- Benicar 40 mg       The 10-year ASCVD risk score (Anjel NGO, et al., 2019) is: 28%    Values used to calculate the score:      Age: 69 years      Sex: Male      Is Non- : Yes      Diabetic: Yes      Tobacco smoker: No      Systolic Blood Pressure: 108 mmHg      Is BP treated: Yes      HDL Cholesterol: 44 mg/dL      Total Cholesterol: 267 mg/dL      Screening or Prevention Patient's value Goal Complete/Controlled?   HgA1C Testing and Control   Lab Results   Component Value Date    HGBA1C 8.0 (H) 08/29/2024      Annually/Less than 8% No   Lipid profile : 08/29/2024 Annually Yes   LDL control Lab Results   Component Value Date    LDLCALC 196.6 (H) 08/29/2024    Annually/Less than 100 mg/dl  No   Nephropathy screening Lab Results   Component Value Date    LABMICR 5.0 04/23/2024     Lab Results   Component Value Date    PROTEINUA Negative 05/29/2022    Annually Yes   Blood pressure BP Readings from Last 1 Encounters:   09/05/24 108/60    Less than 140/90 No   Dilated retinal exam : 01/02/2024 Annually No   Foot exam   : 02/29/2024 Annually No       CURRENT A1C:    Hemoglobin A1C   Date Value Ref Range Status   08/29/2024 8.0 (H) 4.0 - 5.6 % Final     Comment:     ADA Screening Guidelines:  5.7-6.4%  Consistent with prediabetes  >or=6.5%  Consistent with diabetes    High levels of fetal hemoglobin interfere with the HbA1C  assay. Heterozygous hemoglobin variants (HbS, HgC, etc)do  not significantly interfere with this assay.   However, presence of multiple variants may affect accuracy.     04/23/2024 7.0 (H) 4.0 - 5.6 % Final     Comment:     ADA Screening Guidelines:  5.7-6.4%  Consistent with prediabetes  >or=6.5%  Consistent with diabetes    High levels of fetal hemoglobin interfere with the HbA1C  assay. Heterozygous hemoglobin variants (HbS, HgC, etc)do  not significantly  interfere with this assay.   However, presence of multiple variants may affect accuracy.     2024 9.6 (H) 4.0 - 5.6 % Final     Comment:     ADA Screening Guidelines:  5.7-6.4%  Consistent with prediabetes  >or=6.5%  Consistent with diabetes    High levels of fetal hemoglobin interfere with the HbA1C  assay. Heterozygous hemoglobin variants (HbS, HgC, etc)do  not significantly interfere with this assay.   However, presence of multiple variants may affect accuracy.         GOAL A1C: 7% without hypoglycemia     DM MEDICATIONS USED IN THE PAST: Metformin XR  Farxiga    Jardiance- urinary frequently   Tradjenta   Glimepiride         CURRENT DIABETES MEDICATIONS: Metformin XR 1000 mg daily -- when he takes the Metformin BID- it causes GI upset   Farxiga 10 mg daily   Glimepiride 2 mg daily in the AM-- sometimes taking without food    Insulin: N/A   Missed doses: occ   Out of the farxiga for a couple of weeks       BLOOD GLUCOSE MONITORING:      Sensor type: Freestyle barak 2   Average BG readin  Time in range: 82%  17% high, 1% very high, 0% low, 0% very low  Site change: q14 days.    Sensor was downloaded in clinic today and reviewed with patient.   Please see attached document for download.     Supplies- [facility]     Barak 2 is readings 154 and fingerstick is 152     Results for orders placed or performed in visit on 24   POCT Glucose, Hand-Held Device   Result Value Ref Range    POC Glucose 152 (A) 70 - 110 MG/DL     *Note: Due to a large number of results and/or encounters for the requested time period, some results have not been displayed. A complete set of results can be found in Results Review.           HYPOGLYCEMIA:  0% low --  0% very low       MEALS: eating 3 meals per day   BF: cheerios or sandwich or full meal   Lunch: sandwich - ham   Dinner: home cooked - cabbage/ mustard greens, sweet potato - black eyed peas   Snack:occ- 1 cookie   Drinks water   Stopped coffee in the AM         CURRENT EXERCISE:  No      Review of Systems  Review of Systems   Constitutional:  Negative for appetite change, fatigue and unexpected weight change.   HENT:  Negative for trouble swallowing.    Eyes:  Negative for visual disturbance.   Respiratory:  Negative for shortness of breath.    Cardiovascular:  Negative for chest pain.   Gastrointestinal:  Negative for nausea.   Endocrine: Negative for polydipsia, polyphagia and polyuria.   Genitourinary:         No Nocturia    Skin:  Negative for wound.   Neurological:  Positive for numbness (intermittent pins and needles to right hand - comes and goes).       Physical Exam   Physical Exam  Vitals and nursing note reviewed.   Constitutional:       Appearance: He is well-developed.      Comments: Overweight male    HENT:      Head: Normocephalic and atraumatic.      Right Ear: External ear normal.      Left Ear: External ear normal.      Nose: Nose normal.   Neck:      Thyroid: No thyromegaly.      Trachea: No tracheal deviation.   Cardiovascular:      Rate and Rhythm: Normal rate and regular rhythm.      Heart sounds: No murmur heard.  Pulmonary:      Effort: Pulmonary effort is normal. No respiratory distress.      Breath sounds: Normal breath sounds.   Abdominal:      Palpations: Abdomen is soft.      Tenderness: There is no abdominal tenderness.      Hernia: No hernia is present.   Musculoskeletal:      Cervical back: Normal range of motion and neck supple.   Skin:     General: Skin is warm and dry.      Capillary Refill: Capillary refill takes less than 2 seconds.      Findings: No rash.      Comments:      Neurological:      Mental Status: He is alert and oriented to person, place, and time.      Cranial Nerves: No cranial nerve deficit.   Psychiatric:         Behavior: Behavior normal.         Judgment: Judgment normal.         FOOT EXAMINATION: Appropriate footwear           Lab Results   Component Value Date    TSH 1.282 08/29/2024           Type 2 diabetes  mellitus with hyperglycemia, without long-term current use of insulin  Uncontrolled   A1c has increased to 8% and this is with taking glimepiride 2 mg daily   Fingerstick and barak 2 are correlating   He is not open to injections   + medication noncompliance   + dietary indiscretions         -- Medication Changes:   Stop the glimepiride   Change to Rybelsus    Start with 3 mg daily for 1 month and then increase to 7 mg daily   Rybelsus    Oral: Initial: 3 mg once daily for 30 days, then increase to 7 mg once daily for at least 30 days; if further glycemic control is necessary, increase to 14 mg once daily. Note: The 3 mg dose is not effective for glycemic control and is intended only for therapy initiation.      Take the Rybelsus == first thing in the AM - with only 4 oz of WATER-- ALL BY ITSELF. WAIT 30 MINUTES TO EAT, DRINK, TAKE OTHER MEDICATIONS     If you have any stomach upset- please let me know     Continue the Metformin XR 1000 mg daily in the AM     Continue the Farxiga 10 mg daily   -- Please let us know if you have nausea, vomiting, or weakness with a normal BG. This could be euglycemic DKA.  -- please hold medication 3 days prior to surgery or if you are sick and have decreased intake.   -- Please drink lots of water daily while on this medication.   --This medication could also give you a yeast infection- please let us know if this occurs and we can treat it.     Continue the barak 2         -- Reviewed goals of therapy are to get the best control we can without hypoglycemia.  -- Advised frequent self blood glucose monitoring.  Patient encouraged to document glucose results and bring them to every clinic visit. Continue Barak 2-- supplies from pharmacy   -- Hypoglycemia precautions discussed. Instructed on precautions before driving.    -- Call for Bg repeatedly < 70 or > 180.   -- Close adherence to lifestyle changes recommended.   -- Periodic follow ups for eye evaluations, foot care and dental care  suggested.        Hypertension associated with diabetes  BP goal is < 140/90.   Tolerating ARB  Controlled   Blood pressure goals discussed with patient      Hyperlipidemia associated with type 2 diabetes mellitus  Off the statin therapy due to intolerance   LDL goal <100.   Continue the zetia 10 mg nightly   Start repatha injections every 2 weeks-- I sent this to [facility] and they will ship it to you. Call us- [phone]. We will help you with the shot       The 10-year ASCVD risk score (Anjel DK, et al., 2019) is: 28%    Values used to calculate the score:      Age: 69 years      Sex: Male      Is Non- : Yes      Diabetic: Yes      Tobacco smoker: No      Systolic Blood Pressure: 108 mmHg      Is BP treated: Yes      HDL Cholesterol: 44 mg/dL      Total Cholesterol: 267 mg/dL      Statin intolerance  See above     Malignant neoplasm of prostate  Following with oncology   Injections every 6 months   No steroids that he is aware     Iron deficiency anemia secondary to inadequate dietary iron intake  May skew A1c level     Overweight (BMI 25.0-29.9)  Body mass index is 26.96 kg/m².  Increases insulin resistance.   Discussed DM diet and exercise.         I spent a total of 30 minutes on the day of the visit.This includes face to face time and non-face to face time preparing to see the patient (eg, review of tests), obtaining and/or reviewing separately obtained history, documenting clinical information in the electronic or other health record, independently interpreting results and communicating results to the patient/family/caregiver, or care coordinator.          Follow up in about 3 months (around 12/5/2024).   Follow up with me in 3 moths with labs prior       Orders Placed This Encounter   Procedures    Hemoglobin A1C     Standing Status:   Future     Standing Expiration Date:   3/5/2026    Comprehensive Metabolic Panel     Standing Status:   Future     Standing  Expiration Date:   3/5/2026    Lipid Panel     Standing Status:   Future     Standing Expiration Date:   3/5/2026    Microalbumin/Creatinine Ratio, Urine     Standing Status:   Future     Standing Expiration Date:   3/5/2026     Order Specific Question:   Specimen Source     Answer:   Urine    POCT Glucose, Hand-Held Device       Recommendations were discussed with the patient in detail  The patient verbalized understanding and agrees with the plan outlined as above.     This note was partly generated with [employer] voice recognition software. I apologize for any possible typographical errors.

## 2024-09-05 NOTE — PATIENT INSTRUCTIONS
Stop the glimepiride   Change to Rybelsus    Start with 3 mg daily for 1 month and then increase to 7 mg daily   Rybelsus    Oral: Initial: 3 mg once daily for 30 days, then increase to 7 mg once daily for at least 30 days; if further glycemic control is necessary, increase to 14 mg once daily. Note: The 3 mg dose is not effective for glycemic control and is intended only for therapy initiation.      Take the Rybelsus == first thing in the AM - with only 4 oz of WATER-- ALL BY ITSELF. WAIT 30 MINUTES TO EAT, DRINK, TAKE OTHER MEDICATIONS     If you have any stomach upset- please let me know     Continue the Metformin XR 1000 mg daily in the AM     Continue the Farxiga 10 mg daily   -- Please let us know if you have nausea, vomiting, or weakness with a normal BG. This could be euglycemic DKA.  -- please hold medication 3 days prior to surgery or if you are sick and have decreased intake.   -- Please drink lots of water daily while on this medication.   --This medication could also give you a yeast infection- please let us know if this occurs and we can treat it.     Continue the richmond 2       Your cholesterol was high ! Too high   Cannot tolerate statins   Continue the zetia 10 mg nightly   Start repatha injections every 2 weeks-- I sent this to [facility] and they will ship it to you. Call us- [phone]. We will help you with the shot

## 2024-09-05 NOTE — ASSESSMENT & PLAN NOTE
Uncontrolled   A1c has increased to 8% and this is with taking glimepiride 2 mg daily   Fingerstick and barak 2 are correlating   He is not open to injections   + medication noncompliance   + dietary indiscretions         -- Medication Changes:   Stop the glimepiride   Change to Rybelsus    Start with 3 mg daily for 1 month and then increase to 7 mg daily   Rybelsus    Oral: Initial: 3 mg once daily for 30 days, then increase to 7 mg once daily for at least 30 days; if further glycemic control is necessary, increase to 14 mg once daily. Note: The 3 mg dose is not effective for glycemic control and is intended only for therapy initiation.      Take the Rybelsus == first thing in the AM - with only 4 oz of WATER-- ALL BY ITSELF. WAIT 30 MINUTES TO EAT, DRINK, TAKE OTHER MEDICATIONS     If you have any stomach upset- please let me know     Continue the Metformin XR 1000 mg daily in the AM     Continue the Farxiga 10 mg daily   -- Please let us know if you have nausea, vomiting, or weakness with a normal BG. This could be euglycemic DKA.  -- please hold medication 3 days prior to surgery or if you are sick and have decreased intake.   -- Please drink lots of water daily while on this medication.   --This medication could also give you a yeast infection- please let us know if this occurs and we can treat it.     Continue the barak 2         -- Reviewed goals of therapy are to get the best control we can without hypoglycemia.  -- Advised frequent self blood glucose monitoring.  Patient encouraged to document glucose results and bring them to every clinic visit. Continue Barak 2-- supplies from pharmacy   -- Hypoglycemia precautions discussed. Instructed on precautions before driving.    -- Call for Bg repeatedly < 70 or > 180.   -- Close adherence to lifestyle changes recommended.   -- Periodic follow ups for eye evaluations, foot care and dental care suggested.

## 2024-09-08 NOTE — PROGRESS NOTES
HPI    DLS: 5/06/2024    Pt here for 4 Month IOP Check:    Meds:  Cosopt TID OD   Brimonidin e TID OD   Rocklatan QHS OD       1. POAG OU   2. VF Defect OS   3. Chorioretinal Scar OS   4. Hx RD OS   5. NS OU   6. Type 2 DM no DR   7. Pupil Sphincter Rupture OS   8. Presbyopia   9 Trace APD OS   10. S/P trab os - 6/2023 - (LSLx3)     Last edited by Talya Hobson on 9/9/2024  9:16 AM.            Assessment /Plan     For exam results, see Encounter Report.    Primary open-angle glaucoma, left eye, moderate stage    Primary open-angle glaucoma, right eye, mild stage    Nuclear sclerotic cataract of both eyes    Pupillary sphincter rupture, left    Chorioretinal scar, left    Old subtotal retinal detachment, left    Glaucoma filtering bleb of left eye    Type 2 diabetes mellitus without ophthalmic manifestations        Pt initially dx with glaucoma at [facility] his gtts on his own   Presented to [facility] to [facility] 8/23/2006 - off gtts with a baseline / Tmax of 25/27  Referred by Courtney for consideration of SLT's   Pt with POAG and poorly controlled IOP's - does not use drops regularly       1. Primary open angle glaucoma, both eyes, mild stage        Glaucoma (type and duration)    POAG with elevated IOP ou - mild stage    First HVF   2007 - full od // SAD os 2/2 to CRS   First photos   2011   Treatment / Drops started   2006           Family history    ++ mother and 2 brothers         Glaucoma meds    Latanoprost // brimonidine //  cosopt (out of cosopt - 5/2/2023)         H/O adverse rxn to glaucoma drops    None (has tired alphagan and timolol in past -non compliant)         LASERS    SLT os - 3/31/2016 // SLT od - 4/14/2016 // repeat SLT OS 3/16/2023 - no response        GLAUCOMA SURGERIES    trab os - 6/14/2023 (LSL x 3)         OTHER EYE SURGERIES    H/O RD repair os - S/P laser repair         CDR    0.75 // 0.85-0.9         Tbase    25/27          Tmax    25/27            Ttarget    18/18              HVF    14 test 2006 to  2024 - near  full  od // SAD - 2/2 CRS from laser for RD repair os / new IAD (+prog when IOP up in the high 20's to 40's just pre- and post trab - 6/2023        Gonio    +3 ou          CCT    486/502 - thin ou         OCT    5 test 2018 to 2024 - RNFL - dec Tjorge G od - dec. G/NI/TI/N /NS/ TS  Jorge JORY  (has a CRS)  os        HRT    3 test 2017 to 2019 -MR -  DEC. Sn/n/in od // DEC. S/n/i, JORGE t os /// CDR 0.73 od // 0.86 os        Disc photos    2011, 2015 , 2017// harmony - 2022      - Ttoday  11/8    (on glaucoma drops - od // off gtts post trab os) )    - Test done today  - IOP,  gonio   -  // S/P trab OS 6/14/2023      2. 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 Chorioretinal scar, left   -with 2nd VF defect      4. Old subtotal retinal detachment, left   S/P repair - retina flat - stable   -large CRS   - pt states he did NOT go to OR - just Rx with laser at the slit lamp     5. Type 2 diabetes mellitus without ophthalmic manifestations      6. Senile nuclear sclerosis  -mild - monitor      7. pupil sphincter rupture os  -suggest old trauma  -does NOT appear to have a angle recession    8. Trace apd os      PLAN    POAG with OHT OS > OD - mild od // moderate os     The ON and VF's are still good od  ?? Trace APD os - first noted on 5/2/2023   The VF loss os is 2/2 old CRS- ?  post laser for a RD repair   Good initial response to SLT ou 25/25 --> 16/17    --  rec incisional surgery - doubt he has a scleral buckle  // + free conj sup - could try a trab w/ [facility]  / ? Xen os / express minishunt or no shunt or a GDD     Pt is NOT a diamox candidate - H/O sulfa allergy - hives     S/P trabeculectomy with mitomycin OS  Date:6/14/2023 ( still phackic w/ a buckle in place) (pre-op IOP  30-52 os)   POY > 1   without any stent or shunt   Number or sutures in flap  4  Number of sutures already cut - 3 -  (1st cut  6/22/2023) // 2nd cut 6/26/2023// 3rd suture cut 6/27/2023   anterior chamber depth  deep fully formed   Bleb appearance - flat prior to digital massage //  elevated after digital massage next to bleb   sidell neg  IOP  8  (S/P LSL x 3 - only one suture left)     cosopt cont OD // hold os   brim tid  cont OD // hold os  -  Rocklatan - OD only     1/2/2024    ++ VF porg os - occurred whn IOP up pre-op and high post op until 3rd suture lysis done   IOP ok od on gtts   IOP ok (low) off gtts post trab os       9/9/2024   IOP ok ou - on gtts od / off gtts post trab os   VA stable     F/U 4 months  with HVF - 24-2 ss od and try 10-2 ss os // DFE // OCT       Bethany Lechuga MD

## 2024-09-09 ENCOUNTER — OFFICE VISIT (OUTPATIENT)
Dept: OPHTHALMOLOGY | Facility: CLINIC | Age: 69
End: 2024-09-09
Payer: MEDICARE

## 2024-09-09 DIAGNOSIS — H25.13 NUCLEAR SCLEROTIC CATARACT OF BOTH EYES: ICD-10-CM

## 2024-09-09 DIAGNOSIS — H31.002 CHORIORETINAL SCAR, LEFT: ICD-10-CM

## 2024-09-09 DIAGNOSIS — H40.1122 PRIMARY OPEN-ANGLE GLAUCOMA, LEFT EYE, MODERATE STAGE: Primary | ICD-10-CM

## 2024-09-09 DIAGNOSIS — H33.052 OLD SUBTOTAL RETINAL DETACHMENT, LEFT: ICD-10-CM

## 2024-09-09 DIAGNOSIS — H21.562 PUPILLARY SPHINCTER RUPTURE, LEFT: ICD-10-CM

## 2024-09-09 DIAGNOSIS — H40.1111 PRIMARY OPEN-ANGLE GLAUCOMA, RIGHT EYE, MILD STAGE: ICD-10-CM

## 2024-09-09 DIAGNOSIS — E11.9 TYPE 2 DIABETES MELLITUS WITHOUT OPHTHALMIC MANIFESTATIONS: ICD-10-CM

## 2024-09-09 DIAGNOSIS — Z98.83 GLAUCOMA FILTERING BLEB OF LEFT EYE: ICD-10-CM

## 2024-09-09 PROCEDURE — 1159F MED LIST DOCD IN RCRD: CPT | Mod: CPTII,S$GLB,, | Performed by: OPHTHALMOLOGY

## 2024-09-09 PROCEDURE — 1101F PT FALLS ASSESS-DOCD LE1/YR: CPT | Mod: CPTII,S$GLB,, | Performed by: OPHTHALMOLOGY

## 2024-09-09 PROCEDURE — 3061F NEG MICROALBUMINURIA REV: CPT | Mod: CPTII,S$GLB,, | Performed by: OPHTHALMOLOGY

## 2024-09-09 PROCEDURE — 3066F NEPHROPATHY DOC TX: CPT | Mod: CPTII,S$GLB,, | Performed by: OPHTHALMOLOGY

## 2024-09-09 PROCEDURE — 3288F FALL RISK ASSESSMENT DOCD: CPT | Mod: CPTII,S$GLB,, | Performed by: OPHTHALMOLOGY

## 2024-09-09 PROCEDURE — 1126F AMNT PAIN NOTED NONE PRSNT: CPT | Mod: CPTII,S$GLB,, | Performed by: OPHTHALMOLOGY

## 2024-09-09 PROCEDURE — 99999 PR PBB SHADOW E&M-EST. PATIENT-LVL III: CPT | Mod: PBBFAC,,, | Performed by: OPHTHALMOLOGY

## 2024-09-09 PROCEDURE — 3052F HG A1C>EQUAL 8.0%<EQUAL 9.0%: CPT | Mod: CPTII,S$GLB,, | Performed by: OPHTHALMOLOGY

## 2024-09-09 PROCEDURE — 1160F RVW MEDS BY RX/DR IN RCRD: CPT | Mod: CPTII,S$GLB,, | Performed by: OPHTHALMOLOGY

## 2024-09-09 PROCEDURE — 4010F ACE/ARB THERAPY RXD/TAKEN: CPT | Mod: CPTII,S$GLB,, | Performed by: OPHTHALMOLOGY

## 2024-09-09 PROCEDURE — 99214 OFFICE O/P EST MOD 30 MIN: CPT | Mod: S$GLB,,, | Performed by: OPHTHALMOLOGY

## 2024-09-13 ENCOUNTER — TELEPHONE (OUTPATIENT)
Dept: HEMATOLOGY/ONCOLOGY | Facility: CLINIC | Age: 69
End: 2024-09-13
Payer: MEDICARE

## 2024-09-13 NOTE — TELEPHONE ENCOUNTER
Cancer Genetics  Hereditary and High-Risk Clinic  Department of Hematology and Oncology  [facility] [place]    [facility]    Patient ID  Name: Eyad Garcia    : 1955    MRN: [id number]      IMPRESSION     Eyad's panel genetic testing was negative for actionable mutations in 34 genes associated with hereditary breast, gastrointestinal, gynecologic, pancreatic, prostate and skin cancers. Results were disclosed over the phone on 24.    DISCUSSION      GENETIC TEST RESULTS    Eyad had a sample submitted on 24 to [facility] for CancerNext +RNAinsight panel testing. This panel includes sequencing and/or deletion/duplication analysis of the following 34 genes:    APC, JUAN PABLO, AXIN2, BARD1, BMPR1A, BRCA1, BRCA2, BRIP1, CDH1, CDK4, CDKN2A, CHEK2, DICER1, EPCAM, GREM1, HOXB13, MLH1, MSH2, MSH3, MSH6, MUTYH, NF1, NTHL1, PALB2, PMS2, POLD1, POLE, PTEN, RAD51C, RAD51D, SMAD4, SMARCA4, STK11, TP53     The results were negative for actionable mutations in any of these genes. This is considered a negative result, but it does not completely rule out a hereditary form of cancer in his family. Some individuals/families with cancer may have a mutation in a gene that is not included in this panel, and some may have mutations in one of these genes that is not detectable with our current technology. It could also be that Eyad's personal and family history of cancer is not due to a hereditary cause.     Given his negative results, the likelihood of a hereditary form of cancer has been drastically reduced for Eyad and his family. He has undergone comprehensive hereditary cancer genetic testing, and no further genetic testing is recommended at this time.    CANCER RISKS    We have no reason to expect that Eyad has an increased risk for other cancers based on his genetic test results. He is encouraged to continue his prostate cancer treatment, and increased colon cancer screening based on his polyp  history.    FAMILY MEMBERS    Eyad's close male relatives may still be at increased risk of prostate cancer given his diagnosis. They are encouraged to speak with their physicians about prostate cancer screening beginning at 39yo. However, it is not recommended for unaffected individuals to undergo genetic testing at this time.    Eyad received comprehensive genetic counseling regarding his negative genetic test results. Benefits and limitations were discussed, and he was provided with an electronic copy of her results report. Eyad is encouraged to contact cancer genetics if there are any updates to his personal or family history, or if he has any questions or concerns.    This assessment is based on the history and reports provided, as well as the current scientific knowledge regarding cancer genetics.    Ana Luisa Ordonez, [facility], [facility]  Senior Genetic Counselor, Hereditary and High-Risk Clinic  Department of Hematology and Oncology  [facility]        CC:  Dr. Jarret Mehta

## 2024-09-25 ENCOUNTER — PATIENT MESSAGE (OUTPATIENT)
Dept: DIABETES | Facility: CLINIC | Age: 69
End: 2024-09-25
Payer: MEDICARE

## 2024-09-30 DIAGNOSIS — M41.9 SCOLIOSIS, UNSPECIFIED SCOLIOSIS TYPE, UNSPECIFIED SPINAL REGION: Primary | ICD-10-CM

## 2024-09-30 RX ORDER — MELOXICAM 15 MG/1
15 TABLET ORAL
Qty: 90 TABLET | Refills: 0 | Status: SHIPPED | OUTPATIENT
Start: 2024-09-30

## 2024-10-14 ENCOUNTER — PATIENT OUTREACH (OUTPATIENT)
Dept: ADMINISTRATIVE | Facility: HOSPITAL | Age: 69
End: 2024-10-14
Payer: MEDICARE

## 2024-10-15 ENCOUNTER — PATIENT MESSAGE (OUTPATIENT)
Dept: DIABETES | Facility: CLINIC | Age: 69
End: 2024-10-15
Payer: MEDICARE

## 2024-10-28 ENCOUNTER — PATIENT MESSAGE (OUTPATIENT)
Dept: PRIMARY CARE CLINIC | Facility: CLINIC | Age: 69
End: 2024-10-28
Payer: MEDICARE

## 2024-10-30 ENCOUNTER — PATIENT MESSAGE (OUTPATIENT)
Dept: ADMINISTRATIVE | Facility: OTHER | Age: 69
End: 2024-10-30
Payer: MEDICARE

## 2024-11-18 ENCOUNTER — PATIENT OUTREACH (OUTPATIENT)
Dept: ADMINISTRATIVE | Facility: HOSPITAL | Age: 69
End: 2024-11-18
Payer: MEDICARE

## 2024-11-18 NOTE — PROGRESS NOTES
Health Maintenance reviewed, updated and links triggered. HM'S up to date. SDOH questions answered  (Fford) 11/18/24    Care Coordination Encounter Details:       MyChart Portal Status:         [x]  Reviewed MyChart Portal Status offered / enrolled if applicable        Additional Notes:     MyChart Outcomes: Pt is enrolled & active          Updates Requested / Reviewed:        Care Everywhere         Health Maintenance Screening(s) Due:      Health Maintenance Topics Overdue:      VBHM Score: 0     Patient is not due for any topics at this time.    Influenza Vaccine  RSV Vaccine                  Health Maintenance Topic(s) Outreach Outcomes & Actions Taken:                  Additional Notes:  Health Maintenance reviewed, updated and links triggered. HM'S up to date. SDOH questions answered  (Fford) 11/18/24           Chronic Disease Management:     Diabetes Measures        Lab Results   Component Value Date    HGBA1C 8.0 (H) 08/29/2024           [x]  Reviewed chart for active Diabetes diagnosis     [x]  Scheduled necessary follow up appointments if needed         Additional Notes:             Hypertension Measures        BP Readings from Last 1 Encounters:   09/05/24 108/60           [x]  Reviewed chart for active Hypertension diagnosis     [x]  Reviewed & documented Home BP Cuff     [x]  Documented a Remote BP if needed & applicable     [x]  Scheduled necessary follow up appointments with Primary Care if needed         Additional Notes:    WNL         Provider Team Continuity:     Last PCP Visit Date: 1/18/2024          [x]  Reviewed Primary Care Provider Visits, Annual Wellness Visit, and Future          Appointments to ensure appointments have been scheduled and/or           completed        Additional Notes:             Social Determinants of Health          [x]  Reviewed, completed, and/or updated the following sections:                  Food Insecurity, Transportation Needs, Financial Resource Strain,                  Tobacco Use        Additional Notes:             Care Management, Digital Medicine, and/or Education Referrals    OPCM Risk Score: 17.9                 Additional Notes:

## 2024-12-02 ENCOUNTER — PATIENT MESSAGE (OUTPATIENT)
Dept: DIABETES | Facility: CLINIC | Age: 69
End: 2024-12-02
Payer: MEDICARE

## 2024-12-05 ENCOUNTER — TELEPHONE (OUTPATIENT)
Dept: DIABETES | Facility: CLINIC | Age: 69
End: 2024-12-05
Payer: MEDICARE

## 2024-12-05 NOTE — PROGRESS NOTES
CC:   Chief Complaint   Patient presents with    Diabetes Mellitus       HPI: Eyad Garcia is a 69 y.o. male presents for a follow up visit today for the management of Diabetes       He was diagnosed with Type 2 diabetes more than 40 years ago-- in his 20's-30's     Family hx of diabetes: mother, many siblings - he is 1 of 12 children   Hospitalized for diabetes: denies    Insulin therapy: denies       No personal or FH of thyroid cancer or personal of pancreatic cancer or pancreatitis.       Our last visit was in September of 2024  At that visit we stop the glimepiride  Change to Rybelsus   Continued the metformin 1000 mg daily  Continued the Farxiga 10 mg daily  Continued his Barak 2  His A1c went from 8% to 7.6%  See attached Barak download  He took the Rybelsus 2-3 times and had dizziness -- so he stopped the medication and went back to Glimepiride   After discussion- it sound like he was taking the Rybelsus AND the Glimepiride together- likely having hypoglycemia and that is why he was dizzy     He has the Repatha injection at home but he has not started it yet            Dispensed Days Supply Quantity Provider Pharmacy   RYBELSUS 3 MG TABLET 10/01/2024 30 30 each Marisol Bob NP [id number]         How do dispenses affect the score?                                 DIABETES COMPLICATIONS: none    Hx of glaucoma       Diabetes Management Status    ASA:  No    Statin: not taking it -  stopped the Pravastatin due to issues with walking  ( looks like it was d/c by oncology)-- unable to tolerate Lipitor-- had severe myalgia with Lipitor   On zetia 10 mg nightly   Not taking the Repatha    ACE/ARB: Taking-- Benicar 40 mg       The 10-year ASCVD risk score (Anjel NGO, et al., 2019) is: 36.2%    Values used to calculate the score:      Age: 69 years      Sex: Male      Is Non- : Yes      Diabetic: Yes      Tobacco smoker: No      Systolic Blood Pressure: 126 mmHg      Is BP  treated: Yes      HDL Cholesterol: 39 mg/dL      Total Cholesterol: 250 mg/dL      Screening or Prevention Patient's value Goal Complete/Controlled?   HgA1C Testing and Control   Lab Results   Component Value Date    HGBA1C 7.6 (H) 11/29/2024      Annually/Less than 8% No   Lipid profile : 11/29/2024 Annually Yes   LDL control Lab Results   Component Value Date    LDLCALC 190.6 (H) 11/29/2024    Annually/Less than 100 mg/dl  No   Nephropathy screening Lab Results   Component Value Date    LABMICR 7.0 11/29/2024     Lab Results   Component Value Date    PROTEINUA Negative 05/29/2022    Annually Yes   Blood pressure BP Readings from Last 1 Encounters:   12/06/24 126/72    Less than 140/90 No   Dilated retinal exam : 01/02/2024 Annually No   Foot exam   : 02/29/2024 Annually No       CURRENT A1C:    Hemoglobin A1C   Date Value Ref Range Status   11/29/2024 7.6 (H) 4.0 - 5.6 % Final     Comment:     ADA Screening Guidelines:  5.7-6.4%  Consistent with prediabetes  >or=6.5%  Consistent with diabetes    High levels of fetal hemoglobin interfere with the HbA1C  assay. Heterozygous hemoglobin variants (HbS, HgC, etc)do  not significantly interfere with this assay.   However, presence of multiple variants may affect accuracy.     08/29/2024 8.0 (H) 4.0 - 5.6 % Final     Comment:     ADA Screening Guidelines:  5.7-6.4%  Consistent with prediabetes  >or=6.5%  Consistent with diabetes    High levels of fetal hemoglobin interfere with the HbA1C  assay. Heterozygous hemoglobin variants (HbS, HgC, etc)do  not significantly interfere with this assay.   However, presence of multiple variants may affect accuracy.     04/23/2024 7.0 (H) 4.0 - 5.6 % Final     Comment:     ADA Screening Guidelines:  5.7-6.4%  Consistent with prediabetes  >or=6.5%  Consistent with diabetes    High levels of fetal hemoglobin interfere with the HbA1C  assay. Heterozygous hemoglobin variants (HbS, HgC, etc)do  not significantly interfere with this assay.    However, presence of multiple variants may affect accuracy.         GOAL A1C: 7% without hypoglycemia     DM MEDICATIONS USED IN THE PAST: Metformin XR  Farxiga    Jardiance- urinary frequently   Tradjenta   Glimepiride   Rybelsus- dizziness       CURRENT DIABETES MEDICATIONS: Metformin XR 1000 mg daily -- when he takes the Metformin BID- it causes GI upset   Farxiga 10 mg daily   Glimepiride 2 mg daily in the AM-- sometimes taking without food    Insulin: N/A   Missed doses: occ   Out of the farxiga for a couple of weeks       BLOOD GLUCOSE MONITORING:      Sensor type: Freestyle richmond 2   Average BG readin  Time in range: 58%  GMI- 7.6%  Site change: q14 days.    Sensor was downloaded in clinic today and reviewed with patient.   Please see attached document for download.     [facility]       HYPOGLYCEMIA:  0% low --  0% very low       MEALS: eating 3 meals per day   BF: cheerios or sandwich or full meal   Lunch: sandwich - ham   Dinner: home cooked - cabbage/ mustard greens, sweet potato - black eyed peas   Snack:occ- 1 cookie   Drinks water   Stopped coffee in the AM        CURRENT EXERCISE:  No      Review of Systems  Review of Systems   Constitutional:  Negative for appetite change, fatigue and unexpected weight change.   HENT:  Positive for postnasal drip. Negative for trouble swallowing.    Eyes:  Negative for visual disturbance.   Respiratory:  Positive for cough. Negative for shortness of breath.    Cardiovascular:  Negative for chest pain.   Gastrointestinal:  Negative for nausea.   Endocrine: Negative for polydipsia, polyphagia and polyuria.   Genitourinary:         No Nocturia    Skin:  Negative for wound.   Neurological:  Positive for numbness (intermittent pins and needles to right hand - comes and goes).       Physical Exam   Physical Exam  Vitals and nursing note reviewed.   Constitutional:       Appearance: He is well-developed.      Comments: Overweight male    HENT:      Head:  Normocephalic and atraumatic.      Right Ear: External ear normal.      Left Ear: External ear normal.      Nose: Nose normal.   Neck:      Thyroid: No thyromegaly.      Trachea: No tracheal deviation.   Cardiovascular:      Rate and Rhythm: Normal rate and regular rhythm.      Heart sounds: No murmur heard.  Pulmonary:      Effort: Pulmonary effort is normal. No respiratory distress.      Breath sounds: Normal breath sounds.   Abdominal:      Palpations: Abdomen is soft.      Tenderness: There is no abdominal tenderness.      Hernia: No hernia is present.   Musculoskeletal:      Cervical back: Normal range of motion and neck supple.   Skin:     General: Skin is warm and dry.      Capillary Refill: Capillary refill takes less than 2 seconds.      Findings: No rash.      Comments:      Neurological:      Mental Status: He is alert and oriented to person, place, and time.      Cranial Nerves: No cranial nerve deficit.   Psychiatric:         Behavior: Behavior normal.         Judgment: Judgment normal.         FOOT EXAMINATION: Appropriate footwear           Lab Results   Component Value Date    TSH 1.282 08/29/2024           Type 2 diabetes mellitus with hyperglycemia, without long-term current use of insulin  Uncontrolled - A1c has improved but still above goal   He wants to try Rybelsus again - he doesn't feel like he gave it a fair shot and wants to try again   He is not open to injections   + medication noncompliance   + dietary indiscretions         -- Medication Changes:   Stop the glimepiride   Change to Rybelsus    Start with 3 mg daily for 1 month and then increase to 7 mg daily   Rybelsus     Oral: Initial: 3 mg once daily for 30 days, then increase to 7 mg once daily for at least 30 days; if further glycemic control is necessary, increase to 14 mg once daily. Note: The 3 mg dose is not effective for glycemic control and is intended only for therapy initiation.        Take the Rybelsus == first thing in the  AM - with only 4 oz of WATER-- ALL BY ITSELF. WAIT 30 MINUTES TO EAT, DRINK, TAKE OTHER MEDICATIONS      If you have any stomach upset- please let me know or the dizziness let me know     Continue the Metformin XR 1000 mg daily in the AM      Continue the Farxiga 10 mg daily   -- Please let us know if you have nausea, vomiting, or weakness with a normal BG. This could be euglycemic DKA.  -- please hold medication 3 days prior to surgery or if you are sick and have decreased intake.   -- Please drink lots of water daily while on this medication.   --This medication could also give you a yeast infection- please let us know if this occurs and we can treat it.      Continue the barak 2        -- Reviewed goals of therapy are to get the best control we can without hypoglycemia.  -- Advised frequent self blood glucose monitoring.  Patient encouraged to document glucose results and bring them to every clinic visit. Continue Barak 2-- supplies from pharmacy   -- Hypoglycemia precautions discussed. Instructed on precautions before driving.    -- Call for Bg repeatedly < 70 or > 180.   -- Close adherence to lifestyle changes recommended.   -- Periodic follow ups for eye evaluations, foot care and dental care suggested.        Hypertension associated with diabetes  BP goal is < 140/90.   Tolerating ARB  Controlled   Blood pressure goals discussed with patient      Hyperlipidemia associated with type 2 diabetes mellitus  Off the statin therapy due to intolerance   LDL goal <100.   LDL above goal - has NOT started Repatha injections     Continue the zetia 10 mg nightly   Start repatha injections every 2 weeks-- I sent this to [facility] and they will ship it to you. Call us- [phone]. We will help you with the shot   YOU MUST START THE SHOT!!!! VERY IMPORTANT!!!!       The 10-year ASCVD risk score (Anjel NGO, et al., 2019) is: 36.2%    Values used to calculate the score:      Age: 69 years      Sex: Male       Is Non- : Yes      Diabetic: Yes      Tobacco smoker: No      Systolic Blood Pressure: 126 mmHg      Is BP treated: Yes      HDL Cholesterol: 39 mg/dL      Total Cholesterol: 250 mg/dL      Statin intolerance  See above     Malignant neoplasm of prostate  Following with oncology   Injections every 6 months   No steroids that he is aware     Iron deficiency anemia secondary to inadequate dietary iron intake  May skew A1c level     Overweight (BMI 25.0-29.9)  Body mass index is 27.57 kg/m².  Increases insulin resistance.   Discussed DM diet and exercise.         I spent a total of 30 minutes on the day of the visit.This includes face to face time and non-face to face time preparing to see the patient (eg, review of tests), obtaining and/or reviewing separately obtained history, documenting clinical information in the electronic or other health record, independently interpreting results and communicating results to the patient/family/caregiver, or care coordinator.          Follow up in about 3 months (around 3/6/2025).   Follow up with me in 3 moths with labs prior       Orders Placed This Encounter   Procedures    Hemoglobin A1C     Standing Status:   Future     Standing Expiration Date:   6/6/2026    Comprehensive Metabolic Panel     Standing Status:   Future     Standing Expiration Date:   6/6/2026    Lipid Panel     Standing Status:   Future     Standing Expiration Date:   6/6/2026       Recommendations were discussed with the patient in detail  The patient verbalized understanding and agrees with the plan outlined as above.     This note was partly generated with [employer] voice recognition software. I apologize for any possible typographical errors.

## 2024-12-06 ENCOUNTER — OFFICE VISIT (OUTPATIENT)
Dept: DIABETES | Facility: CLINIC | Age: 69
End: 2024-12-06
Payer: MEDICARE

## 2024-12-06 VITALS
OXYGEN SATURATION: 97 % | BODY MASS INDEX: 27.61 KG/M2 | DIASTOLIC BLOOD PRESSURE: 72 MMHG | HEART RATE: 75 BPM | WEIGHT: 165.69 LBS | SYSTOLIC BLOOD PRESSURE: 126 MMHG | HEIGHT: 65 IN

## 2024-12-06 DIAGNOSIS — J40 BRONCHITIS: ICD-10-CM

## 2024-12-06 DIAGNOSIS — C61 MALIGNANT NEOPLASM OF PROSTATE: ICD-10-CM

## 2024-12-06 DIAGNOSIS — Z91.199 NONCOMPLIANCE WITH DIABETES TREATMENT: ICD-10-CM

## 2024-12-06 DIAGNOSIS — E78.5 HYPERLIPIDEMIA ASSOCIATED WITH TYPE 2 DIABETES MELLITUS: ICD-10-CM

## 2024-12-06 DIAGNOSIS — Z71.9 HEALTH EDUCATION/COUNSELING: ICD-10-CM

## 2024-12-06 DIAGNOSIS — E11.69 HYPERLIPIDEMIA ASSOCIATED WITH TYPE 2 DIABETES MELLITUS: ICD-10-CM

## 2024-12-06 DIAGNOSIS — I15.2 HYPERTENSION ASSOCIATED WITH DIABETES: ICD-10-CM

## 2024-12-06 DIAGNOSIS — Z78.9 STATIN INTOLERANCE: ICD-10-CM

## 2024-12-06 DIAGNOSIS — D50.8 IRON DEFICIENCY ANEMIA SECONDARY TO INADEQUATE DIETARY IRON INTAKE: ICD-10-CM

## 2024-12-06 DIAGNOSIS — E66.3 OVERWEIGHT (BMI 25.0-29.9): ICD-10-CM

## 2024-12-06 DIAGNOSIS — J30.2 SEASONAL ALLERGIC RHINITIS, UNSPECIFIED TRIGGER: ICD-10-CM

## 2024-12-06 DIAGNOSIS — E11.65 TYPE 2 DIABETES MELLITUS WITH HYPERGLYCEMIA, WITHOUT LONG-TERM CURRENT USE OF INSULIN: Primary | ICD-10-CM

## 2024-12-06 DIAGNOSIS — E11.59 HYPERTENSION ASSOCIATED WITH DIABETES: ICD-10-CM

## 2024-12-06 DIAGNOSIS — Z91.148 NONCOMPLIANCE W/MEDICATION TREATMENT DUE TO INTERMIT USE OF MEDICATION: ICD-10-CM

## 2024-12-06 DIAGNOSIS — Z91.148 NONCOMPLIANCE WITH MEDICATIONS: ICD-10-CM

## 2024-12-06 PROCEDURE — 3078F DIAST BP <80 MM HG: CPT | Mod: CPTII,S$GLB,, | Performed by: NURSE PRACTITIONER

## 2024-12-06 PROCEDURE — 3066F NEPHROPATHY DOC TX: CPT | Mod: CPTII,S$GLB,, | Performed by: NURSE PRACTITIONER

## 2024-12-06 PROCEDURE — 3074F SYST BP LT 130 MM HG: CPT | Mod: CPTII,S$GLB,, | Performed by: NURSE PRACTITIONER

## 2024-12-06 PROCEDURE — 95251 CONT GLUC MNTR ANALYSIS I&R: CPT | Mod: S$GLB,,, | Performed by: NURSE PRACTITIONER

## 2024-12-06 PROCEDURE — 1160F RVW MEDS BY RX/DR IN RCRD: CPT | Mod: CPTII,S$GLB,, | Performed by: NURSE PRACTITIONER

## 2024-12-06 PROCEDURE — 3008F BODY MASS INDEX DOCD: CPT | Mod: CPTII,S$GLB,, | Performed by: NURSE PRACTITIONER

## 2024-12-06 PROCEDURE — 3061F NEG MICROALBUMINURIA REV: CPT | Mod: CPTII,S$GLB,, | Performed by: NURSE PRACTITIONER

## 2024-12-06 PROCEDURE — 1159F MED LIST DOCD IN RCRD: CPT | Mod: CPTII,S$GLB,, | Performed by: NURSE PRACTITIONER

## 2024-12-06 PROCEDURE — 3051F HG A1C>EQUAL 7.0%<8.0%: CPT | Mod: CPTII,S$GLB,, | Performed by: NURSE PRACTITIONER

## 2024-12-06 PROCEDURE — 1101F PT FALLS ASSESS-DOCD LE1/YR: CPT | Mod: CPTII,S$GLB,, | Performed by: NURSE PRACTITIONER

## 2024-12-06 PROCEDURE — 3288F FALL RISK ASSESSMENT DOCD: CPT | Mod: CPTII,S$GLB,, | Performed by: NURSE PRACTITIONER

## 2024-12-06 PROCEDURE — 99999 PR PBB SHADOW E&M-EST. PATIENT-LVL V: CPT | Mod: PBBFAC,,, | Performed by: NURSE PRACTITIONER

## 2024-12-06 PROCEDURE — 1126F AMNT PAIN NOTED NONE PRSNT: CPT | Mod: CPTII,S$GLB,, | Performed by: NURSE PRACTITIONER

## 2024-12-06 PROCEDURE — 99214 OFFICE O/P EST MOD 30 MIN: CPT | Mod: S$GLB,,, | Performed by: NURSE PRACTITIONER

## 2024-12-06 PROCEDURE — 4010F ACE/ARB THERAPY RXD/TAKEN: CPT | Mod: CPTII,S$GLB,, | Performed by: NURSE PRACTITIONER

## 2024-12-06 RX ORDER — FLASH GLUCOSE SENSOR
1 KIT MISCELLANEOUS
Qty: 2 KIT | Refills: 11 | Status: SHIPPED | OUTPATIENT
Start: 2024-12-06

## 2024-12-06 RX ORDER — ORAL SEMAGLUTIDE 7 MG/1
7 TABLET ORAL DAILY
Qty: 30 TABLET | Refills: 5 | Status: SHIPPED | OUTPATIENT
Start: 2024-12-06

## 2024-12-06 RX ORDER — EZETIMIBE 10 MG/1
10 TABLET ORAL NIGHTLY
Qty: 90 TABLET | Refills: 3 | Status: SHIPPED | OUTPATIENT
Start: 2024-12-06 | End: 2025-12-06

## 2024-12-06 RX ORDER — MINERAL OIL
180 ENEMA (ML) RECTAL DAILY
Qty: 90 TABLET | Refills: 2 | Status: SHIPPED | OUTPATIENT
Start: 2024-12-06 | End: 2025-12-06

## 2024-12-06 RX ORDER — GLIMEPIRIDE 2 MG/1
2 TABLET ORAL
COMMUNITY
Start: 2024-09-27 | End: 2024-12-06

## 2024-12-06 RX ORDER — DAPAGLIFLOZIN 10 MG/1
10 TABLET, FILM COATED ORAL DAILY
Qty: 90 TABLET | Refills: 3 | Status: SHIPPED | OUTPATIENT
Start: 2024-12-06

## 2024-12-06 RX ORDER — METFORMIN HYDROCHLORIDE 500 MG/1
1000 TABLET, EXTENDED RELEASE ORAL DAILY
Qty: 180 TABLET | Refills: 3 | Status: SHIPPED | OUTPATIENT
Start: 2024-12-06 | End: 2025-12-06

## 2024-12-06 RX ORDER — FLUTICASONE PROPIONATE 50 MCG
2 SPRAY, SUSPENSION (ML) NASAL 2 TIMES DAILY PRN
Qty: 16 G | Refills: 11 | Status: SHIPPED | OUTPATIENT
Start: 2024-12-06

## 2024-12-06 NOTE — PATIENT INSTRUCTIONS
Stop the glimepiride   Change to Rybelsus    Start with 3 mg daily for 1 month and then increase to 7 mg daily   Rybelsus     Oral: Initial: 3 mg once daily for 30 days, then increase to 7 mg once daily for at least 30 days; if further glycemic control is necessary, increase to 14 mg once daily. Note: The 3 mg dose is not effective for glycemic control and is intended only for therapy initiation.        Take the Rybelsus == first thing in the AM - with only 4 oz of WATER-- ALL BY ITSELF. WAIT 30 MINUTES TO EAT, DRINK, TAKE OTHER MEDICATIONS      If you have any stomach upset- please let me know or the dizziness let me know     Continue the Metformin XR 1000 mg daily in the AM      Continue the Farxiga 10 mg daily   -- Please let us know if you have nausea, vomiting, or weakness with a normal BG. This could be euglycemic DKA.  -- please hold medication 3 days prior to surgery or if you are sick and have decreased intake.   -- Please drink lots of water daily while on this medication.   --This medication could also give you a yeast infection- please let us know if this occurs and we can treat it.      Continue the richmond 2        For cholesterol --   Continue the zetia 10 mg nightly   Start repatha injections every 2 weeks-- I sent this to [facility] and they will ship it to you. Call us- [phone]. We will help you with the shot   YOU MUST START THE SHOT!!!! VERY IMPORTANT!!!!

## 2024-12-10 ENCOUNTER — LAB VISIT (OUTPATIENT)
Dept: LAB | Facility: HOSPITAL | Age: 69
End: 2024-12-10
Payer: MEDICARE

## 2024-12-10 ENCOUNTER — OFFICE VISIT (OUTPATIENT)
Dept: HEMATOLOGY/ONCOLOGY | Facility: CLINIC | Age: 69
End: 2024-12-10
Payer: MEDICARE

## 2024-12-10 VITALS
BODY MASS INDEX: 27.11 KG/M2 | OXYGEN SATURATION: 97 % | WEIGHT: 162.94 LBS | SYSTOLIC BLOOD PRESSURE: 163 MMHG | DIASTOLIC BLOOD PRESSURE: 80 MMHG | HEART RATE: 81 BPM | RESPIRATION RATE: 18 BRPM | TEMPERATURE: 98 F

## 2024-12-10 DIAGNOSIS — C61 MALIGNANT NEOPLASM OF PROSTATE: ICD-10-CM

## 2024-12-10 DIAGNOSIS — E11.69 HYPERLIPIDEMIA ASSOCIATED WITH TYPE 2 DIABETES MELLITUS: ICD-10-CM

## 2024-12-10 DIAGNOSIS — I15.2 HYPERTENSION ASSOCIATED WITH DIABETES: ICD-10-CM

## 2024-12-10 DIAGNOSIS — E78.5 HYPERLIPIDEMIA ASSOCIATED WITH TYPE 2 DIABETES MELLITUS: ICD-10-CM

## 2024-12-10 DIAGNOSIS — E11.59 HYPERTENSION ASSOCIATED WITH DIABETES: ICD-10-CM

## 2024-12-10 DIAGNOSIS — C61 MALIGNANT NEOPLASM OF PROSTATE: Primary | ICD-10-CM

## 2024-12-10 DIAGNOSIS — Z79.818 ANDROGEN DEPRIVATION THERAPY: ICD-10-CM

## 2024-12-10 DIAGNOSIS — Z79.899 LONG TERM CURRENT USE OF THERAPEUTIC DRUG: ICD-10-CM

## 2024-12-10 DIAGNOSIS — E11.65 TYPE 2 DIABETES MELLITUS WITH HYPERGLYCEMIA, WITHOUT LONG-TERM CURRENT USE OF INSULIN: ICD-10-CM

## 2024-12-10 LAB
ALBUMIN SERPL BCP-MCNC: 3.9 G/DL (ref 3.5–5.2)
ALP SERPL-CCNC: 63 U/L (ref 40–150)
ALT SERPL W/O P-5'-P-CCNC: 53 U/L (ref 10–44)
ANION GAP SERPL CALC-SCNC: 10 MMOL/L (ref 8–16)
AST SERPL-CCNC: 47 U/L (ref 10–40)
BILIRUB SERPL-MCNC: 0.4 MG/DL (ref 0.1–1)
BUN SERPL-MCNC: 11 MG/DL (ref 8–23)
CALCIUM SERPL-MCNC: 9.9 MG/DL (ref 8.7–10.5)
CHLORIDE SERPL-SCNC: 105 MMOL/L (ref 95–110)
CO2 SERPL-SCNC: 24 MMOL/L (ref 23–29)
COMPLEXED PSA SERPL-MCNC: <0.01 NG/ML (ref 0–4)
CREAT SERPL-MCNC: 0.9 MG/DL (ref 0.5–1.4)
ERYTHROCYTE [DISTWIDTH] IN BLOOD BY AUTOMATED COUNT: 11.9 % (ref 11.5–14.5)
EST. GFR  (NO RACE VARIABLE): >60 ML/MIN/1.73 M^2
GLUCOSE SERPL-MCNC: 152 MG/DL (ref 70–110)
HCT VFR BLD AUTO: 38 % (ref 40–54)
HGB BLD-MCNC: 12.5 G/DL (ref 14–18)
IMM GRANULOCYTES # BLD AUTO: 0.06 K/UL (ref 0–0.04)
MCH RBC QN AUTO: 29.9 PG (ref 27–31)
MCHC RBC AUTO-ENTMCNC: 32.9 G/DL (ref 32–36)
MCV RBC AUTO: 91 FL (ref 82–98)
NEUTROPHILS # BLD AUTO: 4.3 K/UL (ref 1.8–7.7)
PLATELET # BLD AUTO: 290 K/UL (ref 150–450)
PMV BLD AUTO: 8.9 FL (ref 9.2–12.9)
POTASSIUM SERPL-SCNC: 4 MMOL/L (ref 3.5–5.1)
PROT SERPL-MCNC: 7.1 G/DL (ref 6–8.4)
RBC # BLD AUTO: 4.18 M/UL (ref 4.6–6.2)
SODIUM SERPL-SCNC: 139 MMOL/L (ref 136–145)
TESTOST SERPL-MCNC: 20 NG/DL (ref 304–1227)
WBC # BLD AUTO: 7.04 K/UL (ref 3.9–12.7)

## 2024-12-10 PROCEDURE — 1126F AMNT PAIN NOTED NONE PRSNT: CPT | Mod: CPTII,S$GLB,, | Performed by: HOSPITALIST

## 2024-12-10 PROCEDURE — 1101F PT FALLS ASSESS-DOCD LE1/YR: CPT | Mod: CPTII,S$GLB,, | Performed by: HOSPITALIST

## 2024-12-10 PROCEDURE — 99999 PR PBB SHADOW E&M-EST. PATIENT-LVL IV: CPT | Mod: PBBFAC,,, | Performed by: HOSPITALIST

## 2024-12-10 PROCEDURE — 3288F FALL RISK ASSESSMENT DOCD: CPT | Mod: CPTII,S$GLB,, | Performed by: HOSPITALIST

## 2024-12-10 PROCEDURE — 3051F HG A1C>EQUAL 7.0%<8.0%: CPT | Mod: CPTII,S$GLB,, | Performed by: HOSPITALIST

## 2024-12-10 PROCEDURE — 3061F NEG MICROALBUMINURIA REV: CPT | Mod: CPTII,S$GLB,, | Performed by: HOSPITALIST

## 2024-12-10 PROCEDURE — 85027 COMPLETE CBC AUTOMATED: CPT | Performed by: HOSPITALIST

## 2024-12-10 PROCEDURE — 84153 ASSAY OF PSA TOTAL: CPT | Performed by: HOSPITALIST

## 2024-12-10 PROCEDURE — 36415 COLL VENOUS BLD VENIPUNCTURE: CPT | Performed by: HOSPITALIST

## 2024-12-10 PROCEDURE — 3008F BODY MASS INDEX DOCD: CPT | Mod: CPTII,S$GLB,, | Performed by: HOSPITALIST

## 2024-12-10 PROCEDURE — 3079F DIAST BP 80-89 MM HG: CPT | Mod: CPTII,S$GLB,, | Performed by: HOSPITALIST

## 2024-12-10 PROCEDURE — 3066F NEPHROPATHY DOC TX: CPT | Mod: CPTII,S$GLB,, | Performed by: HOSPITALIST

## 2024-12-10 PROCEDURE — 84403 ASSAY OF TOTAL TESTOSTERONE: CPT | Performed by: HOSPITALIST

## 2024-12-10 PROCEDURE — G2211 COMPLEX E/M VISIT ADD ON: HCPCS | Mod: S$GLB,,, | Performed by: HOSPITALIST

## 2024-12-10 PROCEDURE — 80053 COMPREHEN METABOLIC PANEL: CPT | Performed by: HOSPITALIST

## 2024-12-10 PROCEDURE — 99215 OFFICE O/P EST HI 40 MIN: CPT | Mod: S$GLB,,, | Performed by: HOSPITALIST

## 2024-12-10 PROCEDURE — 1159F MED LIST DOCD IN RCRD: CPT | Mod: CPTII,S$GLB,, | Performed by: HOSPITALIST

## 2024-12-10 PROCEDURE — 3077F SYST BP >= 140 MM HG: CPT | Mod: CPTII,S$GLB,, | Performed by: HOSPITALIST

## 2024-12-10 PROCEDURE — 4010F ACE/ARB THERAPY RXD/TAKEN: CPT | Mod: CPTII,S$GLB,, | Performed by: HOSPITALIST

## 2024-12-10 NOTE — ASSESSMENT & PLAN NOTE
Uncontrolled - A1c has improved but still above goal   He wants to try Rybelsus again - he doesn't feel like he gave it a fair shot and wants to try again   He is not open to injections   + medication noncompliance   + dietary indiscretions         -- Medication Changes:   Stop the glimepiride   Change to Rybelsus    Start with 3 mg daily for 1 month and then increase to 7 mg daily   Rybelsus     Oral: Initial: 3 mg once daily for 30 days, then increase to 7 mg once daily for at least 30 days; if further glycemic control is necessary, increase to 14 mg once daily. Note: The 3 mg dose is not effective for glycemic control and is intended only for therapy initiation.        Take the Rybelsus == first thing in the AM - with only 4 oz of WATER-- ALL BY ITSELF. WAIT 30 MINUTES TO EAT, DRINK, TAKE OTHER MEDICATIONS      If you have any stomach upset- please let me know or the dizziness let me know     Continue the Metformin XR 1000 mg daily in the AM      Continue the Farxiga 10 mg daily   -- Please let us know if you have nausea, vomiting, or weakness with a normal BG. This could be euglycemic DKA.  -- please hold medication 3 days prior to surgery or if you are sick and have decreased intake.   -- Please drink lots of water daily while on this medication.   --This medication could also give you a yeast infection- please let us know if this occurs and we can treat it.      Continue the barak 2        -- Reviewed goals of therapy are to get the best control we can without hypoglycemia.  -- Advised frequent self blood glucose monitoring.  Patient encouraged to document glucose results and bring them to every clinic visit. Continue Barak 2-- supplies from pharmacy   -- Hypoglycemia precautions discussed. Instructed on precautions before driving.    -- Call for Bg repeatedly < 70 or > 180.   -- Close adherence to lifestyle changes recommended.   -- Periodic follow ups for eye evaluations, foot care and dental care  suggested.

## 2024-12-10 NOTE — ASSESSMENT & PLAN NOTE
Body mass index is 27.57 kg/m².  Increases insulin resistance.   Discussed DM diet and exercise.

## 2024-12-10 NOTE — ASSESSMENT & PLAN NOTE
- Poor control today  - Missed his blood pressure meds today  - Encouraged better compliance  - Does monitor BP at home with better control

## 2024-12-10 NOTE — ASSESSMENT & PLAN NOTE
Off the statin therapy due to intolerance   LDL goal <100.   LDL above goal - has NOT started Repatha injections     Continue the zetia 10 mg nightly   Start repatha injections every 2 weeks-- I sent this to [facility] and they will ship it to you. Call us- [phone]. We will help you with the shot   YOU MUST START THE SHOT!!!! VERY IMPORTANT!!!!       The 10-year ASCVD risk score (Anjel NGO, et al., 2019) is: 36.2%    Values used to calculate the score:      Age: 69 years      Sex: Male      Is Non- : Yes      Diabetic: Yes      Tobacco smoker: No      Systolic Blood Pressure: 126 mmHg      Is BP treated: Yes      HDL Cholesterol: 39 mg/dL      Total Cholesterol: 250 mg/dL

## 2024-12-10 NOTE — PROGRESS NOTES
[facility]: New patient visit  Best Contact Phone Number(s): [phone] (home)       Cancer/Stage/TNM:    Cancer Staging   Malignant neoplasm of prostate  Staging form: Prostate, AJCC 7th Edition  - Pathologic stage from 7/15/2015: T2, N0, cM0, PSA: Less than 10 - Signed by Jarret Mehta MD on 6/10/2024        Reason for visit:  Biochemical recurrence of prostate cancer on iADT    Molecular:  Germline Testing: Negative  Somatic Testing: N/A    Densitometry:  06/2023: DEXA - Lower risk osteopenia    Treatment History:   2015   RALP  2019   Pelvic EBRT and ADT x 6 months  03/2022 - 06/2024 ADT     HPI:   Eyad Garcia is a 69 y.o. 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 He started iADT for biochemical recurrence 03/2022. He presents to medical oncology clinic for routine follow up; currently off ADT.    Interval Events:    Energy levels are good. Reports nocturia about 1x per night - awakens at 4am; can't go back to sleep afterwards. No urinary hesitancy. Does have some urgency with rare incontinence. Does not have to wear a pad and declines pharmacytherapy for OAB. No particular pain. Appetite is good. No N/V/D.      History has been obtained by chart review and discussion with the patient.         Oncology History   Malignant neoplasm of prostate   2/2015 Initial Diagnosis    02/27/15: PSA 6.6    Prostate biopsy: biopsies from the Rt. base revealed Scooter 8 (4+4) adenocarcinoma      7/2015 Surgery    07/2015: Prostatectomy with PLND. Pathology revealed a single focus of [place] 6 (3+3) adenocarcinoma in a background of extensive high-grade PIN. There was no extraprostatic extension, seminal vesicle invasion or lymphovascular invasion. The margins and 10 (5 Lt., 5 Rt.) pelvic nodes were negative for tumor involvement.      7/2015 Imaging Significant Findings    072019: Axumin Scan  Impression:  1.  No  evidence of local recurrence.  2.  Small pelvic lymph nodes, one of which demonstrates mild uptake that may represent early justa metastases.  Attention on follow-up.     7/15/2015 Cancer Staged    Staging form: Prostate, AJCC 7th Edition  - Pathologic stage from 7/15/2015: T2, N0, cM0, PSA: Less than 10     8/2015 Tumor Markers    08/27/15: PSA 0.03    06/2016: PSA 0.11    11/2018: PSA 1.9    06/2019: PSA 4.0     8/6/2019 -  Hormone Therapy    6 month Lupron given      9/25/2019 - 11/15/2019 Radiation Therapy    Treating physician: Henrry Sampson MD   Total Dose: 46.8 Gy to the prostate bed and pelvic nodes  21.6 Gy boost to the prostate bed   Fractions: 38 fractions of 1.8 Gy per fraction      2/2020 Tumor Markers    02/2020: PSA <0.01    05/2021: PSA 0.90     5/2021 Imaging Significant Findings    05/2021: Axumin scan  Impression:     No definite evidence of active prostate carcinoma.  There is questionable focal uptake near the vesicoureteral anastomosis, and there are no other foci of significant uptake to suggest active tumor.     12/2021 Tumor Markers      12/2021: PSA 3.7    03/2022: PSA 8.9     3/2022 Imaging Significant Findings    03/2022: Axumuin scan  In this patient with prostate cancer, there are no definite foci of increased tracer avidity to indicate recurrence or metastatic disease.  Questionable focal uptake in the vesicoureteral anastomosis is no longer appreciated.     New nonspecific noncalcified right lower lobe nodule, which could represent infection, inflammation, atelectasis with malignancy felt less likely.  Clinical correlation and continued follow-up advised.  This nodule is amenable to percutaneous biopsy if clinically warranted.     3/23/2022 - 6/2024 Hormone Therapy    Restart Lupron     5/2024 Tumor Markers    05/2024: PSA <0.01           Past Medical History:   Diagnosis Date    Cataract     Diabetes mellitus type II     Difficult intubation     Erectile dysfunction     History of  colon polyps 01/25/2019    Hyperlipidemia     Hypertension     OA (osteoarthritis)     POAG (primary open-angle glaucoma)     Retinal detachment     OS         Past Surgical History:   Procedure Laterality Date    COLONOSCOPY N/A 01/25/2019    Procedure: COLONOSCOPY;  Surgeon: Elder Guillermo MD;  Location: [facility] (4TH FLR);  Service: Endoscopy;  Laterality: N/A;    COLONOSCOPY N/A 11/17/2023    Procedure: COLONOSCOPY;  Surgeon: JORDAN Guillermo MD;  Location: [facility] ENDO (4TH FLR);  Service: Endoscopy;  Laterality: N/A;  ref by / basim inst portal-RB  11/10-lvm for precall-MS  11/10-confirmed arrival time of 10am-tb    CYSTOSCOPY      HERNIA REPAIR      KNEE SURGERY      left    PENILE PROSTHESIS IMPLANT      PROSTATECTOMY      RETINAL DETACHMENT SURGERY      OS    SELECTIVE LASER TRABECULOPLASTY Left 03/16/2023        SELECTIVE LASER TRABECUPLASTY Bilateral 04/2016    OU WITH     TRABECULECTOMY Left 6/14/2023    Procedure: TRABECULECTOMY;  Surgeon: Bethany Lechuga MD;  [facility];  Service: Ophthalmology;  Laterality: Left;  Trabeculectomy w/[facility]         Review of patient's allergies indicates:   Allergen Reactions    Lipitor [atorvastatin]      Muscle pain    Sulfa (sulfonamide antibiotics) Hives and Rash           Aspirin      Stomach upset         Current Outpatient Medications   Medication Sig Dispense Refill    albuterol (VENTOLIN HFA) 90 mcg/actuation inhaler Inhale 2 puffs into the lungs every 6 (six) hours as needed for Wheezing or Shortness of Breath (cough). Rescue 18 g 11    alcohol swabs (ALCOHOL WIPES) PadM Monitor as directed X 2 daily. Per insurance coverage. ICD 10 E11.9 200 each 3    amLODIPine (NORVASC) 10 MG tablet TAKE 1 TABLET BY MOUTH EVERY DAY 90 tablet 1    ascorbic acid, vitamin C, (VITAMIN C) 250 MG tablet Take 250 mg by mouth once daily.      brimonidine 0.2% (ALPHAGAN) 0.2 % Drop Place 1 drop into the right eye 3 (three)  times daily. 20 mL 3    dapagliflozin propanediol (FARXIGA) 10 mg tablet Take 1 tablet (10 mg total) by mouth once daily. 90 tablet 3    dorzolamide-timolol 2-0.5% (COSOPT) 22.3-6.8 mg/mL ophthalmic solution Place 1 drop into the right eye 3 (three) times daily. 320 mL 3    evolocumab (REPATHA SURECLICK) 140 mg/mL PnIj Inject 1 mL (140 mg total) into the skin every 14 (fourteen) days. (Patient not taking: Reported on 12/6/2024) 2 mL 11    ezetimibe (ZETIA) 10 mg tablet Take 1 tablet (10 mg total) by mouth every evening. 90 tablet 3    fexofenadine (ALLEGRA) 180 MG tablet Take 1 tablet (180 mg total) by mouth once daily. 90 tablet 2    fluticasone propionate (FLONASE) 50 mcg/actuation nasal spray 2 sprays (100 mcg total) by Each Nostril route 2 (two) times daily as needed for Rhinitis. 16 g 11    FREESTYLE ROME 2 SENSOR Kit Use as directed. Change every 14 (fourteen) days. 2 kit 11    hydroCHLOROthiazide (HYDRODIURIL) 25 MG tablet TAKE 1 TABLET BY MOUTH EVERY DAY 90 tablet 1    meloxicam (MOBIC) 15 MG tablet TAKE 1 TABLET BY MOUTH EVERY DAY 90 tablet 0    metFORMIN (GLUCOPHAGE-XR) 500 MG ER 24hr tablet Take 2 tablets (1,000 mg total) by mouth once daily. 180 tablet 3    netarsudiL-latanoprost (ROCKLATAN) 0.02-0.005 % Drop Place 1 drop into both eyes every evening. Patient was not controlled on latanoprost - now needs to use rocklatan. Please run script through - it is now a covered medication if they have failed latanoprost 7.5 mL 3    olmesartan (BENICAR) 40 MG tablet TAKE 1 TABLET BY MOUTH EVERY DAY 90 tablet 1    semaglutide (RYBELSUS) 3 mg tablet Take 1 tablet (3 mg total) by mouth once daily. Take 3 mg daily for 30 days and then increase to 7 mg daily. Must be taken ist thing in the AM, on an empty stomach and wati g30 minutes before other pills and food. Take with only 4 oz oz of water (Patient not taking: Reported on 12/6/2024) 30 tablet 0    semaglutide (RYBELSUS) 7 mg tablet Take 1 tablet (7 mg total) by  mouth once daily. Must be taken ist thing in the AM, on an empty stomach and wati g30 minutes before other pills and food. Take with only 4 oz oz of water 30 tablet 5    triamcinolone acetonide 0.025% (KENALOG) 0.025 % cream Apply topically 2 (two) times daily. (Patient not taking: Reported on 12/6/2024) 80 g 1     Current Facility-Administered Medications   Medication Dose Route Frequency Provider Last Rate Last Admin    leuprolide acetate (6 month) injection 45 mg  45 mg Intramuscular Q6 Months Henrry Sampson Jr., MD   45 mg at 05/03/23 1231    leuprolide acetate (6 month) injection 45 mg  45 mg Intramuscular Q6 Months Henrry Sampson Jr., MD   45 mg at 11/22/23 1132    leuprolide acetate (6 month) injection 45 mg  45 mg Intramuscular Q6 Months Henrry Sampson Jr., MD   45 mg at 05/29/24 1138        Objective:      Physical Exam:   BP (!) 163/80 (BP Location: Left arm, Patient Position: Sitting)   Pulse 81   Temp 98 °F (36.7 °C) (Temporal)   Resp 18   Wt 73.9 kg (162 lb 14.7 oz)   SpO2 97%   BMI 27.11 kg/m²       ECOG Performance status: (0) Fully active, able to carry on all predisease performance without restriction     Physical Exam  Constitutional:       General: He is not in acute distress.     Appearance: Normal appearance.   HENT:      Head: Normocephalic.   Eyes:      General: No scleral icterus.     Extraocular Movements: Extraocular movements intact.      Conjunctiva/sclera: Conjunctivae normal.   Cardiovascular:      Rate and Rhythm: Normal rate.   Pulmonary:      Effort: Pulmonary effort is normal. No respiratory distress.   Abdominal:      General: There is no distension.      Palpations: Abdomen is soft.   Skin:     General: Skin is warm and dry.   Neurological:      Mental Status: He is alert and oriented to person, place, and time.      Motor: No weakness.   Psychiatric:         Mood and Affect: Mood normal.         Behavior: Behavior normal.         Thought Content: Thought content  normal.          Recent Labs:   Lab Results   Component Value Date    WBC 7.04 12/10/2024    RBC 4.18 (L) 12/10/2024    HGB 12.5 (L) 12/10/2024    HCT 38.0 (L) 12/10/2024    MCV 91 12/10/2024    MCH 29.9 12/10/2024    MCHC 32.9 12/10/2024    RDW 11.9 12/10/2024     12/10/2024    MPV 8.9 (L) 12/10/2024    IMMGR 0.5 08/29/2024    GRAN 4.3 12/10/2024    IGABS 0.06 (H) 12/10/2024    LYMPH 1.5 08/29/2024    LYMPH 23.7 08/29/2024    MONO 0.6 08/29/2024    MONO 8.4 08/29/2024    EOS 0.3 08/29/2024    BASO 0.04 08/29/2024    NRBC 0 08/29/2024    EOSINOPHIL 4.8 08/29/2024    BASOPHIL 0.6 08/29/2024    DIFFMETHOD Automated 08/29/2024       Lab Results   Component Value Date     12/10/2024    K 4.0 12/10/2024     12/10/2024    CO2 24 12/10/2024     (H) 12/10/2024    BUN 11 12/10/2024    CREATININE 0.9 12/10/2024    CALCIUM 9.9 12/10/2024    PROT 7.1 12/10/2024    ALBUMIN 3.9 12/10/2024    BILITOT 0.4 12/10/2024    ALKPHOS 63 12/10/2024    AST 47 (H) 12/10/2024    ALT 53 (H) 12/10/2024    ANIONGAP 10 12/10/2024    EGFRNORACEVR >60.0 12/10/2024        Lab Results   Component Value Date    PSADIAG <0.01 12/10/2024    PSADIAG <0.01 05/22/2024    PSADIAG <0.01 11/15/2023    PSADIAG <0.01 03/22/2023    PSADIAG <0.01 12/01/2022    PSADIAG <0.01 09/21/2022    PSADIAG 0.02 06/16/2022    PSADIAG 8.9 (H) 03/10/2022    PSADIAG 3.7 12/09/2021    PSADIAG 2.6 09/20/2021    PSA 0.14 03/27/2017    PSA 6.6 (H) 02/27/2015    PSA 5.48 (H) 05/13/2013    PSA 5.28 (H) 04/09/2012    PSA 4.5 (H) 06/01/2011    PSA 3.9 02/14/2011    PSA 7.6 (H) 12/28/2010    PSA 4.3 (H) 11/12/2010    PSA 2.3 06/30/2009    PSA 2.5 02/28/2009        Cardiovascular Screening:  Primary care physician: Dominique Rojas MD      The 10-year ASCVD risk score (Anjel NGO et al., 2019) is: 52.1%    Values used to calculate the score:      Age: 69 years      Sex: Male      Is Non- : Yes      Diabetic: Yes      Tobacco smoker: No       Systolic Blood Pressure: 163 mmHg      Is BP treated: Yes      HDL Cholesterol: 39 mg/dL      Total Cholesterol: 250 mg/dL    ASCVD Risk Level: High risk >= 20%    EKG:   Results for orders placed or performed during the hospital encounter of 05/29/22   EKG 12-lead    Collection Time: 05/29/22  2:15 PM    Narrative    Test Reason : I10,    Vent. Rate : 090 BPM     Atrial Rate : 090 BPM     P-R Int : 166 ms          QRS Dur : 082 ms      QT Int : 356 ms       P-R-T Axes : 051 025 050 degrees     QTc Int : 435 ms    Normal sinus rhythm  Normal ECG    Confirmed by Jose C Mahan MD ([id number]) on 6/6/2022 6:53:05 AM    Referred By: [email or web address]   SELF           Confirmed By:Jose C Mahan MD       High blood pressure:  Antihypertensive agents: amLODIPine - 10 MG  dorzolamide-timolol 2-0.5% - 22.3-6.8 mg/mL  hydroCHLOROthiazide - 25 MG  olmesartan - 40 MG      DM2:  Antidiabetic agents: dapagliflozin propanediol - 10 mg  metFORMIN - 500 MG  RYBELSUS Tab - 3 mg, 7 mg      Antilipid therapy:  Lipid lowering agents: [unfilled]      Antiplatelet therapy:  Agent: This patient does not have an active medication from one of the medication groupers.     Body mass index is 27.11 kg/m².    Lab Results   Component Value Date    CHOL 250 (H) 11/29/2024    LDLCALC 190.6 (H) 11/29/2024    HDL 39 (L) 11/29/2024    TRIG 102 11/29/2024    HGBA1C 7.6 (H) 11/29/2024          Bone Health    Lab Results   Component Value Date    [id number] 32 09/02/2015        Results for orders placed during the hospital encounter of 06/09/23    DXA Bone Density Axial Skeleton 1 or more sites    Impression  *Low bone mass (Osteopenia); FRAX calculations do not support treatment as osteoporosis.    RECOMMENDATIONS:  *Daily calcium intake 1228-5551 mg, dietary sources preferred; Vitamin D 0495-5295 IU daily.  *Weight bearing exercise and fall precautions.  *No additional pharmacologic therapy recommended at this time.  *Repeat BMD in 2  years.      Electronically signed by: Zuleima Becker MD  Date:    06/15/2023  Time:    14:49         Staging Imaging     No results found for this or any previous visit.       Results for orders placed during the hospital encounter of 09/29/21    NM Bone Scan Whole Body    Impression  No scintigraphic evidence of osseous metastatic disease.    Redemonstration of nonspecific focal abnormal radiotracer uptake in the left inguinal region concerning which could be artifactual related to presence of known left inguinal penile prosthesis reservoir near this site.    I, Syd Blankenship MD, attest that I reviewed and interpreted the images.    Electronically signed by resident: Owen Nunn  Date:    09/29/2021  Time:    13:37    Electronically signed by: Syd Blankenship  Date:    09/29/2021  Time:    14:20      No results found for this or any previous visit.       Results for orders placed during the hospital encounter of 05/10/21    CT Abdomen Pelvis With Contrast    Impression  1. Postsurgical changes of prostatectomy and pelvic node dissection.  No evidence of the metastatic lesions.  No measurable lesions per recist criteria.  2. Right renal cyst and left renal hypodensity too small to further characterize, likely also cysts.  3.  Additional findings as detailed in body of the report.    Electronically signed by resident: Chantel Fierro  Date:    05/10/2021  Time:    16:36    Electronically signed by: Lavern Lopez MD  Date:    05/10/2021  Time:    17:16       I have personally reviewed the above imaging.     Path:   Reviewed pathology as documented above.    Genomic testing:     Germline genetic testing  Results for orders placed or performed in visit on 08/29/24   Genetic Misc Sendout Test, Blood    Collection Time: 08/29/24 10:35 AM   Result Value Ref Range    Miscellaneous Genetic Test Name [facility] CancerNext +RNAinsight     Genso Specimen Type Blood     Genetic counseling? Yes     Parental or Sibling Testing?  No     Test Result See result image under hyperlink     Reference Lab SEE COMMENT         Somatic tumor genotyping:      ctDNA genotyping:       Diagnoses:     1. Malignant neoplasm of prostate    2. Type 2 diabetes mellitus with hyperglycemia, without long-term current use of insulin    3. Hypertension associated with diabetes    4. Hyperlipidemia associated with type 2 diabetes mellitus            Assessment and Plan:     1. Malignant neoplasm of prostate  Overview:  Biochemically recurrent postate cancer following RALP 2015 and salvage RT for metachronous justa recurrence in 2019.  He started iADT for biochemical recurrence 03/2022    Assessment & Plan:  PSA remains undetectable. Last Lupron was 5/29/24. Planning on ongoing treatment break.   - PSA and testosterone check q6 months  - Repeat DEXA 06/2025; cont vitmian D  - Repeat PSMA PET on PSA progression; consider enzalutamide +/- ADT per EMBARK on PSA progression if M0    Orders:  -     CBC Oncology; Standing  -     Comprehensive Metabolic Panel; Standing  -     Prostate Specific Antigen, Diagnostic; Standing  -     Testosterone; Standing    2. Type 2 diabetes mellitus with hyperglycemia, without long-term current use of insulin  Overview:  Home medications include metformin, glimedperide, and Farxiga.       3. Hypertension associated with diabetes  Overview:  Home medications include amlodipine, olemsartan, and HCTZ    Assessment & Plan:  - Poor control today  - Missed his blood pressure meds today  - Encouraged better compliance  - Does monitor BP at home with better control      4. Hyperlipidemia associated with type 2 diabetes mellitus  Overview:  Intolerant of statin. Home medications include Repatha and Zetia                Follow up:   Route Chart for Scheduling    Med Onc Chart Routing      Follow up with physician 6 months.   Follow up with [facility]    Infusion scheduling note    Injection scheduling note    Labs CBC, CMP, PSA and other    Scheduling:  Preferred lab:  Lab interval:  testosteorne   Imaging    Pharmacy appointment    Other referrals                           The above information has been reviewed with the patient and all questions have been answered to their apparent satisfaction.  They understand that they can call the clinic with any questions.    Jarret Mehta MD MPH  Staff Physician     [facility] [address] [address]  Email: [email or web address]  Phone: o) [phone] (c) [phone]

## 2024-12-10 NOTE — ASSESSMENT & PLAN NOTE
PSA remains undetectable. Last Lupron was 5/29/24. Planning on ongoing treatment break.   - PSA and testosterone check q6 months  - Repeat DEXA 06/2025; cont vitmian D  - Repeat PSMA PET on PSA progression; consider enzalutamide +/- ADT per EMBARK on PSA progression if M0

## 2024-12-26 ENCOUNTER — TELEPHONE (OUTPATIENT)
Dept: PHARMACY | Facility: CLINIC | Age: 69
End: 2024-12-26
Payer: MEDICARE

## 2024-12-26 NOTE — TELEPHONE ENCOUNTER
[facility]/Population Health Chart Review & Patient Outreach Details For Medication Adherence Project    Reason for Outreach Encounter: 3rd Party payor non-compliance report ([employer], [employer], [employer], etc)  2.  Patient Outreach Method: Reviewed patient chart  and [facility] message  3.   Medication in question:    Hypertension Medications               amLODIPine (NORVASC) 10 MG tablet TAKE 1 TABLET BY MOUTH EVERY DAY    hydroCHLOROthiazide (HYDRODIURIL) 25 MG tablet TAKE 1 TABLET BY MOUTH EVERY DAY    olmesartan (BENICAR) 40 MG tablet TAKE 1 TABLET BY MOUTH EVERY DAY                 Olmesartan  last filled  7/26/24 for 90 day supply    4.  Reviewed and or Updates Made To: Patient Chart  5. Outreach Outcomes and/or actions taken: Sent inquiry to patient: Waiting for response  Additional Notes:

## 2024-12-29 DIAGNOSIS — M41.9 SCOLIOSIS, UNSPECIFIED SCOLIOSIS TYPE, UNSPECIFIED SPINAL REGION: ICD-10-CM

## 2024-12-31 ENCOUNTER — TELEPHONE (OUTPATIENT)
Dept: DIABETES | Facility: CLINIC | Age: 69
End: 2024-12-31
Payer: MEDICARE

## 2024-12-31 ENCOUNTER — PATIENT MESSAGE (OUTPATIENT)
Dept: DIABETES | Facility: CLINIC | Age: 69
End: 2024-12-31
Payer: MEDICARE

## 2024-12-31 RX ORDER — MELOXICAM 15 MG/1
15 TABLET ORAL
Qty: 90 TABLET | Refills: 0 | Status: SHIPPED | OUTPATIENT
Start: 2024-12-31

## 2024-12-31 NOTE — TELEPHONE ENCOUNTER
They cannot reach him to send out the Repatha   Can we call him and give him the number to the specialty pharmacy to send out the Repatha   This is for the cholesterol.

## 2025-01-11 NOTE — PROGRESS NOTES
HPI     Glaucoma            Comments: HVF and OCT review today           Blurred Vision            Comments: Pt states that vision OS is blurry when he wakes up and he has   to blink a lot to get it clear. Pt denies any pain, discharge, or other   symptoms          Comments    DLS: 9/9/24    1. POAG OU  2. VF Defect OS   3. Chorioretinal Scar OS   4. Hx RD OS   5. NS OU   6. Type 2 DM no DR   7. Pupil Sphincter Rupture OS   8. Presbyopia   9 Trace APD OS  10. S/P trab os - 6/2023 - (LSLx3)    MEDS:   Cosopt TID OD   Brimonidine TID OD   Rocklatan QHS OD            Last edited by Mayda tSerling MA on 1/13/2025  8:55 AM.            Assessment /Plan     For exam results, see Encounter Report.    Primary open-angle glaucoma, left eye, moderate stage  -     Petersen Visual Field - OU - Extended - Both Eyes  -     Posterior Segment OCT Optic Nerve- Both eyes    Primary open-angle glaucoma, right eye, mild stage  -     Petersen Visual Field - OU - Extended - Both Eyes  -     Posterior Segment OCT Optic Nerve- Both eyes    Nuclear sclerotic cataract of both eyes    Pupillary sphincter rupture, left    Chorioretinal scar, left    Type 2 diabetes mellitus without ophthalmic manifestations    Old subtotal retinal detachment, left    Glaucoma filtering bleb of left eye        [facility]   Presented to [facility] - to [facility] 8/23/2006 - off gtts with a baseline / Tmax of 25/27  Referred by Courtney for consideration of SLT's   Pt with POAG and poorly controlled IOP's - does not use drops regularly       1. Primary open angle glaucoma, both eyes, mild stage        Glaucoma (type and duration)    POAG with elevated IOP ou - mild stage    First HVF   2007 - full od // SAD os 2/2 to CRS   First photos   2011   Treatment / Drops started   2006           Family history    ++ mother and 2 brothers         Glaucoma meds    Latanoprost // brimonidine //  cosopt (out of cosopt -  5/2/2023)         H/O adverse rxn to glaucoma drops    None (has tired alphagan and timolol in past -non compliant)         LASERS    SLT os - 3/31/2016 // SLT od - 4/14/2016 // repeat SLT OS 3/16/2023 - no response        GLAUCOMA SURGERIES    trab os - 6/14/2023 (LSL x 3) (still phakic)         OTHER EYE SURGERIES    H/O RD repair os - S/P laser repair         CDR    0.75 // 0.85-0.9         Tbase    25/27          Tmax    25/27            Ttarget    18/18             HVF    14 test 2006 to  2024 - near  full  od // SAD - 2/2 CRS from laser for RD repair os / new IAD (+prog when IOP up in the high 20's to 40's just pre- and post trab - 6/2023          NEW BASE - 1 test 1/2025 to 1/2025 - 23-2 ss od - full // 10-2 ss os - SAD / IAD         Gonio    +3 ou          CCT    486/502 - thin ou         OCT    6 test 2018 to 2025 - RNFL - dec Tchung od - dec. Thru out   (has a CRS)  os        HRT    3 test 2017 to 2019 -MR -  DEC. Sn/n/in od // DEC. S/n/iCHUNG os /// CDR 0.73 od // 0.86 os        Disc photos    2011, 2015 , 2017// harmony - 2022      - Ttoday  15/11     (on glaucoma drops - od // off gtts post trab os) )    - Test done today  - IOP, hvf - 24-2SS OD AND FIRST 10-2 SS OS // dfe // oct // S/P trab OS 6/14/2023      2. 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 Chorioretinal scar, left   -with 2nd VF defect      4. Old subtotal retinal detachment, left   S/P repair - retina flat - stable   -large CRS   - pt states he did NOT go to OR - just Rx with laser at the slit lamp     5. Type 2 diabetes mellitus without ophthalmic manifestations      6. Senile nuclear sclerosis  -mild - monitor      7. pupil sphincter rupture os  -suggest old trauma  -does NOT appear to have a angle recession    8. Trace apd os      PLAN    POAG with OHT OS > OD - mild od // moderate os     The ON and VF's are still good od  ?? Trace APD os - first  noted on 5/2/2023   The VF loss os is 2/2 old CRS- ?  post laser for a RD repair   Good initial response to SLT ou 25/25 --> 16/17    --  rec incisional surgery - doubt he has a scleral buckle  // + free conj sup - could try a trab w/ [facility]  / ? Xen os / express minishunt or no shunt or a GDD     Pt is NOT a diamox candidate - H/O sulfa allergy - hives     S/P trabeculectomy with mitomycin OS  Date:6/14/2023 ( still phackic w/ a buckle in place) (pre-op IOP  30-52 os)   POY > 1   without any stent or shunt   Number or sutures in flap  4  Number of sutures already cut - 3 -  (1st cut 6/22/2023) // 2nd cut 6/26/2023// 3rd suture cut 6/27/2023   anterior chamber depth  deep fully formed   Bleb appearance - flat prior to digital massage //  elevated after digital massage next to bleb   sidell neg  IOP  8  (S/P LSL x 3 - only one suture left)     cosopt cont OD // hold os   brim tid  cont OD // hold os  -  Rocklatan - OD only     1/2/2024    ++ VF porg os - occurred whn IOP up pre-op and high post op until 3rd suture lysis done   IOP ok od on gtts   IOP ok (low) off gtts post trab os       1/13/2025  IOP ok ou - on gtts od / off gtts post trab os   VA stable   HVF 24-2 ss od - full // 10-2 ss SAD/IAD os       F/U 4 months  with IOP and gonio // cont with 10-2 ss os in future       Bethany Lechuga MD

## 2025-01-13 ENCOUNTER — CLINICAL SUPPORT (OUTPATIENT)
Dept: OPHTHALMOLOGY | Facility: CLINIC | Age: 70
End: 2025-01-13
Payer: MEDICARE

## 2025-01-13 ENCOUNTER — OFFICE VISIT (OUTPATIENT)
Dept: OPHTHALMOLOGY | Facility: CLINIC | Age: 70
End: 2025-01-13
Payer: MEDICARE

## 2025-01-13 DIAGNOSIS — H25.13 NUCLEAR SCLEROTIC CATARACT OF BOTH EYES: ICD-10-CM

## 2025-01-13 DIAGNOSIS — H33.052 OLD SUBTOTAL RETINAL DETACHMENT, LEFT: ICD-10-CM

## 2025-01-13 DIAGNOSIS — H40.1111 PRIMARY OPEN-ANGLE GLAUCOMA, RIGHT EYE, MILD STAGE: ICD-10-CM

## 2025-01-13 DIAGNOSIS — E11.9 TYPE 2 DIABETES MELLITUS WITHOUT OPHTHALMIC MANIFESTATIONS: ICD-10-CM

## 2025-01-13 DIAGNOSIS — H40.1122 PRIMARY OPEN-ANGLE GLAUCOMA, LEFT EYE, MODERATE STAGE: Primary | ICD-10-CM

## 2025-01-13 DIAGNOSIS — Z98.83 GLAUCOMA FILTERING BLEB OF LEFT EYE: ICD-10-CM

## 2025-01-13 DIAGNOSIS — H21.562 PUPILLARY SPHINCTER RUPTURE, LEFT: ICD-10-CM

## 2025-01-13 DIAGNOSIS — H31.002 CHORIORETINAL SCAR, LEFT: ICD-10-CM

## 2025-01-13 PROCEDURE — 99999 PR PBB SHADOW E&M-EST. PATIENT-LVL II: CPT | Mod: PBBFAC,,, | Performed by: OPHTHALMOLOGY

## 2025-01-13 PROCEDURE — 92133 CPTRZD OPH DX IMG PST SGM ON: CPT | Mod: S$GLB,,, | Performed by: OPHTHALMOLOGY

## 2025-01-13 PROCEDURE — 1159F MED LIST DOCD IN RCRD: CPT | Mod: CPTII,S$GLB,, | Performed by: OPHTHALMOLOGY

## 2025-01-13 PROCEDURE — 1160F RVW MEDS BY RX/DR IN RCRD: CPT | Mod: CPTII,S$GLB,, | Performed by: OPHTHALMOLOGY

## 2025-01-13 PROCEDURE — 99214 OFFICE O/P EST MOD 30 MIN: CPT | Mod: S$GLB,,, | Performed by: OPHTHALMOLOGY

## 2025-01-13 PROCEDURE — 1126F AMNT PAIN NOTED NONE PRSNT: CPT | Mod: CPTII,S$GLB,, | Performed by: OPHTHALMOLOGY

## 2025-01-13 PROCEDURE — 92083 EXTENDED VISUAL FIELD XM: CPT | Mod: S$GLB,,, | Performed by: OPHTHALMOLOGY

## 2025-01-13 NOTE — PROGRESS NOTES
Oct PPole and Bmo was done ou, per Dr. Lechuga./CC      24-2 SS HVF done ou.  Patient denies allergies to adhesives./MA    Last Rx in chart was used.    -1.50 +1.00 x137  -1.50 +1.50 x010

## 2025-01-14 ENCOUNTER — PATIENT OUTREACH (OUTPATIENT)
Dept: ADMINISTRATIVE | Facility: HOSPITAL | Age: 70
End: 2025-01-14
Payer: MEDICARE

## 2025-01-14 NOTE — PROGRESS NOTES
Health Maintenance reviewed, updated and links triggered. HM'S up to date.   ([facility]) 1/14/25 Foot Exam due 2/28/25 portal message sent for scheduling B/p Check   Care Coordination Encounter Details:       MyChart Portal Status:         [x]  Reviewed MyChart Portal Status offered / enrolled if applicable        Additional Notes:     MyChart Outcomes: Pt is enrolled & active          Updates Requested / Reviewed:        Care Everywhere         Health Maintenance Screening(s) Due:      Health Maintenance Topics Overdue:      VBHM Score: 0     Uncontrolled BP  Patient is not due for any topics at this time.                       Health Maintenance Topic(s) Outreach Outcomes & Actions Taken:    Blood Pressure - Outreach Outcomes & Actions Taken  :                   Additional Notes:  Health Maintenance reviewed, updated and links triggered. HM'S up to date.   ([facility]) 1/14/25 Foot Exam due 2/28/25 portal message sent for scheduling B/p Check            Chronic Disease Management:     Diabetes Measures        Lab Results   Component Value Date    HGBA1C 7.6 (H) 11/29/2024           [x]  Reviewed chart for active Diabetes diagnosis     [x]  Scheduled necessary follow up appointments if needed         Additional Notes:      Appt 3/28/25       Hypertension Measures        BP Readings from Last 1 Encounters:   12/10/24 (!) 163/80           [x]  Reviewed chart for active Hypertension diagnosis     [x]  Reviewed & documented Home BP Cuff     [x]  Documented a Remote BP if needed & applicable     [x]  Scheduled necessary follow up appointments with Primary Care if needed         Additional Notes:    Poral message sent vm left         Provider Team Continuity:     Last PCP Visit Date: 1/18/2024          [x]  Reviewed Primary Care Provider Visits, Annual Wellness Visit, and Future          Appointments to ensure appointments have been scheduled and/or           completed        Additional Notes:             Social Determinants of  Health          [x]  Reviewed, completed, and/or updated the following sections:                  Food Insecurity, Transportation Needs, Financial Resource Strain,                 Tobacco Use        Additional Notes:             Care Management, Digital Medicine, and/or Education Referrals    OPCM Risk Score: 19.7                 Additional Notes:

## 2025-01-30 DIAGNOSIS — Z00.00 ENCOUNTER FOR MEDICARE ANNUAL WELLNESS EXAM: ICD-10-CM

## 2025-03-13 ENCOUNTER — OFFICE VISIT (OUTPATIENT)
Dept: HEMATOLOGY/ONCOLOGY | Facility: CLINIC | Age: 70
End: 2025-03-13
Payer: MEDICARE

## 2025-03-13 ENCOUNTER — LAB VISIT (OUTPATIENT)
Dept: LAB | Facility: HOSPITAL | Age: 70
End: 2025-03-13
Attending: HOSPITALIST
Payer: MEDICARE

## 2025-03-13 VITALS
HEIGHT: 65 IN | OXYGEN SATURATION: 99 % | DIASTOLIC BLOOD PRESSURE: 72 MMHG | RESPIRATION RATE: 14 BRPM | HEART RATE: 70 BPM | BODY MASS INDEX: 27.01 KG/M2 | WEIGHT: 162.13 LBS | SYSTOLIC BLOOD PRESSURE: 154 MMHG

## 2025-03-13 DIAGNOSIS — Z79.899 LONG TERM CURRENT USE OF THERAPEUTIC DRUG: ICD-10-CM

## 2025-03-13 DIAGNOSIS — Z79.818 ANDROGEN DEPRIVATION THERAPY: ICD-10-CM

## 2025-03-13 DIAGNOSIS — C61 MALIGNANT NEOPLASM OF PROSTATE: ICD-10-CM

## 2025-03-13 DIAGNOSIS — C61 MALIGNANT NEOPLASM OF PROSTATE: Primary | ICD-10-CM

## 2025-03-13 DIAGNOSIS — I15.2 HYPERTENSION ASSOCIATED WITH DIABETES: ICD-10-CM

## 2025-03-13 DIAGNOSIS — E11.65 TYPE 2 DIABETES MELLITUS WITH HYPERGLYCEMIA, WITHOUT LONG-TERM CURRENT USE OF INSULIN: ICD-10-CM

## 2025-03-13 DIAGNOSIS — E11.59 HYPERTENSION ASSOCIATED WITH DIABETES: ICD-10-CM

## 2025-03-13 LAB
25(OH)D3+25(OH)D2 SERPL-MCNC: 34 NG/ML (ref 30–96)
ALBUMIN SERPL BCP-MCNC: 3.7 G/DL (ref 3.5–5.2)
ALP SERPL-CCNC: 65 U/L (ref 40–150)
ALT SERPL W/O P-5'-P-CCNC: 41 U/L (ref 10–44)
ANION GAP SERPL CALC-SCNC: 7 MMOL/L (ref 8–16)
AST SERPL-CCNC: 37 U/L (ref 10–40)
BILIRUB SERPL-MCNC: 0.4 MG/DL (ref 0.1–1)
BUN SERPL-MCNC: 22 MG/DL (ref 8–23)
CALCIUM SERPL-MCNC: 9.3 MG/DL (ref 8.7–10.5)
CHLORIDE SERPL-SCNC: 105 MMOL/L (ref 95–110)
CO2 SERPL-SCNC: 25 MMOL/L (ref 23–29)
COMPLEXED PSA SERPL-MCNC: <0.01 NG/ML (ref 0–4)
CREAT SERPL-MCNC: 1 MG/DL (ref 0.5–1.4)
ERYTHROCYTE [DISTWIDTH] IN BLOOD BY AUTOMATED COUNT: 12.5 % (ref 11.5–14.5)
EST. GFR  (NO RACE VARIABLE): >60 ML/MIN/1.73 M^2
GLUCOSE SERPL-MCNC: 163 MG/DL (ref 70–110)
HCT VFR BLD AUTO: 33.7 % (ref 40–54)
HGB BLD-MCNC: 11 G/DL (ref 14–18)
IMM GRANULOCYTES # BLD AUTO: 0.03 K/UL (ref 0–0.04)
MCH RBC QN AUTO: 28.7 PG (ref 27–31)
MCHC RBC AUTO-ENTMCNC: 32.6 G/DL (ref 32–36)
MCV RBC AUTO: 88 FL (ref 82–98)
NEUTROPHILS # BLD AUTO: 4 K/UL (ref 1.8–7.7)
PLATELET # BLD AUTO: 268 K/UL (ref 150–450)
PMV BLD AUTO: 9.1 FL (ref 9.2–12.9)
POTASSIUM SERPL-SCNC: 4.3 MMOL/L (ref 3.5–5.1)
PROT SERPL-MCNC: 6.7 G/DL (ref 6–8.4)
RBC # BLD AUTO: 3.83 M/UL (ref 4.6–6.2)
SODIUM SERPL-SCNC: 137 MMOL/L (ref 136–145)
TESTOST SERPL-MCNC: 21 NG/DL (ref 304–1227)
WBC # BLD AUTO: 6.74 K/UL (ref 3.9–12.7)

## 2025-03-13 PROCEDURE — 1159F MED LIST DOCD IN RCRD: CPT | Mod: CPTII,S$GLB,, | Performed by: HOSPITALIST

## 2025-03-13 PROCEDURE — G2211 COMPLEX E/M VISIT ADD ON: HCPCS | Mod: S$GLB,,, | Performed by: HOSPITALIST

## 2025-03-13 PROCEDURE — 85027 COMPLETE CBC AUTOMATED: CPT | Performed by: HOSPITALIST

## 2025-03-13 PROCEDURE — 99999 PR PBB SHADOW E&M-EST. PATIENT-LVL IV: CPT | Mod: PBBFAC,,, | Performed by: HOSPITALIST

## 2025-03-13 PROCEDURE — 80053 COMPREHEN METABOLIC PANEL: CPT | Performed by: HOSPITALIST

## 2025-03-13 PROCEDURE — 3008F BODY MASS INDEX DOCD: CPT | Mod: CPTII,S$GLB,, | Performed by: HOSPITALIST

## 2025-03-13 PROCEDURE — 3077F SYST BP >= 140 MM HG: CPT | Mod: CPTII,S$GLB,, | Performed by: HOSPITALIST

## 2025-03-13 PROCEDURE — 3288F FALL RISK ASSESSMENT DOCD: CPT | Mod: CPTII,S$GLB,, | Performed by: HOSPITALIST

## 2025-03-13 PROCEDURE — 3078F DIAST BP <80 MM HG: CPT | Mod: CPTII,S$GLB,, | Performed by: HOSPITALIST

## 2025-03-13 PROCEDURE — 82306 VITAMIN D 25 HYDROXY: CPT | Performed by: HOSPITALIST

## 2025-03-13 PROCEDURE — 84153 ASSAY OF PSA TOTAL: CPT | Performed by: HOSPITALIST

## 2025-03-13 PROCEDURE — 36415 COLL VENOUS BLD VENIPUNCTURE: CPT | Performed by: HOSPITALIST

## 2025-03-13 PROCEDURE — 99214 OFFICE O/P EST MOD 30 MIN: CPT | Mod: S$GLB,,, | Performed by: HOSPITALIST

## 2025-03-13 PROCEDURE — 1126F AMNT PAIN NOTED NONE PRSNT: CPT | Mod: CPTII,S$GLB,, | Performed by: HOSPITALIST

## 2025-03-13 PROCEDURE — 1101F PT FALLS ASSESS-DOCD LE1/YR: CPT | Mod: CPTII,S$GLB,, | Performed by: HOSPITALIST

## 2025-03-13 PROCEDURE — 84403 ASSAY OF TOTAL TESTOSTERONE: CPT | Performed by: HOSPITALIST

## 2025-03-13 NOTE — PROGRESS NOTES
"[facility]: New patient visit  Best Contact Phone Number(s): [phone] (home)       Cancer/Stage/TNM:    Cancer Staging   Malignant neoplasm of prostate  Staging form: Prostate, AJCC 7th Edition  - Pathologic stage from 7/15/2015: T2, N0, cM0, PSA: Less than 10 - Signed by Jarret Mehta MD on 6/10/2024        Reason for visit:  Biochemical recurrence of prostate cancer    Molecular:  Germline Testing: Negative  Somatic Testing: N/A    Densitometry:  06/2023: DEXA - Lower risk osteopenia    Treatment History:   2015   RALP  2019   Pelvic EBRT and ADT x 6 months  03/2022 - 06/2024 ADT     HPI:   Eyad Garcia is a 69 y.o. male with biochemically recurrent prostate cancer following RALP 2015 and salvage RT for metachronous justa recurrence in 2019. He started iADT for biochemical recurrence 03/2022. He presents to medical oncology clinic for routine follow up; currently off ADT.    Interval Events:    Overall doing well. Had a good Caesar Gras - went out to several parades with his grandchildren. No current hot flashes. Nocturia about 1x per night. No obstructive urinary symptoms. Has some modest ongoing urgency which is tolerable. Previously declined pharmacotherapy. Does note a tender subcutanaeous area of induration over his back. No fevers or chills.       Objective:      Physical Exam:   BP (!) 154/72 (BP Location: Right arm, Patient Position: Sitting)   Pulse 70   Resp 14   Ht 5' 5" (1.651 m)   Wt 73.5 kg (162 lb 2.4 oz)   SpO2 99%   BMI 26.98 kg/m²       ECOG Performance status: (0) Fully active, able to carry on all predisease performance without restriction     Physical Exam  Constitutional:       General: He is not in acute distress.     Appearance: Normal appearance.   HENT:      Head: Normocephalic.   Eyes:      General: No scleral icterus.     Extraocular Movements: Extraocular movements intact.      Conjunctiva/sclera: Conjunctivae normal.   Cardiovascular:      Rate " and Rhythm: Normal rate.   Pulmonary:      Effort: Pulmonary effort is normal. No respiratory distress.   Abdominal:      General: There is no distension.      Palpations: Abdomen is soft.   Skin:     General: Skin is warm and dry.      Comments: ~2 cm area of induration over midback. No overlying erythema   Neurological:      Mental Status: He is alert and oriented to person, place, and time.      Motor: No weakness.   Psychiatric:         Mood and Affect: Mood normal.         Behavior: Behavior normal.         Thought Content: Thought content normal.          Lab Results   Component Value Date    PSADIAG <0.01 12/10/2024    PSADIAG <0.01 05/22/2024    PSADIAG <0.01 11/15/2023    PSADIAG <0.01 03/22/2023    PSADIAG <0.01 12/01/2022    PSADIAG <0.01 09/21/2022    PSADIAG 0.02 06/16/2022    PSADIAG 8.9 (H) 03/10/2022    PSADIAG 3.7 12/09/2021    PSADIAG 2.6 09/20/2021    PSA 0.14 03/27/2017    PSA 6.6 (H) 02/27/2015    PSA 5.48 (H) 05/13/2013    PSA 5.28 (H) 04/09/2012    PSA 4.5 (H) 06/01/2011    PSA 3.9 02/14/2011    PSA 7.6 (H) 12/28/2010    PSA 4.3 (H) 11/12/2010    PSA 2.3 06/30/2009    PSA 2.5 02/28/2009            Diagnoses:     1. Malignant neoplasm of prostate    2. Hypertension associated with diabetes    3. Type 2 diabetes mellitus with hyperglycemia, without long-term current use of insulin              Assessment and Plan:     1. Malignant neoplasm of prostate  Overview:  Biochemically recurrent postate cancer following RALP 2015 and salvage RT for metachronous justa recurrence in 2019.  He started iADT for biochemical recurrence 03/2022    Assessment & Plan:  Currently off treatment. PSA pending today, will follow up. Anticiapate monitoring PSA q6 months until >1, then consider resuming ADT + enzalutamide per EMBARK    Orders:  -     CBC Auto Differential; Future; Expected date: 03/13/2025  -     Prostate Specific Antigen, Diagnostic; Future; Expected date: 03/13/2025  -     CMP; Future; Expected date:  03/13/2025    2. Hypertension associated with diabetes  Overview:  Home medications include amlodipine, olemsartan, and HCTZ    Assessment & Plan:  - Reports better control at home  - Recommend maintaining a home log  - Can FU with PCP      3. Type 2 diabetes mellitus with hyperglycemia, without long-term current use of insulin  Overview:  Home medications include metformin, glimedperide, and Rybelsus                  Follow up:   Route Chart for Scheduling    Med Onc Chart Routing      Follow up with physician 6 months.   Follow up with [facility]    Infusion scheduling note    Injection scheduling note    Labs CBC, CMP and PSA   Scheduling:  Preferred lab:  Lab interval:     Imaging    Pharmacy appointment    Other referrals                           The above information has been reviewed with the patient and all questions have been answered to their apparent satisfaction.  They understand that they can call the clinic with any questions.    Jarret Mehta MD MPH  Staff Physician     [facility]  [address] [place] [id number]  Email: [email or web address]  Phone: o) [phone] (c) [phone]

## 2025-03-13 NOTE — ASSESSMENT & PLAN NOTE
Currently off treatment. PSA pending today, will follow up. Anticiapate monitoring PSA q6 months until >1, then consider resuming ADT + enzalutamide per EMBARK

## 2025-03-14 ENCOUNTER — TELEPHONE (OUTPATIENT)
Dept: PODIATRY | Facility: CLINIC | Age: 70
End: 2025-03-14
Payer: MEDICARE

## 2025-03-14 RX ORDER — ORAL SEMAGLUTIDE 7 MG/1
7 TABLET ORAL DAILY
Qty: 30 TABLET | Refills: 5 | Status: SHIPPED | OUTPATIENT
Start: 2025-03-14

## 2025-03-14 RX ORDER — ORAL SEMAGLUTIDE 3 MG/1
TABLET ORAL
Qty: 30 TABLET | Refills: 0 | OUTPATIENT
Start: 2025-03-14

## 2025-03-17 ENCOUNTER — OFFICE VISIT (OUTPATIENT)
Dept: PODIATRY | Facility: CLINIC | Age: 70
End: 2025-03-17
Payer: MEDICARE

## 2025-03-17 VITALS
SYSTOLIC BLOOD PRESSURE: 132 MMHG | HEIGHT: 65 IN | DIASTOLIC BLOOD PRESSURE: 81 MMHG | HEART RATE: 81 BPM | BODY MASS INDEX: 27.03 KG/M2 | WEIGHT: 162.25 LBS

## 2025-03-17 DIAGNOSIS — M20.42 HAMMER TOES OF BOTH FEET: ICD-10-CM

## 2025-03-17 DIAGNOSIS — M20.5X2 HALLUX LIMITUS, ACQUIRED, LEFT: ICD-10-CM

## 2025-03-17 DIAGNOSIS — E11.9 ENCOUNTER FOR COMPREHENSIVE DIABETIC FOOT EXAMINATION, TYPE 2 DIABETES MELLITUS: Primary | ICD-10-CM

## 2025-03-17 DIAGNOSIS — M20.41 HAMMER TOES OF BOTH FEET: ICD-10-CM

## 2025-03-17 DIAGNOSIS — M20.5X1 HALLUX LIMITUS, ACQUIRED, RIGHT: ICD-10-CM

## 2025-03-17 PROCEDURE — 99214 OFFICE O/P EST MOD 30 MIN: CPT | Mod: S$GLB,,, | Performed by: PODIATRIST

## 2025-03-17 PROCEDURE — 3079F DIAST BP 80-89 MM HG: CPT | Mod: CPTII,S$GLB,, | Performed by: PODIATRIST

## 2025-03-17 PROCEDURE — 1125F AMNT PAIN NOTED PAIN PRSNT: CPT | Mod: CPTII,S$GLB,, | Performed by: PODIATRIST

## 2025-03-17 PROCEDURE — 3288F FALL RISK ASSESSMENT DOCD: CPT | Mod: CPTII,S$GLB,, | Performed by: PODIATRIST

## 2025-03-17 PROCEDURE — 3008F BODY MASS INDEX DOCD: CPT | Mod: CPTII,S$GLB,, | Performed by: PODIATRIST

## 2025-03-17 PROCEDURE — 99999 PR PBB SHADOW E&M-EST. PATIENT-LVL V: CPT | Mod: PBBFAC,,, | Performed by: PODIATRIST

## 2025-03-17 PROCEDURE — 1160F RVW MEDS BY RX/DR IN RCRD: CPT | Mod: CPTII,S$GLB,, | Performed by: PODIATRIST

## 2025-03-17 PROCEDURE — 1159F MED LIST DOCD IN RCRD: CPT | Mod: CPTII,S$GLB,, | Performed by: PODIATRIST

## 2025-03-17 PROCEDURE — 1101F PT FALLS ASSESS-DOCD LE1/YR: CPT | Mod: CPTII,S$GLB,, | Performed by: PODIATRIST

## 2025-03-17 PROCEDURE — 3075F SYST BP GE 130 - 139MM HG: CPT | Mod: CPTII,S$GLB,, | Performed by: PODIATRIST

## 2025-03-17 NOTE — PROGRESS NOTES
Subjective:      Patient ID: Eyad Garcia is a 69 y.o. male.    Chief Complaint: Follow-up (Pain in feet and calf) and Nail Problem (Left toenail pain)    Eyad is a 69 y.o. male who presents to the clinic upon referral from Dr. Arleen aguirre. provider found  for evaluation and treatment of diabetic feet. Eyad has a past medical history of Cataract, Diabetes mellitus type II, Difficult intubation, Erectile dysfunction, History of colon polyps (01/25/2019), Hyperlipidemia, Hypertension, OA (osteoarthritis), POAG (primary open-angle glaucoma), and Retinal detachment. Patient relates no acute problem with feet.  He relates occasional ingrowing medial left hallux nail pain.  Relates occasional arch pain especially after walking for long periods of times such as the amount of walking he did with his grandchild during pureed season.  His main concerns today as how to care for his feet as a diabetic.    PCP: Dominique Rojas MD    Date Last Seen by PCP:   Chief Complaint   Patient presents with    Follow-up     Pain in feet and calf    Nail Problem     Left toenail pain         Current shoe gear:  flat casual tennis shoes    Hemoglobin A1C   Date Value Ref Range Status   11/29/2024 7.6 (H) 4.0 - 5.6 % Final     Comment:     ADA Screening Guidelines:  5.7-6.4%  Consistent with prediabetes  >or=6.5%  Consistent with diabetes    High levels of fetal hemoglobin interfere with the HbA1C  assay. Heterozygous hemoglobin variants (HbS, HgC, etc)do  not significantly interfere with this assay.   However, presence of multiple variants may affect accuracy.     08/29/2024 8.0 (H) 4.0 - 5.6 % Final     Comment:     ADA Screening Guidelines:  5.7-6.4%  Consistent with prediabetes  >or=6.5%  Consistent with diabetes    High levels of fetal hemoglobin interfere with the HbA1C  assay. Heterozygous hemoglobin variants (HbS, HgC, etc)do  not significantly interfere with this assay.   However, presence of multiple variants may affect accuracy.      04/23/2024 7.0 (H) 4.0 - 5.6 % Final     Comment:     ADA Screening Guidelines:  5.7-6.4%  Consistent with prediabetes  >or=6.5%  Consistent with diabetes    High levels of fetal hemoglobin interfere with the HbA1C  assay. Heterozygous hemoglobin variants (HbS, HgC, etc)do  not significantly interfere with this assay.   However, presence of multiple variants may affect accuracy.         Problem List[1]    Medications Ordered Prior to Encounter[2]    Review of patient's allergies indicates:   Allergen Reactions    Lipitor [atorvastatin]      Muscle pain    Sulfa (sulfonamide antibiotics) Hives and Rash           Aspirin      Stomach upset       Past Surgical History:   Procedure Laterality Date    COLONOSCOPY N/A 01/25/2019    Procedure: COLONOSCOPY;  Surgeon: Elder Guillermo MD;  Location: [facility] (4TH FLR);  Service: Endoscopy;  Laterality: N/A;    COLONOSCOPY N/A 11/17/2023    Procedure: COLONOSCOPY;  Surgeon: JORDAN Guillermo MD;  Location: [facility] (4TH FLR);  Service: Endoscopy;  Laterality: N/A;  ref by / basim inst portal-RB  11/10-lvm for precall-MS  11/10-confirmed arrival time of 10am-tb    CYSTOSCOPY      HERNIA REPAIR      KNEE SURGERY      left    PENILE PROSTHESIS IMPLANT      PROSTATECTOMY      RETINAL DETACHMENT SURGERY      OS    SELECTIVE LASER TRABECULOPLASTY Left 03/16/2023        SELECTIVE LASER TRABECUPLASTY Bilateral 04/2016    OU WITH     TRABECULECTOMY Left 6/14/2023    Procedure: TRABECULECTOMY;  Surgeon: Bethany Lechuga MD;  [facility];  Service: Ophthalmology;  Laterality: Left;  Trabeculectomy w/[facility]       Family History   Problem Relation Name Age of Onset    Diabetes Mother      Hypertension Mother      Glaucoma Mother      No Known Problems Father      Diabetes Sister      Glaucoma Sister      Diabetes Brother      Glaucoma Brother      No Known Problems Maternal Grandmother      No Known Problems Maternal Grandfather   "    No Known Problems Paternal Grandmother      No Known Problems Paternal Grandfather      Colon cancer Maternal Aunt      No Known Problems Maternal Uncle      No Known Problems Paternal Aunt      No Known Problems Paternal Uncle      Anesthesia problems Neg Hx      Broken bones Neg Hx      Clotting disorder Neg Hx      Collagen disease Neg Hx      Dislocations Neg Hx      Osteoporosis Neg Hx      Rheumatologic disease Neg Hx      Scoliosis Neg Hx      Severe sprains Neg Hx      Amblyopia Neg Hx      Blindness Neg Hx      Cataracts Neg Hx      Macular degeneration Neg Hx      Retinal detachment Neg Hx      Strabismus Neg Hx      Stroke Neg Hx      Thyroid disease Neg Hx      Esophageal cancer Neg Hx      Prostate cancer Neg Hx      Breast cancer Neg Hx         Social History[3]    Review of Systems   Constitutional: Negative for chills and fever.   Cardiovascular:  Negative for claudication and leg swelling.   Respiratory:  Negative for cough and shortness of breath.    Skin:  Positive for nail changes. Negative for itching and rash.   Musculoskeletal:  Positive for arthritis, joint pain, myalgias and stiffness. Negative for falls, joint swelling and muscle weakness.   Gastrointestinal:  Negative for diarrhea, nausea and vomiting.   Neurological:  Negative for numbness, paresthesias, tremors and weakness.   Psychiatric/Behavioral:  Negative for altered mental status and hallucinations.            Objective:      Vitals:    03/17/25 0951   BP: 132/81   Pulse: 81   Weight: 73.6 kg (162 lb 4.1 oz)   Height: 5' 5" (1.651 m)   PainSc:   5   PainLoc: Foot       Physical Exam  Vitals reviewed.   Constitutional:       General: He is not in acute distress.     Appearance: He is well-developed. He is not ill-appearing, toxic-appearing or diaphoretic.      Comments: Flat nonsupportive shoe gear      Cardiovascular:      Pulses:           Dorsalis pedis pulses are 2+ on the right side and 2+ on the left side.        Posterior " tibial pulses are 2+ on the right side and 2+ on the left side.      Comments: dorsalis pedis and posterior tibial pulses are palpable bilaterally. Capillary refill time is within normal limits.    Musculoskeletal:         General: No swelling or deformity.      Right lower leg: No edema.      Left lower leg: No edema.      Right ankle:      Right Achilles Tendon: Normal. No tenderness.      Left ankle:      Left Achilles Tendon: Normal. No tenderness.      Right foot: Decreased range of motion. Bunion present. No deformity, tenderness or bony tenderness.      Left foot: Decreased range of motion. Bunion present. No deformity, tenderness or bony tenderness.      Comments: There is equinus deformity bilateral with decreased dorsiflexion at the ankle joint bilateral.     Decreased first MPJ range of motion both weightbearing and nonweightbearing, no crepitus observed the first MP joint, + dorsal flag sign.     Patient has hammertoes of digits 2-5 bilateral partially reducible      Feet:      Right foot:      Protective Sensation: 10 sites tested.  10 sites sensed.      Skin integrity: Callus and dry skin present. No ulcer, blister, skin breakdown or erythema.      Toenail Condition: Right toenails are abnormally thick and long.      Left foot:      Protective Sensation: 10 sites tested.  10 sites sensed.      Skin integrity: Callus and dry skin present. No ulcer, blister, skin breakdown or erythema.      Toenail Condition: Left toenails are abnormally thick, long and ingrown.      Comments: Nails dystrophic no signs of mycosis    Incurvated L hallux distal medial border, with significant hyperkeratosis  Skin:     General: Skin is warm.      Capillary Refill: Capillary refill takes 2 to 3 seconds.      Coloration: Skin is not pale.      Findings: No erythema or rash.      Nails: There is no clubbing.      Comments: L hallux distal medial border with history of slant back nail    Neurological:      Mental Status: He is  alert and oriented to person, place, and time.      Sensory: No sensory deficit.      Gait: Gait abnormal.      Comments: Light touch present    [place]ithya 5.07 monofilament is intact bilateral feet. Sharp/dull sensation is also intact Bilateral feet.    proprioception intact    Vibratory intact       Psychiatric:         Attention and Perception: Attention normal.         Mood and Affect: Mood normal.         Speech: Speech normal.         Behavior: Behavior normal.         Thought Content: Thought content normal.         Cognition and Memory: Cognition normal.         Judgment: Judgment normal.               Assessment:       Encounter Diagnoses   Name Primary?    Encounter for comprehensive diabetic foot examination, type 2 diabetes mellitus Yes    Hammer toes of both feet     Hallux limitus, acquired, right     Hallux limitus, acquired, left          Plan:     Problem List Items Addressed This Visit    None  Visit Diagnoses         Encounter for comprehensive diabetic foot examination, type 2 diabetes mellitus    -  Primary    Relevant Orders    DIABETIC SHOES FOR HOME USE      Hammer toes of both feet        Relevant Orders    DIABETIC SHOES FOR HOME USE      Hallux limitus, acquired, right        Relevant Orders    DIABETIC SHOES FOR HOME USE      Hallux limitus, acquired, left        Relevant Orders    DIABETIC SHOES FOR HOME USE           I counseled the patient on his conditions, their implications and medical management.    Orders Placed This Encounter    DIABETIC SHOES FOR HOME USE       Education about the diabetic foot, neuropathy, and prevention of limb loss.    Shoe inspection. Diabetic Foot Education. Patient reminded of the importance of good nutrition/healthy diet/weight management and blood sugar control to help prevent podiatric complications of diabetes. Patient instructed on proper foot hygeine. Wear comfortable, proper fitting shoes. Wash feet daily. Dry well. After drying, apply  moisturizer to feet (no lotion to webspaces). Inspect feet daily for skin breaks, blisters, swelling, or redness. Wear cotton socks (preferably white)  Change socks every day. Do NOT walk barefoot. Do NOT use heating pads or hot water soaks. We discussed wearing proper shoe gear, daily foot inspections, never walking without protective shoe gear.     Discussed edema control and the importance of daily moisturizer to the skin of the lower extremity    Recommend applying vicks vaporub to thick abnormal toenails daily x 6 months to treat fungal nail infection.    Rx diabetic shoes for protection and support    Stretching handout dispensed to patient. Instructions on adequate stretching reviewed in clinic       Based on clinical exam, patient has palpable pulses and intact protective sensation and therefore does not qualify for foot care. Patients who qualify for nail care are usually as follows: diabetic with neurological manifestations, PVD, pernicious anemia, or CKD with appropriate modifiers that indicate high amputation risk (evidence of decreased perfusion, previous amputation, loss of protective sensation,etc). Therefore patient is low risk for developing lower extremity issues secondary to diabetes. I recommend continued yearly diabetic foot examinations. Patients without pedal manifestations of DM, do not qualify for nail/callus trimming      In depth conversation on the treatment of ingrown nail; partial nail avulsion vs chemical matrixectomy vs conservative treatment of soaking and nail trimming    Informed patient that many nail problems can be prevented by wearing the right shoes and trimming your nails properly.   The right shoes: Patient is to wear shoes that are supportive and roomy enough for toes to wiggle. Look for shoes made of natural materials such as leather, which allow  feet to breathe.   Proper trimming: To avoid problems, she was instructed to trim toenails straight across without cutting down  into the corners.          He will continue to monitor the areas daily, inspect his feet, wear protective shoe gear when ambulatory, moisturizer to maintain skin integrity and follow in this office in approximately 12 months, sooner p.r.n.                 [1]   Patient Active Problem List  Diagnosis    Hyperlipidemia associated with type 2 diabetes mellitus    Old retinal detachment, total or subtotal    POAG (primary open-angle glaucoma) - Both Eyes    Hypertension associated with diabetes    OA (osteoarthritis)    Hip pain    Malignant neoplasm of prostate    Erectile dysfunction after radical prostatectomy    Adenomatous polyp of colon    Type 2 diabetes mellitus with hyperglycemia, without long-term current use of insulin    Erectile dysfunction    Overweight (BMI 25.0-29.9)    Iron deficiency anemia secondary to inadequate dietary iron intake    Gastroesophageal reflux disease without esophagitis    Aortic atherosclerosis    Statin intolerance    Primary open-angle glaucoma, left eye, moderate stage    Nuclear sclerotic cataract of both eyes    Glaucoma filtering bleb of left eye    Decreased GFR    Calcified granuloma of lung    Type 2 diabetes mellitus with diabetic cataract   [2]   Current Outpatient Medications on File Prior to Visit   Medication Sig Dispense Refill    albuterol (VENTOLIN HFA) 90 mcg/actuation inhaler Inhale 2 puffs into the lungs every 6 (six) hours as needed for Wheezing or Shortness of Breath (cough). Rescue 18 g 11    alcohol swabs (ALCOHOL WIPES) PadM Monitor as directed X 2 daily. Per insurance coverage. ICD 10 E11.9 200 each 3    amLODIPine (NORVASC) 10 MG tablet TAKE 1 TABLET BY MOUTH EVERY DAY 90 tablet 1    ascorbic acid, vitamin C, (VITAMIN C) 250 MG tablet Take 250 mg by mouth once daily.      brimonidine 0.2% (ALPHAGAN) 0.2 % Drop Place 1 drop into the right eye 3 (three) times daily. 20 mL 3    dorzolamide-timolol 2-0.5% (COSOPT) 22.3-6.8 mg/mL ophthalmic solution Place 1 drop  into the right eye 3 (three) times daily. 320 mL 3    evolocumab (REPATHA SURECLICK) 140 mg/mL PnIj Inject 1 mL (140 mg total) into the skin every 14 (fourteen) days. 2 mL 11    ezetimibe (ZETIA) 10 mg tablet Take 1 tablet (10 mg total) by mouth every evening. 90 tablet 3    fexofenadine (ALLEGRA) 180 MG tablet Take 1 tablet (180 mg total) by mouth once daily. 90 tablet 2    fluticasone propionate (FLONASE) 50 mcg/actuation nasal spray 2 sprays (100 mcg total) by Each Nostril route 2 (two) times daily as needed for Rhinitis. 16 g 11    FREESTYLE ROME 2 SENSOR Kit Use as directed. Change every 14 (fourteen) days. 2 kit 11    hydroCHLOROthiazide (HYDRODIURIL) 25 MG tablet TAKE 1 TABLET BY MOUTH EVERY DAY 90 tablet 1    meloxicam (MOBIC) 15 MG tablet TAKE 1 TABLET BY MOUTH EVERY DAY 90 tablet 0    metFORMIN (GLUCOPHAGE-XR) 500 MG ER 24hr tablet Take 2 tablets (1,000 mg total) by mouth once daily. 180 tablet 3    netarsudiL-latanoprost (ROCKLATAN) 0.02-0.005 % Drop Place 1 drop into both eyes every evening. Patient was not controlled on latanoprost - now needs to use rocklatan. Please run script through - it is now a covered medication if they have failed latanoprost 7.5 mL 3    olmesartan (BENICAR) 40 MG tablet TAKE 1 TABLET BY MOUTH EVERY DAY 90 tablet 1    semaglutide (RYBELSUS) 7 mg tablet Take 1 tablet (7 mg total) by mouth once daily. Must be taken ist thing in the AM, on an empty stomach and wati g30 minutes before other pills and food. Take with only 4 oz oz of water 30 tablet 5    triamcinolone acetonide 0.025% (KENALOG) 0.025 % cream Apply topically 2 (two) times daily. 80 g 1    [DISCONTINUED] bicalutamide (CASODEX) 50 MG Tab Take 1 tablet (50 mg total) by mouth once daily. (Patient not taking: No sig reported) 30 tablet 1    [DISCONTINUED] TRUE [place]RIX [place]UCOSE [place]ER [facility] [place] Facility-Administered Medications on File Prior to Visit   Medication Dose Route Frequency Provider Last Rate Last Admin     leuprolide acetate (6 month) injection 45 mg  45 mg Intramuscular Q6 Months Henrry Sampson Jr., MD   45 mg at 05/03/23 1231    leuprolide acetate (6 month) injection 45 mg  45 mg Intramuscular Q6 Months Henrry Sampson Jr., MD   45 mg at 11/22/23 1132    leuprolide acetate (6 month) injection 45 mg  45 mg Intramuscular Q6 Months Henrry Sampson Jr., MD   45 mg at 05/29/24 1138   [3]   Social History  Socioeconomic History    Marital status:     Number of children: 2   Occupational History     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 Retired  for [email or web address].      Social Drivers of Health     Financial Resource Strain: Low Risk  (11/18/2024)    Overall Financial Resource Strain (CARDIA)     Difficulty of Paying Living Expenses: Not hard at all   Food Insecurity: No Food Insecurity (11/18/2024)    Hunger Vital Sign     Worried About Running Out of Food in the Last Year: Never true     Ran Out of Food in the Last Year: Never true   Transportation Needs: No Transportation Needs (11/18/2024)    TRANSPORTATION NEEDS     Transportation : No   Physical Activity: Inactive (3/13/2024)    Exercise Vital Sign     Days of Exercise per Week: 0 days     Minutes of Exercise per Session: 0 min   Stress: No Stress Concern Present (3/13/2024)    [employer] [place] [facility] [employer] - Occupational Stress Questionnaire     Feeling of Stress : Not at all   Housing Stability: Unknown (11/18/2024)    Housing Stability Vital Sign     Unable to Pay for Housing in the Last Year: No     Homeless in the Last Year: No

## 2025-03-17 NOTE — PATIENT INSTRUCTIONS
Shoe purchasing ideas: (try 6pm[employer], zappos.[employer] , nordstromrack.[employer], or shoes.[employer] for discounted prices) you can visit varsity shoes in [facility] Shoe [facility], DSW shoes in [place]  or florentin rack in the [facility] (there are also several shoe brand outlets in the [facility])    ONLY purchase stability style tennis shoes AVOID flex, foam, free, yoga mat style shoes or shoes that claim to feel like clouds  If you have a flatter foot purchase shoes for PRONATION  If you have a high arch purchase shoes for SUPINATION    Shoe examples:    Asics (GT or gel foundations, gel-kayano-30), new balance stability type shoes (such as the 940 series or the [id number]), saucony (Guide 16, stabil c3),  Tian (GTS or Beast or transcend), propet, HokaOne (sherrie 7, arahi, humble) Yon (tennis shoes and boots)    Sofft Brand (women) Mima&Jesse (men), barepradeep, clarks, crocs, aerosoles, naturalizers, SAS, ecco, born, nell garcia, rockports (dress shoes)    Vionic, burkenstocks, fitflops, propet, taos, baretraps, oofos, Hoka or vionic recovery slides/sandals (sandals)    Hoka or vionic recovery slides/sandals, birkenstock rubber sandals, crocs(especially the recovery and support styles), propet. Bared, vionic slippers, PowerStep slippers, Aetrex slippers (house shoes)    Over the Counter Insert Recommendations (always go for FULL length inserts):  - Spenco brand (orthotic are or total support)  - SuperFeet (look for the ones that offer support and/or pain relief)  - Stacie   - PowerStep [place]  - OrthoFeet      Over the counter pain creams: Voltaren Gel, Biofreeze, Bengay, tiger balm, two old goat, lidocaine gel,  Absorbine Veterinary Liniment Gel Topical Analgesic Sore Muscle and Joint Pain Relief    Alternative Pain remedies: Omega-3 EFAs, tumeric with black pepper, green tea, Bromelain, Arnica, garlic    Anti-inflammatory foods  An anti-inflammatory diet should include these foods:  tomatoes  olive oil  green leafy  vegetables, such as spinach, kale, and collards  nuts like almonds and walnuts  fatty fish like salmon, mackerel, tuna, and sardines  fruits such as strawberries, blueberries, cherries, and oranges   Foods that cause inflammation  Try to avoid or limit these foods as much as possible:  refined carbohydrates, such as white bread and pastries  French fries and other fried foods  soda and other sugar-sweetened beverages  red meat (burgers, steaks) and processed meat (hot dogs, sausage)  margarine, shortening, and lard        Recommend lotions: eucerin, eucerin for diabetics, aquaphor, A&D ointment, gold bond for diabetics, sween, Adrián's Bees all purpose baby ointment,  urea 40 with aloe or SkinIntegra rapid crack repair (found on amazon.com)  Nail Home remedy: Vicks Vapor rub or Emuaid to nails for easier manageability    How Diabetes Can Affect Your Feet   Diabetes can lead to serious complications in the feet due to its impact on nerves, blood flow, and the immune system. Proper foot care is essential to prevent complications such as ulcers, infections, and amputations.  1. Nerve Damage (Diabetic Neuropathy)   What Happens? High blood sugar damages nerves, leading to loss of sensation in the feet.   Symptoms: Numbness, tingling, burning pain, or complete lack of feeling.  -  Why Its a Problem?   You may not feel cuts, blisters, or injuries, increasing the risk of infection.   Changes in foot shape can lead to pressure points, deformities, and ulcers.    2. Poor Circulation (Peripheral Arterial Disease - PAD)   What Happens? Diabetes causes narrowed or blocked arteries, reducing blood flow to the feet.   Symptoms: Cold feet, slow-healing wounds, pain when walking (claudication).  - Why Its a Problem?   Wounds heal slowly or not at all, increasing infection risk.   Tissue death (gangrene) may occur, leading to amputation.    3. Increased Risk of Infections   What Happens? High blood sugar weakens the immune system,  making infections more severe.   Common Foot Infections:   Skin infections (cellulitis)   Fungal infections (athletes foot, toenail fungus)   Bone infections (osteomyelitis) from untreated ulcers  - Why Its a Problem?   Minor infections can spread quickly.   In severe cases, amputation may be required.    4. Diabetic Foot Ulcers   What Happens? Pressure points, poor sensation, and slow healing lead to open wounds on the foot.   Common Causes:   Ill-fitting shoes (pressure ulcers).   Calluses & corns that break down into wounds.   Injuries that go unnoticed due to neuropathy.  - Why Its a Problem?   Ulcers can become infected and lead to gangrene.   If the infection spreads to bone, amputation may be necessary.    5. Charcot Foot (Diabetic Foot Deformity)   What Happens? Nerve damage leads to unnoticed fractures, causing the foot to collapse over time.   Symptoms: Swelling, redness, warmth, and foot shape changes.  - Why Its a Problem?   Can cause severe deformities, making walking difficult.   Leads to pressure ulcers and infections.    6. Dry Skin & Cracking (Autonomic Neuropathy)   What Happens? Diabetes affects nerves that control sweating, leading to dry skin and cracking.   Symptoms: Peeling, cracking heels, increased calluses.  - Why Its a Problem?   Cracks allow bacteria to enter, increasing infection risk.   Skin breakdown can lead to ulcers.    How to Prevent Foot Complications:  ? Check your feet daily for cuts, redness, or swelling.  ? Moisturize but avoid lotion between toes (prevents fungal growth).  ? Wear well-fitting shoes and avoid walking barefoot.  ? Trim nails carefully (or have a podiatrist trim them if feet have been determined to be high risk).  ? Manage blood sugar levels to reduce nerve damage.  ? See a podiatrist annually or as advised based on exam.         Diabetes: Inspecting Your Feet  Diabetes increases your chances of developing foot problems. So inspect your feet every day. This  helps you find small skin irritations before they become serious infections. If you have trouble seeing the bottoms of your feet, use a mirror or ask a family member or friend to help.     Pressure spots on the bottom of the foot are common areas where problems develop.   How to check your feet  Below are tips to help you look for foot problems. Try to check your feet at the same time each day, such as when you get out of bed in the morning:  Check the top of each foot. The tops of toes, back of the heel, and outer edge of the foot can get a lot of rubbing from poor-fitting shoes.  Check the bottom of each foot. Daily wear and tear often leads to problems at pressure spots.  Check the toes and nails. Fungal infections often occur between toes. Toenail problems can also be a sign of fungal infections or lead to breaks in the skin.  Check your shoes, too. Loose objects inside a shoe can injure the foot. Use your hand to feel inside your shoes for things like ben, loose stitching, or rough areas that could irritate your skin.  Warning signs  Look for any color changes in the foot. Redness with streaks can signal a severe infection, which needs immediate medical attention. Tell your doctor right away if you have any of these problems:  Swelling, sometimes with color changes, may be a sign of poor blood flow or infection. Symptoms include tenderness and an increase in the size of your foot.  Warm or hot areas on your feet may be signs of infection. A foot that is cold may not be getting enough blood.  Sensations such as burning, tingling, or pins and needles can be signs of a problem. Also check for areas that may be numb.  Hot spots are caused by friction or pressure. Look for hot spots in areas that get a lot of rubbing. Hot spots can turn into blisters, calluses, or sores.  Cracks and sores are caused by dry or irritated skin. They are a sign that the skin is breaking down, which can lead to infection.  Toenail  problems to watch for include nails growing into the skin (ingrown toenail) and causing redness or pain. Thick, yellow, or discolored nails can signal a fungal infection.  Drainage and odor can develop from untreated sores and ulcers. Call your doctor right away if you notice white or yellow drainage, bleeding, or unpleasant odor.   © 2588-8533 [employer]. [address] [place] [id number]. All rights reserved. This information is not intended as a substitute for professional medical care. Always follow your healthcare professional's instructions.        Step-by-Step:  Inspecting Your Feet (Diabetes)    Date Last Reviewed: 10/1/2016  © 0283-8998 [employer]. [address] [place] [id number]. All rights reserved. This information is not intended as a substitute for professional medical care. Always follow your healthcare professional's instructions.

## 2025-03-21 ENCOUNTER — OFFICE VISIT (OUTPATIENT)
Dept: URGENT CARE | Facility: CLINIC | Age: 70
End: 2025-03-21
Payer: MEDICARE

## 2025-03-21 VITALS
TEMPERATURE: 98 F | RESPIRATION RATE: 20 BRPM | HEART RATE: 76 BPM | DIASTOLIC BLOOD PRESSURE: 68 MMHG | WEIGHT: 162 LBS | HEIGHT: 65 IN | OXYGEN SATURATION: 100 % | SYSTOLIC BLOOD PRESSURE: 133 MMHG | BODY MASS INDEX: 26.99 KG/M2

## 2025-03-21 DIAGNOSIS — R22.2 SUBCUTANEOUS NODULE OF BACK: Primary | ICD-10-CM

## 2025-03-21 NOTE — PROGRESS NOTES
"Subjective:      Patient ID: Eyad Garcia is a 69 y.o. male.    Vitals:  height is 5' 5" (1.651 m) and weight is 73.5 kg (162 lb). His oral temperature is 97.7 °F (36.5 °C). His blood pressure is 133/68 and his pulse is 76. His respiration is 20 and oxygen saturation is 100%.     Chief Complaint: Abscess    This is a 69 y.o. male who presents today with a chief complaint of abscess located that began a week ago.  No drainage. No fever.      Abscess  Chronicity:  New  Onset:  7 days or greater  Progression Since Onset: gradually worsening  Size:  Less than 2 cm  Location:  Torso  Associated Symptoms: no fever, no chills, no sweats  Characteristics: painful and swelling    Characteristics: not draining, no itching, no redness, no dryness, no scaling, no peeling, no bruising and no blistering    Pain Scale:  8/10  Treatments Tried:  Draining/squeezing  Relieved by:  Nothing  Worsened by:  Draining/squeezing      Constitution: Negative for chills and fever.   Musculoskeletal:  Negative for trauma.   Skin:  Positive for abscess. Negative for erythema.      Objective:     Physical Exam   Constitutional: He is cooperative. No distress.   HENT:   Head: Normocephalic and atraumatic.   Ears:   Right Ear: Hearing and external ear normal.   Left Ear: Hearing and external ear normal.   Nose: Nose normal.   Eyes: Lids are normal. Right conjunctiva is not injected. Left conjunctiva is not injected. No scleral icterus.   Cardiovascular: Normal pulses.   Pulmonary/Chest: Effort normal. No respiratory distress.   Abdominal: Normal appearance.   Neurological: He is alert. He has normal motor skills and normal sensation. He displays no weakness.   Skin: Skin is warm and dry. No erythema        Nursing note and vitals reviewed.      Assessment:     1. Subcutaneous nodule of back        Plan:     Subcutaneous nodule does not appear to be infectious.  There is no drainage or erythema.  Differential includes epidermal inclusion cysts, " sebaceous cyst, lipoma, abscess.  I will refer to dermatology for evaluation and management.  I do not believe I and D is appropriate at this time.      Subcutaneous nodule of back  -     Ambulatory referral/consult to Dermatology      Patient Instructions   Please review attached instructions.    You must understand that you've received an Urgent Care treatment only and that you may be released before all your medical problems are known or treated. You, the patient, will arrange for follow up care as instructed.  Follow up with your PCP or specialty clinic as directed in the next 1-2 weeks if not improved or as needed.  You may call [phone] to schedule an appointment with the appropriate provider.  If your condition worsens we recommend that you receive another evaluation at the emergency room immediately or contact your primary medical clinics after hours call service to discuss your concerns.    If you were prescribed a narcotic or controlled medication, do not drive or operate heavy equipment or machinery while taking these medications.    If you smoke, please stop smoking.

## 2025-03-21 NOTE — PATIENT INSTRUCTIONS
Please review attached instructions.    You must understand that you've received an Urgent Care treatment only and that you may be released before all your medical problems are known or treated. You, the patient, will arrange for follow up care as instructed.  Follow up with your PCP or specialty clinic as directed in the next 1-2 weeks if not improved or as needed.  You may call [phone] to schedule an appointment with the appropriate provider.  If your condition worsens we recommend that you receive another evaluation at the emergency room immediately or contact your primary medical clinics after hours call service to discuss your concerns.    If you were prescribed a narcotic or controlled medication, do not drive or operate heavy equipment or machinery while taking these medications.    If you smoke, please stop smoking.

## 2025-03-28 ENCOUNTER — RESULTS FOLLOW-UP (OUTPATIENT)
Dept: DIABETES | Facility: CLINIC | Age: 70
End: 2025-03-28

## 2025-03-31 ENCOUNTER — PATIENT MESSAGE (OUTPATIENT)
Dept: DIABETES | Facility: CLINIC | Age: 70
End: 2025-03-31
Payer: MEDICARE

## 2025-04-04 ENCOUNTER — TELEPHONE (OUTPATIENT)
Dept: DIABETES | Facility: CLINIC | Age: 70
End: 2025-04-04
Payer: MEDICARE

## 2025-04-07 ENCOUNTER — OFFICE VISIT (OUTPATIENT)
Dept: DIABETES | Facility: CLINIC | Age: 70
End: 2025-04-07
Payer: MEDICARE

## 2025-04-07 ENCOUNTER — TELEPHONE (OUTPATIENT)
Dept: DIABETES | Facility: CLINIC | Age: 70
End: 2025-04-07

## 2025-04-07 VITALS
HEART RATE: 72 BPM | OXYGEN SATURATION: 99 % | WEIGHT: 159.75 LBS | SYSTOLIC BLOOD PRESSURE: 140 MMHG | DIASTOLIC BLOOD PRESSURE: 78 MMHG | HEIGHT: 65 IN | BODY MASS INDEX: 26.62 KG/M2

## 2025-04-07 DIAGNOSIS — Z78.9 STATIN INTOLERANCE: ICD-10-CM

## 2025-04-07 DIAGNOSIS — E11.65 TYPE 2 DIABETES MELLITUS WITH HYPERGLYCEMIA, WITHOUT LONG-TERM CURRENT USE OF INSULIN: Primary | ICD-10-CM

## 2025-04-07 DIAGNOSIS — Z91.199 NONCOMPLIANCE WITH DIABETES TREATMENT: ICD-10-CM

## 2025-04-07 DIAGNOSIS — E11.59 HYPERTENSION ASSOCIATED WITH DIABETES: ICD-10-CM

## 2025-04-07 DIAGNOSIS — E11.69 HYPERLIPIDEMIA ASSOCIATED WITH TYPE 2 DIABETES MELLITUS: ICD-10-CM

## 2025-04-07 DIAGNOSIS — E66.3 OVERWEIGHT (BMI 25.0-29.9): ICD-10-CM

## 2025-04-07 DIAGNOSIS — I10 ELEVATED BLOOD PRESSURE READING IN OFFICE WITH DIAGNOSIS OF HYPERTENSION: ICD-10-CM

## 2025-04-07 DIAGNOSIS — I15.2 HYPERTENSION ASSOCIATED WITH DIABETES: ICD-10-CM

## 2025-04-07 DIAGNOSIS — E78.5 HYPERLIPIDEMIA ASSOCIATED WITH TYPE 2 DIABETES MELLITUS: ICD-10-CM

## 2025-04-07 DIAGNOSIS — D50.8 IRON DEFICIENCY ANEMIA SECONDARY TO INADEQUATE DIETARY IRON INTAKE: ICD-10-CM

## 2025-04-07 DIAGNOSIS — Z71.9 HEALTH EDUCATION/COUNSELING: ICD-10-CM

## 2025-04-07 DIAGNOSIS — Z91.148 NONCOMPLIANCE W/MEDICATION TREATMENT DUE TO INTERMIT USE OF MEDICATION: ICD-10-CM

## 2025-04-07 DIAGNOSIS — C61 MALIGNANT NEOPLASM OF PROSTATE: ICD-10-CM

## 2025-04-07 PROCEDURE — 99999 PR PBB SHADOW E&M-EST. PATIENT-LVL V: CPT | Mod: PBBFAC,,, | Performed by: NURSE PRACTITIONER

## 2025-04-07 RX ORDER — METFORMIN HYDROCHLORIDE 500 MG/1
1000 TABLET, EXTENDED RELEASE ORAL DAILY
Qty: 180 TABLET | Refills: 3 | Status: SHIPPED | OUTPATIENT
Start: 2025-04-07 | End: 2026-04-07

## 2025-04-07 RX ORDER — BLOOD-GLUCOSE SENSOR
1 EACH MISCELLANEOUS
Qty: 2 EACH | Refills: 11 | Status: SHIPPED | OUTPATIENT
Start: 2025-04-07 | End: 2026-04-07

## 2025-04-07 RX ORDER — DAPAGLIFLOZIN 10 MG/1
10 TABLET, FILM COATED ORAL DAILY
Qty: 90 TABLET | Refills: 2 | Status: SHIPPED | OUTPATIENT
Start: 2025-04-07 | End: 2025-04-07 | Stop reason: SDUPTHER

## 2025-04-07 RX ORDER — DAPAGLIFLOZIN 10 MG/1
10 TABLET, FILM COATED ORAL DAILY
Qty: 90 TABLET | Refills: 3 | Status: SHIPPED | OUTPATIENT
Start: 2025-04-07

## 2025-04-07 RX ORDER — DAPAGLIFLOZIN 10 MG/1
10 TABLET, FILM COATED ORAL DAILY
Qty: 90 TABLET | Refills: 2 | Status: SHIPPED | OUTPATIENT
Start: 2025-04-07 | End: 2025-04-07

## 2025-04-07 RX ORDER — DAPAGLIFLOZIN 10 MG/1
10 TABLET, FILM COATED ORAL DAILY
Qty: 90 TABLET | Refills: 3 | Status: SHIPPED | OUTPATIENT
Start: 2025-04-07 | End: 2025-04-07 | Stop reason: SDUPTHER

## 2025-04-07 RX ORDER — ORAL SEMAGLUTIDE 7 MG/1
7 TABLET ORAL DAILY
Qty: 30 TABLET | Refills: 6 | Status: SHIPPED | OUTPATIENT
Start: 2025-04-07

## 2025-04-07 NOTE — ASSESSMENT & PLAN NOTE
Body mass index is 26.59 kg/m².  Increases insulin resistance.   Discussed DM diet and exercise.

## 2025-04-07 NOTE — PROGRESS NOTES
CC:   Chief Complaint   Patient presents with    Diabetes Mellitus       HPI: Eyad Garcia is a 69 y.o. male presents for a follow up visit today for the management of Diabetes       He was diagnosed with Type 2 diabetes more than 40 years ago-- in his 20's-30's     Family hx of diabetes: mother, many siblings - he is 1 of 12 children   Hospitalized for diabetes: denies    Insulin therapy: denies       No personal or FH of thyroid cancer or personal of pancreatic cancer or pancreatitis.       Our last visit was in December of 2024  At that visit we discussed stopping the glimepiride and changing to Rybelsus  Start with Rybelsus 3 mg daily for 1 month and then increase to 7 mg daily  Continue metformin extended release 1000 mg daily  Continue Farxiga 10 mg daily  Continue the Barka 2  His A1c has increased from 7.6% to 8.4%  Reports that he is taking the Rybelsus correctly     He just started the Rybelsus 7 mg daily -- denies GI upset on the Rybelsus     He is a poor historian when it comes to his medications  Farxiga is not on his medication list  He says he thinks he might have stop the Farxiga??  I do not see were anywhere anyone told him to stop the Farxiga  He did confirm that he is off the glimepiride  He is unsure if he is taking the Zetia    I have asked him to bring his medication bottles to the visit in the future so we know exactly what he is taking  He will go home and call me back and let me know if he has been taking the Farxiga or if he has been taking the Zetia    His cholesterol has improved with the Repatha  Sometimes he has issues with getting refills but it is coming from the [facility]    Dispenses     Dispensed Days Supply Quantity Provider Pharmacy   RYBELSUS 7 MG TABLET 03/14/2025 30 30 each Marisol Bob NP [facility]/pharmacy #[id number] - [place]...   RYBELSUS 3 MG TABLET 10/01/2024 30 30 each Marisol Bob NP [facility]/pharmacy #[id number] - [place]...         How do dispenses affect the score?                                                DIABETES COMPLICATIONS: none    Hx of glaucoma       Diabetes Management Status    ASA:  No    Statin: not taking it -  stopped the Pravastatin due to issues with walking  ( looks like it was d/c by oncology)-- unable to tolerate Lipitor-- had severe myalgia with Lipitor   Unsure if he is taking hte Zetia    Repatha?? 140 mg q 2 weeks -- he just got it 3 days ago -- was off of for a week or two-- cholesterol has improved     ACE/ARB: Taking-- Benicar 40 mg       The 10-year ASCVD risk score (Anjel NGO, et al., 2019) is: 35.2%    Values used to calculate the score:      Age: 69 years      Sex: Male      Is Non- : Yes      Diabetic: Yes      Tobacco smoker: No      Systolic Blood Pressure: 140 mmHg      Is BP treated: Yes      HDL Cholesterol: 47 mg/dL      Total Cholesterol: 137 mg/dL        [place] [place] or Prevention Patient's value Goal Complete/Controlled?   HgA1C Testing and Control   Lab Results   Component Value Date    HGBA1C 8.4 (H) 03/28/2025      Annually/Less than 8% No   Lipid profile : 03/28/2025 Annually Yes   LDL control Lab Results   Component Value Date    LDLCALC 190.6 (H) 11/29/2024    Annually/Less than 100 mg/dl  No   Nephropathy screening Lab Results   Component Value Date    LABMICR 7.0 11/29/2024     Lab Results   Component Value Date    PROTEINUA Negative 05/29/2022    Annually Yes   Blood pressure BP Readings from Last 1 Encounters:   04/07/25 (!) 140/78    Less than 140/90 No   Dilated retinal exam : 01/13/2025 Annually No   Foot exam   : 03/17/2025 Annually No       CURRENT A1C:    Hemoglobin A1C   Date Value Ref Range Status   11/29/2024 7.6 (H) 4.0 - 5.6 % Final     Comment:     ADA Screening Guidelines:  5.7-6.4%  Consistent with prediabetes  >or=6.5%  Consistent with diabetes    High levels of fetal hemoglobin interfere with the HbA1C  assay. Heterozygous hemoglobin variants (HbS, HgC, etc)do  not significantly  interfere with this assay.   However, presence of multiple variants may affect accuracy.     08/29/2024 8.0 (H) 4.0 - 5.6 % Final     Comment:     ADA Screening Guidelines:  5.7-6.4%  Consistent with prediabetes  >or=6.5%  Consistent with diabetes    High levels of fetal hemoglobin interfere with the HbA1C  assay. Heterozygous hemoglobin variants (HbS, HgC, etc)do  not significantly interfere with this assay.   However, presence of multiple variants may affect accuracy.     04/23/2024 7.0 (H) 4.0 - 5.6 % Final     Comment:     ADA Screening Guidelines:  5.7-6.4%  Consistent with prediabetes  >or=6.5%  Consistent with diabetes    High levels of fetal hemoglobin interfere with the HbA1C  assay. Heterozygous hemoglobin variants (HbS, HgC, etc)do  not significantly interfere with this assay.   However, presence of multiple variants may affect accuracy.       Hemoglobin A1c   Date Value Ref Range Status   03/28/2025 8.4 (H) 4.0 - 5.6 % Final     Comment:     ADA Screening Guidelines:  5.7-6.4%  Consistent with prediabetes  >=6.5%  Consistent with diabetes    High levels of fetal hemoglobin interfere with the HbA1C  assay. Heterozygous hemoglobin variants (HbS, HgC, etc)do  not significantly interfere with this assay.   However, presence of multiple variants may affect accuracy.       GOAL A1C: 7% without hypoglycemia     DM MEDICATIONS USED IN THE PAST: Metformin XR  Farxiga    Jardiance- urinary frequently   Tradjenta   Glimepiride   Rybelsus- dizziness       CURRENT DIABETES MEDICATIONS: Metformin XR 1000 mg daily ( 2 tablets- 500 mg each) -- when he takes the Metformin BID- it causes GI upset   Rybelsus 7 mg daily     He has not been taking the Farxiga??? Unsure -- d/c by his cancer doctor     He is off the Glimepiride     Insulin: N/A   Missed doses: yes- missed a lot of doses during Mardi gras   Off the farxiga??     BLOOD GLUCOSE MONITORING:      Sensor type: Freestyle richmond 2 -- reviewed data for the last 4 weeks-  3/11/2025-2025  Average BG readin  Time in range: 57%  GMI- 7.5%  Site change: q14 days.    Sensor was downloaded in clinic today and reviewed with patient.   Please see attached document for download.     Supplies- [facility]       HYPOGLYCEMIA:  0% low --  0% very low       MEALS: eating 3 meals per day   BF: cheerios or sandwich or full meal or sometimes skips -- this AM he had coffee and donut this AM   Lunch: sandwich - ham   Dinner: home cooked - cabbage/ mustard greens, sweet potato - black eyed peas   Snack:occ- 1 cookie   Drinks water   Stopped coffee in the AM        CURRENT EXERCISE:  No      Review of Systems  Review of Systems   Constitutional:  Negative for appetite change, fatigue and unexpected weight change.   HENT:  Negative for trouble swallowing.    Eyes:  Negative for visual disturbance.   Respiratory:  Negative for shortness of breath.    Cardiovascular:  Negative for chest pain.   Gastrointestinal:  Negative for nausea.   Endocrine: Negative for polydipsia, polyphagia and polyuria.   Genitourinary:         No Nocturia    Skin:  Negative for wound.   Neurological:  Positive for numbness (intermittent pins and needles to right hand - comes and goes).       Physical Exam   Physical Exam  Vitals and nursing note reviewed.   Constitutional:       Appearance: He is well-developed.      Comments: Overweight male    HENT:      Head: Normocephalic and atraumatic.      Right Ear: External ear normal.      Left Ear: External ear normal.      Nose: Nose normal.   Neck:      Thyroid: No thyromegaly.      Trachea: No tracheal deviation.   Cardiovascular:      Rate and Rhythm: Normal rate and regular rhythm.      Heart sounds: No murmur heard.  Pulmonary:      Effort: Pulmonary effort is normal. No respiratory distress.      Breath sounds: Normal breath sounds.   Abdominal:      Palpations: Abdomen is soft.      Tenderness: There is no abdominal tenderness.      Hernia: No hernia is present.    Musculoskeletal:      Cervical back: Normal range of motion and neck supple.   Skin:     General: Skin is warm and dry.      Capillary Refill: Capillary refill takes less than 2 seconds.      Findings: No rash.      Comments:      Neurological:      Mental Status: He is alert and oriented to person, place, and time.      Cranial Nerves: No cranial nerve deficit.   Psychiatric:         Behavior: Behavior normal.         Judgment: Judgment normal.         FOOT EXAMINATION: Appropriate footwear           Lab Results   Component Value Date    TSH 1.282 08/29/2024           Type 2 diabetes mellitus with hyperglycemia, without long-term current use of insulin  Uncontrolled - A1c has increased 8.4%  Suspect medication noncompliance  Poor historian and presents without a medication list or his medication bottles  Sounds like he may have been off of the Farxiga??  Not sure why??  May be a misunderstanding?  He did confirm that he stop the glimepiride  It looks like he has not been filling the Rybelsus consistently based on his medication history from the pharmacy---he tells me that he just increased to the 7 mg daily dose of the Rybelsus because he had a lot of the 3 mg tablets??     Will upgrade his Barak        -- Medication Changes:   Continue the Metformin 1000 mg daily -- 2 tablets in the AM with food     Continue the Rybelsus 7 mg daily that you just started   Remember to take the Rybelsus -first thing in the AM before medications and food  Take ALL BY ITSELF with only 4 oz of water   Wait 30 minutes to eat, drink, and take other medications     Make sure to get back on the Farxiga 10 mg daily   -- Please let us know if you have nausea, vomiting, or weakness with a normal BG. This could be euglycemic DKA.  -- please hold medication 3 days prior to surgery or if you are sick and have decreased intake.   -- Please drink lots of water daily while on this medication.   --This medication could also give you a yeast  infection- please let us know if this occurs and we can treat it.       We will upgrade your barak to the barak 3 with your next refill       -- Reviewed goals of therapy are to get the best control we can without hypoglycemia.  -- Advised frequent self blood glucose monitoring.  Patient encouraged to document glucose results and bring them to every clinic visit.  Upgrade to the Barak 3+----supplies come from pharmacy  -- Hypoglycemia precautions discussed. Instructed on precautions before driving.    -- Call for Bg repeatedly < 70 or > 180.   -- Close adherence to lifestyle changes recommended.   -- Periodic follow ups for eye evaluations, foot care and dental care suggested.  ---see Anupama with diabetes education for Barak 3 start      Hypertension associated with diabetes  BP goal is < 140/90.   Tolerating ARB  Blood pressure goals discussed with patient  Blood pressure slightly above goal in clinic today    Statin intolerance  On Repatha now    Hyperlipidemia associated with type 2 diabetes mellitus  Off the statin therapy due to intolerance   LDL goal <100.   LDL is now at goal on the Repatha  Unsure if he is on the Zetia??  He needs to go home and let me know  He says he has been taking the Repatha  He is going to call to let me know if he has been taking the Zetia or not when he gets home    The Repatha comes from [facility]       The 10-year ASCVD risk score (Anjel NGO, et al., 2019) is: 35.2%    Values used to calculate the score:      Age: 69 years      Sex: Male      Is Non- : Yes      Diabetic: Yes      Tobacco smoker: No      Systolic Blood Pressure: 140 mmHg      Is BP treated: Yes      HDL Cholesterol: 47 mg/dL      Total Cholesterol: 137 mg/dL      Iron deficiency anemia secondary to inadequate dietary iron intake  May skew A1c level     Overweight (BMI 25.0-29.9)  Body mass index is 26.59 kg/m².  Increases insulin resistance.   Discussed DM diet and exercise.        At the end of the visit I spent time to print out his medication list  Highlight all the medications that he should be taking  Explained to him what each medication is for  I have asked him to go home and make sure that he has all of these medications and if he does not he needs to let us know        I spent a total of 30 minutes on the day of the visit.This includes face to face time and non-face to face time preparing to see the patient (eg, review of tests), obtaining and/or reviewing separately obtained history, documenting clinical information in the electronic or other health record, independently interpreting results and communicating results to the patient/family/caregiver, or care coordinator.          Follow up in about 3 months (around 7/7/2025).  Follow up with me in 3 months with labs prior   Schedule mono in 4 weeks for richmond 3 help / start         Orders Placed This Encounter   Procedures    Hemoglobin A1C     Standing Status:   Future     Expected Date:   7/7/2025     Expiration Date:   10/7/2026    Comprehensive Metabolic Panel     Standing Status:   Future     Expected Date:   7/7/2025     Expiration Date:   10/7/2026    Microalbumin/Creatinine Ratio, Urine     Standing Status:   Future     Expected Date:   7/7/2025     Expiration Date:   10/7/2026     Specimen Source:   Urine    TSH     Standing Status:   Future     Expected Date:   7/7/2025     Expiration Date:   10/7/2026    Ambulatory referral/consult to Diabetes Education     Standing Status:   Future     Expected Date:   4/7/2025     Expiration Date:   10/7/2026     Referral Priority:   Routine     Referral Type:   Consultation     Referral Reason:   Specialty Services Required     [facility] Provider[facility]   Mon[facility] Brian RD[facility]     Requested Specialty:   Diabetes     Number of Visits Requested:   1       Recommendations were discussed with the patient in detail  The patient verbalized understanding and agrees with the plan outlined  as above.     This note was partly generated with [employer] voice recognition software. I apologize for any possible typographical errors.   I personally performed the services described in the documentation, reviewed the documentation recorded by the scribe in my presence and it accurately and completely records my words and action.

## 2025-04-07 NOTE — ASSESSMENT & PLAN NOTE
Off the statin therapy due to intolerance   LDL goal <100.   LDL is now at goal on the Repatha  Unsure if he is on the Zetia??  He needs to go home and let me know  He says he has been taking the Repatha  He is going to call to let me know if he has been taking the Zetia or not when he gets home    The Repatha comes from [facility]       The 10-year ASCVD risk score (Anjel NGO, et al., 2019) is: 35.2%    Values used to calculate the score:      Age: 69 years      Sex: Male      Is Non- : Yes      Diabetic: Yes      Tobacco smoker: No      Systolic Blood Pressure: 140 mmHg      Is BP treated: Yes      HDL Cholesterol: 47 mg/dL      Total Cholesterol: 137 mg/dL

## 2025-04-07 NOTE — TELEPHONE ENCOUNTER
----- Message from Nurse Elizabeth sent at 4/7/2025 11:27 AM CDT -----  They did receive farxiga rx  ----- Message -----  From: Marisol Bob NP  Sent: 4/7/2025  10:36 AM CDT  To: Lisbeth Damon Staff    [facility] and [facility] for the Farxiga  I keep getting error messages with sending it

## 2025-04-07 NOTE — ASSESSMENT & PLAN NOTE
Uncontrolled - A1c has increased 8.4%  Suspect medication noncompliance  Poor historian and presents without a medication list or his medication bottles  Sounds like he may have been off of the Farxiga??  Not sure why??  May be a misunderstanding?  He did confirm that he stop the glimepiride  It looks like he has not been filling the Rybelsus consistently based on his medication history from the pharmacy---he tells me that he just increased to the 7 mg daily dose of the Rybelsus because he had a lot of the 3 mg tablets??     Will upgrade his Barak        -- Medication Changes:   Continue the Metformin 1000 mg daily -- 2 tablets in the AM with food     Continue the Rybelsus 7 mg daily that you just started   Remember to take the Rybelsus -first thing in the AM before medications and food  Take ALL BY ITSELF with only 4 oz of water   Wait 30 minutes to eat, drink, and take other medications     Make sure to get back on the Farxiga 10 mg daily   -- Please let us know if you have nausea, vomiting, or weakness with a normal BG. This could be euglycemic DKA.  -- please hold medication 3 days prior to surgery or if you are sick and have decreased intake.   -- Please drink lots of water daily while on this medication.   --This medication could also give you a yeast infection- please let us know if this occurs and we can treat it.       We will upgrade your barak to the barak 3 with your next refill       -- Reviewed goals of therapy are to get the best control we can without hypoglycemia.  -- Advised frequent self blood glucose monitoring.  Patient encouraged to document glucose results and bring them to every clinic visit.  Upgrade to the Barak 3+----supplies come from pharmacy  -- Hypoglycemia precautions discussed. Instructed on precautions before driving.    -- Call for Bg repeatedly < 70 or > 180.   -- Close adherence to lifestyle changes recommended.   -- Periodic follow ups for eye evaluations, foot care and dental  care suggested.  ---see Anupama with diabetes education for Barak 3 start

## 2025-04-07 NOTE — Clinical Note
[facility] and make sure [facility] for the Farxiga I keep getting error messages with sending it

## 2025-04-07 NOTE — PATIENT INSTRUCTIONS
Continue the Metformin 1000 mg daily -- 2 tablets in the AM with food     Continue the Rybelsus 7 mg daily that you just started   Remember to take the Rybelsus -first thing in the AM before medications and food  Take ALL BY ITSELF with only 4 oz of water   Wait 30 minutes to eat, drink, and take other medications     Make sure to get back on the Farxiga 10 mg daily   -- Please let us know if you have nausea, vomiting, or weakness with a normal BG. This could be euglycemic DKA.  -- please hold medication 3 days prior to surgery or if you are sick and have decreased intake.   -- Please drink lots of water daily while on this medication.   --This medication could also give you a yeast infection- please let us know if this occurs and we can treat it.       We will upgrade your richmond to the richmond 3 with your next refill

## 2025-04-07 NOTE — ASSESSMENT & PLAN NOTE
BP goal is < 140/90.   Tolerating ARB  Blood pressure goals discussed with patient  Blood pressure slightly above goal in clinic today

## 2025-04-08 ENCOUNTER — TELEPHONE (OUTPATIENT)
Dept: DIABETES | Facility: CLINIC | Age: 70
End: 2025-04-08
Payer: MEDICARE

## 2025-04-08 DIAGNOSIS — E78.5 HYPERLIPIDEMIA ASSOCIATED WITH TYPE 2 DIABETES MELLITUS: ICD-10-CM

## 2025-04-08 DIAGNOSIS — E11.69 HYPERLIPIDEMIA ASSOCIATED WITH TYPE 2 DIABETES MELLITUS: ICD-10-CM

## 2025-04-08 RX ORDER — EZETIMIBE 10 MG/1
10 TABLET ORAL NIGHTLY
Qty: 90 TABLET | Refills: 3 | Status: SHIPPED | OUTPATIENT
Start: 2025-04-08 | End: 2026-04-08

## 2025-04-08 NOTE — TELEPHONE ENCOUNTER
----- Message from Nurse Elizabeth sent at 4/7/2025  1:18 PM CDT -----  Pt stated that he is taking all medications that are listed

## 2025-04-17 ENCOUNTER — TELEPHONE (OUTPATIENT)
Dept: DERMATOLOGY | Facility: CLINIC | Age: 70
End: 2025-04-17
Payer: MEDICARE

## 2025-04-20 ENCOUNTER — TELEPHONE (OUTPATIENT)
Dept: DERMATOLOGY | Facility: CLINIC | Age: 70
End: 2025-04-20
Payer: MEDICARE

## 2025-04-22 ENCOUNTER — PATIENT OUTREACH (OUTPATIENT)
Dept: ADMINISTRATIVE | Facility: HOSPITAL | Age: 70
End: 2025-04-22
Payer: MEDICARE

## 2025-04-24 ENCOUNTER — TELEPHONE (OUTPATIENT)
Dept: DERMATOLOGY | Facility: CLINIC | Age: 70
End: 2025-04-24
Payer: MEDICARE

## 2025-05-01 ENCOUNTER — OFFICE VISIT (OUTPATIENT)
Dept: DERMATOLOGY | Facility: CLINIC | Age: 70
End: 2025-05-01
Payer: MEDICARE

## 2025-05-01 DIAGNOSIS — L81.8 IDIOPATHIC GUTTATE HYPOMELANOSIS: ICD-10-CM

## 2025-05-01 DIAGNOSIS — L72.0 EIC (EPIDERMAL INCLUSION CYST): Primary | ICD-10-CM

## 2025-05-01 DIAGNOSIS — D23.5 DILATED PORE OF WINER OF BACK: ICD-10-CM

## 2025-05-01 PROCEDURE — 1159F MED LIST DOCD IN RCRD: CPT | Mod: CPTII,S$GLB,, | Performed by: DERMATOLOGY

## 2025-05-01 PROCEDURE — 3052F HG A1C>EQUAL 8.0%<EQUAL 9.0%: CPT | Mod: CPTII,S$GLB,, | Performed by: DERMATOLOGY

## 2025-05-01 PROCEDURE — 99999 PR PBB SHADOW E&M-EST. PATIENT-LVL III: CPT | Mod: PBBFAC,,, | Performed by: DERMATOLOGY

## 2025-05-01 PROCEDURE — 4010F ACE/ARB THERAPY RXD/TAKEN: CPT | Mod: CPTII,S$GLB,, | Performed by: DERMATOLOGY

## 2025-05-01 PROCEDURE — 1101F PT FALLS ASSESS-DOCD LE1/YR: CPT | Mod: CPTII,S$GLB,, | Performed by: DERMATOLOGY

## 2025-05-01 PROCEDURE — 3288F FALL RISK ASSESSMENT DOCD: CPT | Mod: CPTII,S$GLB,, | Performed by: DERMATOLOGY

## 2025-05-01 PROCEDURE — 1126F AMNT PAIN NOTED NONE PRSNT: CPT | Mod: CPTII,S$GLB,, | Performed by: DERMATOLOGY

## 2025-05-01 PROCEDURE — 99202 OFFICE O/P NEW SF 15 MIN: CPT | Mod: S$GLB,,, | Performed by: DERMATOLOGY

## 2025-05-01 PROCEDURE — 1160F RVW MEDS BY RX/DR IN RCRD: CPT | Mod: CPTII,S$GLB,, | Performed by: DERMATOLOGY

## 2025-05-01 NOTE — PROGRESS NOTES
Subjective:      Patient ID:  Eyad Garcia is a 69 y.o. male who presents for   Chief Complaint   Patient presents with    Lesion     Mid back      Patient here for concern of lesion on mid back.     Last seen by dermatologist: Never     No - personal history of NMSC    Patient with specific complaint of lesion(s)  Location: mid back   Duration: a month   Symptoms: discoloration, tenderness with pressure , itchy occ   Relieving factors/Previous treatments: otc blackhead removal and wife squeezed it    Lab Results       Component                Value               Date                       HGBA1C                   8.4 (H)             03/28/2025                          Review of Systems   Skin:  Positive for itching (occ) and abscesses (mid back).   Hematologic/Lymphatic: Does not bruise/bleed easily.       Objective:   Physical Exam   Constitutional: He appears well-developed and well-nourished. No distress.   Neurological: He is alert and oriented to person, place, and time. He is not disoriented.   Psychiatric: He has a normal mood and affect.   Skin:   Areas Examined (abnormalities noted in diagram):   Back Inspection Performed            Diagram Legend     Erythematous scaling macule/papule c/w actinic keratosis       Vascular papule c/w angioma      Pigmented verrucoid papule/plaque c/w seborrheic keratosis      Yellow umbilicated papule c/w sebaceous hyperplasia      Irregularly shaped tan macule c/w lentigo     1-2 mm smooth white papules consistent with Milia      Movable subcutaneous cyst with punctum c/w epidermal inclusion cyst      Subcutaneous movable cyst c/w pilar cyst      Firm pink to brown papule c/w dermatofibroma      Pedunculated fleshy papule(s) c/w skin tag(s)      Evenly pigmented macule c/w junctional nevus     Mildly variegated pigmented, slightly irregular-bordered macule c/w mildly atypical nevus      Flesh colored to evenly pigmented papule c/w intradermal nevus       Pink pearly  papule/plaque c/w basal cell carcinoma      Erythematous hyperkeratotic cursted plaque c/w SCC      Surgical scar with no sign of skin cancer recurrence      Open and closed comedones      Inflammatory papules and pustules      Verrucoid papule consistent consistent with wart     Erythematous eczematous patches and plaques     Dystrophic onycholytic nail with subungual debris c/w onychomycosis     Umbilicated papule    Erythematous-base heme-crusted tan verrucoid plaque consistent with inflamed seborrheic keratosis     Erythematous Silvery Scaling Plaque c/w Psoriasis     See annotation            Assessment / Plan:        EIC (epidermal inclusion cyst) - back  Reassurance given to patient. No treatment is necessary.   Discussed options, risks, benefits and alternatives for treatment. All questions answered.       Dilated pore of Juliet of back  Reassurance given to patient. No treatment is necessary.     Idiopathic guttate hypomelanosis  Reassurance given to patient. No treatment is available.                No follow-ups on file.

## 2025-05-02 ENCOUNTER — TELEPHONE (OUTPATIENT)
Dept: DIABETES | Facility: CLINIC | Age: 70
End: 2025-05-02
Payer: MEDICARE

## 2025-05-02 NOTE — TELEPHONE ENCOUNTER
----- Message from Lacey sent at 5/2/2025 10:11 AM CDT -----  Regarding: Device Questions/Rx Inquiry  Type: Device QuestionsWho Called:Eyad Kernould the patient rather a call back or a response via [facility]? Call Sindy Call Back Number: [phone] Information:Patient asking to speak to office about his lifestyle richmond going off late night and sometimes with high reading. Patient is concerned maybe he new medication semaglutide (RYBELSUS) 7 mg tablet-is causing glucose to drop to 62.

## 2025-05-05 ENCOUNTER — NUTRITION (OUTPATIENT)
Dept: DIABETES | Facility: CLINIC | Age: 70
End: 2025-05-05
Payer: MEDICARE

## 2025-05-05 ENCOUNTER — TELEPHONE (OUTPATIENT)
Dept: DIABETES | Facility: CLINIC | Age: 70
End: 2025-05-05
Payer: MEDICARE

## 2025-05-05 VITALS — BODY MASS INDEX: 26.49 KG/M2 | HEIGHT: 65 IN | WEIGHT: 159 LBS

## 2025-05-05 DIAGNOSIS — E11.65 TYPE 2 DIABETES MELLITUS WITH HYPERGLYCEMIA, WITHOUT LONG-TERM CURRENT USE OF INSULIN: ICD-10-CM

## 2025-05-05 PROCEDURE — 99999 PR PBB SHADOW E&M-EST. PATIENT-LVL II: CPT | Mod: PBBFAC,,, | Performed by: DIETITIAN, REGISTERED

## 2025-05-05 PROCEDURE — G0108 DIAB MANAGE TRN  PER INDIV: HCPCS | Mod: S$GLB,,, | Performed by: DIETITIAN, REGISTERED

## 2025-05-05 NOTE — PROGRESS NOTES
"Diabetes Care Specialist Follow-up Note  Author: Anupama Brian RD, [facility]  Date: 5/5/2025    Intake    Program Intake  Reason for Diabetes Program Visit:: Intervention  Type of Intervention:: Individual  Individual: Device Training  Device Training: Personal CGM  Current diabetes risk level:: moderate (HgbA1c 8.4)  In the last month, have you used the ER or been admitted to the hospital: No  Permission to speak with others about care:: no    Current Diabetes Treatment: Oral Medications  Oral Medication Type/Dose: metformin ER 1000mg a day, rybelsus 7mg a day, farxiga 10mg a day    Continuous Glucose Monitoring  Patient has CGM: Yes  Personal CGM type:: freestlye libre2  GMI Date: 05/05/25  GMI Value: 5.8 %    Lab Results   Component Value Date    HGBA1C 8.4 (H) 03/28/2025     A1c Pre Diabetes Care Specialist Intervention:  8.4%      Weight: 72.1 kg (159 lb)   Height: 5' 5" (165.1 cm)   Body mass index is 26.46 kg/m².        Physical activity/Exercise:   No change, was walking more during parade season, patient marched with granddaughter    SMBG: Barak 2      Lifestyle Coping Support & Clinical    Lifestyle/Coping/Support  Compared to other people your age, how would you rate your health?: Good  Psychosocial/Coping Skills Assessment Completed: : No  Assessment indicates:: Adequate understanding  Deffered due to:: Other (comment) (previously completed, there has been no change and patient has adequate understanding)  Area of need?: No    Problem Review  Active Comorbidities: Other (comment), Gastrointestinal Disorder, Cardiovascular Disease, Hypertension    Diabetes Self-Management Skills Assessment    Medication Skills Assessment  Patient is able to identify current diabetes medications, dosages, and appropriate timing of medications.: yes  Patient reports problems or concerns with current medication regimen.: no  Patient is  aware that some diabetes medications can cause low blood sugar?: Yes  Medication Skills " Assessment Completed:: Yes  Assessment indicates:: Adequate understanding  Deffered due to:: Other (comment)  Area of need?: No    Diabetes Disease Process/Treatment Options  Diabetes Type?: Type II  Diabetes Disease Process/Treatment Options: Skills Assessment Completed: No  Assessment indicates:: Adequate understanding  Deferred due to:: Other (comment)  Area of need?: No    Nutrition/Healthy Eating  Meal Plan 24 Hour Recall - Breakfast: cheerios and milk or grits and sausage or oatmeal and a banana; always coffee with splenda  Meal Plan 24 Hour Recall - Lunch: tunafish, ham, or PB&J sandwich with water or skipped  Meal Plan 24 Hour Recall - Dinner: baked chicken and fries or stewed turkey neck with red beaks, or Friday's will have boiled or fried seafood  Meal Plan 24 Hour Recall - Snack: occassionally an oreo  Meal Plan 24 Hour Recall - Beverage: drinks water and occassionally apple juice  Who shops/cooks?: spouse  Patient can identify foods that impact blood sugar.: yes  Nutrition/Healthy Eating Skills Assessment Completed:: No  Assessment indicates:: Adequate understanding  Deffered due to:: Other (comment)  Area of need?: No    Physical Activity/Exercise  Physical Activity/Exercise Skills Assessment Completed: : No  Assessment indicates:: Adequate understanding  Deffered due to:: Other (comment)  Area of need?: No    Home Blood Glucose Monitoring  Patient states that blood sugar is checked at home daily.: yes  Monitoring Method:: personal continuous glucose monitor  Personal CGM type:: rui guajardo   What is your current Time in Range?: 84%  What is your A1c Target?: 7.0  Home Blood Glucose Monitoring Skills Assessment Completed: : Yes  Assessment indicates:: Adequate understanding  Area of need?: Yes    Acute Complications  Have you ever had hypoglycemia (low BG 70 or less)?: yes  How often and what are your symptoms?: everyday multiple times a day with this sensor, did not feel bad, believes it was just  the sensor  How do you treat hypoglycemia?: yes, candy, juice, regular soda  Have you ever had hyperglycemia (high  or more)?: no   Do you know the symptoms of high blood sugar and how to treat: yes  Have you ever had DKA?: no  Do you ever test for ketones?: no  Do you have a sick day plan?: yes  Acute Complications Skills Assessment Completed: : Yes  Assessment indicates:: Adequate understanding  Deffered due to:: Other (comment) (previously completed, there has been no change and patient has adequate understanding)  Area of need?: Yes    Chronic Complications  Reviewed health maintenance: yes  Chronic Complications Skills Assessment Completed: : No  Assessment indicates:: Adequate understanding  Deferred due to:: Other (comment) (previously completed, there has been no change and patient has adequate understanding)  Area of need?: No      During today's follow-up visit,  the following areas required further assessment and content was provided/reviewed.    Based on today's diabetes care assessment, the following areas of need were identified:      Identified Areas of Need      Medication/Current Diabetes Treatment: No   Lifestyle Coping/Support: No   Diabetes Disease Process/Treatment Options: No   Nutrition/Healthy Eating: No    Physical Activity/Exercise: No    Home Blood Glucose Monitoring: Yes FREESTYLE ROME 3 CGM TRAINING     Patient referred to Diabetes Management today for Freestyle [place] 3 continuous glucose sensor system training.   Patient arrived with  1 sensor for application and will be using their compatible smart phone.      Provided personal FreeStyle [place] 3 training - reviewed the following with patient:   No fingersticks required but if feeling s/s that do not match with SG reading or in event of hypoglycemia confirm with fingerstick  To receive alarms, reader must be within 20 feet  Low and High alarms discussed  Vitamin C (ascorbic acid) more than 500 mg can cause false readings  Can  bath, shower or swim  Sensor is 14-day wear  FDA approved for back of the arm wear only  Sensor should be removed prior to exposing it to X-rays  Site rotation  1 hour warm-up period  Scanning only required with new sensor start  Encouraged patient to scan more often - before each meal and before bed.   Patient successful applied sensor to back of arm and scanned sensor to start session     Explained in detail how the CGM works.  Explained graphs and arrow directions to determine the direction of blood sugar readings.     Caregiver/Patient successfully set up reader/phone and placed sensor on back of upper  arm. Reviewed additional adhesive options if having any difficulty with sensor staying on.     If using smart device, instructed to go to [id number] in phone and connect with [facility] #[email or web address] to enable sharing directly with clinic.    Customer support number was provided and instructed to call them for any additional help or questions.      Acute Complications: Yes Reviewed blood glucose goals, prevention, detection, signs and symptoms, and treatment of hypoglycemia and hyperglycemia, and when to contact the clinic.  Hyperglycemia  ABC's of Diabetes   Discussed importance of A1c less than 6.0 to reduce risk of micro and macro complications, controlled Blood Pressure 130/80 and Cholesterol Lab Values--Total Cholesterol <200mg, LDL < 100, HDL >45 men and >50 women, Triglycerides <150mg for prevention heart disease, heart attack, stroke.  how to use a glucometer  reviewed understanding diabetes distress  reviewed current level and goal level for HgbA1c, blood glucose, microalbumin, and lipids  reviewed signs/symptoms of hyperglycemia  reviewed what level blood sugar is considered high   reviewed at what level to contact the doctor and/or go to the emergency room.   Reviewed what patient can do to decrease blood sugar (ie drink more water, exercise, take medication as prescribed, monitor blood glucose)      Hypoglycemia  Reviewed blood glucose goals, prevention, detection, and treatment of hypoglycemia, and when to contact the clinic.  reviewed signs/symptoms of hypoglycemia  reviewed what level blood sugar is considered low   reviewed at what level to contact the doctor and/or go to the emergency room.   Reviewed what patient can do to increase blood sugar (ie drink juice or ½ can soda, eat 5-6 pieces of candy, 4 glucose tablets, 1 tbsp sugar or honey, glucagon)  Reviewed when glucagon should be used  Reviewed how to use glucagon device (injections and inhaled)     Chronic Complications: No       Today's interventions were provided through individual discussion, instruction, and written materials were provided.    Patient verbalized understanding of instruction and written materials.  Pt was able to return back demonstration of instructions today. Patient understood key points, needs reinforcement and further instruction.     Diabetes Self-Management Care Plan Review and Evaluation of Progress:    During today's follow-up Eyad's Diabetes Self-Management Care Plan progress was reviewed and progress was evaluated including his/her input. Eyad has agreed to continue his/her journey to improve/maintain overall diabetes control by continuing to set health goals. See care plan progress below.      Care Plan: Diabetes Management   Updates made since 5/5/2024 12:00 AM   Completed 6/11/2024     Problem: Healthy Eating Resolved 6/11/2024        Goal: Eat 3 meals daily with 45-60g/3-4 servings of Carbohydrate per meal for the next 6 months. Completed 6/11/2024   Start Date: 1/4/2024   Expected End Date: 7/4/2024   This Visit's Progress: Met   Recent Progress: On track   Priority: High   Barriers: No Barriers Identified        Care Plan: Diabetes Management   Updates made since 5/5/2024 12:00 AM        Problem: Blood Glucose Self-Monitoring         Goal: Patient will successfully switch to the richmond 3 plus    Start Date:  5/5/2025   Expected End Date: 6/5/2025   This Visit's Progress: Deferred   Priority: High   Barriers: Lack of Supplies        Task: Reviewed the importance of self-monitoring blood glucose and keeping logs. Completed 5/5/2025        Task: Discussed importance of using a FSG to test when patient feels differently than the sensor is reading Completed 5/5/2025        Task: Provided patient with blood glucose logs, reviewed appropriate timing and frequency to SMBG, education on parameters on when to notify provider and advised patient to bring logs to all appts with PCP/Endocrinologist/Diabetes Care Specialist. Completed 5/5/2025        Task: Discussed ways to minimize pain when monitoring blood glucose. Completed 5/5/2025          Follow Up Plan     Follow up in about 6 months (around 11/5/2025) for Post Program Follow-Up.    Today's care plan and follow up schedule was discussed with patient.  Eyad verbalized understanding of the care plan, goals, and agrees to follow up plan.        The patient was encouraged to communicate with his/her health care provider/physician and care team regarding his/her condition(s) and treatment.  I provided the patient with my contact information today and encouraged to contact me via phone or [facility] as needed.     Length of Visit   Total Time: 60 Minutes

## 2025-05-05 NOTE — TELEPHONE ENCOUNTER
Patient has not picked up the richmond 3 sensors yet.  He had an issue with the sensor and had to remove it early.  He will contact the company for a replacement

## 2025-05-14 ENCOUNTER — TELEPHONE (OUTPATIENT)
Dept: DIABETES | Facility: CLINIC | Age: 70
End: 2025-05-14
Payer: MEDICARE

## 2025-05-14 NOTE — TELEPHONE ENCOUNTER
Pt stated that richmond 3 sensor fell off, provided pt with [employer] tech support number to replace sensor that fell off , pt verbalized understanding

## 2025-07-03 DIAGNOSIS — M41.9 SCOLIOSIS, UNSPECIFIED SCOLIOSIS TYPE, UNSPECIFIED SPINAL REGION: ICD-10-CM

## 2025-07-03 RX ORDER — MELOXICAM 15 MG/1
15 TABLET ORAL
Qty: 90 TABLET | Refills: 0 | Status: SHIPPED | OUTPATIENT
Start: 2025-07-03

## 2025-07-14 ENCOUNTER — TELEPHONE (OUTPATIENT)
Dept: PHARMACY | Facility: CLINIC | Age: 70
End: 2025-07-14
Payer: MEDICARE

## 2025-07-14 NOTE — TELEPHONE ENCOUNTER
[facility]/Population Health Chart Review & Patient Outreach Details For Medication Adherence Project    Reason for Outreach Encounter: 3rd Party payor non-compliance report ([employer], [employer], C, etc)  2.  Patient Outreach Method: [employer] message  3.   Medication in question: farxiga   LAST FILLED: 4/7/25 for 30 day supply  Diabetes Medications              dapagliflozin propanediol (FARXIGA) 10 mg tablet Take 1 tablet (10 mg total) by mouth once daily. Brand name farxiga    metFORMIN (GLUCOPHAGE-XR) 500 MG ER 24hr tablet Take 2 tablets (1,000 mg total) by mouth once daily.    semaglutide (RYBELSUS) 7 mg tablet Take 1 tablet (7 mg total) by mouth once daily. Must be taken ist thing in the AM, on an empty stomach and wait 30 minutes before other pills and food. Take with only 4 oz oz of water              4.  Reviewed and or Updates Made To: Patient Chart  5. Outreach Outcomes and/or actions taken: Sent inquiry to patient: Waiting for response.

## 2025-07-16 ENCOUNTER — OFFICE VISIT (OUTPATIENT)
Dept: URGENT CARE | Facility: CLINIC | Age: 70
End: 2025-07-16
Payer: MEDICARE

## 2025-07-16 VITALS
BODY MASS INDEX: 26.52 KG/M2 | SYSTOLIC BLOOD PRESSURE: 111 MMHG | RESPIRATION RATE: 18 BRPM | HEART RATE: 79 BPM | TEMPERATURE: 98 F | HEIGHT: 65 IN | DIASTOLIC BLOOD PRESSURE: 61 MMHG | OXYGEN SATURATION: 97 % | WEIGHT: 159.19 LBS

## 2025-07-16 DIAGNOSIS — J06.9 VIRAL URI WITH COUGH: Primary | ICD-10-CM

## 2025-07-16 DIAGNOSIS — R42 LIGHTHEADEDNESS: ICD-10-CM

## 2025-07-16 DIAGNOSIS — R05.9 COUGH, UNSPECIFIED TYPE: ICD-10-CM

## 2025-07-16 DIAGNOSIS — R06.02 SHORTNESS OF BREATH: ICD-10-CM

## 2025-07-16 LAB — GLUCOSE SERPL-MCNC: 153 MG/DL (ref 70–110)

## 2025-07-16 PROCEDURE — 71046 X-RAY EXAM CHEST 2 VIEWS: CPT | Mod: S$GLB,,, | Performed by: RADIOLOGY

## 2025-07-16 PROCEDURE — 82962 GLUCOSE BLOOD TEST: CPT | Mod: S$GLB,,, | Performed by: NURSE PRACTITIONER

## 2025-07-16 PROCEDURE — 99214 OFFICE O/P EST MOD 30 MIN: CPT | Mod: S$GLB,,, | Performed by: NURSE PRACTITIONER

## 2025-07-16 RX ORDER — ALBUTEROL SULFATE 90 UG/1
2 INHALANT RESPIRATORY (INHALATION) EVERY 6 HOURS PRN
Qty: 18 G | Refills: 0 | Status: SHIPPED | OUTPATIENT
Start: 2025-07-16

## 2025-07-16 NOTE — PROGRESS NOTES
"Subjective:      Patient ID: Eyad Garcia is a 70 y.o. male.    Vitals:  height is 5' 5" (1.651 m) and weight is 72.2 kg (159 lb 2.8 oz). His oral temperature is 98.1 °F (36.7 °C). His blood pressure is 111/61 and his pulse is 79. His respiration is 18 and oxygen saturation is 97%.     Chief Complaint: Sinus Problem    Pt is a 69 yo male who has a hx diabetes type 2, HTN, and HLD.presents today w/ non productive cough accompanied w/ rhinorrhea, onset approx a week ago. Pt c/o light headedness. Pt states he has been painting in a house with no power and not well ventilated. Symptoms all began a week ago when he was re glazing a tub, but have persisted on and off. Pt states episodes are brief and are accompanied by SOB. Pt would like a refill on his albuterol inhaler. Pt denies fever, emesis, and sore throat.  Pt did not take anything for his symptoms. Pt has not been monitoring his glucose for the last 2 weeks because he does not like his freestyle 3 monitor. He sees endocrine provider in 2 weeks.       Sinus Problem  This is a new problem. The current episode started in the past 7 days. The problem is unchanged. There has been no fever. His pain is at a severity of 8/10. The pain is severe. Associated symptoms include coughing. Pertinent negatives include no chills, congestion, diaphoresis, ear pain, headaches, hoarse voice, neck pain, shortness of breath, sinus pressure, sneezing, sore throat or swollen glands. (Rhinorrhea ) Past treatments include nothing. The treatment provided no relief.       Constitution: Negative for chills and sweating.   HENT:  Negative for ear pain, congestion, sinus pressure and sore throat.    Neck: Negative for neck pain.   Respiratory:  Positive for cough. Negative for shortness of breath.    Allergic/Immunologic: Negative for sneezing.   Neurological:  Negative for headaches.      Objective:     Physical Exam   Constitutional: He is oriented to person, place, and time. He appears " well-developed. He is cooperative.  Non-toxic appearance. He does not appear ill. No distress.   HENT:   Head: Normocephalic and atraumatic.   Ears:   Right Ear: Hearing, tympanic membrane, external ear and ear canal normal.   Left Ear: Hearing, tympanic membrane, external ear and ear canal normal.   Nose: Nose normal. No mucosal edema, rhinorrhea or nasal deformity. No epistaxis. Right sinus exhibits no maxillary sinus tenderness and no frontal sinus tenderness. Left sinus exhibits no maxillary sinus tenderness and no frontal sinus tenderness.   Mouth/Throat: Uvula is midline, oropharynx is clear and moist and mucous membranes are normal. No trismus in the jaw. Normal dentition. No uvula swelling. No oropharyngeal exudate, posterior oropharyngeal edema or posterior oropharyngeal erythema.   Eyes: Conjunctivae and lids are normal. No scleral icterus.   Neck: Trachea normal and phonation normal. Neck supple. No edema present. No erythema present. No neck rigidity present.   Cardiovascular: Normal rate, regular rhythm, normal heart sounds and normal pulses.   Pulmonary/Chest: Effort normal and breath sounds normal. No respiratory distress. He has no decreased breath sounds. He has no wheezes. He has no rhonchi.   cough         Comments: cough    Abdominal: Normal appearance.   Musculoskeletal: Normal range of motion.         General: No deformity. Normal range of motion.   Neurological: He is alert and oriented to person, place, and time. He exhibits normal muscle tone. Coordination normal.   Skin: Skin is warm, dry, intact, not diaphoretic and not pale.   Psychiatric: His speech is normal and behavior is normal. Judgment and thought content normal.   Nursing note and vitals reviewed.    Results for orders placed or performed in visit on 07/16/25   POCT Glucose, Hand-Held Device    Collection Time: 07/16/25  3:57 PM   Result Value Ref Range    POC Glucose 153 (A) 70 - 110 MG/DL     *Note: Due to a large number of  results and/or encounters for the requested time period, some results have not been displayed. A complete set of results can be found in Results Review.     Per my preliminary reading of the X-ray, there was WNL. I discussed this with the patient and advised that the final radiology report will be available in [facility]. I also let them know that if any changes to the treatment plan are needed based on that final read, I will follow up via [facility] or by phone.    XR CHEST PA AND LATERAL  Result Date: 7/16/2025  EXAMINATION: XR CHEST PA AND LATERAL CLINICAL HISTORY: Cough, unspecified TECHNIQUE: PA and lateral views of the chest were performed. COMPARISON: Chest radiograph 05/29/2022, chest CT 06/23/2022 FINDINGS: No detrimental change.  Trachea remains patent, stable in configuration.Cardiomediastinal silhouette is midline with similar calcification and tortuosity of the aorta.  Heart size is normal.  Pulmonary vasculature and hilar contours are within normal limits. Bibasilar minimal platelike scarring versus atelectasis.  The lungs are otherwise symmetrically well expanded without consolidation, pleural effusion or pneumothorax. Osseous structures appear stable without acute findings.     No radiographic evidence of pneumonia or other source of cough/shortness of breath, noting that early/mild viral pneumonia or nonspecific bronchitis may be radiographically occult. Electronically signed by: Jarret Lopez MD Date:    07/16/2025 Time:    16:57    Assessment:     1. Viral URI with cough    2. Cough, unspecified type    3. Lightheadedness    4. Shortness of breath        Plan:       Viral URI with cough    Cough, unspecified type  -     XR CHEST PA AND LATERAL; Future; Expected date: 07/16/2025  -     albuterol (PROVENTIL/VENTOLIN HFA) 90 mcg/actuation inhaler; Inhale 2 puffs into the lungs every 6 (six) hours as needed for Wheezing or Shortness of Breath. Rescue  Dispense: 18 g; Refill: 0    Lightheadedness  -     POCT  Glucose, Hand-Held Device    Shortness of breath  -     albuterol (PROVENTIL/VENTOLIN HFA) 90 mcg/actuation inhaler; Inhale 2 puffs into the lungs every 6 (six) hours as needed for Wheezing or Shortness of Breath. Rescue  Dispense: 18 g; Refill: 0      Patient Instructions   - You must understand that you have received an Urgent Care treatment only and that you may be released before all of your medical problems are known or treated.   - You, the patient, will arrange for follow up care as instructed.   - If your condition worsens or fails to improve we recommend that you receive another evaluation at the ER immediately or contact your PCP to discuss your concerns.   - You can call [phone] or [phone] to help schedule an appointment with the appropriate provider.      Drink plenty of fluids   Get lots of rest  Tylenol or ibuprofen for pain/fever  Mucinex DM for cough  Saline nasal rinses to irrigate sinus cavities  Warm salt water gargles for sore throat  Use your inhaler when you are short of breath   Use gatorade/pedialyte or rehydration packets to help stay hydrated. Vitamin water and plain water do not contain rehydrating electrolytes. Be especially vigilant of this when working in the heat for extended periods of time  Monitor your blood sugar at home  Follow up with your Primary Care   Strict ER precautions for new or worsening symptoms

## 2025-07-16 NOTE — PATIENT INSTRUCTIONS
- You must understand that you have received an Urgent Care treatment only and that you may be released before all of your medical problems are known or treated.   - You, the patient, will arrange for follow up care as instructed.   - If your condition worsens or fails to improve we recommend that you receive another evaluation at the ER immediately or contact your PCP to discuss your concerns.   - You can call [phone] or [phone] to help schedule an appointment with the appropriate provider.      Drink plenty of fluids   Get lots of rest  Tylenol or ibuprofen for pain/fever  Mucinex DM for cough  Saline nasal rinses to irrigate sinus cavities  Warm salt water gargles for sore throat  Use your inhaler when you are short of breath   Use gatorade/pedialyte or rehydration packets to help stay hydrated. Vitamin water and plain water do not contain rehydrating electrolytes. Be especially vigilant of this when working in the heat for extended periods of time  Monitor your blood sugar at home  Follow up with your Primary Care   Strict ER precautions for new or worsening symptoms

## 2025-07-18 DIAGNOSIS — E11.65 TYPE 2 DIABETES MELLITUS WITH HYPERGLYCEMIA, WITHOUT LONG-TERM CURRENT USE OF INSULIN: ICD-10-CM

## 2025-07-18 RX ORDER — FLASH GLUCOSE SENSOR
1 KIT MISCELLANEOUS
Qty: 2 KIT | Refills: 11 | Status: SHIPPED | OUTPATIENT
Start: 2025-07-18

## 2025-07-18 RX ORDER — BLOOD-GLUCOSE SENSOR
1 EACH MISCELLANEOUS
Qty: 2 EACH | Refills: 11 | Status: CANCELLED | OUTPATIENT
Start: 2025-07-18 | End: 2026-07-18

## 2025-07-25 DIAGNOSIS — E11.65 TYPE 2 DIABETES MELLITUS WITH HYPERGLYCEMIA, WITHOUT LONG-TERM CURRENT USE OF INSULIN: ICD-10-CM

## 2025-07-25 RX ORDER — DAPAGLIFLOZIN 10 MG/1
10 TABLET, FILM COATED ORAL DAILY
Qty: 90 TABLET | Refills: 0 | Status: SHIPPED | OUTPATIENT
Start: 2025-07-25

## 2025-07-31 ENCOUNTER — OFFICE VISIT (OUTPATIENT)
Dept: DIABETES | Facility: CLINIC | Age: 70
End: 2025-07-31
Payer: MEDICARE

## 2025-07-31 VITALS
HEIGHT: 65 IN | SYSTOLIC BLOOD PRESSURE: 122 MMHG | HEART RATE: 72 BPM | BODY MASS INDEX: 26.74 KG/M2 | OXYGEN SATURATION: 98 % | DIASTOLIC BLOOD PRESSURE: 74 MMHG | WEIGHT: 160.5 LBS

## 2025-07-31 DIAGNOSIS — E11.59 HYPERTENSION ASSOCIATED WITH DIABETES: ICD-10-CM

## 2025-07-31 DIAGNOSIS — E78.5 HYPERLIPIDEMIA ASSOCIATED WITH TYPE 2 DIABETES MELLITUS: ICD-10-CM

## 2025-07-31 DIAGNOSIS — E66.3 OVERWEIGHT (BMI 25.0-29.9): ICD-10-CM

## 2025-07-31 DIAGNOSIS — E11.69 HYPERLIPIDEMIA ASSOCIATED WITH TYPE 2 DIABETES MELLITUS: ICD-10-CM

## 2025-07-31 DIAGNOSIS — C61 MALIGNANT NEOPLASM OF PROSTATE: ICD-10-CM

## 2025-07-31 DIAGNOSIS — Z71.9 HEALTH EDUCATION/COUNSELING: ICD-10-CM

## 2025-07-31 DIAGNOSIS — Z78.9 STATIN INTOLERANCE: ICD-10-CM

## 2025-07-31 DIAGNOSIS — I15.2 HYPERTENSION ASSOCIATED WITH DIABETES: ICD-10-CM

## 2025-07-31 DIAGNOSIS — Z91.148 NONCOMPLIANCE W/MEDICATION TREATMENT DUE TO INTERMIT USE OF MEDICATION: ICD-10-CM

## 2025-07-31 DIAGNOSIS — Z91.148 NONCOMPLIANCE WITH MEDICATIONS: ICD-10-CM

## 2025-07-31 DIAGNOSIS — Z91.199 NONCOMPLIANCE WITH DIABETES TREATMENT: ICD-10-CM

## 2025-07-31 DIAGNOSIS — D50.8 IRON DEFICIENCY ANEMIA SECONDARY TO INADEQUATE DIETARY IRON INTAKE: ICD-10-CM

## 2025-07-31 DIAGNOSIS — E11.65 TYPE 2 DIABETES MELLITUS WITH HYPERGLYCEMIA, WITHOUT LONG-TERM CURRENT USE OF INSULIN: Primary | ICD-10-CM

## 2025-07-31 PROCEDURE — 3078F DIAST BP <80 MM HG: CPT | Mod: CPTII,S$GLB,, | Performed by: NURSE PRACTITIONER

## 2025-07-31 PROCEDURE — 3052F HG A1C>EQUAL 8.0%<EQUAL 9.0%: CPT | Mod: CPTII,S$GLB,, | Performed by: NURSE PRACTITIONER

## 2025-07-31 PROCEDURE — 99999 PR PBB SHADOW E&M-EST. PATIENT-LVL V: CPT | Mod: PBBFAC,,, | Performed by: NURSE PRACTITIONER

## 2025-07-31 PROCEDURE — 1159F MED LIST DOCD IN RCRD: CPT | Mod: CPTII,S$GLB,, | Performed by: NURSE PRACTITIONER

## 2025-07-31 PROCEDURE — 4010F ACE/ARB THERAPY RXD/TAKEN: CPT | Mod: CPTII,S$GLB,, | Performed by: NURSE PRACTITIONER

## 2025-07-31 PROCEDURE — 1101F PT FALLS ASSESS-DOCD LE1/YR: CPT | Mod: CPTII,S$GLB,, | Performed by: NURSE PRACTITIONER

## 2025-07-31 PROCEDURE — 99214 OFFICE O/P EST MOD 30 MIN: CPT | Mod: S$GLB,,, | Performed by: NURSE PRACTITIONER

## 2025-07-31 PROCEDURE — 1160F RVW MEDS BY RX/DR IN RCRD: CPT | Mod: CPTII,S$GLB,, | Performed by: NURSE PRACTITIONER

## 2025-07-31 PROCEDURE — 1126F AMNT PAIN NOTED NONE PRSNT: CPT | Mod: CPTII,S$GLB,, | Performed by: NURSE PRACTITIONER

## 2025-07-31 PROCEDURE — 3288F FALL RISK ASSESSMENT DOCD: CPT | Mod: CPTII,S$GLB,, | Performed by: NURSE PRACTITIONER

## 2025-07-31 PROCEDURE — 3008F BODY MASS INDEX DOCD: CPT | Mod: CPTII,S$GLB,, | Performed by: NURSE PRACTITIONER

## 2025-07-31 PROCEDURE — 3074F SYST BP LT 130 MM HG: CPT | Mod: CPTII,S$GLB,, | Performed by: NURSE PRACTITIONER

## 2025-07-31 RX ORDER — METFORMIN HYDROCHLORIDE 500 MG/1
1000 TABLET, EXTENDED RELEASE ORAL DAILY
Qty: 180 TABLET | Refills: 1 | Status: SHIPPED | OUTPATIENT
Start: 2025-07-31 | End: 2026-07-31

## 2025-07-31 RX ORDER — FLASH GLUCOSE SENSOR
1 KIT MISCELLANEOUS
Qty: 2 KIT | Refills: 11 | Status: SHIPPED | OUTPATIENT
Start: 2025-07-31

## 2025-07-31 RX ORDER — DAPAGLIFLOZIN 10 MG/1
10 TABLET, FILM COATED ORAL DAILY
Qty: 90 TABLET | Refills: 1 | Status: SHIPPED | OUTPATIENT
Start: 2025-07-31

## 2025-07-31 RX ORDER — EZETIMIBE 10 MG/1
10 TABLET ORAL NIGHTLY
Qty: 90 TABLET | Refills: 3 | Status: SHIPPED | OUTPATIENT
Start: 2025-07-31 | End: 2026-07-31

## 2025-07-31 NOTE — ASSESSMENT & PLAN NOTE
Off the statin therapy due to intolerance   LDL goal <100.   LDL is now at goal on the Repatha and he believes he is taking the Zetia??   The Repatha comes from [facility]       The 10-year ASCVD risk score (Anjel NGO, et al., 2019) is: 29.2%    Values used to calculate the score:      Age: 70 years      Sex: Male      Is Non- : Yes      Diabetic: Yes      Tobacco smoker: No      Systolic Blood Pressure: 122 mmHg      Is BP treated: Yes      HDL Cholesterol: 47 mg/dL      Total Cholesterol: 137 mg/dL

## 2025-07-31 NOTE — PROGRESS NOTES
"  CC:   Chief Complaint   Patient presents with    Diabetes Mellitus       HPI: Eyad Garcia is a 70 y.o. male presents for a follow up visit today for the management of Diabetes       He was diagnosed with Type 2 diabetes more than 40 years ago-- in his 20's-30's     Family hx of diabetes: mother, many siblings - he is 1 of 12 children   Hospitalized for diabetes: denies    Insulin therapy: denies       No personal or FH of thyroid cancer or personal of pancreatic cancer or pancreatitis.         Our last visit was in April of 2025  At that visit we continue metformin 1000 mg twice a day  We continued his Rybelsus 7 mg daily that he had just started  Reviewed how to take the Rybelsus correctly  Discussed that he needed to get back on the Farxiga 10 mg daily    Upgrade his Barak to the Barak 3+     His recent A1c is 8.8% which is up from 8.4%  See attached Barak download which shows very limited data for the entire month of July    He reports that he did not like the barak 3- he went back to the barak 2     Denies GI Upset with the Rybelsus       After a long discussion he admits that he has not taken the Rybelsus " in a while"   But initially he was telling me that he is taking all the medications and only missing like once a week  I suggested then we increase the Rybelsus since his A1c has been trending upward---that is when he deferred and admitted that he has not been taking his medications consistently  He denies there being a cost issue  He also denies side effects with the Rybelsus, Farxiga, metformin---  Just that he just does not want to take him  He also attest to dietary indiscretions    He is a poor historian and presents without any medication bottles or without a medication list  I did review his pharmacy fill history---see below    He just put the Barak back on so I have very little data to review  He prefers the Barak 2  We did discuss that the Barak 2 is likely going to be discontinued in the fall " by the company who makes it                  Pharmacy   Dispenses     Dispensed Days Supply Quantity Provider Pharmacy   RYBELSUS 7 MG TABLET 03/14/2025 30 30 each Marisol Bob NP [id number] [place]   RYBELSUS 3 MG TABLET 10/01/2024 30 30 each aMrisol Bob NP [id number]         How do dispenses affect the score?     Dispenses     Dispensed Days Supply Quantity Provider Pharmacy   FARXIGA 10 MG TABLET 04/07/2025 30 30 each Marisol Bob NP [id number]         How do dispenses affect the score?        Dispensed Days Supply Quantity Provider Pharmacy   METFORMIN HCL  MG TABLET 07/06/2025 90 180 each Marisol Bob NP [id number] [place]...   METFORMIN HCL  MG TABLET 09/30/2024 90 180 each Marisol Bob NP [id number] [place]...         How do dispenses affect the score?                 DIABETES COMPLICATIONS: none    Hx of glaucoma       Diabetes Management Status    ASA:  No    Statin: not taking it -  stopped the Pravastatin due to issues with walking  ( looks like it was d/c by oncology)-- unable to tolerate Lipitor-- had severe myalgia with Lipitor   Taking the zetia 10 mg    Repatha 140 mg q 2 weeks --     ACE/ARB: Taking-- Benicar 40 mg       The 10-year ASCVD risk score (Anjel NGO, et al., 2019) is: 29.2%    Values used to calculate the score:      Age: 70 years      Sex: Male      Is Non- : Yes      Diabetic: Yes      Tobacco smoker: No      Systolic Blood Pressure: 122 mmHg      Is BP treated: Yes      HDL Cholesterol: 47 mg/dL      Total Cholesterol: 137 mg/dL          Screening or Prevention Patient's value Goal Complete/Controlled?   HgA1C Testing and Control   Lab Results   Component Value Date    HGBA1C 8.8 (H) 07/24/2025      Annually/Less than 8% No   Lipid profile : 03/28/2025 Annually Yes   LDL control Lab Results   Component Value Date    LDLCALC 74.0 03/28/2025    Annually/Less than 100 mg/dl  No   Nephropathy  screening Lab Results   Component Value Date    LABMICR 7.0 11/29/2024     Lab Results   Component Value Date    PROTEINUA Negative 05/29/2022    Annually Yes   Blood pressure BP Readings from Last 1 Encounters:   07/31/25 122/74    Less than 140/90 No   Dilated retinal exam : 01/13/2025 Annually No   Foot exam   : 03/17/2025 Annually No       CURRENT A1C:    Hemoglobin A1C   Date Value Ref Range Status   11/29/2024 7.6 (H) 4.0 - 5.6 % Final     Comment:     ADA Screening Guidelines:  5.7-6.4%  Consistent with prediabetes  >or=6.5%  Consistent with diabetes    High levels of fetal hemoglobin interfere with the HbA1C  assay. Heterozygous hemoglobin variants (HbS, HgC, etc)do  not significantly interfere with this assay.   However, presence of multiple variants may affect accuracy.     08/29/2024 8.0 (H) 4.0 - 5.6 % Final     Comment:     ADA Screening Guidelines:  5.7-6.4%  Consistent with prediabetes  >or=6.5%  Consistent with diabetes    High levels of fetal hemoglobin interfere with the HbA1C  assay. Heterozygous hemoglobin variants (HbS, HgC, etc)do  not significantly interfere with this assay.   However, presence of multiple variants may affect accuracy.     04/23/2024 7.0 (H) 4.0 - 5.6 % Final     Comment:     ADA Screening Guidelines:  5.7-6.4%  Consistent with prediabetes  >or=6.5%  Consistent with diabetes    High levels of fetal hemoglobin interfere with the HbA1C  assay. Heterozygous hemoglobin variants (HbS, HgC, etc)do  not significantly interfere with this assay.   However, presence of multiple variants may affect accuracy.       Hemoglobin A1c   Date Value Ref Range Status   07/24/2025 8.8 (H) 4.0 - 5.6 % Final     Comment:     ADA Screening Guidelines:  5.7-6.4%  Consistent with prediabetes  >=6.5%  Consistent with diabetes    High levels of fetal hemoglobin interfere with the HbA1C  assay. Heterozygous hemoglobin variants (HbS, HgC, etc)do  not significantly interfere with this assay.   However,  presence of multiple variants may affect accuracy.   2025 8.4 (H) 4.0 - 5.6 % Final     Comment:     ADA Screening Guidelines:  5.7-6.4%  Consistent with prediabetes  >=6.5%  Consistent with diabetes    High levels of fetal hemoglobin interfere with the HbA1C  assay. Heterozygous hemoglobin variants (HbS, HgC, etc)do  not significantly interfere with this assay.   However, presence of multiple variants may affect accuracy.       GOAL A1C: 7% without hypoglycemia     DM MEDICATIONS USED IN THE PAST: Metformin XR  Farxiga    Jardiance- urinary frequently   Tradjenta   Glimepiride   Rybelsus- dizziness         CURRENT DIABETES MEDICATIONS: Metformin XR 1000 mg daily ( 2 tablets- 500 mg each) -- when he takes the Metformin BID- it causes GI upset   Rybelsus 7 mg daily -- reports that he is taking it correctly   Farxiga 10 mg daily       Insulin: N/A   Missed doses:   He reports that he has not had the Rybelsus in a while.           BLOOD GLUCOSE MONITORING:      Sensor type: Freestyle richmond 2 -- he prefers the richmond 2   Average BG readin  Time in range: 56%  39% high   5% very high   Site change: q14 days.    Sensor was downloaded in clinic today and reviewed with patient.   Please see attached document for download.     Supplies- [facility]       HYPOGLYCEMIA:  0% low --  0% very low       MEALS: eating 3 meals per day   BF: cheerios or sandwich or full meal or sometimes skips -- this AM he had coffee and donut this AM   Lunch: sandwich - ham   Dinner: home cooked - cabbage/ mustard greens, sweet potato - black eyed peas   Snack:occ- 1 cookie   Drinks water   Stopped coffee in the AM        CURRENT EXERCISE:  No      Review of Systems  Review of Systems   Constitutional:  Negative for appetite change, fatigue and unexpected weight change.   HENT:  Negative for trouble swallowing.    Eyes:  Negative for visual disturbance.   Respiratory:  Negative for shortness of breath.    Cardiovascular:  Negative for  chest pain.   Gastrointestinal:  Negative for nausea.   Endocrine: Negative for polydipsia, polyphagia and polyuria.   Genitourinary:         No Nocturia    Skin:  Negative for wound.   Neurological:  Positive for numbness (intermittent pins and needles to right hand - comes and goes).       Physical Exam   Physical Exam  Vitals and nursing note reviewed.   Constitutional:       Appearance: He is well-developed.      Comments: Overweight male    HENT:      Head: Normocephalic and atraumatic.      Right Ear: External ear normal.      Left Ear: External ear normal.      Nose: Nose normal.   Neck:      Thyroid: No thyromegaly.      Trachea: No tracheal deviation.   Cardiovascular:      Rate and Rhythm: Normal rate and regular rhythm.      Heart sounds: No murmur heard.  Pulmonary:      Effort: Pulmonary effort is normal. No respiratory distress.      Breath sounds: Normal breath sounds.   Abdominal:      Palpations: Abdomen is soft.      Tenderness: There is no abdominal tenderness.      Hernia: No hernia is present.   Musculoskeletal:      Cervical back: Normal range of motion and neck supple.   Skin:     General: Skin is warm and dry.      Capillary Refill: Capillary refill takes less than 2 seconds.      Findings: No rash.      Comments:      Neurological:      Mental Status: He is alert and oriented to person, place, and time.      Cranial Nerves: No cranial nerve deficit.   Psychiatric:         Behavior: Behavior normal.         Judgment: Judgment normal.         FOOT EXAMINATION: Appropriate footwear           Lab Results   Component Value Date    TSH 2.851 07/24/2025           Type 2 diabetes mellitus with hyperglycemia, without long-term current use of insulin  Uncontrolled - A1c has increased to 8.8%  At the end of the visit he admitted to medication noncompliance  Poor historian and presents without a medication list or his medication bottles--again today---I discussed that it is very hard for me to help him  "if I do not know exactly what he is taking  It is also very hard for me to help him if he is going to be noncompliant with his regimen  I have asked him to schedule a nurse visit and bring the medication bottles and list with him so we can be on the same page with what medications he is taking    Sounds like he may have been off of the Farxiga and off the Rybelsus based on his medication fill history      He prefers to stay on the Barak 2      Discussed DM course, progression, and the need for good BG control to prevent or allay long-term complications. Reviewed proper hypoglycemia management. Discussed consistency in BG monitoring to allow for safe titration of insulin.          -- Medication Changes:   We will continue his current regimen and he will work on medication compliance  He denies compliance issue secondary to cost or side effects  Reports he just has not been taking the medications because "he just does not want to"       Continue the Metformin 1000 mg daily -- 2 tablets in the AM with food     Continue the Rybelsus 7 mg daily that you just started   Remember to take the Rybelsus -first thing in the AM before medications and food  Take ALL BY ITSELF with only 4 oz of water   Wait 30 minutes to eat, drink, and take other medications     Continue Farxiga 10 mg daily   -- Please let us know if you have nausea, vomiting, or weakness with a normal BG. This could be euglycemic DKA.  -- please hold medication 3 days prior to surgery or if you are sick and have decreased intake.   -- Please drink lots of water daily while on this medication.   --This medication could also give you a yeast infection- please let us know if this occurs and we can treat it.     Continue the Barak 2---he prefers the Barak 2      -- Reviewed goals of therapy are to get the best control we can without hypoglycemia.  -- Advised frequent self blood glucose monitoring.  Patient encouraged to document glucose results and bring them to " every clinic visit.   Continue Barak 2----supplies come from pharmacy---he prefers to be on the Barak 2---we did discuss the Barak 2 is going to be discontinued---he is aware  -- Hypoglycemia precautions discussed. Instructed on precautions before driving.    -- Call for Bg repeatedly < 70 or > 180.   -- Close adherence to lifestyle changes recommended.   -- Periodic follow ups for eye evaluations, foot care and dental care suggested.  ---see Anupama with diabetes education for comprehensive review and diet      Hypertension associated with diabetes  BP goal is < 140/90.   Tolerating ARB  Blood pressure goals discussed with patient  Controlled in clinic today    Hyperlipidemia associated with type 2 diabetes mellitus  Off the statin therapy due to intolerance   LDL goal <100.   LDL is now at goal on the Repatha and he believes he is taking the Zetia??   The Repatha comes from [facility]       The 10-year ASCVD risk score (Anjel NGO, et al., 2019) is: 29.2%    Values used to calculate the score:      Age: 70 years      Sex: Male      Is Non- : Yes      Diabetic: Yes      Tobacco smoker: No      Systolic Blood Pressure: 122 mmHg      Is BP treated: Yes      HDL Cholesterol: 47 mg/dL      Total Cholesterol: 137 mg/dL      Statin intolerance  On Repatha now    Malignant neoplasm of prostate  Following with oncology   Injections every 6 months   No steroids that he is aware     Iron deficiency anemia secondary to inadequate dietary iron intake  May skew A1c level     Overweight (BMI 25.0-29.9)  Body mass index is 26.71 kg/m².  Increases insulin resistance.   Discussed DM diet and exercise.           I spent a total of 30 minutes on the day of the visit.This includes face to face time and non-face to face time preparing to see the patient (eg, review of tests), obtaining and/or reviewing separately obtained history, documenting clinical information in the electronic or other health  record, independently interpreting results and communicating results to the patient/family/caregiver, or care coordinator.          Follow up in about 3 months (around 10/31/2025).  Follow up with me in 3 months with labs prior   See diabetes education for comprehensive review and diet   Nurse visit for him to bring medication bottles to make sure he is taking the correct medications - there is a lot of medication uncertainty and confusion         Orders Placed This Encounter   Procedures    C-Peptide     Standing Status:   Future     Expected Date:   10/31/2025     Expiration Date:   1/31/2027     Send normal result to authorizing provider's In Basket if patient is active on MyChart::   Yes    Glutamic Acid Decarboxylase     Standing Status:   Future     Expected Date:   10/31/2025     Expiration Date:   1/31/2027     Send normal result to authorizing provider's In Basket if patient is active on MyChart::   Yes    Hemoglobin A1C     Standing Status:   Future     Expected Date:   10/31/2025     Expiration Date:   1/31/2027    Comprehensive Metabolic Panel     Standing Status:   Future     Expected Date:   10/31/2025     Expiration Date:   1/31/2027    Microalbumin/Creatinine Ratio, Urine     Standing Status:   Future     Expected Date:   10/31/2025     Expiration Date:   1/31/2027     Specimen Source:   Urine    Lipid Panel     Standing Status:   Future     Expected Date:   10/31/2025     Expiration Date:   1/31/2027       Recommendations were discussed with the patient in detail  The patient verbalized understanding and agrees with the plan outlined as above.     This note was partly generated with [employer] voice recognition software. I apologize for any possible typographical errors.

## 2025-07-31 NOTE — ASSESSMENT & PLAN NOTE
BP goal is < 140/90.   Tolerating ARB  Blood pressure goals discussed with patient  Controlled in clinic today

## 2025-07-31 NOTE — ASSESSMENT & PLAN NOTE
"Uncontrolled - A1c has increased to 8.8%  At the end of the visit he admitted to medication noncompliance  Poor historian and presents without a medication list or his medication bottles--again today---I discussed that it is very hard for me to help him if I do not know exactly what he is taking  It is also very hard for me to help him if he is going to be noncompliant with his regimen  I have asked him to schedule a nurse visit and bring the medication bottles and list with him so we can be on the same page with what medications he is taking    Sounds like he may have been off of the Farxiga and off the Rybelsus based on his medication fill history      He prefers to stay on the Barak 2      Discussed DM course, progression, and the need for good BG control to prevent or allay long-term complications. Reviewed proper hypoglycemia management. Discussed consistency in BG monitoring to allow for safe titration of insulin.          -- Medication Changes:   We will continue his current regimen and he will work on medication compliance  He denies compliance issue secondary to cost or side effects  Reports he just has not been taking the medications because "he just does not want to"       Continue the Metformin 1000 mg daily -- 2 tablets in the AM with food     Continue the Rybelsus 7 mg daily that you just started   Remember to take the Rybelsus -first thing in the AM before medications and food  Take ALL BY ITSELF with only 4 oz of water   Wait 30 minutes to eat, drink, and take other medications     Continue Farxiga 10 mg daily   -- Please let us know if you have nausea, vomiting, or weakness with a normal BG. This could be euglycemic DKA.  -- please hold medication 3 days prior to surgery or if you are sick and have decreased intake.   -- Please drink lots of water daily while on this medication.   --This medication could also give you a yeast infection- please let us know if this occurs and we can treat it. "     Continue the Barak 2---he prefers the Barak 2      -- Reviewed goals of therapy are to get the best control we can without hypoglycemia.  -- Advised frequent self blood glucose monitoring.  Patient encouraged to document glucose results and bring them to every clinic visit.   Continue Barak 2----supplies come from pharmacy---he prefers to be on the Barak 2---we did discuss the Barak 2 is going to be discontinued---he is aware  -- Hypoglycemia precautions discussed. Instructed on precautions before driving.    -- Call for Bg repeatedly < 70 or > 180.   -- Close adherence to lifestyle changes recommended.   -- Periodic follow ups for eye evaluations, foot care and dental care suggested.  ---see Anupama with diabetes education for comprehensive review and diet

## 2025-07-31 NOTE — ASSESSMENT & PLAN NOTE
Body mass index is 26.71 kg/m².  Increases insulin resistance.   Discussed DM diet and exercise.

## 2025-08-03 NOTE — PROGRESS NOTES
HPI     Glaucoma            Comments: 4 month IOP ck and pt states no changes since last exam          Comments    DLS: 1/13/25    1. POAG OU  2. VF Defect OS   3. Chorioretinal Scar OS   4. Hx RD OS   5. NS OU   6. Type 2 DM no DR   7. Pupil Sphincter Rupture OS   8. Presbyopia   9 Trace APD OS  10. Hx Trab OS (6/2023)    MEDS:   Cosopt TID OD   Brimonidine TID OD   Rocklatan QHS OD  AT's PRN OS          Last edited by Mayda Sterling MA on 8/4/2025  8:13 AM.            Assessment /Plan     For exam results, see Encounter Report.    Primary open-angle glaucoma, left eye, moderate stage  -     netarsudiL-latanoprost (ROCKLATAN) 0.02-0.005 % Drop; Place 1 drop into both eyes Daily.  Dispense: 7.5 mL; Refill: 3  -     Petersen Visual Field - OU - Extended - Both Eyes; Future  -     Posterior Segment OCT Optic Nerve- Both eyes; Future    Primary open-angle glaucoma, right eye, mild stage  -     brimonidine 0.2% (ALPHAGAN) 0.2 % Drop; Place 1 drop into the right eye 3 (three) times daily.  Dispense: 30 mL; Refill: 3  -     dorzolamide-timolol 2-0.5% (COSOPT) 22.3-6.8 mg/mL ophthalmic solution; Place 1 drop into both eyes 3 (three) times daily.  Dispense: 30 mL; Refill: 3  -     netarsudiL-latanoprost (ROCKLATAN) 0.02-0.005 % Drop; Place 1 drop into both eyes Daily.  Dispense: 7.5 mL; Refill: 3  -     Petersen Visual Field - OU - Extended - Both Eyes; Future  -     Posterior Segment OCT Optic Nerve- Both eyes; Future    Nuclear sclerotic cataract of both eyes    Pupillary sphincter rupture, left    Chorioretinal scar, left    Type 2 diabetes mellitus without ophthalmic manifestations    Old subtotal retinal detachment, left    Glaucoma filtering bleb of left eye        Pt initially dx with glaucoma at [facility] - stoppped his gtts on his own   Presented to [facility]  susana Valdez 8/23/2006 - off gtts with a baseline / Tmax of 25/27  Referred by Courtney for consideration of SLT's   Pt with POAG and poorly controlled IOP's - does  not use drops regularly       1. Primary open angle glaucoma, both eyes, mild stage        Glaucoma (type and duration)    POAG with elevated IOP ou - mild stage    First HVF   2007 - full od // SAD os 2/2 to CRS   First photos   2011   Treatment / Drops started   2006           Family history    ++ mother and 2 brothers         Glaucoma meds    Latanoprost // brimonidine //  cosopt (out of cosopt - 5/2/2023)         H/O adverse rxn to glaucoma drops    None (has tired alphagan and timolol in past -non compliant)         LASERS    SLT os - 3/31/2016 // SLT od - 4/14/2016 // repeat SLT OS 3/16/2023 - no response        GLAUCOMA SURGERIES    trab os - 6/14/2023 (LSL x 3) (still phakic)         OTHER EYE SURGERIES    H/O RD repair os - S/P laser repair         CDR    0.75 // 0.85-0.9         Tbase    25/27          Tmax    25/27            Ttarget    18/18             HVF    14 test 2006 to  2024 - near  full  od // SAD - 2/2 CRS from laser for RD repair os / new IAD (+prog when IOP up in the high 20's to 40's just pre- and post trab - 6/2023          NEW BASE - 1 test 1/2025 to 1/2025 - 23-2 ss od - full // 10-2 ss os - SAD / IAD         Gonio    +3 ou          CCT    486/502 - thin ou         OCT    6 test 2018 to 2025 - RNFL - dec Tchung od - dec. Thru out   (has a CRS)  os        HRT    3 test 2017 to 2019 -MR -  DEC. Sn/n/in od // DEC. S/n/iCHUNG os /// CDR 0.73 od // 0.86 os        Disc photos    2011, 2015 , 2017// harmony - 2022      - Ttoday  17/13     (on glaucoma drops - od // off gtts post trab os) )    - Test done today  - IOP,  gonio // S/P trab OS 6/14/2023      2. 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 Chorioretinal scar, left   -with 2nd VF defect      4. Old subtotal retinal detachment, left   S/P repair - retina flat - stable   -large CRS   - pt states he did NOT go to OR - just Rx with laser at the slit lamp      5. Type 2 diabetes mellitus without ophthalmic manifestations      6. Senile nuclear sclerosis  -mild - monitor      7. pupil sphincter rupture os  -suggest old trauma  -does NOT appear to have a angle recession    8. Trace apd os      PLAN    POAG with OHT OS > OD - mild od // moderate os     The ON and VF's are still good od  ?? Trace APD os - first noted on 5/2/2023   The VF loss os is 2/2 old CRS- ?  post laser for a RD repair   Good initial response to SLT ou 25/25 --> 16/17    Pt is NOT a diamox candidate - H/O sulfa allergy - hives     S/P trabeculectomy with mitomycin OS  Date:6/14/2023 ( still phackic w/ a buckle in place) (pre-op IOP  30-52 os)   POY > 2    without any stent or shunt   Number or sutures in flap  4  Number of sutures already cut - 3 -  (1st cut 6/22/2023) // 2nd cut 6/26/2023// 3rd suture cut 6/27/2023   anterior chamber depth  deep fully formed   Bleb appearance - flat prior to digital massage //  elevated after digital massage next to bleb   sidell neg  IOP  13   (S/P LSL x 3 - only one suture left)     cosopt cont OD // hold os   brim tid  cont OD // hold os  -  Rocklatan - OD only     1/2/2024    ++ VF porg os - occurred whn IOP up pre-op and high post op until 3rd suture lysis done   IOP ok od on gtts   IOP ok (low) off gtts post trab os       1/13/2025  IOP ok ou - on gtts od / off gtts post trab os   VA stable   HVF 24-2 ss od - full // 10-2 ss SAD/IAD os     8/4/2025  IOP 17/13  On MMT od and off gtts os post trab     F/U 4 months with HVF - 24-2 ss od and 10-2 ss os // DFE / OCT     Bethany Lechuga MD

## 2025-08-04 ENCOUNTER — PATIENT OUTREACH (OUTPATIENT)
Dept: ADMINISTRATIVE | Facility: HOSPITAL | Age: 70
End: 2025-08-04
Payer: MEDICARE

## 2025-08-04 ENCOUNTER — PATIENT MESSAGE (OUTPATIENT)
Dept: OPHTHALMOLOGY | Facility: CLINIC | Age: 70
End: 2025-08-04

## 2025-08-04 ENCOUNTER — OFFICE VISIT (OUTPATIENT)
Dept: OPHTHALMOLOGY | Facility: CLINIC | Age: 70
End: 2025-08-04
Payer: MEDICARE

## 2025-08-04 DIAGNOSIS — H21.562 PUPILLARY SPHINCTER RUPTURE, LEFT: ICD-10-CM

## 2025-08-04 DIAGNOSIS — H25.13 NUCLEAR SCLEROTIC CATARACT OF BOTH EYES: ICD-10-CM

## 2025-08-04 DIAGNOSIS — Z98.83 GLAUCOMA FILTERING BLEB OF LEFT EYE: ICD-10-CM

## 2025-08-04 DIAGNOSIS — H31.002 CHORIORETINAL SCAR, LEFT: ICD-10-CM

## 2025-08-04 DIAGNOSIS — E11.9 TYPE 2 DIABETES MELLITUS WITHOUT OPHTHALMIC MANIFESTATIONS: ICD-10-CM

## 2025-08-04 DIAGNOSIS — H33.052 OLD SUBTOTAL RETINAL DETACHMENT, LEFT: ICD-10-CM

## 2025-08-04 DIAGNOSIS — H40.1111 PRIMARY OPEN-ANGLE GLAUCOMA, RIGHT EYE, MILD STAGE: ICD-10-CM

## 2025-08-04 DIAGNOSIS — H40.1122 PRIMARY OPEN-ANGLE GLAUCOMA, LEFT EYE, MODERATE STAGE: Primary | ICD-10-CM

## 2025-08-04 PROCEDURE — 99999 PR PBB SHADOW E&M-EST. PATIENT-LVL III: CPT | Mod: PBBFAC,,, | Performed by: OPHTHALMOLOGY

## 2025-08-04 RX ORDER — DORZOLAMIDE HYDROCHLORIDE AND TIMOLOL MALEATE 20; 5 MG/ML; MG/ML
1 SOLUTION/ DROPS OPHTHALMIC 3 TIMES DAILY
Qty: 30 ML | Refills: 3 | Status: SHIPPED | OUTPATIENT
Start: 2025-08-04

## 2025-08-04 RX ORDER — NETARSUDIL AND LATANOPROST OPHTHALMIC SOLUTION, 0.02%/0.005% .2; .05 MG/ML; MG/ML
1 SOLUTION/ DROPS OPHTHALMIC; TOPICAL DAILY
Qty: 7.5 ML | Refills: 3 | Status: SHIPPED | OUTPATIENT
Start: 2025-08-04

## 2025-08-04 RX ORDER — BRIMONIDINE TARTRATE 2 MG/ML
1 SOLUTION/ DROPS OPHTHALMIC 3 TIMES DAILY
Qty: 30 ML | Refills: 3 | Status: SHIPPED | OUTPATIENT
Start: 2025-08-04

## 2025-08-04 NOTE — Clinical Note
Needs a 4 month F/U with [facility] 24-2 ss od and 10-2 ss os // DFE / OCT - likes early morning - just put it in his portal

## 2025-08-04 NOTE — PROGRESS NOTES
Population Health Chart Review & Patient Outreach Details      Additional [facility] Health Notes:      Payor report for hypertension. Pt is also due for annual visit. Portal msg sent to get scheduled          Updates Requested / Reviewed:      Updated Care Coordination Note, Care Everywhere, Care Team Updated, and Immunizations Reconciliation Completed or Queried: [place]         Health Maintenance Topics Overdue:      [facility] Score: 1     Osteoporosis Screening                       Health Maintenance Topic(s) Outreach Outcomes & Actions Taken:    Primary Care Appt - Outreach Outcomes & Actions Taken  : portal msg sent for scheduling

## 2025-08-07 ENCOUNTER — CLINICAL SUPPORT (OUTPATIENT)
Dept: DIABETES | Facility: CLINIC | Age: 70
End: 2025-08-07
Payer: MEDICARE

## 2025-08-07 VITALS — WEIGHT: 160.5 LBS | HEIGHT: 65 IN | BODY MASS INDEX: 26.74 KG/M2

## 2025-08-07 DIAGNOSIS — E11.65 TYPE 2 DIABETES MELLITUS WITH HYPERGLYCEMIA, WITHOUT LONG-TERM CURRENT USE OF INSULIN: Primary | ICD-10-CM

## 2025-08-07 PROCEDURE — 99999 PR PBB SHADOW E&M-EST. PATIENT-LVL II: CPT | Mod: PBBFAC,,,

## 2025-08-07 PROCEDURE — 99999 PR PBB SHADOW E&M-EST. PATIENT-LVL III: CPT | Mod: PBBFAC,,, | Performed by: DIETITIAN, REGISTERED

## 2025-08-07 NOTE — PROGRESS NOTES
"Diabetes Care Specialist Follow-up Note  Author: Anupama Brian RD, [facility]  Date: 8/7/2025    Intake    Program Intake  Reason for Diabetes Program Visit:: Intervention  Type of Intervention:: Individual  Individual: Education  Education: Nutrition and Meal Planning, Pattern Management  Current diabetes risk level:: moderate (HgbA1c 8.4)  In the last month, have you used the ER or been admitted to the hospital: No  Permission to speak with others about care:: no    Current Diabetes Treatment: Oral Medications  Oral Medication Type/Dose: metformin ER 1000mg a day, rybelsus 7mg a day, farxiga 10mg a day    Continuous Glucose Monitoring  Patient has CGM: Yes  Personal CGM type:: freestlye libre2  GMI Date: 08/07/25  GMI Value: 7.7 %    Lab Results   Component Value Date    HGBA1C 8.8 (H) 07/24/2025     A1c Pre Diabetes Care Specialist Intervention:  8.4%      Weight: 72.8 kg (160 lb 7.9 oz)   Height: 5' 5" (165.1 cm)   Body mass index is 26.71 kg/m².        Physical activity/Exercise:   Working in the yard, painting the rooms in her home    SMBG: using the richmond        Lifestyle Coping Support & Clinical    Lifestyle/Coping/Support  Compared to other people your age, how would you rate your health?: Good  Psychosocial/Coping Skills Assessment Completed: : No  Assessment indicates:: Adequate understanding  Deffered due to:: Other (comment) (previously completed, there has been no change and patient has adequate understanding)  Area of need?: No    Problem Review  Active Comorbidities: Other (comment), Gastrointestinal Disorder, Cardiovascular Disease, Hypertension    Diabetes Self-Management Skills Assessment    Medication Skills Assessment  Patient is able to identify current diabetes medications, dosages, and appropriate timing of medications.: yes  Patient reports problems or concerns with current medication regimen.: no  Patient is  aware that some diabetes medications can cause low blood sugar?: Yes  Medication " Skills Assessment Completed:: Yes  Assessment indicates:: Adequate understanding  Deffered due to:: Other (comment)  Area of need?: No    Diabetes Disease Process/Treatment Options  Diabetes Type?: Type II  Diabetes Disease Process/Treatment Options: Skills Assessment Completed: No  Assessment indicates:: Adequate understanding  Deferred due to:: Other (comment)  Area of need?: No    Nutrition/Healthy Eating  Meal Plan 24 Hour Recall - Breakfast: oatmeal 1 pack with either raisins or a banana  Meal Plan 24 Hour Recall - Lunch: ham sandwich with a handful of chips  Meal Plan 24 Hour Recall - Dinner: red beans and rice about 3 tbsp and a hot sausage jesika  Meal Plan 24 Hour Recall - Snack: last night pound cake  Meal Plan 24 Hour Recall - Beverage: water and crystal light  Who shops/cooks?: spouse or self  Patient can identify foods that impact blood sugar.: yes  Challenges to healthy eating:: portion control  Nutrition/Healthy Eating Skills Assessment Completed:: Yes  Assessment indicates:: Adequate understanding, Instruction Needed  Area of need?: Yes    Physical Activity/Exercise  Patient's daily activity level:: moderately active  Patient formally exercises outside of work.: yes  Frequency: three times a week  Patient can identify forms of physical activity.: yes  Physical Activity/Exercise Skills Assessment Completed: : Yes  Assessment indicates:: Adequate understanding  Deffered due to:: Other (comment)  Area of need?: No    Home Blood Glucose Monitoring  Patient states that blood sugar is checked at home daily.: yes  Monitoring Method:: personal continuous glucose monitor  Personal CGM type:: rui weire2   What is your current Time in Range?: 56%  What is your A1c Target?: 7.0  Home Blood Glucose Monitoring Skills Assessment Completed: : Yes  Assessment indicates:: Adequate understanding  Area of need?: No    Acute Complications  Have you ever had hypoglycemia (low BG 70 or less)?: yes  How often and what  are your symptoms?: not often, mostly high  How do you treat hypoglycemia?: yes, candy, juice, regular soda  Have you ever had hyperglycemia (high  or more)?: no   Do you know the symptoms of high blood sugar and how to treat: yes  Have you ever had DKA?: no  Do you ever test for ketones?: no  Do you have a sick day plan?: yes  Acute Complications Skills Assessment Completed: : Yes  Assessment indicates:: Adequate understanding  Deffered due to:: Other (comment)  Area of need?: No    Chronic Complications  Reviewed health maintenance: yes  Have you completed your annual diabetes maintenance labwork? : yes  Do you examine your feet daily?: yes  Has your doctor examined your feet?: yes  Do you see a Dentist?: yes  Do you see an eye doctor?: yes  Eye doctor date of last visit:: Monday 8/4/2025  Chronic Complications Skills Assessment Completed: : Yes  Assessment indicates:: Adequate understanding  Deferred due to:: Other (comment)  Area of need?: No      During today's follow-up visit,  the following areas required further assessment and content was provided/reviewed.    Based on today's diabetes care assessment, the following areas of need were identified:      Identified Areas of Need      Medication/Current Diabetes Treatment: No   Lifestyle Coping/Support: No   Diabetes Disease Process/Treatment Options: No   Nutrition/Healthy Eating: Yes Reviewed carb counting, portion control, importance of spacing meals throughout the day to prevent post prandial elevations.  Recommended low saturated fat, low sodium diet to aid in control of hypertension and cholesterol. Reviewed plate method and portion control, dining out tips, meal planning, reading a food label, healthy snack options, benefits of physical activity   Physical Activity/Exercise: No    Home Blood Glucose Monitoring: No    Acute Complications: No    Chronic Complications: No       Today's interventions were provided through individual discussion,  instruction, and written materials were provided.    Patient verbalized understanding of instruction and written materials.  Pt was able to return back demonstration of instructions today. Patient understood key points, needs reinforcement and further instruction.     Diabetes Self-Management Care Plan Review and Evaluation of Progress:    During today's follow-up Kevins Diabetes Self-Management Care Plan progress was reviewed and progress was evaluated including his/her input. Eyad has agreed to continue his/her journey to improve/maintain overall diabetes control by continuing to set health goals. See care plan progress below.      Care Plan: Diabetes Management   Updates made since 8/7/2024 12:00 AM        Problem: Blood Glucose Self-Monitoring Resolved 8/7/2025        Goal: Patient will successfully switch to the richmond 3 plus Completed 8/7/2025   Start Date: 5/5/2025   Expected End Date: 6/5/2025   Recent Progress: Deferred   Priority: High   Barriers: Lack of Supplies        Task: Reviewed the importance of self-monitoring blood glucose and keeping logs. Completed 5/5/2025        Task: Discussed importance of using a FSG to test when patient feels differently than the sensor is reading Completed 5/5/2025        Task: Provided patient with blood glucose logs, reviewed appropriate timing and frequency to SMBG, education on parameters on when to notify provider and advised patient to bring logs to all appts with PCP/Endocrinologist/Diabetes Care Specialist. Completed 5/5/2025        Task: Discussed ways to minimize pain when monitoring blood glucose. Completed 5/5/2025        Problem: Healthy Eating         Goal: patient will work on having smaller portions with meals    Start Date: 8/7/2025   Expected End Date: 9/7/2025   This Visit's Progress: Deferred   Priority: High   Barriers: No Barriers Identified        Task: Reviewed the sources and role of Carbohydrate, Protein, and Fat and how each nutrient impacts  blood sugar. Completed 8/7/2025        Task: Provided visual examples using dry measuring cups, food models, and other familiar objects such as computer mouse, deck or cards, tennis ball etc. to help with visualization of portions. Completed 8/7/2025        Task: Explained how to count carbohydrates using the food label and the use of dry measuring cups for accurate carb counting. Completed 8/7/2025        Task: Discussed strategies for choosing healthier menu options when dining out. Completed 8/7/2025        Task: Recommended replacing beverages containing high sugar content with noncaloric/sugar free options and/or water. Completed 8/7/2025        Task: Review the importance of balancing carbohydrates with each meal using portion control techniques to count servings of carbohydrate and label reading to identify serving size and amount of total carbs per serving. Completed 8/7/2025        Task: Provided Sample plate method and reviewed the use of the plate to estimate amounts of carbohydrate per meal. Completed 8/7/2025          Follow Up Plan     Follow up in about 4 weeks (around 9/4/2025) for General Follow-up, Personal CGM Upload.    Today's care plan and follow up schedule was discussed with patient.  Eyad verbalized understanding of the care plan, goals, and agrees to follow up plan.        The patient was encouraged to communicate with his/her health care provider/physician and care team regarding his/her condition(s) and treatment.  I provided the patient with my contact information today and encouraged to contact me via phone or [facility] Patient Portal as needed.     Length of Visit   Total Time: 60 Minutes

## 2025-08-08 ENCOUNTER — PATIENT OUTREACH (OUTPATIENT)
Dept: DIABETES | Facility: CLINIC | Age: 70
End: 2025-08-08
Payer: MEDICARE

## 2025-08-08 ENCOUNTER — TELEPHONE (OUTPATIENT)
Dept: DIABETES | Facility: CLINIC | Age: 70
End: 2025-08-08

## 2025-08-08 NOTE — TELEPHONE ENCOUNTER
Called pt to review Barak download, no answer, lft vm.   Patient to the ER with complaints of runny nose, sore throat, cough, and fatigue that started Friday night.     Patient came from urgent care where he had a negative flu, strep, and covid test.

## 2025-08-08 NOTE — PROGRESS NOTES
"  Continuous Glucose Sensor Report of Personal Device    Eyad Garcia is a 70 y.o.male with type 2 diabetes     Interpretation of data from [employer] Barak---report from July 25, 2025 through August 7, 2025    Reason for review: Currently on anti-hyperglycemic therapy and failing to achieve glycemic goals.    Lab Results   Component Value Date    HGBA1C 8.8 (H) 07/24/2025         Current diabetes medications and doses:     Medication Changes:   We will continue his current regimen and he will work on medication compliance  He denies compliance issue secondary to cost or side effects  Reports he just has not been taking the medications because "he just does not want to"         Continue the Metformin 1000 mg daily -- 2 tablets in the AM with food      Continue the Rybelsus 7 mg daily that you just started   Remember to take the Rybelsus -first thing in the AM before medications and food  Take ALL BY ITSELF with only 4 oz of water   Wait 30 minutes to eat, drink, and take other medications      Continue Farxiga 10 mg daily   -- Please let us know if you have nausea, vomiting, or weakness with a normal BG. This could be euglycemic DKA.  -- please hold medication 3 days prior to surgery or if you are sick and have decreased intake.   -- Please drink lots of water daily while on this medication.   --This medication could also give you a yeast infection- please let us know if this occurs and we can treat it.      Continue the Barak 2---he prefers the Barak 2     ________________________________________________________________    SUMMARY of KEY FINDINGS:      Average glucose: 182  Above 180 mg/dL: 36%  (Above 250 mg/dL: 8%)  Within  mg/dL: 56%  Below 70 mg/dL: 0%  (Below 54 mg/dL: 0%)      CGM data reviewed and notable for the following trends:   Blood sugar readings are running above goal with prandial excursions      Will make the following changes based on review of data:  Make sure patient is compliant with " medications  If he has been taking the Rybelsus continuously we could consider increasing it to the next dose---14 mg  Make sure he stays on the Barak  Continue the metformin 1000 mg twice a day  Continue the Farxiga 10 mg daily  Work on diabetic diet    Marisol Bob, NP

## 2025-08-08 NOTE — TELEPHONE ENCOUNTER
----- Message from Marisol Bob NP sent at 8/8/2025  1:06 PM CDT -----  Please call patient let him know that I reviewed his Barak download from Anupama  His readings are above goal----average sugar 180 to and in target range only 56% of the time with an estimated A1c of 7.7%  We need to get his average down to the 150s are better  Will make the following changes based on review of data:  Make sure patient is compliant with medications  If he has been taking the Rybelsus continuously we could consider increasing it to the next dose---14 mg  Make sure he stays on the Barak  Continue the metformin 1000 mg twice a day  Continue the Farxiga 10 mg daily  Work on diabetic diet

## 2025-08-08 NOTE — Clinical Note
Please call patient let him know that I reviewed his Barak download from Anupama His readings are above goal----average sugar 180 to and in target range only 56% of the time with an estimated A1c of 7.7% We need to get his average down to the 150s are better Will make the following changes based on review of data: Make sure patient is compliant with medications If he has been taking the Rybelsus continuously we could consider increasing it to the next dose---14 mg Make sure he stays on the Barak Continue the metformin 1000 mg twice a day Continue the Farxiga 10 mg daily Work on diabetic diet

## 2025-08-11 ENCOUNTER — PATIENT MESSAGE (OUTPATIENT)
Dept: DIABETES | Facility: CLINIC | Age: 70
End: 2025-08-11
Payer: MEDICARE

## 2025-09-05 ENCOUNTER — TELEPHONE (OUTPATIENT)
Dept: DIABETES | Facility: CLINIC | Age: 70
End: 2025-09-05
Payer: MEDICARE

## (undated) DEVICE — SEE L#95700

## (undated) DEVICE — KIT MEROCEL INSTRUMENT WIPE

## (undated) DEVICE — SUT 10/0 1X12IN BLK TG160-6

## (undated) DEVICE — BLADE SURG #15 CARBON STEEL

## (undated) DEVICE — TIPS REAR EXTENDER

## (undated) DEVICE — SUT 6/0 18IN COATED VICRYL

## (undated) DEVICE — SPONGE COTTON TRAY 4X4IN

## (undated) DEVICE — Device

## (undated) DEVICE — SYR 30CC LUER LOCK

## (undated) DEVICE — GAUZE SPONGE 4X4 12PLY

## (undated) DEVICE — CORD BIPOLAR 12 FOOT

## (undated) DEVICE — SUT VICRYL 9-0 VIL MONO

## (undated) DEVICE — RETRACTOR STAY SPIRA  20MM

## (undated) DEVICE — GAUZE FLUFF XXLG 36X36 2 PLY

## (undated) DEVICE — SOL STERILE WATER 10ML

## (undated) DEVICE — LUBRICANT SURGILUBE 2 OZ

## (undated) DEVICE — SPONGE DERMACEA GAUZE 4X4

## (undated) DEVICE — BRIEF MESH LARGE

## (undated) DEVICE — SEE MEDLINE ITEM 146347

## (undated) DEVICE — TRAY MINOR GEN SURG

## (undated) DEVICE — SUT VICRYL 3-0 27 SH

## (undated) DEVICE — NDL FILTER 19GA X 1-1/2

## (undated) DEVICE — TUBING SUC UNIV W/CONN 12FT

## (undated) DEVICE — DRAPE STERI INSTRUMENT 1018

## (undated) DEVICE — SUT 2-0 VICRYL / SH (J417)

## (undated) DEVICE — GAUZE SPONGE PEANUT STRL

## (undated) DEVICE — ELECTRODE REM PLYHSV RETURN 9

## (undated) DEVICE — SPONGE WEC CEL SPEARS

## (undated) DEVICE — DRESSING EYE OVAL LF

## (undated) DEVICE — SOL NS 1000CC

## (undated) DEVICE — ERASER HEMSTAT BPLR 23G BLUNT

## (undated) DEVICE — RETRACTOR LONE STAR 28.3X18.3

## (undated) DEVICE — SEE MEDLINE ITEM 152529

## (undated) DEVICE — SHIELD COLLAGEN 12HR CORNEAL

## (undated) DEVICE — NDL RETROBULAR AKTKINSON 23G

## (undated) DEVICE — CASSETTE DRAPE

## (undated) DEVICE — PAD EYE OVAL CNTOUR 1.62X2.62

## (undated) DEVICE — PANTIES FEMININE NAPKIN LG/XLG

## (undated) DEVICE — SET DECANTER MEDICHOICE

## (undated) DEVICE — SYR 50CC LL

## (undated) DEVICE — NDL 22GA X1 1/2 REG BEVEL

## (undated) DEVICE — KNIFE ANGLE 1MM

## (undated) DEVICE — DRESSING XEROFORM FOIL PK 1X8

## (undated) DEVICE — TUBING VENTED 90IN

## (undated) DEVICE — SEE MEDLINE ITEM 157148

## (undated) DEVICE — SUT CHROMIC 3-0 SH 27IN GUT

## (undated) DEVICE — GOWN SURGICAL X-LARGE

## (undated) DEVICE — SEE MEDLINE ITEM 146417

## (undated) DEVICE — SYR 10CC LUER LOCK

## (undated) DEVICE — ADHESIVE DERMABOND ADVANCED

## (undated) DEVICE — SOL BETADINE 5%

## (undated) DEVICE — SEE MEDLINE ITEM 154981

## (undated) DEVICE — SUT 10/0 12IN VICRYL VIO M

## (undated) DEVICE — KNIFE ANGLED OPTH

## (undated) DEVICE — CUP MEDICINE STERILE 2OZ

## (undated) DEVICE — SEE MEDLINE ITEM 152487

## (undated) DEVICE — GLOVE BIOGEL ECLIPSE SZ 6.5

## (undated) DEVICE — KNIFE OPHTH MICRO UNITOME 5MM

## (undated) DEVICE — DRAPE INCISE IOBAN 2 23X17IN

## (undated) DEVICE — PACK SCROTO-PAK LONE STAR

## (undated) DEVICE — CLIPPER BLADE MOD 4406 (CAREF)

## (undated) DEVICE — SHIELD EYE PLASTIC

## (undated) DEVICE — BLADE EYE